# Patient Record
Sex: FEMALE | Race: WHITE | NOT HISPANIC OR LATINO | Employment: UNEMPLOYED | URBAN - NONMETROPOLITAN AREA
[De-identification: names, ages, dates, MRNs, and addresses within clinical notes are randomized per-mention and may not be internally consistent; named-entity substitution may affect disease eponyms.]

---

## 2017-03-11 ENCOUNTER — APPOINTMENT (OUTPATIENT)
Dept: GENERAL RADIOLOGY | Facility: HOSPITAL | Age: 82
End: 2017-03-11

## 2017-03-11 ENCOUNTER — HOSPITAL ENCOUNTER (EMERGENCY)
Facility: HOSPITAL | Age: 82
Discharge: HOME | End: 2017-03-11
Attending: EMERGENCY MEDICINE | Admitting: EMERGENCY MEDICINE

## 2017-03-11 VITALS
BODY MASS INDEX: 25.83 KG/M2 | TEMPERATURE: 97.6 F | OXYGEN SATURATION: 97 % | HEART RATE: 70 BPM | DIASTOLIC BLOOD PRESSURE: 80 MMHG | SYSTOLIC BLOOD PRESSURE: 142 MMHG | RESPIRATION RATE: 18 BRPM | HEIGHT: 65 IN | WEIGHT: 155 LBS
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
## 2017-03-11 DIAGNOSIS — S52.531A CLOSED COLLES' FRACTURE OF RIGHT RADIUS, INITIAL ENCOUNTER: Primary | ICD-10-CM

## 2017-03-11 DIAGNOSIS — S52.124A CLOSED NONDISPLACED FRACTURE OF HEAD OF RIGHT RADIUS, INITIAL ENCOUNTER: ICD-10-CM

## 2017-03-11 DIAGNOSIS — S52.611A CLOSED DISPLACED FRACTURE OF STYLOID PROCESS OF RIGHT ULNA, INITIAL ENCOUNTER: ICD-10-CM

## 2017-03-11 PROCEDURE — 73080 X-RAY EXAM OF ELBOW: CPT | Performed by: RADIOLOGY

## 2017-03-11 PROCEDURE — 73110 X-RAY EXAM OF WRIST: CPT | Performed by: RADIOLOGY

## 2017-03-11 PROCEDURE — 73130 X-RAY EXAM OF HAND: CPT

## 2017-03-11 PROCEDURE — 73110 X-RAY EXAM OF WRIST: CPT

## 2017-03-11 PROCEDURE — 99283 EMERGENCY DEPT VISIT LOW MDM: CPT

## 2017-03-11 PROCEDURE — 73080 X-RAY EXAM OF ELBOW: CPT

## 2017-03-11 PROCEDURE — 73130 X-RAY EXAM OF HAND: CPT | Performed by: RADIOLOGY

## 2017-03-11 RX ORDER — HYDROCODONE BITARTRATE AND ACETAMINOPHEN 5; 325 MG/1; MG/1
1 TABLET ORAL EVERY 6 HOURS PRN
Qty: 10 TABLET | Refills: 0 | Status: SHIPPED | OUTPATIENT
Start: 2017-03-11 | End: 2018-04-24 | Stop reason: ALTCHOICE

## 2017-03-11 NOTE — DISCHARGE INSTRUCTIONS

## 2017-03-11 NOTE — ED PROVIDER NOTES
Subjective   HPI Comments: 83-year-old female who presents to the ED today with a right wrist injury.  She states she fell last night.  She was stepping onto her porch and lost her balance and fell backwards into her yard.  She states she fell down one step.  She is complaining of pain in her right hand, right wrist and right elbow.  She denies any previous fracture or injury to this area.  Patient denies any other injury.  She denies any loss of consciousness.    Patient is a 83 y.o. female presenting with upper extremity pain.   History provided by:  Patient  Upper Extremity Issue   Location:  Wrist and elbow  Elbow location:  R elbow  Wrist location:  R wrist  Injury: yes    Time since incident:  1 day  Mechanism of injury: fall    Fall:     Fall occurred:  Down stairs    Impact surface:  Hamel    Point of impact:  Unable to specify    Entrapped after fall: no    Pain details:     Quality:  Aching and throbbing    Radiates to:  R elbow    Severity:  Moderate    Onset quality:  Sudden    Timing:  Constant    Progression:  Worsening  Foreign body present:  No foreign bodies  Prior injury to area:  No  Relieved by:  Rest  Worsened by:  Movement  Ineffective treatments:  None tried  Associated symptoms: decreased range of motion and swelling    Associated symptoms: no back pain, no fatigue, no fever, no muscle weakness, no neck pain, no numbness, no stiffness and no tingling        Review of Systems   Constitutional: Negative for fatigue and fever.   HENT: Negative.    Eyes: Negative.    Respiratory: Negative.    Cardiovascular: Negative.    Gastrointestinal: Negative.    Genitourinary: Negative.    Musculoskeletal: Positive for arthralgias and joint swelling. Negative for back pain, neck pain and stiffness.   Skin: Negative.    Neurological: Negative.    Psychiatric/Behavioral: Negative.    All other systems reviewed and are negative.      History reviewed. No pertinent past medical history.    Allergies    Allergen Reactions   • Motrin [Ibuprofen]    • Novocain [Procaine]        Past Surgical History   Procedure Laterality Date   • Breast biopsy Left      benign       History reviewed. No pertinent family history.    Social History     Social History   • Marital status:      Spouse name: N/A   • Number of children: N/A   • Years of education: N/A     Social History Main Topics   • Smoking status: Never Smoker   • Smokeless tobacco: None   • Alcohol use None   • Drug use: None   • Sexual activity: Not Asked     Other Topics Concern   • None     Social History Narrative   • None           Objective   Physical Exam   Constitutional: She is oriented to person, place, and time. She appears well-developed and well-nourished. No distress.   HENT:   Head: Normocephalic and atraumatic.   Right Ear: External ear normal.   Left Ear: External ear normal.   Nose: Nose normal.   Mouth/Throat: Oropharynx is clear and moist.   Eyes: Conjunctivae and EOM are normal. Pupils are equal, round, and reactive to light.   Neck: Normal range of motion. Neck supple.   Cardiovascular: Normal rate, regular rhythm, normal heart sounds and intact distal pulses.    Pulmonary/Chest: Effort normal and breath sounds normal. No respiratory distress. She has no wheezes. She exhibits no tenderness.   Abdominal: Soft. Bowel sounds are normal. There is no tenderness.   Musculoskeletal: She exhibits tenderness.   Pt has swelling and bruising from her right hand into her mid right forearm.  Decreased ROM of her right wrist, also has pain along the radial side of her right elbow.  She has a palpable right radial pulse.   Neurological: She is alert and oriented to person, place, and time.   Skin: Skin is warm and dry.   Psychiatric: She has a normal mood and affect. Her behavior is normal. Judgment and thought content normal.   Nursing note and vitals reviewed.      Splint Application  Date/Time: 3/11/2017 2:28 PM  Performed by: OSIRIS MINAYA  ROSINA  Authorized by: CHASTITY CLARK   Location details: right wrist  Splint type: sugar tong  Supplies used: Ortho-Glass  Post-procedure: The splinted body part was neurovascularly unchanged following the procedure.  Patient tolerance: Patient tolerated the procedure well with no immediate complications               ED Course  ED Course   Comment By Time   Pt declines any pain medication at this time. ANA Orozco 03/11 0926   Pt found to have a distal radius fracture and a possible right radial head fracture.  Will place in sugar tong splint and sling and have her follow up with ortho. ANA Orozco 03/11 1038                  MDM  Number of Diagnoses or Management Options  Closed Colles' fracture of right radius, initial encounter:   Closed displaced fracture of styloid process of right ulna, initial encounter:   Closed nondisplaced fracture of head of right radius, initial encounter:   Fall at home, initial encounter:      Amount and/or Complexity of Data Reviewed  Tests in the radiology section of CPT®: ordered and reviewed    Patient Progress  Patient progress: stable      Final diagnoses:   Closed Colles' fracture of right radius, initial encounter   Closed displaced fracture of styloid process of right ulna, initial encounter   Closed nondisplaced fracture of head of right radius, initial encounter   Fall at home, initial encounter            ANA Orozco  03/11/17 1427

## 2017-03-11 NOTE — ED NOTES
Discharge instructions reviewed with patient, patient instructed to return to ED if symptoms worsen or if any new problems arise. Patient verbalizes understanding of discharge instructions, patient ambulatory out of ED. No acute distress noted.       Renata Thornton RN  03/11/17 1127

## 2017-03-13 ENCOUNTER — OFFICE VISIT (OUTPATIENT)
Dept: ORTHOPEDIC SURGERY | Facility: CLINIC | Age: 82
End: 2017-03-13

## 2017-03-13 VITALS — BODY MASS INDEX: 25.83 KG/M2 | HEIGHT: 65 IN | WEIGHT: 155 LBS

## 2017-03-13 DIAGNOSIS — S52.601A RADIUS AND ULNA DISTAL FRACTURE, RIGHT, CLOSED, INITIAL ENCOUNTER: Primary | ICD-10-CM

## 2017-03-13 DIAGNOSIS — S52.501A RADIUS AND ULNA DISTAL FRACTURE, RIGHT, CLOSED, INITIAL ENCOUNTER: Primary | ICD-10-CM

## 2017-03-13 PROCEDURE — 25600 CLTX DST RDL FX/EPHYS SEP WO: CPT | Performed by: ORTHOPAEDIC SURGERY

## 2017-03-13 RX ORDER — METOPROLOL SUCCINATE 100 MG/1
100 TABLET, EXTENDED RELEASE ORAL DAILY
COMMUNITY
End: 2018-04-24 | Stop reason: ALTCHOICE

## 2017-03-13 RX ORDER — BENAZEPRIL HYDROCHLORIDE 20 MG/1
20 TABLET ORAL 2 TIMES DAILY
COMMUNITY
End: 2018-07-17 | Stop reason: DRUGHIGH

## 2017-03-13 RX ORDER — LEVOTHYROXINE SODIUM 112 UG/1
100 CAPSULE ORAL DAILY
Status: ON HOLD | COMMUNITY
End: 2018-05-08

## 2017-03-13 RX ORDER — HYDROCHLOROTHIAZIDE 12.5 MG/1
12.5 CAPSULE, GELATIN COATED ORAL DAILY PRN
Status: ON HOLD | COMMUNITY
End: 2022-12-20

## 2017-03-13 RX ORDER — AMLODIPINE BESYLATE 5 MG/1
5 TABLET ORAL DAILY
COMMUNITY
End: 2019-07-22 | Stop reason: DRUGHIGH

## 2017-03-13 RX ORDER — MELOXICAM 7.5 MG/1
7.5 TABLET ORAL 2 TIMES DAILY PRN
COMMUNITY
End: 2022-12-25 | Stop reason: HOSPADM

## 2017-03-13 RX ORDER — ASPIRIN 325 MG
325 TABLET ORAL DAILY
Status: ON HOLD | COMMUNITY

## 2017-03-13 NOTE — PROGRESS NOTES
New Patient Visit      Patient: Christine Garg  YOB: 1933  Date of Encounter: 03/13/2017          History of Present Illness:       83-year-old white female seen today secondary to an acute injury suffered 3/11/17. Patient states that she was attempting to step up on her porch when she fell back onto outstretched wrists. She had immediate pain as well as a popping sensation and swelling to the distal forearm and right wrist. She presented to the emergency department which radiographs of the hand, wrist and elbow were obtained. She was placed in a sugar tong splint and she was asked to follow-up with orthopedics. She is remain in the splint and sling with complaints of progressive swelling of her fingers. She reports intermittent numbness which has improved with the decrease swelling this a.m. She complains of a dull throbbing pain to the distal aspect of her wrist. No other prior injuries or complications with her wrist proper. She does report long-standing bilateral thumb osteoarthritis.      Past Medical History   Diagnosis Date   • High cholesterol    • Past heart attack    • Thyroid disease      Past Surgical History   Procedure Laterality Date   • Breast biopsy Left      benign   • Cataract extraction     • Coronary angioplasty       Social History     Occupational History   • Not on file.     Social History Main Topics   • Smoking status: Former Smoker   • Smokeless tobacco: Never Used   • Alcohol use No   • Drug use: Not on file   • Sexual activity: Not on file      Social History     Social History Narrative     Family History   Problem Relation Age of Onset   • Heart disease Father    • Heart disease Brother      Current Outpatient Prescriptions   Medication Sig Dispense Refill   • amLODIPine (NORVASC) 10 MG tablet Take 10 mg by mouth Daily.     • aspirin 325 MG tablet Take 325 mg by mouth Daily.     • benazepril (LOTENSIN) 10 MG tablet Take 10 mg by mouth Daily.     • hydrochlorothiazide  "(MICROZIDE) 12.5 MG capsule Take 12.5 mg by mouth Daily.     • levothyroxine (SYNTHROID, LEVOTHROID) 100 MCG tablet Take 100 mcg by mouth Daily.     • meloxicam (MOBIC) 15 MG tablet Take 15 mg by mouth Daily.     • metoprolol succinate XL (TOPROL-XL) 100 MG 24 hr tablet Take 100 mg by mouth Daily.     • HYDROcodone-acetaminophen (NORCO) 5-325 MG per tablet Take 1 tablet by mouth Every 6 (Six) Hours As Needed for severe pain (7-10). 10 tablet 0     No current facility-administered medications for this visit.      Allergies   Allergen Reactions   • Motrin [Ibuprofen]    • Novocain [Procaine]         Review of Systems   Constitution: Negative.   HENT: Negative.    Eyes: Negative.    Cardiovascular: Negative.    Respiratory: Negative.    Endocrine: Negative.    Hematologic/Lymphatic: Negative.    Skin: Negative.    Musculoskeletal:        Pertinent positives listed in HPI   Gastrointestinal: Positive for constipation.   Genitourinary: Negative.    Neurological: Negative.    Psychiatric/Behavioral: Negative.    Allergic/Immunologic: Negative.        Vitals:    03/13/17 1052   Weight: 155 lb (70.3 kg)   Height: 65\" (165.1 cm)       Physical Exam: 83 y.o. female .  General Appearance:    Well appearing, cooperative, in no acute distress.       Patient is alert and oriented x 3.            Musculoskeletal: On examination the patient has mild generalized swelling of the forearm with volar ecchymosis. She has tenderness upon any attempted range of motion of her wrist remainder of the range of motion was deferred secondary to notable fracture. She does have moderate generalized swelling starting at the wrist extending into the far hand and digits. She can weakly make a full fist. Palpable pulses. The patient has full flexion and extension of the elbow with no pain upon pronation or supination. Neurovascular status grossly intact.      Radiology:       AP, lateral and oblique x-rays of the right wrist reveals there is a " comminuted distal radius fracture with minimal impaction and extension into the radiocarpal joint. There is no associated obliquely oriented ulnar styloid fracture. Extensive first CMC joint osteoarthritis.    Procedures    Assesment:    ICD-10-CM ICD-9-CM   1. Radius and ulna distal fracture, right, closed, initial encounter S52.501A 813.44    S52.601A          Plan:    Discussion was had with the patient in regards to the intra-articular nondisplaced component of the distal radius fracture. We will treat this conservatively in short arm fiberglass cast applied today. She will remain in this for 4 weeks for xrays out of cast. Likely progression to brace at that time. She was given instructions on cast care as well as maximum elevation to help alleviate the swelling of the digits. Norco 5/325mg 1 po q 6 hours #20 no refills.     Discussion/Summary:    Written by Barrett Spring PA-C, acting as scribe for Dr. Taqueria CUELLAR, Ang Meng MD, personally performed the services described in this documentation as scribed by the above named individual in my presence, and it is both accurate and complete.  3/13/2017  1:44 PM      This document was signed by Barrett Spring PA-C March 13, 2017

## 2017-04-06 DIAGNOSIS — S52.501A CLOSED FRACTURE OF RIGHT DISTAL RADIUS AND ULNA, INITIAL ENCOUNTER: Primary | ICD-10-CM

## 2017-04-06 DIAGNOSIS — S52.601A CLOSED FRACTURE OF RIGHT DISTAL RADIUS AND ULNA, INITIAL ENCOUNTER: Primary | ICD-10-CM

## 2017-04-10 ENCOUNTER — HOSPITAL ENCOUNTER (OUTPATIENT)
Dept: GENERAL RADIOLOGY | Facility: HOSPITAL | Age: 82
Discharge: HOME | End: 2017-04-10
Attending: ORTHOPAEDIC SURGERY | Admitting: ORTHOPAEDIC SURGERY

## 2017-04-10 ENCOUNTER — OFFICE VISIT (OUTPATIENT)
Dept: ORTHOPEDIC SURGERY | Facility: CLINIC | Age: 82
End: 2017-04-10

## 2017-04-10 VITALS — WEIGHT: 155 LBS | BODY MASS INDEX: 25.83 KG/M2 | HEIGHT: 65 IN

## 2017-04-10 DIAGNOSIS — S52.601D CLOSED FRACTURE OF RIGHT DISTAL RADIUS AND ULNA, WITH ROUTINE HEALING, SUBSEQUENT ENCOUNTER: Primary | ICD-10-CM

## 2017-04-10 DIAGNOSIS — S52.601A CLOSED FRACTURE OF RIGHT DISTAL RADIUS AND ULNA, INITIAL ENCOUNTER: ICD-10-CM

## 2017-04-10 DIAGNOSIS — S52.501A CLOSED FRACTURE OF RIGHT DISTAL RADIUS AND ULNA, INITIAL ENCOUNTER: ICD-10-CM

## 2017-04-10 DIAGNOSIS — S52.501D CLOSED FRACTURE OF RIGHT DISTAL RADIUS AND ULNA, WITH ROUTINE HEALING, SUBSEQUENT ENCOUNTER: Primary | ICD-10-CM

## 2017-04-10 PROCEDURE — 99024 POSTOP FOLLOW-UP VISIT: CPT | Performed by: ORTHOPAEDIC SURGERY

## 2017-04-10 PROCEDURE — 73110 X-RAY EXAM OF WRIST: CPT

## 2017-04-10 PROCEDURE — 73110 X-RAY EXAM OF WRIST: CPT | Performed by: RADIOLOGY

## 2017-04-10 NOTE — PROGRESS NOTES
Christine Garg   :1933    Date of encounter:04/10/2017        HPI:  Christine Garg is a 83 y.o. female who returns here today for follow-up of a right distal radius and distal ulna fracture.  Date of injury was 3/11/2017.  She's been in a short arm cast for the last month.  She states she's tolerated it well.  She states she still has some mild pain does radiate down into her thumb.  She denies paresthesias.      Exam:  On examination she has mild swelling.  She has decreased range of motion.  There is no gross instability.  She still is mild tenderness along the distal radius.  Her neurovascular status is intact.    Radiology:  3 views of the right wrist show evidence of previous distal  radial and ulnar styloid fracture with evidence of healing    Assessment    ICD-10-CM ICD-9-CM   1. Closed fracture of right distal radius and ulna, with routine healing, subsequent encounter S52.501D V54.12    S52.601D        Plan:  An 83-year-old female 4 weeks post injury from a distal radius and ulnar styloid fracture of the right wrist.  X-rays today reveal no change in alignment with evidence of healing.  Today we provided her with a forearm brace to wear full time.  She can remove it for bathing purposes and gentle range of motion.  We will have her return back in one month for repeat x-rays and evaluation.    Written by, Ca PEREZ, acting as a scribe for Dr. Taqueria Tobar I, Ang Meng MD, personally performed the services described in this documentation as scribed by the above named individual in my presence, and it is both accurate and complete.  2017  10:00 PM

## 2017-05-05 DIAGNOSIS — S52.501D CLOSED FRACTURE OF RIGHT DISTAL RADIUS AND ULNA, WITH ROUTINE HEALING, SUBSEQUENT ENCOUNTER: Primary | ICD-10-CM

## 2017-05-05 DIAGNOSIS — S52.601D CLOSED FRACTURE OF RIGHT DISTAL RADIUS AND ULNA, WITH ROUTINE HEALING, SUBSEQUENT ENCOUNTER: Primary | ICD-10-CM

## 2017-05-08 ENCOUNTER — OFFICE VISIT (OUTPATIENT)
Dept: ORTHOPEDIC SURGERY | Facility: CLINIC | Age: 82
End: 2017-05-08

## 2017-05-08 ENCOUNTER — HOSPITAL ENCOUNTER (OUTPATIENT)
Dept: GENERAL RADIOLOGY | Facility: HOSPITAL | Age: 82
Discharge: HOME | End: 2017-05-08
Attending: ORTHOPAEDIC SURGERY | Admitting: ORTHOPAEDIC SURGERY

## 2017-05-08 DIAGNOSIS — M25.631 STIFFNESS OF WRIST JOINT, RIGHT: ICD-10-CM

## 2017-05-08 DIAGNOSIS — S52.501D CLOSED FRACTURE OF RIGHT DISTAL RADIUS AND ULNA, WITH ROUTINE HEALING, SUBSEQUENT ENCOUNTER: Primary | ICD-10-CM

## 2017-05-08 DIAGNOSIS — S52.601D CLOSED FRACTURE OF RIGHT DISTAL RADIUS AND ULNA, WITH ROUTINE HEALING, SUBSEQUENT ENCOUNTER: ICD-10-CM

## 2017-05-08 DIAGNOSIS — S52.501D CLOSED FRACTURE OF RIGHT DISTAL RADIUS AND ULNA, WITH ROUTINE HEALING, SUBSEQUENT ENCOUNTER: ICD-10-CM

## 2017-05-08 DIAGNOSIS — S52.601D CLOSED FRACTURE OF RIGHT DISTAL RADIUS AND ULNA, WITH ROUTINE HEALING, SUBSEQUENT ENCOUNTER: Primary | ICD-10-CM

## 2017-05-08 PROCEDURE — 73110 X-RAY EXAM OF WRIST: CPT | Performed by: RADIOLOGY

## 2017-05-08 PROCEDURE — 73110 X-RAY EXAM OF WRIST: CPT

## 2017-05-08 PROCEDURE — 99024 POSTOP FOLLOW-UP VISIT: CPT | Performed by: ORTHOPAEDIC SURGERY

## 2017-06-29 DIAGNOSIS — M25.531 RIGHT WRIST PAIN: Primary | ICD-10-CM

## 2017-07-05 ENCOUNTER — APPOINTMENT (OUTPATIENT)
Dept: GENERAL RADIOLOGY | Facility: HOSPITAL | Age: 82
End: 2017-07-05
Attending: ORTHOPAEDIC SURGERY

## 2017-08-15 ENCOUNTER — TRANSCRIBE ORDERS (OUTPATIENT)
Dept: ADMINISTRATIVE | Facility: HOSPITAL | Age: 82
End: 2017-08-15

## 2017-08-15 DIAGNOSIS — M81.0 OSTEOPOROSIS: Primary | ICD-10-CM

## 2017-08-21 ENCOUNTER — APPOINTMENT (OUTPATIENT)
Dept: BONE DENSITY | Facility: HOSPITAL | Age: 82
End: 2017-08-21
Attending: INTERNAL MEDICINE

## 2017-08-22 ENCOUNTER — HOSPITAL ENCOUNTER (OUTPATIENT)
Dept: BONE DENSITY | Facility: HOSPITAL | Age: 82
Discharge: HOME | End: 2017-08-22
Attending: INTERNAL MEDICINE | Admitting: INTERNAL MEDICINE

## 2017-08-22 DIAGNOSIS — M81.0 OSTEOPOROSIS: ICD-10-CM

## 2017-08-22 PROCEDURE — 77080 DXA BONE DENSITY AXIAL: CPT

## 2017-08-22 PROCEDURE — 77080 DXA BONE DENSITY AXIAL: CPT | Performed by: RADIOLOGY

## 2017-11-16 ENCOUNTER — TRANSCRIBE ORDERS (OUTPATIENT)
Dept: ADMINISTRATIVE | Facility: HOSPITAL | Age: 82
End: 2017-11-16

## 2017-11-16 DIAGNOSIS — R10.84 ABDOMINAL PAIN, DIFFUSE: Primary | ICD-10-CM

## 2017-11-17 ENCOUNTER — HOSPITAL ENCOUNTER (OUTPATIENT)
Dept: ULTRASOUND IMAGING | Facility: HOSPITAL | Age: 82
Discharge: HOME | End: 2017-11-17
Attending: INTERNAL MEDICINE | Admitting: INTERNAL MEDICINE

## 2017-11-17 DIAGNOSIS — R10.84 ABDOMINAL PAIN, DIFFUSE: ICD-10-CM

## 2017-11-17 PROCEDURE — 76700 US EXAM ABDOM COMPLETE: CPT

## 2017-11-17 PROCEDURE — 76700 US EXAM ABDOM COMPLETE: CPT | Performed by: RADIOLOGY

## 2018-04-24 ENCOUNTER — OFFICE VISIT (OUTPATIENT)
Dept: CARDIOLOGY | Facility: CLINIC | Age: 83
End: 2018-04-24

## 2018-04-24 VITALS
WEIGHT: 164.4 LBS | RESPIRATION RATE: 16 BRPM | BODY MASS INDEX: 27.39 KG/M2 | HEIGHT: 65 IN | DIASTOLIC BLOOD PRESSURE: 75 MMHG | SYSTOLIC BLOOD PRESSURE: 169 MMHG | HEART RATE: 68 BPM

## 2018-04-24 DIAGNOSIS — E78.5 DYSLIPIDEMIA: ICD-10-CM

## 2018-04-24 DIAGNOSIS — I10 ESSENTIAL HYPERTENSION: ICD-10-CM

## 2018-04-24 DIAGNOSIS — I25.10 ASCVD (ARTERIOSCLEROTIC CARDIOVASCULAR DISEASE): ICD-10-CM

## 2018-04-24 DIAGNOSIS — R60.0 LEG EDEMA: ICD-10-CM

## 2018-04-24 DIAGNOSIS — R07.2 PRECORDIAL PAIN: Primary | ICD-10-CM

## 2018-04-24 PROCEDURE — 99204 OFFICE O/P NEW MOD 45 MIN: CPT | Performed by: INTERNAL MEDICINE

## 2018-04-24 PROCEDURE — 93000 ELECTROCARDIOGRAM COMPLETE: CPT | Performed by: INTERNAL MEDICINE

## 2018-04-24 RX ORDER — CHLORAL HYDRATE 500 MG
CAPSULE ORAL
Status: ON HOLD | COMMUNITY
End: 2018-05-08

## 2018-04-24 RX ORDER — ACETAMINOPHEN 160 MG
2000 TABLET,DISINTEGRATING ORAL DAILY
COMMUNITY
End: 2023-03-22

## 2018-04-24 NOTE — PROGRESS NOTES
Dave Tobar MD  Christine [place]HEAD [place]  : 1933  DATE:2018    Patient Active Problem List   Diagnosis   • Precordial pain   • Essential hypertension   • Dyslipidemia   • Leg edema   • ASCVD (arteriosclerotic cardiovascular disease)       Dear Dave Tobar MD:    Subjective     Christine Garg is a 84 y.o. female with the above medical problems who is here today for follow-up. Patient states she was a previous patient of Dr. Mcduffie who has not been seen in several years.  She has a history of coronary artery disease underwent coronary artery bypass grafting ×3 in the past.  She also has a history of hypertension, dyslipidemia and thyroid disease.  She comes in today complaining of chest pain that she describes as midsternal, restless, so she'll shortness of breath, worsened on exertion and improved by nothing.  She states the intensity is 2/10 on the pain scale.  She also complains of dyspnea and lower extremity edema.  She denies any palpitations, orthopnea, PND, syncope or dizziness.  She is noted to be moderately hypertensive on current visit but she states she did not take her blood pressure medications this morning.  She has a previous history of smoking but quit 40 years ago.    Past Medical History:   Diagnosis Date   • High cholesterol    • Hypertension    • Past heart attack    • Thyroid disease        Past Surgical History:   Procedure Laterality Date   • BREAST BIOPSY Left     benign   • CATARACT EXTRACTION     • CORONARY ANGIOPLASTY         Family History   Problem Relation Age of Onset   • Heart disease Father    • Heart disease Brother        Social History     Social History   • Marital status:      Spouse name: N/A   • Number of children: N/A   • Years of education: N/A     Occupational History   • Not on file.     Social History Main Topics   • Smoking status: Former Smoker     Types: Cigarettes   • Smokeless tobacco: Never Used   • Alcohol use No   • Drug use: No   • Sexual  activity: Defer     Other Topics Concern   • Not on file     Social History Narrative   • No narrative on file         Current Outpatient Prescriptions:   •  amLODIPine (NORVASC) 5 MG tablet, Take 5 mg by mouth Daily., Disp: , Rfl:   •  aspirin 325 MG tablet, Take 325 mg by mouth Daily., Disp: , Rfl:   •  benazepril (LOTENSIN) 20 MG tablet, Take 10 mg by mouth Daily., Disp: , Rfl:   •  Calcium Carb-Cholecalciferol (OS-DARLYN PO), Take  by mouth., Disp: , Rfl:   •  Cholecalciferol (VITAMIN D3) 2000 units capsule, Take 1,000 Units by mouth Daily., Disp: , Rfl:   •  Flaxseed, Linseed, (FLAX SEEDS PO), Take  by mouth., Disp: , Rfl:   •  hydrochlorothiazide (MICROZIDE) 12.5 MG capsule, Take 12.5 mg by mouth Daily., Disp: , Rfl:   •  levothyroxine sodium (TIROSINT) 112 MCG capsule, Take 100 mcg by mouth Daily., Disp: , Rfl:   •  meloxicam (MOBIC) 7.5 MG tablet, Take 7.5 mg by mouth Daily., Disp: , Rfl:   •  metoprolol tartrate (LOPRESSOR) 25 MG tablet, Take 25 mg by mouth 2 (Two) Times a Day., Disp: , Rfl:   •  Omega-3 Fatty Acids (FISH OIL) 1000 MG capsule capsule, Take  by mouth Daily With Breakfast., Disp: , Rfl:   •  Red Yeast Rice Extract (RED YEAST RICE PO), Take  by mouth., Disp: , Rfl:     The following portions of the patient's history were reviewed and updated as appropriate: allergies, current medications, past family history, past medical history, past social history, past surgical history and problem list.    Review of Systems   Constitution: Positive for malaise/fatigue. Negative for chills, diaphoresis, fever, weakness, weight gain and weight loss.   HENT: Negative for congestion, ear discharge, ear pain, hearing loss, hoarse voice, nosebleeds, sore throat and tinnitus.    Eyes: Positive for blurred vision. Negative for discharge, double vision, pain and redness.   Cardiovascular: Positive for chest pain and leg swelling. Negative for dyspnea on exertion, irregular heartbeat, orthopnea, palpitations,  "paroxysmal nocturnal dyspnea and syncope.   Respiratory: Positive for shortness of breath. Negative for cough, hemoptysis and wheezing.    Endocrine: Negative for cold intolerance, heat intolerance, polydipsia, polyphagia and polyuria.   Hematologic/Lymphatic: Negative for bleeding problem. Does not bruise/bleed easily.   Skin: Negative for color change, dry skin, flushing, itching and rash.   Musculoskeletal: Positive for back pain, joint pain, joint swelling, muscle cramps and muscle weakness. Negative for arthritis, myalgias, neck pain and stiffness.   Gastrointestinal: Negative for abdominal pain, change in bowel habit, constipation, diarrhea, heartburn, nausea and vomiting.   Genitourinary: Positive for frequency. Negative for bladder incontinence, decreased libido, dysuria and hematuria.   Neurological: Positive for numbness. Negative for disturbances in coordination, dizziness, focal weakness, headaches, light-headedness, loss of balance, paresthesias and tremors.   Psychiatric/Behavioral: Negative for altered mental status, depression and memory loss. The patient does not have insomnia.    Allergic/Immunologic: Negative for hives.       Objective   Blood pressure 169/75, pulse 68, resp. rate 16, height 165.1 cm (65\"), weight 74.6 kg (164 lb 6.4 oz).    Physical Exam   Constitutional: She is oriented to person, place, and time. She appears well-developed and well-nourished.   Elderly WF sitting comfortably on chair.   HENT:   Mouth/Throat: Oropharynx is clear and moist.   Eyes: EOM are normal. Pupils are equal, round, and reactive to light.   Neck: Neck supple. No JVD present. No tracheal deviation present. No thyromegaly present.   Cardiovascular: Normal rate, regular rhythm, S1 normal and S2 normal.  Exam reveals no gallop and no friction rub.    No murmur heard.  Pulmonary/Chest: Effort normal and breath sounds normal. No respiratory distress. She has no wheezes. She has no rales.   Abdominal: Soft. Bowel " sounds are normal. She exhibits no mass. There is tenderness.   Musculoskeletal: Normal range of motion. She exhibits no edema.   Lymphadenopathy:     She has no cervical adenopathy.   Neurological: She is alert and oriented to person, place, and time.   Skin: Skin is warm and dry. No rash noted.   Psychiatric: She has a normal mood and affect.         ECG 12 Lead  Date/Time: 4/24/2018 8:36 AM  Performed by: MARTIN RHODES  Authorized by: MARTIN RHODES   Comparison: not compared with previous ECG   Previous ECG: no previous ECG available  Rhythm: sinus rhythm  Rate: normal  BPM: 60  Conduction: conduction normal  ST Segments: ST segments normal  T Waves: T waves normal  QRS axis: normal  Other findings: LVH and LAE  Clinical impression: non-specific ECG            Assessment/Plan      1.  Chest pain: Patient with chest pain concerning for coronary artery disease.  Especially in the setting of known history of coronary artery disease.  At this point we'll evaluate further with stress test and echocardiogram.  She is unable to complete a treadmill stress test due to bilateral knee pain and poor exercise tolerance.  We will therefore question nuclear stress test.    2.  ASCVD: Patient with history of ASCVD currently on aspirin, beta blocker and ACE inhibitor.  She is not on a statin due to history of intolerance in the past.  She states she is trying to get her cholesterol level with supplements.  This point we'll continue current regimen.    3.  Hypertension: Patient with history of hypertension which is currently uncontrolled.  However the patient states she did not take her blood pressure medications this morning as she was coming to the doctor's office.  I advised her of the importance of compliance with medication.  Otherwise the patient keep a blood pressure log and bring in next visit.  We will check CMP.    4.  Dyslipidemia: Patient with history of dyslipidemia with no recent  lipid panel record.  Will check.  She is currently not on a statin in spite of a history of coronary artery disease.  At this point the patient does not want to be started on a statin.  We will readdress with lipid panel.    5.  Lower extremity edema: Patient with bilateral lower extremity edema unclear etiology.  At this point we will assess further with a BNP and echocardiogram as above.       Diagnosis Plan   1. Precordial pain  ECG 12 Lead    Stress Test With Myocardial Perfusion (1 Day)    Adult Transthoracic Echo Complete W/ Cont if Necessary Per Protocol   2. ASCVD (arteriosclerotic cardiovascular disease)     3. Essential hypertension  Comprehensive Metabolic Panel   4. Dyslipidemia  Lipid Panel   5. Leg edema  BNP            Return in about 4 weeks (around 5/22/2018).    I appreciate the opportunity to participate in this patient's cardiovascular care.    Best Regards    Eyad Gonzalez

## 2018-05-07 ENCOUNTER — APPOINTMENT (OUTPATIENT)
Dept: CT IMAGING | Facility: HOSPITAL | Age: 83
End: 2018-05-07

## 2018-05-07 ENCOUNTER — APPOINTMENT (OUTPATIENT)
Dept: GENERAL RADIOLOGY | Facility: HOSPITAL | Age: 83
End: 2018-05-07

## 2018-05-07 ENCOUNTER — HOSPITAL ENCOUNTER (OUTPATIENT)
Facility: HOSPITAL | Age: 83
Setting detail: OBSERVATION
Discharge: HOME | End: 2018-05-08
Attending: FAMILY MEDICINE | Admitting: INTERNAL MEDICINE

## 2018-05-07 DIAGNOSIS — R07.9 CHEST PAIN WITH MODERATE RISK FOR CARDIAC ETIOLOGY: Primary | ICD-10-CM

## 2018-05-07 DIAGNOSIS — R06.00 DYSPNEA, UNSPECIFIED TYPE: ICD-10-CM

## 2018-05-07 LAB
ALBUMIN SERPL-MCNC: 4.2 G/DL (ref 3.4–4.8)
ALBUMIN/GLOB SERPL: 1.4 G/DL (ref 1.5–2.5)
ALP SERPL-CCNC: 104 U/L (ref 35–104)
ALT SERPL W P-5'-P-CCNC: 13 U/L (ref 10–36)
ANION GAP SERPL CALCULATED.3IONS-SCNC: 7.5 MMOL/L (ref 3.6–11.2)
APTT PPP: 27.5 SECONDS (ref 23.8–36.1)
AST SERPL-CCNC: 17 U/L (ref 10–30)
BASOPHILS # BLD AUTO: 0.04 10*3/MM3 (ref 0–0.3)
BASOPHILS NFR BLD AUTO: 0.6 % (ref 0–2)
BILIRUB SERPL-MCNC: 0.3 MG/DL (ref 0.2–1.8)
BNP SERPL-MCNC: 93 PG/ML (ref 0–100)
BUN BLD-MCNC: 30 MG/DL (ref 7–21)
BUN/CREAT SERPL: 20 (ref 7–25)
CALCIUM SPEC-SCNC: 9.7 MG/DL (ref 7.7–10)
CHLORIDE SERPL-SCNC: 106 MMOL/L (ref 99–112)
CK MB SERPL-CCNC: 2.14 NG/ML (ref 0–5)
CK MB SERPL-RTO: 2.3 % (ref 0–3)
CK SERPL-CCNC: 95 U/L (ref 24–173)
CO2 SERPL-SCNC: 24.5 MMOL/L (ref 24.3–31.9)
CREAT BLD-MCNC: 1.5 MG/DL (ref 0.43–1.29)
D DIMER PPP FEU-MCNC: 0.82 MCGFEU/ML (ref 0–0.5)
D-LACTATE SERPL-SCNC: 0.7 MMOL/L (ref 0.5–2)
DEPRECATED RDW RBC AUTO: 45.2 FL (ref 37–54)
EOSINOPHIL # BLD AUTO: 0.11 10*3/MM3 (ref 0–0.7)
EOSINOPHIL NFR BLD AUTO: 1.5 % (ref 0–7)
ERYTHROCYTE [DISTWIDTH] IN BLOOD BY AUTOMATED COUNT: 13.7 % (ref 11.5–14.5)
GFR SERPL CREATININE-BSD FRML MDRD: 33 ML/MIN/1.73
GLOBULIN UR ELPH-MCNC: 2.9 GM/DL
GLUCOSE BLD-MCNC: 101 MG/DL (ref 70–110)
HCT VFR BLD AUTO: 39.9 % (ref 37–47)
HGB BLD-MCNC: 13.5 G/DL (ref 12–16)
HOLD SPECIMEN: NORMAL
HOLD SPECIMEN: NORMAL
IMM GRANULOCYTES # BLD: 0.01 10*3/MM3 (ref 0–0.03)
IMM GRANULOCYTES NFR BLD: 0.1 % (ref 0–0.5)
INR PPP: 0.99 (ref 0.9–1.1)
LIPASE SERPL-CCNC: 48 U/L (ref 13–60)
LYMPHOCYTES # BLD AUTO: 1.67 10*3/MM3 (ref 1–3)
LYMPHOCYTES NFR BLD AUTO: 23.1 % (ref 16–46)
MCH RBC QN AUTO: 31.2 PG (ref 27–33)
MCHC RBC AUTO-ENTMCNC: 33.8 G/DL (ref 33–37)
MCV RBC AUTO: 92.1 FL (ref 80–94)
MONOCYTES # BLD AUTO: 0.66 10*3/MM3 (ref 0.1–0.9)
MONOCYTES NFR BLD AUTO: 9.1 % (ref 0–12)
NEUTROPHILS # BLD AUTO: 4.74 10*3/MM3 (ref 1.4–6.5)
NEUTROPHILS NFR BLD AUTO: 65.6 % (ref 40–75)
OSMOLALITY SERPL CALC.SUM OF ELEC: 282 MOSM/KG (ref 273–305)
PLATELET # BLD AUTO: 304 10*3/MM3 (ref 130–400)
PMV BLD AUTO: 10.3 FL (ref 6–10)
POTASSIUM BLD-SCNC: 4.2 MMOL/L (ref 3.5–5.3)
PROT SERPL-MCNC: 7.1 G/DL (ref 6–8)
PROTHROMBIN TIME: 13.3 SECONDS (ref 11–15.4)
RBC # BLD AUTO: 4.33 10*6/MM3 (ref 4.2–5.4)
SODIUM BLD-SCNC: 138 MMOL/L (ref 135–153)
TROPONIN I SERPL-MCNC: 0.01 NG/ML
TROPONIN I SERPL-MCNC: 0.01 NG/ML
WBC NRBC COR # BLD: 7.23 10*3/MM3 (ref 4.5–12.5)
WHOLE BLOOD HOLD SPECIMEN: NORMAL
WHOLE BLOOD HOLD SPECIMEN: NORMAL

## 2018-05-07 PROCEDURE — 96360 HYDRATION IV INFUSION INIT: CPT

## 2018-05-07 PROCEDURE — 93010 ELECTROCARDIOGRAM REPORT: CPT | Performed by: INTERNAL MEDICINE

## 2018-05-07 PROCEDURE — 85379 FIBRIN DEGRADATION QUANT: CPT | Performed by: FAMILY MEDICINE

## 2018-05-07 PROCEDURE — 84484 ASSAY OF TROPONIN QUANT: CPT | Performed by: FAMILY MEDICINE

## 2018-05-07 PROCEDURE — 36415 COLL VENOUS BLD VENIPUNCTURE: CPT

## 2018-05-07 PROCEDURE — 99285 EMERGENCY DEPT VISIT HI MDM: CPT

## 2018-05-07 PROCEDURE — 85610 PROTHROMBIN TIME: CPT | Performed by: FAMILY MEDICINE

## 2018-05-07 PROCEDURE — 80053 COMPREHEN METABOLIC PANEL: CPT | Performed by: FAMILY MEDICINE

## 2018-05-07 PROCEDURE — 83690 ASSAY OF LIPASE: CPT | Performed by: FAMILY MEDICINE

## 2018-05-07 PROCEDURE — 82550 ASSAY OF CK (CPK): CPT | Performed by: FAMILY MEDICINE

## 2018-05-07 PROCEDURE — 83880 ASSAY OF NATRIURETIC PEPTIDE: CPT | Performed by: FAMILY MEDICINE

## 2018-05-07 PROCEDURE — 85730 THROMBOPLASTIN TIME PARTIAL: CPT | Performed by: FAMILY MEDICINE

## 2018-05-07 PROCEDURE — 71250 CT THORAX DX C-: CPT | Performed by: RADIOLOGY

## 2018-05-07 PROCEDURE — 71046 X-RAY EXAM CHEST 2 VIEWS: CPT | Performed by: RADIOLOGY

## 2018-05-07 PROCEDURE — 82553 CREATINE MB FRACTION: CPT | Performed by: FAMILY MEDICINE

## 2018-05-07 PROCEDURE — 71046 X-RAY EXAM CHEST 2 VIEWS: CPT

## 2018-05-07 PROCEDURE — 93005 ELECTROCARDIOGRAM TRACING: CPT | Performed by: FAMILY MEDICINE

## 2018-05-07 PROCEDURE — 87040 BLOOD CULTURE FOR BACTERIA: CPT | Performed by: FAMILY MEDICINE

## 2018-05-07 PROCEDURE — 83605 ASSAY OF LACTIC ACID: CPT | Performed by: FAMILY MEDICINE

## 2018-05-07 PROCEDURE — 85025 COMPLETE CBC W/AUTO DIFF WBC: CPT | Performed by: INTERNAL MEDICINE

## 2018-05-07 PROCEDURE — 71250 CT THORAX DX C-: CPT

## 2018-05-07 RX ORDER — SODIUM CHLORIDE 0.9 % (FLUSH) 0.9 %
10 SYRINGE (ML) INJECTION AS NEEDED
Status: DISCONTINUED | OUTPATIENT
Start: 2018-05-07 | End: 2018-05-08 | Stop reason: HOSPADM

## 2018-05-07 RX ADMIN — SODIUM CHLORIDE 1000 ML: 9 INJECTION, SOLUTION INTRAVENOUS at 21:47

## 2018-05-07 RX ADMIN — SODIUM CHLORIDE 500 ML: 9 INJECTION, SOLUTION INTRAVENOUS at 19:55

## 2018-05-07 NOTE — ED PROVIDER NOTES
Subjective   84-year-old white female with a past medical history of coronary artery disease status post the triple bypass also has hypertension hyperlipidemia and hypothyroidism presents the emergency department complaining of shortness of breath increasing for 4  weeks duration        History provided by:  Patient  Shortness of Breath   Severity:  Mild  Onset quality:  Gradual  Timing:  Intermittent  Progression:  Worsening  Chronicity:  New  Context: activity    Relieved by:  Nothing  Worsened by:  Activity  Ineffective treatments:  None tried  Associated symptoms: chest pain    Associated symptoms: no abdominal pain, no claudication, no fever and no syncope    Chest pain:     Quality: aching      Severity:  Mild    Onset quality:  Gradual    Timing:  Intermittent    Progression:  Resolved    Chronicity:  New  Risk factors: no recent alcohol use, no family hx of DVT, no hx of cancer, no hx of PE/DVT, no obesity, no oral contraceptive use, no prolonged immobilization, no recent surgery and no tobacco use        Review of Systems   Constitutional: Negative.  Negative for fever.   HENT: Negative.    Respiratory: Positive for shortness of breath.    Cardiovascular: Positive for chest pain. Negative for claudication and syncope.   Gastrointestinal: Negative.  Negative for abdominal pain.   Endocrine: Negative.    Genitourinary: Negative.  Negative for dysuria.   Skin: Negative.    Neurological: Negative.    Psychiatric/Behavioral: Negative.    All other systems reviewed and are negative.      Past Medical History:   Diagnosis Date   • High cholesterol    • Hypertension    • Past heart attack    • Thyroid disease        Allergies   Allergen Reactions   • Motrin [Ibuprofen] Anaphylaxis and Hives   • Novocain [Procaine] Other (See Comments)     Pt state she passes out.       Past Surgical History:   Procedure Laterality Date   • BREAST BIOPSY Left     benign   • CATARACT EXTRACTION     • CORONARY ANGIOPLASTY     •  CORONARY ARTERY BYPASS GRAFT         Family History   Problem Relation Age of Onset   • Heart disease Father    • Heart disease Brother        Social History     Social History   • Marital status:      Social History Main Topics   • Smoking status: Former Smoker     Types: Cigarettes   • Smokeless tobacco: Never Used   • Alcohol use No   • Drug use: No   • Sexual activity: Defer     Other Topics Concern   • Not on file           Objective   Physical Exam   Constitutional: She is oriented to person, place, and time. She appears well-developed and well-nourished.   HENT:   Head: Normocephalic and atraumatic.   Right Ear: External ear normal.   Left Ear: External ear normal.   Eyes: EOM are normal. Pupils are equal, round, and reactive to light.   Neck: Neck supple.   Cardiovascular: Normal rate and regular rhythm.    Pulmonary/Chest: Effort normal and breath sounds normal.   Abdominal: Soft. Bowel sounds are normal.   Musculoskeletal: Normal range of motion.   Neurological: She is alert and oriented to person, place, and time.   Skin: Skin is warm. Capillary refill takes less than 2 seconds.   Psychiatric: She has a normal mood and affect. Her behavior is normal. Judgment and thought content normal.   Nursing note and vitals reviewed.      Procedures           ED Course  ED Course   Comment By Time   PT with history of CABG concern for this progressive SOA - has been seen for this at [facility] and is scheduled for Outpt stress; but is concerned that this is simlar to presentation last time she had triple bypass; obtain ddimer to ensure we were not missing finding such as ddimer- pt has elevated Cr so will need a VQ as well as stress- hospitlist will admit for further eval Ca Farah,  05/08 0005                  MDM  Number of Diagnoses or Management Options  Chest pain with moderate risk for cardiac etiology: new and requires workup  Dyspnea, unspecified type: new and requires workup     Amount  and/or Complexity of Data Reviewed  Clinical lab tests: ordered and reviewed  Tests in the radiology section of CPT®: ordered and reviewed  Tests in the medicine section of CPT®: ordered and reviewed  Decide to obtain previous medical records or to obtain history from someone other than the patient: yes  Discuss the patient with other providers: yes  Independent visualization of images, tracings, or specimens: yes    Risk of Complications, Morbidity, and/or Mortality  Presenting problems: high  Diagnostic procedures: high  Management options: high    Patient Progress  Patient progress: stable        Final diagnoses:   Chest pain with moderate risk for cardiac etiology   Dyspnea, unspecified type            Ca Farah DO  05/08/18 2686

## 2018-05-08 ENCOUNTER — APPOINTMENT (OUTPATIENT)
Dept: CARDIOLOGY | Facility: HOSPITAL | Age: 83
End: 2018-05-08
Attending: INTERNAL MEDICINE

## 2018-05-08 ENCOUNTER — APPOINTMENT (OUTPATIENT)
Dept: NUCLEAR MEDICINE | Facility: HOSPITAL | Age: 83
End: 2018-05-08

## 2018-05-08 ENCOUNTER — APPOINTMENT (OUTPATIENT)
Dept: CARDIOLOGY | Facility: HOSPITAL | Age: 83
End: 2018-05-08

## 2018-05-08 VITALS
BODY MASS INDEX: 27.69 KG/M2 | DIASTOLIC BLOOD PRESSURE: 57 MMHG | HEART RATE: 71 BPM | HEIGHT: 65 IN | SYSTOLIC BLOOD PRESSURE: 123 MMHG | RESPIRATION RATE: 20 BRPM | OXYGEN SATURATION: 95 % | WEIGHT: 166.2 LBS | TEMPERATURE: 98 F

## 2018-05-08 LAB
ALBUMIN SERPL-MCNC: 3.3 G/DL (ref 3.4–4.8)
ALBUMIN/GLOB SERPL: 1.4 G/DL (ref 1.5–2.5)
ALP SERPL-CCNC: 80 U/L (ref 35–104)
ALT SERPL W P-5'-P-CCNC: 14 U/L (ref 10–36)
ANION GAP SERPL CALCULATED.3IONS-SCNC: 7.7 MMOL/L (ref 3.6–11.2)
AST SERPL-CCNC: 15 U/L (ref 10–30)
BASOPHILS # BLD AUTO: 0.05 10*3/MM3 (ref 0–0.3)
BASOPHILS NFR BLD AUTO: 0.8 % (ref 0–2)
BH CV ECHO MEAS - % IVS THICK: 13.6 %
BH CV ECHO MEAS - % LVPW THICK: 53.6 %
BH CV ECHO MEAS - ACS: 1.6 CM
BH CV ECHO MEAS - AO MAX PG: 12.1 MMHG
BH CV ECHO MEAS - AO MEAN PG: 5.1 MMHG
BH CV ECHO MEAS - AO ROOT AREA (BSA CORRECTED): 1.5
BH CV ECHO MEAS - AO ROOT AREA: 5.6 CM^2
BH CV ECHO MEAS - AO ROOT DIAM: 2.7 CM
BH CV ECHO MEAS - AO V2 MAX: 173.7 CM/SEC
BH CV ECHO MEAS - AO V2 MEAN: 101.6 CM/SEC
BH CV ECHO MEAS - AO V2 VTI: 36.9 CM
BH CV ECHO MEAS - BSA(HAYCOCK): 1.9 M^2
BH CV ECHO MEAS - BSA: 1.8 M^2
BH CV ECHO MEAS - BZI_BMI: 27.6 KILOGRAMS/M^2
BH CV ECHO MEAS - BZI_METRIC_HEIGHT: 165.1 CM
BH CV ECHO MEAS - BZI_METRIC_WEIGHT: 75.3 KG
BH CV ECHO MEAS - CONTRAST EF 4CH: 65.9 ML/M^2
BH CV ECHO MEAS - EDV(CUBED): 63 ML
BH CV ECHO MEAS - EDV(MOD-SP4): 41 ML
BH CV ECHO MEAS - EDV(TEICH): 69.1 ML
BH CV ECHO MEAS - EF(CUBED): 65.2 %
BH CV ECHO MEAS - EF(MOD-SP4): 65.9 %
BH CV ECHO MEAS - EF(TEICH): 57.3 %
BH CV ECHO MEAS - ESV(CUBED): 21.9 ML
BH CV ECHO MEAS - ESV(MOD-SP4): 14 ML
BH CV ECHO MEAS - ESV(TEICH): 29.5 ML
BH CV ECHO MEAS - FS: 29.6 %
BH CV ECHO MEAS - IVS/LVPW: 1.2
BH CV ECHO MEAS - IVSD: 1.1 CM
BH CV ECHO MEAS - IVSS: 1.2 CM
BH CV ECHO MEAS - LA DIMENSION: 2.9 CM
BH CV ECHO MEAS - LA/AO: 1.1
BH CV ECHO MEAS - LV DIASTOLIC VOL/BSA (35-75): 22.4 ML/M^2
BH CV ECHO MEAS - LV MASS(C)D: 121.4 GRAMS
BH CV ECHO MEAS - LV MASS(C)DI: 66.4 GRAMS/M^2
BH CV ECHO MEAS - LV MASS(C)S: 110.9 GRAMS
BH CV ECHO MEAS - LV MASS(C)SI: 60.7 GRAMS/M^2
BH CV ECHO MEAS - LV SYSTOLIC VOL/BSA (12-30): 7.7 ML/M^2
BH CV ECHO MEAS - LVIDD: 4 CM
BH CV ECHO MEAS - LVIDS: 2.8 CM
BH CV ECHO MEAS - LVLD AP4: 6.6 CM
BH CV ECHO MEAS - LVLS AP4: 6.2 CM
BH CV ECHO MEAS - LVOT AREA (M): 3.1 CM^2
BH CV ECHO MEAS - LVOT AREA: 3 CM^2
BH CV ECHO MEAS - LVOT DIAM: 2 CM
BH CV ECHO MEAS - LVPWD: 0.88 CM
BH CV ECHO MEAS - LVPWS: 1.4 CM
BH CV ECHO MEAS - MV A MAX VEL: 101.2 CM/SEC
BH CV ECHO MEAS - MV E MAX VEL: 82.9 CM/SEC
BH CV ECHO MEAS - MV E/A: 0.82
BH CV ECHO MEAS - PA ACC SLOPE: 1225 CM/SEC^2
BH CV ECHO MEAS - PA ACC TIME: 0.12 SEC
BH CV ECHO MEAS - PA PR(ACCEL): 26.7 MMHG
BH CV ECHO MEAS - RAP SYSTOLE: 10 MMHG
BH CV ECHO MEAS - RVSP: 40 MMHG
BH CV ECHO MEAS - SI(AO): 112.8 ML/M^2
BH CV ECHO MEAS - SI(CUBED): 22.5 ML/M^2
BH CV ECHO MEAS - SI(MOD-SP4): 14.8 ML/M^2
BH CV ECHO MEAS - SI(TEICH): 21.7 ML/M^2
BH CV ECHO MEAS - SV(AO): 206.2 ML
BH CV ECHO MEAS - SV(CUBED): 41.1 ML
BH CV ECHO MEAS - SV(MOD-SP4): 27 ML
BH CV ECHO MEAS - SV(TEICH): 39.6 ML
BH CV ECHO MEAS - TR MAX VEL: 273.6 CM/SEC
BH CV NUCLEAR PRIOR STUDY: 3
BH CV STRESS BP STAGE 1: NORMAL
BH CV STRESS BP STAGE 2: NORMAL
BH CV STRESS COMMENTS STAGE 1: NORMAL
BH CV STRESS COMMENTS STAGE 2: NORMAL
BH CV STRESS DOSE REGADENOSON STAGE 1: 0.4
BH CV STRESS DURATION MIN STAGE 1: 0
BH CV STRESS DURATION MIN STAGE 2: 4
BH CV STRESS DURATION SEC STAGE 1: 10
BH CV STRESS DURATION SEC STAGE 2: 0
BH CV STRESS HR STAGE 1: 88
BH CV STRESS HR STAGE 2: 87
BH CV STRESS PROTOCOL 1: NORMAL
BH CV STRESS RECOVERY BP: NORMAL MMHG
BH CV STRESS RECOVERY HR: 70 BPM
BH CV STRESS STAGE 1: 1
BH CV STRESS STAGE 2: 2
BILIRUB SERPL-MCNC: 0.3 MG/DL (ref 0.2–1.8)
BUN BLD-MCNC: 26 MG/DL (ref 7–21)
BUN/CREAT SERPL: 23.6 (ref 7–25)
CALCIUM SPEC-SCNC: 8.6 MG/DL (ref 7.7–10)
CHLORIDE SERPL-SCNC: 109 MMOL/L (ref 99–112)
CHOLEST SERPL-MCNC: 184 MG/DL (ref 0–200)
CK MB SERPL-CCNC: 1.46 NG/ML (ref 0–5)
CK MB SERPL-CCNC: 1.76 NG/ML (ref 0–5)
CK MB SERPL-CCNC: 2.01 NG/ML (ref 0–5)
CK MB SERPL-RTO: 2.1 % (ref 0–3)
CK MB SERPL-RTO: 2.5 % (ref 0–3)
CK MB SERPL-RTO: 2.8 % (ref 0–3)
CK SERPL-CCNC: 68 U/L (ref 24–173)
CK SERPL-CCNC: 70 U/L (ref 24–173)
CK SERPL-CCNC: 72 U/L (ref 24–173)
CO2 SERPL-SCNC: 24.3 MMOL/L (ref 24.3–31.9)
CREAT BLD-MCNC: 1.1 MG/DL (ref 0.43–1.29)
DEPRECATED RDW RBC AUTO: 45.1 FL (ref 37–54)
EOSINOPHIL # BLD AUTO: 0.09 10*3/MM3 (ref 0–0.7)
EOSINOPHIL NFR BLD AUTO: 1.5 % (ref 0–7)
ERYTHROCYTE [DISTWIDTH] IN BLOOD BY AUTOMATED COUNT: 13.7 % (ref 11.5–14.5)
GFR SERPL CREATININE-BSD FRML MDRD: 47 ML/MIN/1.73
GLOBULIN UR ELPH-MCNC: 2.4 GM/DL
GLUCOSE BLD-MCNC: 145 MG/DL (ref 70–110)
HBA1C MFR BLD: 5.8 % (ref 4.5–5.7)
HCT VFR BLD AUTO: 35.7 % (ref 37–47)
HDLC SERPL-MCNC: 53 MG/DL (ref 60–100)
HGB BLD-MCNC: 11.7 G/DL (ref 12–16)
IMM GRANULOCYTES # BLD: 0.01 10*3/MM3 (ref 0–0.03)
IMM GRANULOCYTES NFR BLD: 0.2 % (ref 0–0.5)
LDLC SERPL CALC-MCNC: 111 MG/DL (ref 0–100)
LDLC/HDLC SERPL: 2.09 {RATIO}
LYMPHOCYTES # BLD AUTO: 2.1 10*3/MM3 (ref 1–3)
LYMPHOCYTES NFR BLD AUTO: 35.1 % (ref 16–46)
MAXIMAL PREDICTED HEART RATE: 136 BPM
MAXIMAL PREDICTED HEART RATE: 136 BPM
MCH RBC QN AUTO: 30.9 PG (ref 27–33)
MCHC RBC AUTO-ENTMCNC: 32.8 G/DL (ref 33–37)
MCV RBC AUTO: 94.2 FL (ref 80–94)
MONOCYTES # BLD AUTO: 0.58 10*3/MM3 (ref 0.1–0.9)
MONOCYTES NFR BLD AUTO: 9.7 % (ref 0–12)
MYOGLOBIN SERPL-MCNC: 51 NG/ML (ref 0–109)
MYOGLOBIN SERPL-MCNC: 61 NG/ML (ref 0–109)
MYOGLOBIN SERPL-MCNC: 65 NG/ML (ref 0–109)
NEUTROPHILS # BLD AUTO: 3.15 10*3/MM3 (ref 1.4–6.5)
NEUTROPHILS NFR BLD AUTO: 52.7 % (ref 40–75)
OSMOLALITY SERPL CALC.SUM OF ELEC: 288.6 MOSM/KG (ref 273–305)
PERCENT MAX PREDICTED HR: 64.71 %
PLATELET # BLD AUTO: 261 10*3/MM3 (ref 130–400)
PMV BLD AUTO: 10.4 FL (ref 6–10)
POTASSIUM BLD-SCNC: 3.6 MMOL/L (ref 3.5–5.3)
PROT SERPL-MCNC: 5.7 G/DL (ref 6–8)
RBC # BLD AUTO: 3.79 10*6/MM3 (ref 4.2–5.4)
SODIUM BLD-SCNC: 141 MMOL/L (ref 135–153)
STRESS BASELINE BP: NORMAL MMHG
STRESS BASELINE HR: 58 BPM
STRESS PERCENT HR: 76 %
STRESS POST PEAK BP: NORMAL MMHG
STRESS POST PEAK HR: 88 BPM
STRESS TARGET HR: 116 BPM
STRESS TARGET HR: 116 BPM
TRIGL SERPL-MCNC: 101 MG/DL (ref 0–150)
TROPONIN I SERPL-MCNC: 0.01 NG/ML
TROPONIN I SERPL-MCNC: 0.02 NG/ML
TROPONIN I SERPL-MCNC: 0.02 NG/ML
VLDLC SERPL-MCNC: 20.2 MG/DL
WBC NRBC COR # BLD: 5.98 10*3/MM3 (ref 4.5–12.5)

## 2018-05-08 PROCEDURE — 96372 THER/PROPH/DIAG INJ SC/IM: CPT

## 2018-05-08 PROCEDURE — 84484 ASSAY OF TROPONIN QUANT: CPT | Performed by: INTERNAL MEDICINE

## 2018-05-08 PROCEDURE — 83036 HEMOGLOBIN GLYCOSYLATED A1C: CPT | Performed by: INTERNAL MEDICINE

## 2018-05-08 PROCEDURE — 93306 TTE W/DOPPLER COMPLETE: CPT

## 2018-05-08 PROCEDURE — G0378 HOSPITAL OBSERVATION PER HR: HCPCS

## 2018-05-08 PROCEDURE — 25010000002 REGADENOSON 0.4 MG/5ML SOLUTION: Performed by: INTERNAL MEDICINE

## 2018-05-08 PROCEDURE — 0 TECHNETIUM SESTAMIBI: Performed by: INTERNAL MEDICINE

## 2018-05-08 PROCEDURE — 25010000002 ENOXAPARIN PER 10 MG: Performed by: INTERNAL MEDICINE

## 2018-05-08 PROCEDURE — 78452 HT MUSCLE IMAGE SPECT MULT: CPT | Performed by: INTERNAL MEDICINE

## 2018-05-08 PROCEDURE — 85025 COMPLETE CBC W/AUTO DIFF WBC: CPT | Performed by: INTERNAL MEDICINE

## 2018-05-08 PROCEDURE — A9500 TC99M SESTAMIBI: HCPCS | Performed by: INTERNAL MEDICINE

## 2018-05-08 PROCEDURE — 93017 CV STRESS TEST TRACING ONLY: CPT

## 2018-05-08 PROCEDURE — 78452 HT MUSCLE IMAGE SPECT MULT: CPT

## 2018-05-08 PROCEDURE — 80053 COMPREHEN METABOLIC PANEL: CPT | Performed by: INTERNAL MEDICINE

## 2018-05-08 PROCEDURE — 94799 UNLISTED PULMONARY SVC/PX: CPT

## 2018-05-08 PROCEDURE — 93018 CV STRESS TEST I&R ONLY: CPT | Performed by: INTERNAL MEDICINE

## 2018-05-08 PROCEDURE — 93306 TTE W/DOPPLER COMPLETE: CPT | Performed by: INTERNAL MEDICINE

## 2018-05-08 PROCEDURE — 80061 LIPID PANEL: CPT | Performed by: INTERNAL MEDICINE

## 2018-05-08 PROCEDURE — 99236 HOSP IP/OBS SAME DATE HI 85: CPT | Performed by: INTERNAL MEDICINE

## 2018-05-08 PROCEDURE — 82553 CREATINE MB FRACTION: CPT | Performed by: INTERNAL MEDICINE

## 2018-05-08 PROCEDURE — 83874 ASSAY OF MYOGLOBIN: CPT | Performed by: INTERNAL MEDICINE

## 2018-05-08 PROCEDURE — 82550 ASSAY OF CK (CPK): CPT | Performed by: INTERNAL MEDICINE

## 2018-05-08 RX ORDER — LISINOPRIL 10 MG/1
20 TABLET ORAL DAILY
Status: CANCELLED | OUTPATIENT
Start: 2018-05-08

## 2018-05-08 RX ORDER — MELOXICAM 7.5 MG/1
7.5 TABLET ORAL 2 TIMES DAILY PRN
Status: CANCELLED | OUTPATIENT
Start: 2018-05-08

## 2018-05-08 RX ORDER — AMLODIPINE BESYLATE 5 MG/1
5 TABLET ORAL DAILY
Status: DISCONTINUED | OUTPATIENT
Start: 2018-05-08 | End: 2018-05-08 | Stop reason: HOSPADM

## 2018-05-08 RX ORDER — ASPIRIN 325 MG
325 TABLET ORAL DAILY
Status: DISCONTINUED | OUTPATIENT
Start: 2018-05-08 | End: 2018-05-08 | Stop reason: HOSPADM

## 2018-05-08 RX ORDER — OMEGA-3S/DHA/EPA/FISH OIL/D3 300MG-1000
2000 CAPSULE ORAL DAILY
Status: DISCONTINUED | OUTPATIENT
Start: 2018-05-08 | End: 2018-05-08 | Stop reason: HOSPADM

## 2018-05-08 RX ORDER — SODIUM CHLORIDE 0.9 % (FLUSH) 0.9 %
1-10 SYRINGE (ML) INJECTION AS NEEDED
Status: DISCONTINUED | OUTPATIENT
Start: 2018-05-08 | End: 2018-05-08 | Stop reason: HOSPADM

## 2018-05-08 RX ORDER — LEVOTHYROXINE SODIUM 0.07 MG/1
112 TABLET ORAL DAILY
Status: DISCONTINUED | OUTPATIENT
Start: 2018-05-08 | End: 2018-05-08 | Stop reason: HOSPADM

## 2018-05-08 RX ORDER — ACETAMINOPHEN 160 MG
2000 TABLET,DISINTEGRATING ORAL DAILY
Status: CANCELLED | OUTPATIENT
Start: 2018-05-08

## 2018-05-08 RX ORDER — HEPARIN SODIUM 5000 [USP'U]/ML
5000 INJECTION, SOLUTION INTRAVENOUS; SUBCUTANEOUS EVERY 12 HOURS SCHEDULED
Status: DISCONTINUED | OUTPATIENT
Start: 2018-05-08 | End: 2018-05-08

## 2018-05-08 RX ORDER — HYDROCHLOROTHIAZIDE 12.5 MG/1
12.5 CAPSULE, GELATIN COATED ORAL DAILY
Status: CANCELLED | OUTPATIENT
Start: 2018-05-08

## 2018-05-08 RX ORDER — LEVOTHYROXINE SODIUM 112 UG/1
112 TABLET ORAL
Status: ON HOLD | COMMUNITY

## 2018-05-08 RX ADMIN — ASPIRIN 325 MG: 325 TABLET ORAL at 09:25

## 2018-05-08 RX ADMIN — REGADENOSON 0.4 MG: 0.08 INJECTION, SOLUTION INTRAVENOUS at 12:15

## 2018-05-08 RX ADMIN — TECHNETIUM TC 99M SESTAMIBI 1 DOSE: 1 INJECTION INTRAVENOUS at 10:25

## 2018-05-08 RX ADMIN — CHOLECALCIFEROL TAB 10 MCG (400 UNIT) 2000 UNITS: 10 TAB at 09:24

## 2018-05-08 RX ADMIN — METOPROLOL TARTRATE 25 MG: 25 TABLET, FILM COATED ORAL at 09:25

## 2018-05-08 RX ADMIN — TECHNETIUM TC 99M SESTAMIBI 1 DOSE: 1 INJECTION INTRAVENOUS at 12:15

## 2018-05-08 RX ADMIN — LEVOTHYROXINE SODIUM 112 MCG: 75 TABLET ORAL at 08:42

## 2018-05-08 RX ADMIN — AMLODIPINE BESYLATE 5 MG: 5 TABLET ORAL at 09:25

## 2018-05-08 RX ADMIN — ENOXAPARIN SODIUM 80 MG: 80 INJECTION SUBCUTANEOUS at 08:42

## 2018-05-08 NOTE — PLAN OF CARE
Problem: Pain, Acute (Adult)  Goal: Identify Related Risk Factors and Signs and Symptoms  Outcome: Ongoing (interventions implemented as appropriate)    Goal: Acceptable Pain Control/Comfort Level  Outcome: Ongoing (interventions implemented as appropriate)      Problem: Patient Care Overview  Goal: Plan of Care Review  Outcome: Ongoing (interventions implemented as appropriate)    Goal: Individualization and Mutuality  Outcome: Ongoing (interventions implemented as appropriate)    Goal: Discharge Needs Assessment  Outcome: Ongoing (interventions implemented as appropriate)    Goal: Interprofessional Rounds/Family Conf  Outcome: Ongoing (interventions implemented as appropriate)      Problem: Cardiac Output Decreased (Adult)  Goal: Identify Related Risk Factors and Signs and Symptoms  Outcome: Ongoing (interventions implemented as appropriate)    Goal: Effective Tissue Perfusion  Outcome: Ongoing (interventions implemented as appropriate)      Problem: Fall Risk (Adult)  Goal: Identify Related Risk Factors and Signs and Symptoms  Outcome: Ongoing (interventions implemented as appropriate)    Goal: Absence of Fall  Outcome: Ongoing (interventions implemented as appropriate)      Problem: Patient Care Overview  Goal: Plan of Care Review  Outcome: Ongoing (interventions implemented as appropriate)    Goal: Individualization and Mutuality  Outcome: Ongoing (interventions implemented as appropriate)    Goal: Discharge Needs Assessment  Outcome: Ongoing (interventions implemented as appropriate)    Goal: Interprofessional Rounds/Family Conf  Outcome: Ongoing (interventions implemented as appropriate)

## 2018-05-08 NOTE — ED NOTES
Patient presents to Emergency Department with complaints of shortness of breath x1 month with occasional chest pain. (Denies chest pain at this time.) Assisted patient to undress and placed in hospital gown.      Vale Scanlon RN  05/08/18 0022

## 2018-05-08 NOTE — PLAN OF CARE
Problem: Pain, Acute (Adult)  Goal: Identify Related Risk Factors and Signs and Symptoms  Outcome: Ongoing (interventions implemented as appropriate)      Problem: Patient Care Overview  Goal: Plan of Care Review  Outcome: Ongoing (interventions implemented as appropriate)      Problem: Cardiac Output Decreased (Adult)  Goal: Effective Tissue Perfusion  Outcome: Ongoing (interventions implemented as appropriate)      Problem: Fall Risk (Adult)  Goal: Identify Related Risk Factors and Signs and Symptoms  Outcome: Ongoing (interventions implemented as appropriate)

## 2018-05-08 NOTE — PROGRESS NOTES
Discharge Planning Assessment   [facility]     Patient Name: Christine Garg  MRN: [id number]  Today's Date: 5/8/2018    Admit Date: 5/7/2018          Discharge Needs Assessment     Row Name 05/08/18 1442       Living Environment    Lives With alone   Pt lives at home alone and is independent with ADLs.     Current Living Arrangements home/apartment/condo    Primary Care Provided by self    Quality of Family Relationships supportive;involved;helpful    Able to Return to Prior Arrangements yes       Transition Planning    Patient/Family Anticipates Transition to home    Transportation Anticipated family or friend will provide       Discharge Needs Assessment    Equipment Currently Used at Home none    Equipment Needed After Discharge none    Outpatient/Agency/Support Group Needs --   Pt does not utilize home health services and denies the need.             Discharge Plan     Row Name 05/08/18 1448       Plan    Plan Pt lives at home alone and states she is independent with ADLs. Pt does not utilize home health services or DME and denies the need at this time. Pt's PCP is Dr. Tobar. Pt states she does not have a POA or advance directive. Pt utilizes [facility] for prescriptions. Pt's family to provide transportation at discharge. SS will follow and assist as needed with discharge planning.     Patient/Family in Agreement with Plan yes     Demographic Summary     Row Name 05/08/18 1441       General Information    Admission Type observation    Referral Source nursing    Reason for Consult --   Patient is 84 years old and lives alone. SS spoke to pt.     Preferred Language English     Used During This Interaction no     Cassandra Amado

## 2018-05-08 NOTE — H&P
"Hospitalist History and Physical    Patient Identification:  Name: Christine Garg  Age/Sex: 84 y.o. female  :  1933  MRN: [id number]         Primary Care Physician: Dave Tobar MD    Chief Complaint   Patient presents with   • Shortness of Breath     pt reports soa that started approx 1 mth ago. pt also reports intermittent midsternal cp. pt denies cp at this time. pt reports took 325mg asa today       History of Present Illness  Patient is a 84 y.o. female presents with the following: shortness of breath and chest pain    The patient is a very pleasant 84-year-old female with past medical history significant for coronary artery disease status post 3 vessel coronary artery bypass graft approximately 7 years ago, hypertension and hyperlipidemia who presents the emergency department complaining of worsening shortness of air.    The patient states her symptoms have been present for at least one month.  She reports that her shortness of air has been gradual in onset.  She states that initially she was only symptomatic with exertion.  She reported that her symptoms would significantly improve at rest but she states that for the past 3-4 days she has had ongoing shortness of air at rest.  Patient states that she lives alone and is very independent and the symptoms have significantly impaired her activities of daily living.    The patient also reports occasional and intermittent chest pain.  She is unable to describe this pain in detail and states that it is \"just the pain and then it is gone.\"  She reports the associated symptoms of weakness and diaphoresis that usually occur at night. She denies PND and/or orthopnea.  Patient states that she became concerned because the symptoms are similar to symptoms she experienced prior to her bypass graft.    Upon further questioning, the patient reports occasional episodes of lower extremity edema.  She reports that she takes an as needed diuretic and reports " improvement in her symptoms.  The patient states that typically the swelling is worse by the end of the day and has resolved the following morning.  The patient denies any recent surgeries.  She denies any recent acute illnesses.  The patient states that her last trip out of state was in December of last year when she traveled to [place] to visit family.    In the emergency department, the patient was found to have 2 negative troponin levels.  Her BNP was 93.  Her initial BUN was 30 with a creatinine of 1.50.  The patient's baseline creatinine appears to be around 0.9-1.2.  A d-dimer was performed in the emergency department and was found to be slightly positive at 0.82.    The patient was seen by Dr. Gonzalez on April 24 with plans for an outpatient stress test on May 21. The patient has been admitted to the telemetry unit for further evaluation and management.     Past History:  Past Medical History:   Diagnosis Date   • High cholesterol    • Hypertension    • Past heart attack    • Thyroid disease      Past Surgical History:   Procedure Laterality Date   • BREAST BIOPSY Left     benign   • CATARACT EXTRACTION     • CORONARY ANGIOPLASTY     • CORONARY ARTERY BYPASS GRAFT       Family History   Problem Relation Age of Onset   • Heart disease Father    • Heart disease Brother      Social History   Substance Use Topics   • Smoking status: Former Smoker     Types: Cigarettes   • Smokeless tobacco: Never Used   • Alcohol use No     Prescriptions Prior to Admission   Medication Sig Dispense Refill Last Dose   • aspirin 325 MG tablet Take 325 mg by mouth Daily.   5/7/2018 at am   • levothyroxine (SYNTHROID, LEVOTHROID) 112 MCG tablet Take 112 mcg by mouth Daily.   5/7/2018 at am   • amLODIPine (NORVASC) 5 MG tablet Take 5 mg by mouth Daily.   Unknown at Unknown time   • benazepril (LOTENSIN) 20 MG tablet Take 20 mg by mouth 2 (Two) Times a Day.   Unknown at Unknown time   • Cholecalciferol (VITAMIN D3) 2000 units capsule  Take 2,000 Units by mouth Daily.   Unknown at Unknown time   • hydrochlorothiazide (MICROZIDE) 12.5 MG capsule Take 12.5 mg by mouth Daily.   Unknown at Unknown time   • meloxicam (MOBIC) 7.5 MG tablet Take 7.5 mg by mouth 2 (Two) Times a Day As Needed for Mild Pain .   Unknown at Unknown time   • metoprolol tartrate (LOPRESSOR) 25 MG tablet Take 25 mg by mouth 2 (Two) Times a Day.   Unknown at Unknown time     Allergies: Motrin [ibuprofen] and Novocain [procaine]    Review of Systems:  Review of Systems   Constitutional: Negative for chills, diaphoresis and fever.   HENT: Negative for hearing loss, tinnitus and trouble swallowing.    Eyes: Negative for discharge and visual disturbance.   Respiratory: Positive for shortness of breath. Negative for cough and wheezing.    Cardiovascular: Positive for chest pain. Negative for palpitations and leg swelling.   Gastrointestinal: Negative for abdominal pain, constipation, diarrhea, nausea and vomiting.   Endocrine: Negative for polydipsia, polyphagia and polyuria.   Genitourinary: Negative for dysuria, frequency and hematuria.   Musculoskeletal: Negative for gait problem, myalgias and neck pain.   Skin: Negative for rash and wound.   Neurological: Negative for dizziness, tremors, seizures, syncope, weakness and light-headedness.   Hematological: Does not bruise/bleed easily.   Psychiatric/Behavioral: Negative for confusion, hallucinations and suicidal ideas.      Vital Signs  Temp:  [97.6 °F (36.4 °C)-98.2 °F (36.8 °C)] 97.6 °F (36.4 °C)  Heart Rate:  [61-85] 61  Resp:  [18] 18  BP: (132-165)/(47-84) 141/47  Body mass index is 27.51 kg/m².    Physical Exam:  Physical Exam   Constitutional: She is oriented to person, place, and time. She appears well-developed and well-nourished. No distress.   HENT:   Head: Normocephalic and atraumatic.   Mouth/Throat: Oropharynx is clear and moist.   Eyes: Conjunctivae and EOM are normal. Pupils are equal, round, and reactive to light.    Neck: Neck supple. No tracheal deviation present. No thyromegaly present.   Cardiovascular: Normal rate and regular rhythm.  Exam reveals no gallop and no friction rub.    No murmur heard.  Pulmonary/Chest: Breath sounds normal. No respiratory distress. She has no wheezes. She has no rales.   Abdominal: Soft. Bowel sounds are normal. She exhibits no distension. There is no tenderness. There is no guarding.   Musculoskeletal: Normal range of motion. She exhibits no edema.   Neurological: She is alert and oriented to person, place, and time. No cranial nerve deficit.   Skin: Skin is warm and dry. No rash noted. No erythema.   Psychiatric: She has a normal mood and affect.      Results Review:      Results from last 7 days  Lab Units 05/08/18  0140 05/07/18  1837   WBC 10*3/mm3 5.98 7.23   HEMOGLOBIN g/dL 11.7* 13.5   HEMATOCRIT % 35.7* 39.9   PLATELETS 10*3/mm3 261 304       Results from last 7 days  Lab Units 05/08/18  0140 05/07/18  1837   SODIUM mmol/L 141 138   POTASSIUM mmol/L 3.6 4.2   CHLORIDE mmol/L 109 106   CO2 mmol/L 24.3 24.5   BUN mg/dL 26* 30*   CREATININE mg/dL 1.10 1.50*   CALCIUM mg/dL 8.6 9.7   GLUCOSE mg/dL 145* 101       Results from last 7 days  Lab Units 05/08/18  0140 05/07/18  1837   BILIRUBIN mg/dL 0.3 0.3   ALK PHOS U/L 80 104   AST (SGOT) U/L 15 17   ALT (SGPT) U/L 14 13       Results from last 7 days  Lab Units 05/08/18  0140 05/07/18  2219 05/07/18  1837   CK TOTAL U/L 68 95  --    TROPONIN I ng/mL 0.011 0.011 0.007   CK MB INDEX % 2.1 2.3  --    MYOGLOBIN ng/mL 61.0  --   --        Results from last 7 days  Lab Units 05/07/18  1837   INR  0.99       Imaging:    I have personally reviewed the EKG. Per my view: NSR with no nonspecific ST-T changes inferolaterally    Imaging Results (most recent)     Procedure Component Value Units Date/Time    CT Chest Without Contrast [[id number]] Updated:  05/07/18 2234    XR Chest 2 View [[id number]] Updated:  05/07/18 4226      FINDINGS: Today's study  shows a 3 mm nodule in the lateral aspect of the  left lung. This is nonspecific, no other parenchymal nodules are  present.     There are no pericardial or pleural effusions.     Small mediastinal lymph nodes are present. There is a 1 cm lymph node in  the precarinal location.     IMPRESSION:  1. Tiny parenchymal nodule.  2. Small mediastinal lymph nodes.      Assessment/Plan     -Progressive dyspnea of unclear etiology - anginal equivalent versus underlying pulmonary etiology versus other  -Coronary artery disease status post remote CABG  -Mildly (+) d-dimer; low risk for PE  -Hypertension  -Hypothyroidism    The patient has been admitted to the telemetry unit. She is NPO and will undergo stress testing pending the results of her cardiac isoenzymes. I have ordered an echocardiogram and consulted Cardiology. I have ordered an echocardiogram,  HgbA1c and FLP. She will receive gentle IVF hydration. We discussed her (+) d dimer test; what that means and how it is unclear if she has a pulmonary embolism, however, I feel that this is less likely. Unable to perform CTA of the chest at this time as Creatinine is 1.50 and patient may require left heart catheterization based on further cardiac workup. V/Q scan unable to be performed as she will have a stress test today. She did receive a one time dose of Lovenox. Will repeat her labs in the am and leave to the attending physician who will resume care of her regarding how to further pursue this once her cardiac workup has been complete.    DVT prophylaxis: Lovenox    Estimated Length of Stay: < 2 MNs    I discussed the patients findings and my recommendations with patient and nursing staff (Anibal)      Winnie Tillman DO  05/08/18  3:00 AM

## 2018-05-08 NOTE — DISCHARGE SUMMARY
[facility] HOSPITALIST MEDICINE DISCHARGE SUMMARY    Patient Identification:  Name:  Christine Garg  Age:  84 y.o.  Sex:  female  :  1933  MRN:  [id number]  Visit Number:  [id number]    Date of Admission: 2018  Date of Discharge:  2018     PCP: Dave Tobar MD    DISCHARGE DIAGNOSIS  Dyspnea on exertion  Coronary artery disease  Hypertension  Hyperlipidemia  Hypothyroidism    CONSULTS       PROCEDURES PERFORMED      HOSPITAL COURSE  Patient is a 84 y.o. female presented to [facility] complaining of shortness of breath.  Please see the admitting history and physical for further details.  Place in observation on telemetry and ruled out for myocardial infarction by serial cardiac enzymes and EKG.  She was continued on her aspirin and statin and usual home cardiac regimen.  She underwent a Lexiscan stress test which showed no evidence of ischemia and was considered a low risk test.  Her echocardiogram showed a normal ejection fraction with grade 1 diastolic dysfunction.  Her dyspnea is felt to be noncardiac.  Due to minimally elevated d-dimer she had a CT scan of her chest which was essentially unrevealing.  She and I had a long discussion about her dyspnea and I recommended that the next step in workup would be pulmonary function test and a pulmonologist.  We will set her up an appointment to see her PCP next week to initiate further workup for her dyspnea which has been ongoing for some time now.  She will also follow-up with cardiology in a few weeks with her previously scheduled appointment, as she was originally scheduled for an outpatient stress test a couple weeks from now.  She was discharged home in improved condition.    VITAL SIGNS:  1    18  1805 18  0033 18  0402   Weight: 72.6 kg (160 lb) 75 kg (165 lb 4.8 oz) 75.4 kg (166 lb 3.2 oz)     Body mass index is 27.66 kg/m².    PHYSICAL EXAM:  Constitutional:  Well-developed and well-nourished.  No  respiratory distress.      HENT:  Head: Normocephalic and atraumatic.  Mouth:  Moist mucous membranes.    Eyes:  Conjunctivae and EOM are normal.  Pupils are equal, round, and reactive to light.  No scleral icterus.  Neck:  Neck supple.  No JVD present.    Cardiovascular:  Normal rate, regular rhythm and normal heart sounds with no murmur.  Pulmonary/Chest:  No respiratory distress, no wheezes, no crackles, with normal breath sounds and good air movement.  Abdominal:  Soft.  Bowel sounds are normal.  No distension and no tenderness.   Musculoskeletal:  No edema, no tenderness, and no deformity.  No red or swollen joints anywhere.    Neurological:  Alert and oriented to person, place, and time.  No cranial nerve deficit.  No tongue deviation.  No facial droop.  No slurred speech.   Skin:  Skin is warm and dry.  No rash noted.  No pallor.   Psychiatric:  Normal mood and affect.  Behavior is normal.  Judgment and thought content normal.   Peripheral vascular:  No edema and strong pulses on all 4 extremities.  Genitourinary:    DISCHARGE DISPOSITION   Stable    DISCHARGE MEDICATIONS:   Christine Garg   Home Medication Instructions VALENTIN:[id number]    Printed on:05/08/18 6964   Medication Information                      amLODIPine (NORVASC) 5 MG tablet  Take 5 mg by mouth Daily.             aspirin 325 MG tablet  Take 325 mg by mouth Daily.             benazepril (LOTENSIN) 20 MG tablet  Take 20 mg by mouth 2 (Two) Times a Day.             Cholecalciferol (VITAMIN D3) 2000 units capsule  Take 2,000 Units by mouth Daily.             hydrochlorothiazide (MICROZIDE) 12.5 MG capsule  Take 12.5 mg by mouth Daily.             levothyroxine (SYNTHROID, LEVOTHROID) 112 MCG tablet  Take 112 mcg by mouth Daily.             meloxicam (MOBIC) 7.5 MG tablet  Take 7.5 mg by mouth 2 (Two) Times a Day As Needed for Mild Pain .             metoprolol tartrate (LOPRESSOR) 25 MG tablet  Take 25 mg by mouth 2 (Two) Times a Day.                    Your Scheduled Appointments    May 21, 2018  7:15 AM EDT   [facility] with COR NM ADMIN RM [facility] 1 [facility]  [place] [id number] [phone]   No food or drink for 2 hours prior to your test. No caffeine or chocolate 24 hours prior to you test. (this includes coffee, tea, soda, all decaffeinated beverages, cappuccino flavorings, noooz or vivarian, diet medications, excedrin, anacin or any energy drinks) wear comfortable clothing and walking shoes. Bring your insurance cards with you. Bring all medications in their original bottles. If you are diabetic, do not take your diabetic medications after consulting with your primary care physician. if you take insulin, only take half the dose after consulting with your primary care physician. please hold the following medications for 48 hours prior to your test after consulting with your primary care physician. (acebutolo, aggrenox, atenolol, bisoprolol, betaxolol, betapace, calan, cardizem, carvadilol, coreg, corgard, corzide, dilacor xr, diltiazem, inderal, inderal la, isoptin, karlone, labetolol, levatol, nadolol, normodyne, penbutolol, pindolol, propanolol, sectral, sotalol, tenoretic, tiazic, timolol, bystolic, toprol xl, lopressor, metoprolol, trandata, verapamil, verelan, visken, zebeta, zisc, theophylline, jim-dur, sio-phyline, qulbron-t, primatene, persantine, dipyrimadiole)   May 21, 2018  7:45 AM EDT  ECHOCARDIOGRAM 2D COMPLETE VISIT with COR ECHO/VAS/GELA CART  [facility] 1 [facility]  [place] [id number] [phone]   Bring your insurance cards with you. Wear comfortable clothing and shoes.   May 21, 2018  8:45 AM EDT   [facility] SCAN VT with COR NM 2 [facility] [address]  [facility] [id number] [phone]   No food or drink for 2 hours prior to your test. No caffeine or chocolate 24  hours prior to you test. (this includes coffee, tea, soda, all decaffeinated beverages, cappuccino flavorings, noooz or vivarian, diet medications, excedrin, anacin or any energy drinks) wear comfortable clothing and walking shoes. Bring your insurance cards with you. Bring all medications in their original bottles. If you are diabetic, do not take your diabetic medications after consulting with your primary care physician. if you take insulin, only take half the dose after consulting with your primary care physician. please hold the following medications for 48 hours prior to your test after consulting with your primary care physician. (acebutolo, aggrenox, atenolol, bisoprolol, betaxolol, betapace, calan, cardizem, carvadilol, coreg, corgard, corzide, dilacor xr, diltiazem, inderal, inderal la, isoptin, karlone, labetolol, levatol, nadolol, normodyne, penbutolol, pindolol, propanolol, sectral, sotalol, tenoretic, tiazic, timolol, bystolic, toprol xl, lopressor, metoprolol, trandata, verapamil, verelan, visken, zebeta, zisc, theophylline, jim-dur, sio-phyline, qulbron-t, primatene, persantine, dipyrimadiole)   May 21, 2018  9:15 AM EDT   [facility] SCAN [facility] with COR STRESS LAB 1 [facility] STRESS TESTS ([place]) 1 [facility] [id number] [phone]   No food or drink for 2 hours prior to your test. No caffeine or chocolate 24 hours prior to you test. (this includes coffee, tea, soda, all decaffeinated beverages, cappuccino flavorings, noooz or vivarian, diet medications, excedrin, anacin or any energy drinks) wear comfortable clothing and walking shoes. Bring your insurance cards with you. Bring all medications in their original bottles. If you are diabetic, do not take your diabetic medications after consulting with your primary care physician. if you take insulin, only take half the dose after consulting with your primary care physician. please hold the following medications  for 48 hours prior to your test after consulting with your primary care physician. (acebutolo, aggrenox, atenolol, bisoprolol, betaxolol, betapace, calan, cardizem, carvadilol, coreg, corgard, corzide, dilacor xr, diltiazem, inderal, inderal la, isoptin, karlone, labetolol, levatol, nadolol, normodyne, penbutolol, pindolol, propanolol, sectral, sotalol, tenoretic, tiazic, timolol, bystolic, toprol xl, lopressor, metoprolol, trandata, verapamil, verelan, visken, zebeta, zisc, theophylline, jim-dur, sio-phyline, qulbron-t, primatene, persantine, dipyrimadiole)   May 21, 2018 10:45 AM EDT   [facility] NM SCAN VT with COR NM 2 CARD  [facility] NUCLEAR MEDICINE ([place]) 1 [facility] [address] [phone]   No food or drink for 2 hours prior to your test. No caffeine or chocolate 24 hours prior to you test. (this includes coffee, tea, soda, all decaffeinated beverages, cappuccino flavorings, noooz or vivarian, diet medications, excedrin, anacin or any energy drinks) wear comfortable clothing and walking shoes. Bring your insurance cards with you. Bring all medications in their original bottles. If you are diabetic, do not take your diabetic medications after consulting with your primary care physician. if you take insulin, only take half the dose after consulting with your primary care physician. please hold the following medications for 48 hours prior to your test after consulting with your primary care physician. (acebutolo, aggrenox, atenolol, bisoprolol, betaxolol, betapace, calan, cardizem, carvadilol, coreg, corgard, corzide, dilacor xr, diltiazem, inderal, inderal la, isoptin, karlone, labetolol, levatol, nadolol, normodyne, penbutolol, pindolol, propanolol, sectral, sotalol, tenoretic, tiazic, timolol, bystolic, toprol xl, lopressor, metoprolol, trandata, verapamil, verelan, visken, zebeta, zisc, theophylline, jim-dur, sio-phyline, qulbron-t, primatene, persantine, dipyrimadiole)    Jun 05, 2018  3:00 PM EDT  Follow Up with Eyad Clarke MD  [facility] CARDIOLOGY (--) [address] [place] [address]  [phone]   Arrive 15 minutes prior to appointment.          Additional Instructions for the Follow-ups that You Need to Schedule     Discharge Follow-up with PCP    As directed      Follow Up Details:  Dr. Tobar in one week         Discharge Follow-up with Specified Provider: Dr. Gonzalez    As directed      To:  Dr. Gonzalez    Follow Up Details:  at previously scheduled appointment           Follow-up Information     Dave Tobar MD Follow up.    Specialty:  Internal Medicine  Why:  Dr. Tobar in one week: PATIENT WILL BE NOTIFIED ABOUT APPT  Contact information:  [address] [facility] [place] [address]  [phone]                   TEST  RESULTS PENDING AT DISCHARGE   Order Current Status    Blood Culture - Blood, Preliminary result    Blood Culture - Blood, Preliminary result           Anderson Wheatley MD  05/08/18  6:22 PM    Please note that this discharge summary required more than 30 minutes to complete.

## 2018-05-09 NOTE — PROGRESS NOTES
Discharge Planning Assessment   [place]     Patient Name: Christine Garg  MRN: [id number]  Today's Date: 5/9/2018    Admit Date: 5/7/2018          Discharge Needs Assessment    No documentation.             Discharge Plan     Row Name 05/09/18 0926       Plan    Final Discharge Disposition Code 01 - home or self-care    Final Note Pt discharged home.    Row Name 05/09/18 0640       Plan    Final Discharge Disposition Code 01 - home or self-care    Final Note Discharged home 5/8/18.        Destination     No service coordination in this encounter.      Durable Medical Equipment     No service coordination in this encounter.      Dialysis/Infusion     No service coordination in this encounter.      Home Medical Care     No service coordination in this encounter.      Social Care     No service coordination in this encounter.        Expected Discharge Date and Time     Expected Discharge Date Expected Discharge Time    May 8, 2018               Demographic Summary    No documentation.           Functional Status    No documentation.           Psychosocial    No documentation.           Abuse/Neglect    No documentation.           Legal    No documentation.           Substance Abuse    No documentation.           Patient Forms    No documentation.         Estella Christianson

## 2018-05-10 ENCOUNTER — TELEPHONE (OUTPATIENT)
Dept: CARDIOLOGY | Facility: CLINIC | Age: 83
End: 2018-05-10

## 2018-05-10 NOTE — TELEPHONE ENCOUNTER
Pt called and said that she had the same test that you've ordered for her done in the hospital when she was there. She said does she still need to have these done or can she cancel these?    Thank you

## 2018-05-12 LAB
BACTERIA SPEC AEROBE CULT: NORMAL
BACTERIA SPEC AEROBE CULT: NORMAL

## 2018-05-15 ENCOUNTER — TRANSCRIBE ORDERS (OUTPATIENT)
Dept: ADMINISTRATIVE | Facility: HOSPITAL | Age: 83
End: 2018-05-15

## 2018-05-15 DIAGNOSIS — R07.9 CHEST PAIN, UNSPECIFIED TYPE: Primary | ICD-10-CM

## 2018-05-15 DIAGNOSIS — R60.0 LEG EDEMA: ICD-10-CM

## 2018-05-16 ENCOUNTER — HOSPITAL ENCOUNTER (OUTPATIENT)
Dept: CARDIOLOGY | Facility: HOSPITAL | Age: 83
Discharge: HOME | End: 2018-05-16
Attending: INTERNAL MEDICINE

## 2018-05-16 ENCOUNTER — HOSPITAL ENCOUNTER (OUTPATIENT)
Dept: CT IMAGING | Facility: HOSPITAL | Age: 83
Discharge: HOME | End: 2018-05-16
Attending: INTERNAL MEDICINE | Admitting: INTERNAL MEDICINE

## 2018-05-16 DIAGNOSIS — R60.0 LEG EDEMA: ICD-10-CM

## 2018-05-16 DIAGNOSIS — R07.9 CHEST PAIN, UNSPECIFIED TYPE: ICD-10-CM

## 2018-05-16 PROCEDURE — 71275 CT ANGIOGRAPHY CHEST: CPT

## 2018-05-16 PROCEDURE — 25010000002 IOPAMIDOL 61 % SOLUTION: Performed by: INTERNAL MEDICINE

## 2018-05-16 PROCEDURE — 93970 EXTREMITY STUDY: CPT

## 2018-05-16 PROCEDURE — 93970 EXTREMITY STUDY: CPT | Performed by: RADIOLOGY

## 2018-05-16 PROCEDURE — 71275 CT ANGIOGRAPHY CHEST: CPT | Performed by: RADIOLOGY

## 2018-05-16 RX ADMIN — IOPAMIDOL 60 ML: 612 INJECTION, SOLUTION INTRAVENOUS at 11:15

## 2018-05-18 ENCOUNTER — TRANSCRIBE ORDERS (OUTPATIENT)
Dept: PULMONOLOGY | Facility: HOSPITAL | Age: 83
End: 2018-05-18

## 2018-05-18 ENCOUNTER — HOSPITAL ENCOUNTER (OUTPATIENT)
Dept: RESPIRATORY THERAPY | Facility: HOSPITAL | Age: 83
Discharge: HOME | End: 2018-05-18
Attending: INTERNAL MEDICINE | Admitting: INTERNAL MEDICINE

## 2018-05-18 DIAGNOSIS — R00.2 PALPITATIONS: ICD-10-CM

## 2018-05-18 DIAGNOSIS — R00.2 PALPITATIONS: Primary | ICD-10-CM

## 2018-05-18 PROCEDURE — 93226 XTRNL ECG REC<48 HR SCAN A/R: CPT

## 2018-05-18 PROCEDURE — 93225 XTRNL ECG REC<48 HRS REC: CPT

## 2018-05-21 ENCOUNTER — APPOINTMENT (OUTPATIENT)
Dept: CARDIOLOGY | Facility: HOSPITAL | Age: 83
End: 2018-05-21
Attending: INTERNAL MEDICINE

## 2018-05-21 ENCOUNTER — APPOINTMENT (OUTPATIENT)
Dept: NUCLEAR MEDICINE | Facility: HOSPITAL | Age: 83
End: 2018-05-21
Attending: INTERNAL MEDICINE

## 2018-05-22 PROCEDURE — 93227 XTRNL ECG REC<48 HR R&I: CPT | Performed by: INTERNAL MEDICINE

## 2018-06-05 ENCOUNTER — OFFICE VISIT (OUTPATIENT)
Dept: CARDIOLOGY | Facility: CLINIC | Age: 83
End: 2018-06-05

## 2018-06-05 VITALS
WEIGHT: 164 LBS | RESPIRATION RATE: 16 BRPM | HEART RATE: 67 BPM | SYSTOLIC BLOOD PRESSURE: 123 MMHG | OXYGEN SATURATION: 97 % | HEIGHT: 65 IN | BODY MASS INDEX: 27.32 KG/M2 | DIASTOLIC BLOOD PRESSURE: 65 MMHG

## 2018-06-05 DIAGNOSIS — I10 ESSENTIAL HYPERTENSION: ICD-10-CM

## 2018-06-05 DIAGNOSIS — I25.10 ASCVD (ARTERIOSCLEROTIC CARDIOVASCULAR DISEASE): ICD-10-CM

## 2018-06-05 DIAGNOSIS — R60.0 LEG EDEMA: ICD-10-CM

## 2018-06-05 DIAGNOSIS — R06.02 SOB (SHORTNESS OF BREATH): ICD-10-CM

## 2018-06-05 DIAGNOSIS — R07.2 PRECORDIAL PAIN: Primary | ICD-10-CM

## 2018-06-05 DIAGNOSIS — E78.5 DYSLIPIDEMIA: ICD-10-CM

## 2018-06-05 DIAGNOSIS — R00.2 PALPITATIONS: ICD-10-CM

## 2018-06-05 PROCEDURE — 99214 OFFICE O/P EST MOD 30 MIN: CPT | Performed by: INTERNAL MEDICINE

## 2018-06-05 RX ORDER — POTASSIUM CHLORIDE 750 MG/1
TABLET, FILM COATED, EXTENDED RELEASE ORAL
COMMUNITY
Start: 2018-05-14 | End: 2018-07-20 | Stop reason: SDUPTHER

## 2018-06-05 RX ORDER — METOPROLOL TARTRATE 50 MG/1
50 TABLET, FILM COATED ORAL EVERY 12 HOURS SCHEDULED
Qty: 60 TABLET | Refills: 6 | Status: SHIPPED | OUTPATIENT
Start: 2018-06-05 | End: 2018-07-17 | Stop reason: DRUGHIGH

## 2018-06-05 RX ORDER — FUROSEMIDE 20 MG/1
20 TABLET ORAL DAILY PRN
COMMUNITY
Start: 2018-05-14 | End: 2023-03-22

## 2018-06-05 NOTE — PROGRESS NOTES
Dave Tobar MD  Christine Garg  : 1933  DATE:2018    Patient Active Problem List   Diagnosis   • Precordial pain   • Essential hypertension   • Dyslipidemia   • Leg edema   • ASCVD (arteriosclerotic cardiovascular disease)   • Chest pain   • SOB (shortness of breath)   • Palpitations       Dear Dave Tobar MD:    Subjective     Christine Garg is a 84 y.o. female with the above medical problems who is here today for follow-up. According to the patient she was recently admitted for an episode of shortness of breath.  She underwent an extensive cardiac workup including a stress test which showed no evidence of ischemia.  An echocardiogram which showed a normal ejection fraction and no wall motion abnormalities.  It showed no evidence of systolic or diastolic dysfunction.  She underwent a BNP which was not be within normal limits.  She underwent a Holter which showed frequent PACs as well as short runs of supraventricular tachycardia.  She continues to complain of lower extremity edema that appears to have been related to an episode of acute renal failure.      Current Outpatient Prescriptions:   •  amLODIPine (NORVASC) 5 MG tablet, Take 5 mg by mouth Daily., Disp: , Rfl:   •  aspirin 325 MG tablet, Take 325 mg by mouth Daily., Disp: , Rfl:   •  benazepril (LOTENSIN) 20 MG tablet, Take 20 mg by mouth 2 (Two) Times a Day., Disp: , Rfl:   •  Cholecalciferol (VITAMIN D3) 2000 units capsule, Take 2,000 Units by mouth Daily., Disp: , Rfl:   •  hydrochlorothiazide (MICROZIDE) 12.5 MG capsule, Take 12.5 mg by mouth Daily., Disp: , Rfl:   •  levothyroxine (SYNTHROID, LEVOTHROID) 112 MCG tablet, Take 112 mcg by mouth Daily., Disp: , Rfl:   •  meloxicam (MOBIC) 7.5 MG tablet, Take 7.5 mg by mouth 2 (Two) Times a Day As Needed for Mild Pain ., Disp: , Rfl:   •  metoprolol tartrate (LOPRESSOR) 50 MG tablet, Take 1 tablet by mouth Every 12 (Twelve) Hours., Disp: 60 tablet, Rfl: 6  •  furosemide (LASIX) 20 MG  "tablet, , Disp: , Rfl:   •  potassium chloride (K-DUR) 10 MEQ CR tablet, , Disp: , Rfl:     The following portions of the patient's history were reviewed and updated as appropriate: allergies, current medications, past family history, past medical history, past social history, past surgical history and problem list.    Review of Systems   Constitution: Negative for chills, diaphoresis and fever.   HENT: Negative for congestion and ear pain.    Eyes: Negative for blurred vision, pain and redness.   Cardiovascular: Positive for dyspnea on exertion (has improved since ER) and leg swelling. Negative for chest pain, irregular heartbeat, near-syncope, orthopnea, palpitations, paroxysmal nocturnal dyspnea and syncope.   Respiratory: Negative for cough, hemoptysis and shortness of breath.    Endocrine: Negative for cold intolerance and heat intolerance.   Hematologic/Lymphatic: Does not bruise/bleed easily.   Skin: Negative for rash.   Musculoskeletal: Positive for arthritis, back pain, joint pain, joint swelling and stiffness. Negative for myalgias.   Gastrointestinal: Negative for abdominal pain, constipation, diarrhea, hematemesis, hematochezia, melena, nausea and vomiting.   Genitourinary: Negative for dysuria and hematuria.   Neurological: Negative for dizziness, focal weakness and numbness.   Psychiatric/Behavioral: Negative for depression. The patient is not nervous/anxious.        Objective   Blood pressure 123/65, pulse 67, resp. rate 16, height 165.1 cm (65\"), weight 74.4 kg (164 lb), SpO2 97 %.    Physical Exam   Constitutional: She is oriented to person, place, and time. She appears well-developed and well-nourished.   Elderly WF sitting comfortably on chair.   HENT:   Mouth/Throat: Oropharynx is clear and moist.   Eyes: EOM are normal. Pupils are equal, round, and reactive to light.   Neck: Neck supple. No JVD present. No tracheal deviation present. No thyromegaly present.   Cardiovascular: Normal rate, regular " rhythm, S1 normal and S2 normal.  Exam reveals no gallop and no friction rub.    No murmur heard.  Pulmonary/Chest: Effort normal and breath sounds normal. No respiratory distress. She has no wheezes. She has no rales.   Abdominal: Soft. Bowel sounds are normal. She exhibits no mass. There is tenderness.   Musculoskeletal: Normal range of motion. She exhibits no edema.   Lymphadenopathy:     She has no cervical adenopathy.   Neurological: She is alert and oriented to person, place, and time.   Skin: Skin is warm and dry. No rash noted.   Psychiatric: She has a normal mood and affect.       Procedures    Assessment/Plan      1.  Chest pain: Patient with chest pain that does not appear to be related to coronary artery disease.  She underwent a stress is return no evidence of ischemia and a cardiologist as a normal ejection fraction and no wall motion abnormalities.    2.  ASCVD: Patient with history of ASCVD currently on aspirin, beta blocker and ACE inhibitor.  She is not on a statin due to history of intolerance in the past.  According to the patient Dr. Tobar is currently working on starting Repatha.  We will continue to monitor.    3.  Hypertension: Patient with history of hypertension which is currently controlled on current regimen.  At this point would continue.    4.  Shortness of breath: Patient with shortness of breath of unclear etiology.  She has a previous history of smoking.  Will evaluate further with pulmonary function tests.    5.  Palpitations: Patient with occasional palpitations that appeared to be related to short runs of supraventricular tachycardia noted on Holter.  We will increase metoprolol dose to 50 mg by mouth twice a day and monitor for improvement.    6.  Dyslipidemia: Patient with history of dyslipidemia with abnormal lipid panel.  According to the patient she is unable to take statins due to intolerance.  She is undergoing approval for Repatha.    7.  Lower extremity edema: Patient  with a history of bilateral lower extremity edema that appears resolved at this time.  She continues to complain of lower extremity edema at home.  She underwent an echocardiography showed no evidence of systolic or diastolic heart failure.  She also underwent a BNP which was within normal limits.  His does not appear to be related to heart failure and could be related to an episode of renal dysfunction that was noted during recent hospitalization.       Diagnosis Plan   1. Precordial pain     2. ASCVD (arteriosclerotic cardiovascular disease)     3. Essential hypertension     4. SOB (shortness of breath)  Full Pulmonary Function Test With Bronchodilator   5. Dyslipidemia     6. Leg edema     7. Palpitations              Return in about 4 weeks (around 7/3/2018).    I appreciate the opportunity to participate in this patient's cardiovascular care.    Best Regards    Eyad Gonzalez

## 2018-07-06 ENCOUNTER — HOSPITAL ENCOUNTER (OUTPATIENT)
Dept: RESPIRATORY THERAPY | Facility: HOSPITAL | Age: 83
Discharge: HOME | End: 2018-07-06
Attending: INTERNAL MEDICINE | Admitting: INTERNAL MEDICINE

## 2018-07-06 VITALS — HEART RATE: 71 BPM | OXYGEN SATURATION: 99 % | RESPIRATION RATE: 16 BRPM

## 2018-07-06 DIAGNOSIS — R06.02 SOB (SHORTNESS OF BREATH): ICD-10-CM

## 2018-07-06 PROCEDURE — 94729 DIFFUSING CAPACITY: CPT | Performed by: INTERNAL MEDICINE

## 2018-07-06 PROCEDURE — 94640 AIRWAY INHALATION TREATMENT: CPT

## 2018-07-06 PROCEDURE — 94060 EVALUATION OF WHEEZING: CPT | Performed by: INTERNAL MEDICINE

## 2018-07-06 PROCEDURE — 94060 EVALUATION OF WHEEZING: CPT

## 2018-07-06 PROCEDURE — 94729 DIFFUSING CAPACITY: CPT

## 2018-07-06 PROCEDURE — 94727 GAS DIL/WSHOT DETER LNG VOL: CPT

## 2018-07-06 PROCEDURE — 94799 UNLISTED PULMONARY SVC/PX: CPT

## 2018-07-06 PROCEDURE — 94727 GAS DIL/WSHOT DETER LNG VOL: CPT | Performed by: INTERNAL MEDICINE

## 2018-07-06 RX ORDER — ALBUTEROL SULFATE 2.5 MG/3ML
2.5 SOLUTION RESPIRATORY (INHALATION) ONCE
Status: COMPLETED | OUTPATIENT
Start: 2018-07-06 | End: 2018-07-06

## 2018-07-06 RX ADMIN — ALBUTEROL SULFATE 2.5 MG: 2.5 SOLUTION RESPIRATORY (INHALATION) at 12:27

## 2018-07-17 ENCOUNTER — OFFICE VISIT (OUTPATIENT)
Dept: CARDIOLOGY | Facility: CLINIC | Age: 83
End: 2018-07-17

## 2018-07-17 VITALS
HEART RATE: 63 BPM | HEIGHT: 66 IN | BODY MASS INDEX: 26.68 KG/M2 | SYSTOLIC BLOOD PRESSURE: 156 MMHG | WEIGHT: 166 LBS | DIASTOLIC BLOOD PRESSURE: 75 MMHG | OXYGEN SATURATION: 97 %

## 2018-07-17 DIAGNOSIS — R94.2 ABNORMAL PFTS (PULMONARY FUNCTION TESTS): ICD-10-CM

## 2018-07-17 DIAGNOSIS — I25.10 CORONARY ARTERY DISEASE INVOLVING NATIVE CORONARY ARTERY OF NATIVE HEART WITHOUT ANGINA PECTORIS: Primary | ICD-10-CM

## 2018-07-17 DIAGNOSIS — R06.02 SHORTNESS OF BREATH: ICD-10-CM

## 2018-07-17 DIAGNOSIS — J44.9 CHRONIC OBSTRUCTIVE PULMONARY DISEASE, UNSPECIFIED COPD TYPE (HCC): ICD-10-CM

## 2018-07-17 DIAGNOSIS — I10 ESSENTIAL HYPERTENSION: ICD-10-CM

## 2018-07-17 DIAGNOSIS — E78.5 DYSLIPIDEMIA: ICD-10-CM

## 2018-07-17 DIAGNOSIS — R00.2 PALPITATIONS: ICD-10-CM

## 2018-07-17 DIAGNOSIS — E87.6 HYPOKALEMIA: ICD-10-CM

## 2018-07-17 DIAGNOSIS — R60.0 LEG EDEMA: ICD-10-CM

## 2018-07-17 PROCEDURE — 99213 OFFICE O/P EST LOW 20 MIN: CPT | Performed by: INTERNAL MEDICINE

## 2018-07-17 RX ORDER — BENAZEPRIL HYDROCHLORIDE 10 MG/1
10 TABLET ORAL 2 TIMES DAILY
COMMUNITY
End: 2022-11-30

## 2018-07-17 NOTE — PROGRESS NOTES
Christine Garg  6/8/1933 07/17/2018   Ref:No referring provider defined for this encounter.  Pcp: Dave Tobar MD  [address] [facility] [id number]    Follow up for:  Chief Complaint   Patient presents with   • Follow-up   • Med Management     Med list.    • Results     PFT.    • Shortness of Breath   • Edema     Feet        Patient Active Problem List   Diagnosis   • Precordial pain   • Essential hypertension   • Dyslipidemia   • Leg edema   • ASCVD (arteriosclerotic cardiovascular disease)   • Chest pain   • SOB (shortness of breath)   • Palpitations   • Coronary artery disease    • Abnormal PFTs (pulmonary function tests)   • Shortness of breath   • Chronic obstructive pulmonary disease (CMS/HCC)   • Hypokalemia       HPI     Christine Garg is a 84 yo female who presents to the office today for a follow up visit regarding shortness of breath. Patient notes she can only walk between 50 and 100 feet before she gets short of breath for the last 2 months. Patient notes 2 pillow orthopnea for 2 years. Patient notes bilateral leg edema which is improved. Patient denies chest pain, palpitations, dizziness or syncope. She is unable to tolerate statins and Dr. Tobar is trying to get Repatha for her. She has a history of CABG 2010 and short runs of SVT on a holter monitor.    She smoked 1/4 pack per day for 10 years quitting 50 years ago. 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      Review of Systems   Cardiovascular: Positive for leg swelling. Negative for chest pain, palpitations and syncope.   Respiratory: Positive for shortness of breath.    Neurological: Negative for dizziness.         Current Outpatient Prescriptions:   •  amLODIPine (NORVASC) 5 MG tablet, Take 5 mg by mouth Daily., Disp: , Rfl:   •  aspirin 325 MG tablet, Take 325 mg by mouth Daily., Disp: , Rfl:   •  benazepril (LOTENSIN) 10 MG tablet, Take 10 mg by mouth 2 (Two) Times a Day., Disp: , Rfl:   •   "Cholecalciferol (VITAMIN D3) 2000 units capsule, Take 2,000 Units by mouth Daily., Disp: , Rfl:   •  furosemide (LASIX) 20 MG tablet, , Disp: , Rfl:   •  hydrochlorothiazide (MICROZIDE) 12.5 MG capsule, Take 12.5 mg by mouth Daily., Disp: , Rfl:   •  levothyroxine (SYNTHROID, LEVOTHROID) 112 MCG tablet, Take 112 mcg by mouth Daily., Disp: , Rfl:   •  meloxicam (MOBIC) 7.5 MG tablet, Take 7.5 mg by mouth 2 (Two) Times a Day As Needed for Mild Pain ., Disp: , Rfl:   •  metoprolol tartrate (LOPRESSOR) 25 MG tablet, Take 25 mg by mouth 2 (Two) Times a Day., Disp: , Rfl:   •  potassium chloride (K-DUR) 10 MEQ CR tablet, , Disp: , Rfl:     Allergies   Allergen Reactions   • Motrin [Ibuprofen] Anaphylaxis and Hives   • Novocain [Procaine] Other (See Comments)     Pt state she passes out.       /75 (BP Location: Right arm, Patient Position: Sitting, Cuff Size: Adult)   Pulse 63   Ht 166.4 cm (65.5\")   Wt 75.3 kg (166 lb)   SpO2 97%   BMI 27.20 kg/m²        Physical Exam   Constitutional: She is oriented to person, place, and time.   Mild moderate obese   Cardiovascular: Normal rate, regular rhythm and intact distal pulses.  Exam reveals no gallop and no friction rub.    No murmur heard.  Pulses:       Carotid pulses are 2+ on the right side, and 2+ on the left side.       Posterior tibial pulses are 1+ on the right side, and 2+ on the left side.   Pulmonary/Chest: No respiratory distress. She has no wheezes. She has no rales. She exhibits no tenderness.   Bilateral basilar rhonchi R>L    Abdominal: Soft. Bowel sounds are normal. She exhibits no distension and no mass. There is no tenderness. There is no rebound and no guarding.   Mild moderate obese   Musculoskeletal: Normal range of motion. She exhibits no edema, tenderness or deformity.   Neurological: She is alert and oriented to person, place, and time. She displays normal reflexes. No cranial nerve deficit. She exhibits normal muscle tone. Coordination " normal.   Skin: No rash noted. No erythema. No pallor.   Psychiatric: She has a normal mood and affect. Her behavior is normal. Judgment and thought content normal.   :    Lab Review:    Procedures    Lab Results   Component Value Date     05/08/2018    K 3.6 05/08/2018     05/08/2018    CO2 24.3 05/08/2018    BUN 26 (H) 05/08/2018    CREATININE 1.10 05/08/2018    GLUCOSE 145 (H) 05/08/2018    CALCIUM 8.6 05/08/2018    AST 15 05/08/2018    ALT 14 05/08/2018    ALKPHOS 80 05/08/2018    LABIL2 1.4 (L) 10/12/2015     Lab Results   Component Value Date    CKTOTAL 72 05/08/2018     Lab Results   Component Value Date    WBC 5.98 05/08/2018    HGB 11.7 (L) 05/08/2018    HCT 35.7 (L) 05/08/2018     05/08/2018     Lab Results   Component Value Date    INR 0.99 05/07/2018    INR 0.94 10/12/2015     No results found for: MG  Lab Results   Component Value Date    TSH 0.245 (L) 10/13/2015    CHLPL 187 10/13/2015    TRIG 101 05/08/2018    HDL 53 (L) 05/08/2018     (H) 05/08/2018      Lab Results   Component Value Date    BNP 93.0 05/07/2018       Chemistry        Component Value Date/Time     05/08/2018 0140    K 3.6 05/08/2018 0140     05/08/2018 0140    CO2 24.3 05/08/2018 0140    BUN 26 (H) 05/08/2018 0140    CREATININE 1.10 05/08/2018 0140        Component Value Date/Time    CALCIUM 8.6 05/08/2018 0140    ALKPHOS 80 05/08/2018 0140    AST 15 05/08/2018 0140    ALT 14 05/08/2018 0140    BILITOT 0.3 05/08/2018 0140            Full Pulmonary Function Test With Bronchodilator 7/6/18:    Moderate obstructive lung disease with no significant broncho-dilator effect seen on this occasion.  Lung volumes are normal.  Diffusion capacity is moderately reduced.  Flow volume loop is obstructed and restricted.    Echocardiogram 5/8/18    · Normal left ventricular cavity size and wall thickness noted. All left ventricular wall segments contract normally.  · Estimated EF appears to be in the range of 61 -  65%.  · Left ventricular diastolic dysfunction (grade I) consistent with impaired relaxation.  · The aortic valve is structurally normal. No aortic valve regurgitation is present. No aortic valve stenosis is present.  · The mitral valve is normal in structure. Mild mitral valve regurgitation is present. No significant mitral valve stenosis is present  · The tricuspid valve is normal. No tricuspid valve stenosis is present. Mild tricuspid valve regurgitation is present. Estimated right ventricular systolic pressure from tricuspid regurgitation is mildly elevated (35-45 mmHg).  · There is no evidence of pericardial effusion.    Stress Test 5/8/18:    · Findings consistent with a normal ECG stress test.  · Myocardial perfusion imaging indicates a normal myocardial perfusion study with no evidence of ischemia.  · Normal LV cavity size. Normal LV wall motion noted. Normal RV cavity size.  · Left ventricular ejection fraction is hyperdynamic (Calculated EF > 70%).  · Impressions are consistent with a low risk study.        Impression:   Diagnosis Plan   1. Coronary artery disease s/p CABG and MI      2. Dyslipidemia  Potassium   3. Essential hypertension  Potassium   4. Palpitations non sustained SVT on holter     5. Leg edema     6. Chronic obstructive pulmonary disease, unspecified COPD type (CMS/HCC)  Ambulatory Referral to Pulmonology   7. Abnormal PFTs (pulmonary function tests)  Ambulatory Referral to Pulmonology   8. Shortness of breath  Ambulatory Referral to Pulmonology   9. Hypokalemia (3.6)         Plan:  1. Increase potassium to 20 meq daily.   2. Check potassium in 1 month  3. Refer to Pulmonology       Return in about 6 months (around 1/17/2019) for or sooner if needed..     This document signed by Bimal Bueno MD July 17, 2018 4:03 PM     Scribed for Bimal Bueno MD by Suzanna Ridley CMA. 7/17/2018  4:03 PM    IBimal MD, personally performed the services described in this  documentation as scribed by the above named individual in my presence, and it is both accurate and complete.  7/17/2018  4:03 PM

## 2018-07-20 RX ORDER — POTASSIUM CHLORIDE 1500 MG/1
20 TABLET, FILM COATED, EXTENDED RELEASE ORAL DAILY
Qty: 30 TABLET | Refills: 1 | Status: SHIPPED | OUTPATIENT
Start: 2018-07-20 | End: 2021-04-13

## 2018-09-04 ENCOUNTER — OFFICE VISIT (OUTPATIENT)
Dept: PULMONOLOGY | Facility: CLINIC | Age: 83
End: 2018-09-04

## 2018-09-04 VITALS
SYSTOLIC BLOOD PRESSURE: 125 MMHG | OXYGEN SATURATION: 94 % | TEMPERATURE: 98 F | HEART RATE: 81 BPM | BODY MASS INDEX: 27.32 KG/M2 | WEIGHT: 164 LBS | DIASTOLIC BLOOD PRESSURE: 80 MMHG | HEIGHT: 65 IN

## 2018-09-04 DIAGNOSIS — R53.83 FATIGUE, UNSPECIFIED TYPE: ICD-10-CM

## 2018-09-04 DIAGNOSIS — J45.30 MILD PERSISTENT ASTHMATIC BRONCHITIS WITHOUT COMPLICATION: Primary | ICD-10-CM

## 2018-09-04 PROCEDURE — 99204 OFFICE O/P NEW MOD 45 MIN: CPT | Performed by: INTERNAL MEDICINE

## 2018-09-04 RX ORDER — ALBUTEROL SULFATE 90 UG/1
2 AEROSOL, METERED RESPIRATORY (INHALATION) EVERY 4 HOURS PRN
Qty: 1 INHALER | Refills: 11 | Status: SHIPPED | OUTPATIENT
Start: 2018-09-04 | End: 2019-09-25 | Stop reason: SDUPTHER

## 2018-09-04 NOTE — PROGRESS NOTES
Subjective   Christine Garg is a 85 y.o. female who is being seen for Shortness of Breath    History of Present Illness   This 85-year-old female who has been referred to us by her primary care provider for evaluation of exertional dyspnea. Patient tells me that she usually does not have any problem at rest except vague feeling of air hunger type a time but she develops shortness of breath on mild exertion particularly when she is walking uphill.  At that time she also has dry cough and heard fine wheezing at times.  She denies any symptoms of postnasal drip.  Her sleep is good but she has excessive fatigue in the daytime which she noticed over the past few years.  She denies any weight loss or any change in appetite recently    She already had a chest CT as well as a pulmonary function test done per her primary care provider before coming to us.  Past Medical History:   Diagnosis Date   • High cholesterol    • Hypertension    • Past heart attack    • Thyroid disease      Past Surgical History:   Procedure Laterality Date   • BREAST BIOPSY Left     benign   • CATARACT EXTRACTION     • CORONARY ANGIOPLASTY     • CORONARY ARTERY BYPASS GRAFT       Family History   Problem Relation Age of Onset   • Heart disease Father    • Heart disease Brother       reports that she has quit smoking. Her smoking use included Cigarettes. She has never used smokeless tobacco. She reports that she does not drink alcohol or use drugs.  Allergies   Allergen Reactions   • Motrin [Ibuprofen] Anaphylaxis and Hives   • Novocain [Procaine] Other (See Comments)     Pt state she passes out.           Patient has not had flu and pneumonia vaccinations this year.     The following portions of the patient's history were reviewed and updated as appropriate: allergies, current medications, past family history, past medical history, past social history, past surgical history and problem list.    Review of Systems   Constitutional: Negative for  "appetite change, chills, diaphoresis and unexpected weight change.   HENT: Negative for sore throat, trouble swallowing and voice change.    Eyes: Negative for visual disturbance.   Respiratory: Positive for cough and shortness of breath. Negative for apnea, choking and wheezing.    Cardiovascular: Positive for leg swelling. Negative for chest pain and palpitations.   Gastrointestinal: Negative for abdominal pain, constipation, diarrhea, nausea and vomiting.   Endocrine: Negative for cold intolerance, heat intolerance, polydipsia, polyphagia and polyuria.   Genitourinary: Negative for difficulty urinating and dysuria.   Musculoskeletal: Negative for gait problem.   Skin: Negative for rash and wound.   Neurological: Negative for syncope and light-headedness.   Hematological: Negative for adenopathy.   Psychiatric/Behavioral: Negative for agitation, behavioral problems and confusion.   All other systems reviewed and are negative.      Objective   /80   Pulse 81   Temp 98 °F (36.7 °C)   Ht 165.1 cm (65\")   Wt 74.4 kg (164 lb)   SpO2 94%   BMI 27.29 kg/m²   Physical Exam   Constitutional: She is oriented to person, place, and time.   HENT:   Head: Normocephalic and atraumatic.   Nose: Mucosal edema present.   Eyes: Pupils are equal, round, and reactive to light. EOM are normal.   Neck: Neck supple.   Cardiovascular: Normal rate, regular rhythm and normal heart sounds.    Pulmonary/Chest: She has rhonchi.   Vesicular breath sound bilaterally with prolonged expiratory phase   Abdominal: Soft. Bowel sounds are normal.   Musculoskeletal: Normal range of motion. She exhibits no deformity.   Neurological: She is alert and oriented to person, place, and time.   Skin: Skin is warm and dry.   Psychiatric: She has a normal mood and affect. Her behavior is normal.   Nursing note and vitals reviewed.        Radiology:  Xr Chest 2 View    Result Date: 5/8/2018  No radiographic evidence of acute cardiac or pulmonary " disease.  This report was finalized on 5/8/2018 9:49 AM by Dr. Danny Nguyen MD.      Ct Chest Without Contrast    Result Date: 5/8/2018  Study Result     CT CHEST WITHOUT CONTRAST-      CLINICAL INDICATION: soa          DOSE: 329.92 mGy.cm  COMPARISON: Chest x-ray 05/07/2018.     Radiation dose reduction techniques were utilized per ALARA protocol.  Automated exposure control was initiated through either or [employer] or  DoseRight software packages by  protocol.           PROCEDURE: Axial images were acquired from the thoracic inlet through  the upper abdomen without any IV contrast. Reformatted images were  created.     FINDINGS: Today's study shows a 3 mm nodule in the lateral aspect of the  left lung. This is nonspecific, no other parenchymal nodules are  present.     There are no pericardial or pleural effusions.     Small mediastinal lymph nodes are present. There is a 1 cm lymph node in  the precarinal location.     IMPRESSION:  1. Tiny parenchymal nodule.  2. Small mediastinal lymph nodes.      This report was finalized on 5/8/2018 8:30 AM by Dr. Danny Nguyen MD.     .      Ct Chest Pulmonary Embolism With Contrast    Result Date: 5/16/2018  1. No evidence of a pulmonary embolus 2. Coronary artery calcifications.  This report was finalized on 5/16/2018 10:25 AM by Dr. Danyn Nguyen MD.      Us Venous Doppler Lower Extremity Bilateral (duplex)    Result Date: 5/16/2018  No DVT in the lower extremities on today's exam.  This report was finalized on 5/16/2018 9:38 AM by Dr. Danny Nguyen MD.        Lab Results:  Admission on 05/07/2018, Discharged on 05/08/2018   No results displayed because visit has over 200 results.          Assessment      ICD-10-CM ICD-9-CM   1. Mild persistent asthmatic bronchitis without complication J45.30 493.90   2. Fatigue, unspecified type R53.83 780.79                DISCUSSION:  Is 85-year-old pleasant lady has exertional dyspnea.  She already had a CT scan of the  chest done per her primary care provider to rule out pulmonary embolus, I have reviewed that.  No evidence of pulmonary embolus noted.  A prior CT was done without contrast, I have reviewed that were a tiny nodule, approximately 4 mm size was noted.  I believe this is insignificant considering the fact that she did not have any significant smoking history.  Nonetheless our recommendation is to follow her clinically and radiologically.    She had a pulmonary function test done, I have reviewed that.  Her FEV1 to FVC ratio was reduced to 67, FEV1 was 75% predicted and improved by 7% after bronchodilator.  FEF 25-75 was normal but improved by 26% after bronchodilator.  Lung volumes showed evidence of mild air trapping, diffusion capacity was slightly reduced.  I believe we are dealing with asthmatic bronchitis of mild degree I'm starting her on Breo 100 µg 1 puff daily for regular use and albuterol inhaler 2 puffs 3-4 times a day on a when necessary basis.  I believe that should be sufficient for her she is already following with her cardiologist closely and I would leave the other causes of exertional dyspnea, namely a cardiac component, to the able hand of our cardiology colleagues.    Patient complained of fatigue.  We have noticed that she is is on thyroid replacement.  If her symptoms persist she may benefit from a nocturnal polysomnography to rule out obstructive sleep apnea.  Would discuss about this during her next visit.    We recommend a follow-up visit with us in approximately 6 months for pulmonary reevaluation.    Plan    No orders of the defined types were placed in this encounter.    New Medications Ordered This Visit   Medications   • albuterol (PROAIR HFA) 108 (90 Base) MCG/ACT inhaler     Sig: Inhale 2 puffs Every 4 (Four) Hours As Needed for Shortness of Air.     Dispense:  1 inhaler     Refill:  11   • Fluticasone Furoate-Vilanterol (BREO ELLIPTA) 100-25 MCG/INH aerosol powder      Sig: Inhale 1  puff Daily.     Dispense:  1 each     Refill:  6                  Stephani Roberts MD, FCCP, FAASM  Pulmonary, Critical Care, and Sleep Medicine

## 2019-01-21 ENCOUNTER — OFFICE VISIT (OUTPATIENT)
Dept: CARDIOLOGY | Facility: CLINIC | Age: 84
End: 2019-01-21

## 2019-01-21 VITALS
DIASTOLIC BLOOD PRESSURE: 75 MMHG | SYSTOLIC BLOOD PRESSURE: 169 MMHG | WEIGHT: 167.2 LBS | BODY MASS INDEX: 27.86 KG/M2 | HEART RATE: 78 BPM | HEIGHT: 65 IN | RESPIRATION RATE: 16 BRPM

## 2019-01-21 DIAGNOSIS — I10 ESSENTIAL HYPERTENSION: Primary | ICD-10-CM

## 2019-01-21 DIAGNOSIS — E78.5 DYSLIPIDEMIA: ICD-10-CM

## 2019-01-21 DIAGNOSIS — I25.10 ASCVD (ARTERIOSCLEROTIC CARDIOVASCULAR DISEASE): ICD-10-CM

## 2019-01-21 PROCEDURE — 99213 OFFICE O/P EST LOW 20 MIN: CPT | Performed by: PHYSICIAN ASSISTANT

## 2019-01-21 PROCEDURE — 93000 ELECTROCARDIOGRAM COMPLETE: CPT | Performed by: PHYSICIAN ASSISTANT

## 2019-01-21 NOTE — PROGRESS NOTES
Dave Tobar MD  Christine Garg  6/8/1933 01/21/2019    Patient Active Problem List   Diagnosis   • Precordial pain   • Essential hypertension   • Dyslipidemia   • Leg edema   • ASCVD (arteriosclerotic cardiovascular disease)   • Chest pain   • SOB (shortness of breath)   • Palpitations   • Coronary artery disease    • Abnormal PFTs (pulmonary function tests)   • Shortness of breath   • Chronic obstructive pulmonary disease (CMS/HCC)   • Hypokalemia       Dear Dave Tobar MD:    Subjective       History of Present Illness:    Chief Complaint   Patient presents with   • Coronary Artery Disease       Christine Garg is a pleasant 85 y.o. female with a past medical history significant for ASCVD status post CABG in 2010, history of SVT, tobacco abuse for 10 years having quit 50 years ago.  She also has a history of essential hypertension and dyslipidemia.  She comes in today for routine cardiology follow-up.    Patient states she has been doing well.  She admits to chronic shortness of breath and orthopnea sleeping on 2 pillows a night which is been stable for the last year.  She also admits to pedal edema gets worse at the end of the day after she's been on her feet all day long.  Overall she does rate this edema is mild and does not cause her any problems.  She denies any chest pains, palpitations, syncope, or near syncope.  She is able to perform her activities of daily living.       Allergies   Allergen Reactions   • Motrin [Ibuprofen] Anaphylaxis and Hives   • Novocain [Procaine] Other (See Comments)     Pt state she passes out.   :      Current Outpatient Medications:   •  albuterol (PROAIR HFA) 108 (90 Base) MCG/ACT inhaler, Inhale 2 puffs Every 4 (Four) Hours As Needed for Shortness of Air., Disp: 1 inhaler, Rfl: 11  •  amLODIPine (NORVASC) 5 MG tablet, Take 5 mg by mouth Daily., Disp: , Rfl:   •  aspirin 325 MG tablet, Take 325 mg by mouth Daily., Disp: , Rfl:   •  benazepril (LOTENSIN) 20 MG tablet,  Take 20 mg by mouth 2 (Two) Times a Day., Disp: , Rfl:   •  Cholecalciferol (VITAMIN D3) 2000 units capsule, Take 2,000 Units by mouth Daily., Disp: , Rfl:   •  Fluticasone Furoate-Vilanterol (BREO ELLIPTA) 100-25 MCG/INH aerosol powder , Inhale 1 puff Daily., Disp: 1 each, Rfl: 6  •  furosemide (LASIX) 20 MG tablet, As Needed., Disp: , Rfl:   •  hydrochlorothiazide (MICROZIDE) 12.5 MG capsule, Take 12.5 mg by mouth Daily., Disp: , Rfl:   •  levothyroxine (SYNTHROID, LEVOTHROID) 112 MCG tablet, Take 112 mcg by mouth Daily., Disp: , Rfl:   •  meloxicam (MOBIC) 7.5 MG tablet, Take 7.5 mg by mouth 2 (Two) Times a Day As Needed for Mild Pain ., Disp: , Rfl:   •  metoprolol tartrate (LOPRESSOR) 25 MG tablet, Take 25 mg by mouth 2 (Two) Times a Day., Disp: , Rfl:   •  potassium chloride (K-TAB) 20 MEQ tablet controlled-release ER tablet, Take 1 tablet by mouth Daily. (Patient taking differently: Take 20 mEq by mouth As Needed (only if she takes lasix).), Disp: 30 tablet, Rfl: 1      The following portions of the patient's history were reviewed and updated as appropriate: allergies, current medications, past family history, past medical history, past social history, past surgical history and problem list.    Social History     Tobacco Use   • Smoking status: Former Smoker     Types: Cigarettes   • Smokeless tobacco: Never Used   Substance Use Topics   • Alcohol use: No   • Drug use: No       Review of Systems   Constitution: Negative for weakness and malaise/fatigue.   Cardiovascular: Negative for chest pain, dyspnea on exertion, irregular heartbeat, leg swelling, palpitations and syncope.   Respiratory: Positive for shortness of breath. Negative for cough.    Hematologic/Lymphatic: Negative for bleeding problem. Does not bruise/bleed easily.   Gastrointestinal: Negative for nausea and vomiting.   Neurological: Negative for dizziness.       Objective   Vitals:    01/21/19 1345   BP: 169/75   BP Location: Right arm   Pulse:  "78   Resp: 16   Weight: 75.8 kg (167 lb 3.2 oz)   Height: 165.1 cm (65\")     Body mass index is 27.82 kg/m².        Physical Exam   Constitutional: She is oriented to person, place, and time. She appears well-developed and well-nourished. No distress.   HENT:   Head: Normocephalic and atraumatic.   Cardiovascular: Normal rate, regular rhythm, normal heart sounds and intact distal pulses.   Pulmonary/Chest: Effort normal and breath sounds normal. No respiratory distress.   Musculoskeletal: She exhibits no edema.   Neurological: She is alert and oriented to person, place, and time.   Skin: She is not diaphoretic.       Lab Results   Component Value Date     05/08/2018    K 3.6 05/08/2018     05/08/2018    CO2 24.3 05/08/2018    BUN 26 (H) 05/08/2018    CREATININE 1.10 05/08/2018    GLUCOSE 145 (H) 05/08/2018    CALCIUM 8.6 05/08/2018    AST 15 05/08/2018    ALT 14 05/08/2018    ALKPHOS 80 05/08/2018    LABIL2 1.4 (L) 10/12/2015     Lab Results   Component Value Date    CKTOTAL 72 05/08/2018     Lab Results   Component Value Date    WBC 5.98 05/08/2018    HGB 11.7 (L) 05/08/2018    HCT 35.7 (L) 05/08/2018     05/08/2018     Lab Results   Component Value Date    INR 0.99 05/07/2018    INR 0.94 10/12/2015     No results found for: MG  Lab Results   Component Value Date    TSH 0.245 (L) 10/13/2015    CHLPL 187 10/13/2015    TRIG 101 05/08/2018    HDL 53 (L) 05/08/2018     (H) 05/08/2018      Lab Results   Component Value Date    BNP 93.0 05/07/2018       During this visit the following were done:  Labs Reviewed [x]    Labs Ordered []    Radiology Reports Reviewed [x]    Radiology Ordered []    PCP Records Reviewed []    Referring Provider Records Reviewed []    ER Records Reviewed []    Hospital Records Reviewed []    History Obtained From Family []    Radiology Images Reviewed []    Other Reviewed []    Records Requested []         ECG 12 Lead  Date/Time: 1/21/2019 1:46 PM  Performed by: " Enrique Trinidad PA-C  Authorized by: Enrique Trinidad PA-C   Rhythm: sinus rhythm  Conduction: conduction normal  ST Segments: ST segments normal  T depression: III and V1  T flattening: V3  Clinical impression: normal ECG and non-specific ECG  Comments: QTc 391              Assessment/Plan    Diagnosis Plan   1. Essential hypertension     2. ASCVD (arteriosclerotic cardiovascular disease)     3. Dyslipidemia                  Recommendations:  1. Continue amlodipine, aspirin, benazepril, and metoprolol tartrate.  2. Follow-up in 6 months.    No Follow-up on file.    As always, I appreciate very much the opportunity to participate in the cardiovascular care of your patients.      With Best Regards,    CHARI Burks disclaimer:  Much of this encounter note is an electronic transcription/translation of spoken language to printed text. The electronic translation of spoken language may permit erroneous, or at times, nonsensical words or phrases to be inadvertently transcribed; Although I have reviewed the note for such errors, some may still exist.

## 2019-07-22 ENCOUNTER — OFFICE VISIT (OUTPATIENT)
Dept: CARDIOLOGY | Facility: CLINIC | Age: 84
End: 2019-07-22

## 2019-07-22 VITALS
SYSTOLIC BLOOD PRESSURE: 174 MMHG | HEIGHT: 65 IN | WEIGHT: 172 LBS | DIASTOLIC BLOOD PRESSURE: 84 MMHG | BODY MASS INDEX: 28.66 KG/M2 | HEART RATE: 84 BPM | OXYGEN SATURATION: 98 %

## 2019-07-22 DIAGNOSIS — I25.10 ASCVD (ARTERIOSCLEROTIC CARDIOVASCULAR DISEASE): Primary | ICD-10-CM

## 2019-07-22 DIAGNOSIS — I25.10 CORONARY ARTERY DISEASE INVOLVING NATIVE CORONARY ARTERY OF NATIVE HEART WITHOUT ANGINA PECTORIS: ICD-10-CM

## 2019-07-22 DIAGNOSIS — I10 ESSENTIAL HYPERTENSION: ICD-10-CM

## 2019-07-22 PROCEDURE — 99213 OFFICE O/P EST LOW 20 MIN: CPT | Performed by: PHYSICIAN ASSISTANT

## 2019-07-22 PROCEDURE — 93000 ELECTROCARDIOGRAM COMPLETE: CPT | Performed by: PHYSICIAN ASSISTANT

## 2019-07-22 RX ORDER — AMLODIPINE BESYLATE 2.5 MG/1
2.5 TABLET ORAL DAILY
COMMUNITY
End: 2020-07-22 | Stop reason: SDUPTHER

## 2019-07-22 RX ORDER — ROSUVASTATIN CALCIUM 10 MG/1
10 TABLET, COATED ORAL WEEKLY
COMMUNITY
End: 2020-01-22 | Stop reason: SINTOL

## 2019-07-22 NOTE — PROGRESS NOTES
Dave Tobar MD  Christine Garg  6/8/1933 07/22/2019    Patient Active Problem List   Diagnosis   • Precordial pain   • Essential hypertension   • Dyslipidemia   • Leg edema   • ASCVD (arteriosclerotic cardiovascular disease)   • Chest pain   • SOB (shortness of breath)   • Palpitations   • Coronary artery disease    • Abnormal PFTs (pulmonary function tests)   • Shortness of breath   • Chronic obstructive pulmonary disease (CMS/HCC)   • Hypokalemia       Dear Dave Tobar MD:    Subjective     History of Present Illness:    Chief Complaint   Patient presents with   • Follow-up   • Med Management     med list.    • Shortness of Breath   • Edema       Christine Garg is a pleasant 86 y.o. female with a past medical history significant for ASCVD status post CABG in 2010, history of SVT, tobacco abuse for 10 years having quit 50 years ago.  She also has a history of essential hypertension and dyslipidemia.  She comes in today for routine cardiology follow-up.    Patient reports that she has been doing well. She does report some chronic shortness of breath and pedal edema but states that this is her baseline and has not changed any. She does take one lasix every now and then for increased pedal edema but reports she averages less than 1 lasix dose a week. She does deny any chest pains, palpitations, or syncope.     Echo doppler on 5/8/2018  Interpretation Summary   · Normal left ventricular cavity size and wall thickness noted. All left ventricular wall segments contract normally.  · Estimated EF appears to be in the range of 61 - 65%.  · Left ventricular diastolic dysfunction (grade I) consistent with impaired relaxation.  · The aortic valve is structurally normal. No aortic valve regurgitation is present. No aortic valve stenosis is present.  · The mitral valve is normal in structure. Mild mitral valve regurgitation is present. No significant mitral valve stenosis is present  · The tricuspid valve is normal.  No tricuspid valve stenosis is present. Mild tricuspid valve regurgitation is present. Estimated right ventricular systolic pressure from tricuspid regurgitation is mildly elevated (35-45 mmHg).  · There is no evidence of pericardial effusion.           Allergies   Allergen Reactions   • Motrin [Ibuprofen] Anaphylaxis and Hives   • Novocain [Procaine] Other (See Comments)     Pt state she passes out.   :      Current Outpatient Medications:   •  albuterol (PROAIR HFA) 108 (90 Base) MCG/ACT inhaler, Inhale 2 puffs Every 4 (Four) Hours As Needed for Shortness of Air., Disp: 1 inhaler, Rfl: 11  •  Alirocumab (PRALUENT) 150 MG/ML solution pen-injector, Inject  under the skin into the appropriate area as directed., Disp: , Rfl:   •  amLODIPine (NORVASC) 10 MG tablet, Take 10 mg by mouth Daily., Disp: , Rfl:   •  aspirin 325 MG tablet, Take 325 mg by mouth Daily., Disp: , Rfl:   •  benazepril (LOTENSIN) 20 MG tablet, Take 20 mg by mouth 2 (Two) Times a Day., Disp: , Rfl:   •  Cholecalciferol (VITAMIN D3) 2000 units capsule, Take 2,000 Units by mouth Daily., Disp: , Rfl:   •  Cyanocobalamin (VITAMIN B 12 PO), Take  by mouth., Disp: , Rfl:   •  Fluticasone Furoate-Vilanterol (BREO ELLIPTA) 100-25 MCG/INH aerosol powder , Inhale 1 puff Daily., Disp: 1 each, Rfl: 6  •  furosemide (LASIX) 20 MG tablet, As Needed., Disp: , Rfl:   •  hydrochlorothiazide (MICROZIDE) 12.5 MG capsule, Take 12.5 mg by mouth Daily., Disp: , Rfl:   •  levothyroxine (SYNTHROID, LEVOTHROID) 112 MCG tablet, Take 112 mcg by mouth Daily., Disp: , Rfl:   •  meloxicam (MOBIC) 7.5 MG tablet, Take 7.5 mg by mouth 2 (Two) Times a Day As Needed for Mild Pain ., Disp: , Rfl:   •  potassium chloride (K-TAB) 20 MEQ tablet controlled-release ER tablet, Take 1 tablet by mouth Daily. (Patient taking differently: Take 20 mEq by mouth As Needed (only if she takes lasix).), Disp: 30 tablet, Rfl: 1  •  rosuvastatin (CRESTOR) 10 MG tablet, Take 10 mg by mouth 1 (One) Time  "Per Week. Pt only takes her crestor 2 times a week., Disp: , Rfl:   •  metoprolol tartrate (LOPRESSOR) 25 MG tablet, Take 25 mg by mouth 2 (Two) Times a Day., Disp: , Rfl:     The following portions of the patient's history were reviewed and updated as appropriate: allergies, current medications, past family history, past medical history, past social history, past surgical history and problem list.    Social History     Tobacco Use   • Smoking status: Former Smoker     Types: Cigarettes   • Smokeless tobacco: Never Used   Substance Use Topics   • Alcohol use: No   • Drug use: No       Review of Systems   Constitution: Negative for weakness and malaise/fatigue.   Cardiovascular: Negative for chest pain, dyspnea on exertion and irregular heartbeat.   Respiratory: Negative for cough and shortness of breath.    Hematologic/Lymphatic: Negative for bleeding problem. Does not bruise/bleed easily.   Gastrointestinal: Negative for nausea and vomiting.       Objective   Vitals:    07/22/19 1201   BP: 174/84   BP Location: Left arm   Patient Position: Sitting   Cuff Size: Adult   Pulse: 84   SpO2: 98%   Weight: 78 kg (172 lb)   Height: 165.1 cm (65\")     Body mass index is 28.62 kg/m².    Physical Exam   Constitutional: She is oriented to person, place, and time. She appears well-developed and well-nourished. No distress.   HENT:   Head: Normocephalic and atraumatic.   Cardiovascular: Normal rate and normal heart sounds. An irregular rhythm present.   Pulmonary/Chest: Effort normal and breath sounds normal. No respiratory distress.   Musculoskeletal: She exhibits edema ( 1+ pedal edema bilaterally).   Neurological: She is alert and oriented to person, place, and time.   Skin: She is not diaphoretic.       Lab Results   Component Value Date     05/08/2018    K 3.6 05/08/2018     05/08/2018    CO2 24.3 05/08/2018    BUN 26 (H) 05/08/2018    CREATININE 1.10 05/08/2018    GLUCOSE 145 (H) 05/08/2018    CALCIUM 8.6 " 05/08/2018    AST 15 05/08/2018    ALT 14 05/08/2018    ALKPHOS 80 05/08/2018    LABIL2 1.4 (L) 10/12/2015     Lab Results   Component Value Date    CKTOTAL 72 05/08/2018     Lab Results   Component Value Date    WBC 5.98 05/08/2018    HGB 11.7 (L) 05/08/2018    HCT 35.7 (L) 05/08/2018     05/08/2018     Lab Results   Component Value Date    INR 0.99 05/07/2018    INR 0.94 10/12/2015     No results found for: MG  Lab Results   Component Value Date    TSH 0.245 (L) 10/13/2015    CHLPL 187 10/13/2015    TRIG 101 05/08/2018    HDL 53 (L) 05/08/2018     (H) 05/08/2018      Lab Results   Component Value Date    BNP 93.0 05/07/2018       During this visit the following were done:  Labs Reviewed [x]    Labs Ordered []    Radiology Reports Reviewed [x]    Radiology Ordered []    PCP Records Reviewed []    Referring Provider Records Reviewed []    ER Records Reviewed []    Hospital Records Reviewed []    History Obtained From Family []    Radiology Images Reviewed []    Other Reviewed []    Records Requested []         ECG 12 Lead  Date/Time: 7/22/2019 11:57 AM  Performed by: Enrique Trinidad PA-C  Authorized by: Enrique Trinidad PA-C   Comparison: compared with previous ECG   Similar to previous ECG  Rhythm: sinus rhythm  Ectopy: unifocal PVCs  Conduction: conduction normal  Other findings: left atrial abnormality    Clinical impression: non-specific ECG            Assessment/Plan    Diagnosis Plan   1. ASCVD (arteriosclerotic cardiovascular disease)     2. Essential hypertension     3. Coronary artery disease               Recommendations:  1. Patient appears to be doing well from cardiac standpoint.  She is asymptomatic her blood pressure is slightly elevated in the office today however she reports that when she checks it at home it runs about 130 mmHg systolic.  She does take 1 dose of Lasix roughly once every other week for increased pedal edema which I do feel is appropriate.  2. Continue Crestor,  Praluent, HCTZ, Lasix, benazepril, amlodipine, and aspirin.    Return in about 6 months (around 1/22/2020).    As always, I appreciate very much the opportunity to participate in the cardiovascular care of your patients.      With Best Regards,    Enrique Trinidad PA-C

## 2019-09-25 ENCOUNTER — OFFICE VISIT (OUTPATIENT)
Dept: PULMONOLOGY | Facility: CLINIC | Age: 84
End: 2019-09-25

## 2019-09-25 VITALS
DIASTOLIC BLOOD PRESSURE: 84 MMHG | HEART RATE: 84 BPM | TEMPERATURE: 98 F | SYSTOLIC BLOOD PRESSURE: 141 MMHG | BODY MASS INDEX: 27.49 KG/M2 | OXYGEN SATURATION: 97 % | WEIGHT: 165 LBS | HEIGHT: 65 IN

## 2019-09-25 DIAGNOSIS — J45.30 MILD PERSISTENT ASTHMA WITH ALLERGIC RHINITIS WITHOUT COMPLICATION: Primary | ICD-10-CM

## 2019-09-25 DIAGNOSIS — R91.1 PULMONARY NODULE: ICD-10-CM

## 2019-09-25 DIAGNOSIS — Z87.891 FORMER SMOKER: ICD-10-CM

## 2019-09-25 PROCEDURE — 99214 OFFICE O/P EST MOD 30 MIN: CPT | Performed by: PHYSICIAN ASSISTANT

## 2019-09-25 RX ORDER — MONTELUKAST SODIUM 10 MG/1
10 TABLET ORAL NIGHTLY
Qty: 30 TABLET | Refills: 11 | Status: ON HOLD | OUTPATIENT
Start: 2019-09-25

## 2019-09-25 RX ORDER — ALBUTEROL SULFATE 90 UG/1
2 AEROSOL, METERED RESPIRATORY (INHALATION) EVERY 4 HOURS PRN
Qty: 1 INHALER | Refills: 11 | Status: SHIPPED | OUTPATIENT
Start: 2019-09-25 | End: 2020-07-14 | Stop reason: SDUPTHER

## 2019-09-25 NOTE — PROGRESS NOTES
Interval history since last visit:  Shortness of breath at baseline with intermittent increases    Recent hospitalizations: none    Investigations (imaging, PFT's, labs, sleep study, record requests, etc.) none recent    Have you had the Influenza Vaccine? no   Would you like to receive this Vaccine today? no    Have you had the Pneumonia Vaccine?  no  Would you like to receive this Vaccine today? no    Subjective    Christine Garg presents for the following asthmatic bronchitis      History of Present Illness     Ms. Garg presents today for follow up of asthmatic bronchitis and former smoking history.   She reports that she is doing fairly well today. She reports that her baseline shortness of breath has progressively became more constant than episodic since the previous visit. However, she still notices days of fluctuations where symptoms are more so increased with ambulation versus fairly stable days.  Ambulation is also impaired knee pain. Travel is not significantly impaired by dyspnea. She is still able to ambulate through a store without having to rest multiple times. She reports that she is currently only able to use her albuterol and breo inhaler as needed, as she cannot afford the cost of this in addition to her other medications. Thus she attempts to avoid daily use.  Dyspnea is worsened by heat, dust exposure.       Review of Systems   Respiratory: Positive for shortness of breath. Negative for cough, chest tightness and wheezing.    Cardiovascular: Negative for chest pain.   Neurological: Negative for dizziness, light-headedness and headaches.       Active Problems:  Problem List Items Addressed This Visit     None          Past Medical History:  Past Medical History:   Diagnosis Date   • High cholesterol    • Hypertension    • Past heart attack    • Thyroid disease        Family History:  Family History   Problem Relation Age of Onset   • Heart disease Father    • Heart disease Brother        Social  History:  Social History     Tobacco Use   • Smoking status: Former Smoker     Types: Cigarettes   • Smokeless tobacco: Never Used   Substance Use Topics   • Alcohol use: No       Current Medications:  Current Outpatient Medications   Medication Sig Dispense Refill   • albuterol (PROAIR HFA) 108 (90 Base) MCG/ACT inhaler Inhale 2 puffs Every 4 (Four) Hours As Needed for Shortness of Air. 1 inhaler 11   • Alirocumab (PRALUENT) 150 MG/ML solution pen-injector Inject  under the skin into the appropriate area as directed.     • amLODIPine (NORVASC) 10 MG tablet Take 10 mg by mouth Daily.     • aspirin 325 MG tablet Take 325 mg by mouth Daily.     • benazepril (LOTENSIN) 20 MG tablet Take 20 mg by mouth 2 (Two) Times a Day.     • Cholecalciferol (VITAMIN D3) 2000 units capsule Take 2,000 Units by mouth Daily.     • Cyanocobalamin (VITAMIN B 12 PO) Take  by mouth.     • Fluticasone Furoate-Vilanterol (BREO ELLIPTA) 100-25 MCG/INH aerosol powder  Inhale 1 puff Daily. 1 each 6   • furosemide (LASIX) 20 MG tablet As Needed.     • hydrochlorothiazide (MICROZIDE) 12.5 MG capsule Take 12.5 mg by mouth Daily.     • levothyroxine (SYNTHROID, LEVOTHROID) 112 MCG tablet Take 112 mcg by mouth Daily.     • meloxicam (MOBIC) 7.5 MG tablet Take 7.5 mg by mouth 2 (Two) Times a Day As Needed for Mild Pain .     • metoprolol tartrate (LOPRESSOR) 25 MG tablet Take 25 mg by mouth 2 (Two) Times a Day.     • potassium chloride (K-TAB) 20 MEQ tablet controlled-release ER tablet Take 1 tablet by mouth Daily. (Patient taking differently: Take 20 mEq by mouth As Needed (only if she takes lasix).) 30 tablet 1   • rosuvastatin (CRESTOR) 10 MG tablet Take 10 mg by mouth 1 (One) Time Per Week. Pt only takes her crestor 2 times a week.       No current facility-administered medications for this visit.        Allergies:  Allergies   Allergen Reactions   • Motrin [Ibuprofen] Anaphylaxis and Hives   • Novocain [Procaine] Other (See Comments)     Pt state  "she passes out.       Vitals:  /84   Pulse 84   Temp 98 °F (36.7 °C)   Ht 165.1 cm (65\")   Wt 74.8 kg (165 lb)   SpO2 97%   BMI 27.46 kg/m²     Imaging:    Imaging Results (most recent)     None          Pulmonary Functions Testing Results:    No results found for: FEV1, FVC, YSC2OJU, TLC, DLCO    Results for orders placed or performed during the hospital encounter of 05/07/18   Blood Culture - Blood,   Result Value Ref Range    Blood Culture No growth at 5 days    Blood Culture - Blood,   Result Value Ref Range    Blood Culture No growth at 5 days    Comprehensive Metabolic Panel   Result Value Ref Range    Glucose 101 70 - 110 mg/dL    BUN 30 (H) 7 - 21 mg/dL    Creatinine 1.50 (H) 0.43 - 1.29 mg/dL    Sodium 138 135 - 153 mmol/L    Potassium 4.2 3.5 - 5.3 mmol/L    Chloride 106 99 - 112 mmol/L    CO2 24.5 24.3 - 31.9 mmol/L    Calcium 9.7 7.7 - 10.0 mg/dL    Total Protein 7.1 6.0 - 8.0 g/dL    Albumin 4.20 3.40 - 4.80 g/dL    ALT (SGPT) 13 10 - 36 U/L    AST (SGOT) 17 10 - 30 U/L    Alkaline Phosphatase 104 35 - 104 U/L    Total Bilirubin 0.3 0.2 - 1.8 mg/dL    eGFR Non African Amer 33 (L) >60 mL/min/1.73    Globulin 2.9 gm/dL    A/G Ratio 1.4 (L) 1.5 - 2.5 g/dL    BUN/Creatinine Ratio 20.0 7.0 - 25.0    Anion Gap 7.5 3.6 - 11.2 mmol/L   BNP   Result Value Ref Range    BNP 93.0 0.0 - 100.0 pg/mL   Troponin   Result Value Ref Range    Troponin I 0.007 <=0.040 ng/mL   CBC Auto Differential   Result Value Ref Range    WBC 7.23 4.50 - 12.50 10*3/mm3    RBC 4.33 4.20 - 5.40 10*6/mm3    Hemoglobin 13.5 12.0 - 16.0 g/dL    Hematocrit 39.9 37.0 - 47.0 %    MCV 92.1 80.0 - 94.0 fL    MCH 31.2 27.0 - 33.0 pg    MCHC 33.8 33.0 - 37.0 g/dL    RDW 13.7 11.5 - 14.5 %    RDW-SD 45.2 37.0 - 54.0 fl    MPV 10.3 (H) 6.0 - 10.0 fL    Platelets 304 130 - 400 10*3/mm3    Neutrophil % 65.6 40.0 - 75.0 %    Lymphocyte % 23.1 16.0 - 46.0 %    Monocyte % 9.1 0.0 - 12.0 %    Eosinophil % 1.5 0.0 - 7.0 %    Basophil % 0.6 0.0 - " 2.0 %    Immature Grans % 0.1 0.0 - 0.5 %    Neutrophils, Absolute 4.74 1.40 - 6.50 10*3/mm3    Lymphocytes, Absolute 1.67 1.00 - 3.00 10*3/mm3    Monocytes, Absolute 0.66 0.10 - 0.90 10*3/mm3    Eosinophils, Absolute 0.11 0.00 - 0.70 10*3/mm3    Basophils, Absolute 0.04 0.00 - 0.30 10*3/mm3    Immature Grans, Absolute 0.01 0.00 - 0.03 10*3/mm3   Osmolality, Calculated   Result Value Ref Range    Osmolality Calc 282.0 273.0 - 305.0 mOsm/kg   Protime-INR   Result Value Ref Range    Protime 13.3 11.0 - 15.4 Seconds    INR 0.99 0.90 - 1.10   aPTT   Result Value Ref Range    PTT 27.5 23.8 - 36.1 seconds   Lipase   Result Value Ref Range    Lipase 48 13 - 60 U/L   D-dimer, Quantitative   Result Value Ref Range    D-Dimer, Quantitative 0.82 (C) 0.00 - 0.50 MCGFEU/mL   Lactic Acid, Plasma   Result Value Ref Range    Lactate 0.7 0.5 - 2.0 mmol/L   Troponin   Result Value Ref Range    Troponin I 0.011 <=0.040 ng/mL   CK   Result Value Ref Range    Creatine Kinase 95 24 - 173 U/L   CK-MB   Result Value Ref Range    CKMB 2.14 0.00 - 5.00 ng/mL   CK-MB Index   Result Value Ref Range    CK-MB Index 2.3 0.0 - 3.0 %   Comprehensive Metabolic Panel   Result Value Ref Range    Glucose 145 (H) 70 - 110 mg/dL    BUN 26 (H) 7 - 21 mg/dL    Creatinine 1.10 0.43 - 1.29 mg/dL    Sodium 141 135 - 153 mmol/L    Potassium 3.6 3.5 - 5.3 mmol/L    Chloride 109 99 - 112 mmol/L    CO2 24.3 24.3 - 31.9 mmol/L    Calcium 8.6 7.7 - 10.0 mg/dL    Total Protein 5.7 (L) 6.0 - 8.0 g/dL    Albumin 3.30 (L) 3.40 - 4.80 g/dL    ALT (SGPT) 14 10 - 36 U/L    AST (SGOT) 15 10 - 30 U/L    Alkaline Phosphatase 80 35 - 104 U/L    Total Bilirubin 0.3 0.2 - 1.8 mg/dL    eGFR Non African Amer 47 (L) >60 mL/min/1.73    Globulin 2.4 gm/dL    A/G Ratio 1.4 (L) 1.5 - 2.5 g/dL    BUN/Creatinine Ratio 23.6 7.0 - 25.0    Anion Gap 7.7 3.6 - 11.2 mmol/L   CK   Result Value Ref Range    Creatine Kinase 68 24 - 173 U/L   CK   Result Value Ref Range    Creatine Kinase 70 24  - 173 U/L   CK-MB   Result Value Ref Range    CKMB 1.46 0.00 - 5.00 ng/mL   CK-MB   Result Value Ref Range    CKMB 1.76 0.00 - 5.00 ng/mL   Myoglobin, Serum   Result Value Ref Range    Myoglobin 61.0 0.0 - 109.0 ng/mL   Myoglobin, Serum   Result Value Ref Range    Myoglobin 51.0 0.0 - 109.0 ng/mL   Troponin   Result Value Ref Range    Troponin I 0.011 <=0.040 ng/mL   Troponin   Result Value Ref Range    Troponin I 0.019 <=0.040 ng/mL   Hemoglobin A1c   Result Value Ref Range    Hemoglobin A1C 5.80 (H) 4.50 - 5.70 %   Lipid Panel   Result Value Ref Range    Total Cholesterol 184 0 - 200 mg/dL    Triglycerides 101 0 - 150 mg/dL    HDL Cholesterol 53 (L) 60 - 100 mg/dL    LDL Cholesterol  111 (H) 0 - 100 mg/dL    VLDL Cholesterol 20.2 mg/dL    LDL/HDL Ratio 2.09    CBC Auto Differential   Result Value Ref Range    WBC 5.98 4.50 - 12.50 10*3/mm3    RBC 3.79 (L) 4.20 - 5.40 10*6/mm3    Hemoglobin 11.7 (L) 12.0 - 16.0 g/dL    Hematocrit 35.7 (L) 37.0 - 47.0 %    MCV 94.2 (H) 80.0 - 94.0 fL    MCH 30.9 27.0 - 33.0 pg    MCHC 32.8 (L) 33.0 - 37.0 g/dL    RDW 13.7 11.5 - 14.5 %    RDW-SD 45.1 37.0 - 54.0 fl    MPV 10.4 (H) 6.0 - 10.0 fL    Platelets 261 130 - 400 10*3/mm3    Neutrophil % 52.7 40.0 - 75.0 %    Lymphocyte % 35.1 16.0 - 46.0 %    Monocyte % 9.7 0.0 - 12.0 %    Eosinophil % 1.5 0.0 - 7.0 %    Basophil % 0.8 0.0 - 2.0 %    Immature Grans % 0.2 0.0 - 0.5 %    Neutrophils, Absolute 3.15 1.40 - 6.50 10*3/mm3    Lymphocytes, Absolute 2.10 1.00 - 3.00 10*3/mm3    Monocytes, Absolute 0.58 0.10 - 0.90 10*3/mm3    Eosinophils, Absolute 0.09 0.00 - 0.70 10*3/mm3    Basophils, Absolute 0.05 0.00 - 0.30 10*3/mm3    Immature Grans, Absolute 0.01 0.00 - 0.03 10*3/mm3   Osmolality, Calculated   Result Value Ref Range    Osmolality Calc 288.6 273.0 - 305.0 mOsm/kg   CK-MB Index   Result Value Ref Range    CK-MB Index 2.1 0.0 - 3.0 %   CK   Result Value Ref Range    Creatine Kinase 72 24 - 173 U/L   CK-MB   Result Value Ref  Range    CKMB 2.01 0.00 - 5.00 ng/mL   Myoglobin, Serum   Result Value Ref Range    Myoglobin 65.0 0.0 - 109.0 ng/mL   Troponin   Result Value Ref Range    Troponin I 0.018 <=0.040 ng/mL   CK-MB Index   Result Value Ref Range    CK-MB Index 2.5 0.0 - 3.0 %   CK-MB Index   Result Value Ref Range    CK-MB Index 2.8 0.0 - 3.0 %   Stress Test With Myocardial Perfusion One Day   Result Value Ref Range    Nuclear Prior Study 3     Target HR (85%) 116 bpm    Max. Pred. HR (100%) 136 bpm     CV STRESS PROTOCOL 1 Pharmacologic     Stage 1 1     HR Stage 1 88     BP Stage 1 142/73     Duration Min Stage 1 0     Duration Sec Stage 1 10     Stress Dose Regadenoson Stage 1 0.4     Stress Comments Stage 1 10 sec bolus injection     Stage 2 2     HR Stage 2 87     BP Stage 2 139/68     Duration Min Stage 2 4     Duration Sec Stage 2 0     Stress Comments Stage 2 recovery     Baseline HR 58 bpm    Baseline /64 mmHg    Peak HR 88 bpm    Percent Max Pred HR 64.71 %    Percent Target HR 76 %    Peak /64 mmHg    Recovery HR 70 bpm    Recovery /65 mmHg   Adult Transthoracic Echo Complete W/ Cont if Necessary Per Protocol   Result Value Ref Range    BSA 1.8 m^2    IVSd 1.1 cm    IVSs 1.2 cm    LVIDd 4.0 cm    LVIDs 2.8 cm    LVPWd 0.88 cm     CV ECHO RAVI - LVPWS 1.4 cm    IVS/LVPW 1.2     FS 29.6 %    EDV(Teich) 69.1 ml    ESV(Teich) 29.5 ml    EF(Teich) 57.3 %    EDV(cubed) 63.0 ml    ESV(cubed) 21.9 ml    EF(cubed) 65.2 %    % IVS thick 13.6 %    % LVPW thick 53.6 %    LV mass(C)d 121.4 grams    LV mass(C)dI 66.4 grams/m^2    LV mass(C)s 110.9 grams    LV mass(C)sI 60.7 grams/m^2    SV(Teich) 39.6 ml    SI(Teich) 21.7 ml/m^2    SV(cubed) 41.1 ml    SI(cubed) 22.5 ml/m^2    Ao root diam 2.7 cm    Ao root area 5.6 cm^2    ACS 1.6 cm    LA dimension 2.9 cm    LA/Ao 1.1     LVOT diam 2.0 cm    LVOT area 3.0 cm^2    LVOT area(traced) 3.1 cm^2    LVLd ap4 6.6 cm    EDV(MOD-sp4) 41.0 ml    LVLs ap4 6.2 cm     ESV(MOD-sp4) 14.0 ml    EF(MOD-sp4) 65.9 %    SV(MOD-sp4) 27.0 ml    SI(MOD-sp4) 14.8 ml/m^2    Ao root area (BSA corrected) 1.5     EF - Contrast (4Ch) 65.9 ml/m^2    LV Coronel Vol (BSA corrected) 22.4 ml/m^2    LV Sys Vol (BSA corrected) 7.7 ml/m^2    MV E max salome 82.9 cm/sec    MV A max salome 101.2 cm/sec    MV E/A 0.82     Ao pk salome 173.7 cm/sec    Ao max PG 12.1 mmHg    Ao V2 mean 101.6 cm/sec    Ao mean PG 5.1 mmHg    Ao V2 VTI 36.9 cm    SV(Ao) 206.2 ml    SI(Ao) 112.8 ml/m^2    PA acc slope 1,225 cm/sec^2    PA acc time 0.12 sec    TR max salome 273.6 cm/sec    RVSP(TR) 40.0 mmHg    RAP systole 10.0 mmHg    PA pr(Accel) 26.7 mmHg     CV ECHO RAVI - BZI_BMI 27.6 kilograms/m^2     CV ECHO RAVI - BSA(HAYCOCK) 1.9 m^2     CV ECHO RAVI - BZI_METRIC_WEIGHT 75.3 kg     CV ECHO RAVI - BZI_METRIC_HEIGHT 165.1 cm    Target HR (85%) 116 bpm    Max. Pred. HR (100%) 136 bpm   Light Blue Top   Result Value Ref Range    Extra Tube hold for add-on    Green Top (Gel)   Result Value Ref Range    Extra Tube Hold for add-ons.    Lavender Top   Result Value Ref Range    Extra Tube hold for add-on    Gold Top - SST   Result Value Ref Range    Extra Tube Hold for add-ons.        Objective   Physical Exam   Constitutional: She is oriented to person, place, and time. She appears well-developed and well-nourished. No distress.   HENT:   Head: Normocephalic and atraumatic.   Nose: Nose normal.   Mouth/Throat: No oropharyngeal exudate.   Eyes: EOM are normal. Pupils are equal, round, and reactive to light.   Neck: Normal range of motion. No thyromegaly present.   Cardiovascular: Normal rate, regular rhythm, S1 normal and S2 normal.   Pulmonary/Chest: Effort normal.   Bilateral air entry positive and appreciated throughout.  Diminished breath sounds at the bases.  No wheezing, rhonchi, or crackles.   Musculoskeletal: Normal range of motion. She exhibits edema (trace lower extremity edema bilaterally).   Neurological: She is alert and  oriented to person, place, and time.   Skin: Skin is warm and dry. She is not diaphoretic.   Psychiatric: She has a normal mood and affect. Her behavior is normal.   Vitals reviewed.      Assessment/Plan     I have reviewed the past medical history, family history, social history, surgical history, and allergies.     I have reviewed the imaging of the chest CT without contrast completed on 5/7/2018 this is significant for a tiny 3 mm pulmonary nodule of the lateral left lung and 1 cm lymph node of the precarinal location.    I have reviewed the imaging of the chest CT completed on 5/16/2018.  Interpretation was nonsignificant.  Per my interpretation, I have again noted the 3 mm nodule of the left lung on image 40.  1 cm precarinal lymph node again noted.     I reviewed the PFT completed on 7/6/2018 which showed moderate obstruction without significant bronchodilator effect.  Bronchodilator effect was noted at +7.  DLCO is moderately reduced.    I reviewed the echocardiogram completed on 5/8/2018.  EF noted at 61 to 65% with grade 1 diastolic dysfunction.  Mild mitral regurgitation present.  Mild tricuspid valve regurgitation present. No effusion.         ICD-10-CM ICD-9-CM   1. Mild persistent asthma with allergic rhinitis without complication J45.30 493.00   2. Pulmonary nodule R91.1 793.11   3. Former smoker Z87.891 V15.82         Mild asthmatic bronchitis:   · Concern for minimal emphysematous changes notable upon previous CT imaging.   · Started on Advair diskus for 2 puffs, once daily use.   Reminded to rinse following use to avoid oral irritation. She is familiar with this type of inhaler as she was previously on Breo ellipta, but currently only using this as needed as this was too expensive to afford with other medication costs and was unable to afford everyday use.   Advair diskus, HFA, and Dulera appear to be on the preferred list.   · Continue albuterol as needed. Ordered ProAir (ventolin more expensive,  not preferred by insurance)  · Ordered Singulair for nightly use      Pulmonary nodule, former smoker:   · Small 3 mm nodule and 1 cm lymph node mentioned on CT imaging completed on 5/8/2018 and 5/16/2018. Not mentioned on the following study completed on 5/16/2018, but appears to be present with calcified appearance. Previous smoker, but this was 50 years ago with ess than 1 pack per day.   Discussed the risks and benefits to repeated CT imaging. There is minimal concern with smoking history that this could be malignant.  However with calcified appearance on the secondary imaging, this could be related to previous infection.  She does admit to a history of pneumonia.    Patient is currently not experiencing any hemoptysis, unexpected weight loss, or night sweats, we have currently agreed after discussion to await repeat imaging at this time.   · Patient preferred to repeat as needed if she becomes more short of breath or develops symptoms concerning for malignancy      Vaccinations:  Not up to date. Preferred to await vaccinations today.     Patient's Body mass index is 27.46 kg/m². BMI is within normal parameters. No follow-up required..      Return in about 6 months (around 3/25/2020), or as needed.

## 2019-12-06 ENCOUNTER — HOSPITAL ENCOUNTER (OUTPATIENT)
Dept: MAMMOGRAPHY | Facility: HOSPITAL | Age: 84
Discharge: HOME | End: 2019-12-06

## 2019-12-06 ENCOUNTER — HOSPITAL ENCOUNTER (OUTPATIENT)
Dept: BONE DENSITY | Facility: HOSPITAL | Age: 84
Discharge: HOME | End: 2019-12-06
Admitting: INTERNAL MEDICINE

## 2019-12-06 DIAGNOSIS — Z12.31 SCREENING MAMMOGRAM FOR HIGH-RISK PATIENT: ICD-10-CM

## 2019-12-06 DIAGNOSIS — M81.0 AGE-RELATED OSTEOPOROSIS WITHOUT CURRENT PATHOLOGICAL FRACTURE: ICD-10-CM

## 2019-12-06 PROCEDURE — 77067 SCR MAMMO BI INCL CAD: CPT | Performed by: RADIOLOGY

## 2019-12-06 PROCEDURE — 77063 BREAST TOMOSYNTHESIS BI: CPT | Performed by: RADIOLOGY

## 2019-12-06 PROCEDURE — 77080 DXA BONE DENSITY AXIAL: CPT

## 2019-12-06 PROCEDURE — 77080 DXA BONE DENSITY AXIAL: CPT | Performed by: RADIOLOGY

## 2019-12-06 PROCEDURE — 77067 SCR MAMMO BI INCL CAD: CPT

## 2019-12-06 PROCEDURE — 77063 BREAST TOMOSYNTHESIS BI: CPT

## 2020-01-22 ENCOUNTER — OFFICE VISIT (OUTPATIENT)
Dept: CARDIOLOGY | Facility: CLINIC | Age: 85
End: 2020-01-22

## 2020-01-22 VITALS
DIASTOLIC BLOOD PRESSURE: 70 MMHG | HEIGHT: 65 IN | WEIGHT: 170.4 LBS | HEART RATE: 75 BPM | BODY MASS INDEX: 28.39 KG/M2 | OXYGEN SATURATION: 99 % | RESPIRATION RATE: 16 BRPM | SYSTOLIC BLOOD PRESSURE: 126 MMHG

## 2020-01-22 DIAGNOSIS — I25.10 ASCVD (ARTERIOSCLEROTIC CARDIOVASCULAR DISEASE): Primary | ICD-10-CM

## 2020-01-22 DIAGNOSIS — I10 ESSENTIAL HYPERTENSION: ICD-10-CM

## 2020-01-22 DIAGNOSIS — E78.5 DYSLIPIDEMIA: ICD-10-CM

## 2020-01-22 DIAGNOSIS — J44.9 CHRONIC OBSTRUCTIVE PULMONARY DISEASE, UNSPECIFIED COPD TYPE (HCC): ICD-10-CM

## 2020-01-22 PROCEDURE — 99213 OFFICE O/P EST LOW 20 MIN: CPT | Performed by: NURSE PRACTITIONER

## 2020-01-22 PROCEDURE — 93000 ELECTROCARDIOGRAM COMPLETE: CPT | Performed by: NURSE PRACTITIONER

## 2020-01-22 RX ORDER — UBIDECARENONE 100 MG
100 CAPSULE ORAL DAILY PRN
COMMUNITY
End: 2023-03-22

## 2020-01-22 NOTE — PROGRESS NOTES
Dave Tobar MD  Christine Garg  6/8/1933 01/22/2020    Patient Active Problem List   Diagnosis   • Precordial pain   • Essential hypertension   • Dyslipidemia   • Leg edema   • ASCVD (arteriosclerotic cardiovascular disease)   • Chest pain   • SOB (shortness of breath)   • Palpitations   • Coronary artery disease    • Abnormal PFTs (pulmonary function tests)   • Shortness of breath   • Chronic obstructive pulmonary disease (CMS/HCC)   • Hypokalemia   • Mild persistent asthma with allergic rhinitis   • Pulmonary nodule   • Former smoker       Dear Dave Tobar MD:    Subjective     Chief Complaint   Patient presents with   • Coronary Artery Disease     6 mos follow   • Shortness of Breath     some   • Edema     some   • Med Management     list provided           History of Present Illness:    Christine Garg is a 86 y.o. female with a history of ASCVD status post CABG in 2010, history of SVT, and tobacco abuse having quit 50 years ago.  She presents today for routine cardiology follow-up.  She reports she has been doing very well.  She denies any chest pain, palpitations, dizziness, or lightheadedness.  She has chronic shortness of breath which is recently been better since using the Breo inhaler prescribed by her pulmonologist.  She takes Lasix occasionally when needed for lower extremity edema but has not had to take this in several weeks.  She is intolerant to statins but has been taking Praluent prescribed by her PCP.          Allergies   Allergen Reactions   • Motrin [Ibuprofen] Anaphylaxis and Hives   • Novocain [Procaine] Other (See Comments)     Pt state she passes out.   :      Current Outpatient Medications:   •  albuterol sulfate HFA (PROAIR HFA) 108 (90 Base) MCG/ACT inhaler, Inhale 2 puffs Every 4 (Four) Hours As Needed for Shortness of Air., Disp: 1 inhaler, Rfl: 11  •  Alirocumab (PRALUENT) 150 MG/ML solution pen-injector, Inject  under the skin into the appropriate area as directed., Disp:  , Rfl:   •  amLODIPine (NORVASC) 10 MG tablet, Take 2.5 mg by mouth Daily., Disp: , Rfl:   •  aspirin 325 MG tablet, Take 325 mg by mouth Daily., Disp: , Rfl:   •  benazepril (LOTENSIN) 20 MG tablet, Take 10 mg by mouth 2 (Two) Times a Day., Disp: , Rfl:   •  Calcium-Vitamin D-Vitamin K (VIACTIV PO), Take  by mouth., Disp: , Rfl:   •  Cholecalciferol (VITAMIN D3) 2000 units capsule, Take 2,000 Units by mouth Daily., Disp: , Rfl:   •  coenzyme Q10 100 MG capsule, Take 100 mg by mouth Daily., Disp: , Rfl:   •  Cyanocobalamin (VITAMIN B 12 PO), Take  by mouth., Disp: , Rfl:   •  Fluticasone Furoate-Vilanterol (BREO ELLIPTA IN), Inhale., Disp: , Rfl:   •  furosemide (LASIX) 20 MG tablet, As Needed., Disp: , Rfl:   •  hydrochlorothiazide (MICROZIDE) 12.5 MG capsule, Take 12.5 mg by mouth Daily., Disp: , Rfl:   •  levothyroxine (SYNTHROID, LEVOTHROID) 112 MCG tablet, Take 112 mcg by mouth Daily., Disp: , Rfl:   •  meloxicam (MOBIC) 7.5 MG tablet, Take 7.5 mg by mouth 2 (Two) Times a Day As Needed for Mild Pain ., Disp: , Rfl:   •  metoprolol tartrate (LOPRESSOR) 25 MG tablet, Take 25 mg by mouth 2 (Two) Times a Day., Disp: , Rfl:   •  montelukast (SINGULAIR) 10 MG tablet, Take 1 tablet by mouth Every Night., Disp: 30 tablet, Rfl: 11  •  potassium chloride (K-TAB) 20 MEQ tablet controlled-release ER tablet, Take 1 tablet by mouth Daily. (Patient taking differently: Take 20 mEq by mouth As Needed (only if she takes lasix).), Disp: 30 tablet, Rfl: 1      The following portions of the patient's history were reviewed and updated as appropriate: allergies, current medications, past family history, past medical history, past social history, past surgical history and problem list.    Social History     Tobacco Use   • Smoking status: Former Smoker     Types: Cigarettes   • Smokeless tobacco: Never Used   Substance Use Topics   • Alcohol use: No   • Drug use: No       Review of Systems   Constitution: Negative for malaise/fatigue.  "  Cardiovascular: Positive for leg swelling. Negative for chest pain, dyspnea on exertion, irregular heartbeat, near-syncope, orthopnea, palpitations, paroxysmal nocturnal dyspnea and syncope.   Respiratory: Positive for shortness of breath. Negative for cough and wheezing.    Neurological: Negative for dizziness, light-headedness and weakness.       Objective   Vitals:    01/22/20 1252   BP: 126/70   Pulse: 75   Resp: 16   SpO2: 99%   Weight: 77.3 kg (170 lb 6.4 oz)   Height: 165.1 cm (65\")     Body mass index is 28.36 kg/m².        Physical Exam   Constitutional: She is oriented to person, place, and time. She appears well-developed and well-nourished.   HENT:   Head: Normocephalic and atraumatic.   Cardiovascular: Normal rate, regular rhythm and normal heart sounds. Exam reveals no S3 and no S4.   No murmur heard.  Pulmonary/Chest: Effort normal and breath sounds normal. She has no wheezes. She has no rales.   Abdominal: Soft. Bowel sounds are normal.   Musculoskeletal: She exhibits no edema.   Neurological: She is alert and oriented to person, place, and time.   Skin: Skin is warm and dry.   Psychiatric: She has a normal mood and affect. Her behavior is normal.       Lab Results   Component Value Date     05/08/2018    K 3.6 05/08/2018     05/08/2018    CO2 24.3 05/08/2018    BUN 26 (H) 05/08/2018    CREATININE 1.10 05/08/2018    GLUCOSE 145 (H) 05/08/2018    CALCIUM 8.6 05/08/2018    AST 15 05/08/2018    ALT 14 05/08/2018    ALKPHOS 80 05/08/2018    LABIL2 1.4 (L) 10/12/2015     Lab Results   Component Value Date    CKTOTAL 72 05/08/2018     Lab Results   Component Value Date    WBC 5.98 05/08/2018    HGB 11.7 (L) 05/08/2018    HCT 35.7 (L) 05/08/2018     05/08/2018     Lab Results   Component Value Date    INR 0.99 05/07/2018    INR 0.94 10/12/2015      Lab Results   Component Value Date    TSH 0.245 (L) 10/13/2015    CHLPL 187 10/13/2015    TRIG 101 05/08/2018    HDL 53 (L) 05/08/2018    "  (H) 05/08/2018      Lab Results   Component Value Date    BNP 93.0 05/07/2018             ECG 12 Lead  Date/Time: 1/22/2020 12:53 PM  Performed by: Cassandra Crowder APRN  Authorized by: Cassandra Crowder APRN   Comparison: compared with previous ECG   Similar to previous ECG  Rhythm: sinus rhythm  BPM: 68  Other findings: non-specific ST-T wave changes and left atrial abnormality              Assessment/Plan    Diagnosis Plan   1. ASCVD (arteriosclerotic cardiovascular disease)     2. Essential hypertension     3. Chronic obstructive pulmonary disease, unspecified COPD type (CMS/[facility])     4. Dyslipidemia                  Recommendations:    Continue with aspirin, Praluent, amlodipine, benazepril, metoprolol, and lasix when needed.    She will follow up in 6 months or sooner if needed.      Return in about 6 months (around 7/22/2020) for Recheck.    As always, I appreciate very much the opportunity to participate in the cardiovascular care of your patients.      With Best Regards,    LUCÍA Muñoz

## 2020-07-14 ENCOUNTER — OFFICE VISIT (OUTPATIENT)
Dept: PULMONOLOGY | Facility: CLINIC | Age: 85
End: 2020-07-14

## 2020-07-14 VITALS — BODY MASS INDEX: 27.49 KG/M2 | HEIGHT: 65 IN | WEIGHT: 165 LBS

## 2020-07-14 DIAGNOSIS — R91.1 PULMONARY NODULE: ICD-10-CM

## 2020-07-14 DIAGNOSIS — Z87.891 FORMER SMOKER: ICD-10-CM

## 2020-07-14 DIAGNOSIS — J45.30 MILD PERSISTENT ASTHMA WITH ALLERGIC RHINITIS WITHOUT COMPLICATION: Primary | ICD-10-CM

## 2020-07-14 PROCEDURE — 99442 PR PHYS/QHP TELEPHONE EVALUATION 11-20 MIN: CPT | Performed by: PHYSICIAN ASSISTANT

## 2020-07-14 RX ORDER — ALBUTEROL SULFATE 90 UG/1
2 AEROSOL, METERED RESPIRATORY (INHALATION) EVERY 4 HOURS PRN
Qty: 3 INHALER | Refills: 12 | Status: SHIPPED | OUTPATIENT
Start: 2020-07-14 | End: 2020-10-12

## 2020-07-14 NOTE — PROGRESS NOTES
You have chosen to receive care through a telephone visit. Do you consent to use a telephone visit for your medical care today? Yes      Interval history since last visit: Overall symptom stability    Recent hospitalizations: none    Investigations (imaging, PFT's, labs, sleep study, record requests, etc.)    Have you had the Influenza Vaccine? no   Would you like to receive this Vaccine today? no    Have you had the Pneumonia Vaccine?  yes   Would you like to receive this Vaccine today? no    Subjective    Christine Garg presents for the following Asthma      History of Present Illness       Follow-up visit completed today via telephone for mild persistent asthma, pulmonary nodule, and former smoking history.  She states that symptoms remain overall stable as she currently continues to use Breo Ellipta.  She is tried several different inhalers due to insurance coverage.  She reports occasional increase of shortness of breath, with no significant increase noted with the recent weather changes.   She admits to occasional cough.  She denies frequent requirements of the risk inhaler.  No recent fever, chills, other acute upper respiratory symptoms.  She has previously noted worsening symptoms with increased heat and dust exposure.      Review of Systems   Constitutional: Negative for activity change, chills and fever.   HENT: Negative for congestion and postnasal drip.    Respiratory: Negative for chest tightness, shortness of breath and wheezing.    Cardiovascular: Negative for chest pain and palpitations.   Gastrointestinal: Negative for nausea.   Neurological: Negative for dizziness and light-headedness.          Active Problems:  Problem List Items Addressed This Visit     None          Past Medical History:  Past Medical History:   Diagnosis Date   • High cholesterol    • Hypertension    • Past heart attack    • Thyroid disease        Family History:  Family History   Problem Relation Age of Onset   • Heart  disease Father    • Heart disease Brother    • Breast cancer Neg Hx        Social History:  Social History     Tobacco Use   • Smoking status: Former Smoker     Types: Cigarettes   • Smokeless tobacco: Never Used   Substance Use Topics   • Alcohol use: No       Current Medications:  Current Outpatient Medications   Medication Sig Dispense Refill   • albuterol sulfate HFA (ProAir HFA) 108 (90 Base) MCG/ACT inhaler Inhale 2 puffs Every 4 (Four) Hours As Needed for Wheezing or Shortness of Air for up to 90 days. 3 inhaler 12   • Alirocumab (PRALUENT) 150 MG/ML solution pen-injector Inject  under the skin into the appropriate area as directed.     • amLODIPine (NORVASC) 10 MG tablet Take 2.5 mg by mouth Daily.     • aspirin 325 MG tablet Take 325 mg by mouth Daily.     • benazepril (LOTENSIN) 20 MG tablet Take 10 mg by mouth 2 (Two) Times a Day.     • Calcium-Vitamin D-Vitamin K (VIACTIV PO) Take  by mouth.     • Cholecalciferol (VITAMIN D3) 2000 units capsule Take 2,000 Units by mouth Daily.     • coenzyme Q10 100 MG capsule Take 100 mg by mouth Daily.     • Cyanocobalamin (VITAMIN B 12 PO) Take  by mouth.     • Fluticasone Furoate-Vilanterol (BREO ELLIPTA IN) Inhale 100 mcg Daily.     • furosemide (LASIX) 20 MG tablet As Needed.     • hydrochlorothiazide (MICROZIDE) 12.5 MG capsule Take 12.5 mg by mouth Daily.     • levothyroxine (SYNTHROID, LEVOTHROID) 112 MCG tablet Take 112 mcg by mouth Daily.     • meloxicam (MOBIC) 7.5 MG tablet Take 7.5 mg by mouth 2 (Two) Times a Day As Needed for Mild Pain .     • metoprolol tartrate (LOPRESSOR) 25 MG tablet Take 25 mg by mouth 2 (Two) Times a Day.     • montelukast (SINGULAIR) 10 MG tablet Take 1 tablet by mouth Every Night. 30 tablet 11   • potassium chloride (K-TAB) 20 MEQ tablet controlled-release ER tablet Take 1 tablet by mouth Daily. (Patient taking differently: Take 20 mEq by mouth As Needed (only if she takes lasix).) 30 tablet 1     No current facility-administered  "medications for this visit.        Allergies:  Allergies   Allergen Reactions   • Motrin [Ibuprofen] Anaphylaxis and Hives   • Novocain [Procaine] Other (See Comments)     Pt state she passes out.       Vitals:  Ht 165.1 cm (65\")   Wt 74.8 kg (165 lb)   BMI 27.46 kg/m²   Vitals not obtained as visit was completed via telephone.    Imaging:    Imaging Results (Most Recent)     None          Pulmonary Functions Testing Results:    No results found for: FEV1, FVC, OCY6MEM, TLC, DLCO    Results for orders placed or performed during the hospital encounter of 05/07/18   Blood Culture - Blood,   Result Value Ref Range    Blood Culture No growth at 5 days    Blood Culture - Blood,   Result Value Ref Range    Blood Culture No growth at 5 days    Comprehensive Metabolic Panel   Result Value Ref Range    Glucose 101 70 - 110 mg/dL    BUN 30 (H) 7 - 21 mg/dL    Creatinine 1.50 (H) 0.43 - 1.29 mg/dL    Sodium 138 135 - 153 mmol/L    Potassium 4.2 3.5 - 5.3 mmol/L    Chloride 106 99 - 112 mmol/L    CO2 24.5 24.3 - 31.9 mmol/L    Calcium 9.7 7.7 - 10.0 mg/dL    Total Protein 7.1 6.0 - 8.0 g/dL    Albumin 4.20 3.40 - 4.80 g/dL    ALT (SGPT) 13 10 - 36 U/L    AST (SGOT) 17 10 - 30 U/L    Alkaline Phosphatase 104 35 - 104 U/L    Total Bilirubin 0.3 0.2 - 1.8 mg/dL    eGFR Non African Amer 33 (L) >60 mL/min/1.73    Globulin 2.9 gm/dL    A/G Ratio 1.4 (L) 1.5 - 2.5 g/dL    BUN/Creatinine Ratio 20.0 7.0 - 25.0    Anion Gap 7.5 3.6 - 11.2 mmol/L   BNP   Result Value Ref Range    BNP 93.0 0.0 - 100.0 pg/mL   Troponin   Result Value Ref Range    Troponin I 0.007 <=0.040 ng/mL   CBC Auto Differential   Result Value Ref Range    WBC 7.23 4.50 - 12.50 10*3/mm3    RBC 4.33 4.20 - 5.40 10*6/mm3    Hemoglobin 13.5 12.0 - 16.0 g/dL    Hematocrit 39.9 37.0 - 47.0 %    MCV 92.1 80.0 - 94.0 fL    MCH 31.2 27.0 - 33.0 pg    MCHC 33.8 33.0 - 37.0 g/dL    RDW 13.7 11.5 - 14.5 %    RDW-SD 45.2 37.0 - 54.0 fl    MPV 10.3 (H) 6.0 - 10.0 fL    Platelets " 304 130 - 400 10*3/mm3    Neutrophil % 65.6 40.0 - 75.0 %    Lymphocyte % 23.1 16.0 - 46.0 %    Monocyte % 9.1 0.0 - 12.0 %    Eosinophil % 1.5 0.0 - 7.0 %    Basophil % 0.6 0.0 - 2.0 %    Immature Grans % 0.1 0.0 - 0.5 %    Neutrophils, Absolute 4.74 1.40 - 6.50 10*3/mm3    Lymphocytes, Absolute 1.67 1.00 - 3.00 10*3/mm3    Monocytes, Absolute 0.66 0.10 - 0.90 10*3/mm3    Eosinophils, Absolute 0.11 0.00 - 0.70 10*3/mm3    Basophils, Absolute 0.04 0.00 - 0.30 10*3/mm3    Immature Grans, Absolute 0.01 0.00 - 0.03 10*3/mm3   Osmolality, Calculated   Result Value Ref Range    Osmolality Calc 282.0 273.0 - 305.0 mOsm/kg   Protime-INR   Result Value Ref Range    Protime 13.3 11.0 - 15.4 Seconds    INR 0.99 0.90 - 1.10   aPTT   Result Value Ref Range    PTT 27.5 23.8 - 36.1 seconds   Lipase   Result Value Ref Range    Lipase 48 13 - 60 U/L   D-dimer, Quantitative   Result Value Ref Range    D-Dimer, Quantitative 0.82 (C) 0.00 - 0.50 MCGFEU/mL   Lactic Acid, Plasma   Result Value Ref Range    Lactate 0.7 0.5 - 2.0 mmol/L   Troponin   Result Value Ref Range    Troponin I 0.011 <=0.040 ng/mL   CK   Result Value Ref Range    Creatine Kinase 95 24 - 173 U/L   CK-MB   Result Value Ref Range    CKMB 2.14 0.00 - 5.00 ng/mL   CK-MB Index   Result Value Ref Range    CK-MB Index 2.3 0.0 - 3.0 %   Comprehensive Metabolic Panel   Result Value Ref Range    Glucose 145 (H) 70 - 110 mg/dL    BUN 26 (H) 7 - 21 mg/dL    Creatinine 1.10 0.43 - 1.29 mg/dL    Sodium 141 135 - 153 mmol/L    Potassium 3.6 3.5 - 5.3 mmol/L    Chloride 109 99 - 112 mmol/L    CO2 24.3 24.3 - 31.9 mmol/L    Calcium 8.6 7.7 - 10.0 mg/dL    Total Protein 5.7 (L) 6.0 - 8.0 g/dL    Albumin 3.30 (L) 3.40 - 4.80 g/dL    ALT (SGPT) 14 10 - 36 U/L    AST (SGOT) 15 10 - 30 U/L    Alkaline Phosphatase 80 35 - 104 U/L    Total Bilirubin 0.3 0.2 - 1.8 mg/dL    eGFR Non African Amer 47 (L) >60 mL/min/1.73    Globulin 2.4 gm/dL    A/G Ratio 1.4 (L) 1.5 - 2.5 g/dL     BUN/Creatinine Ratio 23.6 7.0 - 25.0    Anion Gap 7.7 3.6 - 11.2 mmol/L   CK   Result Value Ref Range    Creatine Kinase 68 24 - 173 U/L   CK   Result Value Ref Range    Creatine Kinase 70 24 - 173 U/L   CK-MB   Result Value Ref Range    CKMB 1.46 0.00 - 5.00 ng/mL   CK-MB   Result Value Ref Range    CKMB 1.76 0.00 - 5.00 ng/mL   Myoglobin, Serum   Result Value Ref Range    Myoglobin 61.0 0.0 - 109.0 ng/mL   Myoglobin, Serum   Result Value Ref Range    Myoglobin 51.0 0.0 - 109.0 ng/mL   Troponin   Result Value Ref Range    Troponin I 0.011 <=0.040 ng/mL   Troponin   Result Value Ref Range    Troponin I 0.019 <=0.040 ng/mL   Hemoglobin A1c   Result Value Ref Range    Hemoglobin A1C 5.80 (H) 4.50 - 5.70 %   Lipid Panel   Result Value Ref Range    Total Cholesterol 184 0 - 200 mg/dL    Triglycerides 101 0 - 150 mg/dL    HDL Cholesterol 53 (L) 60 - 100 mg/dL    LDL Cholesterol  111 (H) 0 - 100 mg/dL    VLDL Cholesterol 20.2 mg/dL    LDL/HDL Ratio 2.09    CBC Auto Differential   Result Value Ref Range    WBC 5.98 4.50 - 12.50 10*3/mm3    RBC 3.79 (L) 4.20 - 5.40 10*6/mm3    Hemoglobin 11.7 (L) 12.0 - 16.0 g/dL    Hematocrit 35.7 (L) 37.0 - 47.0 %    MCV 94.2 (H) 80.0 - 94.0 fL    MCH 30.9 27.0 - 33.0 pg    MCHC 32.8 (L) 33.0 - 37.0 g/dL    RDW 13.7 11.5 - 14.5 %    RDW-SD 45.1 37.0 - 54.0 fl    MPV 10.4 (H) 6.0 - 10.0 fL    Platelets 261 130 - 400 10*3/mm3    Neutrophil % 52.7 40.0 - 75.0 %    Lymphocyte % 35.1 16.0 - 46.0 %    Monocyte % 9.7 0.0 - 12.0 %    Eosinophil % 1.5 0.0 - 7.0 %    Basophil % 0.8 0.0 - 2.0 %    Immature Grans % 0.2 0.0 - 0.5 %    Neutrophils, Absolute 3.15 1.40 - 6.50 10*3/mm3    Lymphocytes, Absolute 2.10 1.00 - 3.00 10*3/mm3    Monocytes, Absolute 0.58 0.10 - 0.90 10*3/mm3    Eosinophils, Absolute 0.09 0.00 - 0.70 10*3/mm3    Basophils, Absolute 0.05 0.00 - 0.30 10*3/mm3    Immature Grans, Absolute 0.01 0.00 - 0.03 10*3/mm3   Osmolality, Calculated   Result Value Ref Range    Osmolality Calc  288.6 273.0 - 305.0 mOsm/kg   CK-MB Index   Result Value Ref Range    CK-MB Index 2.1 0.0 - 3.0 %   CK   Result Value Ref Range    Creatine Kinase 72 24 - 173 U/L   CK-MB   Result Value Ref Range    CKMB 2.01 0.00 - 5.00 ng/mL   Myoglobin, Serum   Result Value Ref Range    Myoglobin 65.0 0.0 - 109.0 ng/mL   Troponin   Result Value Ref Range    Troponin I 0.018 <=0.040 ng/mL   CK-MB Index   Result Value Ref Range    CK-MB Index 2.5 0.0 - 3.0 %   CK-MB Index   Result Value Ref Range    CK-MB Index 2.8 0.0 - 3.0 %   Stress Test With Myocardial Perfusion One Day   Result Value Ref Range    Nuclear Prior Study 3     Target HR (85%) 116 bpm    Max. Pred. HR (100%) 136 bpm     CV STRESS PROTOCOL 1 Pharmacologic     Stage 1 1     HR Stage 1 88     BP Stage 1 142/73     Duration Min Stage 1 0     Duration Sec Stage 1 10     Stress Dose Regadenoson Stage 1 0.4     Stress Comments Stage 1 10 sec bolus injection     Stage 2 2     HR Stage 2 87     BP Stage 2 139/68     Duration Min Stage 2 4     Duration Sec Stage 2 0     Stress Comments Stage 2 recovery     Baseline HR 58 bpm    Baseline /64 mmHg    Peak HR 88 bpm    Percent Max Pred HR 64.71 %    Percent Target HR 76 %    Peak /64 mmHg    Recovery HR 70 bpm    Recovery /65 mmHg   Adult Transthoracic Echo Complete W/ Cont if Necessary Per Protocol   Result Value Ref Range    BSA 1.8 m^2    IVSd 1.1 cm    IVSs 1.2 cm    LVIDd 4.0 cm    LVIDs 2.8 cm    LVPWd 0.88 cm     CV ECHO RAVI - LVPWS 1.4 cm    IVS/LVPW 1.2     FS 29.6 %    EDV(Teich) 69.1 ml    ESV(Teich) 29.5 ml    EF(Teich) 57.3 %    EDV(cubed) 63.0 ml    ESV(cubed) 21.9 ml    EF(cubed) 65.2 %    % IVS thick 13.6 %    % LVPW thick 53.6 %    LV mass(C)d 121.4 grams    LV mass(C)dI 66.4 grams/m^2    LV mass(C)s 110.9 grams    LV mass(C)sI 60.7 grams/m^2    SV(Teich) 39.6 ml    SI(Teich) 21.7 ml/m^2    SV(cubed) 41.1 ml    SI(cubed) 22.5 ml/m^2    Ao root diam 2.7 cm    Ao root area 5.6 cm^2    ACS  1.6 cm    LA dimension 2.9 cm    LA/Ao 1.1     LVOT diam 2.0 cm    LVOT area 3.0 cm^2    LVOT area(traced) 3.1 cm^2    LVLd ap4 6.6 cm    EDV(MOD-sp4) 41.0 ml    LVLs ap4 6.2 cm    ESV(MOD-sp4) 14.0 ml    EF(MOD-sp4) 65.9 %    SV(MOD-sp4) 27.0 ml    SI(MOD-sp4) 14.8 ml/m^2    Ao root area (BSA corrected) 1.5     EF - Contrast (4Ch) 65.9 ml/m^2    LV Coronel Vol (BSA corrected) 22.4 ml/m^2    LV Sys Vol (BSA corrected) 7.7 ml/m^2    MV E max salome 82.9 cm/sec    MV A max salome 101.2 cm/sec    MV E/A 0.82     Ao pk salome 173.7 cm/sec    Ao max PG 12.1 mmHg    Ao V2 mean 101.6 cm/sec    Ao mean PG 5.1 mmHg    Ao V2 VTI 36.9 cm    SV(Ao) 206.2 ml    SI(Ao) 112.8 ml/m^2    PA acc slope 1,225 cm/sec^2    PA acc time 0.12 sec    TR max salome 273.6 cm/sec    RVSP(TR) 40.0 mmHg    RAP systole 10.0 mmHg    PA pr(Accel) 26.7 mmHg     CV ECHO RAVI - BZI_BMI 27.6 kilograms/m^2     CV ECHO RAVI - BSA(HAYCOCK) 1.9 m^2     CV ECHO RAVI - BZI_METRIC_WEIGHT 75.3 kg     CV ECHO RAVI - BZI_METRIC_HEIGHT 165.1 cm    Target HR (85%) 116 bpm    Max. Pred. HR (100%) 136 bpm   Light Blue Top   Result Value Ref Range    Extra Tube hold for add-on    Green Top (Gel)   Result Value Ref Range    Extra Tube Hold for add-ons.    Lavender Top   Result Value Ref Range    Extra Tube hold for add-on    Gold Top - SST   Result Value Ref Range    Extra Tube Hold for add-ons.        Objective   Physical Exam     Physical exam not completed as visit was completed via telephone.    Assessment/Plan     I have reviewed the past medical history, family history, social history, surgical history, and allergies.     Reviewed imaging and CT chest without contrast on completed on 5/7/2018.  Notable for 3 mm pulmonary nodule on the lateral left lung and 1 cm lymph node of the precarinal location.    Reviewed the PFT from 7/6/2018.  Moderate obstruction noted without significant bronchodilator effect, noted at +7.  DLCO moderately reduced.     Reviewed the echo from  May 2018.  Grade 1 diastolic dysfunction noted with mild mitral valve regurgitation and mild tricuspid regurgitation.        ICD-10-CM ICD-9-CM   1. Mild persistent asthma with allergic rhinitis without complication J45.30 493.00   2. Pulmonary nodule R91.1 793.11   3. Former smoker Z87.891 V15.82        Mild persistent asthma:  · Continue albuterol inhaler as needed  · Continue Breo Ellipta 100-25 mcg 1 puff daily.   May deescalate therapy if she remains symptomatically stable at the next visit.   Patient preferred to avoid inhaler changes at this time.   · Continue singular tablet nightly      Pulmonary nodule, former smoker  · Previous CT imaging was notable for a small 3 mm nodule and a 1 cm lymph node, with calcified nodes.    Again discussed that CT could be ordered to follow-up for any change of nodule. Discussed that nodule etiologies can be benign, infectious, or malignant.   She is notable for previous smoking history of several years with less than 1 pack per day, and quit over 50 years ago.   · She prefers to await CT imaging currently and repeat in the setting of worsening dyspnea, hemoptysis, or other symptoms.       Vaccinations: pneumonia vaccinations up to date.     BMI was not calculated today as this was completed via telephone encounter.   Most recently calculated BMI was 27.5.       Return in about 6 months (around 1/14/2021), or as needed.           This visit has been rescheduled as a phone visit to comply with patient safety concerns in accordance with CDC recommendations. Total time of discussion was 20 minutes.

## 2020-07-22 ENCOUNTER — OFFICE VISIT (OUTPATIENT)
Dept: CARDIOLOGY | Facility: CLINIC | Age: 85
End: 2020-07-22

## 2020-07-22 VITALS
OXYGEN SATURATION: 96 % | SYSTOLIC BLOOD PRESSURE: 177 MMHG | DIASTOLIC BLOOD PRESSURE: 67 MMHG | TEMPERATURE: 98.6 F | WEIGHT: 176.4 LBS | HEART RATE: 56 BPM | BODY MASS INDEX: 29.39 KG/M2 | HEIGHT: 65 IN

## 2020-07-22 DIAGNOSIS — I10 ESSENTIAL HYPERTENSION: ICD-10-CM

## 2020-07-22 DIAGNOSIS — E78.5 DYSLIPIDEMIA: ICD-10-CM

## 2020-07-22 DIAGNOSIS — I25.10 ASCVD (ARTERIOSCLEROTIC CARDIOVASCULAR DISEASE): Primary | ICD-10-CM

## 2020-07-22 DIAGNOSIS — J44.9 CHRONIC OBSTRUCTIVE PULMONARY DISEASE, UNSPECIFIED COPD TYPE (HCC): ICD-10-CM

## 2020-07-22 PROCEDURE — 99213 OFFICE O/P EST LOW 20 MIN: CPT | Performed by: NURSE PRACTITIONER

## 2020-07-22 PROCEDURE — 93000 ELECTROCARDIOGRAM COMPLETE: CPT | Performed by: NURSE PRACTITIONER

## 2020-07-22 RX ORDER — AMPICILLIN TRIHYDRATE 250 MG
600 CAPSULE ORAL DAILY PRN
COMMUNITY
End: 2023-03-22

## 2020-07-22 RX ORDER — AMLODIPINE BESYLATE 10 MG/1
10 TABLET ORAL DAILY
Qty: 30 TABLET | Refills: 11 | Status: ON HOLD | OUTPATIENT
Start: 2020-07-22 | End: 2022-12-20

## 2020-07-22 NOTE — PROGRESS NOTES
Dave Tobar MD  Christine Garg  6/8/1933 07/22/2020    Patient Active Problem List   Diagnosis   • Precordial pain   • Essential hypertension   • Dyslipidemia   • Leg edema   • ASCVD (arteriosclerotic cardiovascular disease)   • Chest pain   • SOB (shortness of breath)   • Palpitations   • Coronary artery disease    • Abnormal PFTs (pulmonary function tests)   • Shortness of breath   • Chronic obstructive pulmonary disease (CMS/HCC)   • Hypokalemia   • Mild persistent asthma with allergic rhinitis   • Pulmonary nodule   • Former smoker       Dear Dave Tobar MD:    Subjective     Chief Complaint   Patient presents with   • Follow-up   • Med Management     med list.    • Shortness of Breath   • Edema   • Dizziness     started last Thursday.            History of Present Illness:    Christine Garg is a 87 y.o. female  with a history of ASCVD status post CABG in 2010, history of SVT, and tobacco abuse having quit 50 years ago.  She presents today for routine cardiology follow-up. She denies any chest pains, palpitations, near syncope, or syncope. She has chronic shortness of breath with mild to moderate exertion which has not changed from baseline. She reports her BP has been running high the last 5 days which has caused dizziness. She has been taking amlodipine 10 mg daily instead of 2.5 mg daily and that has brought her pressure down. She has not been dizzy the past few days.         Allergies   Allergen Reactions   • Motrin [Ibuprofen] Anaphylaxis and Hives   • Novocain [Procaine] Other (See Comments)     Pt state she passes out.   :      Current Outpatient Medications:   •  albuterol sulfate HFA (ProAir HFA) 108 (90 Base) MCG/ACT inhaler, Inhale 2 puffs Every 4 (Four) Hours As Needed for Wheezing or Shortness of Air for up to 90 days., Disp: 3 inhaler, Rfl: 12  •  Alirocumab (PRALUENT) 150 MG/ML solution pen-injector, Inject  under the skin into the appropriate area as directed., Disp: , Rfl:   •   amLODIPine (NORVASC) 10 MG tablet, Take 1 tablet by mouth Daily., Disp: 30 tablet, Rfl: 11  •  aspirin 325 MG tablet, Take 325 mg by mouth Daily., Disp: , Rfl:   •  benazepril (LOTENSIN) 10 MG tablet, Take 10 mg by mouth 2 (Two) Times a Day., Disp: , Rfl:   •  Calcium-Vitamin D-Vitamin K (VIACTIV PO), Take  by mouth., Disp: , Rfl:   •  Cholecalciferol (VITAMIN D3) 2000 units capsule, Take 2,000 Units by mouth Daily., Disp: , Rfl:   •  coenzyme Q10 100 MG capsule, Take 100 mg by mouth Daily., Disp: , Rfl:   •  Cyanocobalamin (VITAMIN B 12 PO), Take  by mouth., Disp: , Rfl:   •  Fluticasone Furoate-Vilanterol (BREO ELLIPTA IN), Inhale 100 mcg Daily., Disp: , Rfl:   •  furosemide (LASIX) 20 MG tablet, As Needed., Disp: , Rfl:   •  hydrochlorothiazide (MICROZIDE) 12.5 MG capsule, Take 12.5 mg by mouth Daily., Disp: , Rfl:   •  levothyroxine (SYNTHROID, LEVOTHROID) 112 MCG tablet, Take 112 mcg by mouth Daily., Disp: , Rfl:   •  meloxicam (MOBIC) 7.5 MG tablet, Take 7.5 mg by mouth 2 (Two) Times a Day As Needed for Mild Pain ., Disp: , Rfl:   •  metoprolol tartrate (LOPRESSOR) 25 MG tablet, Take 25 mg by mouth 2 (Two) Times a Day., Disp: , Rfl:   •  montelukast (SINGULAIR) 10 MG tablet, Take 1 tablet by mouth Every Night., Disp: 30 tablet, Rfl: 11  •  potassium chloride (K-TAB) 20 MEQ tablet controlled-release ER tablet, Take 1 tablet by mouth Daily. (Patient taking differently: Take 20 mEq by mouth As Needed (only if she takes lasix).), Disp: 30 tablet, Rfl: 1  •  Red Yeast Rice 600 MG capsule, Take  by mouth., Disp: , Rfl:       The following portions of the patient's history were reviewed and updated as appropriate: allergies, current medications, past family history, past medical history, past social history, past surgical history and problem list.    Social History     Tobacco Use   • Smoking status: Former Smoker     Types: Cigarettes   • Smokeless tobacco: Never Used   Substance Use Topics   • Alcohol use: No   •  "Drug use: No       Review of Systems   Constitution: Negative for decreased appetite and malaise/fatigue.   Cardiovascular: Negative for chest pain, dyspnea on exertion, irregular heartbeat, leg swelling, near-syncope, orthopnea, palpitations, paroxysmal nocturnal dyspnea and syncope.   Respiratory: Negative for cough, shortness of breath and wheezing.    Neurological: Positive for dizziness. Negative for light-headedness and weakness.       Objective   Vitals:    07/22/20 1311 07/22/20 1323   BP: 166/70 177/67   BP Location: Left arm Left arm   Patient Position: Sitting Sitting   Cuff Size: Adult Adult   Pulse: 65 56   Temp: 98.6 °F (37 °C)    TempSrc: Infrared    SpO2: 96%    Weight: 80 kg (176 lb 6.4 oz)    Height: 165.1 cm (65\")      Body mass index is 29.35 kg/m².        Physical Exam   Constitutional: She is oriented to person, place, and time. She appears well-developed and well-nourished.   HENT:   Head: Normocephalic and atraumatic.   Cardiovascular: Normal rate, regular rhythm and normal heart sounds. Exam reveals no S3 and no S4.   No murmur heard.  Pulmonary/Chest: Effort normal and breath sounds normal. She has no wheezes. She has no rales.   Abdominal: Soft. Bowel sounds are normal.   Musculoskeletal: She exhibits no edema.   Neurological: She is alert and oriented to person, place, and time.   Skin: Skin is warm and dry.   Psychiatric: She has a normal mood and affect. Her behavior is normal.       Lab Results   Component Value Date     05/08/2018    K 3.6 05/08/2018     05/08/2018    CO2 24.3 05/08/2018    BUN 26 (H) 05/08/2018    CREATININE 1.10 05/08/2018    GLUCOSE 145 (H) 05/08/2018    CALCIUM 8.6 05/08/2018    AST 15 05/08/2018    ALT 14 05/08/2018    ALKPHOS 80 05/08/2018    LABIL2 1.4 (L) 10/12/2015     Lab Results   Component Value Date    CKTOTAL 72 05/08/2018     Lab Results   Component Value Date    WBC 5.98 05/08/2018    HGB 11.7 (L) 05/08/2018    HCT 35.7 (L) 05/08/2018    "  05/08/2018     Lab Results   Component Value Date    INR 0.99 05/07/2018    INR 0.94 10/12/2015        Lab Results   Component Value Date    TSH 0.245 (L) 10/13/2015    CHLPL 187 10/13/2015    TRIG 101 05/08/2018    HDL 53 (L) 05/08/2018     (H) 05/08/2018      Lab Results   Component Value Date    BNP 93.0 05/07/2018             ECG 12 Lead  Date/Time: 7/22/2020 1:09 PM  Performed by: Cassandra Crowder APRN  Authorized by: Cassandra Crowder APRN   Comparison: compared with previous ECG   Similar to previous ECG  Rhythm: sinus bradycardia  BPM: 55                Assessment/Plan    Diagnosis Plan   1. ASCVD (arteriosclerotic cardiovascular disease)     2. Essential hypertension  amLODIPine (NORVASC) 10 MG tablet   3. Chronic obstructive pulmonary disease, unspecified COPD type (CMS/[facility])     4. Dyslipidemia                  Recommendations:    1. Will increase amlodipine to 10 mg daily. I asked her to monitor her BP at home and let us know if her BP is too low or is running >150/90.     2. If her dizziness is recurrent, would recommend 14 day holter and echocardiogram. She will let us know if this occurs.     3. I have recommended labs. She reports her PCP will be ordering these in a few weeks.     4. Follow up in 6 months or sooner if needed.      No follow-ups on file.    As always, I appreciate very much the opportunity to participate in the cardiovascular care of your patients.      With Best Regards,    LUCÍA Muñoz

## 2021-02-10 ENCOUNTER — IMMUNIZATION (OUTPATIENT)
Dept: VACCINE CLINIC | Facility: HOSPITAL | Age: 86
End: 2021-02-10

## 2021-02-10 PROCEDURE — 0001A: CPT | Performed by: INTERNAL MEDICINE

## 2021-02-10 PROCEDURE — 91300 HC SARSCOV02 VAC 30MCG/0.3ML IM: CPT | Performed by: INTERNAL MEDICINE

## 2021-03-03 ENCOUNTER — IMMUNIZATION (OUTPATIENT)
Dept: VACCINE CLINIC | Facility: HOSPITAL | Age: 86
End: 2021-03-03

## 2021-03-03 PROCEDURE — 0002A: CPT | Performed by: INTERNAL MEDICINE

## 2021-03-03 PROCEDURE — 91300 HC SARSCOV02 VAC 30MCG/0.3ML IM: CPT | Performed by: INTERNAL MEDICINE

## 2021-03-08 ENCOUNTER — TELEPHONE (OUTPATIENT)
Dept: CARDIOLOGY | Facility: CLINIC | Age: 86
End: 2021-03-08

## 2021-03-08 ENCOUNTER — OFFICE VISIT (OUTPATIENT)
Dept: CARDIOLOGY | Facility: CLINIC | Age: 86
End: 2021-03-08

## 2021-03-08 VITALS
OXYGEN SATURATION: 99 % | RESPIRATION RATE: 16 BRPM | WEIGHT: 175.4 LBS | BODY MASS INDEX: 29.22 KG/M2 | TEMPERATURE: 97.4 F | DIASTOLIC BLOOD PRESSURE: 78 MMHG | HEIGHT: 65 IN | HEART RATE: 63 BPM | SYSTOLIC BLOOD PRESSURE: 146 MMHG

## 2021-03-08 DIAGNOSIS — R06.09 DYSPNEA ON EXERTION: ICD-10-CM

## 2021-03-08 DIAGNOSIS — I10 ESSENTIAL HYPERTENSION: ICD-10-CM

## 2021-03-08 DIAGNOSIS — E78.5 DYSLIPIDEMIA: ICD-10-CM

## 2021-03-08 DIAGNOSIS — J44.9 CHRONIC OBSTRUCTIVE PULMONARY DISEASE, UNSPECIFIED COPD TYPE (HCC): ICD-10-CM

## 2021-03-08 DIAGNOSIS — I25.10 ASCVD (ARTERIOSCLEROTIC CARDIOVASCULAR DISEASE): Primary | ICD-10-CM

## 2021-03-08 PROCEDURE — 93000 ELECTROCARDIOGRAM COMPLETE: CPT | Performed by: NURSE PRACTITIONER

## 2021-03-08 PROCEDURE — 99214 OFFICE O/P EST MOD 30 MIN: CPT | Performed by: NURSE PRACTITIONER

## 2021-03-08 NOTE — PROGRESS NOTES
Dave oTbar MD  Christine Garg  6/8/1933 03/08/2021    Patient Active Problem List   Diagnosis   • Precordial pain   • Essential hypertension   • Dyslipidemia   • Leg edema   • ASCVD (arteriosclerotic cardiovascular disease)   • Chest pain   • SOB (shortness of breath)   • Palpitations   • Coronary artery disease    • Abnormal PFTs (pulmonary function tests)   • Shortness of breath   • Chronic obstructive pulmonary disease (CMS/HCC)   • Hypokalemia   • Mild persistent asthma with allergic rhinitis   • Pulmonary nodule   • Former smoker       Dear Dave Tobar MD:    Subjective     Chief Complaint   Patient presents with   • Coronary Artery Disease     7 mos follow   • Shortness of Breath     routine activity   • Med Management     list provided           History of Present Illness:    Christine Garg is a 87 y.o. female with a past medical history of ASCVD status post CABG in 2010, history of SVT, COPD and history of tobacco abuse.  She presents today for routine cardiology follow-up.  She reports she has been doing well.  She denies any chest pains, palpitations, or dizziness.  Her PCP adjusted her BP medications and her dizziness she was previously experiencing resolved.  She does have chronic shortness of breath with mild to moderate exertion which she does not believe has recently changed.  She has occasional leg edema for which she takes Lasix.  She denies any orthopnea or PND.  She reports her PCP has recently obtained labs.        Allergies   Allergen Reactions   • Motrin [Ibuprofen] Anaphylaxis and Hives   • Novocain [Procaine] Other (See Comments)     Pt state she passes out.   :      Current Outpatient Medications:   •  Alirocumab (PRALUENT) 150 MG/ML solution pen-injector, Inject  under the skin into the appropriate area as directed., Disp: , Rfl:   •  amLODIPine (NORVASC) 10 MG tablet, Take 1 tablet by mouth Daily. (Patient taking differently: Take 2.5 mg by mouth Daily.), Disp: 30 tablet,  Rfl: 11  •  aspirin 325 MG tablet, Take 325 mg by mouth Daily., Disp: , Rfl:   •  benazepril (LOTENSIN) 10 MG tablet, Take 10 mg by mouth 2 (Two) Times a Day., Disp: , Rfl:   •  BREO ELLIPTA 100-25 MCG/INH inhaler, INHALE ONE DOSE BY MOUTH DAILY, Disp: 60 each, Rfl: 5  •  Cholecalciferol (VITAMIN D3) 2000 units capsule, Take 2,000 Units by mouth Daily., Disp: , Rfl:   •  coenzyme Q10 100 MG capsule, Take 100 mg by mouth Daily., Disp: , Rfl:   •  Cyanocobalamin (VITAMIN B 12 PO), Take  by mouth., Disp: , Rfl:   •  Fluticasone Furoate-Vilanterol (BREO ELLIPTA IN), Inhale 100 mcg Daily., Disp: , Rfl:   •  furosemide (LASIX) 20 MG tablet, As Needed., Disp: , Rfl:   •  hydrochlorothiazide (MICROZIDE) 12.5 MG capsule, Take 12.5 mg by mouth Daily., Disp: , Rfl:   •  levothyroxine (SYNTHROID, LEVOTHROID) 112 MCG tablet, Take 112 mcg by mouth Daily., Disp: , Rfl:   •  meloxicam (MOBIC) 7.5 MG tablet, Take 7.5 mg by mouth 2 (Two) Times a Day As Needed for Mild Pain ., Disp: , Rfl:   •  metoprolol tartrate (LOPRESSOR) 25 MG tablet, Take 25 mg by mouth 2 (Two) Times a Day., Disp: , Rfl:   •  montelukast (SINGULAIR) 10 MG tablet, Take 1 tablet by mouth Every Night., Disp: 30 tablet, Rfl: 11  •  potassium chloride (K-TAB) 20 MEQ tablet controlled-release ER tablet, Take 1 tablet by mouth Daily. (Patient taking differently: Take 20 mEq by mouth As Needed (only if she takes lasix).), Disp: 30 tablet, Rfl: 1  •  Red Yeast Rice 600 MG capsule, Take  by mouth., Disp: , Rfl:   •  Calcium-Vitamin D-Vitamin K (VIACTIV PO), Take  by mouth., Disp: , Rfl:       The following portions of the patient's history were reviewed and updated as appropriate: allergies, current medications, past family history, past medical history, past social history, past surgical history and problem list.    Social History     Tobacco Use   • Smoking status: Former Smoker     Types: Cigarettes   • Smokeless tobacco: Never Used   Substance Use Topics   • Alcohol  "use: No   • Drug use: No       Review of Systems   Constitutional: Negative for decreased appetite and malaise/fatigue.   Cardiovascular: Positive for dyspnea on exertion. Negative for chest pain, irregular heartbeat, leg swelling, near-syncope, orthopnea, palpitations, paroxysmal nocturnal dyspnea and syncope.   Respiratory: Positive for shortness of breath. Negative for cough and wheezing.    Neurological: Negative for dizziness, light-headedness and weakness.       Objective   Vitals:    03/08/21 1126   BP: 146/78   Pulse: 63   Resp: 16   Temp: 97.4 °F (36.3 °C)   SpO2: 99%   Weight: 79.6 kg (175 lb 6.4 oz)   Height: 165.1 cm (65\")     Body mass index is 29.19 kg/m².        Vitals reviewed.   Constitutional:       Appearance: Healthy appearance. Well-developed and not in distress.   HENT:      Head: Normocephalic and atraumatic.   Neck:      Vascular: No JVD.   Pulmonary:      Effort: Pulmonary effort is normal.      Breath sounds: Normal breath sounds. No wheezing. No rales.   Cardiovascular:      Normal rate. Regular rhythm.      Murmurs: There is no murmur.      . No S3 and S4 gallop.   Edema:     Peripheral edema absent.   Abdominal:      General: Bowel sounds are normal.      Palpations: Abdomen is soft.   Skin:     General: Skin is warm and dry.   Neurological:      Mental Status: Alert, oriented to person, place, and time and oriented to person, place and time.   Psychiatric:         Mood and Affect: Mood normal.         Behavior: Behavior normal.         Lab Results   Component Value Date     05/08/2018    K 3.6 05/08/2018     05/08/2018    CO2 24.3 05/08/2018    BUN 26 (H) 05/08/2018    CREATININE 1.10 05/08/2018    GLUCOSE 145 (H) 05/08/2018    CALCIUM 8.6 05/08/2018    AST 15 05/08/2018    ALT 14 05/08/2018    ALKPHOS 80 05/08/2018    LABIL2 1.4 (L) 10/12/2015     Lab Results   Component Value Date    CKTOTAL 72 05/08/2018     Lab Results   Component Value Date    WBC 5.98 05/08/2018    HGB " 11.7 (L) 05/08/2018    HCT 35.7 (L) 05/08/2018     05/08/2018     Lab Results   Component Value Date    INR 0.99 05/07/2018    INR 0.94 10/12/2015     No results found for: MG  Lab Results   Component Value Date    TSH 0.245 (L) 10/13/2015    CHLPL 187 10/13/2015    TRIG 101 05/08/2018    HDL 53 (L) 05/08/2018     (H) 05/08/2018      Lab Results   Component Value Date    BNP 93.0 05/07/2018             ECG 12 Lead    Date/Time: 3/8/2021 11:27 AM  Performed by: Cassandra Crowder APRN  Authorized by: Cassandra Crowder APRN   Comparison: compared with previous ECG   Similar to previous ECG  Rhythm: sinus bradycardia  BPM: 57  Q waves: II and III                  Assessment/Plan    Diagnosis Plan   1. ASCVD (arteriosclerotic cardiovascular disease)  ECG 12 Lead    Adult Transthoracic Echo Complete W/ Cont if Necessary Per Protocol   2. Essential hypertension  ECG 12 Lead   3. Chronic obstructive pulmonary disease, unspecified COPD type (CMS/HCC)  ECG 12 Lead   4. Dyslipidemia  ECG 12 Lead   5. Dyspnea on exertion  ECG 12 Lead    Adult Transthoracic Echo Complete W/ Cont if Necessary Per Protocol                Recommendations:    1. Her chronic dyspnea is likely secondary to COPD.  However, since her last echocardiogram was in 2018 we will repeat this to evaluate LV function and tailor further therapy accordingly.  2. Continue low-dose aspirin, benazepril, and metoprolol.  3. We will request recent labs from PCP.  4. Follow-up in 6 months or sooner if needed.        Return in about 6 months (around 9/8/2021) for Recheck.    As always, I appreciate very much the opportunity to participate in the cardiovascular care of your patients.      With Best Regards,    LUCÍA Muñoz

## 2021-03-08 NOTE — TELEPHONE ENCOUNTER
----- Message from LUCÍA Muñoz sent at 3/8/2021  1:06 PM EST -----  Please request recent labs from PCP.      Most recent labs requested -they are from 02/2020-there is an order from 08/2020 but they were not completed

## 2021-03-17 ENCOUNTER — HOSPITAL ENCOUNTER (OUTPATIENT)
Dept: CARDIOLOGY | Facility: HOSPITAL | Age: 86
Discharge: HOME | End: 2021-03-17
Admitting: NURSE PRACTITIONER

## 2021-03-17 DIAGNOSIS — R06.09 DYSPNEA ON EXERTION: ICD-10-CM

## 2021-03-17 DIAGNOSIS — I25.10 ASCVD (ARTERIOSCLEROTIC CARDIOVASCULAR DISEASE): ICD-10-CM

## 2021-03-17 LAB
BH CV ECHO MEAS - % IVS THICK: 36.1 %
BH CV ECHO MEAS - % LVPW THICK: 48.4 %
BH CV ECHO MEAS - ACS: 1.6 CM
BH CV ECHO MEAS - AO MAX PG: 7.1 MMHG
BH CV ECHO MEAS - AO MEAN PG: 3 MMHG
BH CV ECHO MEAS - AO ROOT AREA (BSA CORRECTED): 1.3
BH CV ECHO MEAS - AO ROOT AREA: 4.7 CM^2
BH CV ECHO MEAS - AO ROOT DIAM: 2.5 CM
BH CV ECHO MEAS - AO V2 MAX: 133 CM/SEC
BH CV ECHO MEAS - AO V2 MEAN: 86.3 CM/SEC
BH CV ECHO MEAS - AO V2 VTI: 29.1 CM
BH CV ECHO MEAS - BSA(HAYCOCK): 1.9 M^2
BH CV ECHO MEAS - BSA: 1.9 M^2
BH CV ECHO MEAS - BZI_BMI: 29.1 KILOGRAMS/M^2
BH CV ECHO MEAS - BZI_METRIC_HEIGHT: 165.1 CM
BH CV ECHO MEAS - BZI_METRIC_WEIGHT: 79.4 KG
BH CV ECHO MEAS - EDV(CUBED): 69.4 ML
BH CV ECHO MEAS - EDV(MOD-SP4): 43.1 ML
BH CV ECHO MEAS - EDV(TEICH): 74.7 ML
BH CV ECHO MEAS - EF(CUBED): 77.5 %
BH CV ECHO MEAS - EF(MOD-SP4): 50.6 %
BH CV ECHO MEAS - EF(TEICH): 70.1 %
BH CV ECHO MEAS - ESV(CUBED): 15.6 ML
BH CV ECHO MEAS - ESV(MOD-SP4): 21.3 ML
BH CV ECHO MEAS - ESV(TEICH): 22.3 ML
BH CV ECHO MEAS - FS: 39.2 %
BH CV ECHO MEAS - IVS/LVPW: 0.96
BH CV ECHO MEAS - IVSD: 0.85 CM
BH CV ECHO MEAS - IVSS: 1.2 CM
BH CV ECHO MEAS - LA DIMENSION: 3.6 CM
BH CV ECHO MEAS - LA/AO: 1.5
BH CV ECHO MEAS - LV DIASTOLIC VOL/BSA (35-75): 23.1 ML/M^2
BH CV ECHO MEAS - LV MASS(C)D: 108.4 GRAMS
BH CV ECHO MEAS - LV MASS(C)DI: 58 GRAMS/M^2
BH CV ECHO MEAS - LV MASS(C)S: 89.1 GRAMS
BH CV ECHO MEAS - LV MASS(C)SI: 47.7 GRAMS/M^2
BH CV ECHO MEAS - LV SYSTOLIC VOL/BSA (12-30): 11.4 ML/M^2
BH CV ECHO MEAS - LVIDD: 4.1 CM
BH CV ECHO MEAS - LVIDS: 2.5 CM
BH CV ECHO MEAS - LVLD AP4: 7 CM
BH CV ECHO MEAS - LVLS AP4: 6.2 CM
BH CV ECHO MEAS - LVOT AREA (M): 1.8 CM^2
BH CV ECHO MEAS - LVOT AREA: 1.8 CM^2
BH CV ECHO MEAS - LVOT DIAM: 1.5 CM
BH CV ECHO MEAS - LVPWD: 0.88 CM
BH CV ECHO MEAS - LVPWS: 1.3 CM
BH CV ECHO MEAS - MV A MAX VEL: 98.5 CM/SEC
BH CV ECHO MEAS - MV E MAX VEL: 83.1 CM/SEC
BH CV ECHO MEAS - MV E/A: 0.84
BH CV ECHO MEAS - PA ACC TIME: 0.1 SEC
BH CV ECHO MEAS - PA PR(ACCEL): 34.5 MMHG
BH CV ECHO MEAS - RAP SYSTOLE: 10 MMHG
BH CV ECHO MEAS - RVSP: 37.7 MMHG
BH CV ECHO MEAS - SI(AO): 73.4 ML/M^2
BH CV ECHO MEAS - SI(CUBED): 28.8 ML/M^2
BH CV ECHO MEAS - SI(MOD-SP4): 11.7 ML/M^2
BH CV ECHO MEAS - SI(TEICH): 28 ML/M^2
BH CV ECHO MEAS - SV(AO): 137.2 ML
BH CV ECHO MEAS - SV(CUBED): 53.8 ML
BH CV ECHO MEAS - SV(MOD-SP4): 21.8 ML
BH CV ECHO MEAS - SV(TEICH): 52.3 ML
BH CV ECHO MEAS - TR MAX VEL: 263 CM/SEC
MAXIMAL PREDICTED HEART RATE: 133 BPM
STRESS TARGET HR: 113 BPM

## 2021-03-17 PROCEDURE — 93306 TTE W/DOPPLER COMPLETE: CPT | Performed by: INTERNAL MEDICINE

## 2021-03-17 PROCEDURE — 93306 TTE W/DOPPLER COMPLETE: CPT

## 2021-03-18 ENCOUNTER — TELEPHONE (OUTPATIENT)
Dept: CARDIOLOGY | Facility: CLINIC | Age: 86
End: 2021-03-18

## 2021-03-29 ENCOUNTER — TRANSCRIBE ORDERS (OUTPATIENT)
Dept: ADMINISTRATIVE | Facility: HOSPITAL | Age: 86
End: 2021-03-29

## 2021-03-29 DIAGNOSIS — I73.9 PAD (PERIPHERAL ARTERY DISEASE) (HCC): Primary | ICD-10-CM

## 2021-04-05 ENCOUNTER — HOSPITAL ENCOUNTER (OUTPATIENT)
Dept: ULTRASOUND IMAGING | Facility: HOSPITAL | Age: 86
Discharge: HOME | End: 2021-04-05
Admitting: INTERNAL MEDICINE

## 2021-04-05 DIAGNOSIS — I73.9 PAD (PERIPHERAL ARTERY DISEASE) (HCC): ICD-10-CM

## 2021-04-05 PROCEDURE — 93922 UPR/L XTREMITY ART 2 LEVELS: CPT

## 2021-04-05 PROCEDURE — 93922 UPR/L XTREMITY ART 2 LEVELS: CPT | Performed by: RADIOLOGY

## 2021-04-13 ENCOUNTER — OFFICE VISIT (OUTPATIENT)
Dept: PULMONOLOGY | Facility: CLINIC | Age: 86
End: 2021-04-13

## 2021-04-13 VITALS
TEMPERATURE: 98 F | BODY MASS INDEX: 28.09 KG/M2 | HEIGHT: 66 IN | WEIGHT: 174.8 LBS | SYSTOLIC BLOOD PRESSURE: 148 MMHG | OXYGEN SATURATION: 96 % | DIASTOLIC BLOOD PRESSURE: 96 MMHG | HEART RATE: 76 BPM

## 2021-04-13 DIAGNOSIS — R91.1 PULMONARY NODULE: ICD-10-CM

## 2021-04-13 DIAGNOSIS — J44.9 CHRONIC OBSTRUCTIVE PULMONARY DISEASE, UNSPECIFIED COPD TYPE (HCC): Primary | ICD-10-CM

## 2021-04-13 PROCEDURE — 99213 OFFICE O/P EST LOW 20 MIN: CPT | Performed by: INTERNAL MEDICINE

## 2021-04-13 RX ORDER — OXYBUTYNIN CHLORIDE 5 MG/1
5 TABLET ORAL DAILY
COMMUNITY
End: 2022-10-11

## 2021-04-13 NOTE — PROGRESS NOTES
Chief complaint:   Chief Complaint   Patient presents with   •  COPD     follow up       History of present illness:   Ms. Garg is a very pleasant 87-year-old female with a past medical history of COPD and pulmonary nodule.  I am seeing the patient for first time in the clinic.  Patient followed with Dr. Roberts and ANA Pierre previously.  She presents to pulmonary outpatient clinic as a regular follow-up.  She reported gradual worsening of her shortness of breath.  She is currently on Breo Ellipta inhaler and albuterol inhaler.  Her PFT in year 2018 showed moderate obstruction.  CT scan of the chest in 2018 showed tiny nodules.  She denies any wheezing, chest pain or racing of her heart    Review of Systems:   Negative for cough or wheezing.  Positive for worsening of shortness of breath on exertion.  Past medical history, past surgical history, social history and family history:  •  has a past medical history of High cholesterol, Hypertension, Past heart attack, and Thyroid disease.  •  has a past surgical history that includes Breast biopsy (Left); Cataract extraction; Coronary angioplasty; and Coronary artery bypass graft.  •  reports that she has quit smoking. Her smoking use included cigarettes. She has never used smokeless tobacco. She reports that she does not drink alcohol and does not use drugs.  • family history includes Heart disease in her brother and father.     Medication and allergies:  • Active medication:  Current Outpatient Medications on File Prior to Visit   Medication Sig   • Alirocumab (PRALUENT) 150 MG/ML solution pen-injector Inject  under the skin into the appropriate area as directed.   • aspirin 325 MG tablet Take 325 mg by mouth Daily.   • benazepril (LOTENSIN) 10 MG tablet Take 10 mg by mouth 2 (Two) Times a Day.   • BREO ELLIPTA 100-25 MCG/INH inhaler INHALE ONE DOSE BY MOUTH DAILY   • Calcium-Vitamin D-Vitamin K (VIACTIV PO) Take  by mouth.   • Cholecalciferol (VITAMIN D3) 2000 units  "capsule Take 2,000 Units by mouth Daily.   • coenzyme Q10 100 MG capsule Take 100 mg by mouth Daily.   • Cyanocobalamin (VITAMIN B 12 PO) Take  by mouth.   • Fluticasone Furoate-Vilanterol (BREO ELLIPTA IN) Inhale 100 mcg Daily.   • furosemide (LASIX) 20 MG tablet As Needed.   • hydrochlorothiazide (MICROZIDE) 12.5 MG capsule Take 12.5 mg by mouth Daily.   • levothyroxine (SYNTHROID, LEVOTHROID) 112 MCG tablet Take 112 mcg by mouth Daily.   • meloxicam (MOBIC) 7.5 MG tablet Take 7.5 mg by mouth 2 (Two) Times a Day As Needed for Mild Pain .   • metoprolol tartrate (LOPRESSOR) 25 MG tablet Take 25 mg by mouth 2 (Two) Times a Day.   • montelukast (SINGULAIR) 10 MG tablet Take 1 tablet by mouth Every Night.   • oxybutynin (DITROPAN) 5 MG tablet Take 5 mg by mouth Daily.   • Red Yeast Rice 600 MG capsule Take  by mouth.   • amLODIPine (NORVASC) 10 MG tablet Take 1 tablet by mouth Daily. (Patient taking differently: Take 2.5 mg by mouth Daily.)   • [DISCONTINUED] potassium chloride (K-TAB) 20 MEQ tablet controlled-release ER tablet Take 1 tablet by mouth Daily. (Patient taking differently: Take 20 mEq by mouth As Needed (only if she takes lasix).)     No current facility-administered medications on file prior to visit.        Allergies:    Motrin [ibuprofen] and Novocain [procaine]      Vital Signs    height is 166.4 cm (65.5\") and weight is 79.3 kg (174 lb 12.8 oz). Her temperature is 98 °F (36.7 °C). Her blood pressure is 148/96 and her pulse is 76. Her oxygen saturation is 96%.      Physical Exam:  Patient is oriented to time place and person.  No distress.   No cranial nerve deficit.  Bilateral air entry equal.  No wheezing, rhonchi, crackles.  S1-S2 normal.  No murmur heard.       Result review:   • PFT and CT scan of the chest that was 102,018 reviewed      Assessment and plan:    The primary encounter diagnosis was Chronic obstructive pulmonary disease, unspecified COPD type (CMS/HCC). A diagnosis of Pulmonary " nodule was also pertinent to this visit.    Patient is currently on albuterol inhaler Breo Ellipta inhaler.  I started patient on Spiriva Respimat inhaler.  Patient wishes not to pursue any further imaging for pulmonary nodule and I agree with her decision.      Follow up in 6 months and as needed.       Thank you for involving us in the care of the patient.  Valeriano Morales MD  Pulmonary and Critical Care Medicine

## 2022-06-21 DIAGNOSIS — M25.561 PAIN IN BOTH KNEES, UNSPECIFIED CHRONICITY: Primary | ICD-10-CM

## 2022-06-21 DIAGNOSIS — M25.562 PAIN IN BOTH KNEES, UNSPECIFIED CHRONICITY: Primary | ICD-10-CM

## 2022-06-27 ENCOUNTER — OFFICE VISIT (OUTPATIENT)
Dept: ORTHOPEDIC SURGERY | Facility: CLINIC | Age: 87
End: 2022-06-27

## 2022-06-27 ENCOUNTER — HOSPITAL ENCOUNTER (OUTPATIENT)
Dept: GENERAL RADIOLOGY | Facility: HOSPITAL | Age: 87
Discharge: HOME | End: 2022-06-27
Admitting: ORTHOPAEDIC SURGERY

## 2022-06-27 VITALS
DIASTOLIC BLOOD PRESSURE: 71 MMHG | SYSTOLIC BLOOD PRESSURE: 140 MMHG | BODY MASS INDEX: 27.97 KG/M2 | HEIGHT: 66 IN | HEART RATE: 60 BPM | WEIGHT: 174 LBS

## 2022-06-27 DIAGNOSIS — M25.551 HIP PAIN, BILATERAL: ICD-10-CM

## 2022-06-27 DIAGNOSIS — M25.562 PAIN IN BOTH KNEES, UNSPECIFIED CHRONICITY: ICD-10-CM

## 2022-06-27 DIAGNOSIS — M25.561 PAIN IN BOTH KNEES, UNSPECIFIED CHRONICITY: ICD-10-CM

## 2022-06-27 DIAGNOSIS — M16.12 PRIMARY OSTEOARTHRITIS OF LEFT HIP: Primary | ICD-10-CM

## 2022-06-27 DIAGNOSIS — M25.552 HIP PAIN, BILATERAL: ICD-10-CM

## 2022-06-27 PROCEDURE — 73562 X-RAY EXAM OF KNEE 3: CPT | Performed by: RADIOLOGY

## 2022-06-27 PROCEDURE — 99203 OFFICE O/P NEW LOW 30 MIN: CPT | Performed by: ORTHOPAEDIC SURGERY

## 2022-06-27 PROCEDURE — 73562 X-RAY EXAM OF KNEE 3: CPT

## 2022-06-27 PROCEDURE — 73523 X-RAY EXAM HIPS BI 5/> VIEWS: CPT | Performed by: RADIOLOGY

## 2022-06-27 PROCEDURE — 73523 X-RAY EXAM HIPS BI 5/> VIEWS: CPT

## 2022-06-27 RX ORDER — CHLORAL HYDRATE 500 MG
CAPSULE ORAL DAILY PRN
Status: ON HOLD | COMMUNITY
End: 2022-12-20

## 2022-06-27 NOTE — PROGRESS NOTES
New Patient Visit      Patient: Christine Garg  YOB: 1933  Date of Encounter: 06/27/2022        Chief Complaint:   Chief Complaint   Patient presents with   • Left Knee - Pain, Initial Evaluation   • Right Knee - Pain, Initial Evaluation   • Left Hip - Pain   • Right Hip - Pain           HPI:   Christine Garg, 89 y.o. female, referred by Dave Tobar MD presents for evaluation of bilateral knee pain and also left hip pain.  She reports bilateral knee pain was quite severe later this year but over the past 6 weeks her pain seems to be improving.  She does also localize pain to the anterior aspect of her left hip.  She reports increased pain with prolonged standing or walking.  Denies weakness or numbness to either leg.  She is convinced that her pain is somehow related to her cholesterol medication.  Past medical history includes coronary artery disease previous MI and COPD.        Active Problem List:  Patient Active Problem List   Diagnosis   • Precordial pain   • Essential hypertension   • Dyslipidemia   • Leg edema   • ASCVD (arteriosclerotic cardiovascular disease)   • Chest pain   • SOB (shortness of breath)   • Palpitations   • Coronary artery disease    • Abnormal PFTs (pulmonary function tests)   • Shortness of breath   • Chronic obstructive pulmonary disease (HCC)   • Hypokalemia   • Mild persistent asthma with allergic rhinitis   • Pulmonary nodule   • Former smoker           Past Medical History:  Past Medical History:   Diagnosis Date   • High cholesterol    • Hypertension    • Past heart attack    • Thyroid disease            Past Surgical History:  Past Surgical History:   Procedure Laterality Date   • BREAST BIOPSY Left     benign   • CATARACT EXTRACTION     • CORONARY ANGIOPLASTY     • CORONARY ARTERY BYPASS GRAFT             Family History:  Family History   Problem Relation Age of Onset   • Heart disease Mother    • Heart disease Father    • Heart disease Brother    • Breast  cancer Neg Hx          Social History:  Social History     Socioeconomic History   • Marital status:    Tobacco Use   • Smoking status: Former Smoker     Types: Cigarettes   • Smokeless tobacco: Never Used   Substance and Sexual Activity   • Alcohol use: No   • Drug use: No   • Sexual activity: Defer     Body mass index is 28.5 kg/m².      Medications:  Current Outpatient Medications   Medication Sig Dispense Refill   • Alirocumab 150 MG/ML solution pen-injector Inject  under the skin into the appropriate area as directed.     • amLODIPine (NORVASC) 10 MG tablet Take 1 tablet by mouth Daily. (Patient taking differently: Take 2.5 mg by mouth Daily.) 30 tablet 11   • aspirin 325 MG tablet Take 325 mg by mouth Daily.     • benazepril (LOTENSIN) 10 MG tablet Take 10 mg by mouth 2 (Two) Times a Day.     • Calcium-Vitamin D-Vitamin K (VIACTIV PO) Take  by mouth.     • Cholecalciferol (VITAMIN D3) 2000 units capsule Take 2,000 Units by mouth Daily.     • coenzyme Q10 100 MG capsule Take 100 mg by mouth Daily.     • Cyanocobalamin (VITAMIN B 12 PO) Take  by mouth.     • hydrochlorothiazide (MICROZIDE) 12.5 MG capsule Take 12.5 mg by mouth Daily.     • levothyroxine (SYNTHROID, LEVOTHROID) 112 MCG tablet Take 112 mcg by mouth Daily.     • meloxicam (MOBIC) 7.5 MG tablet Take 7.5 mg by mouth 2 (Two) Times a Day As Needed for Mild Pain .     • metoprolol tartrate (LOPRESSOR) 25 MG tablet Take 25 mg by mouth 2 (Two) Times a Day.     • montelukast (SINGULAIR) 10 MG tablet Take 1 tablet by mouth Every Night. 30 tablet 11   • Omega-3 Fatty Acids (fish oil) 1000 MG capsule capsule Take  by mouth Daily With Breakfast.     • Red Yeast Rice 600 MG capsule Take  by mouth.     • BREO ELLIPTA 100-25 MCG/INH inhaler INHALE ONE DOSE BY MOUTH DAILY 60 each 5   • Fluticasone Furoate-Vilanterol (BREO ELLIPTA IN) Inhale 100 mcg Daily.     • furosemide (LASIX) 20 MG tablet As Needed.     • oxybutynin (DITROPAN) 5 MG tablet Take 5 mg by  "mouth Daily.     • tiotropium bromide monohydrate (SPIRIVA RESPIMAT) 2.5 MCG/ACT aerosol solution inhaler Inhale 2 puffs Daily. 1 each 11     No current facility-administered medications for this visit.         Allergies:  Allergies   Allergen Reactions   • Motrin [Ibuprofen] Anaphylaxis and Hives   • Novocain [Procaine] Other (See Comments)     Pt state she passes out.         Review of Systems:   Review of Systems   Constitutional: Negative.    HENT: Negative.    Eyes: Negative.    Respiratory: Positive for shortness of breath.    Cardiovascular: Positive for leg swelling.   Gastrointestinal: Negative.    Endocrine: Negative.    Genitourinary: Positive for frequency.   Musculoskeletal: Positive for arthralgias, back pain and joint swelling.   Skin: Negative.    Allergic/Immunologic: Negative.    Neurological: Positive for numbness.   Hematological: Negative.    Psychiatric/Behavioral: Negative.          Physical Exam:   Physical Exam  GENERAL: 89 y.o. female, alert and oriented X 3 in no acute distress.   Visit Vitals  /71   Pulse 60   Ht 166.4 cm (65.51\")   Wt 78.9 kg (174 lb)   BMI 28.50 kg/m²         Musculoskeletal:   Examination left hip reveals flexion to 90 degrees with minimal rotation and associated pain right hip with 90 degrees of flexion with 30 degree arc of pain this at the extremes.    Bilateral knee evaluation shows minimal effusion mild to moderate medial joint line tenderness no gross instability neurovascular exam grossly intact.      Radiology/Labs:     XR Knee 3 View Bilateral    Result Date: 6/27/2022  Very mild changes of osteoarthritis in both knees.  This report was finalized on 6/27/2022 12:48 PM by Dr. Benito Hewitt II, MD.      XR Hips Bilateral With or Without Pelvis 5 View    Result Date: 6/27/2022  Osteoarthritis in the hips radiographically much more advanced on the left.  This report was finalized on 6/27/2022 12:48 PM by Dr. Benito Hewitt II, MD.        Radiographs " bilateral hips demonstrates moderate osteoarthritis left hip.    Radiographs bilateral knees shows minimal osteoarthritis by report and by my review.      Assessment & Plan:   89 y.o. female presents with bilateral lower extremity pain which seems to be improving she relates this to her cholesterol medication.  She reports she stopped her medication about 6 weeks ago and she improved.  She is having minimal symptoms with right and left knee we will hold off on any further treatment.  Regarding her left hip complaints discussed her options and she has agreed to receive intra-articular steroid injection she is referred to [facility] for steroid injection left hip intra-articular.  She will follow-up in his clinic as needed.        ICD-10-CM ICD-9-CM   1. Primary osteoarthritis of left hip  M16.12 715.15   2. Hip pain, bilateral  M25.551 719.45    M25.552    3. Pain in both knees, unspecified chronicity  M25.561 719.46    M25.562            Cc:   Dave Tobar MD                This document has been electronically signed by Ang Meng MD   June 28, 2022 18:43 EDT

## 2022-06-28 ENCOUNTER — TELEPHONE (OUTPATIENT)
Dept: ORTHOPEDIC SURGERY | Facility: CLINIC | Age: 87
End: 2022-06-28

## 2022-06-28 NOTE — TELEPHONE ENCOUNTER
Notified patient of her appoinntment for a FL guiced left hip injection on 07/21/2022 at 1:30 pm instructed to hold aspirin 325 mg 3 days prior to the procedure.

## 2022-07-21 ENCOUNTER — APPOINTMENT (OUTPATIENT)
Dept: GENERAL RADIOLOGY | Facility: HOSPITAL | Age: 87
End: 2022-07-21

## 2022-07-21 ENCOUNTER — HOSPITAL ENCOUNTER (OUTPATIENT)
Dept: GENERAL RADIOLOGY | Facility: HOSPITAL | Age: 87
Discharge: HOME | End: 2022-07-21
Admitting: ORTHOPAEDIC SURGERY

## 2022-07-21 VITALS
DIASTOLIC BLOOD PRESSURE: 69 MMHG | BODY MASS INDEX: 27.49 KG/M2 | HEIGHT: 65 IN | RESPIRATION RATE: 18 BRPM | HEART RATE: 64 BPM | OXYGEN SATURATION: 99 % | SYSTOLIC BLOOD PRESSURE: 158 MMHG | WEIGHT: 165 LBS

## 2022-07-21 DIAGNOSIS — M16.12 PRIMARY OSTEOARTHRITIS OF LEFT HIP: ICD-10-CM

## 2022-07-21 DIAGNOSIS — M16.12 PRIMARY OSTEOARTHRITIS OF LEFT HIP: Primary | ICD-10-CM

## 2022-07-21 PROCEDURE — 87070 CULTURE OTHR SPECIMN AEROBIC: CPT | Performed by: ORTHOPAEDIC SURGERY

## 2022-07-21 PROCEDURE — 20610 DRAIN/INJ JOINT/BURSA W/O US: CPT | Performed by: RADIOLOGY

## 2022-07-21 PROCEDURE — 87205 SMEAR GRAM STAIN: CPT | Performed by: ORTHOPAEDIC SURGERY

## 2022-07-21 PROCEDURE — 77002 NEEDLE LOCALIZATION BY XRAY: CPT | Performed by: RADIOLOGY

## 2022-07-21 PROCEDURE — 25010000002 IOPAMIDOL 61 % SOLUTION: Performed by: RADIOLOGY

## 2022-07-21 PROCEDURE — 0 LIDOCAINE 1 % SOLUTION: Performed by: RADIOLOGY

## 2022-07-21 PROCEDURE — 77002 NEEDLE LOCALIZATION BY XRAY: CPT

## 2022-07-21 PROCEDURE — 25010000002 TRIAMCINOLONE PER 10 MG: Performed by: RADIOLOGY

## 2022-07-21 RX ORDER — LIDOCAINE HYDROCHLORIDE 10 MG/ML
10 INJECTION, SOLUTION INFILTRATION; PERINEURAL ONCE
Status: COMPLETED | OUTPATIENT
Start: 2022-07-21 | End: 2022-07-21

## 2022-07-21 RX ORDER — BUPIVACAINE HYDROCHLORIDE 5 MG/ML
5 INJECTION, SOLUTION PERINEURAL ONCE
Status: COMPLETED | OUTPATIENT
Start: 2022-07-21 | End: 2022-07-21

## 2022-07-21 RX ORDER — TRIAMCINOLONE ACETONIDE 40 MG/ML
80 INJECTION, SUSPENSION INTRA-ARTICULAR; INTRAMUSCULAR ONCE
Status: COMPLETED | OUTPATIENT
Start: 2022-07-21 | End: 2022-07-21

## 2022-07-21 RX ADMIN — TRIAMCINOLONE ACETONIDE 80 MG: 40 INJECTION, SUSPENSION INTRA-ARTICULAR; INTRAMUSCULAR at 13:46

## 2022-07-21 RX ADMIN — IOPAMIDOL 3 ML: 612 INJECTION, SOLUTION INTRAVENOUS at 13:46

## 2022-07-21 RX ADMIN — BUPIVACAINE HYDROCHLORIDE 5 ML: 5 INJECTION, SOLUTION PERINEURAL at 13:46

## 2022-07-21 RX ADMIN — LIDOCAINE HYDROCHLORIDE 10 ML: 10 INJECTION, SOLUTION INFILTRATION; PERINEURAL at 13:43

## 2022-07-21 NOTE — NURSING NOTE
Spoke with Mayelin at Dr. Meng's office who reports she will contact provider to ask about testing fluid. Awaiting call back.

## 2022-07-21 NOTE — NURSING NOTE
Procedure complete and patient tolerated well. Dressing applied. Approximately 1.5ml yellow fluid aspirated from joint. Dr. Nguyen requests contact ordering provider to ask about sending off for analysis.

## 2022-07-24 LAB
BACTERIA SPEC AEROBE CULT: NORMAL
GRAM STN SPEC: NORMAL
GRAM STN SPEC: NORMAL

## 2022-10-11 ENCOUNTER — SPECIALTY PHARMACY (OUTPATIENT)
Dept: PHARMACY | Facility: HOSPITAL | Age: 87
End: 2022-10-11

## 2022-10-11 ENCOUNTER — DISEASE STATE MANAGEMENT VISIT (OUTPATIENT)
Dept: PHARMACY | Facility: HOSPITAL | Age: 87
End: 2022-10-11

## 2022-10-11 DIAGNOSIS — I25.10 ASCVD (ARTERIOSCLEROTIC CARDIOVASCULAR DISEASE): Primary | ICD-10-CM

## 2022-10-11 RX ORDER — EVOLOCUMAB 140 MG/ML
140 INJECTION, SOLUTION SUBCUTANEOUS
Qty: 2 ML | Refills: 0 | Status: SHIPPED | OUTPATIENT
Start: 2022-10-11 | End: 2022-10-11

## 2022-10-11 RX ORDER — EVOLOCUMAB 140 MG/ML
140 INJECTION, SOLUTION SUBCUTANEOUS
Qty: 2 ML | Refills: 5 | Status: SHIPPED | OUTPATIENT
Start: 2022-10-11 | End: 2022-11-30 | Stop reason: SDUPTHER

## 2022-10-11 NOTE — PROGRESS NOTES
[facility]  Lipid Management Program - PCSK9i       Christine Garg is a 89 y.o. female referred to the [facility] by Dr. Tobar for clinical pharmacy and specialty pharmacy management of PCSK9i.  Christine Garg is  treated for clinical ASCVD and hyperlipidemia and currently takes Praluent.  In the past, patient has tried zetia and statin therapy and was statin intolerant with myalagia. Denies any trouble giving herself the injection and denies side effects with Praluent. Praluent was changed to Repatha due to lower cost of medication. The patient denies any allergies to latex.      Relevant Past Medical History and Comorbidities  Past Medical History:   Diagnosis Date   • High cholesterol    • Hypertension    • Past heart attack    • Thyroid disease      Social History     Socioeconomic History   • Marital status:    Tobacco Use   • Smoking status: Former     Types: Cigarettes   • Smokeless tobacco: Never   Substance and Sexual Activity   • Alcohol use: No   • Drug use: No   • Sexual activity: Defer       Allergies  Motrin [ibuprofen] and Novocain [procaine]    Current Medication List    Current Outpatient Medications:   •  aspirin 325 MG tablet, Take 325 mg by mouth Daily., Disp: , Rfl:   •  benazepril (LOTENSIN) 10 MG tablet, Take 10 mg by mouth 2 (Two) Times a Day., Disp: , Rfl:   •  Calcium-Vitamin D-Vitamin K (VIACTIV PO), Take  by mouth Daily As Needed., Disp: , Rfl:   •  coenzyme Q10 100 MG capsule, Take 100 mg by mouth Daily., Disp: , Rfl:   •  Cyanocobalamin (VITAMIN B 12 PO), Take  by mouth Daily As Needed. 2-3 times a week, Disp: , Rfl:   •  Evolocumab (Repatha SureClick) solution auto-injector SureClick injection, Inject 1 mL under the skin into the appropriate area as directed Every 14 (Fourteen) Days., Disp: 2 mL, Rfl: 0  •  furosemide (LASIX) 20 MG tablet, As Needed., Disp: , Rfl:   •  hydrochlorothiazide (MICROZIDE) 12.5 MG capsule, Take 12.5 mg by mouth  Daily., Disp: , Rfl:   •  levothyroxine (SYNTHROID, LEVOTHROID) 112 MCG tablet, Take 112 mcg by mouth Daily., Disp: , Rfl:   •  meloxicam (MOBIC) 7.5 MG tablet, Take 1 tablet by mouth Daily., Disp: , Rfl:   •  metoprolol tartrate (LOPRESSOR) 25 MG tablet, Take 25 mg by mouth 2 (Two) Times a Day., Disp: , Rfl:   •  montelukast (SINGULAIR) 10 MG tablet, Take 1 tablet by mouth Every Night., Disp: 30 tablet, Rfl: 11  •  Omega-3 Fatty Acids (fish oil) 1000 MG capsule capsule, Take  by mouth Daily As Needed. 2-3 times a week, Disp: , Rfl:   •  Red Yeast Rice 600 MG capsule, Take  by mouth Daily As Needed., Disp: , Rfl:   •  Alirocumab 150 MG/ML solution pen-injector, Inject  under the skin into the appropriate area as directed., Disp: , Rfl:   •  amLODIPine (NORVASC) 10 MG tablet, Take 1 tablet by mouth Daily. (Patient taking differently: Take 2.5 mg by mouth Daily.), Disp: 30 tablet, Rfl: 11  •  BREO ELLIPTA 100-25 MCG/INH inhaler, INHALE ONE DOSE BY MOUTH DAILY, Disp: 60 each, Rfl: 5  •  Cholecalciferol (VITAMIN D3) 2000 units capsule, Take 1 capsule by mouth Daily As Needed. 3-4 times a week, Disp: , Rfl:   •  tiotropium bromide monohydrate (SPIRIVA RESPIMAT) 2.5 MCG/ACT aerosol solution inhaler, Inhale 2 puffs Daily., Disp: 1 each, Rfl: 11    Drug Interactions  None    Relevant Laboratory Values  Lab Results   Component Value Date    CHOL 184 05/08/2018    CHLPL 187 10/13/2015    TRIG 101 05/08/2018    HDL 53 (L) 05/08/2018     (H) 05/08/2018       Medication Assessment & Plan    Will begin the prior authorization, if applicable.  Will see patient in clinic once prior authorization is obtained to order medication, provide injection training and medication counseling and follow-up with patient as necessary.         Initial Education Provided for Repatha    Patient seen in the [facility] for initial education and injection training for PCSK9 inhibitors. The patient was introduced to services  offered by [facility], including: regular assessments, refill coordination, curbside pick-up or mail order delivery options, prior authorization maintenance, and financial assistance programs as applicable. The patient was also provided with contact information for the pharmacy team. Welcome information and patient satisfaction survey to be sent by retail team with patient's initial fill.    Patient Instructions    Repatha is used to lower LDL, or bad cholesterol, to help reduce your chance of a heart attack or stroke.  You should give your injection once every 14 days  Your doctor will likely keep you on this medication indefinitely as long as it is working for you and not causing any adverse effects.      This medication should be kept in the refrigerator until you are ready to use it.  Once removed from the refrigerator, it is good at room temperature for 30 days.  Do not leave in your car or expose to extreme heat.  Do not shake or freeze.  Dispose the used syringe/device in a sharps container.     This injection is a subcutaneous injection, which means just under the skin.  It is important to choose an area of your skin that is not tender, bruised, cut or has scars or stretch marks.  You can inject into your abdomen (except for 2 inches around your navel), your thigh or your upper arm.  Do not administer with other drugs.   Rotate injection sites each time you give the injection. Wash your hands prior to giving yourself an injection and use an alcohol wipe to clean the area of the injection and allow to dry prior to injecting.      If you miss a dose, take it as soon as you remember and resume the original schedule if it is within 7 days from the missed dose.  If an every 2 week dose is not administered within 7 days, wait until the next dose on the original schedule.  If a once-monthly dose is not administered within 7 days, administer the dose and start a new schedule based on this  date.     Be sure to let your doctor or pharmacist know of any medication changes, including OTC and herbal supplements.     Adverse Effects  Reviewed with patient and education on management provided.     • Sore Throat  • Injection site pain  • Itching or irritation   • Flu-like symptoms  • Signs of an allergic reaction     Immunizations  While there are no immunizations that you need to get specifically because you are on this drug, it is important to keep up with your recommended routine vaccinations.     Adherence and Self-Administration    • Barriers to Patient Adherence and/or Self-Administration: None  • Methods for Supporting Patient Adherence and/or Self-Administration: None Required     Goals of Therapy  • Patient Goals of Therapy: To not miss any doses  • Clinical Goals or Therapeutic Targets, If Applicable: LDL Reduction      Attestation  I attest that the initiated specialty medication is appropriate for the patient based on my assessment.  If the prescribed therapy is at any point deemed not appropriate based on the current or future assessments, a consultation will be initiated with the patient's specialty care provider to determine the best course of action. The revised plan of therapy will be documented along with any additional patient education provided.     The patient has been provided with the following education and any applicable administration techniques (i.e. self-injection) have been demonstrated for the therapies indicated. All questions and concerns have been addressed prior to the patient receiving the medication, and the patient has verbalized understanding of the education and any materials provided.  Additional patient education shall be provided and documented upon request by the patient, provider or payer.      The patient would like to receive specialty mail-out services for the next injection. Care Coordinator to set up future refill outreaches, coordinate prescription delivery,  and escalate clinical questions to pharmacist.     Patient should receive a lipid panel in 4-12 weeks.  Order has been placed.     Thank you,     Jane Anderson RPH  10/11/22  16:03 EDT

## 2022-10-11 NOTE — PROGRESS NOTES
[facility]  Lipid Management Program - PCSK9i       Christine Garg is a 89 y.o. female referred to the [facility] by Repatha for clinical pharmacy and specialty pharmacy management of PCSK9i.  Christine Garg is  treated for clinical ASCVD and hyperlipidemia and currently takes Praluent.  In the past, patient has tried zetia and statin therapy and was statin intolerant with myalagia. Denies any trouble giving herself the injection and denies side effects with Praluent. Praluent was changed to Repatha due to lower cost of medication. The patient denies any allergies to latex.      Relevant Past Medical History and Comorbidities  Past Medical History:   Diagnosis Date   • High cholesterol    • Hypertension    • Past heart attack    • Thyroid disease      Social History     Socioeconomic History   • Marital status:    Tobacco Use   • Smoking status: Former     Types: Cigarettes   • Smokeless tobacco: Never   Substance and Sexual Activity   • Alcohol use: No   • Drug use: No   • Sexual activity: Defer       Allergies  Motrin [ibuprofen] and Novocain [procaine]    Current Medication List    Current Outpatient Medications:   •  Alirocumab 150 MG/ML solution pen-injector, Inject  under the skin into the appropriate area as directed., Disp: , Rfl:   •  amLODIPine (NORVASC) 10 MG tablet, Take 1 tablet by mouth Daily. (Patient taking differently: Take 2.5 mg by mouth Daily.), Disp: 30 tablet, Rfl: 11  •  aspirin 325 MG tablet, Take 325 mg by mouth Daily., Disp: , Rfl:   •  benazepril (LOTENSIN) 10 MG tablet, Take 10 mg by mouth 2 (Two) Times a Day., Disp: , Rfl:   •  BREO ELLIPTA 100-25 MCG/INH inhaler, INHALE ONE DOSE BY MOUTH DAILY, Disp: 60 each, Rfl: 5  •  Calcium-Vitamin D-Vitamin K (VIACTIV PO), Take  by mouth Daily As Needed., Disp: , Rfl:   •  Cholecalciferol (VITAMIN D3) 2000 units capsule, Take 1 capsule by mouth Daily As Needed. 3-4 times a week, Disp: , Rfl:   •   coenzyme Q10 100 MG capsule, Take 100 mg by mouth Daily., Disp: , Rfl:   •  Cyanocobalamin (VITAMIN B 12 PO), Take  by mouth Daily As Needed. 2-3 times a week, Disp: , Rfl:   •  Evolocumab (Repatha SureClick) solution auto-injector SureClick injection, Inject 1 mL under the skin into the appropriate area as directed Every 14 (Fourteen) Days., Disp: 2 mL, Rfl: 5  •  furosemide (LASIX) 20 MG tablet, As Needed., Disp: , Rfl:   •  hydrochlorothiazide (MICROZIDE) 12.5 MG capsule, Take 12.5 mg by mouth Daily., Disp: , Rfl:   •  levothyroxine (SYNTHROID, LEVOTHROID) 112 MCG tablet, Take 112 mcg by mouth Daily., Disp: , Rfl:   •  meloxicam (MOBIC) 7.5 MG tablet, Take 1 tablet by mouth Daily., Disp: , Rfl:   •  metoprolol tartrate (LOPRESSOR) 25 MG tablet, Take 25 mg by mouth 2 (Two) Times a Day., Disp: , Rfl:   •  montelukast (SINGULAIR) 10 MG tablet, Take 1 tablet by mouth Every Night., Disp: 30 tablet, Rfl: 11  •  Omega-3 Fatty Acids (fish oil) 1000 MG capsule capsule, Take  by mouth Daily As Needed. 2-3 times a week, Disp: , Rfl:   •  Red Yeast Rice 600 MG capsule, Take  by mouth Daily As Needed., Disp: , Rfl:   •  tiotropium bromide monohydrate (SPIRIVA RESPIMAT) 2.5 MCG/ACT aerosol solution inhaler, Inhale 2 puffs Daily., Disp: 1 each, Rfl: 11    Drug Interactions  None    Relevant Laboratory Values  Lab Results   Component Value Date    CHOL 184 05/08/2018    CHLPL 187 10/13/2015    TRIG 101 05/08/2018    HDL 53 (L) 05/08/2018     (H) 05/08/2018       Medication Assessment & Plan    Will begin the prior authorization, if applicable.  Will see patient in clinic once prior authorization is obtained to order medication, provide injection training and medication counseling and follow-up with patient as necessary.         Initial Education Provided for Repatha    Patient seen in the [facility] Management [facility] for initial education and injection training for PCSK9 inhibitors. The patient was introduced to services  offered by [facility], including: regular assessments, refill coordination, curbside pick-up or mail order delivery options, prior authorization maintenance, and financial assistance programs as applicable. The patient was also provided with contact information for the pharmacy team. Welcome information and patient satisfaction survey to be sent by retail team with patient's initial fill.    Patient Instructions    Repatha is used to lower LDL, or bad cholesterol, to help reduce your chance of a heart attack or stroke.  You should give your injection once every 14 days  Your doctor will likely keep you on this medication indefinitely as long as it is working for you and not causing any adverse effects.      This medication should be kept in the refrigerator until you are ready to use it.  Once removed from the refrigerator, it is good at room temperature for 30 days.  Do not leave in your car or expose to extreme heat.  Do not shake or freeze.  Dispose the used syringe/device in a sharps container.     This injection is a subcutaneous injection, which means just under the skin.  It is important to choose an area of your skin that is not tender, bruised, cut or has scars or stretch marks.  You can inject into your abdomen (except for 2 inches around your navel), your thigh or your upper arm.  Do not administer with other drugs.   Rotate injection sites each time you give the injection. Wash your hands prior to giving yourself an injection and use an alcohol wipe to clean the area of the injection and allow to dry prior to injecting.      If you miss a dose, take it as soon as you remember and resume the original schedule if it is within 7 days from the missed dose.  If an every 2 week dose is not administered within 7 days, wait until the next dose on the original schedule.  If a once-monthly dose is not administered within 7 days, administer the dose and start a new schedule based on this  date.     Be sure to let your doctor or pharmacist know of any medication changes, including OTC and herbal supplements.     Adverse Effects  Reviewed with patient and education on management provided.     • Sore Throat  • Injection site pain  • Itching or irritation   • Flu-like symptoms  • Signs of an allergic reaction     Immunizations  While there are no immunizations that you need to get specifically because you are on this drug, it is important to keep up with your recommended routine vaccinations.     Adherence and Self-Administration    • Barriers to Patient Adherence and/or Self-Administration: None  • Methods for Supporting Patient Adherence and/or Self-Administration: None Required     Goals of Therapy  • Patient Goals of Therapy: To not miss any doses  • Clinical Goals or Therapeutic Targets, If Applicable: LDL Reduction      Attestation  I attest that the initiated specialty medication is appropriate for the patient based on my assessment.  If the prescribed therapy is at any point deemed not appropriate based on the current or future assessments, a consultation will be initiated with the patient's specialty care provider to determine the best course of action. The revised plan of therapy will be documented along with any additional patient education provided.     The patient has been provided with the following education and any applicable administration techniques (i.e. self-injection) have been demonstrated for the therapies indicated. All questions and concerns have been addressed prior to the patient receiving the medication, and the patient has verbalized understanding of the education and any materials provided.  Additional patient education shall be provided and documented upon request by the patient, provider or payer.      The patient would like to receive specialty mail-out services for the next injection. Care Coordinator to set up future refill outreaches, coordinate prescription delivery,  and escalate clinical questions to pharmacist.     Patient should receive a lipid panel in 4-12 weeks.  Order has been placed.     Thank you,     Jane Anderson RPH  10/11/22  16:14 EDT

## 2022-11-03 ENCOUNTER — SPECIALTY PHARMACY (OUTPATIENT)
Dept: PHARMACY | Facility: HOSPITAL | Age: 87
End: 2022-11-03

## 2022-11-03 NOTE — PROGRESS NOTES
Specialty Pharmacy Refill Coordination Note      Name:  Christine Garg  :  1933  Date:  11/3/2022         Past Medical History:   Diagnosis Date   • High cholesterol    • Hypertension    • Past heart attack    • Thyroid disease        Past Surgical History:   Procedure Laterality Date   • BREAST BIOPSY Left     benign   • CATARACT EXTRACTION     • CORONARY ANGIOPLASTY     • CORONARY ARTERY BYPASS GRAFT         Social History     Socioeconomic History   • Marital status:    Tobacco Use   • Smoking status: Former     Types: Cigarettes   • Smokeless tobacco: Never   Substance and Sexual Activity   • Alcohol use: No   • Drug use: No   • Sexual activity: Defer       Family History   Problem Relation Age of Onset   • Heart disease Mother    • Heart disease Father    • Heart disease Brother    • Breast cancer Neg Hx        Allergies   Allergen Reactions   • Motrin [Ibuprofen] Anaphylaxis and Hives   • Novocain [Procaine] Other (See Comments)     Pt state she passes out.       Current Outpatient Medications   Medication Sig Dispense Refill   • Alirocumab 150 MG/ML solution pen-injector Inject  under the skin into the appropriate area as directed.     • amLODIPine (NORVASC) 10 MG tablet Take 1 tablet by mouth Daily. (Patient taking differently: Take 2.5 mg by mouth Daily.) 30 tablet 11   • aspirin 325 MG tablet Take 325 mg by mouth Daily.     • benazepril (LOTENSIN) 10 MG tablet Take 10 mg by mouth 2 (Two) Times a Day.     • BREO ELLIPTA 100-25 MCG/INH inhaler INHALE ONE DOSE BY MOUTH DAILY 60 each 5   • Calcium-Vitamin D-Vitamin K (VIACTIV PO) Take  by mouth Daily As Needed.     • Cholecalciferol (VITAMIN D3) 2000 units capsule Take 1 capsule by mouth Daily As Needed. 3-4 times a week     • coenzyme Q10 100 MG capsule Take 100 mg by mouth Daily.     • Cyanocobalamin (VITAMIN B 12 PO) Take  by mouth Daily As Needed. 2-3 times a week     • Evolocumab (Repatha SureClick) solution auto-injector SureClick  injection Inject 1 mL under the skin into the appropriate area as directed Every 14 (Fourteen) Days. 2 mL 5   • furosemide (LASIX) 20 MG tablet As Needed.     • hydrochlorothiazide (MICROZIDE) 12.5 MG capsule Take 12.5 mg by mouth Daily.     • levothyroxine (SYNTHROID, LEVOTHROID) 112 MCG tablet Take 112 mcg by mouth Daily.     • meloxicam (MOBIC) 7.5 MG tablet Take 1 tablet by mouth Daily.     • metoprolol tartrate (LOPRESSOR) 25 MG tablet Take 25 mg by mouth 2 (Two) Times a Day.     • montelukast (SINGULAIR) 10 MG tablet Take 1 tablet by mouth Every Night. 30 tablet 11   • Omega-3 Fatty Acids (fish oil) 1000 MG capsule capsule Take  by mouth Daily As Needed. 2-3 times a week     • Red Yeast Rice 600 MG capsule Take  by mouth Daily As Needed.     • tiotropium bromide monohydrate (SPIRIVA RESPIMAT) 2.5 MCG/ACT aerosol solution inhaler Inhale 2 puffs Daily. 1 each 11     No current facility-administered medications for this visit.         ASSESSMENT/PLAN:      Christine is a 89 y.o. female contacted today regarding refills of  Repatha Sureclick 140mg/ml specialty medication(s).    Reviewed and verified with patient:       Specialty medication(s) and dose(s) confirmed: yes    Refill Questions    Flowsheet Row Most Recent Value   Changes to allergies? No   Changes to medications? No   New conditions since last clinic visit No   Unplanned office visit, urgent care, ED, or hospital admission in the last 4 weeks  No   How does patient/caregiver feel medication is working? Very good   Financial problems or insurance changes  No   Since the previous refill, were any specialty medication doses or scheduled injections missed or delayed?  No   Does this patient require a clinical escalation to a pharmacist? No                     Follow-up: 28 day(s)     Parvin Miramontes, Pharmacy Technician  Specialty Pharmacy Technician

## 2022-11-30 ENCOUNTER — SPECIALTY PHARMACY (OUTPATIENT)
Dept: PHARMACY | Facility: HOSPITAL | Age: 87
End: 2022-11-30

## 2022-11-30 RX ORDER — VALSARTAN 320 MG/1
320 TABLET ORAL DAILY
Status: ON HOLD | COMMUNITY

## 2022-11-30 RX ORDER — EVOLOCUMAB 140 MG/ML
140 INJECTION, SOLUTION SUBCUTANEOUS
Qty: 6 ML | Refills: 1 | Status: ON HOLD | OUTPATIENT
Start: 2022-11-30

## 2022-11-30 RX ORDER — TRAMADOL HYDROCHLORIDE 50 MG/1
50 TABLET ORAL EVERY 12 HOURS PRN
COMMUNITY
Start: 2022-09-08 | End: 2023-03-22

## 2022-11-30 RX ORDER — CLONIDINE HYDROCHLORIDE 0.1 MG/1
0.1 TABLET ORAL 3 TIMES DAILY PRN
Status: ON HOLD | COMMUNITY
Start: 2022-10-07

## 2022-11-30 NOTE — PROGRESS NOTES
Patient reported she has completed 2 rounds of antibiotics and during prescribing of last round, the macrobid, that Dr. Tobar had advised patient to hold her next Repatha dose because he thought she may not be responding to the antibiotics as well with the Repatha. Patient reports that she has completed macrobid therapy and denies any signs/symptoms of infections and reports feeling better. Patient reports that she does not have any additional antibiotic courses to take and will follow-up with Dr. Tobar in 2 weeks. Patient instructed that she could resume Repatha since antibiotic course was completed and she denies any continued signs/symptoms of infection. Continued follow-up not warranted at this time and missed dose was not due to an adherence issue rather from instruction of a provider.       Thank you,    Jane Hidalgo. Gallito, PharmD  11/30/22  10:48 EST

## 2022-11-30 NOTE — PROGRESS NOTES
Specialty Pharmacy Refill Coordination Note      Name:  Christine Garg  :  1933  Date:  2022         Past Medical History:   Diagnosis Date   • High cholesterol    • Hypertension    • Past heart attack    • Thyroid disease        Past Surgical History:   Procedure Laterality Date   • BREAST BIOPSY Left     benign   • CATARACT EXTRACTION     • CORONARY ANGIOPLASTY     • CORONARY ARTERY BYPASS GRAFT         Social History     Socioeconomic History   • Marital status:    Tobacco Use   • Smoking status: Former     Types: Cigarettes   • Smokeless tobacco: Never   Substance and Sexual Activity   • Alcohol use: No   • Drug use: No   • Sexual activity: Defer       Family History   Problem Relation Age of Onset   • Heart disease Mother    • Heart disease Father    • Heart disease Brother    • Breast cancer Neg Hx        Allergies   Allergen Reactions   • Motrin [Ibuprofen] Anaphylaxis and Hives   • Novocain [Procaine] Other (See Comments)     Pt state she passes out.       Current Outpatient Medications   Medication Sig Dispense Refill   • Alirocumab 150 MG/ML solution pen-injector Inject  under the skin into the appropriate area as directed.     • amLODIPine (NORVASC) 10 MG tablet Take 1 tablet by mouth Daily. (Patient taking differently: Take 2.5 mg by mouth Daily.) 30 tablet 11   • aspirin 325 MG tablet Take 325 mg by mouth Daily.     • benazepril (LOTENSIN) 10 MG tablet Take 10 mg by mouth 2 (Two) Times a Day.     • BREO ELLIPTA 100-25 MCG/INH inhaler INHALE ONE DOSE BY MOUTH DAILY 60 each 5   • Calcium-Vitamin D-Vitamin K (VIACTIV PO) Take  by mouth Daily As Needed.     • Cholecalciferol (VITAMIN D3) 2000 units capsule Take 1 capsule by mouth Daily As Needed. 3-4 times a week     • coenzyme Q10 100 MG capsule Take 100 mg by mouth Daily.     • Cyanocobalamin (VITAMIN B 12 PO) Take  by mouth Daily As Needed. 2-3 times a week     • Evolocumab (Repatha SureClick) solution auto-injector SureClick  injection Inject 1 mL under the skin into the appropriate area as directed Every 14 (Fourteen) Days. 2 mL 5   • furosemide (LASIX) 20 MG tablet As Needed.     • hydrochlorothiazide (MICROZIDE) 12.5 MG capsule Take 12.5 mg by mouth Daily.     • levothyroxine (SYNTHROID, LEVOTHROID) 112 MCG tablet Take 112 mcg by mouth Daily.     • meloxicam (MOBIC) 7.5 MG tablet Take 1 tablet by mouth Daily.     • metoprolol tartrate (LOPRESSOR) 25 MG tablet Take 25 mg by mouth 2 (Two) Times a Day.     • montelukast (SINGULAIR) 10 MG tablet Take 1 tablet by mouth Every Night. 30 tablet 11   • Omega-3 Fatty Acids (fish oil) 1000 MG capsule capsule Take  by mouth Daily As Needed. 2-3 times a week     • Red Yeast Rice 600 MG capsule Take  by mouth Daily As Needed.     • tiotropium bromide monohydrate (SPIRIVA RESPIMAT) 2.5 MCG/ACT aerosol solution inhaler Inhale 2 puffs Daily. 1 each 11     No current facility-administered medications for this visit.         ASSESSMENT/PLAN:      Christine is a 89 y.o. female contacted today regarding refills of  Repatha Sureclick 140mg.ml  specialty medication(s).    Reviewed and verified with patient:       Specialty medication(s) and dose(s) confirmed: yes    Refill Questions    Flowsheet Row Most Recent Value   Changes to allergies? No   Changes to medications? Yes  [Dr Tobar prescribed ciprofloxacin for 10 days for a kidney injection. Thhe cipro did not clear infection therefore Amadou prescribed macrobid. she finished the last dose of macrobid on 11/29.]   New conditions since last clinic visit No   Unplanned office visit, urgent care, ED, or hospital admission in the last 4 weeks  No   How does patient/caregiver feel medication is working? Very good   Financial problems or insurance changes  No   Since the previous refill, were any specialty medication doses or scheduled injections missed or delayed?  Yes   If yes, please provide the amount 1 injection on 11/22   Why were doses missed? Patient  missed one dose of repatha on 11/22. last injection was 11/8. Dr Tobar prescribed ciprofloxacin for 10 days for a kidney injection. Thhe cipro did not clear infection therefore Amadou prescribed macrobid. she finished the last dose of macrobid on 11/29. Amadou advised pt to not take repatha while on these meds. Patient would like a call back to instruct her when to start injection back.   Does this patient require a clinical escalation to a pharmacist? Yes  [Patient missed one dose of repatha on 11/22. last injection was 11/8.]                     Follow-up: 28 day(s)     Angy Davidson, Pharmacy Technician  Specialty Pharmacy Technician

## 2022-11-30 NOTE — PROGRESS NOTES
[facility]  Lipid Management Program - PCSK[facility]       Christine Garg is a 89 y.o. female referred to the [facility] by Dr. Tobar for clinical pharmacy and specialty pharmacy management of PCSK[facility].  Christine Garg is  treated for clinical ASCVD and hyperlipidemia and currently takes Praluent.  In the past, patient has tried zetia and statin therapy and was statin intolerant with myalagia. Denies any trouble giving herself the injection and denies side effects with Praluent. Praluent was changed to Repatha due to lower cost of medication. The patient denies any allergies to latex.      Patient has only had two doses so far and denies having any adverse effects with the Repatha.  She reports that she had received 2 courses of antibiotics. Per Dr. Tobar advised patient to hold her Repatha dose because he thought it may be contributing to her not responding to the antibiotics. Therefore she missed 1 dose of Repatha. Dr. Tobar wanted patient to hold another Repatha dose as well however since patient has completed her antibiotic course and denies any symptoms associated with an infection, patient was instructed to resume Repatha. Patient to follow up with Dr. Tobar in 2 weeks.     Relevant Past Medical History and Comorbidities  Past Medical History:   Diagnosis Date   • High cholesterol    • Hypertension    • Past heart attack    • Thyroid disease      Social History     Socioeconomic History   • Marital status:    Tobacco Use   • Smoking status: Former     Types: Cigarettes   • Smokeless tobacco: Never   Substance and Sexual Activity   • Alcohol use: No   • Drug use: No   • Sexual activity: Defer       Allergies  Motrin [ibuprofen] and Novocain [procaine]    Current Medication List    Current Outpatient Medications:   •  Alirocumab 150 MG/ML solution pen-injector, Inject  under the skin into the appropriate area as directed., Disp: , Rfl:   •  amLODIPine (NORVASC) 10 MG  AS reviewed, sent to scan tablet, Take 1 tablet by mouth Daily. (Patient taking differently: Take 2.5 mg by mouth Daily.), Disp: 30 tablet, Rfl: 11  •  aspirin 325 MG tablet, Take 325 mg by mouth Daily., Disp: , Rfl:   •  benazepril (LOTENSIN) 10 MG tablet, Take 10 mg by mouth 2 (Two) Times a Day., Disp: , Rfl:   •  BREO ELLIPTA 100-25 MCG/INH inhaler, INHALE ONE DOSE BY MOUTH DAILY, Disp: 60 each, Rfl: 5  •  Calcium-Vitamin D-Vitamin K (VIACTIV PO), Take  by mouth Daily As Needed., Disp: , Rfl:   •  Cholecalciferol (VITAMIN D3) 2000 units capsule, Take 1 capsule by mouth Daily As Needed. 3-4 times a week, Disp: , Rfl:   •  coenzyme Q10 100 MG capsule, Take 100 mg by mouth Daily., Disp: , Rfl:   •  Cyanocobalamin (VITAMIN B 12 PO), Take  by mouth Daily As Needed. 2-3 times a week, Disp: , Rfl:   •  Evolocumab (Repatha SureClick) solution auto-injector SureClick injection, Inject 1 mL under the skin into the appropriate area as directed Every 14 (Fourteen) Days., Disp: 2 mL, Rfl: 5  •  furosemide (LASIX) 20 MG tablet, As Needed., Disp: , Rfl:   •  hydrochlorothiazide (MICROZIDE) 12.5 MG capsule, Take 12.5 mg by mouth Daily., Disp: , Rfl:   •  levothyroxine (SYNTHROID, LEVOTHROID) 112 MCG tablet, Take 112 mcg by mouth Daily., Disp: , Rfl:   •  meloxicam (MOBIC) 7.5 MG tablet, Take 1 tablet by mouth Daily., Disp: , Rfl:   •  metoprolol tartrate (LOPRESSOR) 25 MG tablet, Take 25 mg by mouth 2 (Two) Times a Day., Disp: , Rfl:   •  montelukast (SINGULAIR) 10 MG tablet, Take 1 tablet by mouth Every Night., Disp: 30 tablet, Rfl: 11  •  Omega-3 Fatty Acids (fish oil) 1000 MG capsule capsule, Take  by mouth Daily As Needed. 2-3 times a week, Disp: , Rfl:   •  Red Yeast Rice 600 MG capsule, Take  by mouth Daily As Needed., Disp: , Rfl:   •  tiotropium bromide monohydrate (SPIRIVA RESPIMAT) 2.5 MCG/ACT aerosol solution inhaler, Inhale 2 puffs Daily., Disp: 1 each, Rfl: 11    Drug Interactions  None    Relevant Laboratory Values  Lab Results   Component  Value Date    CHOL 184 05/08/2018    CHLPL 187 10/13/2015    TRIG 101 05/08/2018    HDL 53 (L) 05/08/2018     (H) 05/08/2018       Medication Assessment & Plan    Since patient denies any adverse effects and trouble giving the injection. Benefits outweigh risks and patient to continue on Repatha 140 mg Q14 days. Will order per-protocol under Dr. Tobar.      The patient would like to receive specialty mail-out services for the next injection. Care Coordinator to set up future refill outreaches, coordinate prescription delivery, and escalate clinical questions to pharmacist.     Thank you,     Jane Anderson [facility]  11/30/22  11:17 EST

## 2022-12-19 ENCOUNTER — APPOINTMENT (OUTPATIENT)
Dept: GENERAL RADIOLOGY | Facility: HOSPITAL | Age: 87
DRG: 872 | End: 2022-12-19
Payer: MEDICARE

## 2022-12-19 ENCOUNTER — APPOINTMENT (OUTPATIENT)
Dept: CT IMAGING | Facility: HOSPITAL | Age: 87
DRG: 872 | End: 2022-12-19
Payer: MEDICARE

## 2022-12-19 ENCOUNTER — HOSPITAL ENCOUNTER (INPATIENT)
Facility: HOSPITAL | Age: 87
LOS: 6 days | Discharge: HOME | DRG: 872 | End: 2022-12-25
Attending: STUDENT IN AN ORGANIZED HEALTH CARE EDUCATION/TRAINING PROGRAM | Admitting: HOSPITALIST
Payer: MEDICARE

## 2022-12-19 DIAGNOSIS — K52.9 ACUTE COLITIS: Primary | ICD-10-CM

## 2022-12-19 LAB
ALBUMIN SERPL-MCNC: 3.88 G/DL (ref 3.5–5.2)
ALBUMIN/GLOB SERPL: 1.2 G/DL
ALP SERPL-CCNC: 115 U/L (ref 39–117)
ALT SERPL W P-5'-P-CCNC: 14 U/L (ref 1–33)
ANION GAP SERPL CALCULATED.3IONS-SCNC: 11.5 MMOL/L (ref 5–15)
ANION GAP SERPL CALCULATED.3IONS-SCNC: 15.2 MMOL/L (ref 5–15)
AST SERPL-CCNC: 13 U/L (ref 1–32)
BASOPHILS # BLD AUTO: 0.07 10*3/MM3 (ref 0–0.2)
BASOPHILS NFR BLD AUTO: 0.4 % (ref 0–1.5)
BILIRUB SERPL-MCNC: 0.6 MG/DL (ref 0–1.2)
BUN SERPL-MCNC: 21 MG/DL (ref 8–23)
BUN SERPL-MCNC: 21 MG/DL (ref 8–23)
BUN/CREAT SERPL: 21.6 (ref 7–25)
BUN/CREAT SERPL: 22.3 (ref 7–25)
CALCIUM SPEC-SCNC: 9.1 MG/DL (ref 8.6–10.5)
CALCIUM SPEC-SCNC: 9.8 MG/DL (ref 8.6–10.5)
CHLORIDE SERPL-SCNC: 90 MMOL/L (ref 98–107)
CHLORIDE SERPL-SCNC: 94 MMOL/L (ref 98–107)
CO2 SERPL-SCNC: 20.8 MMOL/L (ref 22–29)
CO2 SERPL-SCNC: 23.5 MMOL/L (ref 22–29)
CREAT SERPL-MCNC: 0.94 MG/DL (ref 0.57–1)
CREAT SERPL-MCNC: 0.97 MG/DL (ref 0.57–1)
CRP SERPL-MCNC: 11.01 MG/DL (ref 0–0.5)
D-LACTATE SERPL-SCNC: 1.5 MMOL/L (ref 0.5–2)
DEPRECATED RDW RBC AUTO: 43.1 FL (ref 37–54)
EGFRCR SERPLBLD CKD-EPI 2021: 56 ML/MIN/1.73
EGFRCR SERPLBLD CKD-EPI 2021: 58.1 ML/MIN/1.73
EOSINOPHIL # BLD AUTO: 0.01 10*3/MM3 (ref 0–0.4)
EOSINOPHIL NFR BLD AUTO: 0.1 % (ref 0.3–6.2)
ERYTHROCYTE [DISTWIDTH] IN BLOOD BY AUTOMATED COUNT: 13.2 % (ref 12.3–15.4)
FLUAV RNA RESP QL NAA+PROBE: NOT DETECTED
FLUBV RNA RESP QL NAA+PROBE: NOT DETECTED
GLOBULIN UR ELPH-MCNC: 3.1 GM/DL
GLUCOSE SERPL-MCNC: 134 MG/DL (ref 65–99)
GLUCOSE SERPL-MCNC: 152 MG/DL (ref 65–99)
HBA1C MFR BLD: 6 % (ref 4.8–5.6)
HCT VFR BLD AUTO: 43.5 % (ref 34–46.6)
HGB BLD-MCNC: 15.3 G/DL (ref 12–15.9)
HOLD SPECIMEN: NORMAL
HOLD SPECIMEN: NORMAL
IMM GRANULOCYTES # BLD AUTO: 0.09 10*3/MM3 (ref 0–0.05)
IMM GRANULOCYTES NFR BLD AUTO: 0.5 % (ref 0–0.5)
LYMPHOCYTES # BLD AUTO: 1.3 10*3/MM3 (ref 0.7–3.1)
LYMPHOCYTES NFR BLD AUTO: 6.7 % (ref 19.6–45.3)
MCH RBC QN AUTO: 31.5 PG (ref 26.6–33)
MCHC RBC AUTO-ENTMCNC: 35.2 G/DL (ref 31.5–35.7)
MCV RBC AUTO: 89.5 FL (ref 79–97)
MONOCYTES # BLD AUTO: 1.25 10*3/MM3 (ref 0.1–0.9)
MONOCYTES NFR BLD AUTO: 6.5 % (ref 5–12)
NEUTROPHILS NFR BLD AUTO: 16.61 10*3/MM3 (ref 1.7–7)
NEUTROPHILS NFR BLD AUTO: 85.8 % (ref 42.7–76)
NRBC BLD AUTO-RTO: 0 /100 WBC (ref 0–0.2)
PLATELET # BLD AUTO: 377 10*3/MM3 (ref 140–450)
PMV BLD AUTO: 8.8 FL (ref 6–12)
POTASSIUM SERPL-SCNC: 4 MMOL/L (ref 3.5–5.2)
POTASSIUM SERPL-SCNC: 4.2 MMOL/L (ref 3.5–5.2)
PROT SERPL-MCNC: 7 G/DL (ref 6–8.5)
RBC # BLD AUTO: 4.86 10*6/MM3 (ref 3.77–5.28)
SARS-COV-2 RNA RESP QL NAA+PROBE: NOT DETECTED
SODIUM SERPL-SCNC: 126 MMOL/L (ref 136–145)
SODIUM SERPL-SCNC: 129 MMOL/L (ref 136–145)
T4 FREE SERPL-MCNC: 2.07 NG/DL (ref 0.93–1.7)
TROPONIN T SERPL-MCNC: <0.01 NG/ML (ref 0–0.03)
TSH SERPL DL<=0.05 MIU/L-ACNC: 1.12 UIU/ML (ref 0.27–4.2)
WBC NRBC COR # BLD: 19.33 10*3/MM3 (ref 3.4–10.8)
WHOLE BLOOD HOLD COAG: NORMAL
WHOLE BLOOD HOLD SPECIMEN: NORMAL

## 2022-12-19 PROCEDURE — 84443 ASSAY THYROID STIM HORMONE: CPT | Performed by: PHYSICIAN ASSISTANT

## 2022-12-19 PROCEDURE — 25010000002 MORPHINE PER 10 MG: Performed by: STUDENT IN AN ORGANIZED HEALTH CARE EDUCATION/TRAINING PROGRAM

## 2022-12-19 PROCEDURE — 93010 ELECTROCARDIOGRAM REPORT: CPT | Performed by: INTERNAL MEDICINE

## 2022-12-19 PROCEDURE — 84484 ASSAY OF TROPONIN QUANT: CPT | Performed by: PHYSICIAN ASSISTANT

## 2022-12-19 PROCEDURE — 87040 BLOOD CULTURE FOR BACTERIA: CPT | Performed by: STUDENT IN AN ORGANIZED HEALTH CARE EDUCATION/TRAINING PROGRAM

## 2022-12-19 PROCEDURE — 99284 EMERGENCY DEPT VISIT MOD MDM: CPT

## 2022-12-19 PROCEDURE — 25010000002 PIPERACILLIN SOD-TAZOBACTAM PER 1 G: Performed by: STUDENT IN AN ORGANIZED HEALTH CARE EDUCATION/TRAINING PROGRAM

## 2022-12-19 PROCEDURE — 87636 SARSCOV2 & INF A&B AMP PRB: CPT | Performed by: STUDENT IN AN ORGANIZED HEALTH CARE EDUCATION/TRAINING PROGRAM

## 2022-12-19 PROCEDURE — 84439 ASSAY OF FREE THYROXINE: CPT | Performed by: PHYSICIAN ASSISTANT

## 2022-12-19 PROCEDURE — 25010000002 HEPARIN (PORCINE) PER 1000 UNITS: Performed by: INTERNAL MEDICINE

## 2022-12-19 PROCEDURE — 36415 COLL VENOUS BLD VENIPUNCTURE: CPT

## 2022-12-19 PROCEDURE — 74018 RADEX ABDOMEN 1 VIEW: CPT

## 2022-12-19 PROCEDURE — 25010000002 PIPERACILLIN SOD-TAZOBACTAM PER 1 G: Performed by: INTERNAL MEDICINE

## 2022-12-19 PROCEDURE — 86140 C-REACTIVE PROTEIN: CPT | Performed by: NURSE PRACTITIONER

## 2022-12-19 PROCEDURE — 93005 ELECTROCARDIOGRAM TRACING: CPT | Performed by: HOSPITALIST

## 2022-12-19 PROCEDURE — 83605 ASSAY OF LACTIC ACID: CPT | Performed by: STUDENT IN AN ORGANIZED HEALTH CARE EDUCATION/TRAINING PROGRAM

## 2022-12-19 PROCEDURE — 85025 COMPLETE CBC W/AUTO DIFF WBC: CPT | Performed by: NURSE PRACTITIONER

## 2022-12-19 PROCEDURE — 74176 CT ABD & PELVIS W/O CONTRAST: CPT

## 2022-12-19 PROCEDURE — 99223 1ST HOSP IP/OBS HIGH 75: CPT | Performed by: PHYSICIAN ASSISTANT

## 2022-12-19 PROCEDURE — 25010000002 ONDANSETRON PER 1 MG: Performed by: STUDENT IN AN ORGANIZED HEALTH CARE EDUCATION/TRAINING PROGRAM

## 2022-12-19 PROCEDURE — 80053 COMPREHEN METABOLIC PANEL: CPT | Performed by: NURSE PRACTITIONER

## 2022-12-19 PROCEDURE — 83930 ASSAY OF BLOOD OSMOLALITY: CPT | Performed by: PHYSICIAN ASSISTANT

## 2022-12-19 PROCEDURE — 72072 X-RAY EXAM THORAC SPINE 3VWS: CPT

## 2022-12-19 PROCEDURE — 72110 X-RAY EXAM L-2 SPINE 4/>VWS: CPT

## 2022-12-19 PROCEDURE — 83036 HEMOGLOBIN GLYCOSYLATED A1C: CPT | Performed by: PHYSICIAN ASSISTANT

## 2022-12-19 RX ORDER — IPRATROPIUM BROMIDE AND ALBUTEROL SULFATE 2.5; .5 MG/3ML; MG/3ML
3 SOLUTION RESPIRATORY (INHALATION) EVERY 4 HOURS PRN
Status: DISCONTINUED | OUTPATIENT
Start: 2022-12-19 | End: 2022-12-22

## 2022-12-19 RX ORDER — ACETAMINOPHEN 325 MG/1
650 TABLET ORAL EVERY 6 HOURS PRN
Status: DISCONTINUED | OUTPATIENT
Start: 2022-12-19 | End: 2022-12-25 | Stop reason: HOSPADM

## 2022-12-19 RX ORDER — HEPARIN SODIUM 5000 [USP'U]/ML
5000 INJECTION, SOLUTION INTRAVENOUS; SUBCUTANEOUS EVERY 8 HOURS SCHEDULED
Status: DISCONTINUED | OUTPATIENT
Start: 2022-12-19 | End: 2022-12-25 | Stop reason: HOSPADM

## 2022-12-19 RX ORDER — HYDROXYZINE HYDROCHLORIDE 25 MG/1
25 TABLET, FILM COATED ORAL 3 TIMES DAILY PRN
Status: DISCONTINUED | OUTPATIENT
Start: 2022-12-19 | End: 2022-12-25 | Stop reason: HOSPADM

## 2022-12-19 RX ORDER — SODIUM CHLORIDE 0.9 % (FLUSH) 0.9 %
10 SYRINGE (ML) INJECTION EVERY 12 HOURS SCHEDULED
Status: DISCONTINUED | OUTPATIENT
Start: 2022-12-19 | End: 2022-12-25 | Stop reason: HOSPADM

## 2022-12-19 RX ORDER — PROCHLORPERAZINE EDISYLATE 5 MG/ML
5 INJECTION INTRAMUSCULAR; INTRAVENOUS EVERY 6 HOURS PRN
Status: DISCONTINUED | OUTPATIENT
Start: 2022-12-19 | End: 2022-12-25 | Stop reason: HOSPADM

## 2022-12-19 RX ORDER — SODIUM CHLORIDE 9 MG/ML
40 INJECTION, SOLUTION INTRAVENOUS AS NEEDED
Status: DISCONTINUED | OUTPATIENT
Start: 2022-12-19 | End: 2022-12-25 | Stop reason: HOSPADM

## 2022-12-19 RX ORDER — CALCIUM CARBONATE 200(500)MG
2 TABLET,CHEWABLE ORAL 3 TIMES DAILY PRN
Status: DISCONTINUED | OUTPATIENT
Start: 2022-12-19 | End: 2022-12-25 | Stop reason: HOSPADM

## 2022-12-19 RX ORDER — MORPHINE SULFATE 2 MG/ML
2 INJECTION, SOLUTION INTRAMUSCULAR; INTRAVENOUS ONCE
Status: COMPLETED | OUTPATIENT
Start: 2022-12-19 | End: 2022-12-19

## 2022-12-19 RX ORDER — SODIUM CHLORIDE 0.9 % (FLUSH) 0.9 %
10 SYRINGE (ML) INJECTION AS NEEDED
Status: DISCONTINUED | OUTPATIENT
Start: 2022-12-19 | End: 2022-12-25 | Stop reason: HOSPADM

## 2022-12-19 RX ORDER — CHOLECALCIFEROL (VITAMIN D3) 125 MCG
10 CAPSULE ORAL NIGHTLY PRN
Status: DISCONTINUED | OUTPATIENT
Start: 2022-12-19 | End: 2022-12-25 | Stop reason: HOSPADM

## 2022-12-19 RX ORDER — LIDOCAINE 50 MG/G
2 PATCH TOPICAL
Status: DISCONTINUED | OUTPATIENT
Start: 2022-12-20 | End: 2022-12-25 | Stop reason: HOSPADM

## 2022-12-19 RX ORDER — SODIUM CHLORIDE 9 MG/ML
50 INJECTION, SOLUTION INTRAVENOUS CONTINUOUS
Status: DISCONTINUED | OUTPATIENT
Start: 2022-12-19 | End: 2022-12-21

## 2022-12-19 RX ORDER — PANTOPRAZOLE SODIUM 40 MG/10ML
40 INJECTION, POWDER, LYOPHILIZED, FOR SOLUTION INTRAVENOUS
Status: DISCONTINUED | OUTPATIENT
Start: 2022-12-20 | End: 2022-12-20

## 2022-12-19 RX ORDER — ONDANSETRON 2 MG/ML
4 INJECTION INTRAMUSCULAR; INTRAVENOUS ONCE
Status: COMPLETED | OUTPATIENT
Start: 2022-12-19 | End: 2022-12-19

## 2022-12-19 RX ADMIN — SODIUM CHLORIDE 75 ML/HR: 9 INJECTION, SOLUTION INTRAVENOUS at 22:08

## 2022-12-19 RX ADMIN — SODIUM CHLORIDE 1000 ML: 9 INJECTION, SOLUTION INTRAVENOUS at 18:14

## 2022-12-19 RX ADMIN — HEPARIN SODIUM 5000 UNITS: 5000 INJECTION INTRAVENOUS; SUBCUTANEOUS at 22:08

## 2022-12-19 RX ADMIN — ONDANSETRON 4 MG: 2 INJECTION INTRAMUSCULAR; INTRAVENOUS at 18:37

## 2022-12-19 RX ADMIN — PIPERACILLIN SODIUM AND TAZOBACTAM SODIUM 3.38 G: 3; .375 INJECTION, POWDER, LYOPHILIZED, FOR SOLUTION INTRAVENOUS at 22:09

## 2022-12-19 RX ADMIN — Medication 10 ML: at 22:09

## 2022-12-19 RX ADMIN — PIPERACILLIN SODIUM AND TAZOBACTAM SODIUM 3.38 G: 3; .375 INJECTION, POWDER, LYOPHILIZED, FOR SOLUTION INTRAVENOUS at 20:04

## 2022-12-19 RX ADMIN — MORPHINE SULFATE 2 MG: 2 INJECTION, SOLUTION INTRAMUSCULAR; INTRAVENOUS at 18:40

## 2022-12-19 NOTE — ED NOTES
MEDICAL SCREENING:    Reason for Visit: Mid and lower back pain. Difficulty ambulating. She reports she has also had issues with constipation.    Patient initially seen in triage.  The patient was advised further evaluation and diagnostic testing will be needed, some of the treatment and testing will be initiated in the lobby in order to begin the process.  The patient will be returned to the waiting area for the time being and possibly be re-assessed by a subsequent ED provider.  The patient will be brought back to the treatment area in as timely manner as possible.       Tessy De Anda, APRN  12/19/22 [id number]

## 2022-12-19 NOTE — ED NOTES
Patient presents to the ER with patient's family due to back pain that has been ongoing for the past 1-2 months but has worsened over the past week. Patient states that she has had constipation for 4 days, states yesterday she took a laxative and reports diarrhea yesterday.

## 2022-12-19 NOTE — ED PROVIDER NOTES
Subjective   History of Present Illness  89-year-old female with past medical history of hypertension and hypothyroidism presents to the ER due to concerns for increasing lower back pain.  Patient noted increasing constipation over the last several days.  Denied fever or chills.  Noted intermittent nausea without vomiting.  Denied chest pain or shortness of breath.  Denied diaphoresis.  Denied obvious aggravating or alleviating factors.  Vital stable.  Afebrile        Review of Systems   Gastrointestinal: Positive for abdominal pain.   Musculoskeletal: Positive for back pain.   All other systems reviewed and are negative.      Past Medical History:   Diagnosis Date   • High cholesterol    • Hypertension    • Past heart attack    • Thyroid disease        Allergies   Allergen Reactions   • Motrin [Ibuprofen] Anaphylaxis and Hives   • Novocain [Procaine] Other (See Comments)     Pt state she passes out.       Past Surgical History:   Procedure Laterality Date   • BREAST BIOPSY Left     benign   • CATARACT EXTRACTION     • CORONARY ANGIOPLASTY     • CORONARY ARTERY BYPASS GRAFT         Family History   Problem Relation Age of Onset   • Heart disease Mother    • Heart disease Father    • Heart disease Brother    • Breast cancer Neg Hx        Social History     Socioeconomic History   • Marital status:    Tobacco Use   • Smoking status: Former     Types: Cigarettes   • Smokeless tobacco: Never   Substance and Sexual Activity   • Alcohol use: No   • Drug use: No   • Sexual activity: Defer           Objective   Physical Exam  Constitutional:       General: She is not in acute distress.     Appearance: Normal appearance. She is not ill-appearing.   HENT:      Head: Normocephalic and atraumatic.      Right Ear: External ear normal.      Left Ear: External ear normal.      Nose: Nose normal.      Mouth/Throat:      Mouth: Mucous membranes are moist.   Eyes:      Extraocular Movements: Extraocular movements intact.       Pupils: Pupils are equal, round, and reactive to light.   Cardiovascular:      Rate and Rhythm: Normal rate and regular rhythm.      Heart sounds: No murmur heard.  Pulmonary:      Effort: Pulmonary effort is normal. No respiratory distress.      Breath sounds: Normal breath sounds. No wheezing.   Abdominal:      General: Bowel sounds are normal.      Palpations: Abdomen is soft.      Tenderness: There is abdominal tenderness in the left lower quadrant. There is rebound.      Comments: Significant tenderness palpation of left lower quadrant with radiating abdominal pain.   Musculoskeletal:         General: No deformity or signs of injury. Normal range of motion.      Cervical back: Normal range of motion and neck supple.   Skin:     General: Skin is warm and dry.      Findings: No erythema.   Neurological:      General: No focal deficit present.      Mental Status: She is alert and oriented to person, place, and time. Mental status is at baseline.      Cranial Nerves: No cranial nerve deficit.   Psychiatric:         Mood and Affect: Mood normal.         Behavior: Behavior normal.         Thought Content: Thought content normal.         Procedures           ED Course  ED Course as of 12/19/22 1852   Mon Dec 19, 2022   1827 CBC notes leukocytosis.  CMP notes hyponatremia.  CRP elevated.  Plain film imaging of the lumbar thoracic spine notes spondylosis but notes no acute changes.  CT of the abdomen pelvis noted concerns for diffuse colitis.  Patient received IV antibiotics and IV fluid.  Recommend admission for further work up and treatment.  Hospitalist team consulted and made aware of the patient.  Consults and orders placed per hospitalist request.  Patient was agreeable to admission plan.  Vitals stable on admission. [SF]      ED Course User Index  [SF] Leon Hanna DO                                           [facility]    Final diagnoses:   Acute colitis       ED Disposition  ED Disposition     ED Disposition    Decision to Admit    Condition   --    Comment   Level of Care: Telemetry [5]   Diagnosis: Colitis [[id number]]   Admitting Physician: ANA MARÍA FORD [[id number]]   Attending Physician: ANA MARÍA FORD [[id number]]   Certification: I Certify That Inpatient Hospital Services Are Medically Necessary For Greater Than 2 Midnights               No follow-up provider specified.       Medication List      Changed    amLODIPine 10 MG tablet  Commonly known as: NORVASC  Take 1 tablet by mouth Daily.  What changed: how much to take             Leon Hanna, DO  12/19/22 [id number]

## 2022-12-20 LAB
ANION GAP SERPL CALCULATED.3IONS-SCNC: 12.6 MMOL/L (ref 5–15)
BACTERIA UR QL AUTO: ABNORMAL /HPF
BILIRUB UR QL STRIP: NEGATIVE
BUN SERPL-MCNC: 16 MG/DL (ref 8–23)
BUN/CREAT SERPL: 20.3 (ref 7–25)
CALCIUM SPEC-SCNC: 9 MG/DL (ref 8.6–10.5)
CHLORIDE SERPL-SCNC: 100 MMOL/L (ref 98–107)
CLARITY UR: ABNORMAL
CO2 SERPL-SCNC: 20.4 MMOL/L (ref 22–29)
COLOR UR: YELLOW
CREAT SERPL-MCNC: 0.79 MG/DL (ref 0.57–1)
CRP SERPL-MCNC: 10.97 MG/DL (ref 0–0.5)
DEPRECATED RDW RBC AUTO: 45.6 FL (ref 37–54)
EGFRCR SERPLBLD CKD-EPI 2021: 71.6 ML/MIN/1.73
ERYTHROCYTE [DISTWIDTH] IN BLOOD BY AUTOMATED COUNT: 13.4 % (ref 12.3–15.4)
GLUCOSE SERPL-MCNC: 120 MG/DL (ref 65–99)
GLUCOSE UR STRIP-MCNC: NEGATIVE MG/DL
HCT VFR BLD AUTO: 38.5 % (ref 34–46.6)
HGB BLD-MCNC: 12.9 G/DL (ref 12–15.9)
HGB UR QL STRIP.AUTO: NEGATIVE
HYALINE CASTS UR QL AUTO: ABNORMAL /LPF
KETONES UR QL STRIP: NEGATIVE
LEUKOCYTE ESTERASE UR QL STRIP.AUTO: ABNORMAL
MAGNESIUM SERPL-MCNC: 1.8 MG/DL (ref 1.6–2.4)
MCH RBC QN AUTO: 31.4 PG (ref 26.6–33)
MCHC RBC AUTO-ENTMCNC: 33.5 G/DL (ref 31.5–35.7)
MCV RBC AUTO: 93.7 FL (ref 79–97)
NITRITE UR QL STRIP: NEGATIVE
NT-PROBNP SERPL-MCNC: 1006 PG/ML (ref 0–1800)
OSMOLALITY SERPL: 283 MOSM/KG (ref 280–301)
OSMOLALITY UR: 304 MOSM/KG
PH UR STRIP.AUTO: 5.5 [PH] (ref 5–8)
PLATELET # BLD AUTO: 313 10*3/MM3 (ref 140–450)
PMV BLD AUTO: 9 FL (ref 6–12)
POTASSIUM SERPL-SCNC: 3.8 MMOL/L (ref 3.5–5.2)
PROT UR QL STRIP: NEGATIVE
RBC # BLD AUTO: 4.11 10*6/MM3 (ref 3.77–5.28)
RBC # UR STRIP: ABNORMAL /HPF
REF LAB TEST METHOD: ABNORMAL
SODIUM SERPL-SCNC: 133 MMOL/L (ref 136–145)
SODIUM UR-SCNC: 38 MMOL/L
SP GR UR STRIP: 1.01 (ref 1–1.03)
SQUAMOUS #/AREA URNS HPF: ABNORMAL /HPF
TROPONIN T SERPL-MCNC: <0.01 NG/ML (ref 0–0.03)
TROPONIN T SERPL-MCNC: <0.01 NG/ML (ref 0–0.03)
UROBILINOGEN UR QL STRIP: ABNORMAL
WBC # UR STRIP: ABNORMAL /HPF
WBC NRBC COR # BLD: 15.98 10*3/MM3 (ref 3.4–10.8)

## 2022-12-20 PROCEDURE — 84484 ASSAY OF TROPONIN QUANT: CPT | Performed by: PHYSICIAN ASSISTANT

## 2022-12-20 PROCEDURE — 83935 ASSAY OF URINE OSMOLALITY: CPT | Performed by: PHYSICIAN ASSISTANT

## 2022-12-20 PROCEDURE — 83880 ASSAY OF NATRIURETIC PEPTIDE: CPT | Performed by: HOSPITALIST

## 2022-12-20 PROCEDURE — 3E03329 INTRODUCTION OF OTHER ANTI-INFECTIVE INTO PERIPHERAL VEIN, PERCUTANEOUS APPROACH: ICD-10-PCS | Performed by: HOSPITALIST

## 2022-12-20 PROCEDURE — 81001 URINALYSIS AUTO W/SCOPE: CPT | Performed by: INTERNAL MEDICINE

## 2022-12-20 PROCEDURE — 25010000002 MAGNESIUM SULFATE 2 GM/50ML SOLUTION: Performed by: HOSPITALIST

## 2022-12-20 PROCEDURE — 25010000002 HEPARIN (PORCINE) PER 1000 UNITS: Performed by: INTERNAL MEDICINE

## 2022-12-20 PROCEDURE — 80048 BASIC METABOLIC PNL TOTAL CA: CPT | Performed by: INTERNAL MEDICINE

## 2022-12-20 PROCEDURE — 25010000002 MORPHINE PER 10 MG: Performed by: HOSPITALIST

## 2022-12-20 PROCEDURE — 25010000002 ONDANSETRON PER 1 MG: Performed by: HOSPITALIST

## 2022-12-20 PROCEDURE — 86140 C-REACTIVE PROTEIN: CPT | Performed by: PHYSICIAN ASSISTANT

## 2022-12-20 PROCEDURE — 85027 COMPLETE CBC AUTOMATED: CPT | Performed by: INTERNAL MEDICINE

## 2022-12-20 PROCEDURE — 25010000002 PIPERACILLIN SOD-TAZOBACTAM PER 1 G: Performed by: INTERNAL MEDICINE

## 2022-12-20 PROCEDURE — 97162 PT EVAL MOD COMPLEX 30 MIN: CPT

## 2022-12-20 PROCEDURE — 97166 OT EVAL MOD COMPLEX 45 MIN: CPT

## 2022-12-20 PROCEDURE — 84300 ASSAY OF URINE SODIUM: CPT | Performed by: PHYSICIAN ASSISTANT

## 2022-12-20 PROCEDURE — 94799 UNLISTED PULMONARY SVC/PX: CPT

## 2022-12-20 PROCEDURE — 87086 URINE CULTURE/COLONY COUNT: CPT | Performed by: INTERNAL MEDICINE

## 2022-12-20 PROCEDURE — 99232 SBSQ HOSP IP/OBS MODERATE 35: CPT | Performed by: HOSPITALIST

## 2022-12-20 PROCEDURE — 83735 ASSAY OF MAGNESIUM: CPT | Performed by: PHYSICIAN ASSISTANT

## 2022-12-20 RX ORDER — FAMOTIDINE 10 MG/ML
20 INJECTION, SOLUTION INTRAVENOUS DAILY
Status: DISCONTINUED | OUTPATIENT
Start: 2022-12-20 | End: 2022-12-22

## 2022-12-20 RX ORDER — OMEGA-3S/DHA/EPA/FISH OIL/D3 300MG-1000
2000 CAPSULE ORAL DAILY
Status: DISCONTINUED | OUTPATIENT
Start: 2022-12-20 | End: 2022-12-25 | Stop reason: HOSPADM

## 2022-12-20 RX ORDER — HYDROCHLOROTHIAZIDE 12.5 MG/1
12.5 TABLET ORAL DAILY PRN
Status: CANCELLED | OUTPATIENT
Start: 2022-12-20

## 2022-12-20 RX ORDER — LANOLIN ALCOHOL/MO/W.PET/CERES
1000 CREAM (GRAM) TOPICAL DAILY
Status: CANCELLED | OUTPATIENT
Start: 2022-12-20

## 2022-12-20 RX ORDER — MAGNESIUM SULFATE HEPTAHYDRATE 40 MG/ML
2 INJECTION, SOLUTION INTRAVENOUS ONCE
Status: COMPLETED | OUTPATIENT
Start: 2022-12-20 | End: 2022-12-20

## 2022-12-20 RX ORDER — MORPHINE SULFATE 2 MG/ML
1 INJECTION, SOLUTION INTRAMUSCULAR; INTRAVENOUS EVERY 4 HOURS PRN
Status: DISCONTINUED | OUTPATIENT
Start: 2022-12-20 | End: 2022-12-24

## 2022-12-20 RX ORDER — ASPIRIN 325 MG
325 TABLET ORAL DAILY
Status: CANCELLED | OUTPATIENT
Start: 2022-12-20

## 2022-12-20 RX ORDER — AMLODIPINE BESYLATE 5 MG/1
2.5 TABLET ORAL DAILY
Status: DISCONTINUED | OUTPATIENT
Start: 2022-12-20 | End: 2022-12-22

## 2022-12-20 RX ORDER — ACETAMINOPHEN 160 MG
2000 TABLET,DISINTEGRATING ORAL DAILY
Status: CANCELLED | OUTPATIENT
Start: 2022-12-20

## 2022-12-20 RX ORDER — HYDROCODONE BITARTRATE AND ACETAMINOPHEN 5; 325 MG/1; MG/1
1 TABLET ORAL EVERY 6 HOURS PRN
Status: DISCONTINUED | OUTPATIENT
Start: 2022-12-20 | End: 2022-12-20

## 2022-12-20 RX ORDER — ONDANSETRON 2 MG/ML
4 INJECTION INTRAMUSCULAR; INTRAVENOUS EVERY 6 HOURS PRN
Status: DISCONTINUED | OUTPATIENT
Start: 2022-12-20 | End: 2022-12-25 | Stop reason: HOSPADM

## 2022-12-20 RX ORDER — MONTELUKAST SODIUM 10 MG/1
10 TABLET ORAL NIGHTLY
Status: CANCELLED | OUTPATIENT
Start: 2022-12-20

## 2022-12-20 RX ORDER — MELOXICAM 7.5 MG/1
7.5 TABLET ORAL 2 TIMES DAILY PRN
Status: CANCELLED | OUTPATIENT
Start: 2022-12-20

## 2022-12-20 RX ORDER — LANOLIN ALCOHOL/MO/W.PET/CERES
1000 CREAM (GRAM) TOPICAL DAILY
COMMUNITY
End: 2023-03-22

## 2022-12-20 RX ORDER — FUROSEMIDE 20 MG/1
20 TABLET ORAL DAILY PRN
Status: CANCELLED | OUTPATIENT
Start: 2022-12-20

## 2022-12-20 RX ORDER — MORPHINE SULFATE 2 MG/ML
1 INJECTION, SOLUTION INTRAMUSCULAR; INTRAVENOUS ONCE
Status: COMPLETED | OUTPATIENT
Start: 2022-12-20 | End: 2022-12-20

## 2022-12-20 RX ORDER — LEVOTHYROXINE SODIUM 0.07 MG/1
112 TABLET ORAL DAILY
Status: CANCELLED | OUTPATIENT
Start: 2022-12-20

## 2022-12-20 RX ORDER — VALSARTAN 160 MG/1
160 TABLET ORAL DAILY
Status: DISCONTINUED | OUTPATIENT
Start: 2022-12-21 | End: 2022-12-21

## 2022-12-20 RX ORDER — VALSARTAN 160 MG/1
320 TABLET ORAL DAILY
Status: CANCELLED | OUTPATIENT
Start: 2022-12-20

## 2022-12-20 RX ORDER — LANOLIN ALCOHOL/MO/W.PET/CERES
1000 CREAM (GRAM) TOPICAL DAILY
Status: DISCONTINUED | OUTPATIENT
Start: 2022-12-20 | End: 2022-12-25 | Stop reason: HOSPADM

## 2022-12-20 RX ORDER — TRAMADOL HYDROCHLORIDE 50 MG/1
50 TABLET ORAL EVERY 12 HOURS PRN
Status: DISCONTINUED | OUTPATIENT
Start: 2022-12-20 | End: 2022-12-25 | Stop reason: HOSPADM

## 2022-12-20 RX ORDER — AMLODIPINE BESYLATE 5 MG/1
2.5 TABLET ORAL DAILY
Status: CANCELLED | OUTPATIENT
Start: 2022-12-20

## 2022-12-20 RX ORDER — MONTELUKAST SODIUM 10 MG/1
10 TABLET ORAL NIGHTLY
Status: DISCONTINUED | OUTPATIENT
Start: 2022-12-20 | End: 2022-12-25 | Stop reason: HOSPADM

## 2022-12-20 RX ORDER — AMLODIPINE BESYLATE 2.5 MG/1
2.5 TABLET ORAL DAILY
COMMUNITY
End: 2022-12-25 | Stop reason: HOSPADM

## 2022-12-20 RX ORDER — CLONIDINE HYDROCHLORIDE 0.1 MG/1
0.1 TABLET ORAL 3 TIMES DAILY PRN
Status: CANCELLED | OUTPATIENT
Start: 2022-12-20

## 2022-12-20 RX ORDER — TRAMADOL HYDROCHLORIDE 50 MG/1
50 TABLET ORAL EVERY 12 HOURS PRN
Status: CANCELLED | OUTPATIENT
Start: 2022-12-20

## 2022-12-20 RX ORDER — HYDROCHLOROTHIAZIDE 12.5 MG/1
12.5 TABLET ORAL DAILY PRN
COMMUNITY
End: 2022-12-25 | Stop reason: HOSPADM

## 2022-12-20 RX ORDER — LEVOTHYROXINE SODIUM 0.07 MG/1
112 TABLET ORAL
Status: DISCONTINUED | OUTPATIENT
Start: 2022-12-21 | End: 2022-12-25 | Stop reason: HOSPADM

## 2022-12-20 RX ORDER — GARLIC EXTRACT 500 MG
1 CAPSULE ORAL DAILY
Status: DISCONTINUED | OUTPATIENT
Start: 2022-12-21 | End: 2022-12-25 | Stop reason: HOSPADM

## 2022-12-20 RX ADMIN — HEPARIN SODIUM 5000 UNITS: 5000 INJECTION INTRAVENOUS; SUBCUTANEOUS at 05:46

## 2022-12-20 RX ADMIN — ACETAMINOPHEN 650 MG: 325 TABLET, FILM COATED ORAL at 05:42

## 2022-12-20 RX ADMIN — HEPARIN SODIUM 5000 UNITS: 5000 INJECTION INTRAVENOUS; SUBCUTANEOUS at 14:03

## 2022-12-20 RX ADMIN — HYDROXYZINE HYDROCHLORIDE 25 MG: 25 TABLET ORAL at 21:45

## 2022-12-20 RX ADMIN — MORPHINE SULFATE 1 MG: 2 INJECTION, SOLUTION INTRAMUSCULAR; INTRAVENOUS at 21:45

## 2022-12-20 RX ADMIN — ONDANSETRON 4 MG: 2 INJECTION INTRAMUSCULAR; INTRAVENOUS at 13:22

## 2022-12-20 RX ADMIN — AMLODIPINE BESYLATE 2.5 MG: 5 TABLET ORAL at 21:46

## 2022-12-20 RX ADMIN — HEPARIN SODIUM 5000 UNITS: 5000 INJECTION INTRAVENOUS; SUBCUTANEOUS at 21:46

## 2022-12-20 RX ADMIN — MORPHINE SULFATE 1 MG: 2 INJECTION, SOLUTION INTRAMUSCULAR; INTRAVENOUS at 07:49

## 2022-12-20 RX ADMIN — MAGNESIUM SULFATE HEPTAHYDRATE 2 G: 40 INJECTION, SOLUTION INTRAVENOUS at 13:22

## 2022-12-20 RX ADMIN — PIPERACILLIN SODIUM AND TAZOBACTAM SODIUM 3.38 G: 3; .375 INJECTION, POWDER, LYOPHILIZED, FOR SOLUTION INTRAVENOUS at 05:43

## 2022-12-20 RX ADMIN — METOPROLOL TARTRATE 25 MG: 25 TABLET, FILM COATED ORAL at 21:46

## 2022-12-20 RX ADMIN — PIPERACILLIN SODIUM AND TAZOBACTAM SODIUM 3.38 G: 3; .375 INJECTION, POWDER, LYOPHILIZED, FOR SOLUTION INTRAVENOUS at 21:45

## 2022-12-20 RX ADMIN — Medication 10 ML: at 08:43

## 2022-12-20 RX ADMIN — PIPERACILLIN SODIUM AND TAZOBACTAM SODIUM 3.38 G: 3; .375 INJECTION, POWDER, LYOPHILIZED, FOR SOLUTION INTRAVENOUS at 11:55

## 2022-12-20 RX ADMIN — Medication 1000 MCG: at 21:46

## 2022-12-20 RX ADMIN — LIDOCAINE 2 PATCH: 50 PATCH CUTANEOUS at 07:49

## 2022-12-20 RX ADMIN — MONTELUKAST SODIUM 10 MG: 10 TABLET, COATED ORAL at 21:46

## 2022-12-20 RX ADMIN — PANTOPRAZOLE SODIUM 40 MG: 40 INJECTION, POWDER, FOR SOLUTION INTRAVENOUS at 06:33

## 2022-12-20 RX ADMIN — LIDOCAINE 2 PATCH: 50 PATCH CUTANEOUS at 08:42

## 2022-12-20 RX ADMIN — Medication 10 ML: at 21:45

## 2022-12-20 RX ADMIN — MORPHINE SULFATE 1 MG: 2 INJECTION, SOLUTION INTRAMUSCULAR; INTRAVENOUS at 14:50

## 2022-12-20 RX ADMIN — FAMOTIDINE 20 MG: 10 INJECTION, SOLUTION INTRAVENOUS at 21:45

## 2022-12-20 NOTE — THERAPY EVALUATION
Patient Name: Christine Garg  : 1933    MRN: [id number]                              Today's Date: 2022       Admit Date: 2022    Visit Dx:     ICD-10-CM ICD-9-CM   1. Acute colitis  K52.9 558.9     Patient Active Problem List   Diagnosis   • Precordial pain   • Essential hypertension   • Dyslipidemia   • Leg edema   • ASCVD (arteriosclerotic cardiovascular disease)   • Chest pain   • SOB (shortness of breath)   • Palpitations   • Coronary artery disease    • Abnormal PFTs (pulmonary function tests)   • Shortness of breath   • Chronic obstructive pulmonary disease (HCC)   • Hypokalemia   • Mild persistent asthma with allergic rhinitis   • Pulmonary nodule   • Former smoker   • Colitis     Past Medical History:   Diagnosis Date   • Advanced age    • CAD (coronary artery disease)     s/p 3v CABG   • Chronic kidney disease (CKD), stage III (moderate) (HCC)    • COPD (chronic obstructive pulmonary disease) ([facility])    • History of tobacco use    • Hyperlipidemia    • Hypertension    • Hypothyroidism    • Pulmonary nodule      Past Surgical History:   Procedure Laterality Date   • BREAST BIOPSY Left     benign   • CATARACT EXTRACTION     • CORONARY ANGIOPLASTY     • CORONARY ARTERY BYPASS GRAFT        General Information     Row Name 22 1016          OT Time and Intention    Document Type evaluation  -     Mode of Treatment individual therapy;occupational therapy  -     Row Name 22 1016          General Information    Patient Profile Reviewed yes  -     Prior Level of Function independent:;all household mobility;community mobility;ADL's;cooking;cleaning;driving;shopping  -     Existing Precautions/Restrictions no known precautions/restrictions  -     Barriers to Rehab none identified  -     Row Name 22 1016          Occupational Profile    Reason for Services/Referral (Occupational Profile) Patient was admitted to [facility] on 2022. She was referred for  OT evaluation due to change in functional performance with ADLs, functional mobility, and/or transfers.  -[facility] Name 12/20/22 1016          Living Environment    People in Home alone  -[facility] Name 12/20/22 1016          Safety Issues, Functional Mobility    Impairments Affecting Function (Mobility) balance;endurance/activity tolerance;pain  -           User Key  (r) = Recorded By, (t) = Taken By, (c) = Cosigned By    Initials Name Provider Type     Reta Kapoor OT Occupational Therapist                 Mobility/ADL's     Row Name 12/20/22 1017          Bed Mobility    Bed Mobility bed mobility (all) activities  -     All Activities, [place] (Bed Mobility) standby assist  -     Assistive Device (Bed Mobility) bed rails  -KP     Row Name 12/20/22 1017          Transfers    Transfers sit-stand transfer;stand-sit transfer  -KP     Row Name 12/20/22 1017          Sit-Stand Transfer    Sit-Stand [place] (Transfers) contact guard;minimum assist (75% patient effort)  -KP     Row Name 12/20/22 1017          Stand-Sit Transfer    Stand-Sit [place] (Transfers) contact guard;minimum assist (75% patient effort)  -KP     Row Name 12/20/22 1017          Functional Mobility    Functional Mobility- Ind. Level minimum assist (75% patient effort);contact guard assist  -     Functional Mobility- Comment 20 feet in room to door and back to bed with slight loss of balance but able to recover; utilized furniture in room for intermittent support  -[facility] Name 12/20/22 1017          Activities of Daily Living    BADL Assessment/Intervention bathing;upper body dressing;lower body dressing;grooming;toileting;feeding  -KP     Row Name 12/20/22 1017          Bathing Assessment/Intervention    [place] Level (Bathing) bathing skills;minimum assist (75% patient effort)  -KP     Row Name 12/20/22 1017          Upper Body Dressing Assessment/Training    [place] Level (Upper Body Dressing) upper body  dressing skills;standby assist  -KP     Row Name 12/20/22 1017          Lower Body Dressing Assessment/Training    [place] Level (Lower Body Dressing) lower body dressing skills;standby assist  -KP     Row Name 12/20/22 1017          Grooming Assessment/Training    [place] Level (Grooming) grooming skills;standby assist  -KP     [facility] Name 12/20/22 1017          Self-Feeding Assessment/Training    [place] Level (Feeding) feeding skills;set up  -     Row Name 12/20/22 1017          Toileting Assessment/Training    [place] Level (Toileting) toileting skills;standby assist  -           User Key  (r) = Recorded By, (t) = Taken By, (c) = Cosigned By    Initials Name Provider Type    Reta David OT Occupational Therapist               Obj/Interventions     Row Name 12/20/22 1020          Sensory Assessment (Somatosensory)    Sensory Assessment (Somatosensory) UE sensation intact  -[facility] Name 12/20/22 1020          Vision Assessment/Intervention    Visual Impairment/Limitations WFL;corrective lenses for reading  -     Row Name 12/20/22 1020          Range of Motion Comprehensive    General Range of Motion bilateral upper extremity ROM WFL  -[facility] Name 12/20/22 1020          Strength Comprehensive (MMT)    Comment, General Manual Muscle Testing (MMT) Assessment 4+/5 MMT in BUEs  -[facility] Name 12/20/22 1020          Motor Skills    Motor Skills coordination;functional endurance  -     Coordination WFL;bilateral;upper extremity  -KP     Functional Endurance fair  -[facility] Name 12/20/22 1020          Balance    Balance Assessment sitting static balance;standing static balance  -KP     Static Sitting Balance independent  -     Static Standing Balance contact guard;minimal assist  -           User Key  (r) = Recorded By, (t) = Taken By, (c) = Cosigned By    Initials Name Provider Type    Reta David OT Occupational Therapist               Goals/Plan     Row Name  12/20/22 1023          Transfer Goal 1 (OT)    Activity/Assistive Device (Transfer Goal 1, OT) transfers, all  -     [place] Level/Cues Needed (Transfer Goal 1, OT) independent  -     Time Frame (Transfer Goal 1, OT) by discharge  -     Row Name 12/20/22 1023          Bathing Goal 1 (OT)    Activity/Device (Bathing Goal 1, OT) bathing skills, all  -     [place] Level/Cues Needed (Bathing Goal 1, OT) independent  -     Time Frame (Bathing Goal 1, OT) by discharge  -     Row Name 12/20/22 1023          Problem Specific Goal 1 (OT)    Problem Specific Goal 1 (OT) Patient will perform sustained activity X15 minutes to promote functional endurance/activity tolerance needed for daily routine.  -     Time Frame (Problem Specific Goal 1, OT) by discharge  -     Row Name 12/20/22 1023          Therapy Assessment/Plan (OT)    Planned Therapy Interventions (OT) activity tolerance training;BADL retraining;functional balance retraining;strengthening exercise;transfer/mobility retraining;patient/caregiver education/training;ROM/therapeutic exercise  -           User Key  (r) = Recorded By, (t) = Taken By, (c) = Cosigned By    Initials Name Provider Type     Reta Kapoor, OT Occupational Therapist               Clinical Impression     Row Name 12/20/22 1021          Pain Assessment    Pretreatment Pain Rating 1/10  -     Posttreatment Pain Rating 1/10  -     Pain Location - back  -     Row Name 12/20/22 1021          Plan of Care Review    Plan of Care Reviewed With patient  -     Progress no change  -     Outcome Evaluation Patient seen for OT evaluation. She currently requires minimal to standby assist for simple ADL routine and contact guard/minimal assist for functional moblity. Patient was independent at prior level and would benefit from skilled OT services during acute hospitalization to address functional deficits of limited endurance/activity tolerance, impaired balance, and pain  for promotion of highest independence and safety with daily routine. Recommend return home with assist as needed at discharge.  -     Row Name 12/20/22 1021          Therapy Assessment/Plan (OT)    Patient/Family Therapy Goal Statement (OT) return home  -     Rehab Potential (OT) good, to achieve stated therapy goals  -     Criteria for Skilled Therapeutic Interventions Met (OT) yes;meets criteria;skilled treatment is necessary  -     Therapy Frequency (OT) 3 times/wk  3-5x/week as able and available to promote functional progress  -     Predicted Duration of Therapy Intervention (OT) discharge  -     Row Name 12/20/22 1021          Therapy Plan Review/Discharge Plan (OT)    Anticipated Discharge Disposition (OT) home with assist  -     Row Name 12/20/22 1021          Positioning and Restraints    Pre-Treatment Position in bed  -     Post Treatment Position bed  -     In Bed call light within reach  -           User Key  (r) = Recorded By, (t) = Taken By, (c) = Cosigned By    Initials Name Provider Type     Reta Kapoor OT Occupational Therapist               Outcome Measures    No documentation.                 Occupational Therapy Education     Title: PT OT SLP Therapies (In Progress)     Topic: Occupational Therapy (In Progress)     Point: ADL training (Done)     Description:   Instruct learner(s) on proper safety adaptation and remediation techniques during self care or transfers.   Instruct in proper use of assistive devices.              Learning Progress Summary           Patient Acceptance, E, VU by  at 12/20/2022 1025    Comment: OT role, plan of care, goals                   Point: Home exercise program (Not Started)     Description:   Instruct learner(s) on appropriate technique for monitoring, assisting and/or progressing therapeutic exercises/activities.              Learner Progress:  Not documented in this visit.          Point: Precautions (Not Started)     Description:    Instruct learner(s) on prescribed precautions during self-care and functional transfers.              Learner Progress:  Not documented in this visit.          Point: Body mechanics (Not Started)     Description:   Instruct learner(s) on proper positioning and spine alignment during self-care, functional mobility activities and/or exercises.              Learner Progress:  Not documented in this visit.                      User Key     Initials Effective Dates Name Provider Type [facility] 06/16/21 -  Reta Kapoor OT Occupational Therapist OT              OT Recommendation and Plan  Planned Therapy Interventions (OT): activity tolerance training, BADL retraining, functional balance retraining, strengthening exercise, transfer/mobility retraining, patient/caregiver education/training, ROM/therapeutic exercise  Therapy Frequency (OT): 3 times/wk (3-5x/week as able and available to promote functional progress)  Plan of Care Review  Plan of Care Reviewed With: patient  Progress: no change  Outcome Evaluation: Patient seen for OT evaluation. She currently requires minimal to standby assist for simple ADL routine and contact guard/minimal assist for functional moblity. Patient was independent at prior level and would benefit from skilled OT services during acute hospitalization to address functional deficits of limited endurance/activity tolerance, impaired balance, and pain for promotion of highest independence and safety with daily routine. Recommend return home with assist as needed at discharge.     Time Calculation:    Time Calculation- OT     Row Name 12/20/22 1025             Time Calculation- OT    OT Start Time 0930  [facility]      OT Received On 12/20/22  -            User Key  (r) = Recorded By, (t) = Taken By, (c) = Cosigned By    Initials Name Provider Type     Reta Kapoor OT Occupational Therapist              Therapy Charges for Today     Code Description Service Date Service Provider Modifiers  Qty    [id number]  OT EVAL MOD COMPLEXITY 4 12/20/2022 Reta Kapoor, OT GO 1               Reta Kapoor OT  12/20/2022

## 2022-12-20 NOTE — PLAN OF CARE
Goal Outcome Evaluation:  Plan of Care Reviewed With: patient        Progress: no change  Outcome Evaluation: Patient seen for OT evaluation. She currently requires minimal to standby assist for simple ADL routine and contact guard/minimal assist for functional moblity. Patient was independent at prior level and would benefit from skilled OT services during acute hospitalization to address functional deficits of limited endurance/activity tolerance, impaired balance, and pain for promotion of highest independence and safety with daily routine. Recommend return home with assist as needed at discharge.

## 2022-12-20 NOTE — PLAN OF CARE
Goal Outcome Evaluation:   Pt resting in bed. No signs or symptoms of distress noted. No complaints at this time. Will continue with plan of care.

## 2022-12-20 NOTE — PLAN OF CARE
Goal Outcome Evaluation:   Care plan started.  Patient resting comfortably at this time. Tolerating RA well. HOB elevated. IVF started per order and IV ATB continue per order.  Bed alarm on and call bell in reach.  Will continue to follow the POC.

## 2022-12-20 NOTE — H&P
[facility]  HISTORY & PHYSICAL    Patient Identification:  Name:  Christine Garg  Age:  89 y.o.  Sex:  female  :  1933  MRN:  [id number]   Visit Number:  [id number]  Admit Date: 2022   Primary Care Physician:  Dave Tobar MD     Subjective     Chief complaint:   Chief Complaint   Patient presents with   • Back Pain     History of presenting illness:   Patient is a 89 y.o. female with past medical history significant for essential hypertension, hyperlipidemia, hypothyroidism, CKD stage IIIa, CAD s/p 3V CABG,  COPD, known pulmonary nodule, hx of smoking tobacco use, that presented to the  emergency department for evaluation of back and abdominal pain.    The patient states she has been experiencing lumbar back pain over the past 2 months that has progressively worsened over the past week.  She states now the pain is radiating to her shoulder blades bilaterally.  She denies any injury or recent falls.  Her sons present at bedside state due to the back pain the patient has been unable to drive which is unusual for her as she is typically very independent.  She states over the past week she has been constipated so she drank some prune juice and took two over-the-counter pills for constipation.  She states since yesterday she has had around 25 episodes of watery diarrhea.  She states after taking the 2 over-the-counter pills she's had significant abdominal cramping.  She denies any fever, chills, diaphoresis, emesis, or dysuria.  She does admit to bright red blood in her stools and significant nausea.  She reports over the past week she has been unable to eat or drink very much and recently noticed malodorous urine. She believes she had a colonoscopy around 10 years ago that was unremarkable other than showing diverticulosis.  She has not been on any antibiotics recently but per one of her sons at bedside, she was recently diagnosed with  pleurisy by her PCP and received 2 \"injections\" in their office. She admits to a history of coronary artery disease and states she has had a CABG in the past at [facility], possibly over 10 years ago.  She denies any chest pain or palpitations.    Upon arrival to the ED, vitals were temperature 96.9 °F, pulse 120, respirations 18, /56, SPO2 97% on room air     In the emergency department the patient received IV morphine 2 mg, IV Zofran 4 mg, NS 1 L bolus    Patient has been admitted to the telemetry floor for further evaluation and treatment.  Patient seen and evaluated in the emergency department in [place] [address]    ---------------------------------------------------------------------------------------------------------------------   Review of Systems   Constitutional: Positive for appetite change (Decreased) and fatigue. Negative for chills, diaphoresis and fever.   HENT: Negative for congestion and sore throat.    Respiratory: Negative for cough, shortness of breath and wheezing.    Cardiovascular: Negative for chest pain, palpitations and leg swelling.   Gastrointestinal: Positive for abdominal pain, blood in stool (Bright red blood), diarrhea (25 episodes since yesterday) and nausea. Negative for constipation and vomiting.   Genitourinary: Negative for dysuria and frequency.        Admits to malodorous urine     Musculoskeletal: Positive for back pain (Lumbar back pain that radiates to her shoulder blades bilaterally) and gait problem (Difficulty with ambulation secondary to pain).   Skin: Negative for wound.   Neurological: Negative for dizziness, syncope and light-headedness.   Psychiatric/Behavioral: Negative for confusion.      ---------------------------------------------------------------------------------------------------------------------   Past Medical History:   Diagnosis Date   • High cholesterol    • Hypertension    • Past heart attack    • Thyroid disease      Past Surgical  History:   Procedure Laterality Date   • BREAST BIOPSY Left     benign   • CATARACT EXTRACTION     • CORONARY ANGIOPLASTY     • CORONARY ARTERY BYPASS GRAFT       Family History   Problem Relation Age of Onset   • Heart disease Mother    • Heart disease Father    • Heart disease Brother    • Breast cancer Neg Hx      Social History     Socioeconomic History   • Marital status:    Tobacco Use   • Smoking status: Former     Types: Cigarettes   • Smokeless tobacco: Never   Substance and Sexual Activity   • Alcohol use: No   • Drug use: No   • Sexual activity: Defer     ---------------------------------------------------------------------------------------------------------------------   Allergies:  Motrin [ibuprofen] and Novocain [procaine]  ---------------------------------------------------------------------------------------------------------------------   Medications below are reported home medications pulling from within the system; at this time, these medications have not been reconciled unless otherwise specified and are in the verification process for further verifcation as current home medications.    Prior to Admission Medications     Prescriptions Last Dose Informant Patient Reported? Taking?    amLODIPine (NORVASC) 10 MG tablet   No No    Take 1 tablet by mouth Daily.    Patient taking differently:  Take 2.5 mg by mouth Daily.    aspirin 325 MG tablet  Self Yes No    Take 325 mg by mouth Daily.    BREO ELLIPTA 100-25 MCG/INH inhaler   No No    INHALE ONE DOSE BY MOUTH DAILY    Cholecalciferol (VITAMIN D3) 2000 units capsule  Self Yes No    Take 2,000 Units by mouth Daily As Needed. 2-3 times a week    cloNIDine (CATAPRES) 0.1 MG tablet   Yes No    Take 0.1 mg by mouth Every 8 (Eight) Hours. Only to take if SBP > 150    coenzyme Q10 100 MG capsule   Yes No    Take 100 mg by mouth Daily As Needed. 2-3 times a week    Cyanocobalamin (VITAMIN B 12 PO)   Yes No    Take  by mouth Daily As Needed. 2-3  times a week    Evolocumab (Repatha SureClick) solution auto-injector SureClick injection   No No    Inject 1 mL under the skin into the appropriate area as directed Every 14 (Fourteen) Days.    furosemide (LASIX) 20 MG tablet   Yes No    Take 20 mg by mouth Daily As Needed.    hydrochlorothiazide (MICROZIDE) 12.5 MG capsule  Self Yes No    Take 12.5 mg by mouth Daily.    levothyroxine (SYNTHROID, LEVOTHROID) 112 MCG tablet  Self Yes No    Take 112 mcg by mouth Daily.    meloxicam (MOBIC) 7.5 MG tablet  Self Yes No    Take 1 tablet by mouth Daily.    metoprolol tartrate (LOPRESSOR) 25 MG tablet   Yes No    Take 25 mg by mouth 2 (Two) Times a Day.    montelukast (SINGULAIR) 10 MG tablet   No No    Take 1 tablet by mouth Every Night.    Omega-3 Fatty Acids (fish oil) 1000 MG capsule capsule   Yes No    Take  by mouth Daily As Needed. 2-3 times a week    Red Yeast Rice 600 MG capsule   Yes No    Take  by mouth Daily As Needed. 2 to 3 times a week    tiotropium bromide monohydrate (SPIRIVA RESPIMAT) 2.5 MCG/ACT aerosol solution inhaler   No No    Inhale 2 puffs Daily.    traMADol (ULTRAM) 50 MG tablet   Yes No    Take 50 mg by mouth Every 12 (Twelve) Hours.    valsartan (DIOVAN) 320 MG tablet   Yes No    Take 320 mg by mouth Daily.        ---------------------------------------------------------------------------------------------------------------------    Objective     Hospital Scheduled Meds:  piperacillin-tazobactam, 3.375 g, Intravenous, Once           Current listed hospital scheduled medications may not yet reflect those currently placed in orders that are signed and held, awaiting patient's arrival to floor/unit.    ---------------------------------------------------------------------------------------------------------------------   Vital Signs:  Temp:  [96.9 °F (36.1 °C)] 96.9 °F (36.1 °C)  Heart Rate:  [] 98  Resp:  [18] 18  BP: (121-131)/(56-72) 131/72  No data found.  SpO2 Percentage    12/19/22 1540  12/19/22 1834   SpO2: 97% 98%     SpO2:  [97 %-98 %] 98 %  on   ;   Device (Oxygen Therapy): room air    Body mass index is 24.96 kg/m².  Wt Readings from Last 3 Encounters:   12/19/22 68 kg (150 lb)   07/21/22 74.8 kg (165 lb)   06/27/22 78.9 kg (174 lb)     ---------------------------------------------------------------------------------------------------------------------   Physical Exam:  Physical Exam  Constitutional:       General: She is awake.      Appearance: Normal appearance. She is well-developed and normal weight.      Comments: 2 sons present at bedside during exam   HENT:      Head: Normocephalic and atraumatic.   Eyes:      General: Lids are normal.   Cardiovascular:      Rate and Rhythm: Normal rate and regular rhythm.      Pulses: Normal pulses.           Dorsalis pedis pulses are 2+ on the right side and 2+ on the left side.        Posterior tibial pulses are 2+ on the right side and 2+ on the left side.      Heart sounds: Normal heart sounds. No murmur heard.    No friction rub. No gallop.   Pulmonary:      Effort: Pulmonary effort is normal.      Breath sounds: Normal breath sounds.   Abdominal:      General: Bowel sounds are normal. There is no distension.      Palpations: Abdomen is soft.      Tenderness: There is generalized abdominal tenderness. There is no guarding.   Musculoskeletal:      Cervical back: Normal.      Thoracic back: Normal.      Lumbar back: Normal.      Right lower leg: No edema.      Left lower leg: No edema.   Skin:     Findings: No ecchymosis or erythema.   Neurological:      General: No focal deficit present.      Mental Status: She is alert and oriented to person, place, and time. Mental status is at baseline.   Psychiatric:         Speech: Speech normal.         Behavior: Behavior is cooperative.         Cognition and Memory: Cognition normal.        ---------------------------------------------------------------------------------------------------------------------  EKG:   Baseline EKG ordered and pending    Telemetry:    Patient is not currently on telemetry  ---------------------------------------------------------------------------------------------------------------------             Results from last 7 days   Lab Units 12/19/22  1638   CRP mg/dL 11.01*   WBC 10*3/mm3 19.33*   HEMOGLOBIN g/dL 15.3   HEMATOCRIT % 43.5   MCV fL 89.5   MCHC g/dL 35.2   PLATELETS 10*3/mm3 377     Results from last 7 days   Lab Units 12/19/22  1638   SODIUM mmol/L 126*   POTASSIUM mmol/L 4.0   CHLORIDE mmol/L 90*   CO2 mmol/L 20.8*   BUN mg/dL 21   CREATININE mg/dL 0.97   CALCIUM mg/dL 9.8   GLUCOSE mg/dL 134*   ALBUMIN g/dL 3.88   BILIRUBIN mg/dL 0.6   ALK PHOS U/L 115   AST (SGOT) U/L 13   ALT (SGPT) U/L 14   Estimated Creatinine Clearance: 42.2 mL/min (by C-G formula based on SCr of 0.97 mg/dL).  No results found for: AMMONIA    No results found for: HGBA1C, POCGLU  Lab Results   Component Value Date    HGBA1C 5.80 (H) 05/08/2018     Lab Results   Component Value Date    TSH 0.245 (L) 10/13/2015     Pain Management Panel    There is no flowsheet data to display.       I have personally reviewed the above laboratory results.   ---------------------------------------------------------------------------------------------------------------------  Imaging Results (Last 7 Days)     Procedure Component Value Units Date/Time    CT Abdomen Pelvis Without Contrast [[id number]] Collected: 12/19/22 1814     Updated: 12/19/22 1816    Narrative:      CT Abdomen Pelvis WO    INDICATION:   Abdominal pain, acute, nonlocalized    TECHNIQUE:   CT of the abdomen and pelvis without IV contrast. Coronal and sagittal reconstructions were obtained.  Radiation dose reduction techniques included automated exposure control or exposure modulation based on body size. Count of known CT and cardiac  nuc  med studies performed in previous 12 months: 0.     COMPARISON:   None available.    FINDINGS:  Evaluation of abdominal viscera is limited due to lack of intravenous contrast.    Lower chest: Peripheral reticulations in the right bilateral lung bases.  Liver: Unremarkable.  Gallbladder and bile ducts: Unremarkable.  Spleen: Unremarkable.  Pancreas: Unremarkable.  Adrenals: Unremarkable.  Kidneys and ureters: Unremarkable.  Bowel: Incidental duodenal diverticulum. Diffuse colonic wall thickening extending from the sigmoid colon to the transverse colon. Sigmoid diverticulosis.  Bladder: Underdistended.  Reproductive organs: Unremarkable.  Lymph nodes: Unremarkable.  Peritoneum: Unremarkable.  Vessels: Extensive atherosclerosis.  Abdominal wall: Unremarkable.  Bones: Multilevel degenerative changes of the lumbar spine with dextroscoliosis.      Impression:        1.  Diffuse colonic wall thickening extending from the sigmoid to the transverse colon compatible with colitis.  2.  Sigmoid diverticulosis without evidence of acute diverticulitis.  3.  Peripheral reticulations in the bilateral lung bases may reflect underlying interstitial lung disease.      Signer Name: Julian Sales MD   Signed: 12/19/2022 6:14 PM   Workstation Name: [id number]    [facility]    XR Spine Lumbar Complete 4+VW [[id number]] Collected: 12/19/22 1758     Updated: 12/19/22 1801    Narrative:      CR Spine Lumbar Min 4 Vws    INDICATION:   Low back pain    COMPARISON:   None available.    FINDINGS:  AP, lateral, L5-S1 spot, and 2 oblique views of the lumbar spine.     Actual convex curvature of the thoracic spine with apex of the L2-3 level. Vertebral body height are preserved. Multilevel disc degeneration. Disc space height loss and marginal osteophytosis most pronounced at the L2-3 level. Multilevel facet  arthropathy.      Impression:      Multilevel lumbar spondylosis.     Signer Name: Julian Sales MD   Signed: 12/19/2022  5:58 PM   Workstation Name: [id number]    [facility]    XR Spine Thoracic 3 View [[id number]] Collected: 12/19/22 1754     Updated: 12/19/22 1756    Narrative:      CR Spine Thoracic 3 Vws    INDICATION:   Acute onset of mid back pain    COMPARISON:   None.    FINDINGS:  AP, lateral, views of the thoracic spine. The alignment is normal. There is no evidence of fracture.  Bony structures are diffusely demineralized. Vascular calcifications noted. Spondylotic changes of the thoracic spine. Pedicles appear intact.      Impression:      No clearly acute radiographic findings.    Clinical aspects of the case will determine if MR of the thoracic spine could provide additional information.    Signer Name: Shashi Corona MD   Signed: 12/19/2022 5:54 PM   Workstation Name: [id number]    [facility]    XR Abdomen KUB [[id number]] Collected: 12/19/22 1753     Updated: 12/19/22 1755    Narrative:      CR Abdomen 1 Vw    INDICATION:   Back pain and constipation    COMPARISON:   None available    FINDINGS:  AP radiograph(s) of the abdomen. The renal shadows are symmetric. No renal calculi. No bladder calculi.    The bowel gas pattern is nonobstructive. Vascular calcifications noted. No acute osseous abnormalities. Degenerative changes of the lumbar spine. No radiopaque foreign body.      Impression:      No clearly acute radiographic findings.    Signer Name: Shashi Corona MD   Signed: 12/19/2022 5:53 PM   Workstation Name: [id number]    [facility]        I have personally reviewed the above radiology results.     Last Echocardiogram:  Results for orders placed during the hospital encounter of 03/17/21    Adult Transthoracic Echo Complete W/ Cont if Necessary Per Protocol    Interpretation Summary  · Normal left ventricular cavity size and wall thickness noted. All left ventricular wall segments contract normally  · Left ventricular ejection fraction  appears to be 61 - 65%.  · The aortic valve is not well visualized. No aortic valve regurgitation or stenosis is present.  · The mitral valve is structurally normal with no significant stenosis present. Mild mitral valve regurgitation is present.  · There is no evidence of pericardial effusion.    ---------------------------------------------------------------------------------------------------------------------    Assessment & Plan      Active Hospital Problems    Diagnosis  POA   • **Colitis [K52.9]  Yes     #Sepsis criteria POA secondary to acute colitis (HR> 90, CRP> 2, WBC> 12)  #BRBPR   -CT of abdomen/pelvis without contrast reviewed, shows diffuse colonic wall thickening extending from the sigmoid to the transverse colon compatible with colitis; sigmoid diverticulosis without evidence of diverticulitis  -Received IV Zosyn in the emergency department, continue with pharmacy dose IV Zosyn on the floor  -Blood cultures pending x 2   -Obtain GI panel; will hold on obtaining C. difficile for now since patient took laxatives recently; may consider should persistent diarrhea continue  -Patient reports bright red blood per rectum, suspect secondary to irritation versus hemorrhoids; H&H stable  -IV protonix 40 mg daily   -Repeat a.m. CBC and CRP; obtain lactate  -Continue with maintenance fluids at 75 mL/h  -Typically would recommend outpatient colonoscopy in ~6-8 weeks once colitis has resolved,  however, given advanced age this is not recommend per current guidelines   -PO tylenol PRN for pain  -Clear liquid diet, advance as tolerated   -Monitor closely     #Mild hyponatremia  -Corrected sodium for mild hyperglycemia is 127; suspect hyponatremia secondary to hypovolemia in setting of decreased p.o. intake  -Received NS 1 L bolus in the emergency department; continue with maintenance fluids at 75 mL/h  -Urine sodium, urine osmolality, and serum osmolality ordered  -Repeat a.m. CMP and monitor sodium level  closely  -Review home medications once reconciled per pharmacy to determine if there are any offending agents    #CKD stage IIIb  -Baseline creatinine appears to be around 0.9-1.1; creatinine admission 0.97  -Repeat a.m. CMP and monitor renal function closely    #Lumbar back pain with radiation to scapula bilaterally  #Multilevel lumbar spondylosis noted on x-ray lumbar spine  #Coronary artery disease status post three-vessel CABG (2011)  #Essential hypertension  #Hyperlipidemia  -Patient denies any chest pain but does admit to the back pain that radiates to her scapula  -Given cardiac history we will go ahead and obtain EKG and serial troponins  -Once arriving to the telemetry floor will monitor on telemetry as she is currently on a ED stretcher in hallway 102 that does not have telemetry monitoring capabilities  -Review home medications once reconciled per pharmacy; based off of current list plan to continue aspirin, Lopressor, Norvasc and valsartan; may consider holding HCTZ in setting of mild hyponatremia  -Lidoderm patches and PO tylenol available PRN for pain     #Malodorous urine  -Obtain UA    #Chronic diastolic CHF  -Appears euvolemic on exam  -Echo from 3/2021 reviewed, EF 61 to 65%  -Monitor for signs of volume overload with daily weights, strict I's and O's    #COPD without acute exacerbation  #Known pulmonary nodule   -Nebs available as needed  -Reviewed home regimen once reconciled per pharmacy  -Recommend continue outpatient follow-up with pulmonology    #Hypothyroidism  -Obtain TSH and free T4  -Continue Synthroid    #Advanced age  -PT/OT  -Up with assistance, fall precautions    F/E/N: NS 75 mL/h.  Replace electrolytes as necessary.  Clear liquid diet  ---------------------------------------------------  DVT Prophylaxis: Subcutaneous heparin  GI Prophylaxis: IV Protonix  Activity: Up with assistance, fall precautions    The patient is considered to be a high risk patient due to: Sepsis POA secondary  to acute colitis, mild hyponatremia    INPATIENT status due to the need for care which can only be reasonably provided in an hospital setting such as aggressive/expedited ancillary services and/or consultation services, the necessity for IV medications, close physician monitoring and/or the possible need for procedures.  In such, I feel patient’s risk for adverse outcomes and need for care warrant INPATIENT evaluation and predict the patient’s care encounter to likely last beyond 2 midnights.    Code Status: FULL CODE     Disposition/Discharge planning: Plan for home at discharge.  Pending clinical course    I have discussed the patient's assessment and plan with the patient and attending physician       Anum Reyna PA-C  Hospitalist Service -- [facility]       12/19/22  19:34 EST    Attending Physician: Jef Beckwith MD

## 2022-12-20 NOTE — PLAN OF CARE
Goal Outcome Evaluation:   Patient resting comfortable. No acute distress noted. IVF continue per order. HOB elevated. Call bell in reach. Bed alarm on. Will continue to follow the POC.

## 2022-12-20 NOTE — PAYOR COMM NOTE
[facility]  NPI:[id number]    Utilization Review  Contact: Cari Gant RN  Phone: [phone]  Fax:[phone]    INITIATE INPATIENT AUTHORIZATION   REF#[id number]  MRN [id number]  Amanda Gomez (89 y.o. Female)     Date of Birth   06/08/1933    Social Security Number       Address   [address] [address]    Home Phone   [phone]    MRN   [id number]       [employer]   [facility]    Marital Status                               Admission Date   12/19/22    Admission Type   Emergency    Admitting Provider   Jef Beckwith MD    Attending Provider   Jeana Arango MD    Department, Room/Bed   [address], [address]       Discharge Date       Discharge Disposition       Discharge Destination                               Attending Provider: Jeana Arango MD    Allergies: Motrin [Ibuprofen], Novocain [Procaine]    Isolation: None   Infection: COVID (rule out) (12/19/22), C.difficile (rule out) (12/20/22)   Code Status: CPR    Ht: 165.1 cm (65\")   Wt: 70.1 kg (154 lb 8 oz)    Admission Cmt: None   Principal Problem: Colitis [K52.9]                 Active Insurance as of 12/19/2022     Primary Coverage     Payor Plan Insurance Group Employer/Plan Group    ANTHEM MEDICARE REPLACEMENT ANTHEM MEDICARE ADVANTAGE [id number]     Payor Plan Address Payor Plan Phone Number Payor Plan Fax Number Effective Dates    [address] [phone]  1/1/2019 - None Entered    [facility] [id number]       Subscriber Name Subscriber Birth Date Member ID       AMANDA GOMEZ 6/8/1933 [id number]                 Emergency Contacts      (Rel.) Home Phone Work Phone Mobile Phone    Kaleb Gomez (Son) [phone] -- --

## 2022-12-20 NOTE — ED NOTES
Patient denies request to urinate for UA at this time. Patient states \"Im too cold to walk to bathroom and Im not using that pan in my bed.\" MD made aware.

## 2022-12-20 NOTE — CASE MANAGEMENT/SOCIAL WORK
Discharge Planning Assessment  [facility]     Patient Name: Christine Garg  MRN: [id number]  Today's Date: 12/20/2022    Admit Date: 12/19/2022       Discharge Needs Assessment     Row Name 12/20/22 1145       Living Environment    People in Home alone    Current Living Arrangements home    Primary Care Provided by self    Provides Primary Care For no one    Family Caregiver if Needed child(shimon), adult    Quality of Family Relationships helpful;involved;supportive       Resource/Environmental Concerns    Resource/Environmental Concerns none       Transition Planning    Patient/Family Anticipates Transition to home with family    Patient/Family Anticipated Services at Transition none    Transportation Anticipated family or friend will provide       Discharge Needs Assessment    Equipment Currently Used at Home none               Discharge Plan     Row Name 12/20/22 1146       Plan    Plan Pt admitted on 12/19/22.  SS received consult per Case Management for discharge planning.  SS spoke with pt at bedside on this date.  Pt resides at home alone at [address] [place] [address] and plans to return home at discharge.  Pt currently does not utilize home health or DME.  Pt stated no POA or Living Will.  Pt's PCP is Dave Tobar.  Pt utilizes [facility]. Pt drives self.  Pt stated no needs.  SS can be reconsulted if needs arise.               Continued Care and Services - Admitted Since 12/19/2022    Coordination has not been started for this encounter.     Selected Continued Care - Episodes Includes continued care and service providers with selected services from the active episodes listed below    Hyperlipidemia Episode start date: 10/11/2022   There are no active outsourced providers for this episode.               Expected Discharge Date and Time     Expected Discharge Date Expected Discharge Time    Dec 21, 2022          Demographic Summary     Row Name 12/20/22 1145       General Information    Admission Type  inpatient    Referral Source nursing    Reason for Consult discharge planning    Preferred Language English              Estella Christianson, BSW

## 2022-12-20 NOTE — PROGRESS NOTES
[facility] HOSPITALIST PROGRESS NOTE     Patient Identification:  Name:  Christine Garg  Age:  89 y.o.  Sex:  female  :  1933  MRN:  [id number]  Visit Number:  [id number]  ROOM: [address] [place] [address]     Primary Care Provider:  Dave Tobar MD    Length of stay:  1    Subjective        Chief Compliant follow-up for sepsis and colitis    Patient seen and examined this morning with no family at bedside.  Patient continued to have nausea diffuse abdominal pain and severe back pain and vomiting but no episodes of diarrhea today.  Patient did take clear liquid diet and had vomiting after that.  Denies any chest pain shortness of breath cough.  Afebrile no events overnight.  On RA.       Objective     Current Hospital Meds:[START ON 2022] Acidophilus/Pectin, 1 capsule, Oral, Daily  amLODIPine, 2.5 mg, Oral, Daily  heparin (porcine), 5,000 Units, Subcutaneous, Q8H  [START ON 2022] levothyroxine, 112 mcg, Oral, Q AM  lidocaine, 2 patch, Transdermal, Q24H  metoprolol tartrate, 25 mg, Oral, Q12H  montelukast, 10 mg, Oral, Nightly  pantoprazole, 40 mg, Intravenous, Q AM  piperacillin-tazobactam, 3.375 g, Intravenous, Q8H  sodium chloride, 10 mL, Intravenous, Q12H  [START ON 2022] valsartan, 160 mg, Oral, Daily  vitamin B-12, 1,000 mcg, Oral, Daily  Vitamin D3, 2,000 Units, Oral, Daily    Pharmacy to Dose Zosyn,   sodium chloride, 50 mL/hr, Last Rate: 50 mL/hr (22 1133)      ----------------------------------------------------------------------------------------------------------------------  Vital Signs:  Temp:  [98 °F (36.7 °C)-98.7 °F (37.1 °C)] 98.4 °F (36.9 °C)  Heart Rate:  [] 112  Resp:  [16-20] 20  BP: (140-173)/(57-87) 173/87  SpO2:  [96 %-99 %] 97 %  on   ;   Device (Oxygen Therapy): room air  Body mass index is 25.71 kg/m².    Wt Readings from Last 3 Encounters:   22 70.1 kg (154 lb 8 oz)   22 74.8 kg (165 lb)   22 78.9 kg (174 lb)       Intake/Output  Summary (Last 24 hours) at 12/20/2022 1948  Last data filed at 12/20/2022 1200  Gross per 24 hour   Intake 960 ml   Output --   Net 960 ml     NPO Diet NPO Type: Sips with Meds, Ice Chips  ----------------------------------------------------------------------------------------------------------------------  Physical exam:  General: Comfortable, Not in distress.  Well-developed and well-nourished.   HENT:  Head:  Normocephalic and atraumatic.  Mouth:  Moist mucous membranes.    Eyes:  Conjunctivae and EOM are normal.  Pupils are equal, round, and reactive to light.  No scleral icterus.    Neck:  Neck supple.  No JVD present.    Cardiovascular:  Normal rate, regular rhythm with no murmur.  Pulmonary/Chest:  No respiratory distress, no wheezes, no crackles, with normal breath sounds and good air movement.  Abdomen:  Soft.  Bowel sounds are normal.  No distension and + diffuse tenderness.   Musculoskeletal:  no tenderness, and no deformity.  No red or swollen joints anywhere.    Neurological:  Alert and oriented to person, place, and time.  No cranial nerve deficit. No focal deficits. No facial droop.  No slurred speech.   Skin:  Skin is warm and dry. No rash noted. No pallor.   Peripheral vascular: pulses in all 4 extremities with no clubbing, no cyanosis, no edema.  Genitourinary: no jenkins  ----------------------------------------------------------------------------------------------------------------------  ----------------------------------------------------------------------------------------------------------------------  Results from last 7 days   Lab Units 12/20/22 0327 12/19/22 1924 12/19/22  1638   CRP mg/dL 10.97*  --  11.01*   LACTATE mmol/L  --  1.5  --    WBC 10*3/mm3 15.98*  --  19.33*   HEMOGLOBIN g/dL 12.9  --  15.3   HEMATOCRIT % 38.5  --  43.5   MCV fL 93.7  --  89.5   MCHC g/dL 33.5  --  35.2   PLATELETS 10*3/mm3 313  --  377     Results from last 7 days   Lab Units 12/20/22 0327 12/19/22 1924  12/19/22  1638   SODIUM mmol/L 133* 129* 126*   POTASSIUM mmol/L 3.8 4.2 4.0   MAGNESIUM mg/dL 1.8  --   --    CHLORIDE mmol/L 100 94* 90*   CO2 mmol/L 20.4* 23.5 20.8*   BUN mg/dL 16 21 21   CREATININE mg/dL 0.79 0.94 0.97   CALCIUM mg/dL 9.0 9.1 9.8   GLUCOSE mg/dL 120* 152* 134*   ALBUMIN g/dL  --   --  3.88   BILIRUBIN mg/dL  --   --  0.6   ALK PHOS U/L  --   --  115   AST (SGOT) U/L  --   --  13   ALT (SGPT) U/L  --   --  14   Estimated Creatinine Clearance: 47.4 mL/min (by C-G formula based on SCr of 0.79 mg/dL).  Results from last 7 days   Lab Units 12/20/22  0851 12/20/22  0327 12/19/22  2119   TROPONIN T ng/mL <0.010 <0.010 <0.010     Hemoglobin A1C   Date/Time Value Ref Range Status   12/19/2022 1638 6.00 (H) 4.80 - 5.60 % Final     No results found for: AMMONIA    Results from last 7 days   Lab Units 12/20/22  0308   NITRITE UA  Negative   WBC UA /HPF 6-12*   BACTERIA UA /HPF None Seen   SQUAM EPITHEL UA /HPF 0-2     No results found for: BLOODCXNo results found for: RESPCXNo results found for: URINECXNo results found for: WOUNDCXNo results found for: BODYFLDCXNo results found for: STOOLCX  I have personally looked at the labs and they are summarized above.  ----------------------------------------------------------------------------------------------------------------------  Imaging Results (Last 24 Hours)     ** No results found for the last 24 hours. **        I have personally reviewed the radiology images and read the final radiology report.    Assessment & Plan      Assessment:  Sepsis secondary to diffuse acute sigmoid and Transverse colon Colitis  Mild hyponatremia  CKD stage II/III  COPD  Prediabetes with A1C 6  Essential hypertension  Hyperlipidemia   CAD s/p 3V CABG    Known pulmonary nodule  Back pain with Multilevel lumbar spondylosis noted on x-ray lumbar spine  H/O of smoking tobacco use      Plan:  Sepsis secondary to diffuse acute sigmoid and Transverse colon Colitis, on IV Zosyn.  Will  start on probiotic.  Currently patient afebrile and VSS.  Leukocytosis improving.  CRP elevated.  Lactate normal.  Blood culture prelim no growth.  Urine culture pending.  Cdiff PCR ordered and gastro PCR ordered but no stool sample yet. CT of abdomen/pelvis without contrast reviewed, shows diffuse colonic wall thickening extending from the sigmoid to the transverse colon compatible with colitis; sigmoid diverticulosis without evidence of diverticulitis.   Will make patient n.p.o. with sips of meds and ice chips.  Continue with pain management and nausea medication.     Mild hyponatremia, , improving. On NS, decrease the rate to 50 cc/hr.     CKD stage II/III, Cr .79. Monitor.     COPD, stable.  On as needed.     Prediabetes with A1C 6, CBG controlled.  We will do Accu-Cheks every 6 hrs.     Essential hypertension, blood pressure accelerated. Resume home amlodipine,   Lopressor and valsartan. On clonidine prn.     Heparin subcu for DVT prophylaxis  Start on Pepcid IV for GI prophylaxis    Management and plans discussed in detail with patient and nursing.       The patient is high risk due to the following diagnoses/reasons:  Sepsis secondary to diffuse acute sigmoid and Transverse colon Colitis    Disposition Home    Jeana Arango MD  12/20/22  19:48 EST

## 2022-12-20 NOTE — THERAPY EVALUATION
Acute Care - Physical Therapy Initial Evaluation   [facility]     Patient Name: Christine Garg  : 1933  MRN: [id number]  Today's Date: 2022      Visit Dx:     ICD-10-CM ICD-9-CM   1. Acute colitis  K52.9 558.9     Patient Active Problem List   Diagnosis   • Precordial pain   • Essential hypertension   • Dyslipidemia   • Leg edema   • ASCVD (arteriosclerotic cardiovascular disease)   • Chest pain   • SOB (shortness of breath)   • Palpitations   • Coronary artery disease    • Abnormal PFTs (pulmonary function tests)   • Shortness of breath   • Chronic obstructive pulmonary disease (HCC)   • Hypokalemia   • Mild persistent asthma with allergic rhinitis   • Pulmonary nodule   • Former smoker   • Colitis     Past Medical History:   Diagnosis Date   • Advanced age    • CAD (coronary artery disease)     s/p 3v CABG   • Chronic kidney disease (CKD), stage III (moderate) (HCC)    • COPD (chronic obstructive pulmonary disease) ([facility])    • History of tobacco use    • Hyperlipidemia    • Hypertension    • Hypothyroidism    • Pulmonary nodule      Past Surgical History:   Procedure Laterality Date   • BREAST BIOPSY Left     benign   • CATARACT EXTRACTION     • CORONARY ANGIOPLASTY     • CORONARY ARTERY BYPASS GRAFT       PT Assessment (last 12 hours)     PT Evaluation and Treatment     Row Name 22 0913          Physical Therapy Time and Intention    Document Type evaluation  -KH     Mode of Treatment physical therapy  -KH     Patient Effort adequate  -     Comment Pt seen for evaluation this date. She lives alone and was not using AD with PLOF. She does not have a history of falls  -     Row Name 22 0955          General Information    Patient Profile Reviewed yes  -     Existing Precautions/Restrictions fall  -     Row Name 2260          Previous Level of Function/Home Environm    Household Ambulation, Premorbid Functional Level other (see comments);independent  no AD  -     Row Name  12/20/22 0935          Living Environment    Current Living Arrangements home  -     People in Home alone  -     Row Name 12/20/22 0935          Range of Motion Comprehensive    Comment, General Range of Motion A/PROM grossly WFL -  hip abd limited, pain with L hip she reports it's gone/wore out  -[facility] Name 12/20/22 0935          Strength Comprehensive (MMT)    Comment, General Manual Muscle Testing (MMT) Assessment Grossly 4/5: Gross MMT hip flex 3/5, knee flex 4+, knee ext 4+ to 5/5, DF/PF 5/5  -[facility] Name 12/20/22 0935          Bed Mobility    Bed Mobility supine-sit;sit-supine  -     Supine-Sit [place] (Bed Mobility) modified independence;independent  -     Sit-Supine [place] (Bed Mobility) modified independence;independent  -[facility] Name 12/20/22 0935          Transfers    Transfers sit-stand transfer;stand-sit transfer  -[facility] Name 12/20/22 0935          Sit-Stand Transfer    Sit-Stand [place] (Transfers) standby assist;contact guard  -[facility] Name 12/20/22 09          Stand-Sit Transfer    Stand-Sit [place] (Transfers) standby assist;contact guard  -[facility] Name 12/20/22 0935          Gait/Stairs (Locomotion)    Comment, (Gait/Stairs) Pt walked grossly 20'+ with some LOB noted, CGA/[place] grossly without RW  -[facility] Name 12/20/22 0935          Balance    Balance Assessment standing dynamic balance  -     Dynamic Standing Balance standby assist;contact guard  -[facility] Name 12/20/22 0935          Plan of Care Review    Outcome Evaluation Pt seen for evaluation this date, presents as fall risk, she appears unsafe to return home alone d/t fall risk. D/C rec for home with family supervision, assist if family is available and willing. Otherwise, pt may benefit from short term rehab SNF/IPR.  -     Row Name 12/20/22 0935          Positioning and Restraints    Pre-Treatment Position in bed  -     Post Treatment Position bed  -     In Bed supine;call  light within reach;exit alarm on  -     Row Name 12/20/22 0935          Therapy Assessment/Plan (PT)    Functional Level at Time of Evaluation (PT) CGA/[place] with ambulation  -     PT Diagnosis (PT) Fall risk, impaired/decreased balance  -     Rehab Potential (PT) good, to achieve stated therapy goals  -     Criteria for Skilled Interventions Met (PT) yes  -     Therapy Frequency (PT) 2 times/wk  2-5x/wk  -     Predicted Duration of Therapy Intervention (PT) length of stay  -     Row Name 12/20/22 0935          Physical Therapy Goals    Gait Training Goal Selection (PT) gait training, PT goal 1  -     Row Name 12/20/22 0935          Gait Training Goal 1 (PT)    Activity/Assistive Device (Gait Training Goal 1, PT) gait (walking locomotion);assistive device use;improve balance and speed;walker, rolling  -           User Key  (r) = Recorded By, (t) = Taken By, (c) = Cosigned By    Initials Name Provider Type    Precious Zaragoza PT Physical Therapist                  PT Recommendation and Plan  Anticipated Discharge Disposition (PT): home with supervision, home with assist, home with home health, skilled nursing facility, sub acute care setting, inpatient rehabilitation facility  Planned Therapy Interventions (PT): gait training, balance training  Therapy Frequency (PT): 2 times/wk (2-5x/wk)  Outcome Evaluation: Pt seen for evaluation this date, presents as fall risk, she appears unsafe to return home alone d/t fall risk. D/C rec for home with family supervision, assist if family is available and willing. Otherwise, pt may benefit from short term rehab SNF/IPR.       Time Calculation:    PT Charges     Row Name 12/20/22 1353             Time Calculation    Start Time 0935  -      PT Received On 12/20/22  -      PT Goal Re-Cert Due Date 01/03/23  -            User Key  (r) = Recorded By, (t) = Taken By, (c) = Cosigned By    Initials Name Provider Type    Precious Zaragoza PT Physical  Therapist              Therapy Charges for Today     Code Description Service Date Service Provider Modifiers Qty    91731587618 HC PT EVAL MOD COMPLEXITY 4 12/20/2022 Precious Huerta, PT GP 1               Precious Huerta, PT  12/20/2022

## 2022-12-20 NOTE — PLAN OF CARE
Goal Outcome Evaluation:              Outcome Evaluation: Pt seen for evaluation this date, presents as fall risk, she appears unsafe to return home alone d/t fall risk. D/C rec for home with family supervision, assist if family is available and willing. Otherwise, pt may benefit from short term rehab SNF/IPR.

## 2022-12-21 LAB
ANION GAP SERPL CALCULATED.3IONS-SCNC: 10.2 MMOL/L (ref 5–15)
BACTERIA SPEC AEROBE CULT: NO GROWTH
BASOPHILS # BLD AUTO: 0.07 10*3/MM3 (ref 0–0.2)
BASOPHILS NFR BLD AUTO: 0.5 % (ref 0–1.5)
BUN SERPL-MCNC: 7 MG/DL (ref 8–23)
BUN/CREAT SERPL: 11.3 (ref 7–25)
CALCIUM SPEC-SCNC: 8.4 MG/DL (ref 8.6–10.5)
CHLORIDE SERPL-SCNC: 100 MMOL/L (ref 98–107)
CO2 SERPL-SCNC: 22.8 MMOL/L (ref 22–29)
CREAT SERPL-MCNC: 0.62 MG/DL (ref 0.57–1)
DEPRECATED RDW RBC AUTO: 45.1 FL (ref 37–54)
EGFRCR SERPLBLD CKD-EPI 2021: 85.2 ML/MIN/1.73
EOSINOPHIL # BLD AUTO: 0.16 10*3/MM3 (ref 0–0.4)
EOSINOPHIL NFR BLD AUTO: 1.3 % (ref 0.3–6.2)
ERYTHROCYTE [DISTWIDTH] IN BLOOD BY AUTOMATED COUNT: 13.3 % (ref 12.3–15.4)
GLUCOSE BLDC GLUCOMTR-MCNC: 102 MG/DL (ref 70–130)
GLUCOSE BLDC GLUCOMTR-MCNC: 104 MG/DL (ref 70–130)
GLUCOSE BLDC GLUCOMTR-MCNC: 107 MG/DL (ref 70–130)
GLUCOSE BLDC GLUCOMTR-MCNC: 121 MG/DL (ref 70–130)
GLUCOSE BLDC GLUCOMTR-MCNC: 99 MG/DL (ref 70–130)
GLUCOSE SERPL-MCNC: 114 MG/DL (ref 65–99)
HCT VFR BLD AUTO: 38 % (ref 34–46.6)
HGB BLD-MCNC: 12.8 G/DL (ref 12–15.9)
IMM GRANULOCYTES # BLD AUTO: 0.06 10*3/MM3 (ref 0–0.05)
IMM GRANULOCYTES NFR BLD AUTO: 0.5 % (ref 0–0.5)
LYMPHOCYTES # BLD AUTO: 1.72 10*3/MM3 (ref 0.7–3.1)
LYMPHOCYTES NFR BLD AUTO: 13.5 % (ref 19.6–45.3)
MAGNESIUM SERPL-MCNC: 1.8 MG/DL (ref 1.6–2.4)
MCH RBC QN AUTO: 30.8 PG (ref 26.6–33)
MCHC RBC AUTO-ENTMCNC: 33.7 G/DL (ref 31.5–35.7)
MCV RBC AUTO: 91.6 FL (ref 79–97)
MONOCYTES # BLD AUTO: 1.13 10*3/MM3 (ref 0.1–0.9)
MONOCYTES NFR BLD AUTO: 8.9 % (ref 5–12)
NEUTROPHILS NFR BLD AUTO: 75.3 % (ref 42.7–76)
NEUTROPHILS NFR BLD AUTO: 9.6 10*3/MM3 (ref 1.7–7)
NRBC BLD AUTO-RTO: 0 /100 WBC (ref 0–0.2)
PLATELET # BLD AUTO: 316 10*3/MM3 (ref 140–450)
PMV BLD AUTO: 9.1 FL (ref 6–12)
POTASSIUM SERPL-SCNC: 3.6 MMOL/L (ref 3.5–5.2)
RBC # BLD AUTO: 4.15 10*6/MM3 (ref 3.77–5.28)
SODIUM SERPL-SCNC: 133 MMOL/L (ref 136–145)
WBC NRBC COR # BLD: 12.74 10*3/MM3 (ref 3.4–10.8)

## 2022-12-21 PROCEDURE — 94762 N-INVAS EAR/PLS OXIMTRY CONT: CPT

## 2022-12-21 PROCEDURE — 80048 BASIC METABOLIC PNL TOTAL CA: CPT | Performed by: INTERNAL MEDICINE

## 2022-12-21 PROCEDURE — 85025 COMPLETE CBC W/AUTO DIFF WBC: CPT | Performed by: HOSPITALIST

## 2022-12-21 PROCEDURE — 25010000002 MAGNESIUM SULFATE 2 GM/50ML SOLUTION: Performed by: HOSPITALIST

## 2022-12-21 PROCEDURE — 25010000002 PIPERACILLIN SOD-TAZOBACTAM PER 1 G: Performed by: INTERNAL MEDICINE

## 2022-12-21 PROCEDURE — 99232 SBSQ HOSP IP/OBS MODERATE 35: CPT | Performed by: HOSPITALIST

## 2022-12-21 PROCEDURE — 97110 THERAPEUTIC EXERCISES: CPT

## 2022-12-21 PROCEDURE — 83735 ASSAY OF MAGNESIUM: CPT | Performed by: HOSPITALIST

## 2022-12-21 PROCEDURE — 25010000002 HEPARIN (PORCINE) PER 1000 UNITS: Performed by: INTERNAL MEDICINE

## 2022-12-21 PROCEDURE — 97530 THERAPEUTIC ACTIVITIES: CPT

## 2022-12-21 PROCEDURE — 25010000002 MORPHINE PER 10 MG: Performed by: HOSPITALIST

## 2022-12-21 PROCEDURE — 97116 GAIT TRAINING THERAPY: CPT

## 2022-12-21 PROCEDURE — 94761 N-INVAS EAR/PLS OXIMETRY MLT: CPT

## 2022-12-21 PROCEDURE — 82962 GLUCOSE BLOOD TEST: CPT

## 2022-12-21 PROCEDURE — 25010000002 HYDRALAZINE PER 20 MG: Performed by: HOSPITALIST

## 2022-12-21 PROCEDURE — 94799 UNLISTED PULMONARY SVC/PX: CPT

## 2022-12-21 RX ORDER — VALSARTAN 160 MG/1
320 TABLET ORAL DAILY
Status: DISCONTINUED | OUTPATIENT
Start: 2022-12-21 | End: 2022-12-25 | Stop reason: HOSPADM

## 2022-12-21 RX ORDER — MAGNESIUM SULFATE HEPTAHYDRATE 40 MG/ML
2 INJECTION, SOLUTION INTRAVENOUS ONCE
Status: COMPLETED | OUTPATIENT
Start: 2022-12-21 | End: 2022-12-21

## 2022-12-21 RX ORDER — VALSARTAN 160 MG/1
320 TABLET ORAL DAILY
Status: DISCONTINUED | OUTPATIENT
Start: 2022-12-22 | End: 2022-12-21

## 2022-12-21 RX ORDER — HYDROCODONE BITARTRATE AND ACETAMINOPHEN 5; 325 MG/1; MG/1
1 TABLET ORAL EVERY 6 HOURS PRN
Status: DISCONTINUED | OUTPATIENT
Start: 2022-12-21 | End: 2022-12-25

## 2022-12-21 RX ORDER — CARVEDILOL 6.25 MG/1
6.25 TABLET ORAL 2 TIMES DAILY WITH MEALS
Status: DISCONTINUED | OUTPATIENT
Start: 2022-12-21 | End: 2022-12-22

## 2022-12-21 RX ORDER — VALSARTAN 160 MG/1
160 TABLET ORAL ONCE
Status: DISCONTINUED | OUTPATIENT
Start: 2022-12-21 | End: 2022-12-21

## 2022-12-21 RX ORDER — HYDRALAZINE HYDROCHLORIDE 20 MG/ML
10 INJECTION INTRAMUSCULAR; INTRAVENOUS ONCE
Status: COMPLETED | OUTPATIENT
Start: 2022-12-21 | End: 2022-12-21

## 2022-12-21 RX ADMIN — HYDROCODONE BITARTRATE AND ACETAMINOPHEN 1 TABLET: 5; 325 TABLET ORAL at 22:23

## 2022-12-21 RX ADMIN — PIPERACILLIN SODIUM AND TAZOBACTAM SODIUM 3.38 G: 3; .375 INJECTION, POWDER, LYOPHILIZED, FOR SOLUTION INTRAVENOUS at 20:29

## 2022-12-21 RX ADMIN — HEPARIN SODIUM 5000 UNITS: 5000 INJECTION INTRAVENOUS; SUBCUTANEOUS at 05:25

## 2022-12-21 RX ADMIN — Medication 10 ML: at 22:23

## 2022-12-21 RX ADMIN — MAGNESIUM SULFATE HEPTAHYDRATE 2 G: 40 INJECTION, SOLUTION INTRAVENOUS at 06:47

## 2022-12-21 RX ADMIN — Medication 1000 MCG: at 09:14

## 2022-12-21 RX ADMIN — CARVEDILOL 6.25 MG: 6.25 TABLET, FILM COATED ORAL at 19:06

## 2022-12-21 RX ADMIN — VALSARTAN 320 MG: 160 TABLET, FILM COATED ORAL at 09:14

## 2022-12-21 RX ADMIN — PIPERACILLIN SODIUM AND TAZOBACTAM SODIUM 3.38 G: 3; .375 INJECTION, POWDER, LYOPHILIZED, FOR SOLUTION INTRAVENOUS at 11:47

## 2022-12-21 RX ADMIN — HYDROCODONE BITARTRATE AND ACETAMINOPHEN 1 TABLET: 5; 325 TABLET ORAL at 15:21

## 2022-12-21 RX ADMIN — HYDRALAZINE HYDROCHLORIDE 10 MG: 20 INJECTION INTRAMUSCULAR; INTRAVENOUS at 11:54

## 2022-12-21 RX ADMIN — AMLODIPINE BESYLATE 2.5 MG: 5 TABLET ORAL at 09:12

## 2022-12-21 RX ADMIN — LEVOTHYROXINE SODIUM 112 MCG: 0.07 TABLET ORAL at 05:25

## 2022-12-21 RX ADMIN — CARVEDILOL 6.25 MG: 6.25 TABLET, FILM COATED ORAL at 10:10

## 2022-12-21 RX ADMIN — LIDOCAINE 2 PATCH: 50 PATCH CUTANEOUS at 09:14

## 2022-12-21 RX ADMIN — PIPERACILLIN SODIUM AND TAZOBACTAM SODIUM 3.38 G: 3; .375 INJECTION, POWDER, LYOPHILIZED, FOR SOLUTION INTRAVENOUS at 03:14

## 2022-12-21 RX ADMIN — Medication 1 CAPSULE: at 09:14

## 2022-12-21 RX ADMIN — FAMOTIDINE 20 MG: 10 INJECTION, SOLUTION INTRAVENOUS at 09:14

## 2022-12-21 RX ADMIN — Medication 10 ML: at 09:14

## 2022-12-21 RX ADMIN — HEPARIN SODIUM 5000 UNITS: 5000 INJECTION INTRAVENOUS; SUBCUTANEOUS at 15:21

## 2022-12-21 RX ADMIN — CHOLECALCIFEROL TAB 10 MCG (400 UNIT) 2000 UNITS: 10 TAB at 09:12

## 2022-12-21 RX ADMIN — MONTELUKAST SODIUM 10 MG: 10 TABLET, COATED ORAL at 20:29

## 2022-12-21 RX ADMIN — HEPARIN SODIUM 5000 UNITS: 5000 INJECTION INTRAVENOUS; SUBCUTANEOUS at 20:29

## 2022-12-21 RX ADMIN — MORPHINE SULFATE 1 MG: 2 INJECTION, SOLUTION INTRAMUSCULAR; INTRAVENOUS at 02:14

## 2022-12-21 NOTE — PROGRESS NOTES
[facility] HOSPITALIST PROGRESS NOTE     Patient Identification:  Name:  Christine Garg  Age:  89 y.o.  Sex:  female  :  1933  MRN:  [id number]  Visit Number:  [id number]  ROOM: [address] [place] [address]     Primary Care Provider:  Dave Tobar MD    Length of stay:  2    Subjective        Chief Compliant follow-up for sepsis and colitis    Patient seen and examined this morning with no family at bedside.  Patient feels better. denies nausea, vomiting and would like to eat. abdominal pain and back pain is improving. No flatus and no episodes of diarrhea today.  Denies any chest pain shortness of breath cough.  Afebrile no events overnight.  On RA. Noted to have SBP in 180-190.       Objective     Current Hospital Meds:Acidophilus/Pectin, 1 capsule, Oral, Daily  amLODIPine, 2.5 mg, Oral, Daily  carvedilol, 6.25 mg, Oral, BID With Meals  cholecalciferol, 2,000 Units, Oral, Daily  famotidine, 20 mg, Intravenous, Daily  heparin (porcine), 5,000 Units, Subcutaneous, Q8H  levothyroxine, 112 mcg, Oral, Q AM  lidocaine, 2 patch, Transdermal, Q24H  montelukast, 10 mg, Oral, Nightly  piperacillin-tazobactam, 3.375 g, Intravenous, Q8H  sodium chloride, 10 mL, Intravenous, Q12H  valsartan, 320 mg, Oral, Daily  vitamin B-12, 1,000 mcg, Oral, Daily    Pharmacy to Dose Zosyn,       ----------------------------------------------------------------------------------------------------------------------  Vital Signs:  Temp:  [98.2 °F (36.8 °C)-98.4 °F (36.9 °C)] 98.3 °F (36.8 °C)  Heart Rate:  [] 89  Resp:  [18-20] 18  BP: (162-196)/(61-90) 184/80  SpO2:  [94 %-98 %] 98 %  on   ;   Device (Oxygen Therapy): room air  Body mass index is 26.03 kg/m².    Wt Readings from Last 3 Encounters:   22 70.9 kg (156 lb 6.4 oz)   22 74.8 kg (165 lb)   22 78.9 kg (174 lb)     No intake or output data in the 24 hours ending 22 1303  Diet: Liquid Diets, Cardiac Diets; Clear Liquid; Healthy Heart (2-3 Na+);  Texture: Regular Texture (IDDSI 7); Fluid Consistency: Thin (IDDSI 0)  ----------------------------------------------------------------------------------------------------------------------  Physical exam:  General: Comfortable, Not in distress.  Well-developed and well-nourished.   HENT:  Head:  Normocephalic and atraumatic.  Mouth:  Moist mucous membranes.    Eyes:  Conjunctivae and EOM are normal.  Pupils are equal, round, and reactive to light.  No scleral icterus.    Neck:  Neck supple.  No JVD present.    Cardiovascular:  Normal rate, regular rhythm with no murmur.  Pulmonary/Chest:  No respiratory distress, no wheezes, no crackles, with normal breath sounds and good air movement.  Abdomen:  Soft.  Bowel sounds are normal.  No distension and + diffuse tenderness.   Musculoskeletal:  no tenderness, and no deformity.  No red or swollen joints anywhere.    Neurological:  Alert and oriented to person, place, and time.  No cranial nerve deficit. No focal deficits. No facial droop.  No slurred speech.   Skin:  Skin is warm and dry. No rash noted. No pallor.   Peripheral vascular: pulses in all 4 extremities with no clubbing, no cyanosis, no edema.  Genitourinary: no jenkins  ----------------------------------------------------------------------------------------------------------------------  ----------------------------------------------------------------------------------------------------------------------  Results from last 7 days   Lab Units 12/21/22 0209 12/20/22 0327 12/19/22 1924 12/19/22  1638   CRP mg/dL  --  10.97*  --  11.01*   LACTATE mmol/L  --   --  1.5  --    WBC 10*3/mm3 12.74* 15.98*  --  19.33*   HEMOGLOBIN g/dL 12.8 12.9  --  15.3   HEMATOCRIT % 38.0 38.5  --  43.5   MCV fL 91.6 93.7  --  89.5   MCHC g/dL 33.7 33.5  --  35.2   PLATELETS 10*3/mm3 316 313  --  377     Results from last 7 days   Lab Units 12/21/22 0209 12/20/22 0327 12/19/22 1924 12/19/22  1638   SODIUM mmol/L 133* 133* 129*  126*   POTASSIUM mmol/L 3.6 3.8 4.2 4.0   MAGNESIUM mg/dL 1.8 1.8  --   --    CHLORIDE mmol/L 100 100 94* 90*   CO2 mmol/L 22.8 20.4* 23.5 20.8*   BUN mg/dL 7* 16 21 21   CREATININE mg/dL 0.62 0.79 0.94 0.97   CALCIUM mg/dL 8.4* 9.0 9.1 9.8   GLUCOSE mg/dL 114* 120* 152* 134*   ALBUMIN g/dL  --   --   --  3.88   BILIRUBIN mg/dL  --   --   --  0.6   ALK PHOS U/L  --   --   --  115   AST (SGOT) U/L  --   --   --  13   ALT (SGPT) U/L  --   --   --  14   Estimated Creatinine Clearance: 60.8 mL/min (by C-G formula based on SCr of 0.62 mg/dL).  Results from last 7 days   Lab Units 12/20/22  0851 12/20/22  0327 12/19/22  2119   TROPONIN T ng/mL <0.010 <0.010 <0.010     Hemoglobin A1C   Date/Time Value Ref Range Status   12/19/2022 1638 6.00 (H) 4.80 - 5.60 % Final     Glucose   Date/Time Value Ref Range Status   12/21/2022 1100 99 70 - 130 mg/dL Final     Comment:     Meter: [id number] : [id number] JASON ALLEN   12/21/2022 0715 102 70 - 130 mg/dL Final     Comment:     Meter: [id number] : [id number] [facility]   12/21/2022 0002 107 70 - 130 mg/dL Final     Comment:     Meter: [id number] : [id number] Yajaira Bates     No results found for: AMMONIA    Results from last 7 days   Lab Units 12/20/22  0308   NITRITE UA  Negative   WBC UA /HPF 6-12*   BACTERIA UA /HPF None Seen   SQUAM EPITHEL UA /HPF 0-2     No results found for: BLOODCXNo results found for: RESPCXNo results found for: URINECXNo results found for: WOUNDCXNo results found for: BODYFLDCXNo results found for: STOOLCX  I have personally looked at the labs and they are summarized above.  ----------------------------------------------------------------------------------------------------------------------  Imaging Results (Last 24 Hours)     ** No results found for the last 24 hours. **        I have personally reviewed the radiology images and read the final radiology report.    Assessment & Plan      Assessment:  Sepsis secondary to diffuse acute  sigmoid and Transverse colon Colitis  Mild hyponatremia  CKD stage II/III  COPD  Prediabetes with A1C 6  Essential hypertension  Hyperlipidemia   CAD s/p 3V CABG    Known pulmonary nodule  Back pain with Multilevel lumbar spondylosis noted on x-ray lumbar spine  H/O of smoking tobacco use      Plan:  Sepsis secondary to diffuse acute sigmoid and Transverse colon Colitis, on IV Zosyn and probiotic.  Currently patient afebrile and VSS.  Leukocytosis improving.  CRP elevated.  Lactate normal.  Blood culture prelim no growth.  Urine culture pending.  Cdiff PCR ordered and gastro PCR ordered but no stool sample yet. CT of abdomen/pelvis without contrast reviewed, shows diffuse colonic wall thickening extending from the sigmoid to the transverse colon compatible with colitis; sigmoid diverticulosis without evidence of diverticulitis.   Will start on clear liquid diet.  Continue with pain management and nausea medication. DC IVF.     Mild hyponatremia, , stable. DC NS. Supplement magnesium.     CKD stage II/III, Cr .6. Monitor.     COPD, stable.  On as needed.     Prediabetes with A1C 6, CBG controlled.  We will do Accu-Cheks every 6 hrs.     Accelerated hypertension, SBP in 180-190. Start on coreg and dc lopressor. Continue with the Diovan and amlodipine. Monitor and adjust. Will do one time dose iv prn.  On clonidine prn.     Heparin subcu for DVT prophylaxis  Pepcid IV for GI prophylaxis.  Advise to ambulate.     Management and plans discussed in detail with patient and nursing.       The patient is high risk due to the following diagnoses/reasons:  Sepsis secondary to diffuse acute sigmoid and Transverse colon Colitis    Disposition Home    Jeana Arango MD  12/21/22  13:03 EST

## 2022-12-21 NOTE — PLAN OF CARE
Goal Outcome Evaluation:   Pt awake and alert this shift. She was able to ambulate within her room and tolerated well on room air. No s/s of acute distress noted. No complaints or requests at this time. VSS, will continue to monitor and follow plan of care

## 2022-12-21 NOTE — THERAPY TREATMENT NOTE
Acute Care - Physical Therapy Treatment Note  DAYLIN Sky     Patient Name: Christine Garg  : 1933  MRN: [id number]  Today's Date: 2022      Visit Dx:     ICD-10-CM ICD-9-CM   1. Acute colitis  K52.9 558.9     Patient Active Problem List   Diagnosis   • Precordial pain   • Essential hypertension   • Dyslipidemia   • Leg edema   • ASCVD (arteriosclerotic cardiovascular disease)   • Chest pain   • SOB (shortness of breath)   • Palpitations   • Coronary artery disease    • Abnormal PFTs (pulmonary function tests)   • Shortness of breath   • Chronic obstructive pulmonary disease (HCC)   • Hypokalemia   • Mild persistent asthma with allergic rhinitis   • Pulmonary nodule   • Former smoker   • Colitis     Past Medical History:   Diagnosis Date   • Advanced age    • CAD (coronary artery disease)     s/p 3v CABG   • Chronic kidney disease (CKD), stage III (moderate) (HCC)    • COPD (chronic obstructive pulmonary disease) (HCC)    • History of tobacco use    • Hyperlipidemia    • Hypertension    • Hypothyroidism    • Pulmonary nodule      Past Surgical History:   Procedure Laterality Date   • BREAST BIOPSY Left     benign   • CATARACT EXTRACTION     • CORONARY ANGIOPLASTY     • CORONARY ARTERY BYPASS GRAFT       PT Assessment (last 12 hours)     PT Evaluation and Treatment     Row Name 22 1508          Physical Therapy Time and Intention    Subjective Information complains of;pain  -HR     Document Type therapy note (daily note)  -HR     Mode of Treatment physical therapy  -HR     Patient Effort adequate  -HR     Comment Pt and RN in agreement for PT. Pt walked 40' with no AD, but did have some LOB and required min A for correction. Sitting exercises up in chair until tolerance. Left Pt sitting EOB with lunch tray, notified nursing aid.  -HR     Row Name 22 1501          General Information    Patient Profile Reviewed yes  -HR     Existing Precautions/Restrictions fall  -HR     Row Name 22  1506          Cognition    Personal Safety Interventions fall prevention program maintained;gait belt;supervised activity;nonskid shoes/slippers when out of bed  -HR     Row Name 12/21/22 1506          Bed Mobility    Bed Mobility supine-sit;sit-supine  -HR     Supine-Sit [place] (Bed Mobility) modified independence;independent  -HR     Row Name 12/21/22 1506          Transfers    Transfers sit-stand transfer;stand-sit transfer  -HR     Row Name 12/21/22 1506          Sit-Stand Transfer    Sit-Stand [place] (Transfers) standby assist;contact guard  -HR     Row Name 12/21/22 1506          Stand-Sit Transfer    Stand-Sit [place] (Transfers) standby assist;contact guard  -HR     Row Name 12/21/22 1506          Gait/Stairs (Locomotion)    [place] Level (Gait) contact guard;minimum assist (75% patient effort)  -HR     Assistive Device (Gait) other (see comments)  no AD  -HR     Distance in Feet (Gait) 40'  -HR     Pattern (Gait) step-to;swing-through  -HR     Deviations/Abnormal Patterns (Gait) festinating/shuffling;base of support, narrow  -HR     Row Name 12/21/22 1506          Motor Skills    Therapeutic Exercise other (see comments)  Sitting: AP, LAQ, March, knees in/out  -HR     Row Name 12/21/22 1506          Positioning and Restraints    Pre-Treatment Position in bed  -HR     Post Treatment Position bed  -HR     In Bed notified nsg;sitting EOB;call light within reach;encouraged to call for assist  -HR     Row Name 12/21/22 1506          Therapy Assessment/Plan (PT)    Rehab Potential (PT) good, to achieve stated therapy goals  -HR     Criteria for Skilled Interventions Met (PT) yes  -HR     Therapy Frequency (PT) 2 times/wk  2-5x/wk  -HR     Row Name 12/21/22 1506          Physical Therapy Goals    Gait Training Goal Selection (PT) gait training, PT goal 1  -HR     Row Name 12/21/22 1506          Gait Training Goal 1 (PT)    Activity/Assistive Device (Gait Training Goal 1, PT) gait (walking  locomotion);assistive device use;improve balance and speed;walker, rolling  -HR           User Key  (r) = Recorded By, (t) = Taken By, (c) = Cosigned By    Initials Name Provider Type    HR Ethel Quiles PTA Physical Therapist Assistant                  PT Recommendation and Plan  Anticipated Discharge Disposition (PT): home with supervision, home with assist, home with home health, skilled nursing facility, sub acute care setting, inpatient rehabilitation facility  Therapy Frequency (PT): 2 times/wk (2-5x/wk)          Time Calculation:    PT Charges     Row Name 12/21/22 1510             Time Calculation    Start Time 1359  -HR      PT Received On 12/21/22  -HR         Time Calculation- PT    Total Timed Code Minutes- PT 23 minute(s)  -HR            User Key  (r) = Recorded By, (t) = Taken By, (c) = Cosigned By    Initials Name Provider Type    HR Ethel Quiles PTA Physical Therapist Assistant              Therapy Charges for Today     Code Description Service Date Service Provider Modifiers Qty    19445880222 HC GAIT TRAINING EA 15 MIN 12/21/2022 Ethel Quiles PTA GP, CQ 1    [id number]  PT THER PROC EA 15 MIN 12/21/2022 Ethel Quiles PTA GP, CQ 1               Ethel Quiles PTA  12/21/2022

## 2022-12-21 NOTE — PLAN OF CARE
Goal Outcome Evaluation:   Patient resting comfortably at this time.  No reports of any acute distress. IVF continue per order. Patient continues to be NPO and tolerating well. HOB elevated. Bed alarm on and call bell in reach.  Will continue to follow POC.

## 2022-12-21 NOTE — CASE MANAGEMENT/SOCIAL WORK
Discharge Planning Assessment   Jose Daniel     Patient Name: Christine Garg  MRN: [id number]  Today's Date: 12/21/2022    Admit Date: 12/19/2022    Plan: SS received new consult per Case Management due to therapy recommendations.  SS attempted to discuss discharge disposition with pt.  Pt asleep.  SS will follow up in am.     Discharge Plan     Row Name 12/21/22 1631       Plan    Plan SS received new consult per Case Management due to therapy recommendations.  SS attempted to discuss discharge disposition with pt.  Pt asleep.  SS will follow up in am.              ZA Martins

## 2022-12-21 NOTE — THERAPY TREATMENT NOTE
Patient Name: Christine Garg  : 1933    MRN: [id number]                              Today's Date: 2022       Admit Date: 2022    Visit Dx:     ICD-10-CM ICD-9-CM   1. Acute colitis  K52.9 558.9     Patient Active Problem List   Diagnosis   • Precordial pain   • Essential hypertension   • Dyslipidemia   • Leg edema   • ASCVD (arteriosclerotic cardiovascular disease)   • Chest pain   • SOB (shortness of breath)   • Palpitations   • Coronary artery disease    • Abnormal PFTs (pulmonary function tests)   • Shortness of breath   • Chronic obstructive pulmonary disease (HCC)   • Hypokalemia   • Mild persistent asthma with allergic rhinitis   • Pulmonary nodule   • Former smoker   • Colitis     Past Medical History:   Diagnosis Date   • Advanced age    • CAD (coronary artery disease)     s/p 3v CABG   • Chronic kidney disease (CKD), stage III (moderate) (HCC)    • COPD (chronic obstructive pulmonary disease) (HCC)    • History of tobacco use    • Hyperlipidemia    • Hypertension    • Hypothyroidism    • Pulmonary nodule      Past Surgical History:   Procedure Laterality Date   • BREAST BIOPSY Left     benign   • CATARACT EXTRACTION     • CORONARY ANGIOPLASTY     • CORONARY ARTERY BYPASS GRAFT        General Information     Row Name 22 1429          OT Time and Intention    Document Type therapy note (daily note)  -     Mode of Treatment individual therapy;occupational therapy  -     Row Name 22 1429          General Information    Patient Profile Reviewed yes  -     Existing Precautions/Restrictions no known precautions/restrictions  -     Row Name 22 1429          Cognition    Orientation Status (Cognition) oriented x 4  -     Row Name 22 1429          Safety Issues, Functional Mobility    Impairments Affecting Function (Mobility) balance;endurance/activity tolerance;pain  -KP           User Key  (r) = Recorded By, (t) = Taken By, (c) = Cosigned By    Initials Name  Provider Type    Reta David OT Occupational Therapist                 Mobility/ADL's     Row Name 12/21/22 1430          Bed Mobility    Bed Mobility supine-sit  -     Supine-Sit [place] (Bed Mobility) standby assist  -     Assistive Device (Bed Mobility) bed rails  -     Row Name 12/21/22 1430          Transfers    Transfers bed-chair transfer  -     Row Name 12/21/22 1430          Bed-Chair Transfer    Bed-Chair [place] (Transfers) contact guard  -     Comment, (Bed-Chair Transfer) stand step pivot using bedrail to standard chair  -           User Key  (r) = Recorded By, (t) = Taken By, (c) = Cosigned By    Initials Name Provider Type    Reta David OT Occupational Therapist               Obj/Interventions    No documentation.                Goals/Plan    No documentation.                Clinical Impression     Row Name 12/21/22 1430          Pain Assessment    Pretreatment Pain Rating 1/10  -     Posttreatment Pain Rating 1/10  -     Pain Location generalized  -     Pain Location - back  -     Row Name 12/21/22 1430          Plan of Care Review    Plan of Care Reviewed With patient  -     Progress improving  -     Outcome Evaluation Patient seen for OT treatment session. She was assist to chair and encouraged out of bed activity to promote functional performance. Standby assist from sidelying to edge of bed and contact guard assist for transfer to chair at bedside. Continue plan of care.  -     Row Name 12/21/22 1430          Positioning and Restraints    Pre-Treatment Position in bed  -     Post Treatment Position chair  -KP     In Chair notified nsg;call light within reach;encouraged to call for assist  -KP           User Key  (r) = Recorded By, (t) = Taken By, (c) = Cosigned By    Initials Name Provider Type    Reta David OT Occupational Therapist               Outcome Measures    No documentation.                 Occupational Therapy Education      Title: PT OT SLP Therapies (Done)     Topic: Occupational Therapy (Done)     Point: ADL training (Done)     Description:   Instruct learner(s) on proper safety adaptation and remediation techniques during self care or transfers.   Instruct in proper use of assistive devices.              Learning Progress Summary           Patient Acceptance, E, VU by BRADLEY at 12/21/2022 0945    Acceptance, E, VU by  at 12/20/2022 1025    Comment: OT role, plan of care, goals                   Point: Home exercise program (Done)     Description:   Instruct learner(s) on appropriate technique for monitoring, assisting and/or progressing therapeutic exercises/activities.              Learning Progress Summary           Patient Acceptance, E, VU by BRADLEY at 12/21/2022 0945                   Point: Precautions (Done)     Description:   Instruct learner(s) on prescribed precautions during self-care and functional transfers.              Learning Progress Summary           Patient Acceptance, E, VU by BRADLEY at 12/21/2022 0945                   Point: Body mechanics (Done)     Description:   Instruct learner(s) on proper positioning and spine alignment during self-care, functional mobility activities and/or exercises.              Learning Progress Summary           Patient Acceptance, E, VU by BRADLEY at 12/21/2022 0945                               User Key     Initials Effective Dates Name Provider Type Discipline    BRADLEY 09/22/22 -  Susi Tapia, RN Registered Nurse Nurse     06/16/21 -  Reta Kapoor OT Occupational Therapist OT              OT Recommendation and Plan  Planned Therapy Interventions (OT): activity tolerance training, BADL retraining, functional balance retraining, strengthening exercise, transfer/mobility retraining, patient/caregiver education/training, ROM/therapeutic exercise  Therapy Frequency (OT): 3 times/wk (3-5x/week as able and available to promote functional progress)  Plan of Care Review  Plan of Care Reviewed  With: patient  Progress: improving  Outcome Evaluation: Patient seen for OT treatment session. She was assist to chair and encouraged out of bed activity to promote functional performance. Standby assist from sidelying to edge of bed and contact guard assist for transfer to chair at bedside. Continue plan of care.     Time Calculation:    Time Calculation- OT     Row Name 12/21/22 1432             Time Calculation- OT    OT Start Time 1040  -KP      OT Received On 12/21/22  -            User Key  (r) = Recorded By, (t) = Taken By, (c) = Cosigned By    Initials Name Provider Type    Reta David OT Occupational Therapist              Therapy Charges for Today     Code Description Service Date Service Provider Modifiers Qty    64886563169  OT EVAL MOD COMPLEXITY 4 12/20/2022 Reta Kapoor OT GO 1    [id number]  OT THERAPEUTIC ACT EA 15 MIN 12/21/2022 Reta Kapoor OT GO 1               Reta Kapoor OT  12/21/2022

## 2022-12-21 NOTE — PLAN OF CARE
Goal Outcome Evaluation:  Plan of Care Reviewed With: patient        Progress: improving  Outcome Evaluation: Patient seen for OT treatment session. She was assist to chair and encouraged out of bed activity to promote functional performance. Standby assist from sidelying to edge of bed and contact guard assist for transfer to chair at bedside. Continue plan of care.

## 2022-12-22 LAB
ANION GAP SERPL CALCULATED.3IONS-SCNC: 11.3 MMOL/L (ref 5–15)
ANION GAP SERPL CALCULATED.3IONS-SCNC: 13.5 MMOL/L (ref 5–15)
BASOPHILS # BLD AUTO: 0.07 10*3/MM3 (ref 0–0.2)
BASOPHILS NFR BLD AUTO: 0.7 % (ref 0–1.5)
BUN SERPL-MCNC: 7 MG/DL (ref 8–23)
BUN SERPL-MCNC: 9 MG/DL (ref 8–23)
BUN/CREAT SERPL: 10.8 (ref 7–25)
BUN/CREAT SERPL: 12.9 (ref 7–25)
CALCIUM SPEC-SCNC: 8.8 MG/DL (ref 8.6–10.5)
CALCIUM SPEC-SCNC: 9.5 MG/DL (ref 8.6–10.5)
CHLORIDE SERPL-SCNC: 94 MMOL/L (ref 98–107)
CHLORIDE SERPL-SCNC: 97 MMOL/L (ref 98–107)
CO2 SERPL-SCNC: 21.5 MMOL/L (ref 22–29)
CO2 SERPL-SCNC: 22.7 MMOL/L (ref 22–29)
CREAT SERPL-MCNC: 0.65 MG/DL (ref 0.57–1)
CREAT SERPL-MCNC: 0.7 MG/DL (ref 0.57–1)
DEPRECATED RDW RBC AUTO: 43.4 FL (ref 37–54)
EGFRCR SERPLBLD CKD-EPI 2021: 82.8 ML/MIN/1.73
EGFRCR SERPLBLD CKD-EPI 2021: 84.3 ML/MIN/1.73
EOSINOPHIL # BLD AUTO: 0.22 10*3/MM3 (ref 0–0.4)
EOSINOPHIL NFR BLD AUTO: 2.3 % (ref 0.3–6.2)
ERYTHROCYTE [DISTWIDTH] IN BLOOD BY AUTOMATED COUNT: 13.2 % (ref 12.3–15.4)
GLUCOSE BLDC GLUCOMTR-MCNC: 108 MG/DL (ref 70–130)
GLUCOSE BLDC GLUCOMTR-MCNC: 136 MG/DL (ref 70–130)
GLUCOSE BLDC GLUCOMTR-MCNC: 143 MG/DL (ref 70–130)
GLUCOSE SERPL-MCNC: 104 MG/DL (ref 65–99)
GLUCOSE SERPL-MCNC: 144 MG/DL (ref 65–99)
HCT VFR BLD AUTO: 37.6 % (ref 34–46.6)
HGB BLD-MCNC: 12.8 G/DL (ref 12–15.9)
IMM GRANULOCYTES # BLD AUTO: 0.04 10*3/MM3 (ref 0–0.05)
IMM GRANULOCYTES NFR BLD AUTO: 0.4 % (ref 0–0.5)
LYMPHOCYTES # BLD AUTO: 1.82 10*3/MM3 (ref 0.7–3.1)
LYMPHOCYTES NFR BLD AUTO: 19.2 % (ref 19.6–45.3)
MAGNESIUM SERPL-MCNC: 1.9 MG/DL (ref 1.6–2.4)
MCH RBC QN AUTO: 30.5 PG (ref 26.6–33)
MCHC RBC AUTO-ENTMCNC: 34 G/DL (ref 31.5–35.7)
MCV RBC AUTO: 89.7 FL (ref 79–97)
MONOCYTES # BLD AUTO: 0.95 10*3/MM3 (ref 0.1–0.9)
MONOCYTES NFR BLD AUTO: 10 % (ref 5–12)
NEUTROPHILS NFR BLD AUTO: 6.36 10*3/MM3 (ref 1.7–7)
NEUTROPHILS NFR BLD AUTO: 67.4 % (ref 42.7–76)
NRBC BLD AUTO-RTO: 0 /100 WBC (ref 0–0.2)
PLATELET # BLD AUTO: 308 10*3/MM3 (ref 140–450)
PMV BLD AUTO: 8.9 FL (ref 6–12)
POTASSIUM SERPL-SCNC: 3.2 MMOL/L (ref 3.5–5.2)
POTASSIUM SERPL-SCNC: 4.7 MMOL/L (ref 3.5–5.2)
QT INTERVAL: 346 MS
QTC INTERVAL: 441 MS
RBC # BLD AUTO: 4.19 10*6/MM3 (ref 3.77–5.28)
SODIUM SERPL-SCNC: 128 MMOL/L (ref 136–145)
SODIUM SERPL-SCNC: 132 MMOL/L (ref 136–145)
WBC NRBC COR # BLD: 9.46 10*3/MM3 (ref 3.4–10.8)

## 2022-12-22 PROCEDURE — 94799 UNLISTED PULMONARY SVC/PX: CPT

## 2022-12-22 PROCEDURE — 82962 GLUCOSE BLOOD TEST: CPT

## 2022-12-22 PROCEDURE — 83735 ASSAY OF MAGNESIUM: CPT | Performed by: HOSPITALIST

## 2022-12-22 PROCEDURE — 94761 N-INVAS EAR/PLS OXIMETRY MLT: CPT

## 2022-12-22 PROCEDURE — 25010000002 PIPERACILLIN SOD-TAZOBACTAM PER 1 G: Performed by: HOSPITALIST

## 2022-12-22 PROCEDURE — 25010000002 PIPERACILLIN SOD-TAZOBACTAM PER 1 G: Performed by: INTERNAL MEDICINE

## 2022-12-22 PROCEDURE — 80048 BASIC METABOLIC PNL TOTAL CA: CPT | Performed by: INTERNAL MEDICINE

## 2022-12-22 PROCEDURE — 97116 GAIT TRAINING THERAPY: CPT

## 2022-12-22 PROCEDURE — 25010000002 MAGNESIUM SULFATE 2 GM/50ML SOLUTION: Performed by: HOSPITALIST

## 2022-12-22 PROCEDURE — 85025 COMPLETE CBC W/AUTO DIFF WBC: CPT | Performed by: HOSPITALIST

## 2022-12-22 PROCEDURE — 80048 BASIC METABOLIC PNL TOTAL CA: CPT | Performed by: HOSPITALIST

## 2022-12-22 PROCEDURE — 99232 SBSQ HOSP IP/OBS MODERATE 35: CPT | Performed by: HOSPITALIST

## 2022-12-22 PROCEDURE — 25010000002 HEPARIN (PORCINE) PER 1000 UNITS: Performed by: INTERNAL MEDICINE

## 2022-12-22 RX ORDER — POTASSIUM CHLORIDE 1.5 G/1.77G
40 POWDER, FOR SOLUTION ORAL AS NEEDED
Status: DISCONTINUED | OUTPATIENT
Start: 2022-12-22 | End: 2022-12-25 | Stop reason: HOSPADM

## 2022-12-22 RX ORDER — CARVEDILOL 3.12 MG/1
3.12 TABLET ORAL ONCE
Status: COMPLETED | OUTPATIENT
Start: 2022-12-22 | End: 2022-12-22

## 2022-12-22 RX ORDER — POTASSIUM CHLORIDE 20 MEQ/1
40 TABLET, EXTENDED RELEASE ORAL AS NEEDED
Status: DISCONTINUED | OUTPATIENT
Start: 2022-12-22 | End: 2022-12-25 | Stop reason: HOSPADM

## 2022-12-22 RX ORDER — MAGNESIUM SULFATE HEPTAHYDRATE 40 MG/ML
2 INJECTION, SOLUTION INTRAVENOUS ONCE
Status: COMPLETED | OUTPATIENT
Start: 2022-12-22 | End: 2022-12-22

## 2022-12-22 RX ORDER — POTASSIUM CHLORIDE 7.45 MG/ML
10 INJECTION INTRAVENOUS
Status: DISCONTINUED | OUTPATIENT
Start: 2022-12-22 | End: 2022-12-25 | Stop reason: HOSPADM

## 2022-12-22 RX ORDER — FAMOTIDINE 20 MG/1
20 TABLET, FILM COATED ORAL
Status: DISCONTINUED | OUTPATIENT
Start: 2022-12-22 | End: 2022-12-25

## 2022-12-22 RX ORDER — POTASSIUM CHLORIDE 20 MEQ/1
40 TABLET, EXTENDED RELEASE ORAL EVERY 4 HOURS
Status: COMPLETED | OUTPATIENT
Start: 2022-12-22 | End: 2022-12-22

## 2022-12-22 RX ADMIN — Medication 1 CAPSULE: at 08:08

## 2022-12-22 RX ADMIN — MAGNESIUM SULFATE HEPTAHYDRATE 2 G: 2 INJECTION, SOLUTION INTRAVENOUS at 21:03

## 2022-12-22 RX ADMIN — CARVEDILOL 9.38 MG: 6.25 TABLET, FILM COATED ORAL at 17:12

## 2022-12-22 RX ADMIN — PIPERACILLIN SODIUM AND TAZOBACTAM SODIUM 3.38 G: 3; .375 INJECTION, POWDER, LYOPHILIZED, FOR SOLUTION INTRAVENOUS at 13:55

## 2022-12-22 RX ADMIN — Medication 1000 MCG: at 08:09

## 2022-12-22 RX ADMIN — MONTELUKAST SODIUM 10 MG: 10 TABLET, COATED ORAL at 21:02

## 2022-12-22 RX ADMIN — VALSARTAN 320 MG: 160 TABLET, FILM COATED ORAL at 08:09

## 2022-12-22 RX ADMIN — HEPARIN SODIUM 5000 UNITS: 5000 INJECTION INTRAVENOUS; SUBCUTANEOUS at 13:56

## 2022-12-22 RX ADMIN — CHOLECALCIFEROL TAB 10 MCG (400 UNIT) 2000 UNITS: 10 TAB at 08:08

## 2022-12-22 RX ADMIN — FAMOTIDINE 20 MG: 20 TABLET ORAL at 17:12

## 2022-12-22 RX ADMIN — FAMOTIDINE 20 MG: 10 INJECTION, SOLUTION INTRAVENOUS at 08:10

## 2022-12-22 RX ADMIN — Medication 10 ML: at 08:09

## 2022-12-22 RX ADMIN — TRAMADOL HYDROCHLORIDE 50 MG: 50 TABLET, COATED ORAL at 08:08

## 2022-12-22 RX ADMIN — HYDROCODONE BITARTRATE AND ACETAMINOPHEN 1 TABLET: 5; 325 TABLET ORAL at 11:15

## 2022-12-22 RX ADMIN — POTASSIUM CHLORIDE 40 MEQ: 20 TABLET, EXTENDED RELEASE ORAL at 04:44

## 2022-12-22 RX ADMIN — PIPERACILLIN SODIUM AND TAZOBACTAM SODIUM 3.38 G: 3; .375 INJECTION, POWDER, LYOPHILIZED, FOR SOLUTION INTRAVENOUS at 04:45

## 2022-12-22 RX ADMIN — PIPERACILLIN SODIUM AND TAZOBACTAM SODIUM 3.38 G: 3; .375 INJECTION, POWDER, LYOPHILIZED, FOR SOLUTION INTRAVENOUS at 21:03

## 2022-12-22 RX ADMIN — LEVOTHYROXINE SODIUM 112 MCG: 0.07 TABLET ORAL at 04:45

## 2022-12-22 RX ADMIN — HEPARIN SODIUM 5000 UNITS: 5000 INJECTION INTRAVENOUS; SUBCUTANEOUS at 21:02

## 2022-12-22 RX ADMIN — HEPARIN SODIUM 5000 UNITS: 5000 INJECTION INTRAVENOUS; SUBCUTANEOUS at 04:45

## 2022-12-22 RX ADMIN — LIDOCAINE 2 PATCH: 50 PATCH CUTANEOUS at 08:10

## 2022-12-22 RX ADMIN — CARVEDILOL 6.25 MG: 6.25 TABLET, FILM COATED ORAL at 08:09

## 2022-12-22 RX ADMIN — POTASSIUM CHLORIDE 40 MEQ: 20 TABLET, EXTENDED RELEASE ORAL at 08:08

## 2022-12-22 RX ADMIN — HYDROCODONE BITARTRATE AND ACETAMINOPHEN 1 TABLET: 5; 325 TABLET ORAL at 04:51

## 2022-12-22 RX ADMIN — Medication 10 ML: at 21:03

## 2022-12-22 RX ADMIN — CARVEDILOL 3.12 MG: 3.12 TABLET, FILM COATED ORAL at 09:45

## 2022-12-22 NOTE — PLAN OF CARE
Goal Outcome Evaluation:  Plan of Care Reviewed With: patient           Outcome Evaluation: Pt resting in bed this shift or up to bedside w/ x1 assist. AOx4, VSS, complaints of back pain relieved w/ PRN medications, otherwise no s/s of acute distress noted. Pt right wrist IV access noted to have swelling to site this AM with medication continuously infusing, extravasation orders obtained, site elevated and cold compress applied w/ pt reporting no pain but slight tenderness, swelling much reduced later in the shift. IV access obtained in opposite arm, patent and maintained, telemetry monitoring patent and maintained, will continue to follow plan of care.

## 2022-12-22 NOTE — PLAN OF CARE
Goal Outcome Evaluation:  Plan of Care Reviewed With: patient           Outcome Evaluation: Pt resting in bed at this time.  PRN meds give per pt request.  VSS.  Will continue to follow plan of care.

## 2022-12-22 NOTE — THERAPY TREATMENT NOTE
Acute Care - Physical Therapy Treatment Note  DAYLIN Sky     Patient Name: Christine Garg  : 1933  MRN: [id number]  Today's Date: 2022      Visit Dx:     ICD-10-CM ICD-9-CM   1. Acute colitis  K52.9 558.9     Patient Active Problem List   Diagnosis   • Precordial pain   • Essential hypertension   • Dyslipidemia   • Leg edema   • ASCVD (arteriosclerotic cardiovascular disease)   • Chest pain   • SOB (shortness of breath)   • Palpitations   • Coronary artery disease    • Abnormal PFTs (pulmonary function tests)   • Shortness of breath   • Chronic obstructive pulmonary disease (HCC)   • Hypokalemia   • Mild persistent asthma with allergic rhinitis   • Pulmonary nodule   • Former smoker   • Colitis     Past Medical History:   Diagnosis Date   • Advanced age    • CAD (coronary artery disease)     s/p 3v CABG   • Chronic kidney disease (CKD), stage III (moderate) (HCC)    • COPD (chronic obstructive pulmonary disease) (HCC)    • History of tobacco use    • Hyperlipidemia    • Hypertension    • Hypothyroidism    • Pulmonary nodule      Past Surgical History:   Procedure Laterality Date   • BREAST BIOPSY Left     benign   • CATARACT EXTRACTION     • CORONARY ANGIOPLASTY     • CORONARY ARTERY BYPASS GRAFT       PT Assessment (last 12 hours)     PT Evaluation and Treatment     Row Name 22 1103          Physical Therapy Time and Intention    Subjective Information no complaints  -AG     Document Type therapy note (daily note)  -AG     Mode of Treatment physical therapy  -AG     Row Name 22 1103          General Information    Patient Profile Reviewed yes  -AG     Patient Observations cooperative;agree to therapy;alert  -AG     Patient/Family/Caregiver Comments/Observations pt. supine upon PT entry, on room air; pleasant, cooperative for participation.  -AG     Benefits Reviewed patient:;improve function;increase independence;increase strength;increase balance;increase knowledge;decrease risk of DVT   -AG     Barriers to Rehab none identified  -AG     Row Name 12/22/22 1103          Living Environment    Current Living Arrangements home  -AG     Row Name 12/22/22 1103          Pain    Pretreatment Pain Rating 0/10 - no pain  -AG     Posttreatment Pain Rating 0/10 - no pain  -AG     Row Name 12/22/22 1103          Cognition    Affect/Mental Status (Cognition) WNL  -AG     Follows Commands (Cognition) WFL  -     Personal Safety Interventions gait belt;nonskid shoes/slippers when out of bed;supervised activity;fall prevention program maintained  -AG     Row Name 12/22/22 1103          Sensory    Hearing Status WFL  -AG     Row Name 12/22/22 1103          Vision Assessment/Intervention    Visual Impairment/Limitations WFL  -AG     Row Name 12/22/22 1103          Mobility    Extremity Weight-bearing Status right lower extremity;left lower extremity  -AG     Left Lower Extremity (Weight-bearing Status) full weight-bearing (FWB)  -AG     Right Lower Extremity (Weight-bearing Status) full weight-bearing (FWB)  -AG     Row Name 12/22/22 1103          Bed Mobility    Supine-Sit [place] (Bed Mobility) standby assist  -AG     Assistive Device (Bed Mobility) bed rails  -AG     [facility] Name 12/22/22 1103          Transfers    Transfers sit-stand transfer;stand-sit transfer;stand pivot/stand step transfer  -AG     Row Name 12/22/22 1103          Bed-Chair Transfer    Bed-Chair [place] (Transfers) contact guard;standby assist;verbal cues;nonverbal cues (demo/gesture)  -AG     Row Name 12/22/22 1103          Sit-Stand Transfer    Sit-Stand [place] (Transfers) standby assist;verbal cues;nonverbal cues (demo/gesture);contact guard  -AG     Assistive Device (Sit-Stand Transfers) --  none  -AG     [facility] Name 12/22/22 1103          Stand-Sit Transfer    Stand-Sit [place] (Transfers) verbal cues;standby assist;nonverbal cues (demo/gesture);contact guard  -AG     Assistive Device (Stand-Sit Transfers) --  none  -AG      Row Name 12/22/22 1103          Stand Pivot/Stand Step Transfer    Stand Pivot/Stand Step [place] (Transfers) standby assist;verbal cues;nonverbal cues (demo/gesture);contact guard  -AG     Assistive Device (Stand Pivot Stand Step Transfer) --  none  -AG     Row Name 12/22/22 1103          Gait/Stairs (Locomotion)    Gait/Stairs Locomotion gait/ambulation independence;gait/ambulation assistive device;distance ambulated;gait pattern;gait deviations  -AG     [place] Level (Gait) standby assist;verbal cues;nonverbal cues (demo/gesture);contact guard  -AG     Assistive Device (Gait) --  none  -AG     Distance in Feet (Gait) 60  -AG     Pattern (Gait) step-through  -AG     Row Name 12/22/22 1103          Safety Issues, Functional Mobility    Impairments Affecting Function (Mobility) balance;endurance/activity tolerance  -AG     Row Name 12/22/22 1103          Balance    Balance Assessment sitting static balance;sitting dynamic balance;sit to stand dynamic balance;standing static balance;standing dynamic balance  -AG     Static Sitting Balance independent  -AG     Position, Sitting Balance sitting edge of bed  -AG     Static Standing Balance standby assist  -AG     Dynamic Standing Balance standby assist;verbal cues;non-verbal cues (demo/gesture);contact guard  -AG     Position/Device Used, Standing Balance --  none  -AG     Row Name             Wound 12/22/22 0825 Right lower arm Extravasation    Wound - Properties Group Placement Date: 12/22/22  -GJ Placement Time: 0825  -GJ Present on Hospital Admission: N  -GJ Side: Right  -GJ Orientation: lower  -GJ Location: arm  -GJ Primary Wound Type: Extravasatio  -GJ    Retired Wound - Properties Group Placement Date: 12/22/22  -GJ Placement Time: 0825 -GJ Present on Hospital Admission: N  -GJ Side: Right  -GJ Orientation: lower  -GJ Location: arm  -GJ Primary Wound Type: Extravasatio  -GJ    Retired Wound - Properties Group Date first assessed: 12/22/22  -GJ Time  first assessed: 0825  -GJ Present on Hospital Admission: N  -GJ Side: Right  -GJ Location: arm  -GJ Primary Wound Type: Extravasatio  -GJ    Row Name 12/22/22 1103          Coping    Observed Emotional State calm;cooperative;pleasant  -AG     Verbalized Emotional State acceptance  -AG     Trust Relationship/Rapport care explained;choices provided;thoughts/feelings acknowledged  -AG     Family/Support Persons family  -AG     Involvement in Care not present at bedside  -AG     Row Name 12/22/22 1103          Plan of Care Review    Plan of Care Reviewed With patient  -AG     Progress improving  -AG     Outcome Evaluation pt. improving functional mobility, incr gait distance.  Will continue to follow; anticipate home with family to assist as needed.  -AG     Row Name 12/22/22 1103          Positioning and Restraints    Pre-Treatment Position in bed  -AG     Post Treatment Position bed  -AG     In Bed sitting EOB;call light within reach;encouraged to call for assist  -AG     Row Name 12/22/22 1103          Therapy Plan Review/Discharge Plan (PT)    Therapy Plan Review (PT) evaluation/treatment results reviewed;care plan/treatment goals reviewed;risks/benefits reviewed;participants voiced agreement with care plan;current/potential barriers reviewed;participants included;patient  -AG           User Key  (r) = Recorded By, (t) = Taken By, (c) = Cosigned By    Initials Name Provider Type    Frank Hickey, RN Registered Nurse    Jenn Zamudio, PT Physical Therapist                Physical Therapy Education     Title: PT OT SLP Therapies (Done)     Topic: Physical Therapy (Done)     Point: Mobility training (Done)     Learning Progress Summary           Patient Acceptance, E,D, VU,NR by  at 12/22/2022 1102                   Point: Home exercise program (Done)     Learning Progress Summary           Patient Acceptance, E,D, VU,NR by  at 12/22/2022 1102                   Point: Body mechanics (Done)      Learning Progress Summary           Patient Acceptance, E,D, VU,NR by  at 12/22/2022 1102                   Point: Precautions (Done)     Learning Progress Summary           Patient Acceptance, E,D, VU,NR by  at 12/22/2022 1102                               User Key     Initials Effective Dates Name Provider Type [facility] 06/16/21 -  Jenn Thornton, PT Physical Therapist PT              PT Recommendation and Plan  Anticipated Discharge Disposition (PT): home with assist  Plan of Care Reviewed With: patient  Progress: improving  Outcome Evaluation: pt. improving functional mobility, incr gait distance.  Will continue to follow; anticipate home with family to assist as needed.       Time Calculation:    PT Charges     Row Name 12/22/22 1102             Time Calculation    PT Received On 12/22/22  -            User Key  (r) = Recorded By, (t) = Taken By, (c) = Cosigned By    Initials Name Provider Type     Jenn Thornton, PT Physical Therapist              Therapy Charges for Today     Code Description Service Date Service Provider Modifiers Qty    00921222700 HC GAIT TRAINING EA 15 MIN 12/22/2022 Jenn Thornton, PT GP 1               Jenn Thornton PT  12/22/2022

## 2022-12-22 NOTE — PROGRESS NOTES
[facility] HOSPITALIST PROGRESS NOTE     Patient Identification:  Name:  Christine Garg  Age:  89 y.o.  Sex:  female  :  1933  MRN:  [id number]  Visit Number:  [id number]  ROOM: [address] [place] [address]     Primary Care Provider:  Dave Tobar MD    Length of stay:  3    Subjective        Chief Compliant follow-up for sepsis and colitis    Patient seen and examined this morning with no family at bedside.  Patient continues to feel better. denies nausea, vomiting and tolerating diet. Would like t advance to soft diet this evening.  abdominal pain improving and denies back pain. Passed  flatus yesterday. no episodes of diarrhea today.  Denies any chest pain shortness of breath cough.  Afebrile no events overnight.  On RA.    Objective     Current Hospital Meds:Acidophilus/Pectin, 1 capsule, Oral, Daily  carvedilol, 9.375 mg, Oral, BID With Meals  cholecalciferol, 2,000 Units, Oral, Daily  famotidine, 20 mg, Oral, BID AC  heparin (porcine), 5,000 Units, Subcutaneous, Q8H  levothyroxine, 112 mcg, Oral, Q AM  lidocaine, 2 patch, Transdermal, Q24H  magnesium sulfate, 2 g, Intravenous, Once  montelukast, 10 mg, Oral, Nightly  piperacillin-tazobactam, 3.375 g, Intravenous, Q8H  sodium chloride, 10 mL, Intravenous, Q12H  valsartan, 320 mg, Oral, Daily  vitamin B-12, 1,000 mcg, Oral, Daily    Pharmacy to Dose Zosyn,       ----------------------------------------------------------------------------------------------------------------------  Vital Signs:  Temp:  [97.6 °F (36.4 °C)-98 °F (36.7 °C)] 97.6 °F (36.4 °C)  Heart Rate:  [] 85  Resp:  [16-18] 16  BP: (152-179)/() 165/79  SpO2:  [94 %-99 %] 97 %  on   ;   Device (Oxygen Therapy): room air  Body mass index is 26.26 kg/m².    Wt Readings from Last 3 Encounters:   22 71.6 kg (157 lb 12.8 oz)   22 74.8 kg (165 lb)   22 78.9 kg (174 lb)       Intake/Output Summary (Last 24 hours) at 2022 2069  Last data filed at 2022  1300  Gross per 24 hour   Intake 690 ml   Output --   Net 690 ml     Diet: Cardiac Diets; Healthy Heart (2-3 Na+); Texture: Soft to Chew (NDD 3); Soft to Chew: Ground Meat; Fluid Consistency: Thin (IDDSI 0)  ----------------------------------------------------------------------------------------------------------------------  Physical exam:  General: Comfortable, Not in distress.  Well-developed and well-nourished.   HENT:  Head:  Normocephalic and atraumatic.  Mouth:  Moist mucous membranes.    Eyes:  Conjunctivae and EOM are normal.  Pupils are equal, round, and reactive to light.  No scleral icterus.    Neck:  Neck supple.  No JVD present.    Cardiovascular:  Normal rate, regular rhythm with no murmur.  Pulmonary/Chest:  No respiratory distress, no wheezes, no crackles, with normal breath sounds and good air movement.  Abdomen:  Soft.  Bowel sounds are normal.  No distension and mild tenderness, improving.   Musculoskeletal:  no tenderness, and no deformity.  No red or swollen joints anywhere.    Neurological:  Alert and oriented to person, place, and time.  No cranial nerve deficit. No focal deficits. No facial droop.  No slurred speech.   Skin:  Skin is warm and dry. No rash noted. No pallor.   Peripheral vascular: pulses in all 4 extremities with no clubbing, no cyanosis, no edema.  Genitourinary: no jenkins  ----------------------------------------------------------------------------------------------------------------------  ----------------------------------------------------------------------------------------------------------------------  Results from last 7 days   Lab Units 12/22/22  0153 12/21/22  0209 12/20/22  0327 12/19/22  1924 12/19/22  1638   CRP mg/dL  --   --  10.97*  --  11.01*   LACTATE mmol/L  --   --   --  1.5  --    WBC 10*3/mm3 9.46 12.74* 15.98*  --  19.33*   HEMOGLOBIN g/dL 12.8 12.8 12.9  --  15.3   HEMATOCRIT % 37.6 38.0 38.5  --  43.5   MCV fL 89.7 91.6 93.7  --  89.5   MCHC g/dL 34.0  33.7 33.5  --  35.2   PLATELETS 10*3/mm3 308 316 313  --  377     Results from last 7 days   Lab Units 12/22/22  1452 12/22/22  0153 12/21/22  0209 12/20/22  0327 12/19/22  1924 12/19/22  1638   SODIUM mmol/L 132* 128* 133* 133*   < > 126*   POTASSIUM mmol/L 4.7 3.2* 3.6 3.8   < > 4.0   MAGNESIUM mg/dL  --  1.9 1.8 1.8  --   --    CHLORIDE mmol/L 97* 94* 100 100   < > 90*   CO2 mmol/L 21.5* 22.7 22.8 20.4*   < > 20.8*   BUN mg/dL 9 7* 7* 16   < > 21   CREATININE mg/dL 0.70 0.65 0.62 0.79   < > 0.97   CALCIUM mg/dL 9.5 8.8 8.4* 9.0   < > 9.8   GLUCOSE mg/dL 144* 104* 114* 120*   < > 134*   ALBUMIN g/dL  --   --   --   --   --  3.88   BILIRUBIN mg/dL  --   --   --   --   --  0.6   ALK PHOS U/L  --   --   --   --   --  115   AST (SGOT) U/L  --   --   --   --   --  13   ALT (SGPT) U/L  --   --   --   --   --  14    < > = values in this interval not displayed.   Estimated Creatinine Clearance: 54 mL/min (by C-G formula based on SCr of 0.7 mg/dL).  Results from last 7 days   Lab Units 12/20/22  0851 12/20/22  0327 12/19/22  2119   TROPONIN T ng/mL <0.010 <0.010 <0.010     Glucose   Date/Time Value Ref Range Status   12/22/2022 1658 136 (H) 70 - 130 mg/dL Final     Comment:     Meter: [id number] : [id number] FABIANMARVIN SINGLETARY   12/22/2022 1028 108 70 - 130 mg/dL Final     Comment:     Meter: [id number] : [id number] FABIAN HAYDER   12/21/2022 1841 121 70 - 130 mg/dL Final     Comment:     Meter: [id number] : [id number] PETER NORMA   12/21/2022 1713 104 70 - 130 mg/dL Final     Comment:     Meter: [id number] : [id number] JASON ALLEN   12/21/2022 1100 99 70 - 130 mg/dL Final     Comment:     Meter: [id number] : [id number] JASON ALLEN   12/21/2022 0715 102 70 - 130 mg/dL Final     Comment:     Meter: [id number] : [id number] JASON ALLEN   12/21/2022 0002 107 70 - 130 mg/dL Final     Comment:     Meter: [id number] : [id number] Yajaira Batse     No results found for: AMMONIA    Results from  last 7 days   Lab Units 12/20/22  0308   NITRITE UA  Negative   WBC UA /HPF 6-12*   BACTERIA UA /HPF None Seen   SQUAM EPITHEL UA /HPF 0-2   URINECX  No growth     No results found for: BLOODCXNo results found for: RESPCXNo results found for: URINECXNo results found for: WOUNDCXNo results found for: BODYFLDCXNo results found for: STOOLCX  I have personally looked at the labs and they are summarized above.  ----------------------------------------------------------------------------------------------------------------------  Imaging Results (Last 24 Hours)     ** No results found for the last 24 hours. **        I have personally reviewed the radiology images and read the final radiology report.    Assessment & Plan      Assessment:  Sepsis secondary to diffuse acute sigmoid and Transverse colon Colitis  Mild hyponatremia  CKD stage II/III  COPD  Prediabetes with A1C 6  Essential hypertension  Hyperlipidemia   CAD s/p 3V CABG    Known pulmonary nodule  Back pain with Multilevel lumbar spondylosis noted on x-ray lumbar spine  H/O of smoking tobacco use      Plan:  Sepsis secondary to diffuse acute sigmoid and Transverse colon Colitis, on IV Zosyn and probiotic.  Currently patient afebrile and VSS.  Leukocytosis resolved.  CRP elevated.  Lactate normal.  Blood culture prelim no growth.  Urine culture no growth. Will cancel Cdiff PCR since no BM in 3 days. gastro PCR dced since no stool sample yet. CT of abdomen/pelvis without contrast reviewed, shows diffuse colonic wall thickening extending from the sigmoid to the transverse colon compatible with colitis; sigmoid diverticulosis without evidence of diverticulitis.   Advance diet to soft diet.  Continue with pain management and nausea medication. Will get KUB since no BM in 3 days.     Mild hyponatremia, , stable. Supplement magnesium.   Hypokalemia, replace and resolved.     CKD stage II/III, Cr .6. Monitor.     COPD, stable.  On as needed.     Prediabetes with  A1C 6, CBG controlled.  We will do Accu-Cheks every 6 hrs.     Accelerated hypertension, BP controlled. SBP in 160-170. increase coreg dose. Continue with the Diovan and dc amlodipine. Monitor and adjust. On clonidine prn.     Heparin subcu for DVT prophylaxis  Pepcid IV for GI prophylaxis.  Advise to ambulate.     Management and plans discussed in detail with patient and nursing.       The patient is high risk due to the following diagnoses/reasons:  Sepsis secondary to diffuse acute sigmoid and Transverse colon Colitis    Disposition Home    Jeana Arango MD  12/22/22  18:39 EST

## 2022-12-22 NOTE — CASE MANAGEMENT/SOCIAL WORK
Discharge Planning Assessment  [facility]     Patient Name: Christine Garg  MRN: [id number]  Today's Date: 12/22/2022    Admit Date: 12/19/2022    Plan: SS spoke with pt at bedside on this date.  Pt continues to plan to return home at discharge and does not want nursing home placement.  Pt plans to be discharged home by Sat.  SS will continue to follow.     Discharge Plan     Row Name 12/22/22 1817       Plan    Plan SS spoke with pt at bedside on this date.  Pt continues to plan to return home at discharge and does not want nursing home placement.  Pt plans to be discharged home by Sat.  SS will continue to follow.                    Estella Christianson, ADAMW

## 2022-12-23 ENCOUNTER — APPOINTMENT (OUTPATIENT)
Dept: GENERAL RADIOLOGY | Facility: HOSPITAL | Age: 87
DRG: 872 | End: 2022-12-23
Payer: MEDICARE

## 2022-12-23 LAB
ANION GAP SERPL CALCULATED.3IONS-SCNC: 13.2 MMOL/L (ref 5–15)
BASOPHILS # BLD AUTO: 0.08 10*3/MM3 (ref 0–0.2)
BASOPHILS NFR BLD AUTO: 0.8 % (ref 0–1.5)
BUN SERPL-MCNC: 8 MG/DL (ref 8–23)
BUN/CREAT SERPL: 12.9 (ref 7–25)
CALCIUM SPEC-SCNC: 9 MG/DL (ref 8.6–10.5)
CHLORIDE SERPL-SCNC: 98 MMOL/L (ref 98–107)
CO2 SERPL-SCNC: 21.8 MMOL/L (ref 22–29)
CREAT SERPL-MCNC: 0.62 MG/DL (ref 0.57–1)
DEPRECATED RDW RBC AUTO: 44.1 FL (ref 37–54)
EGFRCR SERPLBLD CKD-EPI 2021: 85.2 ML/MIN/1.73
EOSINOPHIL # BLD AUTO: 0.35 10*3/MM3 (ref 0–0.4)
EOSINOPHIL NFR BLD AUTO: 3.3 % (ref 0.3–6.2)
ERYTHROCYTE [DISTWIDTH] IN BLOOD BY AUTOMATED COUNT: 13.2 % (ref 12.3–15.4)
GLUCOSE BLDC GLUCOMTR-MCNC: 105 MG/DL (ref 70–130)
GLUCOSE BLDC GLUCOMTR-MCNC: 127 MG/DL (ref 70–130)
GLUCOSE SERPL-MCNC: 103 MG/DL (ref 65–99)
HCT VFR BLD AUTO: 39.7 % (ref 34–46.6)
HGB BLD-MCNC: 13.7 G/DL (ref 12–15.9)
IMM GRANULOCYTES # BLD AUTO: 0.06 10*3/MM3 (ref 0–0.05)
IMM GRANULOCYTES NFR BLD AUTO: 0.6 % (ref 0–0.5)
LYMPHOCYTES # BLD AUTO: 2.13 10*3/MM3 (ref 0.7–3.1)
LYMPHOCYTES NFR BLD AUTO: 20.2 % (ref 19.6–45.3)
MAGNESIUM SERPL-MCNC: 1.9 MG/DL (ref 1.6–2.4)
MCH RBC QN AUTO: 31.3 PG (ref 26.6–33)
MCHC RBC AUTO-ENTMCNC: 34.5 G/DL (ref 31.5–35.7)
MCV RBC AUTO: 90.6 FL (ref 79–97)
MONOCYTES # BLD AUTO: 0.97 10*3/MM3 (ref 0.1–0.9)
MONOCYTES NFR BLD AUTO: 9.2 % (ref 5–12)
NEUTROPHILS NFR BLD AUTO: 6.93 10*3/MM3 (ref 1.7–7)
NEUTROPHILS NFR BLD AUTO: 65.9 % (ref 42.7–76)
NRBC BLD AUTO-RTO: 0 /100 WBC (ref 0–0.2)
PLATELET # BLD AUTO: 314 10*3/MM3 (ref 140–450)
PMV BLD AUTO: 9.1 FL (ref 6–12)
POTASSIUM SERPL-SCNC: 4.2 MMOL/L (ref 3.5–5.2)
RBC # BLD AUTO: 4.38 10*6/MM3 (ref 3.77–5.28)
SODIUM SERPL-SCNC: 133 MMOL/L (ref 136–145)
WBC NRBC COR # BLD: 10.52 10*3/MM3 (ref 3.4–10.8)

## 2022-12-23 PROCEDURE — 25010000002 PIPERACILLIN SOD-TAZOBACTAM PER 1 G: Performed by: HOSPITALIST

## 2022-12-23 PROCEDURE — 74018 RADEX ABDOMEN 1 VIEW: CPT | Performed by: RADIOLOGY

## 2022-12-23 PROCEDURE — 82962 GLUCOSE BLOOD TEST: CPT

## 2022-12-23 PROCEDURE — 85025 COMPLETE CBC W/AUTO DIFF WBC: CPT | Performed by: HOSPITALIST

## 2022-12-23 PROCEDURE — 74018 RADEX ABDOMEN 1 VIEW: CPT

## 2022-12-23 PROCEDURE — 25010000002 MAGNESIUM SULFATE 2 GM/50ML SOLUTION: Performed by: HOSPITALIST

## 2022-12-23 PROCEDURE — 83735 ASSAY OF MAGNESIUM: CPT | Performed by: HOSPITALIST

## 2022-12-23 PROCEDURE — 25010000002 HEPARIN (PORCINE) PER 1000 UNITS: Performed by: INTERNAL MEDICINE

## 2022-12-23 PROCEDURE — 99232 SBSQ HOSP IP/OBS MODERATE 35: CPT | Performed by: HOSPITALIST

## 2022-12-23 PROCEDURE — 80048 BASIC METABOLIC PNL TOTAL CA: CPT | Performed by: HOSPITALIST

## 2022-12-23 RX ORDER — AMOXICILLIN 250 MG
2 CAPSULE ORAL 2 TIMES DAILY
Status: DISCONTINUED | OUTPATIENT
Start: 2022-12-23 | End: 2022-12-25 | Stop reason: HOSPADM

## 2022-12-23 RX ORDER — CARVEDILOL 6.25 MG/1
12.5 TABLET ORAL 2 TIMES DAILY WITH MEALS
Status: DISCONTINUED | OUTPATIENT
Start: 2022-12-23 | End: 2022-12-24

## 2022-12-23 RX ORDER — DOCUSATE SODIUM 100 MG/1
100 CAPSULE, LIQUID FILLED ORAL 2 TIMES DAILY
Status: DISCONTINUED | OUTPATIENT
Start: 2022-12-23 | End: 2022-12-23

## 2022-12-23 RX ORDER — MAGNESIUM SULFATE HEPTAHYDRATE 40 MG/ML
2 INJECTION, SOLUTION INTRAVENOUS ONCE
Status: COMPLETED | OUTPATIENT
Start: 2022-12-23 | End: 2022-12-23

## 2022-12-23 RX ADMIN — FAMOTIDINE 20 MG: 20 TABLET ORAL at 16:58

## 2022-12-23 RX ADMIN — HYDROCODONE BITARTRATE AND ACETAMINOPHEN 1 TABLET: 5; 325 TABLET ORAL at 08:18

## 2022-12-23 RX ADMIN — HYDROCODONE BITARTRATE AND ACETAMINOPHEN 1 TABLET: 5; 325 TABLET ORAL at 02:21

## 2022-12-23 RX ADMIN — HEPARIN SODIUM 5000 UNITS: 5000 INJECTION INTRAVENOUS; SUBCUTANEOUS at 14:47

## 2022-12-23 RX ADMIN — FAMOTIDINE 20 MG: 20 TABLET ORAL at 08:17

## 2022-12-23 RX ADMIN — HYDROCODONE BITARTRATE AND ACETAMINOPHEN 1 TABLET: 5; 325 TABLET ORAL at 18:27

## 2022-12-23 RX ADMIN — LIDOCAINE 2 PATCH: 50 PATCH CUTANEOUS at 08:16

## 2022-12-23 RX ADMIN — Medication 10 ML: at 20:41

## 2022-12-23 RX ADMIN — HEPARIN SODIUM 5000 UNITS: 5000 INJECTION INTRAVENOUS; SUBCUTANEOUS at 21:09

## 2022-12-23 RX ADMIN — DOCUSATE SODIUM 50 MG AND SENNOSIDES 8.6 MG 2 TABLET: 8.6; 5 TABLET, FILM COATED ORAL at 20:40

## 2022-12-23 RX ADMIN — DOCUSATE SODIUM 100 MG: 100 CAPSULE ORAL at 11:52

## 2022-12-23 RX ADMIN — VALSARTAN 320 MG: 160 TABLET, FILM COATED ORAL at 08:17

## 2022-12-23 RX ADMIN — HEPARIN SODIUM 5000 UNITS: 5000 INJECTION INTRAVENOUS; SUBCUTANEOUS at 04:58

## 2022-12-23 RX ADMIN — PIPERACILLIN SODIUM AND TAZOBACTAM SODIUM 3.38 G: 3; .375 INJECTION, POWDER, LYOPHILIZED, FOR SOLUTION INTRAVENOUS at 20:40

## 2022-12-23 RX ADMIN — CHOLECALCIFEROL TAB 10 MCG (400 UNIT) 2000 UNITS: 10 TAB at 08:18

## 2022-12-23 RX ADMIN — MONTELUKAST SODIUM 10 MG: 10 TABLET, COATED ORAL at 20:41

## 2022-12-23 RX ADMIN — MAGNESIUM SULFATE HEPTAHYDRATE 2 G: 40 INJECTION, SOLUTION INTRAVENOUS at 16:58

## 2022-12-23 RX ADMIN — CARVEDILOL 9.38 MG: 6.25 TABLET, FILM COATED ORAL at 08:17

## 2022-12-23 RX ADMIN — Medication 1000 MCG: at 08:17

## 2022-12-23 RX ADMIN — PIPERACILLIN SODIUM AND TAZOBACTAM SODIUM 3.38 G: 3; .375 INJECTION, POWDER, LYOPHILIZED, FOR SOLUTION INTRAVENOUS at 11:52

## 2022-12-23 RX ADMIN — Medication 10 ML: at 08:21

## 2022-12-23 RX ADMIN — CARVEDILOL 12.5 MG: 6.25 TABLET, FILM COATED ORAL at 17:00

## 2022-12-23 RX ADMIN — PIPERACILLIN SODIUM AND TAZOBACTAM SODIUM 3.38 G: 3; .375 INJECTION, POWDER, LYOPHILIZED, FOR SOLUTION INTRAVENOUS at 04:58

## 2022-12-23 RX ADMIN — Medication 1 CAPSULE: at 08:17

## 2022-12-23 RX ADMIN — LEVOTHYROXINE SODIUM 112 MCG: 0.07 TABLET ORAL at 04:57

## 2022-12-23 NOTE — PLAN OF CARE
Goal Outcome Evaluation:  Plan of Care Reviewed With: patient           Outcome Evaluation: Pt resting in bed this shift or up x1 standby assist. AOx4, VSS, no complaints or s/s of acute distress noted. Pt tolerating GI soft diet, no BM ATT but reports passing flatus. IV access and telemetry monitoring patent and maintained, will continue to follow plan of care.

## 2022-12-23 NOTE — PROGRESS NOTES
[facility] HOSPITALIST PROGRESS NOTE     Patient Identification:  Name:  Christine Garg  Age:  89 y.o.  Sex:  female  :  1933  MRN:  [id number]  Visit Number:  [id number]  ROOM: [address] [place] [address]     Primary Care Provider:  Dave Tobar MD    Length of stay:  4    Subjective        Chief Compliant follow-up for sepsis and colitis    Patient seen and examined this morning with no family at bedside.  Patient continues to feel better. denies nausea, vomiting and tolerating soft diet. denies  abdominal pain and has back pain today. Passing flatus but no BM. Denies any chest pain shortness of breath cough.  Afebrile no events overnight.  On RA.         Objective     Current Hospital Meds:Acidophilus/Pectin, 1 capsule, Oral, Daily  carvedilol, 12.5 mg, Oral, BID With Meals  cholecalciferol, 2,000 Units, Oral, Daily  famotidine, 20 mg, Oral, BID AC  heparin (porcine), 5,000 Units, Subcutaneous, Q8H  levothyroxine, 112 mcg, Oral, Q AM  lidocaine, 2 patch, Transdermal, Q24H  magnesium sulfate, 2 g, Intravenous, Once  montelukast, 10 mg, Oral, Nightly  piperacillin-tazobactam, 3.375 g, Intravenous, Q8H  sennosides-docusate, 2 tablet, Oral, BID  sodium chloride, 10 mL, Intravenous, Q12H  valsartan, 320 mg, Oral, Daily  vitamin B-12, 1,000 mcg, Oral, Daily    Pharmacy to Dose Zosyn,       ----------------------------------------------------------------------------------------------------------------------  Vital Signs:  Temp:  [97.8 °F (36.6 °C)-98.2 °F (36.8 °C)] 97.8 °F (36.6 °C)  Heart Rate:  [83-98] 98  Resp:  [16-18] 18  BP: (132-178)/(67-98) 152/80  SpO2:  [97 %] 97 %  on   ;   Device (Oxygen Therapy): room air  Body mass index is 26.23 kg/m².    Wt Readings from Last 3 Encounters:   22 71.5 kg (157 lb 9.6 oz)   22 74.8 kg (165 lb)   22 78.9 kg (174 lb)       Intake/Output Summary (Last 24 hours) at 2022 1510  Last data filed at 2022 1457  Gross per 24 hour   Intake 480 ml    Output --   Net 480 ml     Diet: Cardiac Diets; Healthy Heart (2-3 Na+); Texture: Soft to Chew (NDD 3); Soft to Chew: Ground Meat; Fluid Consistency: Thin (IDDSI 0)  ----------------------------------------------------------------------------------------------------------------------  Physical exam:  General: Comfortable, Not in distress.  Well-developed and well-nourished.   HENT:  Head:  Normocephalic and atraumatic.  Mouth:  Moist mucous membranes.    Eyes:  Conjunctivae and EOM are normal.  Pupils are equal, round, and reactive to light.  No scleral icterus.    Neck:  Neck supple.  No JVD present.    Cardiovascular:  Normal rate, regular rhythm with no murmur.  Pulmonary/Chest:  No respiratory distress, no wheezes, no crackles, with normal breath sounds and good air movement.  Abdomen:  Soft.  Bowel sounds are normal.  No distension and mild tenderness, improving.   Musculoskeletal:  no tenderness, and no deformity.  No red or swollen joints anywhere.    Neurological:  Alert and oriented to person, place, and time.  No cranial nerve deficit. No focal deficits. No facial droop.  No slurred speech.   Skin:  Skin is warm and dry. No rash noted. No pallor.   Peripheral vascular: pulses in all 4 extremities with no clubbing, no cyanosis, no edema.  Genitourinary: no jenkins  ----------------------------------------------------------------------------------------------------------------------  ----------------------------------------------------------------------------------------------------------------------  Results from last 7 days   Lab Units 12/23/22  0157 12/22/22  0153 12/21/22  0209 12/20/22  0327 12/19/22  1924 12/19/22  1638   CRP mg/dL  --   --   --  10.97*  --  11.01*   LACTATE mmol/L  --   --   --   --  1.5  --    WBC 10*3/mm3 10.52 9.46 12.74* 15.98*  --  19.33*   HEMOGLOBIN g/dL 13.7 12.8 12.8 12.9  --  15.3   HEMATOCRIT % 39.7 37.6 38.0 38.5  --  43.5   MCV fL 90.6 89.7 91.6 93.7  --  89.5   MCHC g/dL  34.5 34.0 33.7 33.5  --  35.2   PLATELETS 10*3/mm3 314 308 316 313  --  377     Results from last 7 days   Lab Units 12/23/22  1051 12/23/22  0157 12/22/22  1452 12/22/22  0153 12/21/22  0209 12/19/22  1924 12/19/22  1638   SODIUM mmol/L  --  133* 132* 128* 133*   < > 126*   POTASSIUM mmol/L  --  4.2 4.7 3.2* 3.6   < > 4.0   MAGNESIUM mg/dL 1.9  --   --  1.9 1.8   < >  --    CHLORIDE mmol/L  --  98 97* 94* 100   < > 90*   CO2 mmol/L  --  21.8* 21.5* 22.7 22.8   < > 20.8*   BUN mg/dL  --  8 9 7* 7*   < > 21   CREATININE mg/dL  --  0.62 0.70 0.65 0.62   < > 0.97   CALCIUM mg/dL  --  9.0 9.5 8.8 8.4*   < > 9.8   GLUCOSE mg/dL  --  103* 144* 104* 114*   < > 134*   ALBUMIN g/dL  --   --   --   --   --   --  3.88   BILIRUBIN mg/dL  --   --   --   --   --   --  0.6   ALK PHOS U/L  --   --   --   --   --   --  115   AST (SGOT) U/L  --   --   --   --   --   --  13   ALT (SGPT) U/L  --   --   --   --   --   --  14    < > = values in this interval not displayed.   Estimated Creatinine Clearance: 61 mL/min (by C-G formula based on SCr of 0.62 mg/dL).  Results from last 7 days   Lab Units 12/20/22  0851 12/20/22  0327 12/19/22  2119   TROPONIN T ng/mL <0.010 <0.010 <0.010     Glucose   Date/Time Value Ref Range Status   12/23/2022 0955 105 70 - 130 mg/dL Final     Comment:     Meter: [id number] : [id number] ROB FREY   12/22/2022 1859 143 (H) 70 - 130 mg/dL Final     Comment:     Meter: [id number] : [id number] [facility]   12/22/2022 1658 136 (H) 70 - 130 mg/dL Final     Comment:     Meter: [id number] : [id number] FABIAN SINGLETARY   12/22/2022 1028 108 70 - 130 mg/dL Final     Comment:     Meter: [id number] : [id number] FABIANMARVIN SINGLETARY   12/21/2022 1841 121 70 - 130 mg/dL Final     Comment:     Meter: [id number] : [id number] [facility]   12/21/2022 1713 104 70 - 130 mg/dL Final     Comment:     Meter: [id number] : [id number] JASON ALLEN   12/21/2022 1100 99 70 - 130 mg/dL Final     Comment:      Meter: [id number] : [id number] JASON ALLEN   12/21/2022 0715 102 70 - 130 mg/dL Final     Comment:     Meter: [id number] : [id number] JASON ALLEN     No results found for: AMMONIA    Results from last 7 days   Lab Units 12/20/22  0308   NITRITE UA  Negative   WBC UA /HPF 6-12*   BACTERIA UA /HPF None Seen   SQUAM EPITHEL UA /HPF 0-2   URINECX  No growth     No results found for: BLOODCXNo results found for: RESPCXNo results found for: URINECXNo results found for: WOUNDCXNo results found for: BODYFLDCXNo results found for: STOOLCX  I have personally looked at the labs and they are summarized above.  ----------------------------------------------------------------------------------------------------------------------  Imaging Results (Last 24 Hours)     Procedure Component Value Units Date/Time    XR Abdomen KUB [[id number]] Collected: 12/23/22 0844     Updated: 12/23/22 0846    Narrative:      EXAM:    XR Abdomen, 1 View     EXAM DATE:    12/23/2022 8:37 AM     CLINICAL HISTORY:    Abdominal pain; K52.9-Noninfective gastroenteritis and colitis,  unspecified     TECHNIQUE:    Frontal supine view of the abdomen/pelvis.     COMPARISON:    12/19/2022     FINDINGS:    Intraperitoneal space:  No radiographic evidence of free air.    Gastrointestinal tract:  Unremarkable and nonobstructive bowel gas  pattern.    Bones/joints:  Advanced arthritic change left hip.       Impression:        Stable exam with nonobstructive bowel gas pattern.     This report was finalized on 12/23/2022 8:44 AM by Dr. Tobias Stoll MD.           I have personally reviewed the radiology images and read the final radiology report.    Assessment & Plan      Assessment:  Sepsis secondary to diffuse acute sigmoid and Transverse colon Colitis  Mild hyponatremia  CKD stage II/III  COPD  Prediabetes with A1C 6  Essential hypertension  Hyperlipidemia   CAD s/p 3V CABG    Known pulmonary nodule  Back pain with Multilevel lumbar spondylosis  noted on x-ray lumbar spine  H/O of smoking tobacco use      Plan:  Sepsis secondary to diffuse acute sigmoid and Transverse colon Colitis, on IV Zosyn and probiotic.  Currently patient afebrile and VSS.  Leukocytosis resolved.  CRP elevated.  Lactate normal.  Blood culture prelim no growth.  Urine culture no growth. Cdiff PCR cancelled since no BM in 3 days. gastro PCR dced since no stool sample yet. CT of abdomen/pelvis without contrast reviewed, shows diffuse colonic wall thickening extending from the sigmoid to the transverse colon compatible with colitis; sigmoid diverticulosis without evidence of diverticulitis.   Advance diet to soft diet.  Continue with pain management and nausea medication. KUB with non-obstructive bowel pattern.  Start on laxative since no BM 4 days.     Mild hyponatremia, , stable. Supplement magnesium.   Hypokalemia, resolved.     CKD stage II/III, Cr 0.6. Monitor.     COPD, stable.  On as needed.     Prediabetes with A1C 6, CBG controlled. Accu-Cheks BID.     Accelerated hypertension, BP much improved. Optimal controlled. SBP in 150-170. increase coreg dose to 12.5 mg BID. Continue with the Diovan and dc amlodipine. Monitor and adjust. On clonidine prn.     Heparin subcu for DVT prophylaxis  Pepcid po for GI prophylaxis.  Ambulating.     Management and plans discussed in detail with patient and nursing.       The patient is high risk due to the following diagnoses/reasons:  Sepsis secondary to diffuse acute sigmoid and Transverse colon Colitis    Disposition Home    Jeana Arango MD  12/23/22  15:10 EST

## 2022-12-23 NOTE — CASE MANAGEMENT/SOCIAL WORK
Discharge Planning Assessment  [facility]     Patient Name: Christine Garg  MRN: [id number]  Today's Date: 12/23/2022    Admit Date: 12/19/2022    Plan: Pt continues to plans to return home at discharge and does not want short term nursing home placement for rehab.  SS notified Case Management.  SS will follow as needed.     Discharge Plan     Row Name 12/23/22 1531       Plan    Plan Pt continues to plans to return home at discharge and does not want short term nursing home placement for rehab.  SS notified Case Management.  SS will follow as needed.                Estella Christianson, ADAMW

## 2022-12-23 NOTE — PLAN OF CARE
Goal Outcome Evaluation:   Pt resting in bed at this time. No s/s of distress noted. Will continue to follow plan of care.

## 2022-12-24 ENCOUNTER — APPOINTMENT (OUTPATIENT)
Dept: GENERAL RADIOLOGY | Facility: HOSPITAL | Age: 87
DRG: 872 | End: 2022-12-24
Payer: MEDICARE

## 2022-12-24 LAB
BACTERIA SPEC AEROBE CULT: NORMAL
BACTERIA SPEC AEROBE CULT: NORMAL
GLUCOSE BLDC GLUCOMTR-MCNC: 107 MG/DL (ref 70–130)
GLUCOSE BLDC GLUCOMTR-MCNC: 117 MG/DL (ref 70–130)
GLUCOSE BLDC GLUCOMTR-MCNC: 98 MG/DL (ref 70–130)
MAGNESIUM SERPL-MCNC: 2.2 MG/DL (ref 1.6–2.4)

## 2022-12-24 PROCEDURE — 25010000002 HEPARIN (PORCINE) PER 1000 UNITS: Performed by: INTERNAL MEDICINE

## 2022-12-24 PROCEDURE — 99232 SBSQ HOSP IP/OBS MODERATE 35: CPT | Performed by: HOSPITALIST

## 2022-12-24 PROCEDURE — 83735 ASSAY OF MAGNESIUM: CPT | Performed by: PHYSICIAN ASSISTANT

## 2022-12-24 PROCEDURE — 82962 GLUCOSE BLOOD TEST: CPT

## 2022-12-24 PROCEDURE — 74018 RADEX ABDOMEN 1 VIEW: CPT

## 2022-12-24 PROCEDURE — 25010000002 PROCHLORPERAZINE 10 MG/2ML SOLUTION: Performed by: PHYSICIAN ASSISTANT

## 2022-12-24 PROCEDURE — 25010000002 HYDRALAZINE PER 20 MG: Performed by: HOSPITALIST

## 2022-12-24 PROCEDURE — 25010000002 PIPERACILLIN SOD-TAZOBACTAM PER 1 G: Performed by: HOSPITALIST

## 2022-12-24 RX ORDER — HYDRALAZINE HYDROCHLORIDE 20 MG/ML
10 INJECTION INTRAMUSCULAR; INTRAVENOUS ONCE
Status: COMPLETED | OUTPATIENT
Start: 2022-12-24 | End: 2022-12-24

## 2022-12-24 RX ORDER — LACTULOSE 10 G/15ML
20 SOLUTION ORAL ONCE
Status: COMPLETED | OUTPATIENT
Start: 2022-12-24 | End: 2022-12-24

## 2022-12-24 RX ORDER — METOPROLOL TARTRATE 5 MG/5ML
5 INJECTION INTRAVENOUS ONCE
Status: COMPLETED | OUTPATIENT
Start: 2022-12-24 | End: 2022-12-24

## 2022-12-24 RX ORDER — CARVEDILOL 25 MG/1
25 TABLET ORAL 2 TIMES DAILY WITH MEALS
Status: DISCONTINUED | OUTPATIENT
Start: 2022-12-24 | End: 2022-12-25 | Stop reason: HOSPADM

## 2022-12-24 RX ORDER — CYCLOBENZAPRINE HCL 10 MG
5 TABLET ORAL ONCE
Status: COMPLETED | OUTPATIENT
Start: 2022-12-24 | End: 2022-12-24

## 2022-12-24 RX ORDER — BISACODYL 10 MG
10 SUPPOSITORY, RECTAL RECTAL DAILY
Status: DISCONTINUED | OUTPATIENT
Start: 2022-12-24 | End: 2022-12-25 | Stop reason: HOSPADM

## 2022-12-24 RX ADMIN — DOCUSATE SODIUM 50 MG AND SENNOSIDES 8.6 MG 2 TABLET: 8.6; 5 TABLET, FILM COATED ORAL at 20:30

## 2022-12-24 RX ADMIN — LIDOCAINE 2 PATCH: 50 PATCH CUTANEOUS at 08:56

## 2022-12-24 RX ADMIN — CARVEDILOL 12.5 MG: 6.25 TABLET, FILM COATED ORAL at 07:01

## 2022-12-24 RX ADMIN — FAMOTIDINE 20 MG: 20 TABLET ORAL at 17:21

## 2022-12-24 RX ADMIN — PROCHLORPERAZINE EDISYLATE 5 MG: 5 INJECTION INTRAMUSCULAR; INTRAVENOUS at 20:31

## 2022-12-24 RX ADMIN — HEPARIN SODIUM 5000 UNITS: 5000 INJECTION INTRAVENOUS; SUBCUTANEOUS at 20:30

## 2022-12-24 RX ADMIN — DOCUSATE SODIUM 50 MG AND SENNOSIDES 8.6 MG 2 TABLET: 8.6; 5 TABLET, FILM COATED ORAL at 08:56

## 2022-12-24 RX ADMIN — PIPERACILLIN SODIUM AND TAZOBACTAM SODIUM 3.38 G: 3; .375 INJECTION, POWDER, LYOPHILIZED, FOR SOLUTION INTRAVENOUS at 13:41

## 2022-12-24 RX ADMIN — CHOLECALCIFEROL TAB 10 MCG (400 UNIT) 2000 UNITS: 10 TAB at 08:57

## 2022-12-24 RX ADMIN — Medication 10 ML: at 08:57

## 2022-12-24 RX ADMIN — Medication 10 MG: at 11:54

## 2022-12-24 RX ADMIN — HYDROCODONE BITARTRATE AND ACETAMINOPHEN 1 TABLET: 5; 325 TABLET ORAL at 04:16

## 2022-12-24 RX ADMIN — PIPERACILLIN SODIUM AND TAZOBACTAM SODIUM 3.38 G: 3; .375 INJECTION, POWDER, LYOPHILIZED, FOR SOLUTION INTRAVENOUS at 04:05

## 2022-12-24 RX ADMIN — VALSARTAN 320 MG: 160 TABLET, FILM COATED ORAL at 08:56

## 2022-12-24 RX ADMIN — HYDRALAZINE HYDROCHLORIDE 10 MG: 20 INJECTION INTRAMUSCULAR; INTRAVENOUS at 14:48

## 2022-12-24 RX ADMIN — PIPERACILLIN SODIUM AND TAZOBACTAM SODIUM 3.38 G: 3; .375 INJECTION, POWDER, LYOPHILIZED, FOR SOLUTION INTRAVENOUS at 20:31

## 2022-12-24 RX ADMIN — Medication 1 CAPSULE: at 08:57

## 2022-12-24 RX ADMIN — LEVOTHYROXINE SODIUM 112 MCG: 0.07 TABLET ORAL at 05:54

## 2022-12-24 RX ADMIN — LACTULOSE 20 G: 20 SOLUTION ORAL at 20:30

## 2022-12-24 RX ADMIN — MONTELUKAST SODIUM 10 MG: 10 TABLET, COATED ORAL at 20:30

## 2022-12-24 RX ADMIN — Medication 1000 MCG: at 08:56

## 2022-12-24 RX ADMIN — CARVEDILOL 25 MG: 25 TABLET, FILM COATED ORAL at 17:21

## 2022-12-24 RX ADMIN — Medication 10 ML: at 20:33

## 2022-12-24 RX ADMIN — HYDROCODONE BITARTRATE AND ACETAMINOPHEN 1 TABLET: 5; 325 TABLET ORAL at 20:24

## 2022-12-24 RX ADMIN — FAMOTIDINE 20 MG: 20 TABLET ORAL at 08:57

## 2022-12-24 RX ADMIN — HEPARIN SODIUM 5000 UNITS: 5000 INJECTION INTRAVENOUS; SUBCUTANEOUS at 05:54

## 2022-12-24 RX ADMIN — METOPROLOL TARTRATE 5 MG: 1 INJECTION, SOLUTION INTRAVENOUS at 07:57

## 2022-12-24 RX ADMIN — CYCLOBENZAPRINE 5 MG: 10 TABLET, FILM COATED ORAL at 11:54

## 2022-12-24 RX ADMIN — HEPARIN SODIUM 5000 UNITS: 5000 INJECTION INTRAVENOUS; SUBCUTANEOUS at 13:41

## 2022-12-24 NOTE — PROGRESS NOTES
[facility] HOSPITALIST PROGRESS NOTE     Patient Identification:  Name:  Christine Garg  Age:  89 y.o.  Sex:  female  :  1933  MRN:  [id number]  Visit Number:  [id number]  ROOM: [address] [place] [address]     Primary Care Provider:  Dave Tobar MD    Length of stay:  5    Subjective        Chief Compliant follow-up for sepsis and colitis    Patient seen and examined this morning with no family at bedside.  Patient complaints of back pain right lower, improving with pain medication and worsening with movements. denies nausea, vomiting and tolerating soft diet. denies abdominal pain. Passing flatus and had small BM today. Denies any chest pain shortness of breath cough.  Afebrile no events overnight.  On RA.   SBP in 200 this am.       Objective     Current Hospital Meds:Acidophilus/Pectin, 1 capsule, Oral, Daily  bisacodyl, 10 mg, Rectal, Daily  carvedilol, 25 mg, Oral, BID With Meals  cholecalciferol, 2,000 Units, Oral, Daily  famotidine, 20 mg, Oral, BID AC  heparin (porcine), 5,000 Units, Subcutaneous, Q8H  levothyroxine, 112 mcg, Oral, Q AM  lidocaine, 2 patch, Transdermal, Q24H  montelukast, 10 mg, Oral, Nightly  piperacillin-tazobactam, 3.375 g, Intravenous, Q8H  sennosides-docusate, 2 tablet, Oral, BID  sodium chloride, 10 mL, Intravenous, Q12H  valsartan, 320 mg, Oral, Daily  vitamin B-12, 1,000 mcg, Oral, Daily    Pharmacy to Dose Zosyn,       ----------------------------------------------------------------------------------------------------------------------  Vital Signs:  Temp:  [98 °F (36.7 °C)-98.4 °F (36.9 °C)] 98.1 °F (36.7 °C)  Heart Rate:  [69-87] 79  Resp:  [17-18] 18  BP: (135-200)/(59-95) 169/68  SpO2:  [96 %-98 %] 98 %  on   ;   Device (Oxygen Therapy): room air  Body mass index is 27.19 kg/m².    Wt Readings from Last 3 Encounters:   22 74.1 kg (163 lb 6.4 oz)   22 74.8 kg (165 lb)   22 78.9 kg (174 lb)       Intake/Output Summary (Last 24 hours) at 2022  1502  Last data filed at 12/24/2022 1409  Gross per 24 hour   Intake 1140 ml   Output 450 ml   Net 690 ml     Diet: Cardiac Diets; Healthy Heart (2-3 Na+); Texture: Soft to Chew (NDD 3); Soft to Chew: Ground Meat; Fluid Consistency: Thin (IDDSI 0)  ----------------------------------------------------------------------------------------------------------------------  Physical exam:  General: Comfortable, Not in distress.  Well-developed and well-nourished.   HENT:  Head:  Normocephalic and atraumatic.  Mouth:  Moist mucous membranes.    Eyes:  Conjunctivae and EOM are normal.  Pupils are equal, round, and reactive to light.  No scleral icterus.    Neck:  Neck supple.  No JVD present.    Cardiovascular:  Normal rate, regular rhythm with no murmur.  Pulmonary/Chest:  No respiratory distress, no wheezes, no crackles, with normal breath sounds and good air movement.  Abdomen:  Soft.  Bowel sounds are normal.  No distension and mild tenderness, improving.   Musculoskeletal:  no tenderness, and no deformity.  No red or swollen joints anywhere.    Neurological:  Alert and oriented to person, place, and time.  No cranial nerve deficit. No focal deficits. No facial droop.  No slurred speech.   Skin:  Skin is warm and dry. No rash noted. No pallor.   Peripheral vascular: pulses in all 4 extremities with no clubbing, no cyanosis, no edema.  Genitourinary: no jenkins  Back: No midline tenderness. No tenderness on the right lower back.   ----------------------------------------------------------------------------------------------------------------------  ----------------------------------------------------------------------------------------------------------------------  Results from last 7 days   Lab Units 12/23/22  0157 12/22/22  0153 12/21/22  0209 12/20/22  0327 12/19/22  1924 12/19/22  1638   CRP mg/dL  --   --   --  10.97*  --  11.01*   LACTATE mmol/L  --   --   --   --  1.5  --    WBC 10*3/mm3 10.52 9.46 12.74* 15.98*  --   19.33*   HEMOGLOBIN g/dL 13.7 12.8 12.8 12.9  --  15.3   HEMATOCRIT % 39.7 37.6 38.0 38.5  --  43.5   MCV fL 90.6 89.7 91.6 93.7  --  89.5   MCHC g/dL 34.5 34.0 33.7 33.5  --  35.2   PLATELETS 10*3/mm3 314 308 316 313  --  377     Results from last 7 days   Lab Units 12/24/22  0633 12/23/22  1051 12/23/22  0157 12/22/22  1452 12/22/22  0153 12/19/22  1924 12/19/22  1638   SODIUM mmol/L  --   --  133* 132* 128*   < > 126*   POTASSIUM mmol/L  --   --  4.2 4.7 3.2*   < > 4.0   MAGNESIUM mg/dL 2.2 1.9  --   --  1.9   < >  --    CHLORIDE mmol/L  --   --  98 97* 94*   < > 90*   CO2 mmol/L  --   --  21.8* 21.5* 22.7   < > 20.8*   BUN mg/dL  --   --  8 9 7*   < > 21   CREATININE mg/dL  --   --  0.62 0.70 0.65   < > 0.97   CALCIUM mg/dL  --   --  9.0 9.5 8.8   < > 9.8   GLUCOSE mg/dL  --   --  103* 144* 104*   < > 134*   ALBUMIN g/dL  --   --   --   --   --   --  3.88   BILIRUBIN mg/dL  --   --   --   --   --   --  0.6   ALK PHOS U/L  --   --   --   --   --   --  115   AST (SGOT) U/L  --   --   --   --   --   --  13   ALT (SGPT) U/L  --   --   --   --   --   --  14    < > = values in this interval not displayed.   Estimated Creatinine Clearance: 62 mL/min (by C-G formula based on SCr of 0.62 mg/dL).  Results from last 7 days   Lab Units 12/20/22  0851 12/20/22  0327 12/19/22  2119   TROPONIN T ng/mL <0.010 <0.010 <0.010     Glucose   Date/Time Value Ref Range Status   12/24/2022 0628 98 70 - 130 mg/dL Final     Comment:     Meter: [id number] : [id number] naina spears   12/23/2022 1615 127 70 - 130 mg/dL Final     Comment:     Meter: [id number] : [id number] ROB FREY   12/23/2022 0955 105 70 - 130 mg/dL Final     Comment:     Meter: [id number] : [id number] ROB FREY   12/22/2022 1859 143 (H) 70 - 130 mg/dL Final     Comment:     Meter: [id number] : [id number] PETER GREEN   12/22/2022 1658 136 (H) 70 - 130 mg/dL Final     Comment:     Meter: [id number] : [id number] FABIAN SINGLETARY   12/22/2022 1028  108 70 - 130 mg/dL Final     Comment:     Meter: [id number] : [id number] FABIAN SINGLETARY   12/21/2022 1841 121 70 - 130 mg/dL Final     Comment:     Meter: [id number] : [id number] PETER GREEN   12/21/2022 1713 104 70 - 130 mg/dL Final     Comment:     Meter: [id number] : [id number] JASON ALLEN     No results found for: AMMONIA    Results from last 7 days   Lab Units 12/20/22  0308   NITRITE UA  Negative   WBC UA /HPF 6-12*   BACTERIA UA /HPF None Seen   SQUAM EPITHEL UA /HPF 0-2   URINECX  No growth     No results found for: BLOODCXNo results found for: RESPCXNo results found for: URINECXNo results found for: WOUNDCXNo results found for: BODYFLDCXNo results found for: STOOLCX  I have personally looked at the labs and they are summarized above.  ----------------------------------------------------------------------------------------------------------------------  Imaging Results (Last 24 Hours)     ** No results found for the last 24 hours. **        I have personally reviewed the radiology images and read the final radiology report.    Assessment & Plan      Assessment:  Sepsis secondary to diffuse acute sigmoid and Transverse colon Colitis  Mild hyponatremia  CKD stage II/III  COPD  Prediabetes with A1C 6  Essential hypertension  Hyperlipidemia   CAD s/p 3V CABG    Known pulmonary nodule  Back pain with Multilevel lumbar spondylosis noted on x-ray lumbar spine  H/O of smoking tobacco use      Plan:  Sepsis secondary to diffuse acute sigmoid and Transverse colon Colitis, on IV Zosyn for 5 days and probiotic.  Currently patient afebrile and VSS.  Leukocytosis resolved.  CRP elevated.  Lactate normal.  Blood culture prelim no growth.  Urine culture no growth. Cdiff PCR cancelled since no BM in 3 days. gastro PCR dced since no stool sample yet. CT of abdomen/pelvis without contrast reviewed, shows diffuse colonic wall thickening extending from the sigmoid to the transverse colon compatible with colitis;  sigmoid diverticulosis without evidence of diverticulitis. on soft diet.  Continue with pain management and nausea medication. KUB with non-obstructive bowel pattern. on laxative and had small BM today.     Mild hyponatremia, , stable.   Hypokalemia, resolved.     CKD stage II/III, Cr 0.6. Monitor.     COPD, stable.  On as needed.     Prediabetes with A1C 6, CBG controlled. Accu-Cheks BID.     Accelerated hypertension, BP in 150-200. Will do a dose of iv hydralazine and increase coreg dose to 25 mg BID. Continue with the Diovan. Monitor and adjust. On clonidine prn.     Back pain with Multilevel lumbar spondylosis noted on x-ray lumbar spine, on the prn tylenol and norco. Will do a dose of flexeril. On the lidocaine patch. Xray thoracic spine with No clearly acute radiographic findings.    Heparin subcu for DVT prophylaxis  Pepcid po for GI prophylaxis.  Ambulating.     Management and plans discussed in detail with patient and nursing.       The patient is high risk due to the following diagnoses/reasons:  Sepsis secondary to diffuse acute sigmoid and Transverse colon Colitis    Disposition Home    Jeana Arango MD  12/24/22  15:02 EST

## 2022-12-24 NOTE — PLAN OF CARE
Goal Outcome Evaluation: Pt's BP has been elevated intermittently this shift requiring IV meds to assist in lowering BP. Pt has also received bowel regimen medications with very little results. MD aware. Possible discharge tomorrow if BP managed. Will continue with plan of care.

## 2022-12-24 NOTE — PLAN OF CARE
Goal Outcome Evaluation:     Patient moved from 314B to 304B this shift, patient stated she would inform her family. Patient resting in bed. No complaints of chest pain or shortness of breath. Patient complained of pain, PRN medication provided, see MAR. No indicators of acute distress noted. Will continue to follow plan of care.

## 2022-12-25 VITALS
WEIGHT: 163 LBS | SYSTOLIC BLOOD PRESSURE: 126 MMHG | TEMPERATURE: 97.8 F | BODY MASS INDEX: 27.16 KG/M2 | RESPIRATION RATE: 20 BRPM | HEIGHT: 65 IN | HEART RATE: 82 BPM | DIASTOLIC BLOOD PRESSURE: 61 MMHG | OXYGEN SATURATION: 98 %

## 2022-12-25 LAB
ALBUMIN SERPL-MCNC: 3.37 G/DL (ref 3.5–5.2)
ALP SERPL-CCNC: 79 U/L (ref 39–117)
ALT SERPL W P-5'-P-CCNC: 30 U/L (ref 1–33)
ANION GAP SERPL CALCULATED.3IONS-SCNC: 10 MMOL/L (ref 5–15)
AST SERPL-CCNC: 22 U/L (ref 1–32)
BILIRUB CONJ SERPL-MCNC: <0.2 MG/DL (ref 0–0.3)
BILIRUB INDIRECT SERPL-MCNC: ABNORMAL MG/DL
BILIRUB SERPL-MCNC: 0.3 MG/DL (ref 0–1.2)
BUN SERPL-MCNC: 14 MG/DL (ref 8–23)
BUN/CREAT SERPL: 17.3 (ref 7–25)
CALCIUM SPEC-SCNC: 9.4 MG/DL (ref 8.6–10.5)
CHLORIDE SERPL-SCNC: 98 MMOL/L (ref 98–107)
CO2 SERPL-SCNC: 25 MMOL/L (ref 22–29)
CREAT SERPL-MCNC: 0.81 MG/DL (ref 0.57–1)
EGFRCR SERPLBLD CKD-EPI 2021: 69.5 ML/MIN/1.73
GLUCOSE BLDC GLUCOMTR-MCNC: 114 MG/DL (ref 70–130)
GLUCOSE BLDC GLUCOMTR-MCNC: 89 MG/DL (ref 70–130)
GLUCOSE SERPL-MCNC: 97 MG/DL (ref 65–99)
POTASSIUM SERPL-SCNC: 4.3 MMOL/L (ref 3.5–5.2)
PROT SERPL-MCNC: 6.1 G/DL (ref 6–8.5)
SODIUM SERPL-SCNC: 133 MMOL/L (ref 136–145)

## 2022-12-25 PROCEDURE — 80076 HEPATIC FUNCTION PANEL: CPT | Performed by: HOSPITALIST

## 2022-12-25 PROCEDURE — 99239 HOSP IP/OBS DSCHRG MGMT >30: CPT | Performed by: HOSPITALIST

## 2022-12-25 PROCEDURE — 82962 GLUCOSE BLOOD TEST: CPT

## 2022-12-25 PROCEDURE — 80048 BASIC METABOLIC PNL TOTAL CA: CPT | Performed by: HOSPITALIST

## 2022-12-25 PROCEDURE — 25010000002 HEPARIN (PORCINE) PER 1000 UNITS: Performed by: INTERNAL MEDICINE

## 2022-12-25 RX ORDER — AMOXICILLIN 250 MG
2 CAPSULE ORAL 2 TIMES DAILY
Qty: 20 TABLET | Refills: 0 | Status: SHIPPED | OUTPATIENT
Start: 2022-12-25 | End: 2022-12-30

## 2022-12-25 RX ORDER — CYCLOBENZAPRINE HCL 10 MG
5 TABLET ORAL 3 TIMES DAILY PRN
Status: DISCONTINUED | OUTPATIENT
Start: 2022-12-25 | End: 2022-12-25 | Stop reason: HOSPADM

## 2022-12-25 RX ORDER — LIDOCAINE 50 MG/G
2 PATCH TOPICAL
Qty: 10 PATCH | Refills: 0 | Status: SHIPPED | OUTPATIENT
Start: 2022-12-26 | End: 2023-01-06

## 2022-12-25 RX ORDER — FAMOTIDINE 20 MG/1
20 TABLET, FILM COATED ORAL DAILY
Status: DISCONTINUED | OUTPATIENT
Start: 2022-12-26 | End: 2022-12-25 | Stop reason: HOSPADM

## 2022-12-25 RX ORDER — CARVEDILOL 25 MG/1
25 TABLET ORAL 2 TIMES DAILY WITH MEALS
Qty: 60 TABLET | Refills: 0 | Status: ON HOLD | OUTPATIENT
Start: 2022-12-25

## 2022-12-25 RX ORDER — LACTULOSE 10 G/15ML
20 SOLUTION ORAL 2 TIMES DAILY
Status: DISCONTINUED | OUTPATIENT
Start: 2022-12-25 | End: 2022-12-25 | Stop reason: HOSPADM

## 2022-12-25 RX ORDER — CYCLOBENZAPRINE HCL 5 MG
5 TABLET ORAL 3 TIMES DAILY PRN
Qty: 5 TABLET | Refills: 0 | Status: SHIPPED | OUTPATIENT
Start: 2022-12-25 | End: 2022-12-25

## 2022-12-25 RX ORDER — LIDOCAINE 50 MG/G
2 PATCH TOPICAL
Qty: 10 PATCH | Refills: 0 | Status: SHIPPED | OUTPATIENT
Start: 2022-12-26 | End: 2022-12-25 | Stop reason: SDUPTHER

## 2022-12-25 RX ADMIN — LACTULOSE 20 G: 20 SOLUTION ORAL at 09:41

## 2022-12-25 RX ADMIN — HEPARIN SODIUM 5000 UNITS: 5000 INJECTION INTRAVENOUS; SUBCUTANEOUS at 14:06

## 2022-12-25 RX ADMIN — CHOLECALCIFEROL TAB 10 MCG (400 UNIT) 2000 UNITS: 10 TAB at 09:40

## 2022-12-25 RX ADMIN — ACETAMINOPHEN 650 MG: 325 TABLET, FILM COATED ORAL at 14:05

## 2022-12-25 RX ADMIN — DOCUSATE SODIUM 50 MG AND SENNOSIDES 8.6 MG 2 TABLET: 8.6; 5 TABLET, FILM COATED ORAL at 09:39

## 2022-12-25 RX ADMIN — Medication 1 CAPSULE: at 09:40

## 2022-12-25 RX ADMIN — CYCLOBENZAPRINE 5 MG: 10 TABLET, FILM COATED ORAL at 14:05

## 2022-12-25 RX ADMIN — CARVEDILOL 25 MG: 25 TABLET, FILM COATED ORAL at 09:40

## 2022-12-25 RX ADMIN — Medication 10 ML: at 09:47

## 2022-12-25 RX ADMIN — VALSARTAN 320 MG: 160 TABLET, FILM COATED ORAL at 09:40

## 2022-12-25 RX ADMIN — Medication 10 MG: at 09:41

## 2022-12-25 RX ADMIN — Medication 1000 MCG: at 09:40

## 2022-12-25 RX ADMIN — FAMOTIDINE 20 MG: 20 TABLET ORAL at 09:40

## 2022-12-25 RX ADMIN — HEPARIN SODIUM 5000 UNITS: 5000 INJECTION INTRAVENOUS; SUBCUTANEOUS at 05:40

## 2022-12-25 RX ADMIN — LEVOTHYROXINE SODIUM 112 MCG: 0.07 TABLET ORAL at 05:40

## 2022-12-25 RX ADMIN — LIDOCAINE 2 PATCH: 50 PATCH CUTANEOUS at 09:41

## 2022-12-25 RX ADMIN — HYDROCODONE BITARTRATE AND ACETAMINOPHEN 1 TABLET: 5; 325 TABLET ORAL at 06:28

## 2022-12-25 NOTE — NURSING NOTE
Patient ambulated in amaro. Oxygen saturation remained above 96% on room air. Heart rate stayed below 105 bpm. Patient denied dizziness. Lying in bed with no S&S of distress.

## 2022-12-25 NOTE — PLAN OF CARE
Goal Outcome Evaluation:      Pt is lying in bed with no S&S of distress. No complaints at this time. Will continue to monitor and follow plan of care.

## 2022-12-25 NOTE — PLAN OF CARE
Goal Outcome Evaluation:      Pt currently resting comfortably. No s/s of acute distress noted at this time. Pt had some c/o pain and nausea, prn meds given- see MAR. Will continue with plan of care.             coffee

## 2022-12-25 NOTE — DISCHARGE SUMMARY
[facility] HOSPITALISTS DISCHARGE SUMMARY    Patient Identification:  Name:  Christine Garg  Age:  89 y.o.  Sex:  female  :  1933  MRN:  [id number]  Visit Number:  [id number]    Date of Admission: 2022  Date of Discharge:  2022     PCP: Dave Tobar MD      DISCHARGE DIAGNOSIS  Sepsis secondary to diffuse acute sigmoid and Transverse colon Colitis, improved  Mild hyponatremia  CKD stage II/III  COPD  Prediabetes with A1C 6  Essential hypertension  Hyperlipidemia   CAD s/p 3V CABG    Known pulmonary nodule  Back pain with Multilevel lumbar spondylosis noted on x-ray lumbar spine  H/O of smoking tobacco use      CONSULTS   None      PROCEDURES PERFORMED  None      HOSPITAL COURSE  Ms. Garg is a 89 y.o. female with past medical history significant for essential hypertension, hyperlipidemia, hypothyroidism, CKD stage IIIa, CAD s/p 3V CABG,  COPD, known pulmonary nodule, hx of smoking tobacco use, that presented to the [facility] emergency department for evaluation of back and abdominal pain.  Please see the admitting history and physical for further details. Admitted with Sepsis secondary to diffuse acute sigmoid and Transverse colon Colitis.  Patient was started on IV fluids and IV antibiotics.  C. difficile PCR and gastrointestinal PCR ordered but patient did not have bowel movement for 4 days so those orders were discontinued.  Patient continued to improve symptomatically with resolution of leukocytosis and tolerating diet.  No nausea vomiting and abdominal pain resolved completely.  Since no BM on day 4, started on laxatives and patient had good BM today.  Noted to have accelerated hypertension so antihypertensive adjusted.  Started on Coreg and dose max of 25 twice daily and home Lopressor, HCTZ and amlodipine discontinued.  HCTZ discontinued because of hyponatremia. blood pressure stabilized on valsartan and Coreg.  Patient had back pain which improved  significantly with Flexeril.  Patient been afebrile VSS improved symptomatically significantly and ambulating.  Since patient is vitally stable, improved symptomatically and would like to go home, it was decided to discharge patient to home.  Patient finished antibiotic course for 5 days.  Discharge disposition stable.  Patient did not want home health.         VITAL SIGNS:  Temp:  [97.8 °F (36.6 °C)-98.2 °F (36.8 °C)] 97.8 °F (36.6 °C)  Heart Rate:  [71-97] 82  Resp:  [18-20] 20  BP: (126-182)/(61-87) 126/61  SpO2:  [97 %-98 %] 98 %  on   ;   Device (Oxygen Therapy): room air    Body mass index is 27.12 kg/m².  Wt Readings from Last 3 Encounters:   12/25/22 73.9 kg (163 lb)   07/21/22 74.8 kg (165 lb)   06/27/22 78.9 kg (174 lb)       PHYSICAL EXAM:  General: Comfortable, Not in distress.  Well-developed and well-nourished.   HENT:  Head:  Normocephalic and atraumatic.  Mouth:  Moist mucous membranes.    Eyes:  Conjunctivae and EOM are normal.  Pupils are equal, round, and reactive to light.  No scleral icterus.    Neck:  Neck supple.  No JVD present.    Cardiovascular:  Normal rate, regular rhythm with no murmur.  Pulmonary/Chest:  No respiratory distress, no wheezes, no crackles, with normal breath sounds and good air movement.  Abdomen:  Soft.  Bowel sounds are normal.  No distension and no tenderness.  Musculoskeletal:  no tenderness, and no deformity.  No red or swollen joints anywhere.    Neurological:  Alert and oriented to person, place, and time.  No cranial nerve deficit. No focal deficits. No facial droop.  No slurred speech.   Skin:  Skin is warm and dry. No rash noted. No pallor.   Peripheral vascular: pulses in all 4 extremities with no clubbing, no cyanosis, no edema.  Genitourinary: no jenkins  Back: No midline tenderness. No tenderness on the right lower back.     DISCHARGE DISPOSITION   Home    DISCHARGE MEDICATIONS:     Discharge Medications      New Medications      Instructions Start Date    carvedilol 25 MG tablet  Commonly known as: COREG   25 mg, Oral, 2 Times Daily With Meals      lidocaine 5 %  Commonly known as: LIDODERM   2 patches, Transdermal, Every 24 Hours Scheduled, Remove & Discard patch within 12 hours or as directed by MD   Start Date: December 26, 2022     sennosides-docusate 8.6-50 MG per tablet  Commonly known as: PERICOLACE   2 tablets, Oral, 2 Times Daily         Continue These Medications      Instructions Start Date   aspirin 325 MG tablet   325 mg, Oral, Daily      cloNIDine 0.1 MG tablet  Commonly known as: CATAPRES   0.1 mg, Oral, 3 Times Daily PRN      coenzyme Q10 100 MG capsule   100 mg, Oral, Daily PRN, 2-3 times a week      furosemide 20 MG tablet  Commonly known as: LASIX   20 mg, Oral, Daily PRN      levothyroxine 112 MCG tablet  Commonly known as: SYNTHROID, LEVOTHROID   112 mcg, Oral, Daily      montelukast 10 MG tablet  Commonly known as: SINGULAIR   10 mg, Oral, Nightly      Red Yeast Rice 600 MG capsule   600 mg, Oral, Daily PRN, 2 to 3 times a week      Repatha SureClick solution auto-injector SureClick injection  Generic drug: Evolocumab   140 mg, Subcutaneous, Every 14 Days      traMADol 50 MG tablet  Commonly known as: ULTRAM   50 mg, Oral, Every 12 Hours PRN      valsartan 320 MG tablet  Commonly known as: DIOVAN   320 mg, Oral, Daily      vitamin B-12 1000 MCG tablet  Commonly known as: CYANOCOBALAMIN   1,000 mcg, Oral, Daily      Vitamin D3 50 MCG (2000 UT) capsule   2,000 Units, Oral, Daily         Stop These Medications    amLODIPine 2.5 MG tablet  Commonly known as: NORVASC     hydroCHLOROthiazide 12.5 MG tablet  Commonly known as: HYDRODIURIL     meloxicam 7.5 MG tablet  Commonly known as: MOBIC     metoprolol tartrate 25 MG tablet  Commonly known as: LOPRESSOR            Diet Instructions     Diet: Cardiac      Discharge Diet: Cardiac        Activity Instructions     Activity as Tolerated          No future appointments.    Additional Instructions for  the Follow-ups that You Need to Schedule     Discharge Follow-up with PCP   As directed       Currently Documented PCP:    Dave Tobar MD    PCP Phone Number:    [phone]     Follow Up Details: Within 1 week            Follow-up Information     Dave Tobar MD .    Specialty: Internal Medicine  Why: Someone will call either Monday or Tuesday with appointments.  Contact information:  [address] [place] [address]  [phone]                          TEST  RESULTS PENDING AT DISCHARGE       CODE STATUS  Code Status and Medical Interventions:   Ordered at: 12/19/22 1842     Code Status (Patient has no pulse and is not breathing):    CPR (Attempt to Resuscitate)     Medical Interventions (Patient has pulse or is breathing):    Full Support       The ASCVD Risk score (Kal DK, et al., 2019) failed to calculate for the following reasons:    The 2019 ASCVD risk score is only valid for ages 40 to 79     Jeana Arango MD  12/25/22  18:50 EST     Time: I spent  35 minutes on this discharge activity which included: face-to-face encounter with the patient, reviewing the data in the system, coordination of the care with the nursing staff as well as consultants, documentation, and entering orders.        Please note that this discharge summary required more than 30 minutes to complete.    Please send a copy of this dictation to the following providers:  Dave Tobar MD

## 2022-12-26 ENCOUNTER — TELEPHONE (OUTPATIENT)
Dept: TELEMETRY | Facility: HOSPITAL | Age: 87
End: 2022-12-26

## 2022-12-26 ENCOUNTER — READMISSION MANAGEMENT (OUTPATIENT)
Dept: CALL CENTER | Facility: HOSPITAL | Age: 87
End: 2022-12-26

## 2022-12-26 NOTE — OUTREACH NOTE
Prep Survey    Flowsheet Row Responses   [facility]? [facility]   Is LACE score < 7 ? No   Eligibility Readm Mgmt   Discharge diagnosis Colitis   Does the patient have one of the following disease processes/diagnoses(primary or secondary)? Other   Does the patient have Home health ordered? No   Is there a DME ordered? No   Medication alerts for this patient see AVS   Prep survey completed? Yes          CURT PARKER - Registered Nurse

## 2022-12-27 ENCOUNTER — READMISSION MANAGEMENT (OUTPATIENT)
Dept: CALL CENTER | Facility: HOSPITAL | Age: 87
End: 2022-12-27

## 2022-12-27 ENCOUNTER — TELEPHONE (OUTPATIENT)
Dept: TELEMETRY | Facility: HOSPITAL | Age: 87
End: 2022-12-27

## 2022-12-27 NOTE — OUTREACH NOTE
Medical Week 1 Survey    Flowsheet Row Responses   [facility] [place] patient discharged from? [facility]   Does the patient have one of the following disease processes/diagnoses(primary or secondary)? Other   Week 1 attempt successful? Yes   Call start time 1613   Call end time 1624   Discharge diagnosis Colitis   Meds reviewed with patient/caregiver? Yes   Is the patient having any side effects they believe may be caused by any medication additions or changes? No   Does the patient have all medications ordered at discharge? No   What is keeping the patient from filling the prescriptions? Pre-authorization in progress  [insurancenot covering Lidocaine patches, son going to lick up stool softeners OTC]   Nursing Interventions No intervention needed   Is the patient taking all medications as directed (includes completed medication regime)? Yes   Does the patient have a primary care provider?  Yes   Does the patient have an appointment with their PCP within 7 days of discharge? Yes   Has the patient kept scheduled appointments due by today? N/A   Has home health visited the patient within 72 hours of discharge? N/A   Psychosocial issues? No   Comments pt states son will stay with pt until pt situated   Did the patient receive a copy of their discharge instructions? Yes   Nursing interventions Reviewed instructions with patient   What is the patient's perception of their health status since discharge? Improving   Is the patient/caregiver able to teach back signs and symptoms related to disease process for when to call PCP? Yes   Is the patient/caregiver able to teach back signs and symptoms related to disease process for when to call 911? Yes   Is the patient/caregiver able to teach back the hierarchy of who to call/visit for symptoms/problems? PCP, Specialist, Home health nurse, Urgent Care, ED, 911 Yes   If the patient is a current smoker, are they able to teach back resources for cessation? Not a smoker   Additional teach  back comments has not had BM since d/c, plans to take stool softeners   Week 1 call completed? Yes          KALI GUZMAN - Registered Nurse

## 2023-01-01 ENCOUNTER — APPOINTMENT (OUTPATIENT)
Dept: CT IMAGING | Facility: HOSPITAL | Age: 88
End: 2023-01-01
Payer: MEDICARE

## 2023-01-01 ENCOUNTER — APPOINTMENT (OUTPATIENT)
Dept: GENERAL RADIOLOGY | Facility: HOSPITAL | Age: 88
End: 2023-01-01
Payer: MEDICARE

## 2023-01-01 ENCOUNTER — HOSPITAL ENCOUNTER (INPATIENT)
Facility: HOSPITAL | Age: 88
LOS: 1 days | End: 2023-05-02
Attending: INTERNAL MEDICINE | Admitting: INTERNAL MEDICINE
Payer: COMMERCIAL

## 2023-01-01 ENCOUNTER — HOSPITAL ENCOUNTER (INPATIENT)
Facility: HOSPITAL | Age: 88
LOS: 21 days | End: 2023-05-02
Attending: FAMILY MEDICINE | Admitting: INTERNAL MEDICINE
Payer: MEDICARE

## 2023-01-01 VITALS
RESPIRATION RATE: 16 BRPM | HEART RATE: 81 BPM | SYSTOLIC BLOOD PRESSURE: 82 MMHG | HEIGHT: 65 IN | TEMPERATURE: 96.7 F | BODY MASS INDEX: 24.32 KG/M2 | DIASTOLIC BLOOD PRESSURE: 60 MMHG | WEIGHT: 146 LBS | OXYGEN SATURATION: 97 %

## 2023-01-01 DIAGNOSIS — K22.2 ESOPHAGEAL STRICTURE: Primary | ICD-10-CM

## 2023-01-01 LAB
ABO GROUP BLD: NORMAL
ALBUMIN FLD-MCNC: 1.8 G/DL
ALBUMIN SERPL-MCNC: 2.1 G/DL (ref 3.5–5.2)
ALBUMIN SERPL-MCNC: 2.2 G/DL (ref 3.5–5.2)
ALBUMIN SERPL-MCNC: 2.6 G/DL (ref 3.5–5.2)
ALBUMIN/GLOB SERPL: 1.2 G/DL
ALP SERPL-CCNC: 107 U/L (ref 39–117)
ALT SERPL W P-5'-P-CCNC: 23 U/L (ref 1–33)
ANION GAP SERPL CALCULATED.3IONS-SCNC: 10 MMOL/L (ref 5–15)
ANION GAP SERPL CALCULATED.3IONS-SCNC: 10 MMOL/L (ref 5–15)
ANION GAP SERPL CALCULATED.3IONS-SCNC: 6 MMOL/L (ref 5–15)
ANION GAP SERPL CALCULATED.3IONS-SCNC: 7 MMOL/L (ref 5–15)
ANION GAP SERPL CALCULATED.3IONS-SCNC: 8 MMOL/L (ref 5–15)
ANION GAP SERPL CALCULATED.3IONS-SCNC: 9 MMOL/L (ref 5–15)
AST SERPL-CCNC: 20 U/L (ref 1–32)
BACTERIA FLD CULT: ABNORMAL
BACTERIA SPEC AEROBE CULT: NO GROWTH
BACTERIA SPEC ANAEROBE CULT: NORMAL
BACTERIA UR QL AUTO: ABNORMAL /HPF
BASOPHILS # BLD AUTO: 0.05 10*3/MM3 (ref 0–0.2)
BASOPHILS # BLD AUTO: 0.05 10*3/MM3 (ref 0–0.2)
BASOPHILS # BLD AUTO: 0.06 10*3/MM3 (ref 0–0.2)
BASOPHILS # BLD AUTO: 0.07 10*3/MM3 (ref 0–0.2)
BASOPHILS # BLD AUTO: 0.07 10*3/MM3 (ref 0–0.2)
BASOPHILS # BLD AUTO: 0.08 10*3/MM3 (ref 0–0.2)
BASOPHILS # BLD AUTO: 0.08 10*3/MM3 (ref 0–0.2)
BASOPHILS NFR BLD AUTO: 0.4 % (ref 0–1.5)
BASOPHILS NFR BLD AUTO: 0.5 % (ref 0–1.5)
BASOPHILS NFR BLD AUTO: 0.5 % (ref 0–1.5)
BASOPHILS NFR BLD AUTO: 0.6 % (ref 0–1.5)
BASOPHILS NFR BLD AUTO: 0.6 % (ref 0–1.5)
BASOPHILS NFR BLD AUTO: 0.7 % (ref 0–1.5)
BILIRUB SERPL-MCNC: <0.2 MG/DL (ref 0–1.2)
BILIRUB UR QL STRIP: NEGATIVE
BLD GP AB SCN SERPL QL: NEGATIVE
BUN SERPL-MCNC: 10 MG/DL (ref 8–23)
BUN SERPL-MCNC: 12 MG/DL (ref 8–23)
BUN SERPL-MCNC: 12 MG/DL (ref 8–23)
BUN SERPL-MCNC: 13 MG/DL (ref 8–23)
BUN SERPL-MCNC: 14 MG/DL (ref 8–23)
BUN SERPL-MCNC: 14 MG/DL (ref 8–23)
BUN SERPL-MCNC: 16 MG/DL (ref 8–23)
BUN SERPL-MCNC: 17 MG/DL (ref 8–23)
BUN SERPL-MCNC: 7 MG/DL (ref 8–23)
BUN SERPL-MCNC: 8 MG/DL (ref 8–23)
BUN SERPL-MCNC: 8 MG/DL (ref 8–23)
BUN SERPL-MCNC: 9 MG/DL (ref 8–23)
BUN/CREAT SERPL: 18.9 (ref 7–25)
BUN/CREAT SERPL: 22.5 (ref 7–25)
BUN/CREAT SERPL: 22.9 (ref 7–25)
BUN/CREAT SERPL: 23.5 (ref 7–25)
BUN/CREAT SERPL: 23.5 (ref 7–25)
BUN/CREAT SERPL: 24.4 (ref 7–25)
BUN/CREAT SERPL: 31.7 (ref 7–25)
BUN/CREAT SERPL: 33.3 (ref 7–25)
BUN/CREAT SERPL: 35 (ref 7–25)
BUN/CREAT SERPL: 36.4 (ref 7–25)
BUN/CREAT SERPL: 37.1 (ref 7–25)
BUN/CREAT SERPL: 37.8 (ref 7–25)
BUN/CREAT SERPL: 38.6 (ref 7–25)
BUN/CREAT SERPL: 42.9 (ref 7–25)
CALCIUM SPEC-SCNC: 7.8 MG/DL (ref 8.6–10.5)
CALCIUM SPEC-SCNC: 7.9 MG/DL (ref 8.6–10.5)
CALCIUM SPEC-SCNC: 8 MG/DL (ref 8.6–10.5)
CALCIUM SPEC-SCNC: 8.2 MG/DL (ref 8.6–10.5)
CALCIUM SPEC-SCNC: 8.4 MG/DL (ref 8.6–10.5)
CALCIUM SPEC-SCNC: 8.5 MG/DL (ref 8.6–10.5)
CALCIUM SPEC-SCNC: 8.6 MG/DL (ref 8.6–10.5)
CALCIUM SPEC-SCNC: 8.8 MG/DL (ref 8.6–10.5)
CALCIUM SPEC-SCNC: 8.9 MG/DL (ref 8.6–10.5)
CHLORIDE SERPL-SCNC: 87 MMOL/L (ref 98–107)
CHLORIDE SERPL-SCNC: 87 MMOL/L (ref 98–107)
CHLORIDE SERPL-SCNC: 88 MMOL/L (ref 98–107)
CHLORIDE SERPL-SCNC: 89 MMOL/L (ref 98–107)
CHLORIDE SERPL-SCNC: 91 MMOL/L (ref 98–107)
CHLORIDE SERPL-SCNC: 93 MMOL/L (ref 98–107)
CHLORIDE SERPL-SCNC: 94 MMOL/L (ref 98–107)
CHLORIDE SERPL-SCNC: 96 MMOL/L (ref 98–107)
CHLORIDE SERPL-SCNC: 98 MMOL/L (ref 98–107)
CHLORIDE SERPL-SCNC: 99 MMOL/L (ref 98–107)
CLARITY UR: CLEAR
CO2 SERPL-SCNC: 25 MMOL/L (ref 22–29)
CO2 SERPL-SCNC: 28 MMOL/L (ref 22–29)
CO2 SERPL-SCNC: 28 MMOL/L (ref 22–29)
CO2 SERPL-SCNC: 29 MMOL/L (ref 22–29)
CO2 SERPL-SCNC: 30 MMOL/L (ref 22–29)
CO2 SERPL-SCNC: 31 MMOL/L (ref 22–29)
CO2 SERPL-SCNC: 32 MMOL/L (ref 22–29)
COLOR UR: YELLOW
CREAT SERPL-MCNC: 0.28 MG/DL (ref 0.57–1)
CREAT SERPL-MCNC: 0.34 MG/DL (ref 0.57–1)
CREAT SERPL-MCNC: 0.35 MG/DL (ref 0.57–1)
CREAT SERPL-MCNC: 0.35 MG/DL (ref 0.57–1)
CREAT SERPL-MCNC: 0.37 MG/DL (ref 0.57–1)
CREAT SERPL-MCNC: 0.37 MG/DL (ref 0.57–1)
CREAT SERPL-MCNC: 0.39 MG/DL (ref 0.57–1)
CREAT SERPL-MCNC: 0.4 MG/DL (ref 0.57–1)
CREAT SERPL-MCNC: 0.4 MG/DL (ref 0.57–1)
CREAT SERPL-MCNC: 0.41 MG/DL (ref 0.57–1)
CREAT SERPL-MCNC: 0.41 MG/DL (ref 0.57–1)
CREAT SERPL-MCNC: 0.44 MG/DL (ref 0.57–1)
CREAT SERPL-MCNC: 0.44 MG/DL (ref 0.57–1)
CREAT SERPL-MCNC: 0.51 MG/DL (ref 0.57–1)
CRP SERPL-MCNC: 18.47 MG/DL (ref 0–0.5)
DEPRECATED RDW RBC AUTO: 45.7 FL (ref 37–54)
DEPRECATED RDW RBC AUTO: 46.2 FL (ref 37–54)
DEPRECATED RDW RBC AUTO: 46.4 FL (ref 37–54)
DEPRECATED RDW RBC AUTO: 46.6 FL (ref 37–54)
DEPRECATED RDW RBC AUTO: 47 FL (ref 37–54)
DEPRECATED RDW RBC AUTO: 47.6 FL (ref 37–54)
DEPRECATED RDW RBC AUTO: 47.8 FL (ref 37–54)
DEPRECATED RDW RBC AUTO: 49.1 FL (ref 37–54)
DEPRECATED RDW RBC AUTO: 49.6 FL (ref 37–54)
DEPRECATED RDW RBC AUTO: 50.1 FL (ref 37–54)
DEPRECATED RDW RBC AUTO: 50.2 FL (ref 37–54)
DEPRECATED RDW RBC AUTO: 51.7 FL (ref 37–54)
EGFRCR SERPLBLD CKD-EPI 2021: 103.2 ML/MIN/1.73
EGFRCR SERPLBLD CKD-EPI 2021: 89.4 ML/MIN/1.73
EGFRCR SERPLBLD CKD-EPI 2021: 92.6 ML/MIN/1.73
EGFRCR SERPLBLD CKD-EPI 2021: 92.6 ML/MIN/1.73
EGFRCR SERPLBLD CKD-EPI 2021: 94.2 ML/MIN/1.73
EGFRCR SERPLBLD CKD-EPI 2021: 94.2 ML/MIN/1.73
EGFRCR SERPLBLD CKD-EPI 2021: 94.7 ML/MIN/1.73
EGFRCR SERPLBLD CKD-EPI 2021: 94.7 ML/MIN/1.73
EGFRCR SERPLBLD CKD-EPI 2021: 95.3 ML/MIN/1.73
EGFRCR SERPLBLD CKD-EPI 2021: 96.5 ML/MIN/1.73
EGFRCR SERPLBLD CKD-EPI 2021: 96.5 ML/MIN/1.73
EGFRCR SERPLBLD CKD-EPI 2021: 97.8 ML/MIN/1.73
EGFRCR SERPLBLD CKD-EPI 2021: 97.8 ML/MIN/1.73
EGFRCR SERPLBLD CKD-EPI 2021: 98.5 ML/MIN/1.73
EOSINOPHIL # BLD AUTO: 0.26 10*3/MM3 (ref 0–0.4)
EOSINOPHIL # BLD AUTO: 0.31 10*3/MM3 (ref 0–0.4)
EOSINOPHIL # BLD AUTO: 0.32 10*3/MM3 (ref 0–0.4)
EOSINOPHIL # BLD AUTO: 0.33 10*3/MM3 (ref 0–0.4)
EOSINOPHIL # BLD AUTO: 0.38 10*3/MM3 (ref 0–0.4)
EOSINOPHIL # BLD AUTO: 0.42 10*3/MM3 (ref 0–0.4)
EOSINOPHIL # BLD AUTO: 0.44 10*3/MM3 (ref 0–0.4)
EOSINOPHIL # BLD AUTO: 0.53 10*3/MM3 (ref 0–0.4)
EOSINOPHIL # BLD AUTO: 0.8 10*3/MM3 (ref 0–0.4)
EOSINOPHIL NFR BLD AUTO: 1.7 % (ref 0.3–6.2)
EOSINOPHIL NFR BLD AUTO: 2.2 % (ref 0.3–6.2)
EOSINOPHIL NFR BLD AUTO: 2.3 % (ref 0.3–6.2)
EOSINOPHIL NFR BLD AUTO: 2.5 % (ref 0.3–6.2)
EOSINOPHIL NFR BLD AUTO: 2.6 % (ref 0.3–6.2)
EOSINOPHIL NFR BLD AUTO: 2.9 % (ref 0.3–6.2)
EOSINOPHIL NFR BLD AUTO: 2.9 % (ref 0.3–6.2)
EOSINOPHIL NFR BLD AUTO: 4 % (ref 0.3–6.2)
EOSINOPHIL NFR BLD AUTO: 8.8 % (ref 0.3–6.2)
ERYTHROCYTE [DISTWIDTH] IN BLOOD BY AUTOMATED COUNT: 13.4 % (ref 12.3–15.4)
ERYTHROCYTE [DISTWIDTH] IN BLOOD BY AUTOMATED COUNT: 13.5 % (ref 12.3–15.4)
ERYTHROCYTE [DISTWIDTH] IN BLOOD BY AUTOMATED COUNT: 13.5 % (ref 12.3–15.4)
ERYTHROCYTE [DISTWIDTH] IN BLOOD BY AUTOMATED COUNT: 13.6 % (ref 12.3–15.4)
ERYTHROCYTE [DISTWIDTH] IN BLOOD BY AUTOMATED COUNT: 13.7 % (ref 12.3–15.4)
ERYTHROCYTE [DISTWIDTH] IN BLOOD BY AUTOMATED COUNT: 13.8 % (ref 12.3–15.4)
ERYTHROCYTE [DISTWIDTH] IN BLOOD BY AUTOMATED COUNT: 13.9 % (ref 12.3–15.4)
ERYTHROCYTE [DISTWIDTH] IN BLOOD BY AUTOMATED COUNT: 13.9 % (ref 12.3–15.4)
ERYTHROCYTE [DISTWIDTH] IN BLOOD BY AUTOMATED COUNT: 14 % (ref 12.3–15.4)
ERYTHROCYTE [DISTWIDTH] IN BLOOD BY AUTOMATED COUNT: 14.2 % (ref 12.3–15.4)
GLOBULIN UR ELPH-MCNC: 2.1 GM/DL
GLUCOSE BLDC GLUCOMTR-MCNC: 100 MG/DL (ref 70–130)
GLUCOSE BLDC GLUCOMTR-MCNC: 100 MG/DL (ref 70–130)
GLUCOSE BLDC GLUCOMTR-MCNC: 101 MG/DL (ref 70–130)
GLUCOSE BLDC GLUCOMTR-MCNC: 103 MG/DL (ref 70–130)
GLUCOSE BLDC GLUCOMTR-MCNC: 106 MG/DL (ref 70–130)
GLUCOSE BLDC GLUCOMTR-MCNC: 107 MG/DL (ref 70–130)
GLUCOSE BLDC GLUCOMTR-MCNC: 108 MG/DL (ref 70–130)
GLUCOSE BLDC GLUCOMTR-MCNC: 108 MG/DL (ref 70–130)
GLUCOSE BLDC GLUCOMTR-MCNC: 109 MG/DL (ref 70–130)
GLUCOSE BLDC GLUCOMTR-MCNC: 111 MG/DL (ref 70–130)
GLUCOSE BLDC GLUCOMTR-MCNC: 112 MG/DL (ref 70–130)
GLUCOSE BLDC GLUCOMTR-MCNC: 112 MG/DL (ref 70–130)
GLUCOSE BLDC GLUCOMTR-MCNC: 113 MG/DL (ref 70–130)
GLUCOSE BLDC GLUCOMTR-MCNC: 114 MG/DL (ref 70–130)
GLUCOSE BLDC GLUCOMTR-MCNC: 115 MG/DL (ref 70–130)
GLUCOSE BLDC GLUCOMTR-MCNC: 116 MG/DL (ref 70–130)
GLUCOSE BLDC GLUCOMTR-MCNC: 116 MG/DL (ref 70–130)
GLUCOSE BLDC GLUCOMTR-MCNC: 117 MG/DL (ref 70–130)
GLUCOSE BLDC GLUCOMTR-MCNC: 119 MG/DL (ref 70–130)
GLUCOSE BLDC GLUCOMTR-MCNC: 120 MG/DL (ref 70–130)
GLUCOSE BLDC GLUCOMTR-MCNC: 121 MG/DL (ref 70–130)
GLUCOSE BLDC GLUCOMTR-MCNC: 122 MG/DL (ref 70–130)
GLUCOSE BLDC GLUCOMTR-MCNC: 125 MG/DL (ref 70–130)
GLUCOSE BLDC GLUCOMTR-MCNC: 125 MG/DL (ref 70–130)
GLUCOSE BLDC GLUCOMTR-MCNC: 126 MG/DL (ref 70–130)
GLUCOSE BLDC GLUCOMTR-MCNC: 128 MG/DL (ref 70–130)
GLUCOSE BLDC GLUCOMTR-MCNC: 130 MG/DL (ref 70–130)
GLUCOSE BLDC GLUCOMTR-MCNC: 133 MG/DL (ref 70–130)
GLUCOSE BLDC GLUCOMTR-MCNC: 134 MG/DL (ref 70–130)
GLUCOSE BLDC GLUCOMTR-MCNC: 135 MG/DL (ref 70–130)
GLUCOSE BLDC GLUCOMTR-MCNC: 136 MG/DL (ref 70–130)
GLUCOSE BLDC GLUCOMTR-MCNC: 136 MG/DL (ref 70–130)
GLUCOSE BLDC GLUCOMTR-MCNC: 139 MG/DL (ref 70–130)
GLUCOSE BLDC GLUCOMTR-MCNC: 139 MG/DL (ref 70–130)
GLUCOSE BLDC GLUCOMTR-MCNC: 140 MG/DL (ref 70–130)
GLUCOSE BLDC GLUCOMTR-MCNC: 143 MG/DL (ref 70–130)
GLUCOSE BLDC GLUCOMTR-MCNC: 146 MG/DL (ref 70–130)
GLUCOSE BLDC GLUCOMTR-MCNC: 146 MG/DL (ref 70–130)
GLUCOSE BLDC GLUCOMTR-MCNC: 148 MG/DL (ref 70–130)
GLUCOSE BLDC GLUCOMTR-MCNC: 149 MG/DL (ref 70–130)
GLUCOSE BLDC GLUCOMTR-MCNC: 163 MG/DL (ref 70–130)
GLUCOSE BLDC GLUCOMTR-MCNC: 76 MG/DL (ref 70–130)
GLUCOSE BLDC GLUCOMTR-MCNC: 78 MG/DL (ref 70–130)
GLUCOSE BLDC GLUCOMTR-MCNC: 79 MG/DL (ref 70–130)
GLUCOSE BLDC GLUCOMTR-MCNC: 81 MG/DL (ref 70–130)
GLUCOSE BLDC GLUCOMTR-MCNC: 82 MG/DL (ref 70–130)
GLUCOSE BLDC GLUCOMTR-MCNC: 83 MG/DL (ref 70–130)
GLUCOSE BLDC GLUCOMTR-MCNC: 85 MG/DL (ref 70–130)
GLUCOSE BLDC GLUCOMTR-MCNC: 85 MG/DL (ref 70–130)
GLUCOSE BLDC GLUCOMTR-MCNC: 87 MG/DL (ref 70–130)
GLUCOSE BLDC GLUCOMTR-MCNC: 88 MG/DL (ref 70–130)
GLUCOSE BLDC GLUCOMTR-MCNC: 97 MG/DL (ref 70–130)
GLUCOSE BLDC GLUCOMTR-MCNC: 98 MG/DL (ref 70–130)
GLUCOSE BLDC GLUCOMTR-MCNC: 98 MG/DL (ref 70–130)
GLUCOSE BLDC GLUCOMTR-MCNC: 99 MG/DL (ref 70–130)
GLUCOSE BLDC GLUCOMTR-MCNC: 99 MG/DL (ref 70–130)
GLUCOSE FLD-MCNC: <2 MG/DL
GLUCOSE SERPL-MCNC: 105 MG/DL (ref 65–99)
GLUCOSE SERPL-MCNC: 105 MG/DL (ref 65–99)
GLUCOSE SERPL-MCNC: 106 MG/DL (ref 65–99)
GLUCOSE SERPL-MCNC: 108 MG/DL (ref 65–99)
GLUCOSE SERPL-MCNC: 111 MG/DL (ref 65–99)
GLUCOSE SERPL-MCNC: 113 MG/DL (ref 65–99)
GLUCOSE SERPL-MCNC: 116 MG/DL (ref 65–99)
GLUCOSE SERPL-MCNC: 118 MG/DL (ref 65–99)
GLUCOSE SERPL-MCNC: 123 MG/DL (ref 65–99)
GLUCOSE SERPL-MCNC: 128 MG/DL (ref 65–99)
GLUCOSE SERPL-MCNC: 68 MG/DL (ref 65–99)
GLUCOSE SERPL-MCNC: 85 MG/DL (ref 65–99)
GLUCOSE SERPL-MCNC: 96 MG/DL (ref 65–99)
GLUCOSE SERPL-MCNC: 97 MG/DL (ref 65–99)
GLUCOSE UR STRIP-MCNC: NEGATIVE MG/DL
GRAM STN SPEC: ABNORMAL
GRAM STN SPEC: ABNORMAL
HCT VFR BLD AUTO: 26 % (ref 34–46.6)
HCT VFR BLD AUTO: 27.5 % (ref 34–46.6)
HCT VFR BLD AUTO: 28.6 % (ref 34–46.6)
HCT VFR BLD AUTO: 28.8 % (ref 34–46.6)
HCT VFR BLD AUTO: 29 % (ref 34–46.6)
HCT VFR BLD AUTO: 29.9 % (ref 34–46.6)
HCT VFR BLD AUTO: 30.5 % (ref 34–46.6)
HCT VFR BLD AUTO: 30.7 % (ref 34–46.6)
HCT VFR BLD AUTO: 31.1 % (ref 34–46.6)
HCT VFR BLD AUTO: 32.2 % (ref 34–46.6)
HCT VFR BLD AUTO: 32.3 % (ref 34–46.6)
HCT VFR BLD AUTO: 32.9 % (ref 34–46.6)
HGB BLD-MCNC: 10.3 G/DL (ref 12–15.9)
HGB BLD-MCNC: 10.3 G/DL (ref 12–15.9)
HGB BLD-MCNC: 10.9 G/DL (ref 12–15.9)
HGB BLD-MCNC: 8.3 G/DL (ref 12–15.9)
HGB BLD-MCNC: 8.8 G/DL (ref 12–15.9)
HGB BLD-MCNC: 9 G/DL (ref 12–15.9)
HGB BLD-MCNC: 9.4 G/DL (ref 12–15.9)
HGB BLD-MCNC: 9.5 G/DL (ref 12–15.9)
HGB BLD-MCNC: 9.7 G/DL (ref 12–15.9)
HGB BLD-MCNC: 9.9 G/DL (ref 12–15.9)
HGB UR QL STRIP.AUTO: NEGATIVE
HYALINE CASTS UR QL AUTO: ABNORMAL /LPF
IMM GRANULOCYTES # BLD AUTO: 0.06 10*3/MM3 (ref 0–0.05)
IMM GRANULOCYTES # BLD AUTO: 0.08 10*3/MM3 (ref 0–0.05)
IMM GRANULOCYTES # BLD AUTO: 0.1 10*3/MM3 (ref 0–0.05)
IMM GRANULOCYTES # BLD AUTO: 0.11 10*3/MM3 (ref 0–0.05)
IMM GRANULOCYTES # BLD AUTO: 0.14 10*3/MM3 (ref 0–0.05)
IMM GRANULOCYTES # BLD AUTO: 0.16 10*3/MM3 (ref 0–0.05)
IMM GRANULOCYTES # BLD AUTO: 0.16 10*3/MM3 (ref 0–0.05)
IMM GRANULOCYTES # BLD AUTO: 0.17 10*3/MM3 (ref 0–0.05)
IMM GRANULOCYTES # BLD AUTO: 0.25 10*3/MM3 (ref 0–0.05)
IMM GRANULOCYTES NFR BLD AUTO: 0.7 % (ref 0–0.5)
IMM GRANULOCYTES NFR BLD AUTO: 0.8 % (ref 0–0.5)
IMM GRANULOCYTES NFR BLD AUTO: 0.9 % (ref 0–0.5)
IMM GRANULOCYTES NFR BLD AUTO: 1 % (ref 0–0.5)
IMM GRANULOCYTES NFR BLD AUTO: 1.1 % (ref 0–0.5)
IMM GRANULOCYTES NFR BLD AUTO: 1.2 % (ref 0–0.5)
IMM GRANULOCYTES NFR BLD AUTO: 1.9 % (ref 0–0.5)
KETONES UR QL STRIP: NEGATIVE
LDH FLD-CCNC: 2328 U/L
LEUKOCYTE ESTERASE UR QL STRIP.AUTO: ABNORMAL
LYMPHOCYTES # BLD AUTO: 1.06 10*3/MM3 (ref 0.7–3.1)
LYMPHOCYTES # BLD AUTO: 1.1 10*3/MM3 (ref 0.7–3.1)
LYMPHOCYTES # BLD AUTO: 1.12 10*3/MM3 (ref 0.7–3.1)
LYMPHOCYTES # BLD AUTO: 1.13 10*3/MM3 (ref 0.7–3.1)
LYMPHOCYTES # BLD AUTO: 1.21 10*3/MM3 (ref 0.7–3.1)
LYMPHOCYTES # BLD AUTO: 1.26 10*3/MM3 (ref 0.7–3.1)
LYMPHOCYTES # BLD AUTO: 1.33 10*3/MM3 (ref 0.7–3.1)
LYMPHOCYTES # BLD AUTO: 1.39 10*3/MM3 (ref 0.7–3.1)
LYMPHOCYTES # BLD AUTO: 1.51 10*3/MM3 (ref 0.7–3.1)
LYMPHOCYTES NFR BLD AUTO: 12.3 % (ref 19.6–45.3)
LYMPHOCYTES NFR BLD AUTO: 7.4 % (ref 19.6–45.3)
LYMPHOCYTES NFR BLD AUTO: 8 % (ref 19.6–45.3)
LYMPHOCYTES NFR BLD AUTO: 8.5 % (ref 19.6–45.3)
LYMPHOCYTES NFR BLD AUTO: 8.5 % (ref 19.6–45.3)
LYMPHOCYTES NFR BLD AUTO: 9.2 % (ref 19.6–45.3)
LYMPHOCYTES NFR BLD AUTO: 9.4 % (ref 19.6–45.3)
LYMPHOCYTES NFR BLD AUTO: 9.6 % (ref 19.6–45.3)
LYMPHOCYTES NFR BLD AUTO: 9.6 % (ref 19.6–45.3)
MAGNESIUM SERPL-MCNC: 1.4 MG/DL (ref 1.6–2.4)
MAGNESIUM SERPL-MCNC: 1.6 MG/DL (ref 1.6–2.4)
MAGNESIUM SERPL-MCNC: 1.6 MG/DL (ref 1.6–2.4)
MAGNESIUM SERPL-MCNC: 1.7 MG/DL (ref 1.6–2.4)
MAGNESIUM SERPL-MCNC: 1.8 MG/DL (ref 1.6–2.4)
MAGNESIUM SERPL-MCNC: 1.8 MG/DL (ref 1.6–2.4)
MCH RBC QN AUTO: 30.3 PG (ref 26.6–33)
MCH RBC QN AUTO: 30.3 PG (ref 26.6–33)
MCH RBC QN AUTO: 30.4 PG (ref 26.6–33)
MCH RBC QN AUTO: 30.5 PG (ref 26.6–33)
MCH RBC QN AUTO: 30.7 PG (ref 26.6–33)
MCH RBC QN AUTO: 30.7 PG (ref 26.6–33)
MCH RBC QN AUTO: 30.8 PG (ref 26.6–33)
MCH RBC QN AUTO: 30.8 PG (ref 26.6–33)
MCH RBC QN AUTO: 30.9 PG (ref 26.6–33)
MCH RBC QN AUTO: 31.2 PG (ref 26.6–33)
MCH RBC QN AUTO: 31.5 PG (ref 26.6–33)
MCH RBC QN AUTO: 32.1 PG (ref 26.6–33)
MCHC RBC AUTO-ENTMCNC: 31 G/DL (ref 31.5–35.7)
MCHC RBC AUTO-ENTMCNC: 31.3 G/DL (ref 31.5–35.7)
MCHC RBC AUTO-ENTMCNC: 31.8 G/DL (ref 31.5–35.7)
MCHC RBC AUTO-ENTMCNC: 31.9 G/DL (ref 31.5–35.7)
MCHC RBC AUTO-ENTMCNC: 31.9 G/DL (ref 31.5–35.7)
MCHC RBC AUTO-ENTMCNC: 32 G/DL (ref 31.5–35.7)
MCHC RBC AUTO-ENTMCNC: 32.2 G/DL (ref 31.5–35.7)
MCHC RBC AUTO-ENTMCNC: 32.4 G/DL (ref 31.5–35.7)
MCHC RBC AUTO-ENTMCNC: 32.5 G/DL (ref 31.5–35.7)
MCHC RBC AUTO-ENTMCNC: 32.9 G/DL (ref 31.5–35.7)
MCHC RBC AUTO-ENTMCNC: 33 G/DL (ref 31.5–35.7)
MCHC RBC AUTO-ENTMCNC: 33.9 G/DL (ref 31.5–35.7)
MCV RBC AUTO: 100.6 FL (ref 79–97)
MCV RBC AUTO: 93.4 FL (ref 79–97)
MCV RBC AUTO: 93.5 FL (ref 79–97)
MCV RBC AUTO: 94.1 FL (ref 79–97)
MCV RBC AUTO: 94.4 FL (ref 79–97)
MCV RBC AUTO: 94.5 FL (ref 79–97)
MCV RBC AUTO: 94.7 FL (ref 79–97)
MCV RBC AUTO: 94.8 FL (ref 79–97)
MCV RBC AUTO: 96.3 FL (ref 79–97)
MCV RBC AUTO: 96.9 FL (ref 79–97)
MCV RBC AUTO: 97.9 FL (ref 79–97)
MCV RBC AUTO: 98 FL (ref 79–97)
MONOCYTES # BLD AUTO: 0.63 10*3/MM3 (ref 0.1–0.9)
MONOCYTES # BLD AUTO: 0.64 10*3/MM3 (ref 0.1–0.9)
MONOCYTES # BLD AUTO: 0.69 10*3/MM3 (ref 0.1–0.9)
MONOCYTES # BLD AUTO: 0.7 10*3/MM3 (ref 0.1–0.9)
MONOCYTES # BLD AUTO: 0.72 10*3/MM3 (ref 0.1–0.9)
MONOCYTES # BLD AUTO: 0.72 10*3/MM3 (ref 0.1–0.9)
MONOCYTES # BLD AUTO: 0.73 10*3/MM3 (ref 0.1–0.9)
MONOCYTES # BLD AUTO: 0.73 10*3/MM3 (ref 0.1–0.9)
MONOCYTES # BLD AUTO: 0.96 10*3/MM3 (ref 0.1–0.9)
MONOCYTES NFR BLD AUTO: 4.8 % (ref 5–12)
MONOCYTES NFR BLD AUTO: 5.3 % (ref 5–12)
MONOCYTES NFR BLD AUTO: 5.3 % (ref 5–12)
MONOCYTES NFR BLD AUTO: 5.4 % (ref 5–12)
MONOCYTES NFR BLD AUTO: 5.5 % (ref 5–12)
MONOCYTES NFR BLD AUTO: 7.6 % (ref 5–12)
NEUTROPHILS NFR BLD AUTO: 10.67 10*3/MM3 (ref 1.7–7)
NEUTROPHILS NFR BLD AUTO: 10.78 10*3/MM3 (ref 1.7–7)
NEUTROPHILS NFR BLD AUTO: 11.24 10*3/MM3 (ref 1.7–7)
NEUTROPHILS NFR BLD AUTO: 11.76 10*3/MM3 (ref 1.7–7)
NEUTROPHILS NFR BLD AUTO: 12.49 10*3/MM3 (ref 1.7–7)
NEUTROPHILS NFR BLD AUTO: 12.5 10*3/MM3 (ref 1.7–7)
NEUTROPHILS NFR BLD AUTO: 14.66 10*3/MM3 (ref 1.7–7)
NEUTROPHILS NFR BLD AUTO: 6.38 10*3/MM3 (ref 1.7–7)
NEUTROPHILS NFR BLD AUTO: 69.9 % (ref 42.7–76)
NEUTROPHILS NFR BLD AUTO: 80.2 % (ref 42.7–76)
NEUTROPHILS NFR BLD AUTO: 81 % (ref 42.7–76)
NEUTROPHILS NFR BLD AUTO: 81 % (ref 42.7–76)
NEUTROPHILS NFR BLD AUTO: 81.5 % (ref 42.7–76)
NEUTROPHILS NFR BLD AUTO: 81.9 % (ref 42.7–76)
NEUTROPHILS NFR BLD AUTO: 82.2 % (ref 42.7–76)
NEUTROPHILS NFR BLD AUTO: 83.9 % (ref 42.7–76)
NEUTROPHILS NFR BLD AUTO: 84.2 % (ref 42.7–76)
NEUTROPHILS NFR BLD AUTO: 9.78 10*3/MM3 (ref 1.7–7)
NITRITE UR QL STRIP: NEGATIVE
NRBC BLD AUTO-RTO: 0 /100 WBC (ref 0–0.2)
OSMOLALITY UR: 364 MOSM/KG (ref 300–1100)
OSMOLALITY UR: 435 MOSM/KG (ref 300–1100)
PH UR STRIP.AUTO: 6.5 [PH] (ref 5–8)
PHOSPHATE SERPL-MCNC: 3.2 MG/DL (ref 2.5–4.5)
PHOSPHATE SERPL-MCNC: 3.2 MG/DL (ref 2.5–4.5)
PHOSPHATE SERPL-MCNC: 3.6 MG/DL (ref 2.5–4.5)
PLATELET # BLD AUTO: 251 10*3/MM3 (ref 140–450)
PLATELET # BLD AUTO: 280 10*3/MM3 (ref 140–450)
PLATELET # BLD AUTO: 326 10*3/MM3 (ref 140–450)
PLATELET # BLD AUTO: 337 10*3/MM3 (ref 140–450)
PLATELET # BLD AUTO: 340 10*3/MM3 (ref 140–450)
PLATELET # BLD AUTO: 345 10*3/MM3 (ref 140–450)
PLATELET # BLD AUTO: 352 10*3/MM3 (ref 140–450)
PLATELET # BLD AUTO: 357 10*3/MM3 (ref 140–450)
PLATELET # BLD AUTO: 359 10*3/MM3 (ref 140–450)
PLATELET # BLD AUTO: 387 10*3/MM3 (ref 140–450)
PLATELET # BLD AUTO: 395 10*3/MM3 (ref 140–450)
PLATELET # BLD AUTO: 410 10*3/MM3 (ref 140–450)
PMV BLD AUTO: 10 FL (ref 6–12)
PMV BLD AUTO: 10.4 FL (ref 6–12)
PMV BLD AUTO: 9.1 FL (ref 6–12)
PMV BLD AUTO: 9.2 FL (ref 6–12)
PMV BLD AUTO: 9.3 FL (ref 6–12)
PMV BLD AUTO: 9.4 FL (ref 6–12)
PMV BLD AUTO: 9.5 FL (ref 6–12)
PMV BLD AUTO: 9.5 FL (ref 6–12)
PMV BLD AUTO: 9.6 FL (ref 6–12)
PMV BLD AUTO: 9.6 FL (ref 6–12)
POTASSIUM SERPL-SCNC: 3.7 MMOL/L (ref 3.5–5.2)
POTASSIUM SERPL-SCNC: 3.8 MMOL/L (ref 3.5–5.2)
POTASSIUM SERPL-SCNC: 3.8 MMOL/L (ref 3.5–5.2)
POTASSIUM SERPL-SCNC: 3.9 MMOL/L (ref 3.5–5.2)
POTASSIUM SERPL-SCNC: 4.1 MMOL/L (ref 3.5–5.2)
POTASSIUM SERPL-SCNC: 4.2 MMOL/L (ref 3.5–5.2)
POTASSIUM SERPL-SCNC: 4.2 MMOL/L (ref 3.5–5.2)
POTASSIUM SERPL-SCNC: 4.4 MMOL/L (ref 3.5–5.2)
POTASSIUM SERPL-SCNC: 4.6 MMOL/L (ref 3.5–5.2)
POTASSIUM SERPL-SCNC: 4.7 MMOL/L (ref 3.5–5.2)
PREALB SERPL-MCNC: 9.3 MG/DL (ref 20–40)
PROT FLD-MCNC: 3.3 G/DL
PROT SERPL-MCNC: 4.7 G/DL (ref 6–8.5)
PROT UR QL STRIP: NEGATIVE
QT INTERVAL: 326 MS
QT INTERVAL: 370 MS
QTC INTERVAL: 439 MS
QTC INTERVAL: 442 MS
RBC # BLD AUTO: 2.7 10*6/MM3 (ref 3.77–5.28)
RBC # BLD AUTO: 2.9 10*6/MM3 (ref 3.77–5.28)
RBC # BLD AUTO: 2.96 10*6/MM3 (ref 3.77–5.28)
RBC # BLD AUTO: 3.04 10*6/MM3 (ref 3.77–5.28)
RBC # BLD AUTO: 3.08 10*6/MM3 (ref 3.77–5.28)
RBC # BLD AUTO: 3.2 10*6/MM3 (ref 3.77–5.28)
RBC # BLD AUTO: 3.21 10*6/MM3 (ref 3.77–5.28)
RBC # BLD AUTO: 3.23 10*6/MM3 (ref 3.77–5.28)
RBC # BLD AUTO: 3.25 10*6/MM3 (ref 3.77–5.28)
RBC # BLD AUTO: 3.27 10*6/MM3 (ref 3.77–5.28)
RBC # BLD AUTO: 3.3 10*6/MM3 (ref 3.77–5.28)
RBC # BLD AUTO: 3.4 10*6/MM3 (ref 3.77–5.28)
RBC # UR STRIP: ABNORMAL /HPF
REF LAB TEST METHOD: ABNORMAL
REF LAB TEST METHOD: NORMAL
RH BLD: POSITIVE
SODIUM SERPL-SCNC: 126 MMOL/L (ref 136–145)
SODIUM SERPL-SCNC: 127 MMOL/L (ref 136–145)
SODIUM SERPL-SCNC: 128 MMOL/L (ref 136–145)
SODIUM SERPL-SCNC: 128 MMOL/L (ref 136–145)
SODIUM SERPL-SCNC: 129 MMOL/L (ref 136–145)
SODIUM SERPL-SCNC: 129 MMOL/L (ref 136–145)
SODIUM SERPL-SCNC: 130 MMOL/L (ref 136–145)
SODIUM SERPL-SCNC: 130 MMOL/L (ref 136–145)
SODIUM SERPL-SCNC: 132 MMOL/L (ref 136–145)
SODIUM SERPL-SCNC: 132 MMOL/L (ref 136–145)
SODIUM SERPL-SCNC: 134 MMOL/L (ref 136–145)
SODIUM SERPL-SCNC: 135 MMOL/L (ref 136–145)
SODIUM UR-SCNC: 53 MMOL/L
SODIUM UR-SCNC: <20 MMOL/L
SP GR UR STRIP: 1.02 (ref 1–1.03)
SQUAMOUS #/AREA URNS HPF: ABNORMAL /HPF
T&S EXPIRATION DATE: NORMAL
UROBILINOGEN UR QL STRIP: ABNORMAL
WBC # UR STRIP: ABNORMAL /HPF
WBC NRBC COR # BLD: 12 10*3/MM3 (ref 3.4–10.8)
WBC NRBC COR # BLD: 12.98 10*3/MM3 (ref 3.4–10.8)
WBC NRBC COR # BLD: 13.15 10*3/MM3 (ref 3.4–10.8)
WBC NRBC COR # BLD: 13.29 10*3/MM3 (ref 3.4–10.8)
WBC NRBC COR # BLD: 13.57 10*3/MM3 (ref 3.4–10.8)
WBC NRBC COR # BLD: 13.86 10*3/MM3 (ref 3.4–10.8)
WBC NRBC COR # BLD: 14.51 10*3/MM3 (ref 3.4–10.8)
WBC NRBC COR # BLD: 14.86 10*3/MM3 (ref 3.4–10.8)
WBC NRBC COR # BLD: 14.9 10*3/MM3 (ref 3.4–10.8)
WBC NRBC COR # BLD: 17.81 10*3/MM3 (ref 3.4–10.8)
WBC NRBC COR # BLD: 9.11 10*3/MM3 (ref 3.4–10.8)
WBC NRBC COR # BLD: 9.87 10*3/MM3 (ref 3.4–10.8)

## 2023-01-01 PROCEDURE — 84157 ASSAY OF PROTEIN OTHER: CPT | Performed by: INTERNAL MEDICINE

## 2023-01-01 PROCEDURE — 25010000002 HYDROMORPHONE PER 4 MG: Performed by: INTERNAL MEDICINE

## 2023-01-01 PROCEDURE — 80048 BASIC METABOLIC PNL TOTAL CA: CPT | Performed by: STUDENT IN AN ORGANIZED HEALTH CARE EDUCATION/TRAINING PROGRAM

## 2023-01-01 PROCEDURE — 94799 UNLISTED PULMONARY SVC/PX: CPT

## 2023-01-01 PROCEDURE — 25010000002 CEFEPIME PER 500 MG: Performed by: INTERNAL MEDICINE

## 2023-01-01 PROCEDURE — 97110 THERAPEUTIC EXERCISES: CPT

## 2023-01-01 PROCEDURE — 97530 THERAPEUTIC ACTIVITIES: CPT

## 2023-01-01 PROCEDURE — 97166 OT EVAL MOD COMPLEX 45 MIN: CPT

## 2023-01-01 PROCEDURE — 94761 N-INVAS EAR/PLS OXIMETRY MLT: CPT

## 2023-01-01 PROCEDURE — 82962 GLUCOSE BLOOD TEST: CPT

## 2023-01-01 PROCEDURE — 84300 ASSAY OF URINE SODIUM: CPT | Performed by: INTERNAL MEDICINE

## 2023-01-01 PROCEDURE — 25010000002 METOCLOPRAMIDE PER 10 MG: Performed by: INTERNAL MEDICINE

## 2023-01-01 PROCEDURE — 0 CEFEPIME PER 500 MG: Performed by: INTERNAL MEDICINE

## 2023-01-01 PROCEDURE — 25010000002 ENOXAPARIN PER 10 MG: Performed by: STUDENT IN AN ORGANIZED HEALTH CARE EDUCATION/TRAINING PROGRAM

## 2023-01-01 PROCEDURE — C1874 STENT, COATED/COV W/DEL SYS: HCPCS | Performed by: INTERNAL MEDICINE

## 2023-01-01 PROCEDURE — 25010000002 FLUCONAZOLE PER 200 MG: Performed by: INTERNAL MEDICINE

## 2023-01-01 PROCEDURE — 99232 SBSQ HOSP IP/OBS MODERATE 35: CPT | Performed by: INTERNAL MEDICINE

## 2023-01-01 PROCEDURE — 25010000002 MIDAZOLAM PER 1 MG: Performed by: RADIOLOGY

## 2023-01-01 PROCEDURE — 49406 IMAGE CATH FLUID PERI/RETRO: CPT

## 2023-01-01 PROCEDURE — 86900 BLOOD TYPING SEROLOGIC ABO: CPT

## 2023-01-01 PROCEDURE — 25010000002 CEFTRIAXONE PER 250 MG: Performed by: INTERNAL MEDICINE

## 2023-01-01 PROCEDURE — 0 LIDOCAINE 1 % SOLUTION: Performed by: INTERNAL MEDICINE

## 2023-01-01 PROCEDURE — 85025 COMPLETE CBC W/AUTO DIFF WBC: CPT | Performed by: INTERNAL MEDICINE

## 2023-01-01 PROCEDURE — 99232 SBSQ HOSP IP/OBS MODERATE 35: CPT | Performed by: STUDENT IN AN ORGANIZED HEALTH CARE EDUCATION/TRAINING PROGRAM

## 2023-01-01 PROCEDURE — 83735 ASSAY OF MAGNESIUM: CPT | Performed by: STUDENT IN AN ORGANIZED HEALTH CARE EDUCATION/TRAINING PROGRAM

## 2023-01-01 PROCEDURE — 99233 SBSQ HOSP IP/OBS HIGH 50: CPT | Performed by: INTERNAL MEDICINE

## 2023-01-01 PROCEDURE — 87205 SMEAR GRAM STAIN: CPT | Performed by: INTERNAL MEDICINE

## 2023-01-01 PROCEDURE — 82945 GLUCOSE OTHER FLUID: CPT | Performed by: INTERNAL MEDICINE

## 2023-01-01 PROCEDURE — 97162 PT EVAL MOD COMPLEX 30 MIN: CPT

## 2023-01-01 PROCEDURE — C1769 GUIDE WIRE: HCPCS | Performed by: INTERNAL MEDICINE

## 2023-01-01 PROCEDURE — 94640 AIRWAY INHALATION TREATMENT: CPT

## 2023-01-01 PROCEDURE — 25010000002 ONDANSETRON PER 1 MG: Performed by: INTERNAL MEDICINE

## 2023-01-01 PROCEDURE — 80069 RENAL FUNCTION PANEL: CPT | Performed by: INTERNAL MEDICINE

## 2023-01-01 PROCEDURE — 99152 MOD SED SAME PHYS/QHP 5/>YRS: CPT

## 2023-01-01 PROCEDURE — 80048 BASIC METABOLIC PNL TOTAL CA: CPT | Performed by: INTERNAL MEDICINE

## 2023-01-01 PROCEDURE — 83735 ASSAY OF MAGNESIUM: CPT | Performed by: INTERNAL MEDICINE

## 2023-01-01 PROCEDURE — 87075 CULTR BACTERIA EXCEPT BLOOD: CPT | Performed by: INTERNAL MEDICINE

## 2023-01-01 PROCEDURE — 80053 COMPREHEN METABOLIC PANEL: CPT | Performed by: INTERNAL MEDICINE

## 2023-01-01 PROCEDURE — 25010000002 MORPHINE PER 10 MG

## 2023-01-01 PROCEDURE — 87077 CULTURE AEROBIC IDENTIFY: CPT | Performed by: INTERNAL MEDICINE

## 2023-01-01 PROCEDURE — 84134 ASSAY OF PREALBUMIN: CPT

## 2023-01-01 PROCEDURE — 71045 X-RAY EXAM CHEST 1 VIEW: CPT

## 2023-01-01 PROCEDURE — 99232 SBSQ HOSP IP/OBS MODERATE 35: CPT | Performed by: NURSE PRACTITIONER

## 2023-01-01 PROCEDURE — 86850 RBC ANTIBODY SCREEN: CPT

## 2023-01-01 PROCEDURE — 87086 URINE CULTURE/COLONY COUNT: CPT | Performed by: INTERNAL MEDICINE

## 2023-01-01 PROCEDURE — 97535 SELF CARE MNGMENT TRAINING: CPT

## 2023-01-01 PROCEDURE — 86140 C-REACTIVE PROTEIN: CPT

## 2023-01-01 PROCEDURE — 92610 EVALUATE SWALLOWING FUNCTION: CPT

## 2023-01-01 PROCEDURE — 25010000002 HALOPERIDOL LACTATE PER 5 MG: Performed by: NURSE PRACTITIONER

## 2023-01-01 PROCEDURE — 93005 ELECTROCARDIOGRAM TRACING: CPT | Performed by: INTERNAL MEDICINE

## 2023-01-01 PROCEDURE — 25010000002 HYDROMORPHONE PER 4 MG: Performed by: FAMILY MEDICINE

## 2023-01-01 PROCEDURE — 82948 REAGENT STRIP/BLOOD GLUCOSE: CPT

## 2023-01-01 PROCEDURE — 83935 ASSAY OF URINE OSMOLALITY: CPT | Performed by: INTERNAL MEDICINE

## 2023-01-01 PROCEDURE — 0D758DZ DILATION OF ESOPHAGUS WITH INTRALUMINAL DEVICE, VIA NATURAL OR ARTIFICIAL OPENING ENDOSCOPIC: ICD-10-PCS | Performed by: INTERNAL MEDICINE

## 2023-01-01 PROCEDURE — 99232 SBSQ HOSP IP/OBS MODERATE 35: CPT | Performed by: PHYSICIAN ASSISTANT

## 2023-01-01 PROCEDURE — 81001 URINALYSIS AUTO W/SCOPE: CPT | Performed by: INTERNAL MEDICINE

## 2023-01-01 PROCEDURE — 94664 DEMO&/EVAL PT USE INHALER: CPT

## 2023-01-01 PROCEDURE — 85027 COMPLETE CBC AUTOMATED: CPT | Performed by: STUDENT IN AN ORGANIZED HEALTH CARE EDUCATION/TRAINING PROGRAM

## 2023-01-01 PROCEDURE — 63710000001 ONDANSETRON PER 8 MG: Performed by: INTERNAL MEDICINE

## 2023-01-01 PROCEDURE — 87015 SPECIMEN INFECT AGNT CONCNTJ: CPT | Performed by: INTERNAL MEDICINE

## 2023-01-01 PROCEDURE — 25010000002 MORPHINE PER 10 MG: Performed by: NURSE PRACTITIONER

## 2023-01-01 PROCEDURE — 25010000002 HYDROMORPHONE PER 4 MG: Performed by: STUDENT IN AN ORGANIZED HEALTH CARE EDUCATION/TRAINING PROGRAM

## 2023-01-01 PROCEDURE — 82042 OTHER SOURCE ALBUMIN QUAN EA: CPT | Performed by: INTERNAL MEDICINE

## 2023-01-01 PROCEDURE — 0D9630Z DRAINAGE OF STOMACH WITH DRAINAGE DEVICE, PERCUTANEOUS APPROACH: ICD-10-PCS | Performed by: PEDIATRICS

## 2023-01-01 PROCEDURE — 25010000002 FENTANYL CITRATE (PF) 50 MCG/ML SOLUTION: Performed by: RADIOLOGY

## 2023-01-01 PROCEDURE — 74176 CT ABD & PELVIS W/O CONTRAST: CPT

## 2023-01-01 PROCEDURE — 25010000002 LORAZEPAM PER 2 MG: Performed by: NURSE PRACTITIONER

## 2023-01-01 PROCEDURE — 85025 COMPLETE CBC W/AUTO DIFF WBC: CPT

## 2023-01-01 PROCEDURE — 93010 ELECTROCARDIOGRAM REPORT: CPT | Performed by: INTERNAL MEDICINE

## 2023-01-01 PROCEDURE — 74018 RADEX ABDOMEN 1 VIEW: CPT

## 2023-01-01 PROCEDURE — 87186 SC STD MICRODIL/AGAR DIL: CPT | Performed by: INTERNAL MEDICINE

## 2023-01-01 PROCEDURE — 97116 GAIT TRAINING THERAPY: CPT

## 2023-01-01 PROCEDURE — 83615 LACTATE (LD) (LDH) ENZYME: CPT | Performed by: INTERNAL MEDICINE

## 2023-01-01 PROCEDURE — 43266 EGD ENDOSCOPIC STENT PLACE: CPT | Performed by: INTERNAL MEDICINE

## 2023-01-01 PROCEDURE — 3E03329 INTRODUCTION OF OTHER ANTI-INFECTIVE INTO PERIPHERAL VEIN, PERCUTANEOUS APPROACH: ICD-10-PCS | Performed by: INTERNAL MEDICINE

## 2023-01-01 PROCEDURE — 85025 COMPLETE CBC W/AUTO DIFF WBC: CPT | Performed by: STUDENT IN AN ORGANIZED HEALTH CARE EDUCATION/TRAINING PROGRAM

## 2023-01-01 PROCEDURE — 87070 CULTURE OTHR SPECIMN AEROBIC: CPT | Performed by: INTERNAL MEDICINE

## 2023-01-01 PROCEDURE — 75989 ABSCESS DRAINAGE UNDER X-RAY: CPT

## 2023-01-01 PROCEDURE — G0378 HOSPITAL OBSERVATION PER HR: HCPCS

## 2023-01-01 PROCEDURE — 3E013GC INTRODUCTION OF OTHER THERAPEUTIC SUBSTANCE INTO SUBCUTANEOUS TISSUE, PERCUTANEOUS APPROACH: ICD-10-PCS | Performed by: STUDENT IN AN ORGANIZED HEALTH CARE EDUCATION/TRAINING PROGRAM

## 2023-01-01 PROCEDURE — 99222 1ST HOSP IP/OBS MODERATE 55: CPT | Performed by: PHYSICIAN ASSISTANT

## 2023-01-01 PROCEDURE — 80048 BASIC METABOLIC PNL TOTAL CA: CPT

## 2023-01-01 PROCEDURE — 86901 BLOOD TYPING SEROLOGIC RH(D): CPT

## 2023-01-01 PROCEDURE — 85027 COMPLETE CBC AUTOMATED: CPT | Performed by: INTERNAL MEDICINE

## 2023-01-01 PROCEDURE — 99231 SBSQ HOSP IP/OBS SF/LOW 25: CPT | Performed by: INTERNAL MEDICINE

## 2023-01-01 PROCEDURE — 99223 1ST HOSP IP/OBS HIGH 75: CPT | Performed by: INTERNAL MEDICINE

## 2023-01-01 PROCEDURE — 3E0G76Z INTRODUCTION OF NUTRITIONAL SUBSTANCE INTO UPPER GI, VIA NATURAL OR ARTIFICIAL OPENING: ICD-10-PCS | Performed by: INTERNAL MEDICINE

## 2023-01-01 PROCEDURE — 84100 ASSAY OF PHOSPHORUS: CPT | Performed by: INTERNAL MEDICINE

## 2023-01-01 PROCEDURE — C1729 CATH, DRAINAGE: HCPCS

## 2023-01-01 PROCEDURE — 74177 CT ABD & PELVIS W/CONTRAST: CPT

## 2023-01-01 PROCEDURE — 85027 COMPLETE CBC AUTOMATED: CPT

## 2023-01-01 PROCEDURE — 25510000001 IOPAMIDOL 61 % SOLUTION: Performed by: INTERNAL MEDICINE

## 2023-01-01 PROCEDURE — 76000 FLUOROSCOPY <1 HR PHYS/QHP: CPT

## 2023-01-01 PROCEDURE — 83735 ASSAY OF MAGNESIUM: CPT

## 2023-01-01 DEVICE — STENT SYSTEM
Type: IMPLANTABLE DEVICE | Site: ESOPHAGUS | Status: FUNCTIONAL
Brand: WALLFLEX™ ESOPHAGEAL

## 2023-01-01 DEVICE — ST SYS STNTFIX CLIP 8.5TO10MM 165CM: Type: IMPLANTABLE DEVICE | Site: ESOPHAGUS | Status: FUNCTIONAL

## 2023-01-01 RX ORDER — METOCLOPRAMIDE HYDROCHLORIDE 5 MG/5ML
5 SOLUTION ORAL
Status: CANCELLED | OUTPATIENT
Start: 2023-01-01

## 2023-01-01 RX ORDER — ACETAMINOPHEN 160 MG/5ML
650 SOLUTION ORAL EVERY 4 HOURS PRN
Status: DISCONTINUED | OUTPATIENT
Start: 2023-01-01 | End: 2023-01-01

## 2023-01-01 RX ORDER — ONDANSETRON 2 MG/ML
4 INJECTION INTRAMUSCULAR; INTRAVENOUS ONCE AS NEEDED
Status: DISCONTINUED | OUTPATIENT
Start: 2023-01-01 | End: 2023-01-01 | Stop reason: HOSPADM

## 2023-01-01 RX ORDER — LEVOTHYROXINE SODIUM 112 UG/1
112 TABLET ORAL
Status: DISCONTINUED | OUTPATIENT
Start: 2023-01-01 | End: 2023-01-01 | Stop reason: HOSPADM

## 2023-01-01 RX ORDER — ACETAMINOPHEN 650 MG/1
650 SUPPOSITORY RECTAL EVERY 4 HOURS PRN
Status: CANCELLED | OUTPATIENT
Start: 2023-01-01

## 2023-01-01 RX ORDER — CARVEDILOL 12.5 MG/1
25 TABLET ORAL 2 TIMES DAILY WITH MEALS
Status: DISCONTINUED | OUTPATIENT
Start: 2023-01-01 | End: 2023-01-01 | Stop reason: HOSPADM

## 2023-01-01 RX ORDER — AMOXICILLIN 250 MG
2 CAPSULE ORAL 2 TIMES DAILY
Status: DISCONTINUED | OUTPATIENT
Start: 2023-01-01 | End: 2023-01-01

## 2023-01-01 RX ORDER — HYDROCODONE BITARTRATE AND ACETAMINOPHEN 7.5; 325 MG/1; MG/1
1 TABLET ORAL EVERY 4 HOURS PRN
Status: DISCONTINUED | OUTPATIENT
Start: 2023-01-01 | End: 2023-01-01 | Stop reason: HOSPADM

## 2023-01-01 RX ORDER — ACETAMINOPHEN 650 MG/1
650 SUPPOSITORY RECTAL EVERY 4 HOURS PRN
Status: DISCONTINUED | OUTPATIENT
Start: 2023-01-01 | End: 2023-01-01

## 2023-01-01 RX ORDER — DOCUSATE SODIUM 50 MG/5 ML
100 LIQUID (ML) ORAL 2 TIMES DAILY
Status: CANCELLED | OUTPATIENT
Start: 2023-01-01

## 2023-01-01 RX ORDER — POLYETHYLENE GLYCOL 3350 17 G/17G
17 POWDER, FOR SOLUTION ORAL DAILY PRN
Status: CANCELLED | OUTPATIENT
Start: 2023-01-01

## 2023-01-01 RX ORDER — SODIUM CHLORIDE 0.9 % (FLUSH) 0.9 %
10 SYRINGE (ML) INJECTION AS NEEDED
Status: DISCONTINUED | OUTPATIENT
Start: 2023-01-01 | End: 2023-01-01 | Stop reason: HOSPADM

## 2023-01-01 RX ORDER — LEVOTHYROXINE SODIUM 112 UG/1
112 TABLET ORAL
Status: CANCELLED | OUTPATIENT
Start: 2023-05-03

## 2023-01-01 RX ORDER — BISACODYL 10 MG
10 SUPPOSITORY, RECTAL RECTAL DAILY PRN
Status: DISCONTINUED | OUTPATIENT
Start: 2023-01-01 | End: 2023-01-01

## 2023-01-01 RX ORDER — HYDROMORPHONE HYDROCHLORIDE 1 MG/ML
0.25 INJECTION, SOLUTION INTRAMUSCULAR; INTRAVENOUS; SUBCUTANEOUS EVERY 4 HOURS PRN
Status: DISCONTINUED | OUTPATIENT
Start: 2023-01-01 | End: 2023-01-01

## 2023-01-01 RX ORDER — FLUCONAZOLE 100 MG/1
200 TABLET ORAL EVERY 24 HOURS
Status: COMPLETED | OUTPATIENT
Start: 2023-01-01 | End: 2023-01-01

## 2023-01-01 RX ORDER — FLUCONAZOLE 2 MG/ML
400 INJECTION, SOLUTION INTRAVENOUS EVERY 24 HOURS
Status: DISCONTINUED | OUTPATIENT
Start: 2023-01-01 | End: 2023-01-01

## 2023-01-01 RX ORDER — HYDROCODONE BITARTRATE AND ACETAMINOPHEN 7.5; 325 MG/1; MG/1
1 TABLET ORAL EVERY 6 HOURS PRN
Status: DISCONTINUED | OUTPATIENT
Start: 2023-01-01 | End: 2023-01-01

## 2023-01-01 RX ORDER — BISACODYL 10 MG
10 SUPPOSITORY, RECTAL RECTAL DAILY
Status: DISCONTINUED | OUTPATIENT
Start: 2023-01-01 | End: 2023-05-03 | Stop reason: HOSPADM

## 2023-01-01 RX ORDER — LORAZEPAM 2 MG/ML
0.5 INJECTION INTRAMUSCULAR EVERY 4 HOURS PRN
Status: DISCONTINUED | OUTPATIENT
Start: 2023-01-01 | End: 2023-05-03 | Stop reason: HOSPADM

## 2023-01-01 RX ORDER — BISACODYL 10 MG
10 SUPPOSITORY, RECTAL RECTAL DAILY PRN
Status: CANCELLED | OUTPATIENT
Start: 2023-01-01

## 2023-01-01 RX ORDER — SODIUM CHLORIDE 0.9 % (FLUSH) 0.9 %
10 SYRINGE (ML) INJECTION EVERY 12 HOURS SCHEDULED
Status: DISCONTINUED | OUTPATIENT
Start: 2023-01-01 | End: 2023-05-03 | Stop reason: HOSPADM

## 2023-01-01 RX ORDER — MIDAZOLAM HYDROCHLORIDE 1 MG/ML
INJECTION INTRAMUSCULAR; INTRAVENOUS AS NEEDED
Status: COMPLETED | OUTPATIENT
Start: 2023-01-01 | End: 2023-01-01

## 2023-01-01 RX ORDER — ACETAMINOPHEN 325 MG/1
650 TABLET ORAL EVERY 4 HOURS PRN
Status: CANCELLED | OUTPATIENT
Start: 2023-01-01

## 2023-01-01 RX ORDER — TORSEMIDE 20 MG/1
40 TABLET ORAL ONCE
Status: COMPLETED | OUTPATIENT
Start: 2023-01-01 | End: 2023-01-01

## 2023-01-01 RX ORDER — NALOXEGOL OXALATE 25 MG/1
25 TABLET, FILM COATED ORAL EVERY MORNING
COMMUNITY

## 2023-01-01 RX ORDER — ASPIRIN 81 MG/1
81 TABLET, CHEWABLE ORAL DAILY
Status: CANCELLED | OUTPATIENT
Start: 2023-05-03

## 2023-01-01 RX ORDER — GLYCOPYRROLATE 0.2 MG/ML
0.2 INJECTION INTRAMUSCULAR; INTRAVENOUS EVERY 4 HOURS PRN
Status: DISCONTINUED | OUTPATIENT
Start: 2023-01-01 | End: 2023-05-03 | Stop reason: HOSPADM

## 2023-01-01 RX ORDER — MORPHINE SULFATE 2 MG/ML
2 INJECTION, SOLUTION INTRAMUSCULAR; INTRAVENOUS
Status: DISCONTINUED | OUTPATIENT
Start: 2023-01-01 | End: 2023-01-01 | Stop reason: HOSPADM

## 2023-01-01 RX ORDER — VALSARTAN 160 MG/1
160 TABLET ORAL
Status: DISCONTINUED | OUTPATIENT
Start: 2023-01-01 | End: 2023-01-01

## 2023-01-01 RX ORDER — SODIUM CHLORIDE 9 MG/ML
75 INJECTION, SOLUTION INTRAVENOUS CONTINUOUS
Status: ACTIVE | OUTPATIENT
Start: 2023-01-01 | End: 2023-01-01

## 2023-01-01 RX ORDER — TIZANIDINE 4 MG/1
2 TABLET ORAL 2 TIMES DAILY
COMMUNITY

## 2023-01-01 RX ORDER — EPHEDRINE SULFATE 50 MG/ML
5 INJECTION, SOLUTION INTRAVENOUS ONCE AS NEEDED
Status: DISCONTINUED | OUTPATIENT
Start: 2023-01-01 | End: 2023-01-01 | Stop reason: HOSPADM

## 2023-01-01 RX ORDER — FUROSEMIDE 10 MG/ML
20 INJECTION INTRAMUSCULAR; INTRAVENOUS EVERY 6 HOURS PRN
Status: DISCONTINUED | OUTPATIENT
Start: 2023-01-01 | End: 2023-05-03 | Stop reason: HOSPADM

## 2023-01-01 RX ORDER — ALUMINA, MAGNESIA, AND SIMETHICONE 2400; 2400; 240 MG/30ML; MG/30ML; MG/30ML
15 SUSPENSION ORAL EVERY 6 HOURS PRN
Status: CANCELLED | OUTPATIENT
Start: 2023-01-01

## 2023-01-01 RX ORDER — ASPIRIN 325 MG
325 TABLET ORAL DAILY
Status: DISCONTINUED | OUTPATIENT
Start: 2023-01-01 | End: 2023-01-01

## 2023-01-01 RX ORDER — SODIUM CHLORIDE 0.9 % (FLUSH) 0.9 %
10 SYRINGE (ML) INJECTION EVERY 12 HOURS SCHEDULED
Status: CANCELLED | OUTPATIENT
Start: 2023-01-01

## 2023-01-01 RX ORDER — FENTANYL CITRATE 50 UG/ML
INJECTION, SOLUTION INTRAMUSCULAR; INTRAVENOUS
Status: DISPENSED
Start: 2023-01-01 | End: 2023-01-01

## 2023-01-01 RX ORDER — MORPHINE SULFATE 4 MG/ML
4 INJECTION, SOLUTION INTRAMUSCULAR; INTRAVENOUS
Status: DISCONTINUED | OUTPATIENT
Start: 2023-01-01 | End: 2023-05-03 | Stop reason: HOSPADM

## 2023-01-01 RX ORDER — ONDANSETRON 4 MG/1
4 TABLET, FILM COATED ORAL EVERY 6 HOURS PRN
Status: DISCONTINUED | OUTPATIENT
Start: 2023-01-01 | End: 2023-01-01

## 2023-01-01 RX ORDER — MIDODRINE HYDROCHLORIDE 10 MG/1
10 TABLET ORAL ONCE
Status: COMPLETED | OUTPATIENT
Start: 2023-01-01 | End: 2023-01-01

## 2023-01-01 RX ORDER — METOCLOPRAMIDE HYDROCHLORIDE 5 MG/5ML
10 SOLUTION ORAL
Status: DISCONTINUED | OUTPATIENT
Start: 2023-01-01 | End: 2023-01-01

## 2023-01-01 RX ORDER — FUROSEMIDE 10 MG/ML
40 INJECTION INTRAMUSCULAR; INTRAVENOUS DAILY
Status: DISCONTINUED | OUTPATIENT
Start: 2023-01-01 | End: 2023-01-01

## 2023-01-01 RX ORDER — ASPIRIN 81 MG/1
81 TABLET, CHEWABLE ORAL DAILY
Status: DISCONTINUED | OUTPATIENT
Start: 2023-01-01 | End: 2023-01-01 | Stop reason: HOSPADM

## 2023-01-01 RX ORDER — PANTOPRAZOLE SODIUM 40 MG/10ML
40 INJECTION, POWDER, LYOPHILIZED, FOR SOLUTION INTRAVENOUS
Status: DISCONTINUED | OUTPATIENT
Start: 2023-01-01 | End: 2023-01-01

## 2023-01-01 RX ORDER — BISACODYL 5 MG/1
5 TABLET, DELAYED RELEASE ORAL DAILY PRN
Status: DISCONTINUED | OUTPATIENT
Start: 2023-01-01 | End: 2023-01-01

## 2023-01-01 RX ORDER — ALUMINA, MAGNESIA, AND SIMETHICONE 2400; 2400; 240 MG/30ML; MG/30ML; MG/30ML
15 SUSPENSION ORAL EVERY 6 HOURS PRN
Status: DISCONTINUED | OUTPATIENT
Start: 2023-01-01 | End: 2023-01-01

## 2023-01-01 RX ORDER — HYDRALAZINE HYDROCHLORIDE 50 MG/1
50 TABLET, FILM COATED ORAL EVERY 8 HOURS SCHEDULED
Status: DISCONTINUED | OUTPATIENT
Start: 2023-01-01 | End: 2023-01-01

## 2023-01-01 RX ORDER — LIDOCAINE 40 MG/G
1 CREAM TOPICAL EVERY 6 HOURS
Status: DISCONTINUED | OUTPATIENT
Start: 2023-01-01 | End: 2023-05-03 | Stop reason: HOSPADM

## 2023-01-01 RX ORDER — HYDROXYZINE HYDROCHLORIDE 25 MG/1
25 TABLET, FILM COATED ORAL 3 TIMES DAILY PRN
Status: DISCONTINUED | OUTPATIENT
Start: 2023-01-01 | End: 2023-01-01

## 2023-01-01 RX ORDER — ACETAMINOPHEN 160 MG/5ML
650 SOLUTION ORAL EVERY 4 HOURS PRN
Status: DISCONTINUED | OUTPATIENT
Start: 2023-01-01 | End: 2023-01-01 | Stop reason: HOSPADM

## 2023-01-01 RX ORDER — ACETAMINOPHEN 325 MG/1
650 TABLET ORAL EVERY 4 HOURS PRN
Status: DISCONTINUED | OUTPATIENT
Start: 2023-01-01 | End: 2023-05-03 | Stop reason: HOSPADM

## 2023-01-01 RX ORDER — MIDAZOLAM HYDROCHLORIDE 1 MG/ML
INJECTION INTRAMUSCULAR; INTRAVENOUS
Status: DISPENSED
Start: 2023-01-01 | End: 2023-01-01

## 2023-01-01 RX ORDER — ACETAMINOPHEN 325 MG/1
650 TABLET ORAL EVERY 4 HOURS PRN
Status: DISCONTINUED | OUTPATIENT
Start: 2023-01-01 | End: 2023-01-01

## 2023-01-01 RX ORDER — LIDOCAINE 50 MG/G
1 PATCH TOPICAL
Status: CANCELLED | OUTPATIENT
Start: 2023-05-03

## 2023-01-01 RX ORDER — ACETAMINOPHEN 650 MG/1
650 SUPPOSITORY RECTAL EVERY 4 HOURS PRN
Status: DISCONTINUED | OUTPATIENT
Start: 2023-01-01 | End: 2023-05-03 | Stop reason: HOSPADM

## 2023-01-01 RX ORDER — SCOLOPAMINE TRANSDERMAL SYSTEM 1 MG/1
1 PATCH, EXTENDED RELEASE TRANSDERMAL
Status: DISCONTINUED | OUTPATIENT
Start: 2023-01-01 | End: 2023-05-03 | Stop reason: HOSPADM

## 2023-01-01 RX ORDER — LEVOTHYROXINE SODIUM 112 UG/1
112 TABLET ORAL
Status: DISCONTINUED | OUTPATIENT
Start: 2023-01-01 | End: 2023-01-01

## 2023-01-01 RX ORDER — BISACODYL 10 MG
10 SUPPOSITORY, RECTAL RECTAL DAILY PRN
Status: DISCONTINUED | OUTPATIENT
Start: 2023-01-01 | End: 2023-05-03 | Stop reason: HOSPADM

## 2023-01-01 RX ORDER — ONDANSETRON 2 MG/ML
4 INJECTION INTRAMUSCULAR; INTRAVENOUS EVERY 6 HOURS PRN
Status: DISCONTINUED | OUTPATIENT
Start: 2023-01-01 | End: 2023-01-01 | Stop reason: HOSPADM

## 2023-01-01 RX ORDER — CARVEDILOL 12.5 MG/1
25 TABLET ORAL 2 TIMES DAILY WITH MEALS
Status: CANCELLED | OUTPATIENT
Start: 2023-01-01

## 2023-01-01 RX ORDER — MORPHINE SULFATE 2 MG/ML
2 INJECTION, SOLUTION INTRAMUSCULAR; INTRAVENOUS
Status: DISCONTINUED | OUTPATIENT
Start: 2023-01-01 | End: 2023-01-01

## 2023-01-01 RX ORDER — SODIUM CHLORIDE 9 MG/ML
40 INJECTION, SOLUTION INTRAVENOUS AS NEEDED
Status: DISCONTINUED | OUTPATIENT
Start: 2023-01-01 | End: 2023-01-01 | Stop reason: HOSPADM

## 2023-01-01 RX ORDER — LORAZEPAM 2 MG/ML
0.25 INJECTION INTRAMUSCULAR EVERY 4 HOURS PRN
Status: DISCONTINUED | OUTPATIENT
Start: 2023-01-01 | End: 2023-01-01

## 2023-01-01 RX ORDER — CARVEDILOL 12.5 MG/1
25 TABLET ORAL 2 TIMES DAILY WITH MEALS
Status: DISCONTINUED | OUTPATIENT
Start: 2023-01-01 | End: 2023-01-01

## 2023-01-01 RX ORDER — LORAZEPAM 0.5 MG/1
0.5 TABLET ORAL EVERY 6 HOURS PRN
Status: DISCONTINUED | OUTPATIENT
Start: 2023-01-01 | End: 2023-01-01

## 2023-01-01 RX ORDER — ENOXAPARIN SODIUM 100 MG/ML
40 INJECTION SUBCUTANEOUS NIGHTLY
Status: DISCONTINUED | OUTPATIENT
Start: 2023-01-01 | End: 2023-01-01 | Stop reason: HOSPADM

## 2023-01-01 RX ORDER — BISACODYL 10 MG
10 SUPPOSITORY, RECTAL RECTAL DAILY PRN
Status: DISCONTINUED | OUTPATIENT
Start: 2023-01-01 | End: 2023-01-01 | Stop reason: HOSPADM

## 2023-01-01 RX ORDER — GABAPENTIN 100 MG/1
100 CAPSULE ORAL EVERY 12 HOURS SCHEDULED
Status: DISCONTINUED | OUTPATIENT
Start: 2023-01-01 | End: 2023-01-01 | Stop reason: HOSPADM

## 2023-01-01 RX ORDER — IPRATROPIUM BROMIDE AND ALBUTEROL SULFATE 2.5; .5 MG/3ML; MG/3ML
3 SOLUTION RESPIRATORY (INHALATION) EVERY 4 HOURS PRN
Status: CANCELLED | OUTPATIENT
Start: 2023-01-01

## 2023-01-01 RX ORDER — LIDOCAINE 50 MG/G
1 PATCH TOPICAL
Status: DISCONTINUED | OUTPATIENT
Start: 2023-01-01 | End: 2023-01-01 | Stop reason: HOSPADM

## 2023-01-01 RX ORDER — FUROSEMIDE 40 MG/1
40 TABLET ORAL DAILY
Status: COMPLETED | OUTPATIENT
Start: 2023-01-01 | End: 2023-01-01

## 2023-01-01 RX ORDER — ONDANSETRON 4 MG/1
8 TABLET, FILM COATED ORAL EVERY 6 HOURS SCHEDULED
Status: DISCONTINUED | OUTPATIENT
Start: 2023-01-01 | End: 2023-01-01

## 2023-01-01 RX ORDER — ASPIRIN 81 MG/1
81 TABLET, CHEWABLE ORAL DAILY
Status: DISCONTINUED | OUTPATIENT
Start: 2023-01-01 | End: 2023-01-01

## 2023-01-01 RX ORDER — PANTOPRAZOLE SODIUM 40 MG/1
40 TABLET, DELAYED RELEASE ORAL
Status: DISCONTINUED | OUTPATIENT
Start: 2023-01-01 | End: 2023-01-01

## 2023-01-01 RX ORDER — PROMETHAZINE HYDROCHLORIDE 12.5 MG/1
12.5 SUPPOSITORY RECTAL EVERY 4 HOURS PRN
Status: CANCELLED | OUTPATIENT
Start: 2023-01-01

## 2023-01-01 RX ORDER — HYDROCODONE BITARTRATE AND ACETAMINOPHEN 7.5; 325 MG/1; MG/1
1 TABLET ORAL EVERY 6 HOURS
Status: DISCONTINUED | OUTPATIENT
Start: 2023-01-01 | End: 2023-01-01 | Stop reason: HOSPADM

## 2023-01-01 RX ORDER — SODIUM CHLORIDE 9 MG/ML
40 INJECTION, SOLUTION INTRAVENOUS AS NEEDED
Status: CANCELLED | OUTPATIENT
Start: 2023-01-01

## 2023-01-01 RX ORDER — LORAZEPAM 2 MG/ML
0.25 INJECTION INTRAMUSCULAR EVERY 4 HOURS PRN
Status: CANCELLED | OUTPATIENT
Start: 2023-01-01 | End: 2023-05-09

## 2023-01-01 RX ORDER — IPRATROPIUM BROMIDE AND ALBUTEROL SULFATE 2.5; .5 MG/3ML; MG/3ML
3 SOLUTION RESPIRATORY (INHALATION) EVERY 4 HOURS PRN
Status: DISCONTINUED | OUTPATIENT
Start: 2023-01-01 | End: 2023-05-03 | Stop reason: HOSPADM

## 2023-01-01 RX ORDER — LORAZEPAM 2 MG/ML
0.25 INJECTION INTRAMUSCULAR EVERY 4 HOURS PRN
Status: DISCONTINUED | OUTPATIENT
Start: 2023-01-01 | End: 2023-01-01 | Stop reason: HOSPADM

## 2023-01-01 RX ORDER — DOCUSATE SODIUM 50 MG/5 ML
100 LIQUID (ML) ORAL 2 TIMES DAILY
Status: DISCONTINUED | OUTPATIENT
Start: 2023-01-01 | End: 2023-01-01 | Stop reason: HOSPADM

## 2023-01-01 RX ORDER — POLYETHYLENE GLYCOL 3350 17 G/17G
17 POWDER, FOR SOLUTION ORAL DAILY PRN
Status: DISCONTINUED | OUTPATIENT
Start: 2023-01-01 | End: 2023-01-01 | Stop reason: HOSPADM

## 2023-01-01 RX ORDER — ONDANSETRON 2 MG/ML
4 INJECTION INTRAMUSCULAR; INTRAVENOUS EVERY 6 HOURS PRN
Status: DISCONTINUED | OUTPATIENT
Start: 2023-01-01 | End: 2023-05-03 | Stop reason: HOSPADM

## 2023-01-01 RX ORDER — METOCLOPRAMIDE HYDROCHLORIDE 5 MG/ML
10 INJECTION INTRAMUSCULAR; INTRAVENOUS EVERY 6 HOURS
Status: DISCONTINUED | OUTPATIENT
Start: 2023-01-01 | End: 2023-01-01

## 2023-01-01 RX ORDER — METOCLOPRAMIDE HYDROCHLORIDE 5 MG/5ML
5 SOLUTION ORAL
Status: DISCONTINUED | OUTPATIENT
Start: 2023-01-01 | End: 2023-01-01 | Stop reason: HOSPADM

## 2023-01-01 RX ORDER — NITROFURANTOIN 25; 75 MG/1; MG/1
100 CAPSULE ORAL 2 TIMES DAILY
COMMUNITY

## 2023-01-01 RX ORDER — LIDOCAINE HYDROCHLORIDE 10 MG/ML
10 INJECTION, SOLUTION INFILTRATION; PERINEURAL ONCE
Status: COMPLETED | OUTPATIENT
Start: 2023-01-01 | End: 2023-01-01

## 2023-01-01 RX ORDER — METRONIDAZOLE 500 MG/100ML
500 INJECTION, SOLUTION INTRAVENOUS EVERY 8 HOURS
Status: DISCONTINUED | OUTPATIENT
Start: 2023-01-01 | End: 2023-01-01

## 2023-01-01 RX ORDER — SODIUM CHLORIDE 0.9 % (FLUSH) 0.9 %
10 SYRINGE (ML) INJECTION EVERY 12 HOURS SCHEDULED
Status: DISCONTINUED | OUTPATIENT
Start: 2023-01-01 | End: 2023-01-01 | Stop reason: HOSPADM

## 2023-01-01 RX ORDER — IPRATROPIUM BROMIDE AND ALBUTEROL SULFATE 2.5; .5 MG/3ML; MG/3ML
3 SOLUTION RESPIRATORY (INHALATION)
Status: CANCELLED | OUTPATIENT
Start: 2023-01-01

## 2023-01-01 RX ORDER — VALSARTAN 160 MG/1
320 TABLET ORAL
Status: DISCONTINUED | OUTPATIENT
Start: 2023-01-01 | End: 2023-01-01

## 2023-01-01 RX ORDER — LANSOPRAZOLE
30 KIT
Status: DISCONTINUED | OUTPATIENT
Start: 2023-01-01 | End: 2023-01-01 | Stop reason: RX

## 2023-01-01 RX ORDER — ENOXAPARIN SODIUM 100 MG/ML
40 INJECTION SUBCUTANEOUS NIGHTLY
Status: CANCELLED | OUTPATIENT
Start: 2023-01-01

## 2023-01-01 RX ORDER — SODIUM CHLORIDE 0.9 % (FLUSH) 0.9 %
10 SYRINGE (ML) INJECTION AS NEEDED
Status: CANCELLED | OUTPATIENT
Start: 2023-01-01

## 2023-01-01 RX ORDER — CLONIDINE HYDROCHLORIDE 0.1 MG/1
0.1 TABLET ORAL 3 TIMES DAILY PRN
Status: CANCELLED | OUTPATIENT
Start: 2023-01-01

## 2023-01-01 RX ORDER — ACETAMINOPHEN 160 MG/5ML
650 SOLUTION ORAL EVERY 4 HOURS PRN
Status: CANCELLED | OUTPATIENT
Start: 2023-01-01

## 2023-01-01 RX ORDER — LORAZEPAM 2 MG/ML
1 INJECTION INTRAMUSCULAR ONCE
Status: DISCONTINUED | OUTPATIENT
Start: 2023-01-01 | End: 2023-05-03 | Stop reason: HOSPADM

## 2023-01-01 RX ORDER — LIDOCAINE 40 MG/G
1 CREAM TOPICAL EVERY 6 HOURS
Status: DISCONTINUED | OUTPATIENT
Start: 2023-01-01 | End: 2023-01-01 | Stop reason: HOSPADM

## 2023-01-01 RX ORDER — ACETAMINOPHEN 325 MG/1
650 TABLET ORAL EVERY 4 HOURS PRN
Status: DISCONTINUED | OUTPATIENT
Start: 2023-01-01 | End: 2023-01-01 | Stop reason: HOSPADM

## 2023-01-01 RX ORDER — IPRATROPIUM BROMIDE AND ALBUTEROL SULFATE 2.5; .5 MG/3ML; MG/3ML
3 SOLUTION RESPIRATORY (INHALATION) EVERY 4 HOURS PRN
Status: DISCONTINUED | OUTPATIENT
Start: 2023-01-01 | End: 2023-01-01 | Stop reason: HOSPADM

## 2023-01-01 RX ORDER — HYDROMORPHONE HYDROCHLORIDE 1 MG/ML
0.25 INJECTION, SOLUTION INTRAMUSCULAR; INTRAVENOUS; SUBCUTANEOUS EVERY 8 HOURS PRN
Status: DISCONTINUED | OUTPATIENT
Start: 2023-01-01 | End: 2023-01-01

## 2023-01-01 RX ORDER — HYDROXYZINE HYDROCHLORIDE 25 MG/1
25 TABLET, FILM COATED ORAL 3 TIMES DAILY PRN
Status: DISCONTINUED | OUTPATIENT
Start: 2023-01-01 | End: 2023-01-01 | Stop reason: HOSPADM

## 2023-01-01 RX ORDER — LIDOCAINE 50 MG/G
1 PATCH TOPICAL
Status: DISCONTINUED | OUTPATIENT
Start: 2023-05-03 | End: 2023-05-03 | Stop reason: HOSPADM

## 2023-01-01 RX ORDER — GABAPENTIN 100 MG/1
100 CAPSULE ORAL DAILY
Status: DISCONTINUED | OUTPATIENT
Start: 2023-01-01 | End: 2023-01-01

## 2023-01-01 RX ORDER — PROMETHAZINE HYDROCHLORIDE 12.5 MG/1
12.5 SUPPOSITORY RECTAL EVERY 4 HOURS PRN
Status: DISCONTINUED | OUTPATIENT
Start: 2023-01-01 | End: 2023-01-01 | Stop reason: HOSPADM

## 2023-01-01 RX ORDER — ONDANSETRON 4 MG/1
8 TABLET, FILM COATED ORAL EVERY 6 HOURS SCHEDULED
Status: COMPLETED | OUTPATIENT
Start: 2023-01-01 | End: 2023-01-01

## 2023-01-01 RX ORDER — MORPHINE SULFATE 4 MG/ML
4 INJECTION, SOLUTION INTRAMUSCULAR; INTRAVENOUS ONCE
Status: DISCONTINUED | OUTPATIENT
Start: 2023-01-01 | End: 2023-05-03 | Stop reason: HOSPADM

## 2023-01-01 RX ORDER — HYDROCODONE BITARTRATE AND ACETAMINOPHEN 7.5; 325 MG/1; MG/1
1 TABLET ORAL EVERY 4 HOURS PRN
Status: CANCELLED | OUTPATIENT
Start: 2023-01-01

## 2023-01-01 RX ORDER — ACETAMINOPHEN 650 MG/1
650 SUPPOSITORY RECTAL EVERY 4 HOURS PRN
Status: DISCONTINUED | OUTPATIENT
Start: 2023-01-01 | End: 2023-01-01 | Stop reason: HOSPADM

## 2023-01-01 RX ORDER — GABAPENTIN 100 MG/1
100 CAPSULE ORAL EVERY 12 HOURS SCHEDULED
Status: CANCELLED | OUTPATIENT
Start: 2023-01-01

## 2023-01-01 RX ORDER — FENTANYL CITRATE 50 UG/ML
INJECTION, SOLUTION INTRAMUSCULAR; INTRAVENOUS AS NEEDED
Status: COMPLETED | OUTPATIENT
Start: 2023-01-01 | End: 2023-01-01

## 2023-01-01 RX ORDER — ONDANSETRON 2 MG/ML
4 INJECTION INTRAMUSCULAR; INTRAVENOUS EVERY 6 HOURS PRN
Status: CANCELLED | OUTPATIENT
Start: 2023-01-01

## 2023-01-01 RX ORDER — HYDROXYZINE HYDROCHLORIDE 25 MG/1
25 TABLET, FILM COATED ORAL 3 TIMES DAILY PRN
Status: CANCELLED | OUTPATIENT
Start: 2023-01-01

## 2023-01-01 RX ORDER — IPRATROPIUM BROMIDE AND ALBUTEROL SULFATE 2.5; .5 MG/3ML; MG/3ML
3 SOLUTION RESPIRATORY (INHALATION)
Status: DISCONTINUED | OUTPATIENT
Start: 2023-01-01 | End: 2023-01-01 | Stop reason: HOSPADM

## 2023-01-01 RX ORDER — METOCLOPRAMIDE 5 MG/1
5 TABLET ORAL
Status: DISCONTINUED | OUTPATIENT
Start: 2023-01-01 | End: 2023-01-01

## 2023-01-01 RX ORDER — HYDROCODONE BITARTRATE AND ACETAMINOPHEN 7.5; 325 MG/1; MG/1
1 TABLET ORAL EVERY 6 HOURS
Status: CANCELLED | OUTPATIENT
Start: 2023-01-01 | End: 2023-05-09

## 2023-01-01 RX ORDER — PREDNISONE 20 MG/1
20 TABLET ORAL DAILY
COMMUNITY

## 2023-01-01 RX ORDER — ALUMINA, MAGNESIA, AND SIMETHICONE 2400; 2400; 240 MG/30ML; MG/30ML; MG/30ML
15 SUSPENSION ORAL EVERY 6 HOURS PRN
Status: DISCONTINUED | OUTPATIENT
Start: 2023-01-01 | End: 2023-01-01 | Stop reason: HOSPADM

## 2023-01-01 RX ORDER — HALOPERIDOL 5 MG/ML
1 INJECTION INTRAMUSCULAR EVERY 4 HOURS PRN
Status: DISCONTINUED | OUTPATIENT
Start: 2023-01-01 | End: 2023-05-03 | Stop reason: HOSPADM

## 2023-01-01 RX ORDER — ACETAMINOPHEN 160 MG/5ML
650 SOLUTION ORAL EVERY 4 HOURS PRN
Status: DISCONTINUED | OUTPATIENT
Start: 2023-01-01 | End: 2023-05-03 | Stop reason: HOSPADM

## 2023-01-01 RX ORDER — FUROSEMIDE 40 MG/1
40 TABLET ORAL DAILY
Status: DISCONTINUED | OUTPATIENT
Start: 2023-01-01 | End: 2023-01-01

## 2023-01-01 RX ORDER — LIDOCAINE 40 MG/G
1 CREAM TOPICAL EVERY 6 HOURS
Status: CANCELLED | OUTPATIENT
Start: 2023-01-01

## 2023-01-01 RX ORDER — BISACODYL 10 MG
10 SUPPOSITORY, RECTAL RECTAL ONCE
Status: COMPLETED | OUTPATIENT
Start: 2023-01-01 | End: 2023-01-01

## 2023-01-01 RX ORDER — MORPHINE SULFATE 2 MG/ML
2 INJECTION, SOLUTION INTRAMUSCULAR; INTRAVENOUS
Status: CANCELLED | OUTPATIENT
Start: 2023-01-01 | End: 2023-05-05

## 2023-01-01 RX ORDER — CLONIDINE HYDROCHLORIDE 0.1 MG/1
0.1 TABLET ORAL 3 TIMES DAILY PRN
Status: DISCONTINUED | OUTPATIENT
Start: 2023-01-01 | End: 2023-01-01 | Stop reason: HOSPADM

## 2023-01-01 RX ORDER — POLYVINYL ALCOHOL 14 MG/ML
1 SOLUTION/ DROPS OPHTHALMIC
Status: DISCONTINUED | OUTPATIENT
Start: 2023-01-01 | End: 2023-05-03 | Stop reason: HOSPADM

## 2023-01-01 RX ORDER — CLONIDINE HYDROCHLORIDE 0.1 MG/1
0.1 TABLET ORAL 3 TIMES DAILY PRN
Status: DISCONTINUED | OUTPATIENT
Start: 2023-01-01 | End: 2023-01-01

## 2023-01-01 RX ORDER — POLYETHYLENE GLYCOL 3350 17 G/17G
17 POWDER, FOR SOLUTION ORAL DAILY PRN
Status: DISCONTINUED | OUTPATIENT
Start: 2023-01-01 | End: 2023-01-01

## 2023-01-01 RX ADMIN — Medication 10 ML: at 21:28

## 2023-01-01 RX ADMIN — CARVEDILOL 25 MG: 12.5 TABLET, FILM COATED ORAL at 08:35

## 2023-01-01 RX ADMIN — LEVOTHYROXINE SODIUM 112 MCG: 112 TABLET ORAL at 05:23

## 2023-01-01 RX ADMIN — ONDANSETRON 4 MG: 2 INJECTION INTRAMUSCULAR; INTRAVENOUS at 09:59

## 2023-01-01 RX ADMIN — GABAPENTIN 100 MG: 100 CAPSULE ORAL at 08:18

## 2023-01-01 RX ADMIN — LEVOTHYROXINE SODIUM 112 MCG: 112 TABLET ORAL at 05:20

## 2023-01-01 RX ADMIN — HYDROCODONE BITARTRATE AND ACETAMINOPHEN 1 TABLET: 7.5; 325 TABLET ORAL at 18:14

## 2023-01-01 RX ADMIN — VALSARTAN 320 MG: 160 TABLET, FILM COATED ORAL at 10:41

## 2023-01-01 RX ADMIN — ASPIRIN 81 MG CHEWABLE TABLET 81 MG: 81 TABLET CHEWABLE at 08:19

## 2023-01-01 RX ADMIN — Medication 10 ML: at 21:18

## 2023-01-01 RX ADMIN — IPRATROPIUM BROMIDE AND ALBUTEROL SULFATE 3 ML: 2.5; .5 SOLUTION RESPIRATORY (INHALATION) at 10:39

## 2023-01-01 RX ADMIN — SENNOSIDES 10 ML: 8.8 LIQUID ORAL at 08:19

## 2023-01-01 RX ADMIN — DOCUSATE SODIUM 100 MG: 50 LIQUID ORAL at 08:35

## 2023-01-01 RX ADMIN — ALUMINUM HYDROXIDE, MAGNESIUM HYDROXIDE, AND DIMETHICONE 15 ML: 400; 400; 40 SUSPENSION ORAL at 14:27

## 2023-01-01 RX ADMIN — DOCUSATE SODIUM 100 MG: 50 LIQUID ORAL at 20:10

## 2023-01-01 RX ADMIN — GABAPENTIN 100 MG: 100 CAPSULE ORAL at 09:01

## 2023-01-01 RX ADMIN — DOCUSATE SODIUM 100 MG: 50 LIQUID ORAL at 08:48

## 2023-01-01 RX ADMIN — CARVEDILOL 25 MG: 12.5 TABLET, FILM COATED ORAL at 17:41

## 2023-01-01 RX ADMIN — LANSOPRAZOLE 30 MG: KIT at 17:18

## 2023-01-01 RX ADMIN — METOCLOPRAMIDE HYDROCHLORIDE 10 MG: 5 SOLUTION ORAL at 06:30

## 2023-01-01 RX ADMIN — SENNOSIDES 10 ML: 8.8 LIQUID ORAL at 08:17

## 2023-01-01 RX ADMIN — DOCUSATE SODIUM 100 MG: 50 LIQUID ORAL at 08:33

## 2023-01-01 RX ADMIN — FLUCONAZOLE 400 MG: 400 INJECTION, SOLUTION INTRAVENOUS at 18:36

## 2023-01-01 RX ADMIN — BISACODYL 10 MG: 10 SUPPOSITORY RECTAL at 14:07

## 2023-01-01 RX ADMIN — HYDROCODONE BITARTRATE AND ACETAMINOPHEN 1 TABLET: 7.5; 325 TABLET ORAL at 05:22

## 2023-01-01 RX ADMIN — Medication 10 ML: at 21:10

## 2023-01-01 RX ADMIN — ONDANSETRON 4 MG: 2 INJECTION INTRAMUSCULAR; INTRAVENOUS at 05:19

## 2023-01-01 RX ADMIN — HYDROCODONE BITARTRATE AND ACETAMINOPHEN 1 TABLET: 7.5; 325 TABLET ORAL at 18:10

## 2023-01-01 RX ADMIN — ENOXAPARIN SODIUM 40 MG: 40 INJECTION SUBCUTANEOUS at 20:50

## 2023-01-01 RX ADMIN — SODIUM CHLORIDE 75 ML/HR: 9 INJECTION, SOLUTION INTRAVENOUS at 17:08

## 2023-01-01 RX ADMIN — Medication 10 ML: at 08:05

## 2023-01-01 RX ADMIN — PANTOPRAZOLE SODIUM 40 MG: 40 INJECTION, POWDER, LYOPHILIZED, FOR SOLUTION INTRAVENOUS at 06:44

## 2023-01-01 RX ADMIN — LEVOTHYROXINE SODIUM 112 MCG: 112 TABLET ORAL at 12:32

## 2023-01-01 RX ADMIN — CARVEDILOL 25 MG: 12.5 TABLET, FILM COATED ORAL at 08:04

## 2023-01-01 RX ADMIN — Medication 10 ML: at 08:26

## 2023-01-01 RX ADMIN — LANSOPRAZOLE 30 MG: KIT at 12:39

## 2023-01-01 RX ADMIN — VALSARTAN 320 MG: 160 TABLET, FILM COATED ORAL at 09:59

## 2023-01-01 RX ADMIN — CLONIDINE HYDROCHLORIDE 0.1 MG: 0.1 TABLET ORAL at 10:48

## 2023-01-01 RX ADMIN — ENOXAPARIN SODIUM 40 MG: 40 INJECTION SUBCUTANEOUS at 20:34

## 2023-01-01 RX ADMIN — ASPIRIN 81 MG CHEWABLE TABLET 81 MG: 81 TABLET CHEWABLE at 09:01

## 2023-01-01 RX ADMIN — METOCLOPRAMIDE HYDROCHLORIDE 10 MG: 5 SOLUTION ORAL at 08:20

## 2023-01-01 RX ADMIN — METRONIDAZOLE 500 MG: 500 INJECTION, SOLUTION INTRAVENOUS at 11:52

## 2023-01-01 RX ADMIN — ENOXAPARIN SODIUM 40 MG: 40 INJECTION SUBCUTANEOUS at 21:03

## 2023-01-01 RX ADMIN — HYDRALAZINE HYDROCHLORIDE 50 MG: 50 TABLET, FILM COATED ORAL at 21:29

## 2023-01-01 RX ADMIN — CARVEDILOL 25 MG: 12.5 TABLET, FILM COATED ORAL at 18:10

## 2023-01-01 RX ADMIN — HYDROCODONE BITARTRATE AND ACETAMINOPHEN 1 TABLET: 7.5; 325 TABLET ORAL at 10:48

## 2023-01-01 RX ADMIN — HYDROCODONE BITARTRATE AND ACETAMINOPHEN 1 TABLET: 7.5; 325 TABLET ORAL at 08:04

## 2023-01-01 RX ADMIN — IPRATROPIUM BROMIDE AND ALBUTEROL SULFATE 3 ML: 2.5; .5 SOLUTION RESPIRATORY (INHALATION) at 16:55

## 2023-01-01 RX ADMIN — IPRATROPIUM BROMIDE AND ALBUTEROL SULFATE 3 ML: 2.5; .5 SOLUTION RESPIRATORY (INHALATION) at 15:44

## 2023-01-01 RX ADMIN — LEVOTHYROXINE SODIUM 112 MCG: 112 TABLET ORAL at 06:20

## 2023-01-01 RX ADMIN — HYDROCODONE BITARTRATE AND ACETAMINOPHEN 1 TABLET: 7.5; 325 TABLET ORAL at 19:55

## 2023-01-01 RX ADMIN — ONDANSETRON 4 MG: 2 INJECTION INTRAMUSCULAR; INTRAVENOUS at 21:48

## 2023-01-01 RX ADMIN — METRONIDAZOLE 500 MG: 500 INJECTION, SOLUTION INTRAVENOUS at 03:23

## 2023-01-01 RX ADMIN — ASPIRIN 81 MG CHEWABLE TABLET 81 MG: 81 TABLET CHEWABLE at 08:41

## 2023-01-01 RX ADMIN — GABAPENTIN 100 MG: 100 CAPSULE ORAL at 08:11

## 2023-01-01 RX ADMIN — HYDROXYZINE HYDROCHLORIDE 25 MG: 25 TABLET ORAL at 12:03

## 2023-01-01 RX ADMIN — HYDRALAZINE HYDROCHLORIDE 50 MG: 50 TABLET, FILM COATED ORAL at 04:16

## 2023-01-01 RX ADMIN — CEFTRIAXONE 2 G: 2 INJECTION, POWDER, FOR SOLUTION INTRAMUSCULAR; INTRAVENOUS at 08:12

## 2023-01-01 RX ADMIN — HYDROMORPHONE HYDROCHLORIDE 0.25 MG: 1 INJECTION, SOLUTION INTRAMUSCULAR; INTRAVENOUS; SUBCUTANEOUS at 16:44

## 2023-01-01 RX ADMIN — HYDRALAZINE HYDROCHLORIDE 75 MG: 50 TABLET, FILM COATED ORAL at 14:56

## 2023-01-01 RX ADMIN — DOCUSATE SODIUM 100 MG: 50 LIQUID ORAL at 21:15

## 2023-01-01 RX ADMIN — CARVEDILOL 25 MG: 12.5 TABLET, FILM COATED ORAL at 18:11

## 2023-01-01 RX ADMIN — SENNOSIDES 10 ML: 8.8 LIQUID ORAL at 08:38

## 2023-01-01 RX ADMIN — SENNOSIDES 10 ML: 8.8 LIQUID ORAL at 08:33

## 2023-01-01 RX ADMIN — ONDANSETRON 4 MG: 2 INJECTION INTRAMUSCULAR; INTRAVENOUS at 23:44

## 2023-01-01 RX ADMIN — HYDROMORPHONE HYDROCHLORIDE 0.25 MG: 1 INJECTION, SOLUTION INTRAMUSCULAR; INTRAVENOUS; SUBCUTANEOUS at 04:20

## 2023-01-01 RX ADMIN — GABAPENTIN 100 MG: 100 CAPSULE ORAL at 20:10

## 2023-01-01 RX ADMIN — HYDROCODONE BITARTRATE AND ACETAMINOPHEN 1 TABLET: 7.5; 325 TABLET ORAL at 10:08

## 2023-01-01 RX ADMIN — GABAPENTIN 100 MG: 100 CAPSULE ORAL at 21:03

## 2023-01-01 RX ADMIN — LANSOPRAZOLE 30 MG: KIT at 09:33

## 2023-01-01 RX ADMIN — HYDRALAZINE HYDROCHLORIDE 50 MG: 50 TABLET, FILM COATED ORAL at 15:41

## 2023-01-01 RX ADMIN — HYDRALAZINE HYDROCHLORIDE 75 MG: 50 TABLET, FILM COATED ORAL at 01:39

## 2023-01-01 RX ADMIN — METOCLOPRAMIDE HYDROCHLORIDE 5 MG: 5 SOLUTION ORAL at 05:23

## 2023-01-01 RX ADMIN — SENNOSIDES 10 ML: 8.8 LIQUID ORAL at 21:18

## 2023-01-01 RX ADMIN — DOCUSATE SODIUM 100 MG: 50 LIQUID ORAL at 08:19

## 2023-01-01 RX ADMIN — SENNOSIDES 10 ML: 8.8 LIQUID ORAL at 08:53

## 2023-01-01 RX ADMIN — HYDROCODONE BITARTRATE AND ACETAMINOPHEN 1 TABLET: 7.5; 325 TABLET ORAL at 01:04

## 2023-01-01 RX ADMIN — GABAPENTIN 100 MG: 100 CAPSULE ORAL at 21:49

## 2023-01-01 RX ADMIN — HYDROCODONE BITARTRATE AND ACETAMINOPHEN 1 TABLET: 7.5; 325 TABLET ORAL at 11:10

## 2023-01-01 RX ADMIN — LIDOCAINE 1 PATCH: 700 PATCH TOPICAL at 09:58

## 2023-01-01 RX ADMIN — LEVOTHYROXINE SODIUM 112 MCG: 112 TABLET ORAL at 06:39

## 2023-01-01 RX ADMIN — METOCLOPRAMIDE HYDROCHLORIDE 5 MG: 5 SOLUTION ORAL at 11:27

## 2023-01-01 RX ADMIN — BISACODYL 10 MG: 10 SUPPOSITORY RECTAL at 17:07

## 2023-01-01 RX ADMIN — Medication 10 ML: at 20:07

## 2023-01-01 RX ADMIN — LIDOCAINE 1 PATCH: 700 PATCH TOPICAL at 08:24

## 2023-01-01 RX ADMIN — GABAPENTIN 100 MG: 100 CAPSULE ORAL at 21:17

## 2023-01-01 RX ADMIN — HYDRALAZINE HYDROCHLORIDE 75 MG: 50 TABLET, FILM COATED ORAL at 00:42

## 2023-01-01 RX ADMIN — LIDOCAINE 1 PATCH: 700 PATCH TOPICAL at 11:23

## 2023-01-01 RX ADMIN — SODIUM CHLORIDE 500 ML: 9 INJECTION, SOLUTION INTRAVENOUS at 20:07

## 2023-01-01 RX ADMIN — DOCUSATE SODIUM 100 MG: 50 LIQUID ORAL at 21:08

## 2023-01-01 RX ADMIN — GABAPENTIN 100 MG: 100 CAPSULE ORAL at 09:12

## 2023-01-01 RX ADMIN — GABAPENTIN 100 MG: 100 CAPSULE ORAL at 08:38

## 2023-01-01 RX ADMIN — LIDOCAINE 4% 1 APPLICATION: 4 CREAM TOPICAL at 21:48

## 2023-01-01 RX ADMIN — DOCUSATE SODIUM 100 MG: 50 LIQUID ORAL at 20:51

## 2023-01-01 RX ADMIN — METRONIDAZOLE 500 MG: 500 INJECTION, SOLUTION INTRAVENOUS at 01:44

## 2023-01-01 RX ADMIN — PANTOPRAZOLE SODIUM 40 MG: 40 INJECTION, POWDER, LYOPHILIZED, FOR SOLUTION INTRAVENOUS at 17:49

## 2023-01-01 RX ADMIN — LIDOCAINE 4% 1 APPLICATION: 4 CREAM TOPICAL at 21:49

## 2023-01-01 RX ADMIN — IPRATROPIUM BROMIDE AND ALBUTEROL SULFATE 3 ML: 2.5; .5 SOLUTION RESPIRATORY (INHALATION) at 10:11

## 2023-01-01 RX ADMIN — HYDROXYZINE HYDROCHLORIDE 25 MG: 25 TABLET ORAL at 01:44

## 2023-01-01 RX ADMIN — CARVEDILOL 25 MG: 12.5 TABLET, FILM COATED ORAL at 08:19

## 2023-01-01 RX ADMIN — CEFEPIME 2 G: 2 INJECTION, POWDER, FOR SOLUTION INTRAVENOUS at 01:03

## 2023-01-01 RX ADMIN — CARVEDILOL 25 MG: 12.5 TABLET, FILM COATED ORAL at 09:01

## 2023-01-01 RX ADMIN — METOCLOPRAMIDE HYDROCHLORIDE 10 MG: 5 SOLUTION ORAL at 09:59

## 2023-01-01 RX ADMIN — METOCLOPRAMIDE 5 MG: 5 TABLET ORAL at 12:00

## 2023-01-01 RX ADMIN — LANSOPRAZOLE 30 MG: KIT at 09:06

## 2023-01-01 RX ADMIN — HYDROMORPHONE HYDROCHLORIDE 0.25 MG: 1 INJECTION, SOLUTION INTRAMUSCULAR; INTRAVENOUS; SUBCUTANEOUS at 08:52

## 2023-01-01 RX ADMIN — HYDROCODONE BITARTRATE AND ACETAMINOPHEN 1 TABLET: 7.5; 325 TABLET ORAL at 03:17

## 2023-01-01 RX ADMIN — HYDROCODONE BITARTRATE AND ACETAMINOPHEN 1 TABLET: 7.5; 325 TABLET ORAL at 06:29

## 2023-01-01 RX ADMIN — GABAPENTIN 100 MG: 100 CAPSULE ORAL at 08:58

## 2023-01-01 RX ADMIN — CARVEDILOL 25 MG: 12.5 TABLET, FILM COATED ORAL at 09:31

## 2023-01-01 RX ADMIN — HYDROMORPHONE HYDROCHLORIDE 0.25 MG: 1 INJECTION, SOLUTION INTRAMUSCULAR; INTRAVENOUS; SUBCUTANEOUS at 17:50

## 2023-01-01 RX ADMIN — FUROSEMIDE 40 MG: 40 TABLET ORAL at 09:31

## 2023-01-01 RX ADMIN — DOCUSATE SODIUM 100 MG: 50 LIQUID ORAL at 21:48

## 2023-01-01 RX ADMIN — CLONIDINE HYDROCHLORIDE 0.1 MG: 0.1 TABLET ORAL at 00:02

## 2023-01-01 RX ADMIN — HYDRALAZINE HYDROCHLORIDE 50 MG: 50 TABLET, FILM COATED ORAL at 13:49

## 2023-01-01 RX ADMIN — IPRATROPIUM BROMIDE AND ALBUTEROL SULFATE 3 ML: 2.5; .5 SOLUTION RESPIRATORY (INHALATION) at 10:36

## 2023-01-01 RX ADMIN — SENNOSIDES 10 ML: 8.8 LIQUID ORAL at 08:20

## 2023-01-01 RX ADMIN — IPRATROPIUM BROMIDE AND ALBUTEROL SULFATE 3 ML: 2.5; .5 SOLUTION RESPIRATORY (INHALATION) at 22:29

## 2023-01-01 RX ADMIN — LEVOTHYROXINE SODIUM 112 MCG: 112 TABLET ORAL at 06:02

## 2023-01-01 RX ADMIN — METOCLOPRAMIDE HYDROCHLORIDE 10 MG: 5 SOLUTION ORAL at 21:50

## 2023-01-01 RX ADMIN — ENOXAPARIN SODIUM 40 MG: 40 INJECTION SUBCUTANEOUS at 20:19

## 2023-01-01 RX ADMIN — HYDROCODONE BITARTRATE AND ACETAMINOPHEN 1 TABLET: 7.5; 325 TABLET ORAL at 02:05

## 2023-01-01 RX ADMIN — LIDOCAINE 4% 1 APPLICATION: 4 CREAM TOPICAL at 08:48

## 2023-01-01 RX ADMIN — HYDROCODONE BITARTRATE AND ACETAMINOPHEN 1 TABLET: 7.5; 325 TABLET ORAL at 01:21

## 2023-01-01 RX ADMIN — METOCLOPRAMIDE 10 MG: 5 INJECTION, SOLUTION INTRAMUSCULAR; INTRAVENOUS at 00:13

## 2023-01-01 RX ADMIN — ASPIRIN 81 MG CHEWABLE TABLET 81 MG: 81 TABLET CHEWABLE at 08:34

## 2023-01-01 RX ADMIN — Medication 10 ML: at 08:20

## 2023-01-01 RX ADMIN — LEVOTHYROXINE SODIUM 112 MCG: 112 TABLET ORAL at 06:48

## 2023-01-01 RX ADMIN — HYDROMORPHONE HYDROCHLORIDE 0.25 MG: 1 INJECTION, SOLUTION INTRAMUSCULAR; INTRAVENOUS; SUBCUTANEOUS at 21:22

## 2023-01-01 RX ADMIN — SENNOSIDES 10 ML: 8.8 LIQUID ORAL at 21:29

## 2023-01-01 RX ADMIN — HYDROCODONE BITARTRATE AND ACETAMINOPHEN 1 TABLET: 7.5; 325 TABLET ORAL at 05:09

## 2023-01-01 RX ADMIN — LANSOPRAZOLE 30 MG: KIT at 05:34

## 2023-01-01 RX ADMIN — VALSARTAN 320 MG: 160 TABLET, FILM COATED ORAL at 08:13

## 2023-01-01 RX ADMIN — LIDOCAINE 4% 1 APPLICATION: 4 CREAM TOPICAL at 09:20

## 2023-01-01 RX ADMIN — HYDROCODONE BITARTRATE AND ACETAMINOPHEN 1 TABLET: 7.5; 325 TABLET ORAL at 04:31

## 2023-01-01 RX ADMIN — DOCUSATE SODIUM 100 MG: 50 LIQUID ORAL at 21:29

## 2023-01-01 RX ADMIN — METOCLOPRAMIDE HYDROCHLORIDE 10 MG: 5 SOLUTION ORAL at 20:33

## 2023-01-01 RX ADMIN — MORPHINE SULFATE 2 MG: 2 INJECTION, SOLUTION INTRAMUSCULAR; INTRAVENOUS at 10:54

## 2023-01-01 RX ADMIN — GABAPENTIN 100 MG: 100 CAPSULE ORAL at 20:34

## 2023-01-01 RX ADMIN — METOCLOPRAMIDE HYDROCHLORIDE 10 MG: 5 SOLUTION ORAL at 20:19

## 2023-01-01 RX ADMIN — CARVEDILOL 25 MG: 12.5 TABLET, FILM COATED ORAL at 18:16

## 2023-01-01 RX ADMIN — GABAPENTIN 100 MG: 100 CAPSULE ORAL at 08:45

## 2023-01-01 RX ADMIN — HYDRALAZINE HYDROCHLORIDE 75 MG: 50 TABLET, FILM COATED ORAL at 14:58

## 2023-01-01 RX ADMIN — VALSARTAN 320 MG: 160 TABLET, FILM COATED ORAL at 08:04

## 2023-01-01 RX ADMIN — HYDROCODONE BITARTRATE AND ACETAMINOPHEN 1 TABLET: 7.5; 325 TABLET ORAL at 03:23

## 2023-01-01 RX ADMIN — HYDROXYZINE HYDROCHLORIDE 25 MG: 25 TABLET ORAL at 11:34

## 2023-01-01 RX ADMIN — FUROSEMIDE 40 MG: 40 TABLET ORAL at 09:59

## 2023-01-01 RX ADMIN — METRONIDAZOLE 500 MG: 500 INJECTION, SOLUTION INTRAVENOUS at 02:59

## 2023-01-01 RX ADMIN — ASPIRIN 81 MG CHEWABLE TABLET 81 MG: 81 TABLET CHEWABLE at 09:32

## 2023-01-01 RX ADMIN — HYDROMORPHONE HYDROCHLORIDE 0.25 MG: 1 INJECTION, SOLUTION INTRAMUSCULAR; INTRAVENOUS; SUBCUTANEOUS at 18:05

## 2023-01-01 RX ADMIN — HYDROCODONE BITARTRATE AND ACETAMINOPHEN 1 TABLET: 7.5; 325 TABLET ORAL at 21:33

## 2023-01-01 RX ADMIN — METOCLOPRAMIDE 5 MG: 5 TABLET ORAL at 06:23

## 2023-01-01 RX ADMIN — Medication 10 ML: at 21:22

## 2023-01-01 RX ADMIN — LIDOCAINE 4% 1 APPLICATION: 4 CREAM TOPICAL at 16:03

## 2023-01-01 RX ADMIN — HYDRALAZINE HYDROCHLORIDE 50 MG: 50 TABLET, FILM COATED ORAL at 21:24

## 2023-01-01 RX ADMIN — ONDANSETRON 4 MG: 2 INJECTION INTRAMUSCULAR; INTRAVENOUS at 04:04

## 2023-01-01 RX ADMIN — DOCUSATE SODIUM 100 MG: 50 LIQUID ORAL at 09:33

## 2023-01-01 RX ADMIN — MIDAZOLAM HYDROCHLORIDE 0.5 MG: 1 INJECTION, SOLUTION INTRAMUSCULAR; INTRAVENOUS at 11:13

## 2023-01-01 RX ADMIN — CARVEDILOL 25 MG: 12.5 TABLET, FILM COATED ORAL at 08:10

## 2023-01-01 RX ADMIN — HYDROMORPHONE HYDROCHLORIDE 0.25 MG: 1 INJECTION, SOLUTION INTRAMUSCULAR; INTRAVENOUS; SUBCUTANEOUS at 08:19

## 2023-01-01 RX ADMIN — METOCLOPRAMIDE HYDROCHLORIDE 10 MG: 5 SOLUTION ORAL at 18:41

## 2023-01-01 RX ADMIN — CARVEDILOL 25 MG: 12.5 TABLET, FILM COATED ORAL at 17:04

## 2023-01-01 RX ADMIN — CEFEPIME 2 G: 2 INJECTION, POWDER, FOR SOLUTION INTRAVENOUS at 08:57

## 2023-01-01 RX ADMIN — CARVEDILOL 25 MG: 12.5 TABLET, FILM COATED ORAL at 17:48

## 2023-01-01 RX ADMIN — CARVEDILOL 25 MG: 12.5 TABLET, FILM COATED ORAL at 17:08

## 2023-01-01 RX ADMIN — ENOXAPARIN SODIUM 40 MG: 40 INJECTION SUBCUTANEOUS at 21:07

## 2023-01-01 RX ADMIN — METOCLOPRAMIDE HYDROCHLORIDE 5 MG: 5 SOLUTION ORAL at 17:07

## 2023-01-01 RX ADMIN — PANTOPRAZOLE SODIUM 40 MG: 40 INJECTION, POWDER, LYOPHILIZED, FOR SOLUTION INTRAVENOUS at 17:07

## 2023-01-01 RX ADMIN — LIDOCAINE 1 PATCH: 700 PATCH TOPICAL at 08:46

## 2023-01-01 RX ADMIN — METRONIDAZOLE 500 MG: 500 INJECTION, SOLUTION INTRAVENOUS at 17:16

## 2023-01-01 RX ADMIN — METOCLOPRAMIDE HYDROCHLORIDE 5 MG: 5 SOLUTION ORAL at 16:54

## 2023-01-01 RX ADMIN — GABAPENTIN 100 MG: 100 CAPSULE ORAL at 20:05

## 2023-01-01 RX ADMIN — SODIUM CHLORIDE 75 ML/HR: 9 INJECTION, SOLUTION INTRAVENOUS at 21:40

## 2023-01-01 RX ADMIN — Medication 10 ML: at 21:07

## 2023-01-01 RX ADMIN — SENNOSIDES 10 ML: 8.8 LIQUID ORAL at 21:48

## 2023-01-01 RX ADMIN — HYDROMORPHONE HYDROCHLORIDE 0.25 MG: 1 INJECTION, SOLUTION INTRAMUSCULAR; INTRAVENOUS; SUBCUTANEOUS at 23:44

## 2023-01-01 RX ADMIN — CARVEDILOL 25 MG: 12.5 TABLET, FILM COATED ORAL at 08:45

## 2023-01-01 RX ADMIN — Medication 10 ML: at 08:11

## 2023-01-01 RX ADMIN — SENNOSIDES 10 ML: 8.8 LIQUID ORAL at 21:54

## 2023-01-01 RX ADMIN — LEVOTHYROXINE SODIUM 112 MCG: 0.11 TABLET ORAL at 05:14

## 2023-01-01 RX ADMIN — HYDROCODONE BITARTRATE AND ACETAMINOPHEN 1 TABLET: 7.5; 325 TABLET ORAL at 08:10

## 2023-01-01 RX ADMIN — METOCLOPRAMIDE HYDROCHLORIDE 10 MG: 5 SOLUTION ORAL at 16:31

## 2023-01-01 RX ADMIN — SENNOSIDES 10 ML: 8.8 LIQUID ORAL at 21:05

## 2023-01-01 RX ADMIN — GABAPENTIN 100 MG: 100 CAPSULE ORAL at 09:31

## 2023-01-01 RX ADMIN — DOCUSATE SODIUM 100 MG: 50 LIQUID ORAL at 21:05

## 2023-01-01 RX ADMIN — HYDROCODONE BITARTRATE AND ACETAMINOPHEN 1 TABLET: 7.5; 325 TABLET ORAL at 05:59

## 2023-01-01 RX ADMIN — CEFTRIAXONE 2 G: 2 INJECTION, POWDER, FOR SOLUTION INTRAMUSCULAR; INTRAVENOUS at 08:19

## 2023-01-01 RX ADMIN — METOCLOPRAMIDE HYDROCHLORIDE 10 MG: 5 SOLUTION ORAL at 20:10

## 2023-01-01 RX ADMIN — DOCUSATE SODIUM 100 MG: 50 LIQUID ORAL at 20:19

## 2023-01-01 RX ADMIN — PANTOPRAZOLE SODIUM 40 MG: 40 INJECTION, POWDER, LYOPHILIZED, FOR SOLUTION INTRAVENOUS at 17:04

## 2023-01-01 RX ADMIN — HYDRALAZINE HYDROCHLORIDE 50 MG: 50 TABLET, FILM COATED ORAL at 21:53

## 2023-01-01 RX ADMIN — GABAPENTIN 100 MG: 100 CAPSULE ORAL at 21:50

## 2023-01-01 RX ADMIN — BISACODYL 10 MG: 10 SUPPOSITORY RECTAL at 10:23

## 2023-01-01 RX ADMIN — PANTOPRAZOLE SODIUM 40 MG: 40 INJECTION, POWDER, LYOPHILIZED, FOR SOLUTION INTRAVENOUS at 16:31

## 2023-01-01 RX ADMIN — SENNOSIDES 10 ML: 8.8 LIQUID ORAL at 20:10

## 2023-01-01 RX ADMIN — DOCUSATE SODIUM 100 MG: 50 LIQUID ORAL at 08:13

## 2023-01-01 RX ADMIN — MORPHINE SULFATE 2 MG: 2 INJECTION, SOLUTION INTRAMUSCULAR; INTRAVENOUS at 00:38

## 2023-01-01 RX ADMIN — HYDROMORPHONE HYDROCHLORIDE 0.25 MG: 1 INJECTION, SOLUTION INTRAMUSCULAR; INTRAVENOUS; SUBCUTANEOUS at 05:20

## 2023-01-01 RX ADMIN — HYDRALAZINE HYDROCHLORIDE 75 MG: 50 TABLET, FILM COATED ORAL at 14:46

## 2023-01-01 RX ADMIN — CEFEPIME 2 G: 2 INJECTION, POWDER, FOR SOLUTION INTRAVENOUS at 16:03

## 2023-01-01 RX ADMIN — METOCLOPRAMIDE HYDROCHLORIDE 10 MG: 5 SOLUTION ORAL at 12:12

## 2023-01-01 RX ADMIN — LANSOPRAZOLE 30 MG: 15 TABLET, ORALLY DISINTEGRATING ORAL at 08:34

## 2023-01-01 RX ADMIN — CARVEDILOL 25 MG: 12.5 TABLET, FILM COATED ORAL at 18:49

## 2023-01-01 RX ADMIN — FLUCONAZOLE 400 MG: 400 INJECTION, SOLUTION INTRAVENOUS at 17:16

## 2023-01-01 RX ADMIN — HYDROCODONE BITARTRATE AND ACETAMINOPHEN 1 TABLET: 7.5; 325 TABLET ORAL at 01:07

## 2023-01-01 RX ADMIN — DOCUSATE SODIUM 100 MG: 50 LIQUID ORAL at 10:42

## 2023-01-01 RX ADMIN — DOCUSATE SODIUM 100 MG: 50 LIQUID ORAL at 08:09

## 2023-01-01 RX ADMIN — LANSOPRAZOLE 30 MG: KIT at 18:41

## 2023-01-01 RX ADMIN — LIDOCAINE 4% 1 APPLICATION: 4 CREAM TOPICAL at 02:05

## 2023-01-01 RX ADMIN — SENNOSIDES AND DOCUSATE SODIUM 2 TABLET: 50; 8.6 TABLET ORAL at 09:02

## 2023-01-01 RX ADMIN — METOCLOPRAMIDE 10 MG: 5 INJECTION, SOLUTION INTRAMUSCULAR; INTRAVENOUS at 17:04

## 2023-01-01 RX ADMIN — HYDROXYZINE HYDROCHLORIDE 25 MG: 25 TABLET ORAL at 20:05

## 2023-01-01 RX ADMIN — HYDROXYZINE HYDROCHLORIDE 25 MG: 25 TABLET ORAL at 08:41

## 2023-01-01 RX ADMIN — GABAPENTIN 100 MG: 100 CAPSULE ORAL at 12:11

## 2023-01-01 RX ADMIN — LANSOPRAZOLE 30 MG: 15 TABLET, ORALLY DISINTEGRATING ORAL at 11:49

## 2023-01-01 RX ADMIN — Medication 10 ML: at 21:25

## 2023-01-01 RX ADMIN — CARVEDILOL 25 MG: 12.5 TABLET, FILM COATED ORAL at 07:53

## 2023-01-01 RX ADMIN — METOCLOPRAMIDE HYDROCHLORIDE 10 MG: 5 SOLUTION ORAL at 17:16

## 2023-01-01 RX ADMIN — Medication 10 ML: at 09:38

## 2023-01-01 RX ADMIN — GABAPENTIN 100 MG: 100 CAPSULE ORAL at 08:19

## 2023-01-01 RX ADMIN — MIDODRINE HYDROCHLORIDE 10 MG: 10 TABLET ORAL at 14:30

## 2023-01-01 RX ADMIN — HYDROCODONE BITARTRATE AND ACETAMINOPHEN 1 TABLET: 7.5; 325 TABLET ORAL at 21:10

## 2023-01-01 RX ADMIN — HYDROCODONE BITARTRATE AND ACETAMINOPHEN 1 TABLET: 7.5; 325 TABLET ORAL at 10:23

## 2023-01-01 RX ADMIN — CEFEPIME 2 G: 2 INJECTION, POWDER, FOR SOLUTION INTRAVENOUS at 15:47

## 2023-01-01 RX ADMIN — PANTOPRAZOLE SODIUM 40 MG: 40 INJECTION, POWDER, LYOPHILIZED, FOR SOLUTION INTRAVENOUS at 06:23

## 2023-01-01 RX ADMIN — IPRATROPIUM BROMIDE AND ALBUTEROL SULFATE 3 ML: 2.5; .5 SOLUTION RESPIRATORY (INHALATION) at 12:26

## 2023-01-01 RX ADMIN — LEVOTHYROXINE SODIUM 112 MCG: 112 TABLET ORAL at 05:59

## 2023-01-01 RX ADMIN — IPRATROPIUM BROMIDE AND ALBUTEROL SULFATE 3 ML: 2.5; .5 SOLUTION RESPIRATORY (INHALATION) at 16:08

## 2023-01-01 RX ADMIN — PANTOPRAZOLE SODIUM 40 MG: 40 INJECTION, POWDER, LYOPHILIZED, FOR SOLUTION INTRAVENOUS at 10:41

## 2023-01-01 RX ADMIN — LIDOCAINE 1 PATCH: 700 PATCH TOPICAL at 08:18

## 2023-01-01 RX ADMIN — LORAZEPAM 0.25 MG: 2 INJECTION INTRAMUSCULAR; INTRAVENOUS at 18:46

## 2023-01-01 RX ADMIN — LIDOCAINE 1 PATCH: 700 PATCH TOPICAL at 09:31

## 2023-01-01 RX ADMIN — IPRATROPIUM BROMIDE AND ALBUTEROL SULFATE 3 ML: 2.5; .5 SOLUTION RESPIRATORY (INHALATION) at 16:52

## 2023-01-01 RX ADMIN — Medication 10 ML: at 21:49

## 2023-01-01 RX ADMIN — METOCLOPRAMIDE HYDROCHLORIDE 5 MG: 5 SOLUTION ORAL at 12:09

## 2023-01-01 RX ADMIN — LANSOPRAZOLE 30 MG: KIT at 17:42

## 2023-01-01 RX ADMIN — ASPIRIN 81 MG CHEWABLE TABLET 81 MG: 81 TABLET CHEWABLE at 08:35

## 2023-01-01 RX ADMIN — HYDROXYZINE HYDROCHLORIDE 25 MG: 25 TABLET ORAL at 03:22

## 2023-01-01 RX ADMIN — HYDROCODONE BITARTRATE AND ACETAMINOPHEN 1 TABLET: 7.5; 325 TABLET ORAL at 10:36

## 2023-01-01 RX ADMIN — HYDROCODONE BITARTRATE AND ACETAMINOPHEN 1 TABLET: 7.5; 325 TABLET ORAL at 08:23

## 2023-01-01 RX ADMIN — HYDROCODONE BITARTRATE AND ACETAMINOPHEN 1 TABLET: 7.5; 325 TABLET ORAL at 03:20

## 2023-01-01 RX ADMIN — GABAPENTIN 100 MG: 100 CAPSULE ORAL at 08:04

## 2023-01-01 RX ADMIN — LIDOCAINE 1 PATCH: 700 PATCH TOPICAL at 12:26

## 2023-01-01 RX ADMIN — ONDANSETRON 4 MG: 2 INJECTION INTRAMUSCULAR; INTRAVENOUS at 15:13

## 2023-01-01 RX ADMIN — METOCLOPRAMIDE HYDROCHLORIDE 5 MG: 5 SOLUTION ORAL at 05:39

## 2023-01-01 RX ADMIN — CLONIDINE HYDROCHLORIDE 0.1 MG: 0.1 TABLET ORAL at 06:09

## 2023-01-01 RX ADMIN — METRONIDAZOLE 500 MG: 500 INJECTION, SOLUTION INTRAVENOUS at 17:34

## 2023-01-01 RX ADMIN — CEFEPIME 2 G: 2 INJECTION, POWDER, FOR SOLUTION INTRAVENOUS at 00:35

## 2023-01-01 RX ADMIN — ONDANSETRON 8 MG: 4 TABLET ORAL at 14:58

## 2023-01-01 RX ADMIN — HYDROCODONE BITARTRATE AND ACETAMINOPHEN 1 TABLET: 7.5; 325 TABLET ORAL at 21:28

## 2023-01-01 RX ADMIN — HYDROXYZINE HYDROCHLORIDE 25 MG: 25 TABLET ORAL at 14:50

## 2023-01-01 RX ADMIN — IPRATROPIUM BROMIDE AND ALBUTEROL SULFATE 3 ML: 2.5; .5 SOLUTION RESPIRATORY (INHALATION) at 07:25

## 2023-01-01 RX ADMIN — ASPIRIN 81 MG CHEWABLE TABLET 81 MG: 81 TABLET CHEWABLE at 08:10

## 2023-01-01 RX ADMIN — HYDROCODONE BITARTRATE AND ACETAMINOPHEN 1 TABLET: 7.5; 325 TABLET ORAL at 10:46

## 2023-01-01 RX ADMIN — METRONIDAZOLE 500 MG: 500 INJECTION, SOLUTION INTRAVENOUS at 02:39

## 2023-01-01 RX ADMIN — LIDOCAINE 1 PATCH: 700 PATCH TOPICAL at 10:40

## 2023-01-01 RX ADMIN — GABAPENTIN 100 MG: 100 CAPSULE ORAL at 21:47

## 2023-01-01 RX ADMIN — METOCLOPRAMIDE HYDROCHLORIDE 10 MG: 5 SOLUTION ORAL at 06:39

## 2023-01-01 RX ADMIN — METOCLOPRAMIDE HYDROCHLORIDE 10 MG: 5 SOLUTION ORAL at 21:10

## 2023-01-01 RX ADMIN — HYDROCODONE BITARTRATE AND ACETAMINOPHEN 1 TABLET: 7.5; 325 TABLET ORAL at 00:53

## 2023-01-01 RX ADMIN — IPRATROPIUM BROMIDE AND ALBUTEROL SULFATE 3 ML: 2.5; .5 SOLUTION RESPIRATORY (INHALATION) at 03:23

## 2023-01-01 RX ADMIN — VALSARTAN 320 MG: 160 TABLET, FILM COATED ORAL at 09:12

## 2023-01-01 RX ADMIN — GABAPENTIN 100 MG: 100 CAPSULE ORAL at 21:05

## 2023-01-01 RX ADMIN — SENNOSIDES 10 ML: 8.8 LIQUID ORAL at 20:35

## 2023-01-01 RX ADMIN — ONDANSETRON 8 MG: 4 TABLET ORAL at 12:39

## 2023-01-01 RX ADMIN — PANTOPRAZOLE SODIUM 40 MG: 40 INJECTION, POWDER, LYOPHILIZED, FOR SOLUTION INTRAVENOUS at 18:44

## 2023-01-01 RX ADMIN — SENNOSIDES 10 ML: 8.8 LIQUID ORAL at 08:25

## 2023-01-01 RX ADMIN — LANSOPRAZOLE 30 MG: KIT at 10:40

## 2023-01-01 RX ADMIN — HYDROCODONE BITARTRATE AND ACETAMINOPHEN 1 TABLET: 7.5; 325 TABLET ORAL at 06:20

## 2023-01-01 RX ADMIN — PANTOPRAZOLE SODIUM 40 MG: 40 INJECTION, POWDER, LYOPHILIZED, FOR SOLUTION INTRAVENOUS at 18:11

## 2023-01-01 RX ADMIN — HYDROCODONE BITARTRATE AND ACETAMINOPHEN 1 TABLET: 7.5; 325 TABLET ORAL at 03:04

## 2023-01-01 RX ADMIN — ASPIRIN 81 MG CHEWABLE TABLET 81 MG: 81 TABLET CHEWABLE at 08:58

## 2023-01-01 RX ADMIN — METOCLOPRAMIDE HYDROCHLORIDE 10 MG: 5 SOLUTION ORAL at 13:05

## 2023-01-01 RX ADMIN — PANTOPRAZOLE SODIUM 40 MG: 40 INJECTION, POWDER, LYOPHILIZED, FOR SOLUTION INTRAVENOUS at 06:06

## 2023-01-01 RX ADMIN — SODIUM CHLORIDE 75 ML/HR: 9 INJECTION, SOLUTION INTRAVENOUS at 09:24

## 2023-01-01 RX ADMIN — LIDOCAINE 1 PATCH: 700 PATCH TOPICAL at 08:04

## 2023-01-01 RX ADMIN — HYDROCODONE BITARTRATE AND ACETAMINOPHEN 1 TABLET: 7.5; 325 TABLET ORAL at 12:03

## 2023-01-01 RX ADMIN — CLONIDINE HYDROCHLORIDE 0.1 MG: 0.1 TABLET ORAL at 12:32

## 2023-01-01 RX ADMIN — HYDROCODONE BITARTRATE AND ACETAMINOPHEN 1 TABLET: 7.5; 325 TABLET ORAL at 14:47

## 2023-01-01 RX ADMIN — HYDRALAZINE HYDROCHLORIDE 50 MG: 50 TABLET, FILM COATED ORAL at 04:31

## 2023-01-01 RX ADMIN — Medication 10 ML: at 10:02

## 2023-01-01 RX ADMIN — ENOXAPARIN SODIUM 40 MG: 40 INJECTION SUBCUTANEOUS at 21:50

## 2023-01-01 RX ADMIN — HYDROCODONE BITARTRATE AND ACETAMINOPHEN 1 TABLET: 7.5; 325 TABLET ORAL at 18:59

## 2023-01-01 RX ADMIN — LEVOTHYROXINE SODIUM 112 MCG: 112 TABLET ORAL at 05:08

## 2023-01-01 RX ADMIN — LEVOTHYROXINE SODIUM 112 MCG: 112 TABLET ORAL at 06:03

## 2023-01-01 RX ADMIN — HYDROMORPHONE HYDROCHLORIDE 0.25 MG: 1 INJECTION, SOLUTION INTRAMUSCULAR; INTRAVENOUS; SUBCUTANEOUS at 12:34

## 2023-01-01 RX ADMIN — HYDRALAZINE HYDROCHLORIDE 75 MG: 50 TABLET, FILM COATED ORAL at 06:44

## 2023-01-01 RX ADMIN — MORPHINE SULFATE 2 MG: 2 INJECTION, SOLUTION INTRAMUSCULAR; INTRAVENOUS at 15:04

## 2023-01-01 RX ADMIN — LANSOPRAZOLE 30 MG: KIT at 17:16

## 2023-01-01 RX ADMIN — GABAPENTIN 100 MG: 100 CAPSULE ORAL at 10:02

## 2023-01-01 RX ADMIN — METOCLOPRAMIDE 5 MG: 5 TABLET ORAL at 21:05

## 2023-01-01 RX ADMIN — GABAPENTIN 100 MG: 100 CAPSULE ORAL at 09:03

## 2023-01-01 RX ADMIN — CARVEDILOL 25 MG: 12.5 TABLET, FILM COATED ORAL at 17:07

## 2023-01-01 RX ADMIN — LANSOPRAZOLE 30 MG: 15 TABLET, ORALLY DISINTEGRATING ORAL at 17:40

## 2023-01-01 RX ADMIN — HYDROCODONE BITARTRATE AND ACETAMINOPHEN 1 TABLET: 7.5; 325 TABLET ORAL at 20:21

## 2023-01-01 RX ADMIN — HYDROMORPHONE HYDROCHLORIDE 0.25 MG: 1 INJECTION, SOLUTION INTRAMUSCULAR; INTRAVENOUS; SUBCUTANEOUS at 03:28

## 2023-01-01 RX ADMIN — HYDROCODONE BITARTRATE AND ACETAMINOPHEN 1 TABLET: 7.5; 325 TABLET ORAL at 11:26

## 2023-01-01 RX ADMIN — IPRATROPIUM BROMIDE AND ALBUTEROL SULFATE 3 ML: 2.5; .5 SOLUTION RESPIRATORY (INHALATION) at 06:52

## 2023-01-01 RX ADMIN — HYDRALAZINE HYDROCHLORIDE 50 MG: 50 TABLET, FILM COATED ORAL at 06:06

## 2023-01-01 RX ADMIN — ASPIRIN 81 MG CHEWABLE TABLET 81 MG: 81 TABLET CHEWABLE at 08:00

## 2023-01-01 RX ADMIN — HYDROCODONE BITARTRATE AND ACETAMINOPHEN 1 TABLET: 7.5; 325 TABLET ORAL at 06:03

## 2023-01-01 RX ADMIN — METRONIDAZOLE 500 MG: 500 INJECTION, SOLUTION INTRAVENOUS at 09:13

## 2023-01-01 RX ADMIN — METOCLOPRAMIDE HYDROCHLORIDE 5 MG: 5 SOLUTION ORAL at 11:55

## 2023-01-01 RX ADMIN — LANSOPRAZOLE 30 MG: 15 TABLET, ORALLY DISINTEGRATING ORAL at 08:40

## 2023-01-01 RX ADMIN — Medication 10 ML: at 17:04

## 2023-01-01 RX ADMIN — HYDROCODONE BITARTRATE AND ACETAMINOPHEN 1 TABLET: 7.5; 325 TABLET ORAL at 00:39

## 2023-01-01 RX ADMIN — METOCLOPRAMIDE HYDROCHLORIDE 10 MG: 5 SOLUTION ORAL at 18:16

## 2023-01-01 RX ADMIN — CARVEDILOL 25 MG: 12.5 TABLET, FILM COATED ORAL at 16:44

## 2023-01-01 RX ADMIN — SENNOSIDES 10 ML: 8.8 LIQUID ORAL at 09:59

## 2023-01-01 RX ADMIN — HYDROMORPHONE HYDROCHLORIDE 0.25 MG: 1 INJECTION, SOLUTION INTRAMUSCULAR; INTRAVENOUS; SUBCUTANEOUS at 09:29

## 2023-01-01 RX ADMIN — LIDOCAINE 1 PATCH: 700 PATCH TOPICAL at 08:57

## 2023-01-01 RX ADMIN — CEFEPIME 2 G: 2 INJECTION, POWDER, FOR SOLUTION INTRAVENOUS at 17:06

## 2023-01-01 RX ADMIN — CARVEDILOL 25 MG: 12.5 TABLET, FILM COATED ORAL at 17:16

## 2023-01-01 RX ADMIN — CEFEPIME 2 G: 2 INJECTION, POWDER, FOR SOLUTION INTRAVENOUS at 00:01

## 2023-01-01 RX ADMIN — SENNOSIDES 10 ML: 8.8 LIQUID ORAL at 10:32

## 2023-01-01 RX ADMIN — DOCUSATE SODIUM 100 MG: 50 LIQUID ORAL at 08:17

## 2023-01-01 RX ADMIN — HYDROCODONE BITARTRATE AND ACETAMINOPHEN 1 TABLET: 7.5; 325 TABLET ORAL at 18:11

## 2023-01-01 RX ADMIN — HYDROMORPHONE HYDROCHLORIDE 0.25 MG: 1 INJECTION, SOLUTION INTRAMUSCULAR; INTRAVENOUS; SUBCUTANEOUS at 01:57

## 2023-01-01 RX ADMIN — ASPIRIN 81 MG CHEWABLE TABLET 81 MG: 81 TABLET CHEWABLE at 09:31

## 2023-01-01 RX ADMIN — ENOXAPARIN SODIUM 40 MG: 40 INJECTION SUBCUTANEOUS at 20:22

## 2023-01-01 RX ADMIN — HYDROCODONE BITARTRATE AND ACETAMINOPHEN 1 TABLET: 7.5; 325 TABLET ORAL at 20:10

## 2023-01-01 RX ADMIN — CEFEPIME 2 G: 2 INJECTION, POWDER, FOR SOLUTION INTRAVENOUS at 18:08

## 2023-01-01 RX ADMIN — DOCUSATE SODIUM 100 MG: 50 LIQUID ORAL at 21:54

## 2023-01-01 RX ADMIN — HYDROMORPHONE HYDROCHLORIDE 0.25 MG: 1 INJECTION, SOLUTION INTRAMUSCULAR; INTRAVENOUS; SUBCUTANEOUS at 21:53

## 2023-01-01 RX ADMIN — HYDROXYZINE HYDROCHLORIDE 25 MG: 25 TABLET ORAL at 21:48

## 2023-01-01 RX ADMIN — ENOXAPARIN SODIUM 40 MG: 40 INJECTION SUBCUTANEOUS at 21:48

## 2023-01-01 RX ADMIN — Medication 10 ML: at 09:13

## 2023-01-01 RX ADMIN — LEVOTHYROXINE SODIUM 112 MCG: 112 TABLET ORAL at 05:40

## 2023-01-01 RX ADMIN — HYDROCODONE BITARTRATE AND ACETAMINOPHEN 1 TABLET: 7.5; 325 TABLET ORAL at 05:14

## 2023-01-01 RX ADMIN — ASPIRIN 81 MG CHEWABLE TABLET 81 MG: 81 TABLET CHEWABLE at 08:46

## 2023-01-01 RX ADMIN — IPRATROPIUM BROMIDE AND ALBUTEROL SULFATE 3 ML: 2.5; .5 SOLUTION RESPIRATORY (INHALATION) at 11:53

## 2023-01-01 RX ADMIN — LIDOCAINE HYDROCHLORIDE 10 ML: 10 INJECTION, SOLUTION INFILTRATION; PERINEURAL at 11:41

## 2023-01-01 RX ADMIN — HYDROCODONE BITARTRATE AND ACETAMINOPHEN 1 TABLET: 7.5; 325 TABLET ORAL at 21:09

## 2023-01-01 RX ADMIN — Medication 10 ML: at 19:56

## 2023-01-01 RX ADMIN — LORAZEPAM 0.25 MG: 2 INJECTION INTRAMUSCULAR; INTRAVENOUS at 14:23

## 2023-01-01 RX ADMIN — HYDRALAZINE HYDROCHLORIDE 75 MG: 50 TABLET, FILM COATED ORAL at 05:59

## 2023-01-01 RX ADMIN — DOCUSATE SODIUM 100 MG: 50 LIQUID ORAL at 20:33

## 2023-01-01 RX ADMIN — HYDROCODONE BITARTRATE AND ACETAMINOPHEN 1 TABLET: 7.5; 325 TABLET ORAL at 12:09

## 2023-01-01 RX ADMIN — HYDROCODONE BITARTRATE AND ACETAMINOPHEN 1 TABLET: 7.5; 325 TABLET ORAL at 18:53

## 2023-01-01 RX ADMIN — ENOXAPARIN SODIUM 40 MG: 40 INJECTION SUBCUTANEOUS at 20:33

## 2023-01-01 RX ADMIN — HYDROCODONE BITARTRATE AND ACETAMINOPHEN 1 TABLET: 7.5; 325 TABLET ORAL at 17:07

## 2023-01-01 RX ADMIN — METOCLOPRAMIDE HYDROCHLORIDE 10 MG: 5 SOLUTION ORAL at 08:19

## 2023-01-01 RX ADMIN — Medication 10 ML: at 09:02

## 2023-01-01 RX ADMIN — GABAPENTIN 100 MG: 100 CAPSULE ORAL at 08:00

## 2023-01-01 RX ADMIN — LEVOTHYROXINE SODIUM 112 MCG: 112 TABLET ORAL at 06:13

## 2023-01-01 RX ADMIN — HYDROCODONE BITARTRATE AND ACETAMINOPHEN 1 TABLET: 7.5; 325 TABLET ORAL at 11:55

## 2023-01-01 RX ADMIN — LIDOCAINE 1 PATCH: 700 PATCH TOPICAL at 18:35

## 2023-01-01 RX ADMIN — CARVEDILOL 25 MG: 12.5 TABLET, FILM COATED ORAL at 10:02

## 2023-01-01 RX ADMIN — CEFEPIME 2 G: 2 INJECTION, POWDER, FOR SOLUTION INTRAVENOUS at 08:53

## 2023-01-01 RX ADMIN — HYDROCODONE BITARTRATE AND ACETAMINOPHEN 1 TABLET: 7.5; 325 TABLET ORAL at 17:14

## 2023-01-01 RX ADMIN — HYDRALAZINE HYDROCHLORIDE 50 MG: 50 TABLET, FILM COATED ORAL at 21:10

## 2023-01-01 RX ADMIN — CEFEPIME 2 G: 2 INJECTION, POWDER, FOR SOLUTION INTRAVENOUS at 08:26

## 2023-01-01 RX ADMIN — LANSOPRAZOLE 30 MG: KIT at 06:39

## 2023-01-01 RX ADMIN — CLONIDINE HYDROCHLORIDE 0.1 MG: 0.1 TABLET ORAL at 16:43

## 2023-01-01 RX ADMIN — ACETAMINOPHEN 650 MG: 650 SOLUTION ORAL at 08:48

## 2023-01-01 RX ADMIN — METOCLOPRAMIDE HYDROCHLORIDE 5 MG: 5 SOLUTION ORAL at 06:04

## 2023-01-01 RX ADMIN — DOCUSATE SODIUM 100 MG: 50 LIQUID ORAL at 09:59

## 2023-01-01 RX ADMIN — HYDROCODONE BITARTRATE AND ACETAMINOPHEN 1 TABLET: 7.5; 325 TABLET ORAL at 06:10

## 2023-01-01 RX ADMIN — MORPHINE SULFATE 2 MG: 2 INJECTION, SOLUTION INTRAMUSCULAR; INTRAVENOUS at 18:39

## 2023-01-01 RX ADMIN — CARVEDILOL 25 MG: 12.5 TABLET, FILM COATED ORAL at 09:58

## 2023-01-01 RX ADMIN — CEFEPIME 2 G: 2 INJECTION, POWDER, FOR SOLUTION INTRAVENOUS at 09:20

## 2023-01-01 RX ADMIN — ASPIRIN 81 MG CHEWABLE TABLET 81 MG: 81 TABLET CHEWABLE at 08:17

## 2023-01-01 RX ADMIN — CARVEDILOL 25 MG: 12.5 TABLET, FILM COATED ORAL at 10:42

## 2023-01-01 RX ADMIN — HYDROCODONE BITARTRATE AND ACETAMINOPHEN 1 TABLET: 7.5; 325 TABLET ORAL at 21:05

## 2023-01-01 RX ADMIN — HYDROCODONE BITARTRATE AND ACETAMINOPHEN 1 TABLET: 7.5; 325 TABLET ORAL at 11:49

## 2023-01-01 RX ADMIN — HYDRALAZINE HYDROCHLORIDE 75 MG: 50 TABLET, FILM COATED ORAL at 06:23

## 2023-01-01 RX ADMIN — HYDROCODONE BITARTRATE AND ACETAMINOPHEN 1 TABLET: 7.5; 325 TABLET ORAL at 14:58

## 2023-01-01 RX ADMIN — LORAZEPAM 0.5 MG: 0.5 TABLET ORAL at 11:26

## 2023-01-01 RX ADMIN — SENNOSIDES 10 ML: 8.8 LIQUID ORAL at 08:35

## 2023-01-01 RX ADMIN — ONDANSETRON 4 MG: 2 INJECTION INTRAMUSCULAR; INTRAVENOUS at 20:59

## 2023-01-01 RX ADMIN — CEFTRIAXONE 2 G: 2 INJECTION, POWDER, FOR SOLUTION INTRAMUSCULAR; INTRAVENOUS at 09:32

## 2023-01-01 RX ADMIN — METOCLOPRAMIDE HYDROCHLORIDE 10 MG: 5 SOLUTION ORAL at 05:37

## 2023-01-01 RX ADMIN — PANTOPRAZOLE SODIUM 40 MG: 40 INJECTION, POWDER, LYOPHILIZED, FOR SOLUTION INTRAVENOUS at 08:19

## 2023-01-01 RX ADMIN — LEVOTHYROXINE SODIUM 112 MCG: 112 TABLET ORAL at 06:06

## 2023-01-01 RX ADMIN — HYDROCODONE BITARTRATE AND ACETAMINOPHEN 1 TABLET: 7.5; 325 TABLET ORAL at 22:51

## 2023-01-01 RX ADMIN — METOCLOPRAMIDE HYDROCHLORIDE 10 MG: 5 SOLUTION ORAL at 06:47

## 2023-01-01 RX ADMIN — FLUCONAZOLE 200 MG: 100 TABLET ORAL at 12:09

## 2023-01-01 RX ADMIN — Medication 10 ML: at 20:15

## 2023-01-01 RX ADMIN — IPRATROPIUM BROMIDE AND ALBUTEROL SULFATE 3 ML: 2.5; .5 SOLUTION RESPIRATORY (INHALATION) at 00:51

## 2023-01-01 RX ADMIN — LANSOPRAZOLE 30 MG: KIT at 16:31

## 2023-01-01 RX ADMIN — METOCLOPRAMIDE HYDROCHLORIDE 10 MG: 5 SOLUTION ORAL at 11:34

## 2023-01-01 RX ADMIN — METOCLOPRAMIDE HYDROCHLORIDE 5 MG: 5 SOLUTION ORAL at 12:03

## 2023-01-01 RX ADMIN — HYDROCODONE BITARTRATE AND ACETAMINOPHEN 1 TABLET: 7.5; 325 TABLET ORAL at 08:34

## 2023-01-01 RX ADMIN — GABAPENTIN 100 MG: 100 CAPSULE ORAL at 09:59

## 2023-01-01 RX ADMIN — HYDROCODONE BITARTRATE AND ACETAMINOPHEN 1 TABLET: 7.5; 325 TABLET ORAL at 17:24

## 2023-01-01 RX ADMIN — TORSEMIDE 40 MG: 20 TABLET ORAL at 16:42

## 2023-01-01 RX ADMIN — GABAPENTIN 100 MG: 100 CAPSULE ORAL at 20:19

## 2023-01-01 RX ADMIN — GABAPENTIN 100 MG: 100 CAPSULE ORAL at 21:28

## 2023-01-01 RX ADMIN — METOCLOPRAMIDE HYDROCHLORIDE 5 MG: 5 SOLUTION ORAL at 11:49

## 2023-01-01 RX ADMIN — Medication 10 ML: at 21:54

## 2023-01-01 RX ADMIN — LEVOTHYROXINE SODIUM 112 MCG: 112 TABLET ORAL at 06:44

## 2023-01-01 RX ADMIN — VALSARTAN 320 MG: 160 TABLET, FILM COATED ORAL at 08:19

## 2023-01-01 RX ADMIN — POLYETHYLENE GLYCOL 3350 17 G: 17 POWDER, FOR SOLUTION ORAL at 09:58

## 2023-01-01 RX ADMIN — ENOXAPARIN SODIUM 40 MG: 40 INJECTION SUBCUTANEOUS at 21:05

## 2023-01-01 RX ADMIN — PANTOPRAZOLE SODIUM 40 MG: 40 INJECTION, POWDER, LYOPHILIZED, FOR SOLUTION INTRAVENOUS at 09:31

## 2023-01-01 RX ADMIN — METOCLOPRAMIDE HYDROCHLORIDE 10 MG: 5 SOLUTION ORAL at 12:39

## 2023-01-01 RX ADMIN — CARVEDILOL 25 MG: 12.5 TABLET, FILM COATED ORAL at 08:13

## 2023-01-01 RX ADMIN — LANSOPRAZOLE 30 MG: KIT at 05:40

## 2023-01-01 RX ADMIN — HYDRALAZINE HYDROCHLORIDE 75 MG: 50 TABLET, FILM COATED ORAL at 21:09

## 2023-01-01 RX ADMIN — METOCLOPRAMIDE HYDROCHLORIDE 5 MG: 5 SOLUTION ORAL at 17:40

## 2023-01-01 RX ADMIN — GABAPENTIN 100 MG: 100 CAPSULE ORAL at 20:33

## 2023-01-01 RX ADMIN — HYDRALAZINE HYDROCHLORIDE 50 MG: 50 TABLET, FILM COATED ORAL at 05:14

## 2023-01-01 RX ADMIN — POLYETHYLENE GLYCOL 3350 17 G: 17 POWDER, FOR SOLUTION ORAL at 10:23

## 2023-01-01 RX ADMIN — PANTOPRAZOLE SODIUM 40 MG: 40 INJECTION, POWDER, LYOPHILIZED, FOR SOLUTION INTRAVENOUS at 16:43

## 2023-01-01 RX ADMIN — CARVEDILOL 25 MG: 12.5 TABLET, FILM COATED ORAL at 18:41

## 2023-01-01 RX ADMIN — HYDROCODONE BITARTRATE AND ACETAMINOPHEN 1 TABLET: 7.5; 325 TABLET ORAL at 17:16

## 2023-01-01 RX ADMIN — METOCLOPRAMIDE HYDROCHLORIDE 5 MG: 5 SOLUTION ORAL at 08:33

## 2023-01-01 RX ADMIN — HYDROCODONE BITARTRATE AND ACETAMINOPHEN 1 TABLET: 7.5; 325 TABLET ORAL at 15:08

## 2023-01-01 RX ADMIN — HYDRALAZINE HYDROCHLORIDE 50 MG: 50 TABLET, FILM COATED ORAL at 15:08

## 2023-01-01 RX ADMIN — HYDRALAZINE HYDROCHLORIDE 75 MG: 50 TABLET, FILM COATED ORAL at 05:19

## 2023-01-01 RX ADMIN — FLUCONAZOLE 400 MG: 400 INJECTION, SOLUTION INTRAVENOUS at 17:44

## 2023-01-01 RX ADMIN — HYDROMORPHONE HYDROCHLORIDE 0.25 MG: 1 INJECTION, SOLUTION INTRAMUSCULAR; INTRAVENOUS; SUBCUTANEOUS at 08:18

## 2023-01-01 RX ADMIN — HYDRALAZINE HYDROCHLORIDE 75 MG: 50 TABLET, FILM COATED ORAL at 05:55

## 2023-01-01 RX ADMIN — ONDANSETRON 4 MG: 2 INJECTION INTRAMUSCULAR; INTRAVENOUS at 21:28

## 2023-01-01 RX ADMIN — ASPIRIN 81 MG CHEWABLE TABLET 81 MG: 81 TABLET CHEWABLE at 08:04

## 2023-01-01 RX ADMIN — HYDRALAZINE HYDROCHLORIDE 75 MG: 50 TABLET, FILM COATED ORAL at 06:39

## 2023-01-01 RX ADMIN — Medication 10 ML: at 08:25

## 2023-01-01 RX ADMIN — LIDOCAINE 1 PATCH: 700 PATCH TOPICAL at 10:02

## 2023-01-01 RX ADMIN — METRONIDAZOLE 500 MG: 500 INJECTION, SOLUTION INTRAVENOUS at 17:10

## 2023-01-01 RX ADMIN — Medication 10 ML: at 09:01

## 2023-01-01 RX ADMIN — GABAPENTIN 100 MG: 100 CAPSULE ORAL at 08:35

## 2023-01-01 RX ADMIN — HYDRALAZINE HYDROCHLORIDE 75 MG: 50 TABLET, FILM COATED ORAL at 13:33

## 2023-01-01 RX ADMIN — VALSARTAN 320 MG: 160 TABLET, FILM COATED ORAL at 08:45

## 2023-01-01 RX ADMIN — GABAPENTIN 100 MG: 100 CAPSULE ORAL at 19:55

## 2023-01-01 RX ADMIN — HYDROCODONE BITARTRATE AND ACETAMINOPHEN 1 TABLET: 7.5; 325 TABLET ORAL at 17:48

## 2023-01-01 RX ADMIN — MORPHINE SULFATE 2 MG: 2 INJECTION, SOLUTION INTRAMUSCULAR; INTRAVENOUS at 10:47

## 2023-01-01 RX ADMIN — PANTOPRAZOLE SODIUM 40 MG: 40 INJECTION, POWDER, LYOPHILIZED, FOR SOLUTION INTRAVENOUS at 08:05

## 2023-01-01 RX ADMIN — LEVOTHYROXINE SODIUM 112 MCG: 112 TABLET ORAL at 06:23

## 2023-01-01 RX ADMIN — CEFEPIME 2 G: 2 INJECTION, POWDER, FOR SOLUTION INTRAVENOUS at 00:38

## 2023-01-01 RX ADMIN — VALSARTAN 320 MG: 160 TABLET, FILM COATED ORAL at 09:31

## 2023-01-01 RX ADMIN — METOCLOPRAMIDE HYDROCHLORIDE 5 MG: 5 SOLUTION ORAL at 10:49

## 2023-01-01 RX ADMIN — HYDRALAZINE HYDROCHLORIDE 75 MG: 50 TABLET, FILM COATED ORAL at 06:03

## 2023-01-01 RX ADMIN — Medication 10 ML: at 09:34

## 2023-01-01 RX ADMIN — DOCUSATE SODIUM 100 MG: 50 LIQUID ORAL at 21:21

## 2023-01-01 RX ADMIN — HYDRALAZINE HYDROCHLORIDE 50 MG: 50 TABLET, FILM COATED ORAL at 14:41

## 2023-01-01 RX ADMIN — HYDROCODONE BITARTRATE AND ACETAMINOPHEN 1 TABLET: 7.5; 325 TABLET ORAL at 02:36

## 2023-01-01 RX ADMIN — LEVOTHYROXINE SODIUM 112 MCG: 112 TABLET ORAL at 05:19

## 2023-01-01 RX ADMIN — HYDRALAZINE HYDROCHLORIDE 50 MG: 50 TABLET, FILM COATED ORAL at 21:28

## 2023-01-01 RX ADMIN — Medication 10 ML: at 21:29

## 2023-01-01 RX ADMIN — HYDROCODONE BITARTRATE AND ACETAMINOPHEN 1 TABLET: 7.5; 325 TABLET ORAL at 17:40

## 2023-01-01 RX ADMIN — DOCUSATE SODIUM 100 MG: 50 LIQUID ORAL at 08:56

## 2023-01-01 RX ADMIN — IPRATROPIUM BROMIDE AND ALBUTEROL SULFATE 3 ML: 2.5; .5 SOLUTION RESPIRATORY (INHALATION) at 09:41

## 2023-01-01 RX ADMIN — LEVOTHYROXINE SODIUM 112 MCG: 112 TABLET ORAL at 05:55

## 2023-01-01 RX ADMIN — CLONIDINE HYDROCHLORIDE 0.1 MG: 0.1 TABLET ORAL at 23:48

## 2023-01-01 RX ADMIN — HYDROXYZINE HYDROCHLORIDE 25 MG: 25 TABLET ORAL at 01:42

## 2023-01-01 RX ADMIN — SENNOSIDES 10 ML: 8.8 LIQUID ORAL at 08:05

## 2023-01-01 RX ADMIN — HYDROCODONE BITARTRATE AND ACETAMINOPHEN 1 TABLET: 7.5; 325 TABLET ORAL at 07:53

## 2023-01-01 RX ADMIN — HYDROXYZINE HYDROCHLORIDE 25 MG: 25 TABLET ORAL at 08:57

## 2023-01-01 RX ADMIN — HYDROCODONE BITARTRATE AND ACETAMINOPHEN 1 TABLET: 7.5; 325 TABLET ORAL at 14:56

## 2023-01-01 RX ADMIN — METOCLOPRAMIDE HYDROCHLORIDE 5 MG: 5 SOLUTION ORAL at 17:44

## 2023-01-01 RX ADMIN — HYDROCODONE BITARTRATE AND ACETAMINOPHEN 1 TABLET: 7.5; 325 TABLET ORAL at 14:46

## 2023-01-01 RX ADMIN — Medication 10 ML: at 09:21

## 2023-01-01 RX ADMIN — METRONIDAZOLE 500 MG: 500 INJECTION, SOLUTION INTRAVENOUS at 03:03

## 2023-01-01 RX ADMIN — CLONIDINE HYDROCHLORIDE 0.1 MG: 0.1 TABLET ORAL at 04:13

## 2023-01-01 RX ADMIN — ACETAMINOPHEN 650 MG: 650 SOLUTION ORAL at 09:58

## 2023-01-01 RX ADMIN — METOCLOPRAMIDE HYDROCHLORIDE 10 MG: 5 SOLUTION ORAL at 12:45

## 2023-01-01 RX ADMIN — METOCLOPRAMIDE HYDROCHLORIDE 10 MG: 5 SOLUTION ORAL at 22:17

## 2023-01-01 RX ADMIN — ENOXAPARIN SODIUM 40 MG: 40 INJECTION SUBCUTANEOUS at 19:55

## 2023-01-01 RX ADMIN — HYDROCODONE BITARTRATE AND ACETAMINOPHEN 1 TABLET: 7.5; 325 TABLET ORAL at 21:03

## 2023-01-01 RX ADMIN — Medication 10 ML: at 10:43

## 2023-01-01 RX ADMIN — GABAPENTIN 100 MG: 100 CAPSULE ORAL at 21:24

## 2023-01-01 RX ADMIN — ONDANSETRON 8 MG: 4 TABLET ORAL at 06:48

## 2023-01-01 RX ADMIN — ONDANSETRON 8 MG: 4 TABLET ORAL at 03:26

## 2023-01-01 RX ADMIN — DOCUSATE SODIUM 100 MG: 50 LIQUID ORAL at 08:05

## 2023-01-01 RX ADMIN — GLYCOPYRROLATE 0.2 MG: 0.2 INJECTION INTRAMUSCULAR; INTRAVENOUS at 18:15

## 2023-01-01 RX ADMIN — METRONIDAZOLE 500 MG: 500 INJECTION, SOLUTION INTRAVENOUS at 17:24

## 2023-01-01 RX ADMIN — LANSOPRAZOLE 30 MG: KIT at 18:16

## 2023-01-01 RX ADMIN — IPRATROPIUM BROMIDE AND ALBUTEROL SULFATE 3 ML: 2.5; .5 SOLUTION RESPIRATORY (INHALATION) at 20:38

## 2023-01-01 RX ADMIN — ASPIRIN 81 MG CHEWABLE TABLET 81 MG: 81 TABLET CHEWABLE at 10:42

## 2023-01-01 RX ADMIN — SENNOSIDES 10 ML: 8.8 LIQUID ORAL at 21:08

## 2023-01-01 RX ADMIN — IPRATROPIUM BROMIDE AND ALBUTEROL SULFATE 3 ML: 2.5; .5 SOLUTION RESPIRATORY (INHALATION) at 18:31

## 2023-01-01 RX ADMIN — LIDOCAINE 4% 1 APPLICATION: 4 CREAM TOPICAL at 03:52

## 2023-01-01 RX ADMIN — DOCUSATE SODIUM 100 MG: 50 LIQUID ORAL at 21:24

## 2023-01-01 RX ADMIN — Medication 10 ML: at 09:03

## 2023-01-01 RX ADMIN — HYDRALAZINE HYDROCHLORIDE 75 MG: 50 TABLET, FILM COATED ORAL at 06:48

## 2023-01-01 RX ADMIN — SODIUM CHLORIDE 75 ML/HR: 9 INJECTION, SOLUTION INTRAVENOUS at 18:06

## 2023-01-01 RX ADMIN — METOCLOPRAMIDE HYDROCHLORIDE 10 MG: 5 SOLUTION ORAL at 17:42

## 2023-01-01 RX ADMIN — LEVOTHYROXINE SODIUM 112 MCG: 112 TABLET ORAL at 05:34

## 2023-01-01 RX ADMIN — LIDOCAINE 1 PATCH: 700 PATCH TOPICAL at 08:13

## 2023-01-01 RX ADMIN — ASPIRIN 81 MG CHEWABLE TABLET 81 MG: 81 TABLET CHEWABLE at 08:13

## 2023-01-01 RX ADMIN — GABAPENTIN 100 MG: 100 CAPSULE ORAL at 09:07

## 2023-01-01 RX ADMIN — PANTOPRAZOLE SODIUM 40 MG: 40 INJECTION, POWDER, LYOPHILIZED, FOR SOLUTION INTRAVENOUS at 06:10

## 2023-01-01 RX ADMIN — IPRATROPIUM BROMIDE AND ALBUTEROL SULFATE 3 ML: 2.5; .5 SOLUTION RESPIRATORY (INHALATION) at 18:56

## 2023-01-01 RX ADMIN — IPRATROPIUM BROMIDE AND ALBUTEROL SULFATE 3 ML: 2.5; .5 SOLUTION RESPIRATORY (INHALATION) at 00:10

## 2023-01-01 RX ADMIN — IPRATROPIUM BROMIDE AND ALBUTEROL SULFATE 3 ML: 2.5; .5 SOLUTION RESPIRATORY (INHALATION) at 19:55

## 2023-01-01 RX ADMIN — Medication 10 ML: at 21:34

## 2023-01-01 RX ADMIN — ACETAMINOPHEN 650 MG: 650 SOLUTION ORAL at 18:15

## 2023-01-01 RX ADMIN — GABAPENTIN 100 MG: 100 CAPSULE ORAL at 21:09

## 2023-01-01 RX ADMIN — METOCLOPRAMIDE HYDROCHLORIDE 5 MG: 5 SOLUTION ORAL at 09:00

## 2023-01-01 RX ADMIN — FUROSEMIDE 40 MG: 40 TABLET ORAL at 08:19

## 2023-01-01 RX ADMIN — HYDROCODONE BITARTRATE AND ACETAMINOPHEN 1 TABLET: 7.5; 325 TABLET ORAL at 21:24

## 2023-01-01 RX ADMIN — HYDROCODONE BITARTRATE AND ACETAMINOPHEN 1 TABLET: 7.5; 325 TABLET ORAL at 06:39

## 2023-01-01 RX ADMIN — CARVEDILOL 25 MG: 12.5 TABLET, FILM COATED ORAL at 18:12

## 2023-01-01 RX ADMIN — METOCLOPRAMIDE HYDROCHLORIDE 10 MG: 5 SOLUTION ORAL at 20:35

## 2023-01-01 RX ADMIN — FLUCONAZOLE 400 MG: 400 INJECTION, SOLUTION INTRAVENOUS at 17:24

## 2023-01-01 RX ADMIN — LIDOCAINE 4% 1 APPLICATION: 4 CREAM TOPICAL at 15:04

## 2023-01-01 RX ADMIN — LIDOCAINE 1 PATCH: 700 PATCH TOPICAL at 09:12

## 2023-01-01 RX ADMIN — CARVEDILOL 25 MG: 12.5 TABLET, FILM COATED ORAL at 09:11

## 2023-01-01 RX ADMIN — HYDROMORPHONE HYDROCHLORIDE 0.25 MG: 1 INJECTION, SOLUTION INTRAMUSCULAR; INTRAVENOUS; SUBCUTANEOUS at 19:25

## 2023-01-01 RX ADMIN — HYDROCODONE BITARTRATE AND ACETAMINOPHEN 1 TABLET: 7.5; 325 TABLET ORAL at 06:44

## 2023-01-01 RX ADMIN — CARVEDILOL 25 MG: 12.5 TABLET, FILM COATED ORAL at 18:44

## 2023-01-01 RX ADMIN — LEVOTHYROXINE SODIUM 112 MCG: 112 TABLET ORAL at 05:45

## 2023-01-01 RX ADMIN — PANTOPRAZOLE SODIUM 40 MG: 40 INJECTION, POWDER, LYOPHILIZED, FOR SOLUTION INTRAVENOUS at 18:05

## 2023-01-01 RX ADMIN — HYDROCODONE BITARTRATE AND ACETAMINOPHEN 1 TABLET: 7.5; 325 TABLET ORAL at 22:44

## 2023-01-01 RX ADMIN — FLUCONAZOLE 400 MG: 400 INJECTION, SOLUTION INTRAVENOUS at 16:53

## 2023-01-01 RX ADMIN — LANSOPRAZOLE 30 MG: KIT at 09:02

## 2023-01-01 RX ADMIN — METOCLOPRAMIDE HYDROCHLORIDE 10 MG: 5 SOLUTION ORAL at 09:32

## 2023-01-01 RX ADMIN — HYDROMORPHONE HYDROCHLORIDE 0.25 MG: 1 INJECTION, SOLUTION INTRAMUSCULAR; INTRAVENOUS; SUBCUTANEOUS at 16:43

## 2023-01-01 RX ADMIN — VALSARTAN 320 MG: 160 TABLET, FILM COATED ORAL at 09:05

## 2023-01-01 RX ADMIN — LIDOCAINE 1 PATCH: 700 PATCH TOPICAL at 08:39

## 2023-01-01 RX ADMIN — HYDROMORPHONE HYDROCHLORIDE 0.25 MG: 1 INJECTION, SOLUTION INTRAMUSCULAR; INTRAVENOUS; SUBCUTANEOUS at 05:19

## 2023-01-01 RX ADMIN — HALOPERIDOL LACTATE 1 MG: 5 INJECTION, SOLUTION INTRAMUSCULAR at 18:15

## 2023-01-01 RX ADMIN — IPRATROPIUM BROMIDE AND ALBUTEROL SULFATE 3 ML: 2.5; .5 SOLUTION RESPIRATORY (INHALATION) at 00:55

## 2023-01-01 RX ADMIN — HYDRALAZINE HYDROCHLORIDE 75 MG: 50 TABLET, FILM COATED ORAL at 14:27

## 2023-01-01 RX ADMIN — DOCUSATE SODIUM 100 MG: 50 LIQUID ORAL at 08:18

## 2023-01-01 RX ADMIN — Medication 10 ML: at 21:06

## 2023-01-01 RX ADMIN — Medication 10 ML: at 21:50

## 2023-01-01 RX ADMIN — HYDRALAZINE HYDROCHLORIDE 75 MG: 50 TABLET, FILM COATED ORAL at 14:55

## 2023-01-01 RX ADMIN — LANSOPRAZOLE 30 MG: KIT at 16:55

## 2023-01-01 RX ADMIN — GABAPENTIN 100 MG: 100 CAPSULE ORAL at 08:12

## 2023-01-01 RX ADMIN — LIDOCAINE 1 PATCH: 700 PATCH TOPICAL at 08:12

## 2023-01-01 RX ADMIN — ENOXAPARIN SODIUM 40 MG: 40 INJECTION SUBCUTANEOUS at 20:05

## 2023-01-01 RX ADMIN — VALSARTAN 320 MG: 160 TABLET, FILM COATED ORAL at 08:34

## 2023-01-01 RX ADMIN — ASPIRIN 81 MG CHEWABLE TABLET 81 MG: 81 TABLET CHEWABLE at 09:03

## 2023-01-01 RX ADMIN — HYDROCODONE BITARTRATE AND ACETAMINOPHEN 1 TABLET: 7.5; 325 TABLET ORAL at 00:02

## 2023-01-01 RX ADMIN — LEVOTHYROXINE SODIUM 112 MCG: 112 TABLET ORAL at 05:28

## 2023-01-01 RX ADMIN — ASPIRIN 81 MG CHEWABLE TABLET 81 MG: 81 TABLET CHEWABLE at 10:02

## 2023-01-01 RX ADMIN — SENNOSIDES 10 ML: 8.8 LIQUID ORAL at 23:04

## 2023-01-01 RX ADMIN — ENOXAPARIN SODIUM 40 MG: 40 INJECTION SUBCUTANEOUS at 21:19

## 2023-01-01 RX ADMIN — IOPAMIDOL 85 ML: 612 INJECTION, SOLUTION INTRAVENOUS at 18:31

## 2023-01-01 RX ADMIN — METOCLOPRAMIDE 10 MG: 5 INJECTION, SOLUTION INTRAMUSCULAR; INTRAVENOUS at 06:44

## 2023-01-01 RX ADMIN — IPRATROPIUM BROMIDE AND ALBUTEROL SULFATE 3 ML: 2.5; .5 SOLUTION RESPIRATORY (INHALATION) at 13:09

## 2023-01-01 RX ADMIN — ENOXAPARIN SODIUM 40 MG: 40 INJECTION SUBCUTANEOUS at 21:15

## 2023-01-01 RX ADMIN — HYDRALAZINE HYDROCHLORIDE 50 MG: 50 TABLET, FILM COATED ORAL at 21:32

## 2023-01-01 RX ADMIN — SENNOSIDES 10 ML: 8.8 LIQUID ORAL at 09:01

## 2023-01-01 RX ADMIN — DOCUSATE SODIUM 100 MG: 50 LIQUID ORAL at 21:50

## 2023-01-01 RX ADMIN — METOCLOPRAMIDE HYDROCHLORIDE 5 MG: 5 SOLUTION ORAL at 08:38

## 2023-01-01 RX ADMIN — LIDOCAINE 1 PATCH: 700 PATCH TOPICAL at 08:34

## 2023-01-01 RX ADMIN — LANSOPRAZOLE 30 MG: 15 TABLET, ORALLY DISINTEGRATING ORAL at 17:24

## 2023-01-01 RX ADMIN — SENNOSIDES 10 ML: 8.8 LIQUID ORAL at 20:51

## 2023-01-01 RX ADMIN — CARVEDILOL 25 MG: 12.5 TABLET, FILM COATED ORAL at 21:33

## 2023-01-01 RX ADMIN — IPRATROPIUM BROMIDE AND ALBUTEROL SULFATE 3 ML: 2.5; .5 SOLUTION RESPIRATORY (INHALATION) at 18:28

## 2023-01-01 RX ADMIN — LIDOCAINE 4% 1 APPLICATION: 4 CREAM TOPICAL at 08:27

## 2023-01-01 RX ADMIN — SENNOSIDES 10 ML: 8.8 LIQUID ORAL at 21:24

## 2023-01-01 RX ADMIN — VALSARTAN 320 MG: 160 TABLET, FILM COATED ORAL at 08:00

## 2023-01-01 RX ADMIN — Medication 10 ML: at 20:51

## 2023-01-01 RX ADMIN — HYDROCODONE BITARTRATE AND ACETAMINOPHEN 1 TABLET: 7.5; 325 TABLET ORAL at 04:04

## 2023-01-01 RX ADMIN — FLUCONAZOLE 200 MG: 100 TABLET ORAL at 17:42

## 2023-01-01 RX ADMIN — PANTOPRAZOLE SODIUM 40 MG: 40 INJECTION, POWDER, LYOPHILIZED, FOR SOLUTION INTRAVENOUS at 08:09

## 2023-01-01 RX ADMIN — HYDROCODONE BITARTRATE AND ACETAMINOPHEN 1 TABLET: 7.5; 325 TABLET ORAL at 23:21

## 2023-01-01 RX ADMIN — LIDOCAINE 4% 1 APPLICATION: 4 CREAM TOPICAL at 20:54

## 2023-01-01 RX ADMIN — METOCLOPRAMIDE HYDROCHLORIDE 10 MG: 5 SOLUTION ORAL at 16:42

## 2023-01-01 RX ADMIN — HYDROMORPHONE HYDROCHLORIDE 0.25 MG: 1 INJECTION, SOLUTION INTRAMUSCULAR; INTRAVENOUS; SUBCUTANEOUS at 01:18

## 2023-01-01 RX ADMIN — SODIUM CHLORIDE 40 ML: 9 INJECTION, SOLUTION INTRAVENOUS at 09:02

## 2023-01-01 RX ADMIN — LANSOPRAZOLE 30 MG: KIT at 17:43

## 2023-01-01 RX ADMIN — HYDROCODONE BITARTRATE AND ACETAMINOPHEN 1 TABLET: 7.5; 325 TABLET ORAL at 17:15

## 2023-01-01 RX ADMIN — HYDRALAZINE HYDROCHLORIDE 75 MG: 50 TABLET, FILM COATED ORAL at 21:03

## 2023-01-01 RX ADMIN — LIDOCAINE 1 PATCH: 700 PATCH TOPICAL at 09:33

## 2023-01-01 RX ADMIN — HYDROMORPHONE HYDROCHLORIDE 0.25 MG: 1 INJECTION, SOLUTION INTRAMUSCULAR; INTRAVENOUS; SUBCUTANEOUS at 15:41

## 2023-01-01 RX ADMIN — HYDROCODONE BITARTRATE AND ACETAMINOPHEN 1 TABLET: 7.5; 325 TABLET ORAL at 03:26

## 2023-01-01 RX ADMIN — DOCUSATE SODIUM 100 MG: 50 LIQUID ORAL at 09:01

## 2023-01-01 RX ADMIN — GABAPENTIN 100 MG: 100 CAPSULE ORAL at 08:33

## 2023-01-01 RX ADMIN — CARVEDILOL 25 MG: 12.5 TABLET, FILM COATED ORAL at 09:02

## 2023-01-01 RX ADMIN — CEFEPIME 2 G: 2 INJECTION, POWDER, FOR SOLUTION INTRAVENOUS at 00:53

## 2023-01-01 RX ADMIN — SENNOSIDES 10 ML: 8.8 LIQUID ORAL at 09:33

## 2023-01-01 RX ADMIN — GABAPENTIN 100 MG: 100 CAPSULE ORAL at 08:48

## 2023-01-01 RX ADMIN — HYDROCODONE BITARTRATE AND ACETAMINOPHEN 1 TABLET: 7.5; 325 TABLET ORAL at 00:42

## 2023-01-01 RX ADMIN — IPRATROPIUM BROMIDE AND ALBUTEROL SULFATE 3 ML: 2.5; .5 SOLUTION RESPIRATORY (INHALATION) at 01:27

## 2023-01-01 RX ADMIN — HYDROCODONE BITARTRATE AND ACETAMINOPHEN 1 TABLET: 7.5; 325 TABLET ORAL at 01:44

## 2023-01-01 RX ADMIN — VALSARTAN 320 MG: 160 TABLET, FILM COATED ORAL at 09:01

## 2023-01-01 RX ADMIN — LANSOPRAZOLE 30 MG: KIT at 09:58

## 2023-01-01 RX ADMIN — HYDRALAZINE HYDROCHLORIDE 75 MG: 50 TABLET, FILM COATED ORAL at 21:22

## 2023-01-01 RX ADMIN — Medication 10 ML: at 08:13

## 2023-01-01 RX ADMIN — CARVEDILOL 25 MG: 12.5 TABLET, FILM COATED ORAL at 17:47

## 2023-01-01 RX ADMIN — LEVOTHYROXINE SODIUM 112 MCG: 112 TABLET ORAL at 05:09

## 2023-01-01 RX ADMIN — METRONIDAZOLE 500 MG: 500 INJECTION, SOLUTION INTRAVENOUS at 09:20

## 2023-01-01 RX ADMIN — IPRATROPIUM BROMIDE AND ALBUTEROL SULFATE 3 ML: 2.5; .5 SOLUTION RESPIRATORY (INHALATION) at 11:56

## 2023-01-01 RX ADMIN — HYDRALAZINE HYDROCHLORIDE 75 MG: 50 TABLET, FILM COATED ORAL at 21:18

## 2023-01-01 RX ADMIN — IPRATROPIUM BROMIDE AND ALBUTEROL SULFATE 3 ML: 2.5; .5 SOLUTION RESPIRATORY (INHALATION) at 08:55

## 2023-01-01 RX ADMIN — PANTOPRAZOLE SODIUM 40 MG: 40 INJECTION, POWDER, LYOPHILIZED, FOR SOLUTION INTRAVENOUS at 17:45

## 2023-01-01 RX ADMIN — METOCLOPRAMIDE HYDROCHLORIDE 5 MG: 5 SOLUTION ORAL at 17:24

## 2023-01-01 RX ADMIN — HYDROCODONE BITARTRATE AND ACETAMINOPHEN 1 TABLET: 7.5; 325 TABLET ORAL at 06:13

## 2023-01-01 RX ADMIN — HYDRALAZINE HYDROCHLORIDE 75 MG: 50 TABLET, FILM COATED ORAL at 14:21

## 2023-01-01 RX ADMIN — Medication 10 ML: at 08:48

## 2023-01-01 RX ADMIN — SODIUM CHLORIDE 75 ML/HR: 9 INJECTION, SOLUTION INTRAVENOUS at 21:34

## 2023-01-01 RX ADMIN — CARVEDILOL 25 MG: 12.5 TABLET, FILM COATED ORAL at 18:05

## 2023-01-01 RX ADMIN — LANSOPRAZOLE 30 MG: KIT at 18:08

## 2023-01-01 RX ADMIN — VALSARTAN 320 MG: 160 TABLET, FILM COATED ORAL at 08:10

## 2023-01-01 RX ADMIN — GABAPENTIN 100 MG: 100 CAPSULE ORAL at 21:18

## 2023-01-01 RX ADMIN — HYDROCODONE BITARTRATE AND ACETAMINOPHEN 1 TABLET: 7.5; 325 TABLET ORAL at 00:35

## 2023-01-01 RX ADMIN — MORPHINE SULFATE 2 MG: 2 INJECTION, SOLUTION INTRAMUSCULAR; INTRAVENOUS at 08:13

## 2023-01-01 RX ADMIN — CARVEDILOL 25 MG: 12.5 TABLET, FILM COATED ORAL at 09:32

## 2023-01-01 RX ADMIN — METOCLOPRAMIDE HYDROCHLORIDE 10 MG: 5 SOLUTION ORAL at 13:33

## 2023-01-01 RX ADMIN — METOCLOPRAMIDE HYDROCHLORIDE 10 MG: 5 SOLUTION ORAL at 18:08

## 2023-01-01 RX ADMIN — FUROSEMIDE 40 MG: 40 TABLET ORAL at 09:49

## 2023-01-01 RX ADMIN — VALSARTAN 320 MG: 160 TABLET, FILM COATED ORAL at 10:01

## 2023-01-01 RX ADMIN — HYDROMORPHONE HYDROCHLORIDE 0.25 MG: 1 INJECTION, SOLUTION INTRAMUSCULAR; INTRAVENOUS; SUBCUTANEOUS at 09:55

## 2023-01-01 RX ADMIN — BISACODYL 10 MG: 10 SUPPOSITORY RECTAL at 08:35

## 2023-01-01 RX ADMIN — FENTANYL CITRATE 25 MCG: 50 INJECTION, SOLUTION INTRAMUSCULAR; INTRAVENOUS at 11:13

## 2023-01-01 RX ADMIN — HYDRALAZINE HYDROCHLORIDE 50 MG: 50 TABLET, FILM COATED ORAL at 05:08

## 2023-01-01 RX ADMIN — FUROSEMIDE 40 MG: 40 TABLET ORAL at 08:13

## 2023-01-01 RX ADMIN — ASPIRIN 81 MG CHEWABLE TABLET 81 MG: 81 TABLET CHEWABLE at 09:12

## 2023-01-01 RX ADMIN — HYDRALAZINE HYDROCHLORIDE 50 MG: 50 TABLET, FILM COATED ORAL at 13:31

## 2023-01-01 RX ADMIN — METRONIDAZOLE 500 MG: 500 INJECTION, SOLUTION INTRAVENOUS at 17:43

## 2023-01-01 RX ADMIN — DOCUSATE SODIUM 100 MG: 50 LIQUID ORAL at 08:40

## 2023-01-01 RX ADMIN — ASPIRIN 81 MG CHEWABLE TABLET 81 MG: 81 TABLET CHEWABLE at 09:58

## 2023-01-01 RX ADMIN — GABAPENTIN 100 MG: 100 CAPSULE ORAL at 20:50

## 2023-01-01 RX ADMIN — LIDOCAINE 4% 1 APPLICATION: 4 CREAM TOPICAL at 19:58

## 2023-01-01 RX ADMIN — LIDOCAINE 1 PATCH: 700 PATCH TOPICAL at 08:35

## 2023-01-01 RX ADMIN — HYDROCODONE BITARTRATE AND ACETAMINOPHEN 1 TABLET: 7.5; 325 TABLET ORAL at 18:41

## 2023-01-01 RX ADMIN — METOCLOPRAMIDE 5 MG: 5 TABLET ORAL at 17:48

## 2023-01-01 RX ADMIN — VALSARTAN 320 MG: 160 TABLET, FILM COATED ORAL at 09:32

## 2023-01-01 RX ADMIN — HYDROCODONE BITARTRATE AND ACETAMINOPHEN 1 TABLET: 7.5; 325 TABLET ORAL at 10:11

## 2023-01-01 RX ADMIN — ASPIRIN 81 MG 81 MG: 81 TABLET ORAL at 09:01

## 2023-01-01 RX ADMIN — GABAPENTIN 100 MG: 100 CAPSULE ORAL at 21:22

## 2023-01-01 RX ADMIN — METRONIDAZOLE 500 MG: 500 INJECTION, SOLUTION INTRAVENOUS at 10:06

## 2023-01-04 ENCOUNTER — TRANSCRIBE ORDERS (OUTPATIENT)
Dept: ADMINISTRATIVE | Facility: HOSPITAL | Age: 88
End: 2023-01-04
Payer: MEDICARE

## 2023-01-04 DIAGNOSIS — K52.9 COLITIS: Primary | ICD-10-CM

## 2023-01-05 ENCOUNTER — READMISSION MANAGEMENT (OUTPATIENT)
Dept: CALL CENTER | Facility: HOSPITAL | Age: 88
End: 2023-01-05
Payer: MEDICARE

## 2023-01-05 ENCOUNTER — HOSPITAL ENCOUNTER (EMERGENCY)
Facility: HOSPITAL | Age: 88
Discharge: HOME | End: 2023-01-06
Attending: STUDENT IN AN ORGANIZED HEALTH CARE EDUCATION/TRAINING PROGRAM | Admitting: STUDENT IN AN ORGANIZED HEALTH CARE EDUCATION/TRAINING PROGRAM
Payer: MEDICARE

## 2023-01-05 ENCOUNTER — APPOINTMENT (OUTPATIENT)
Dept: GENERAL RADIOLOGY | Facility: HOSPITAL | Age: 88
End: 2023-01-05
Payer: MEDICARE

## 2023-01-05 DIAGNOSIS — R11.2 NAUSEA AND VOMITING, UNSPECIFIED VOMITING TYPE: ICD-10-CM

## 2023-01-05 DIAGNOSIS — U07.1 SARS-COV-2 POSITIVE: ICD-10-CM

## 2023-01-05 DIAGNOSIS — E87.1 ACUTE HYPONATREMIA: Primary | ICD-10-CM

## 2023-01-05 LAB
ALBUMIN SERPL-MCNC: 3.8 G/DL (ref 3.5–5.2)
ALBUMIN/GLOB SERPL: 1.3 G/DL
ALP SERPL-CCNC: 79 U/L (ref 39–117)
ALT SERPL W P-5'-P-CCNC: 18 U/L (ref 1–33)
ANION GAP SERPL CALCULATED.3IONS-SCNC: 15.8 MMOL/L (ref 5–15)
AST SERPL-CCNC: 19 U/L (ref 1–32)
BASOPHILS # BLD AUTO: 0.02 10*3/MM3 (ref 0–0.2)
BASOPHILS NFR BLD AUTO: 0.3 % (ref 0–1.5)
BILIRUB SERPL-MCNC: 0.2 MG/DL (ref 0–1.2)
BUN SERPL-MCNC: 16 MG/DL (ref 8–23)
BUN/CREAT SERPL: 18.8 (ref 7–25)
CALCIUM SPEC-SCNC: 9.3 MG/DL (ref 8.6–10.5)
CHLORIDE SERPL-SCNC: 88 MMOL/L (ref 98–107)
CO2 SERPL-SCNC: 21.2 MMOL/L (ref 22–29)
CREAT SERPL-MCNC: 0.85 MG/DL (ref 0.57–1)
CRP SERPL-MCNC: 4.11 MG/DL (ref 0–0.5)
D-LACTATE SERPL-SCNC: 1.5 MMOL/L (ref 0.5–2)
DEPRECATED RDW RBC AUTO: 44.2 FL (ref 37–54)
EGFRCR SERPLBLD CKD-EPI 2021: 65.6 ML/MIN/1.73
EOSINOPHIL # BLD AUTO: 0.14 10*3/MM3 (ref 0–0.4)
EOSINOPHIL NFR BLD AUTO: 1.9 % (ref 0.3–6.2)
ERYTHROCYTE [DISTWIDTH] IN BLOOD BY AUTOMATED COUNT: 13.5 % (ref 12.3–15.4)
FLUAV RNA RESP QL NAA+PROBE: NOT DETECTED
FLUBV RNA RESP QL NAA+PROBE: NOT DETECTED
GLOBULIN UR ELPH-MCNC: 2.9 GM/DL
GLUCOSE SERPL-MCNC: 106 MG/DL (ref 65–99)
HCT VFR BLD AUTO: 37.7 % (ref 34–46.6)
HGB BLD-MCNC: 13 G/DL (ref 12–15.9)
HOLD SPECIMEN: NORMAL
HOLD SPECIMEN: NORMAL
IMM GRANULOCYTES # BLD AUTO: 0.03 10*3/MM3 (ref 0–0.05)
IMM GRANULOCYTES NFR BLD AUTO: 0.4 % (ref 0–0.5)
LIPASE SERPL-CCNC: 40 U/L (ref 13–60)
LYMPHOCYTES # BLD AUTO: 1.26 10*3/MM3 (ref 0.7–3.1)
LYMPHOCYTES NFR BLD AUTO: 17.5 % (ref 19.6–45.3)
MCH RBC QN AUTO: 31.1 PG (ref 26.6–33)
MCHC RBC AUTO-ENTMCNC: 34.5 G/DL (ref 31.5–35.7)
MCV RBC AUTO: 90.2 FL (ref 79–97)
MONOCYTES # BLD AUTO: 0.67 10*3/MM3 (ref 0.1–0.9)
MONOCYTES NFR BLD AUTO: 9.3 % (ref 5–12)
NEUTROPHILS NFR BLD AUTO: 5.07 10*3/MM3 (ref 1.7–7)
NEUTROPHILS NFR BLD AUTO: 70.6 % (ref 42.7–76)
NRBC BLD AUTO-RTO: 0 /100 WBC (ref 0–0.2)
PLATELET # BLD AUTO: 359 10*3/MM3 (ref 140–450)
PMV BLD AUTO: 9.1 FL (ref 6–12)
POTASSIUM SERPL-SCNC: 4.5 MMOL/L (ref 3.5–5.2)
PROT SERPL-MCNC: 6.7 G/DL (ref 6–8.5)
RBC # BLD AUTO: 4.18 10*6/MM3 (ref 3.77–5.28)
SARS-COV-2 RNA RESP QL NAA+PROBE: DETECTED
SODIUM SERPL-SCNC: 125 MMOL/L (ref 136–145)
WBC NRBC COR # BLD: 7.19 10*3/MM3 (ref 3.4–10.8)
WHOLE BLOOD HOLD COAG: NORMAL
WHOLE BLOOD HOLD SPECIMEN: NORMAL

## 2023-01-05 PROCEDURE — 85025 COMPLETE CBC W/AUTO DIFF WBC: CPT | Performed by: PHYSICIAN ASSISTANT

## 2023-01-05 PROCEDURE — 83605 ASSAY OF LACTIC ACID: CPT | Performed by: PHYSICIAN ASSISTANT

## 2023-01-05 PROCEDURE — 83690 ASSAY OF LIPASE: CPT | Performed by: PHYSICIAN ASSISTANT

## 2023-01-05 PROCEDURE — 87636 SARSCOV2 & INF A&B AMP PRB: CPT | Performed by: PHYSICIAN ASSISTANT

## 2023-01-05 PROCEDURE — 80053 COMPREHEN METABOLIC PANEL: CPT | Performed by: PHYSICIAN ASSISTANT

## 2023-01-05 PROCEDURE — 96374 THER/PROPH/DIAG INJ IV PUSH: CPT

## 2023-01-05 PROCEDURE — 36415 COLL VENOUS BLD VENIPUNCTURE: CPT

## 2023-01-05 PROCEDURE — 96375 TX/PRO/DX INJ NEW DRUG ADDON: CPT

## 2023-01-05 PROCEDURE — 71045 X-RAY EXAM CHEST 1 VIEW: CPT

## 2023-01-05 PROCEDURE — 87040 BLOOD CULTURE FOR BACTERIA: CPT | Performed by: PHYSICIAN ASSISTANT

## 2023-01-05 PROCEDURE — 96361 HYDRATE IV INFUSION ADD-ON: CPT

## 2023-01-05 PROCEDURE — 99284 EMERGENCY DEPT VISIT MOD MDM: CPT

## 2023-01-05 PROCEDURE — 86140 C-REACTIVE PROTEIN: CPT | Performed by: PHYSICIAN ASSISTANT

## 2023-01-05 RX ORDER — ONDANSETRON 2 MG/ML
4 INJECTION INTRAMUSCULAR; INTRAVENOUS ONCE
Status: COMPLETED | OUTPATIENT
Start: 2023-01-05 | End: 2023-01-06

## 2023-01-05 RX ORDER — METRONIDAZOLE 500 MG/1
500 TABLET ORAL 2 TIMES DAILY
COMMUNITY
End: 2023-03-22

## 2023-01-05 RX ORDER — CIPROFLOXACIN 500 MG/1
500 TABLET, FILM COATED ORAL 2 TIMES DAILY
COMMUNITY
End: 2023-03-22

## 2023-01-05 RX ORDER — SODIUM CHLORIDE 0.9 % (FLUSH) 0.9 %
10 SYRINGE (ML) INJECTION AS NEEDED
Status: DISCONTINUED | OUTPATIENT
Start: 2023-01-05 | End: 2023-01-06 | Stop reason: HOSPADM

## 2023-01-05 NOTE — OUTREACH NOTE
Medical Week 2 Survey    Flowsheet Row Responses   [facility] patient discharged from? [facility]   Does the patient have one of the following disease processes/diagnoses(primary or secondary)? Other   Week 2 attempt successful? Yes   Call start time 1522   Discharge diagnosis Colitis   Call end time 1531   Meds reviewed with patient/caregiver? Yes   Is the patient having any side effects they believe may be caused by any medication additions or changes? No   Does the patient have all medications ordered at discharge? No   Prescription comments Pt has p/u OTC stool softners   Is the patient taking all medications as directed (includes completed medication regime)? Yes   Medication comments new ABX started r/t UTI after PCP f/u   Does the patient have a primary care provider?  Yes   Does the patient have an appointment with their PCP within 7 days of discharge? Yes   Has the patient kept scheduled appointments due by today? Yes   Has home health visited the patient within 72 hours of discharge? N/A   Psychosocial issues? No   What is the patient's perception of their health status since discharge? Same  [pt c/o n/v, constipation. Pt reports she has only has 2 small BM's since d/c and is feeling some discomfort. Pt is taking a stool softner 3x/day. Pt is on a clear liquid diet for 2 weeks. Enc pt to call PCP for advice.]   Week 2 Call Completed? Yes          GE GUZMAN - Registered Nurse

## 2023-01-06 ENCOUNTER — APPOINTMENT (OUTPATIENT)
Dept: CT IMAGING | Facility: HOSPITAL | Age: 88
End: 2023-01-06
Payer: MEDICARE

## 2023-01-06 VITALS
OXYGEN SATURATION: 97 % | TEMPERATURE: 98.9 F | BODY MASS INDEX: 24.99 KG/M2 | SYSTOLIC BLOOD PRESSURE: 168 MMHG | DIASTOLIC BLOOD PRESSURE: 79 MMHG | HEIGHT: 65 IN | RESPIRATION RATE: 18 BRPM | HEART RATE: 68 BPM | WEIGHT: 150 LBS

## 2023-01-06 LAB
ALBUMIN SERPL-MCNC: 3 G/DL (ref 3.5–5.2)
ALBUMIN/GLOB SERPL: 1.2 G/DL
ALP SERPL-CCNC: 65 U/L (ref 39–117)
ALT SERPL W P-5'-P-CCNC: 13 U/L (ref 1–33)
ANION GAP SERPL CALCULATED.3IONS-SCNC: 11.4 MMOL/L (ref 5–15)
ANION GAP SERPL CALCULATED.3IONS-SCNC: 11.7 MMOL/L (ref 5–15)
AST SERPL-CCNC: 18 U/L (ref 1–32)
BILIRUB SERPL-MCNC: 0.2 MG/DL (ref 0–1.2)
BILIRUB UR QL STRIP: NEGATIVE
BUN SERPL-MCNC: 6 MG/DL (ref 8–23)
BUN SERPL-MCNC: 7 MG/DL (ref 8–23)
BUN/CREAT SERPL: 10.2 (ref 7–25)
BUN/CREAT SERPL: 11.3 (ref 7–25)
CALCIUM SPEC-SCNC: 8.1 MG/DL (ref 8.6–10.5)
CALCIUM SPEC-SCNC: 8.2 MG/DL (ref 8.6–10.5)
CHLORIDE SERPL-SCNC: 97 MMOL/L (ref 98–107)
CHLORIDE SERPL-SCNC: 99 MMOL/L (ref 98–107)
CLARITY UR: CLEAR
CO2 SERPL-SCNC: 21.3 MMOL/L (ref 22–29)
CO2 SERPL-SCNC: 23.6 MMOL/L (ref 22–29)
COLOR UR: YELLOW
CREAT SERPL-MCNC: 0.59 MG/DL (ref 0.57–1)
CREAT SERPL-MCNC: 0.62 MG/DL (ref 0.57–1)
EGFRCR SERPLBLD CKD-EPI 2021: 85.2 ML/MIN/1.73
EGFRCR SERPLBLD CKD-EPI 2021: 86.3 ML/MIN/1.73
GLOBULIN UR ELPH-MCNC: 2.5 GM/DL
GLUCOSE SERPL-MCNC: 100 MG/DL (ref 65–99)
GLUCOSE SERPL-MCNC: 118 MG/DL (ref 65–99)
GLUCOSE UR STRIP-MCNC: NEGATIVE MG/DL
HGB UR QL STRIP.AUTO: NEGATIVE
KETONES UR QL STRIP: ABNORMAL
LEUKOCYTE ESTERASE UR QL STRIP.AUTO: NEGATIVE
NITRITE UR QL STRIP: NEGATIVE
PH UR STRIP.AUTO: 6 [PH] (ref 5–8)
POTASSIUM SERPL-SCNC: 3.9 MMOL/L (ref 3.5–5.2)
POTASSIUM SERPL-SCNC: 4 MMOL/L (ref 3.5–5.2)
PROT SERPL-MCNC: 5.5 G/DL (ref 6–8.5)
PROT UR QL STRIP: NEGATIVE
SODIUM SERPL-SCNC: 132 MMOL/L (ref 136–145)
SODIUM SERPL-SCNC: 132 MMOL/L (ref 136–145)
SP GR UR STRIP: 1.01 (ref 1–1.03)
UROBILINOGEN UR QL STRIP: ABNORMAL

## 2023-01-06 PROCEDURE — 96374 THER/PROPH/DIAG INJ IV PUSH: CPT

## 2023-01-06 PROCEDURE — 25010000002 IOPAMIDOL 61 % SOLUTION: Performed by: STUDENT IN AN ORGANIZED HEALTH CARE EDUCATION/TRAINING PROGRAM

## 2023-01-06 PROCEDURE — 81003 URINALYSIS AUTO W/O SCOPE: CPT | Performed by: PHYSICIAN ASSISTANT

## 2023-01-06 PROCEDURE — 80053 COMPREHEN METABOLIC PANEL: CPT | Performed by: STUDENT IN AN ORGANIZED HEALTH CARE EDUCATION/TRAINING PROGRAM

## 2023-01-06 PROCEDURE — 74177 CT ABD & PELVIS W/CONTRAST: CPT

## 2023-01-06 PROCEDURE — 25010000002 ONDANSETRON PER 1 MG: Performed by: STUDENT IN AN ORGANIZED HEALTH CARE EDUCATION/TRAINING PROGRAM

## 2023-01-06 PROCEDURE — 96361 HYDRATE IV INFUSION ADD-ON: CPT

## 2023-01-06 PROCEDURE — 96375 TX/PRO/DX INJ NEW DRUG ADDON: CPT

## 2023-01-06 RX ORDER — LABETALOL HYDROCHLORIDE 5 MG/ML
10 INJECTION, SOLUTION INTRAVENOUS ONCE
Status: COMPLETED | OUTPATIENT
Start: 2023-01-06 | End: 2023-01-06

## 2023-01-06 RX ORDER — MONTELUKAST SODIUM 10 MG/1
10 TABLET ORAL NIGHTLY
Status: DISCONTINUED | OUTPATIENT
Start: 2023-01-06 | End: 2023-01-06 | Stop reason: HOSPADM

## 2023-01-06 RX ORDER — TRAMADOL HYDROCHLORIDE 50 MG/1
50 TABLET ORAL EVERY 12 HOURS PRN
Status: DISCONTINUED | OUTPATIENT
Start: 2023-01-06 | End: 2023-01-06 | Stop reason: HOSPADM

## 2023-01-06 RX ORDER — DOCUSATE SODIUM 100 MG/1
100 CAPSULE, LIQUID FILLED ORAL 3 TIMES DAILY
COMMUNITY
End: 2023-03-22

## 2023-01-06 RX ORDER — ASPIRIN 325 MG
325 TABLET ORAL DAILY
Status: CANCELLED | OUTPATIENT
Start: 2023-01-06

## 2023-01-06 RX ORDER — LANOLIN ALCOHOL/MO/W.PET/CERES
1000 CREAM (GRAM) TOPICAL DAILY
Status: DISCONTINUED | OUTPATIENT
Start: 2023-01-06 | End: 2023-01-06 | Stop reason: HOSPADM

## 2023-01-06 RX ORDER — METRONIDAZOLE 250 MG/1
500 TABLET ORAL 2 TIMES DAILY
Status: CANCELLED | OUTPATIENT
Start: 2023-01-06 | End: 2023-01-14

## 2023-01-06 RX ORDER — CLONIDINE HYDROCHLORIDE 0.1 MG/1
0.1 TABLET ORAL ONCE
Status: COMPLETED | OUTPATIENT
Start: 2023-01-06 | End: 2023-01-06

## 2023-01-06 RX ORDER — LIDOCAINE 50 MG/G
1 PATCH TOPICAL
Status: DISCONTINUED | OUTPATIENT
Start: 2023-01-06 | End: 2023-01-06 | Stop reason: HOSPADM

## 2023-01-06 RX ORDER — ASPIRIN 325 MG
325 TABLET ORAL DAILY
Status: DISCONTINUED | OUTPATIENT
Start: 2023-01-06 | End: 2023-01-06 | Stop reason: HOSPADM

## 2023-01-06 RX ORDER — LIDOCAINE 50 MG/G
1 PATCH TOPICAL DAILY
Status: CANCELLED | OUTPATIENT
Start: 2023-01-06

## 2023-01-06 RX ORDER — OMEGA-3S/DHA/EPA/FISH OIL/D3 300MG-1000
2000 CAPSULE ORAL DAILY
Status: DISCONTINUED | OUTPATIENT
Start: 2023-01-06 | End: 2023-01-06 | Stop reason: HOSPADM

## 2023-01-06 RX ORDER — LEVOTHYROXINE SODIUM 0.07 MG/1
112 TABLET ORAL DAILY
Status: CANCELLED | OUTPATIENT
Start: 2023-01-06

## 2023-01-06 RX ORDER — CLONIDINE HYDROCHLORIDE 0.1 MG/1
0.1 TABLET ORAL 3 TIMES DAILY PRN
Status: CANCELLED | OUTPATIENT
Start: 2023-01-06

## 2023-01-06 RX ORDER — VALSARTAN 160 MG/1
320 TABLET ORAL DAILY
Status: CANCELLED | OUTPATIENT
Start: 2023-01-06

## 2023-01-06 RX ORDER — ACETAMINOPHEN 500 MG
500 TABLET ORAL DAILY PRN
COMMUNITY
End: 2023-03-22

## 2023-01-06 RX ORDER — CLONIDINE HYDROCHLORIDE 0.1 MG/1
0.1 TABLET ORAL 3 TIMES DAILY PRN
Status: DISCONTINUED | OUTPATIENT
Start: 2023-01-06 | End: 2023-01-06 | Stop reason: HOSPADM

## 2023-01-06 RX ORDER — ACETAMINOPHEN 160 MG
2000 TABLET,DISINTEGRATING ORAL DAILY
Status: CANCELLED | OUTPATIENT
Start: 2023-01-06

## 2023-01-06 RX ORDER — MONTELUKAST SODIUM 10 MG/1
10 TABLET ORAL NIGHTLY
Status: CANCELLED | OUTPATIENT
Start: 2023-01-06

## 2023-01-06 RX ORDER — LEVOFLOXACIN 750 MG/1
750 TABLET ORAL
Status: CANCELLED | OUTPATIENT
Start: 2023-01-06 | End: 2023-01-14

## 2023-01-06 RX ORDER — HYDROCODONE BITARTRATE AND ACETAMINOPHEN 5; 325 MG/1; MG/1
1 TABLET ORAL DAILY PRN
Status: CANCELLED | OUTPATIENT
Start: 2023-01-06

## 2023-01-06 RX ORDER — LANOLIN ALCOHOL/MO/W.PET/CERES
1000 CREAM (GRAM) TOPICAL DAILY
Status: CANCELLED | OUTPATIENT
Start: 2023-01-06

## 2023-01-06 RX ORDER — ACETAMINOPHEN 500 MG
500 TABLET ORAL DAILY PRN
Status: CANCELLED | OUTPATIENT
Start: 2023-01-06

## 2023-01-06 RX ORDER — IBUPROFEN 200 MG
200 TABLET ORAL DAILY PRN
Status: CANCELLED | OUTPATIENT
Start: 2023-01-06

## 2023-01-06 RX ORDER — DOCUSATE SODIUM 100 MG/1
100 CAPSULE, LIQUID FILLED ORAL 3 TIMES DAILY
Status: DISCONTINUED | OUTPATIENT
Start: 2023-01-06 | End: 2023-01-06 | Stop reason: HOSPADM

## 2023-01-06 RX ORDER — TRAMADOL HYDROCHLORIDE 50 MG/1
50 TABLET ORAL EVERY 12 HOURS PRN
Status: CANCELLED | OUTPATIENT
Start: 2023-01-06

## 2023-01-06 RX ORDER — VALSARTAN 160 MG/1
320 TABLET ORAL DAILY
Status: DISCONTINUED | OUTPATIENT
Start: 2023-01-06 | End: 2023-01-06 | Stop reason: HOSPADM

## 2023-01-06 RX ORDER — LEVOTHYROXINE SODIUM 0.07 MG/1
112 TABLET ORAL
Status: DISCONTINUED | OUTPATIENT
Start: 2023-01-06 | End: 2023-01-06 | Stop reason: HOSPADM

## 2023-01-06 RX ORDER — LIDOCAINE 4 G/G
1 PATCH TOPICAL DAILY
COMMUNITY
End: 2023-03-22

## 2023-01-06 RX ORDER — CARVEDILOL 25 MG/1
25 TABLET ORAL 2 TIMES DAILY WITH MEALS
Status: DISCONTINUED | OUTPATIENT
Start: 2023-01-06 | End: 2023-01-06 | Stop reason: HOSPADM

## 2023-01-06 RX ORDER — CARVEDILOL 25 MG/1
25 TABLET ORAL 2 TIMES DAILY WITH MEALS
Status: CANCELLED | OUTPATIENT
Start: 2023-01-06

## 2023-01-06 RX ORDER — DOCUSATE SODIUM 100 MG/1
100 CAPSULE, LIQUID FILLED ORAL 3 TIMES DAILY
Status: CANCELLED | OUTPATIENT
Start: 2023-01-06

## 2023-01-06 RX ORDER — HYDROCODONE BITARTRATE AND ACETAMINOPHEN 5; 325 MG/1; MG/1
1 TABLET ORAL DAILY PRN
Status: ON HOLD | COMMUNITY
Start: 2023-01-03

## 2023-01-06 RX ORDER — IBUPROFEN 200 MG
200 TABLET ORAL DAILY PRN
COMMUNITY
End: 2023-03-22

## 2023-01-06 RX ADMIN — CLONIDINE HYDROCHLORIDE 0.1 MG: 0.1 TABLET ORAL at 14:41

## 2023-01-06 RX ADMIN — LIDOCAINE 1 PATCH: 50 PATCH TOPICAL at 13:18

## 2023-01-06 RX ADMIN — ASPIRIN 325 MG: 325 TABLET ORAL at 13:23

## 2023-01-06 RX ADMIN — VALSARTAN 320 MG: 160 TABLET, FILM COATED ORAL at 13:24

## 2023-01-06 RX ADMIN — TRAMADOL HYDROCHLORIDE 50 MG: 50 TABLET, COATED ORAL at 13:47

## 2023-01-06 RX ADMIN — CLONIDINE HYDROCHLORIDE 0.1 MG: 0.1 TABLET ORAL at 07:45

## 2023-01-06 RX ADMIN — DOCUSATE SODIUM 100 MG: 100 CAPSULE ORAL at 13:23

## 2023-01-06 RX ADMIN — SODIUM CHLORIDE 1000 ML: 9 INJECTION, SOLUTION INTRAVENOUS at 00:04

## 2023-01-06 RX ADMIN — ONDANSETRON 4 MG: 2 INJECTION INTRAMUSCULAR; INTRAVENOUS at 00:04

## 2023-01-06 RX ADMIN — Medication 1000 MCG: at 13:23

## 2023-01-06 RX ADMIN — LEVOTHYROXINE SODIUM 112 MCG: 0.07 TABLET ORAL at 13:25

## 2023-01-06 RX ADMIN — IOPAMIDOL 80 ML: 612 INJECTION, SOLUTION INTRAVENOUS at 01:05

## 2023-01-06 RX ADMIN — SODIUM CHLORIDE 125 ML/HR: 9 INJECTION, SOLUTION INTRAVENOUS at 02:45

## 2023-01-06 RX ADMIN — CHOLECALCIFEROL TAB 10 MCG (400 UNIT) 2000 UNITS: 10 TAB at 13:21

## 2023-01-06 RX ADMIN — Medication 5 MG: at 11:50

## 2023-01-06 NOTE — ED NOTES
Patient offered victor m merritt per provider. Given water to drink. Nothing else needed at this time.

## 2023-01-06 NOTE — ED NOTES
MEDICAL SCREENING:    Reason for Visit: recently hospitalized for UTI and colitis; not feeling better     Patient initially seen in triage.  The patient was advised further evaluation and diagnostic testing will be needed, some of the treatment and testing will be initiated in the lobby in order to begin the process.  The patient will be returned to the waiting area for the time being and possibly be re-assessed by a subsequent ED provider.  The patient will be brought back to the treatment area in as timely manner as possible.       Alejandrina Gonzalez PA  01/05/23 9485

## 2023-01-06 NOTE — ED NOTES
Dr. Hanna notified of patient blood pressure, advised we are awaiting pharmacy to verify home medications.

## 2023-01-06 NOTE — ED PROVIDER NOTES
Subjective   History of Present Illness  89-year-old female presents with complaints of progressive weakness, fatigue, and vomiting with generalized body aches.  Patient was previously hospitalized for 6 days for colitis and discharged on oral antibiotics for total treatment duration of 10 days.    On Tuesday, she reports that her PCP, Dr Magdaleno, started patient on Cipro and Flagyl (2 days ago) she was still having some abdominal pain and since darting the second round of antibiotics she has had recurrent episodes of vomiting denies diarrhea.    Patient states that she has only been on a liquid diet consisting of broth or chicken noodle soup but had decreased oral intake the past 2 days  because of abdominal cramping when attempting to eat after being placed on Cipro and Flagyl.  She is also stating that she has had recurrent episodes of vomiting with nausea after starting oral antibiotics.    Pt now states that she has generalized weakness and acute difficulty in focusing.  She ports since the vomiting started she has noticed that she is having and decreased concentration ability and usually she is fully independent with no difficulty in thought processing.    Patient also states that she has a sensation to defecate but cannot actually have a good bowel movement.        Review of Systems   Constitutional: Positive for activity change, appetite change and fatigue. Negative for fever.   HENT: Negative.    Respiratory: Negative.    Cardiovascular: Negative.  Negative for chest pain.   Gastrointestinal: Negative.  Negative for abdominal pain.   Endocrine: Negative.    Genitourinary: Negative.  Negative for dysuria.   Musculoskeletal: Positive for back pain.   Skin: Negative.    Neurological: Positive for weakness and light-headedness.   Psychiatric/Behavioral: Positive for decreased concentration.   All other systems reviewed and are negative.      Past Medical History:   Diagnosis Date   • Advanced age    • CAD  (coronary artery disease) 2011    s/p 3v CABG   • Chronic kidney disease (CKD), stage III (moderate) ([facility])    • COPD (chronic obstructive pulmonary disease) ([facility])    • History of tobacco use    • Hyperlipidemia    • Hypertension    • Hypothyroidism    • Pulmonary nodule        Allergies   Allergen Reactions   • Motrin [Ibuprofen] Anaphylaxis and Hives   • Novocain [Procaine] Other (See Comments)     Pt state she passes out.       Past Surgical History:   Procedure Laterality Date   • BREAST BIOPSY Left     benign   • CATARACT EXTRACTION     • CORONARY ANGIOPLASTY     • CORONARY ARTERY BYPASS GRAFT         Family History   Problem Relation Age of Onset   • Heart disease Mother    • Heart disease Father    • Heart disease Brother    • Breast cancer Neg Hx        Social History     Socioeconomic History   • Marital status:    Tobacco Use   • Smoking status: Former     Types: Cigarettes   • Smokeless tobacco: Never   Substance and Sexual Activity   • Alcohol use: No   • Drug use: No   • Sexual activity: Defer           Objective   Physical Exam  Vitals and nursing note reviewed.   Constitutional:       General: She is not in acute distress.     Appearance: She is well-developed. She is not diaphoretic.      Comments: Appears acutely unwell but she is awake and alert and answering questions appropriately.   HENT:      Head: Normocephalic and atraumatic.      Right Ear: External ear normal.      Left Ear: External ear normal.      Nose: Nose normal.      Mouth/Throat:      Mouth: Mucous membranes are dry.   Eyes:      Extraocular Movements: Extraocular movements intact.      Conjunctiva/sclera: Conjunctivae normal.      Pupils: Pupils are equal, round, and reactive to light.   Neck:      Vascular: No JVD.      Trachea: No tracheal deviation.   Cardiovascular:      Rate and Rhythm: Normal rate and regular rhythm.      Heart sounds: Normal heart sounds. No murmur heard.  Pulmonary:      Effort: Pulmonary effort is  normal. No respiratory distress.      Breath sounds: Normal breath sounds. No wheezing.   Abdominal:      General: Abdomen is flat. Bowel sounds are normal.      Palpations: Abdomen is soft.      Tenderness: There is no abdominal tenderness.   Musculoskeletal:         General: No deformity. Normal range of motion.      Cervical back: Normal range of motion and neck supple.   Skin:     General: Skin is warm and dry.      Coloration: Skin is not pale.      Findings: No erythema or rash.   Neurological:      General: No focal deficit present.      Mental Status: She is alert and oriented to person, place, and time.      Cranial Nerves: No cranial nerve deficit.   Psychiatric:         Behavior: Behavior normal.         Thought Content: Thought content normal.         Procedures       Results for orders placed or performed during the hospital encounter of 01/05/23   COVID-19 and FLU A/B PCR - Swab, Nasopharynx    Specimen: Nasopharynx; Swab   Result Value Ref Range    COVID19 Detected (C) Not Detected - Ref. Range    Influenza A PCR Not Detected Not Detected    Influenza B PCR Not Detected Not Detected   Comprehensive Metabolic Panel    Specimen: Blood   Result Value Ref Range    Glucose 106 (H) 65 - 99 mg/dL    BUN 16 8 - 23 mg/dL    Creatinine 0.85 0.57 - 1.00 mg/dL    Sodium 125 (L) 136 - 145 mmol/L    Potassium 4.5 3.5 - 5.2 mmol/L    Chloride 88 (L) 98 - 107 mmol/L    CO2 21.2 (L) 22.0 - 29.0 mmol/L    Calcium 9.3 8.6 - 10.5 mg/dL    Total Protein 6.7 6.0 - 8.5 g/dL    Albumin 3.8 3.5 - 5.2 g/dL    ALT (SGPT) 18 1 - 33 U/L    AST (SGOT) 19 1 - 32 U/L    Alkaline Phosphatase 79 39 - 117 U/L    Total Bilirubin 0.2 0.0 - 1.2 mg/dL    Globulin 2.9 gm/dL    A/G Ratio 1.3 g/dL    BUN/Creatinine Ratio 18.8 7.0 - 25.0    Anion Gap 15.8 (H) 5.0 - 15.0 mmol/L    eGFR 65.6 >60.0 mL/min/1.73   Urinalysis With Microscopic If Indicated (No Culture) - Urine, Clean Catch    Specimen: Urine, Clean Catch   Result Value Ref Range     Color, UA Yellow Yellow, Straw    Appearance, UA Clear Clear    pH, UA 6.0 5.0 - 8.0    Specific Gravity, UA 1.014 1.005 - 1.030    Glucose, UA Negative Negative    Ketones, UA 15 mg/dL (1+) (A) Negative    Bilirubin, UA Negative Negative    Blood, UA Negative Negative    Protein, UA Negative Negative    Leuk Esterase, UA Negative Negative    Nitrite, UA Negative Negative    Urobilinogen, UA 0.2 E.U./dL 0.2 - 1.0 E.U./dL   Lipase    Specimen: Blood   Result Value Ref Range    Lipase 40 13 - 60 U/L   C-reactive Protein    Specimen: Blood   Result Value Ref Range    C-Reactive Protein 4.11 (H) 0.00 - 0.50 mg/dL   Lactic Acid, Plasma    Specimen: Blood   Result Value Ref Range    Lactate 1.5 0.5 - 2.0 mmol/L   CBC Auto Differential    Specimen: Blood   Result Value Ref Range    WBC 7.19 3.40 - 10.80 10*3/mm3    RBC 4.18 3.77 - 5.28 10*6/mm3    Hemoglobin 13.0 12.0 - 15.9 g/dL    Hematocrit 37.7 34.0 - 46.6 %    MCV 90.2 79.0 - 97.0 fL    MCH 31.1 26.6 - 33.0 pg    MCHC 34.5 31.5 - 35.7 g/dL    RDW 13.5 12.3 - 15.4 %    RDW-SD 44.2 37.0 - 54.0 fl    MPV 9.1 6.0 - 12.0 fL    Platelets 359 140 - 450 10*3/mm3    Neutrophil % 70.6 42.7 - 76.0 %    Lymphocyte % 17.5 (L) 19.6 - 45.3 %    Monocyte % 9.3 5.0 - 12.0 %    Eosinophil % 1.9 0.3 - 6.2 %    Basophil % 0.3 0.0 - 1.5 %    Immature Grans % 0.4 0.0 - 0.5 %    Neutrophils, Absolute 5.07 1.70 - 7.00 10*3/mm3    Lymphocytes, Absolute 1.26 0.70 - 3.10 10*3/mm3    Monocytes, Absolute 0.67 0.10 - 0.90 10*3/mm3    Eosinophils, Absolute 0.14 0.00 - 0.40 10*3/mm3    Basophils, Absolute 0.02 0.00 - 0.20 10*3/mm3    Immature Grans, Absolute 0.03 0.00 - 0.05 10*3/mm3    nRBC 0.0 0.0 - 0.2 /100 WBC   Comprehensive Metabolic Panel    Specimen: Hand, Left; Blood   Result Value Ref Range    Glucose 100 (H) 65 - 99 mg/dL    BUN 7 (L) 8 - 23 mg/dL    Creatinine 0.62 0.57 - 1.00 mg/dL    Sodium 132 (L) 136 - 145 mmol/L    Potassium 4.0 3.5 - 5.2 mmol/L    Chloride 99 98 - 107 mmol/L    CO2  21.3 (L) 22.0 - 29.0 mmol/L    Calcium 8.1 (L) 8.6 - 10.5 mg/dL    Total Protein 5.5 (L) 6.0 - 8.5 g/dL    Albumin 3.0 (L) 3.5 - 5.2 g/dL    ALT (SGPT) 13 1 - 33 U/L    AST (SGOT) 18 1 - 32 U/L    Alkaline Phosphatase 65 39 - 117 U/L    Total Bilirubin 0.2 0.0 - 1.2 mg/dL    Globulin 2.5 gm/dL    A/G Ratio 1.2 g/dL    BUN/Creatinine Ratio 11.3 7.0 - 25.0    Anion Gap 11.7 5.0 - 15.0 mmol/L    eGFR 85.2 >60.0 mL/min/1.73   Basic Metabolic Panel    Specimen: Hand, Left; Blood   Result Value Ref Range    Glucose 118 (H) 65 - 99 mg/dL    BUN 6 (L) 8 - 23 mg/dL    Creatinine 0.59 0.57 - 1.00 mg/dL    Sodium 132 (L) 136 - 145 mmol/L    Potassium 3.9 3.5 - 5.2 mmol/L    Chloride 97 (L) 98 - 107 mmol/L    CO2 23.6 22.0 - 29.0 mmol/L    Calcium 8.2 (L) 8.6 - 10.5 mg/dL    BUN/Creatinine Ratio 10.2 7.0 - 25.0    Anion Gap 11.4 5.0 - 15.0 mmol/L    eGFR 86.3 >60.0 mL/min/1.73   Green Top (Gel)   Result Value Ref Range    Extra Tube Hold for add-ons.    Lavender Top   Result Value Ref Range    Extra Tube hold for add-on    Gold Top - SST   Result Value Ref Range    Extra Tube Hold for add-ons.    Light Blue Top   Result Value Ref Range    Extra Tube Hold for add-ons.        ED Course  ED Course as of 01/06/23 1442   Thu Jan 05, 2023   7231 Findings today show the patient is COVID-positive    Hyponatremia with hypochloremia secondary to GI loss.    Patient has a prominent lymph of his lites are elevated on CBC however no prominent neutrophils making an acute bacterial infectious process unlikely.    X-ray chest unremarkable [LK]   2358 Review of labs show the patient's serum sodium on 12/23 was 133mmol/L   Sodium currently 125 today  Consistent with acute hyponatremia [LK]   Fri Jan 06, 2023   0010 Pt has acute symptomatic hyponatremia with an acute sodium change as previously documented.      Patient also is COVID-positive today    Was given 4 mg of patient will be bolused 1 L of normal saline    Zofran 4 mg IVP x1 for  nausea     And repeat CT abd/pelvis is currently pending   [LK]   0011 Colitis seen on previous CT has predominantly resolved patient has mild inflammatory edges possibly with mild sigmoid diverticulitis however patient has no white count and is afebrile    Patient side effects of profuse vomiting and symptomatic hyponatremia after starting further antibiotics of Cipro and Flagyl I do not feel that patient clinically meets criteria to continue oral antibiotics at this time she is been adequately previously treated during hospitalization require intravenous antibiotics and was discharged home on oral antibiotics to complete appropriate duration of course as CT imaging does reflect that she has been adequately treated. [LK]   0026 Patient is stable for medical level of care [LK]   0144 Patient is now sitting more upright in bed and appears to feel much better.  Patient also expressed that she is feeling better and does not feel as weak thus far she is received 500 mL of normal saline.  After the 1ST 1L NS is finished, will continue normal saline @ maintenance at 125 an hour for an additional L     Labs scheduled for repeat CMP  0800 [LK]   1007 I assumed care of this patient at shift change.  Repeat CMP notes improvement in hyponatremia.  Patient's colitis is resolved on CT in comparison to previous CT imaging of the abdomen pelvis.  Inflammatory markers are elevated.  However, patient does have COVID-19.  CBC is unremarkable. [SF]   1439 Sodium has improved and remained stable.  Blood pressure was elevated through ER course due to patient's home regimen being delayed.  Blood pressure has improved.  Patient remains asymptomatic.  Work up and results were discussed throughly with the patient.  The patient will be discharged for further monitoring and management with their PCP.  Red flags, warning signs, worsening symptoms, and when to return to the ER discussed with and understood by the patient.  Patient will follow  up with their PCP in a timely manner.  Vitals stable at discharge. [SF]      ED Course User Index  [LK] Amy Hartley DO  [SF] Leon Hanna DO      CT Abdomen Pelvis With Contrast   Final Result   1.  Diffuse colitis has resolved since 12/19/2022.   2.  Moderate lower colonic diverticulosis. Suspected subtle focal acute diverticulitis involving the upper sigmoid colon as noted. No complicating features.   3.  No visible sacral decubitus wound.      Signer Name: Pedro Bello MD    Signed: 1/6/2023 1:26 AM    Workstation Name: [facility]     Radiology [facility]      XR Chest 1 View   Final Result   No active disease.      Signer Name: Pedro Bello MD    Signed: 1/5/2023 10:30 PM    Workstation Name: [facility]     Radiology [facility]                                             Medical Decision Making  89-year-old female presented with increasing fatigue, malaise, nausea, and dizziness.  Patient was recently treated for colitis.  Symptoms have somewhat improved.  CT imaging notes improvement of colitis as well.  Hyponatremia was identified and treated.  No beds were available for immediate admission for hyponatremia.  However, patient's symptoms improved significantly after IV fluid resuscitation.  Sodium has improved and remained stable.  Blood pressure was elevated through ER course due to patient's home regimen being delayed.  Blood pressure has improved.  Patient remains asymptomatic.  Work up and results were discussed throughly with the patient.  The patient will be discharged for further monitoring and management with their PCP.  Red flags, warning signs, worsening symptoms, and when to return to the ER discussed with and understood by the patient.  Patient will follow up with their PCP in a timely manner.  Vitals stable at discharge.    Acute hyponatremia: self-limited or minor problem  Nausea and vomiting, unspecified vomiting type: acute illness or  injury  SARS-CoV-2 positive: acute illness or injury  Amount and/or Complexity of Data Reviewed  Labs: ordered.  Radiology: ordered.      Risk  OTC drugs.  Prescription drug management.  Decision regarding hospitalization.          Final diagnoses:   Acute hyponatremia   SARS-CoV-2 positive   Nausea and vomiting, unspecified vomiting type       ED Disposition  ED Disposition     ED Disposition   Discharge    Condition   Stable    Comment   --             Dave Tobar MD  [address] [facility] [address]  [phone]    In 1 week      [facility] Emergency Department  [address] [place] [id number] [phone]    If symptoms worsen         Medication List      No changes were made to your prescriptions during this visit.          Leon Hanna,   01/06/23 1442

## 2023-01-10 LAB
BACTERIA SPEC AEROBE CULT: NORMAL
BACTERIA SPEC AEROBE CULT: NORMAL

## 2023-01-17 ENCOUNTER — READMISSION MANAGEMENT (OUTPATIENT)
Dept: CALL CENTER | Facility: HOSPITAL | Age: 88
End: 2023-01-17
Payer: MEDICARE

## 2023-01-17 NOTE — OUTREACH NOTE
Medical Week 4 Survey    Flowsheet Row Responses   [facility] patient discharged from? [facility]   Does the patient have one of the following disease processes/diagnoses(primary or secondary)? Other   Week 4 attempt successful? Yes   Call start time 1203   Call end time 1212   Discharge diagnosis Colitis   Medication alerts for this patient pt reports hydrocodone and Tylenol/Advil alternating, says it helps back pain some but not much.   Meds reviewed with patient/caregiver? Yes   Is the patient having any side effects they believe may be caused by any medication additions or changes? No   Is the patient taking all medications as directed (includes completed medication regime)? Yes   Has the patient kept scheduled appointments due by today? Yes   Comments Pt c/o back pain but not abd pain. Pt has had covid. Pt unable to stand w/o pain. Having to take stool softeners for constipation. Monitoring BP at home, 228/102 HR 72,  235/105 HR 72 today 243/122 73 HR Monday at 5am at 4pm 167/65 HR 73. Advised that pt should call PCP or go to ER if BP is this high. Advised to bring these readings to the f/u appt she has today with PCP. Pt denies dizziness or HA   What is the patient's perception of their health status since discharge? Same   Is the patient/caregiver able to teach back signs and symptoms related to disease process for when to call PCP? Yes   Is the patient/caregiver able to teach back the hierarchy of who to call/visit for symptoms/problems? PCP, Specialist, Home health nurse, Urgent Care, ED, 911 Yes   Week 4 Call Completed? Yes   Would the patient like one additional call? No   Graduated Yes   Is the patient interested in additional calls from an ambulatory ?  NOTE:  applies to high risk patients requiring additional follow-up. No   Did the patient feel the follow up calls were helpful during their recovery period? Yes          KAE PARKER - Registered Nurse

## 2023-01-20 ENCOUNTER — SPECIALTY PHARMACY (OUTPATIENT)
Dept: PHARMACY | Facility: HOSPITAL | Age: 88
End: 2023-01-20
Payer: MEDICARE

## 2023-01-20 NOTE — PROGRESS NOTES
Specialty Pharmacy Refill Coordination Note      Name:  Christine Garg  :  1933  Date:  2023         Past Medical History:   Diagnosis Date   • Advanced age    • CAD (coronary artery disease)     s/p 3v CABG   • Chronic kidney disease (CKD), stage III (moderate) ([facility])    • COPD (chronic obstructive pulmonary disease) ([facility])    • History of tobacco use    • Hyperlipidemia    • Hypertension    • Hypothyroidism    • Pulmonary nodule        Past Surgical History:   Procedure Laterality Date   • BREAST BIOPSY Left     benign   • CATARACT EXTRACTION     • CORONARY ANGIOPLASTY     • CORONARY ARTERY BYPASS GRAFT         Social History     Socioeconomic History   • Marital status:    Tobacco Use   • Smoking status: Former     Types: Cigarettes   • Smokeless tobacco: Never   Substance and Sexual Activity   • Alcohol use: No   • Drug use: No   • Sexual activity: Defer       Family History   Problem Relation Age of Onset   • Heart disease Mother    • Heart disease Father    • Heart disease Brother    • Breast cancer Neg Hx        Allergies   Allergen Reactions   • Motrin [Ibuprofen] Anaphylaxis and Hives   • Novocain [Procaine] Other (See Comments)     Pt state she passes out.       Current Outpatient Medications   Medication Sig Dispense Refill   • acetaminophen (TYLENOL) 500 MG tablet Take 500 mg by mouth Daily As Needed for Mild Pain.     • aspirin 325 MG tablet Take 325 mg by mouth Daily.     • carvedilol (COREG) 25 MG tablet Take 1 tablet by mouth 2 (Two) Times a Day With Meals. 60 tablet 0   • Cholecalciferol (VITAMIN D3) 2000 units capsule Take 2,000 Units by mouth Daily.     • ciprofloxacin (CIPRO) 500 MG tablet Take 500 mg by mouth 2 (Two) Times a Day.     • cloNIDine (CATAPRES) 0.1 MG tablet Take 0.1 mg by mouth 3 (Three) Times a Day As Needed for High Blood Pressure (SBP > 150).     • coenzyme Q10 100 MG capsule Take 100 mg by mouth Daily As Needed. 2-3 times a week     • docusate sodium  (COLACE) 100 MG capsule Take 100 mg by mouth 3 (Three) Times a Day.     • Evolocumab (Repatha SureClick) solution auto-injector SureClick injection Inject 1 mL under the skin into the appropriate area as directed Every 14 (Fourteen) Days. 6 mL 1   • furosemide (LASIX) 20 MG tablet Take 20 mg by mouth Daily As Needed (swelling).     • HYDROcodone-acetaminophen (NORCO) 5-325 MG per tablet Take 1 tablet by mouth Daily As Needed.     • ibuprofen (ADVIL,MOTRIN) 200 MG tablet Take 200 mg by mouth Daily As Needed for Mild Pain.     • levothyroxine (SYNTHROID, LEVOTHROID) 112 MCG tablet Take 112 mcg by mouth Daily.     • Lidocaine 4 % patch Apply 1 patch topically Daily.     • metroNIDAZOLE (FLAGYL) 500 MG tablet Take 500 mg by mouth 2 (Two) Times a Day.     • montelukast (SINGULAIR) 10 MG tablet Take 1 tablet by mouth Every Night. 30 tablet 11   • Red Yeast Rice 600 MG capsule Take 600 mg by mouth Daily As Needed. 2 to 3 times a week     • traMADol (ULTRAM) 50 MG tablet Take 50 mg by mouth Every 12 (Twelve) Hours As Needed for Moderate Pain or Severe Pain.     • valsartan (DIOVAN) 320 MG tablet Take 320 mg by mouth Daily.     • vitamin B-12 (CYANOCOBALAMIN) 1000 MCG tablet Take 1,000 mcg by mouth Daily.       No current facility-administered medications for this visit.         ASSESSMENT/PLAN:      Christine is a 89 y.o. female contacted today regarding refills of  Repatha sureclick injection  specialty medication(s).    Reviewed and verified with patient:       Specialty medication(s) and dose(s) confirmed: yes    Refill Questions    Flowsheet Row Most Recent Value   Changes to allergies? No   Changes to medications? Yes  [has started antibiotics and pain meds around 1/5 after ed visit]   New conditions since last clinic visit No   Unplanned office visit, urgent care, ED, or hospital admission in the last 4 weeks  Yes  [tested positive for covid. has been having a lot of respiratory issues. er visit 1/5]   How does  patient/caregiver feel medication is working? Very good   Financial problems or insurance changes  No   Since the previous refill, were any specialty medication doses or scheduled injections missed or delayed?  No   If yes, please provide the amount 0   Why were doses missed? no   Does this patient require a clinical escalation to a pharmacist? Yes                     Follow-up: 84 day(s)     Angy Davidson, Pharmacy Technician  Specialty Pharmacy Technician

## 2023-01-20 NOTE — PROGRESS NOTES
Pt recently presented to the ED on 1/5/23 for generalized weakness, fatigue and vomiting after being prescribed Cipro and Flagyl for abdominal pain a few days prior by PCP. Pt was diagnosed with Covid in ED. Upon discharge, the antibiotics were discontinued. There are no issues with continuing her Repatha therapy at this time.     Tessy Pedraza RPH  01/20/23  11:00 EST

## 2023-02-07 ENCOUNTER — HOSPITAL ENCOUNTER (OUTPATIENT)
Dept: GENERAL RADIOLOGY | Facility: HOSPITAL | Age: 88
Discharge: HOME | End: 2023-02-07
Payer: MEDICARE

## 2023-02-07 ENCOUNTER — LAB (OUTPATIENT)
Dept: LAB | Facility: HOSPITAL | Age: 88
End: 2023-02-07
Payer: MEDICARE

## 2023-02-07 ENCOUNTER — TRANSCRIBE ORDERS (OUTPATIENT)
Dept: ADMINISTRATIVE | Facility: HOSPITAL | Age: 88
End: 2023-02-07
Payer: MEDICARE

## 2023-02-07 DIAGNOSIS — E78.2 MIXED HYPERLIPIDEMIA: ICD-10-CM

## 2023-02-07 DIAGNOSIS — E78.3 HYPERCHYLOMICRONEMIA: ICD-10-CM

## 2023-02-07 DIAGNOSIS — M94.0 COSTOCHONDRAL JUNCTION SYNDROME: ICD-10-CM

## 2023-02-07 DIAGNOSIS — M94.0 COSTOCHONDRITIS: ICD-10-CM

## 2023-02-07 DIAGNOSIS — E03.9 HYPOTHYROIDISM, UNSPECIFIED TYPE: Primary | ICD-10-CM

## 2023-02-07 DIAGNOSIS — R07.89 OTHER CHEST PAIN: ICD-10-CM

## 2023-02-07 DIAGNOSIS — E03.9 HYPOTHYROIDISM, UNSPECIFIED TYPE: ICD-10-CM

## 2023-02-07 LAB — D DIMER PPP FEU-MCNC: 2.56 MCGFEU/ML (ref 0–0.89)

## 2023-02-07 PROCEDURE — 80053 COMPREHEN METABOLIC PANEL: CPT

## 2023-02-07 PROCEDURE — 82550 ASSAY OF CK (CPK): CPT

## 2023-02-07 PROCEDURE — 71111 X-RAY EXAM RIBS/CHEST4/> VWS: CPT

## 2023-02-07 PROCEDURE — 84165 PROTEIN E-PHORESIS SERUM: CPT

## 2023-02-07 PROCEDURE — 86140 C-REACTIVE PROTEIN: CPT

## 2023-02-07 PROCEDURE — 71111 X-RAY EXAM RIBS/CHEST4/> VWS: CPT | Performed by: RADIOLOGY

## 2023-02-07 PROCEDURE — 85025 COMPLETE CBC W/AUTO DIFF WBC: CPT

## 2023-02-07 PROCEDURE — 36415 COLL VENOUS BLD VENIPUNCTURE: CPT

## 2023-02-07 PROCEDURE — 83690 ASSAY OF LIPASE: CPT

## 2023-02-07 PROCEDURE — 82150 ASSAY OF AMYLASE: CPT

## 2023-02-07 PROCEDURE — 85379 FIBRIN DEGRADATION QUANT: CPT

## 2023-02-08 ENCOUNTER — TRANSCRIBE ORDERS (OUTPATIENT)
Dept: ADMINISTRATIVE | Facility: HOSPITAL | Age: 88
End: 2023-02-08
Payer: MEDICARE

## 2023-02-08 DIAGNOSIS — R07.89 ATYPICAL CHEST PAIN: Primary | ICD-10-CM

## 2023-02-08 DIAGNOSIS — M79.605 LEFT LEG PAIN: Primary | ICD-10-CM

## 2023-02-08 LAB
ALBUMIN SERPL-MCNC: 3.9 G/DL (ref 3.5–5.2)
ALBUMIN/GLOB SERPL: 1.7 G/DL
ALP SERPL-CCNC: 79 U/L (ref 39–117)
ALT SERPL W P-5'-P-CCNC: 18 U/L (ref 1–33)
AMYLASE SERPL-CCNC: 51 U/L (ref 28–100)
ANION GAP SERPL CALCULATED.3IONS-SCNC: 12 MMOL/L (ref 5–15)
AST SERPL-CCNC: 17 U/L (ref 1–32)
BASOPHILS # BLD AUTO: 0.08 10*3/MM3 (ref 0–0.2)
BASOPHILS NFR BLD AUTO: 0.7 % (ref 0–1.5)
BILIRUB SERPL-MCNC: 0.3 MG/DL (ref 0–1.2)
BUN SERPL-MCNC: 25 MG/DL (ref 8–23)
BUN/CREAT SERPL: 18.9 (ref 7–25)
CALCIUM SPEC-SCNC: 10.1 MG/DL (ref 8.6–10.5)
CHLORIDE SERPL-SCNC: 94 MMOL/L (ref 98–107)
CK SERPL-CCNC: 54 U/L (ref 20–180)
CO2 SERPL-SCNC: 28 MMOL/L (ref 22–29)
CREAT SERPL-MCNC: 1.32 MG/DL (ref 0.57–1)
CRP SERPL-MCNC: 1.27 MG/DL (ref 0–0.5)
DEPRECATED RDW RBC AUTO: 49.1 FL (ref 37–54)
EGFRCR SERPLBLD CKD-EPI 2021: 38.7 ML/MIN/1.73
EOSINOPHIL # BLD AUTO: 0.45 10*3/MM3 (ref 0–0.4)
EOSINOPHIL NFR BLD AUTO: 4.2 % (ref 0.3–6.2)
ERYTHROCYTE [DISTWIDTH] IN BLOOD BY AUTOMATED COUNT: 14.3 % (ref 12.3–15.4)
GLOBULIN UR ELPH-MCNC: 2.3 GM/DL
GLUCOSE SERPL-MCNC: 100 MG/DL (ref 65–99)
HCT VFR BLD AUTO: 37 % (ref 34–46.6)
HGB BLD-MCNC: 12.4 G/DL (ref 12–15.9)
IMM GRANULOCYTES # BLD AUTO: 0.07 10*3/MM3 (ref 0–0.05)
IMM GRANULOCYTES NFR BLD AUTO: 0.7 % (ref 0–0.5)
LIPASE SERPL-CCNC: 22 U/L (ref 13–60)
LYMPHOCYTES # BLD AUTO: 1.83 10*3/MM3 (ref 0.7–3.1)
LYMPHOCYTES NFR BLD AUTO: 17 % (ref 19.6–45.3)
MCH RBC QN AUTO: 31.7 PG (ref 26.6–33)
MCHC RBC AUTO-ENTMCNC: 33.5 G/DL (ref 31.5–35.7)
MCV RBC AUTO: 94.6 FL (ref 79–97)
MONOCYTES # BLD AUTO: 0.82 10*3/MM3 (ref 0.1–0.9)
MONOCYTES NFR BLD AUTO: 7.6 % (ref 5–12)
NEUTROPHILS NFR BLD AUTO: 69.8 % (ref 42.7–76)
NEUTROPHILS NFR BLD AUTO: 7.51 10*3/MM3 (ref 1.7–7)
NRBC BLD AUTO-RTO: 0 /100 WBC (ref 0–0.2)
PLATELET # BLD AUTO: 311 10*3/MM3 (ref 140–450)
PMV BLD AUTO: 9.9 FL (ref 6–12)
POTASSIUM SERPL-SCNC: 4.8 MMOL/L (ref 3.5–5.2)
PROT SERPL-MCNC: 6.2 G/DL (ref 6–8.5)
RBC # BLD AUTO: 3.91 10*6/MM3 (ref 3.77–5.28)
SODIUM SERPL-SCNC: 134 MMOL/L (ref 136–145)
WBC NRBC COR # BLD: 10.76 10*3/MM3 (ref 3.4–10.8)

## 2023-02-09 ENCOUNTER — TRANSCRIBE ORDERS (OUTPATIENT)
Dept: ADMINISTRATIVE | Facility: HOSPITAL | Age: 88
End: 2023-02-09
Payer: MEDICARE

## 2023-02-09 DIAGNOSIS — S22.050G: Primary | ICD-10-CM

## 2023-02-09 DIAGNOSIS — M51.04 DISC DISEASE WITH MYELOPATHY, THORACIC: Primary | ICD-10-CM

## 2023-02-09 LAB
ALBUMIN SERPL ELPH-MCNC: 3.3 G/DL (ref 2.9–4.4)
ALBUMIN/GLOB SERPL: 1 {RATIO} (ref 0.7–1.7)
ALPHA1 GLOB SERPL ELPH-MCNC: 0.4 G/DL (ref 0–0.4)
ALPHA2 GLOB SERPL ELPH-MCNC: 1 G/DL (ref 0.4–1)
B-GLOBULIN SERPL ELPH-MCNC: 1 G/DL (ref 0.7–1.3)
GAMMA GLOB SERPL ELPH-MCNC: 0.8 G/DL (ref 0.4–1.8)
GLOBULIN SER CALC-MCNC: 3.2 G/DL (ref 2.2–3.9)
LABORATORY COMMENT REPORT: NORMAL
M PROTEIN SERPL ELPH-MCNC: NORMAL G/DL
PROT SERPL-MCNC: 6.5 G/DL (ref 6–8.5)

## 2023-02-10 ENCOUNTER — HOSPITAL ENCOUNTER (OUTPATIENT)
Dept: CARDIOLOGY | Facility: HOSPITAL | Age: 88
Discharge: HOME | End: 2023-02-10
Admitting: INTERNAL MEDICINE
Payer: MEDICARE

## 2023-02-10 DIAGNOSIS — M79.605 LEFT LEG PAIN: ICD-10-CM

## 2023-02-10 PROCEDURE — 93970 EXTREMITY STUDY: CPT

## 2023-02-10 PROCEDURE — 93970 EXTREMITY STUDY: CPT | Performed by: RADIOLOGY

## 2023-02-20 ENCOUNTER — HOSPITAL ENCOUNTER (OUTPATIENT)
Dept: NUCLEAR MEDICINE | Facility: HOSPITAL | Age: 88
Discharge: HOME | End: 2023-02-20
Payer: MEDICARE

## 2023-02-20 DIAGNOSIS — S22.050G: ICD-10-CM

## 2023-02-20 PROCEDURE — 78306 BONE IMAGING WHOLE BODY: CPT | Performed by: RADIOLOGY

## 2023-02-20 PROCEDURE — 0 TECHNETIUM OXIDRONATE KIT: Performed by: INTERNAL MEDICINE

## 2023-02-20 PROCEDURE — 78306 BONE IMAGING WHOLE BODY: CPT

## 2023-02-20 PROCEDURE — A9561 TC99M OXIDRONATE: HCPCS | Performed by: INTERNAL MEDICINE

## 2023-02-20 RX ADMIN — TECHNETIUM TC 99M OXIDRONATE 1 DOSE: 3.15 INJECTION, POWDER, LYOPHILIZED, FOR SOLUTION INTRAVENOUS at 10:45

## 2023-03-10 ENCOUNTER — HOSPITAL ENCOUNTER (OUTPATIENT)
Dept: CT IMAGING | Facility: HOSPITAL | Age: 88
Discharge: HOME | End: 2023-03-10
Payer: MEDICARE

## 2023-03-10 DIAGNOSIS — M51.04 DISC DISEASE WITH MYELOPATHY, THORACIC: ICD-10-CM

## 2023-03-10 DIAGNOSIS — R07.89 ATYPICAL CHEST PAIN: ICD-10-CM

## 2023-03-10 LAB — CREAT BLDA-MCNC: 1 MG/DL (ref 0.6–1.3)

## 2023-03-10 PROCEDURE — 71250 CT THORAX DX C-: CPT | Performed by: RADIOLOGY

## 2023-03-10 PROCEDURE — 72128 CT CHEST SPINE W/O DYE: CPT

## 2023-03-10 PROCEDURE — 82565 ASSAY OF CREATININE: CPT

## 2023-03-10 PROCEDURE — 71250 CT THORAX DX C-: CPT

## 2023-03-10 PROCEDURE — 72128 CT CHEST SPINE W/O DYE: CPT | Performed by: RADIOLOGY

## 2023-03-21 ENCOUNTER — TRANSCRIBE ORDERS (OUTPATIENT)
Dept: ADMINISTRATIVE | Facility: HOSPITAL | Age: 88
End: 2023-03-21
Payer: MEDICARE

## 2023-03-21 DIAGNOSIS — R91.8 MASS OF LUNG: Primary | ICD-10-CM

## 2023-03-22 ENCOUNTER — ANESTHESIA (OUTPATIENT)
Dept: PERIOP | Facility: HOSPITAL | Age: 88
DRG: 853 | End: 2023-03-22
Payer: MEDICARE

## 2023-03-22 ENCOUNTER — ANESTHESIA EVENT (OUTPATIENT)
Dept: PERIOP | Facility: HOSPITAL | Age: 88
DRG: 853 | End: 2023-03-22
Payer: MEDICARE

## 2023-03-22 ENCOUNTER — APPOINTMENT (OUTPATIENT)
Dept: CT IMAGING | Facility: HOSPITAL | Age: 88
DRG: 853 | End: 2023-03-22
Payer: MEDICARE

## 2023-03-22 ENCOUNTER — APPOINTMENT (OUTPATIENT)
Dept: GENERAL RADIOLOGY | Facility: HOSPITAL | Age: 88
DRG: 853 | End: 2023-03-22
Payer: MEDICARE

## 2023-03-22 ENCOUNTER — HOSPITAL ENCOUNTER (INPATIENT)
Facility: HOSPITAL | Age: 88
LOS: 19 days | Discharge: ANOTHER HOSPITAL | DRG: 853 | End: 2023-04-10
Attending: STUDENT IN AN ORGANIZED HEALTH CARE EDUCATION/TRAINING PROGRAM | Admitting: SURGERY
Payer: MEDICARE

## 2023-03-22 DIAGNOSIS — E44.0 MODERATE MALNUTRITION: ICD-10-CM

## 2023-03-22 DIAGNOSIS — K25.1 ACUTE GASTRIC ULCER WITH PERFORATION: Primary | ICD-10-CM

## 2023-03-22 DIAGNOSIS — E87.6 HYPOKALEMIA: ICD-10-CM

## 2023-03-22 DIAGNOSIS — K22.2 ESOPHAGEAL STRICTURE: ICD-10-CM

## 2023-03-22 DIAGNOSIS — A41.9 SEPSIS WITHOUT ACUTE ORGAN DYSFUNCTION, DUE TO UNSPECIFIED ORGANISM: ICD-10-CM

## 2023-03-22 DIAGNOSIS — K27.5 PERFORATED ULCER: ICD-10-CM

## 2023-03-22 DIAGNOSIS — E87.1 HYPONATREMIA: ICD-10-CM

## 2023-03-22 PROBLEM — R06.02 SHORTNESS OF BREATH: Status: RESOLVED | Noted: 2018-07-17 | Resolved: 2023-03-22

## 2023-03-22 PROBLEM — R06.02 SOB (SHORTNESS OF BREATH): Status: RESOLVED | Noted: 2018-06-05 | Resolved: 2023-03-22

## 2023-03-22 PROBLEM — R07.2 PRECORDIAL PAIN: Status: RESOLVED | Noted: 2018-04-24 | Resolved: 2023-03-22

## 2023-03-22 PROBLEM — R60.0 LEG EDEMA: Status: RESOLVED | Noted: 2018-04-24 | Resolved: 2023-03-22

## 2023-03-22 PROBLEM — R07.9 CHEST PAIN: Status: RESOLVED | Noted: 2018-05-07 | Resolved: 2023-03-22

## 2023-03-22 LAB
A-A DO2: 29.5 MMHG (ref 0–300)
ABO GROUP BLD: NORMAL
ABO GROUP BLD: NORMAL
ALBUMIN SERPL-MCNC: 3.9 G/DL (ref 3.5–5.2)
ALBUMIN/GLOB SERPL: 1.3 G/DL
ALP SERPL-CCNC: 88 U/L (ref 39–117)
ALT SERPL W P-5'-P-CCNC: 29 U/L (ref 1–33)
ANION GAP SERPL CALCULATED.3IONS-SCNC: 19.7 MMOL/L (ref 5–15)
ARTERIAL PATENCY WRIST A: ABNORMAL
AST SERPL-CCNC: 17 U/L (ref 1–32)
ATMOSPHERIC PRESS: 733 MMHG
BASE EXCESS BLDA CALC-SCNC: -3.2 MMOL/L (ref 0–2)
BASOPHILS # BLD AUTO: 0.13 10*3/MM3 (ref 0–0.2)
BASOPHILS NFR BLD AUTO: 0.4 % (ref 0–1.5)
BDY SITE: ABNORMAL
BILIRUB SERPL-MCNC: 0.6 MG/DL (ref 0–1.2)
BLD GP AB SCN SERPL QL: NEGATIVE
BUN SERPL-MCNC: 23 MG/DL (ref 8–23)
BUN/CREAT SERPL: 27.1 (ref 7–25)
CALCIUM SPEC-SCNC: 10 MG/DL (ref 8.6–10.5)
CHLORIDE SERPL-SCNC: 87 MMOL/L (ref 98–107)
CO2 BLDA-SCNC: 21 MMOL/L (ref 22–33)
CO2 SERPL-SCNC: 21.3 MMOL/L (ref 22–29)
COHGB MFR BLD: 1 % (ref 0–5)
CREAT SERPL-MCNC: 0.85 MG/DL (ref 0.57–1)
CRP SERPL-MCNC: 3.44 MG/DL (ref 0–0.5)
D-LACTATE SERPL-SCNC: 2.2 MMOL/L (ref 0.5–2)
D-LACTATE SERPL-SCNC: 2.3 MMOL/L (ref 0.5–2)
D-LACTATE SERPL-SCNC: 2.5 MMOL/L (ref 0.5–2)
D-LACTATE SERPL-SCNC: 3.5 MMOL/L (ref 0.5–2)
DEPRECATED RDW RBC AUTO: 44.7 FL (ref 37–54)
EGFRCR SERPLBLD CKD-EPI 2021: 65.6 ML/MIN/1.73
EOSINOPHIL # BLD AUTO: 0.08 10*3/MM3 (ref 0–0.4)
EOSINOPHIL NFR BLD AUTO: 0.3 % (ref 0.3–6.2)
ERYTHROCYTE [DISTWIDTH] IN BLOOD BY AUTOMATED COUNT: 13.2 % (ref 12.3–15.4)
ETHANOL BLD-MCNC: <10 MG/DL (ref 0–10)
ETHANOL UR QL: <0.01 %
FLUAV RNA RESP QL NAA+PROBE: NOT DETECTED
FLUBV RNA RESP QL NAA+PROBE: NOT DETECTED
GEN 5 2HR TROPONIN T REFLEX: 29 NG/L
GLOBULIN UR ELPH-MCNC: 3.1 GM/DL
GLUCOSE SERPL-MCNC: 139 MG/DL (ref 65–99)
HBA1C MFR BLD: 6.1 % (ref 4.8–5.6)
HCO3 BLDA-SCNC: 20 MMOL/L (ref 20–26)
HCT VFR BLD AUTO: 46.3 % (ref 34–46.6)
HCT VFR BLD CALC: 45.9 % (ref 38–51)
HGB BLD-MCNC: 15.9 G/DL (ref 12–15.9)
HGB BLDA-MCNC: 15 G/DL (ref 13.5–17.5)
IMM GRANULOCYTES # BLD AUTO: 0.25 10*3/MM3 (ref 0–0.05)
IMM GRANULOCYTES NFR BLD AUTO: 0.8 % (ref 0–0.5)
INHALED O2 CONCENTRATION: 21 %
LIPASE SERPL-CCNC: 84 U/L (ref 13–60)
LYMPHOCYTES # BLD AUTO: 1.13 10*3/MM3 (ref 0.7–3.1)
LYMPHOCYTES NFR BLD AUTO: 3.6 % (ref 19.6–45.3)
Lab: ABNORMAL
MAGNESIUM SERPL-MCNC: 1.8 MG/DL (ref 1.6–2.4)
MCH RBC QN AUTO: 31.7 PG (ref 26.6–33)
MCHC RBC AUTO-ENTMCNC: 34.3 G/DL (ref 31.5–35.7)
MCV RBC AUTO: 92.4 FL (ref 79–97)
METHGB BLD QL: <-0.1 % (ref 0–3)
MODALITY: ABNORMAL
MONOCYTES # BLD AUTO: 1.65 10*3/MM3 (ref 0.1–0.9)
MONOCYTES NFR BLD AUTO: 5.3 % (ref 5–12)
NEUTROPHILS NFR BLD AUTO: 27.75 10*3/MM3 (ref 1.7–7)
NEUTROPHILS NFR BLD AUTO: 89.6 % (ref 42.7–76)
NOTE: ABNORMAL
NRBC BLD AUTO-RTO: 0 /100 WBC (ref 0–0.2)
NT-PROBNP SERPL-MCNC: 963.1 PG/ML (ref 0–1800)
OXYHGB MFR BLDV: 96.7 % (ref 94–99)
PCO2 BLDA: 30.6 MM HG (ref 35–45)
PCO2 TEMP ADJ BLD: ABNORMAL MM[HG]
PH BLDA: 7.42 PH UNITS (ref 7.35–7.45)
PH, TEMP CORRECTED: ABNORMAL
PLATELET # BLD AUTO: 468 10*3/MM3 (ref 140–450)
PMV BLD AUTO: 9.2 FL (ref 6–12)
PO2 BLDA: 80.1 MM HG (ref 83–108)
PO2 TEMP ADJ BLD: ABNORMAL MM[HG]
POTASSIUM SERPL-SCNC: 3.3 MMOL/L (ref 3.5–5.2)
PROT SERPL-MCNC: 7 G/DL (ref 6–8.5)
QT INTERVAL: 354 MS
QTC INTERVAL: 463 MS
RBC # BLD AUTO: 5.01 10*6/MM3 (ref 3.77–5.28)
RH BLD: POSITIVE
RH BLD: POSITIVE
SAO2 % BLDCOA: 96.3 % (ref 94–99)
SARS-COV-2 RNA RESP QL NAA+PROBE: NOT DETECTED
SODIUM SERPL-SCNC: 128 MMOL/L (ref 136–145)
T&S EXPIRATION DATE: NORMAL
TROPONIN T DELTA: 4 NG/L
TROPONIN T SERPL HS-MCNC: 25 NG/L
VENTILATOR MODE: ABNORMAL
WBC NRBC COR # BLD: 30.99 10*3/MM3 (ref 3.4–10.8)

## 2023-03-22 PROCEDURE — 25010000002 ONDANSETRON PER 1 MG: Performed by: PHYSICIAN ASSISTANT

## 2023-03-22 PROCEDURE — 43840 GSTRRPHY SUTR DUOL/GSTR ULCR: CPT | Performed by: SURGERY

## 2023-03-22 PROCEDURE — 25010000002 CEFEPIME PER 500 MG: Performed by: SURGERY

## 2023-03-22 PROCEDURE — 71045 X-RAY EXAM CHEST 1 VIEW: CPT

## 2023-03-22 PROCEDURE — 82077 ASSAY SPEC XCP UR&BREATH IA: CPT | Performed by: PHYSICIAN ASSISTANT

## 2023-03-22 PROCEDURE — 82805 BLOOD GASES W/O2 SATURATION: CPT

## 2023-03-22 PROCEDURE — 99223 1ST HOSP IP/OBS HIGH 75: CPT

## 2023-03-22 PROCEDURE — 82375 ASSAY CARBOXYHB QUANT: CPT

## 2023-03-22 PROCEDURE — 49905 OMENTAL FLAP INTRA-ABDOM: CPT

## 2023-03-22 PROCEDURE — 93005 ELECTROCARDIOGRAM TRACING: CPT | Performed by: PHYSICIAN ASSISTANT

## 2023-03-22 PROCEDURE — 86140 C-REACTIVE PROTEIN: CPT | Performed by: PHYSICIAN ASSISTANT

## 2023-03-22 PROCEDURE — 87636 SARSCOV2 & INF A&B AMP PRB: CPT | Performed by: PHYSICIAN ASSISTANT

## 2023-03-22 PROCEDURE — 83605 ASSAY OF LACTIC ACID: CPT | Performed by: PHYSICIAN ASSISTANT

## 2023-03-22 PROCEDURE — 83690 ASSAY OF LIPASE: CPT | Performed by: PHYSICIAN ASSISTANT

## 2023-03-22 PROCEDURE — 25010000002 PIPERACILLIN SOD-TAZOBACTAM PER 1 G: Performed by: PHYSICIAN ASSISTANT

## 2023-03-22 PROCEDURE — 83880 ASSAY OF NATRIURETIC PEPTIDE: CPT | Performed by: PHYSICIAN ASSISTANT

## 2023-03-22 PROCEDURE — 74177 CT ABD & PELVIS W/CONTRAST: CPT | Performed by: RADIOLOGY

## 2023-03-22 PROCEDURE — 87070 CULTURE OTHR SPECIMN AEROBIC: CPT | Performed by: SURGERY

## 2023-03-22 PROCEDURE — 71045 X-RAY EXAM CHEST 1 VIEW: CPT | Performed by: RADIOLOGY

## 2023-03-22 PROCEDURE — 25010000002 HYDROMORPHONE PER 4 MG: Performed by: STUDENT IN AN ORGANIZED HEALTH CARE EDUCATION/TRAINING PROGRAM

## 2023-03-22 PROCEDURE — 93010 ELECTROCARDIOGRAM REPORT: CPT | Performed by: INTERNAL MEDICINE

## 2023-03-22 PROCEDURE — 86901 BLOOD TYPING SEROLOGIC RH(D): CPT

## 2023-03-22 PROCEDURE — 0DQ90ZZ REPAIR DUODENUM, OPEN APPROACH: ICD-10-PCS | Performed by: SURGERY

## 2023-03-22 PROCEDURE — 25010000002 MORPHINE PER 10 MG: Performed by: ANESTHESIOLOGY

## 2023-03-22 PROCEDURE — 80053 COMPREHEN METABOLIC PANEL: CPT | Performed by: PHYSICIAN ASSISTANT

## 2023-03-22 PROCEDURE — 99285 EMERGENCY DEPT VISIT HI MDM: CPT

## 2023-03-22 PROCEDURE — 83036 HEMOGLOBIN GLYCOSYLATED A1C: CPT | Performed by: INTERNAL MEDICINE

## 2023-03-22 PROCEDURE — 0DU907Z SUPPLEMENT DUODENUM WITH AUTOLOGOUS TISSUE SUBSTITUTE, OPEN APPROACH: ICD-10-PCS | Performed by: SURGERY

## 2023-03-22 PROCEDURE — 25010000002 NEOSTIGMINE 10 MG/10ML SOLUTION: Performed by: NURSE ANESTHETIST, CERTIFIED REGISTERED

## 2023-03-22 PROCEDURE — 86900 BLOOD TYPING SEROLOGIC ABO: CPT

## 2023-03-22 PROCEDURE — 84484 ASSAY OF TROPONIN QUANT: CPT | Performed by: PHYSICIAN ASSISTANT

## 2023-03-22 PROCEDURE — 25010000002 ROPIVACAINE PER 1 MG: Performed by: ANESTHESIOLOGY

## 2023-03-22 PROCEDURE — 74177 CT ABD & PELVIS W/CONTRAST: CPT

## 2023-03-22 PROCEDURE — 99223 1ST HOSP IP/OBS HIGH 75: CPT | Performed by: INTERNAL MEDICINE

## 2023-03-22 PROCEDURE — 25510000001 IOPAMIDOL 61 % SOLUTION: Performed by: STUDENT IN AN ORGANIZED HEALTH CARE EDUCATION/TRAINING PROGRAM

## 2023-03-22 PROCEDURE — 25010000002 MORPHINE PER 10 MG: Performed by: STUDENT IN AN ORGANIZED HEALTH CARE EDUCATION/TRAINING PROGRAM

## 2023-03-22 PROCEDURE — 36415 COLL VENOUS BLD VENIPUNCTURE: CPT

## 2023-03-22 PROCEDURE — 25010000002 DEXAMETHASONE PER 1 MG: Performed by: ANESTHESIOLOGY

## 2023-03-22 PROCEDURE — 02HV33Z INSERTION OF INFUSION DEVICE INTO SUPERIOR VENA CAVA, PERCUTANEOUS APPROACH: ICD-10-PCS | Performed by: SURGERY

## 2023-03-22 PROCEDURE — 83735 ASSAY OF MAGNESIUM: CPT | Performed by: INTERNAL MEDICINE

## 2023-03-22 PROCEDURE — 36600 WITHDRAWAL OF ARTERIAL BLOOD: CPT

## 2023-03-22 PROCEDURE — 86850 RBC ANTIBODY SCREEN: CPT | Performed by: ANESTHESIOLOGY

## 2023-03-22 PROCEDURE — 0 POTASSIUM CHLORIDE 10 MEQ/100ML SOLUTION: Performed by: SURGERY

## 2023-03-22 PROCEDURE — 36556 INSERT NON-TUNNEL CV CATH: CPT | Performed by: SURGERY

## 2023-03-22 PROCEDURE — 87040 BLOOD CULTURE FOR BACTERIA: CPT | Performed by: PHYSICIAN ASSISTANT

## 2023-03-22 PROCEDURE — 25010000002 VANCOMYCIN 5 G RECONSTITUTED SOLUTION: Performed by: PHYSICIAN ASSISTANT

## 2023-03-22 PROCEDURE — 25010000002 SUCCINYLCHOLINE PER 20 MG: Performed by: NURSE ANESTHETIST, CERTIFIED REGISTERED

## 2023-03-22 PROCEDURE — 83050 HGB METHEMOGLOBIN QUAN: CPT

## 2023-03-22 PROCEDURE — 85025 COMPLETE CBC W/AUTO DIFF WBC: CPT | Performed by: PHYSICIAN ASSISTANT

## 2023-03-22 PROCEDURE — 86901 BLOOD TYPING SEROLOGIC RH(D): CPT | Performed by: ANESTHESIOLOGY

## 2023-03-22 PROCEDURE — 25010000002 ONDANSETRON PER 1 MG: Performed by: NURSE ANESTHETIST, CERTIFIED REGISTERED

## 2023-03-22 PROCEDURE — 86900 BLOOD TYPING SEROLOGIC ABO: CPT | Performed by: ANESTHESIOLOGY

## 2023-03-22 PROCEDURE — C1751 CATH, INF, PER/CENT/MIDLINE: HCPCS

## 2023-03-22 PROCEDURE — 25010000002 PROPOFOL 10 MG/ML EMULSION: Performed by: NURSE ANESTHETIST, CERTIFIED REGISTERED

## 2023-03-22 PROCEDURE — 87205 SMEAR GRAM STAIN: CPT | Performed by: SURGERY

## 2023-03-22 DEVICE — LIGACLIP EXTRA LIGATING CLIP CARTRIDGES: 6 TITANIUM CLIPS/ CARTRIDGE (SMALL)
Type: IMPLANTABLE DEVICE | Site: ABDOMEN | Status: FUNCTIONAL
Brand: LIGACLIP

## 2023-03-22 DEVICE — IMPLANTABLE DEVICE: Type: IMPLANTABLE DEVICE | Site: ABDOMEN | Status: FUNCTIONAL

## 2023-03-22 DEVICE — LIGACLIP EXTRA LIGATING CLIP CARTRIDGES: 6 TITANIUM CLIPS/ CARTRIDGE (MEDIUM/LARGE)
Type: IMPLANTABLE DEVICE | Site: ABDOMEN | Status: FUNCTIONAL
Brand: LIGACLIP

## 2023-03-22 DEVICE — LIGACLIP EXTRA LIGATING CLIP CARTRIDGES: 6 TITANIUM CLIPS/ CARTRIDGE (MEDIUM)
Type: IMPLANTABLE DEVICE | Site: ABDOMEN | Status: FUNCTIONAL
Brand: LIGACLIP

## 2023-03-22 RX ORDER — VALSARTAN 160 MG/1
320 TABLET ORAL DAILY
Status: DISCONTINUED | OUTPATIENT
Start: 2023-03-23 | End: 2023-03-24

## 2023-03-22 RX ORDER — SODIUM CHLORIDE 0.9 % (FLUSH) 0.9 %
3 SYRINGE (ML) INJECTION EVERY 12 HOURS SCHEDULED
Status: DISCONTINUED | OUTPATIENT
Start: 2023-03-22 | End: 2023-04-10 | Stop reason: HOSPADM

## 2023-03-22 RX ORDER — ONDANSETRON 2 MG/ML
INJECTION INTRAMUSCULAR; INTRAVENOUS AS NEEDED
Status: DISCONTINUED | OUTPATIENT
Start: 2023-03-22 | End: 2023-03-22 | Stop reason: SURG

## 2023-03-22 RX ORDER — SODIUM CHLORIDE 9 MG/ML
125 INJECTION, SOLUTION INTRAVENOUS CONTINUOUS
Status: DISCONTINUED | OUTPATIENT
Start: 2023-03-22 | End: 2023-03-23

## 2023-03-22 RX ORDER — MORPHINE SULFATE 2 MG/ML
2 INJECTION, SOLUTION INTRAMUSCULAR; INTRAVENOUS
Status: DISCONTINUED | OUTPATIENT
Start: 2023-03-22 | End: 2023-03-22 | Stop reason: HOSPADM

## 2023-03-22 RX ORDER — POTASSIUM CHLORIDE 7.45 MG/ML
10 INJECTION INTRAVENOUS
Status: DISCONTINUED | OUTPATIENT
Start: 2023-03-22 | End: 2023-04-10 | Stop reason: HOSPADM

## 2023-03-22 RX ORDER — MAGNESIUM SULFATE HEPTAHYDRATE 40 MG/ML
4 INJECTION, SOLUTION INTRAVENOUS ONCE
Status: COMPLETED | OUTPATIENT
Start: 2023-03-22 | End: 2023-03-23

## 2023-03-22 RX ORDER — SODIUM CHLORIDE 0.9 % (FLUSH) 0.9 %
10 SYRINGE (ML) INJECTION AS NEEDED
Status: DISCONTINUED | OUTPATIENT
Start: 2023-03-22 | End: 2023-04-10 | Stop reason: HOSPADM

## 2023-03-22 RX ORDER — NITROFURANTOIN 25; 75 MG/1; MG/1
1 CAPSULE ORAL 2 TIMES DAILY WITH MEALS
COMMUNITY
Start: 2023-03-20 | End: 2023-04-10 | Stop reason: HOSPADM

## 2023-03-22 RX ORDER — TIZANIDINE 4 MG/1
2 TABLET ORAL 2 TIMES DAILY
Status: DISCONTINUED | OUTPATIENT
Start: 2023-03-23 | End: 2023-03-23

## 2023-03-22 RX ORDER — POTASSIUM CHLORIDE 7.45 MG/ML
10 INJECTION INTRAVENOUS
Status: ACTIVE | OUTPATIENT
Start: 2023-03-22 | End: 2023-03-22

## 2023-03-22 RX ORDER — MAGNESIUM SULFATE HEPTAHYDRATE 40 MG/ML
2 INJECTION, SOLUTION INTRAVENOUS AS NEEDED
Status: DISCONTINUED | OUTPATIENT
Start: 2023-03-22 | End: 2023-03-29

## 2023-03-22 RX ORDER — POTASSIUM CHLORIDE 1.5 G/1.77G
40 POWDER, FOR SOLUTION ORAL AS NEEDED
Status: DISCONTINUED | OUTPATIENT
Start: 2023-03-22 | End: 2023-04-10 | Stop reason: HOSPADM

## 2023-03-22 RX ORDER — ONDANSETRON 2 MG/ML
4 INJECTION INTRAMUSCULAR; INTRAVENOUS EVERY 4 HOURS PRN
Status: DISCONTINUED | OUTPATIENT
Start: 2023-03-22 | End: 2023-04-10 | Stop reason: HOSPADM

## 2023-03-22 RX ORDER — SUCCINYLCHOLINE CHLORIDE 20 MG/ML
INJECTION INTRAMUSCULAR; INTRAVENOUS AS NEEDED
Status: DISCONTINUED | OUTPATIENT
Start: 2023-03-22 | End: 2023-03-22 | Stop reason: SURG

## 2023-03-22 RX ORDER — ONDANSETRON 2 MG/ML
4 INJECTION INTRAMUSCULAR; INTRAVENOUS ONCE
Status: COMPLETED | OUTPATIENT
Start: 2023-03-22 | End: 2023-03-22

## 2023-03-22 RX ORDER — ONDANSETRON 4 MG/1
4 TABLET, FILM COATED ORAL 3 TIMES DAILY PRN
Status: DISCONTINUED | OUTPATIENT
Start: 2023-03-22 | End: 2023-03-23

## 2023-03-22 RX ORDER — IPRATROPIUM BROMIDE AND ALBUTEROL SULFATE 2.5; .5 MG/3ML; MG/3ML
3 SOLUTION RESPIRATORY (INHALATION) ONCE AS NEEDED
Status: DISCONTINUED | OUTPATIENT
Start: 2023-03-22 | End: 2023-03-22 | Stop reason: HOSPADM

## 2023-03-22 RX ORDER — MAGNESIUM HYDROXIDE 1200 MG/15ML
LIQUID ORAL AS NEEDED
Status: DISCONTINUED | OUTPATIENT
Start: 2023-03-22 | End: 2023-03-22 | Stop reason: HOSPADM

## 2023-03-22 RX ORDER — SODIUM CHLORIDE 0.9 % (FLUSH) 0.9 %
10 SYRINGE (ML) INJECTION AS NEEDED
Status: DISCONTINUED | OUTPATIENT
Start: 2023-03-22 | End: 2023-03-22 | Stop reason: HOSPADM

## 2023-03-22 RX ORDER — PROPOFOL 10 MG/ML
VIAL (ML) INTRAVENOUS AS NEEDED
Status: DISCONTINUED | OUTPATIENT
Start: 2023-03-22 | End: 2023-03-22 | Stop reason: SURG

## 2023-03-22 RX ORDER — SODIUM CHLORIDE 0.9 % (FLUSH) 0.9 %
10 SYRINGE (ML) INJECTION EVERY 12 HOURS SCHEDULED
Status: DISCONTINUED | OUTPATIENT
Start: 2023-03-22 | End: 2023-03-22 | Stop reason: HOSPADM

## 2023-03-22 RX ORDER — TIZANIDINE 2 MG/1
2 TABLET ORAL 2 TIMES DAILY
COMMUNITY
End: 2023-04-10 | Stop reason: HOSPADM

## 2023-03-22 RX ORDER — HYDROMORPHONE HYDROCHLORIDE 1 MG/ML
0.5 INJECTION, SOLUTION INTRAMUSCULAR; INTRAVENOUS; SUBCUTANEOUS ONCE
Status: COMPLETED | OUTPATIENT
Start: 2023-03-22 | End: 2023-03-22

## 2023-03-22 RX ORDER — SODIUM CHLORIDE 0.9 % (FLUSH) 0.9 %
10 SYRINGE (ML) INJECTION EVERY 12 HOURS SCHEDULED
Status: DISCONTINUED | OUTPATIENT
Start: 2023-03-22 | End: 2023-03-27

## 2023-03-22 RX ORDER — DEXAMETHASONE SODIUM PHOSPHATE 4 MG/ML
INJECTION, SOLUTION INTRA-ARTICULAR; INTRALESIONAL; INTRAMUSCULAR; INTRAVENOUS; SOFT TISSUE
Status: COMPLETED | OUTPATIENT
Start: 2023-03-22 | End: 2023-03-22

## 2023-03-22 RX ORDER — SODIUM CHLORIDE 9 MG/ML
40 INJECTION, SOLUTION INTRAVENOUS AS NEEDED
Status: DISCONTINUED | OUTPATIENT
Start: 2023-03-22 | End: 2023-04-10 | Stop reason: HOSPADM

## 2023-03-22 RX ORDER — SODIUM CHLORIDE 9 MG/ML
40 INJECTION, SOLUTION INTRAVENOUS AS NEEDED
Status: DISCONTINUED | OUTPATIENT
Start: 2023-03-22 | End: 2023-03-22 | Stop reason: HOSPADM

## 2023-03-22 RX ORDER — LEVOTHYROXINE SODIUM 0.07 MG/1
112 TABLET ORAL
Status: DISCONTINUED | OUTPATIENT
Start: 2023-03-23 | End: 2023-03-23

## 2023-03-22 RX ORDER — LABETALOL HYDROCHLORIDE 5 MG/ML
INJECTION, SOLUTION INTRAVENOUS AS NEEDED
Status: DISCONTINUED | OUTPATIENT
Start: 2023-03-22 | End: 2023-03-22 | Stop reason: SURG

## 2023-03-22 RX ORDER — FAMOTIDINE 10 MG/ML
INJECTION, SOLUTION INTRAVENOUS AS NEEDED
Status: DISCONTINUED | OUTPATIENT
Start: 2023-03-22 | End: 2023-03-22 | Stop reason: SURG

## 2023-03-22 RX ORDER — OXYCODONE HYDROCHLORIDE AND ACETAMINOPHEN 5; 325 MG/1; MG/1
1 TABLET ORAL ONCE AS NEEDED
Status: DISCONTINUED | OUTPATIENT
Start: 2023-03-22 | End: 2023-03-22 | Stop reason: HOSPADM

## 2023-03-22 RX ORDER — SODIUM CHLORIDE 0.9 % (FLUSH) 0.9 %
10 SYRINGE (ML) INJECTION EVERY 12 HOURS SCHEDULED
Status: DISCONTINUED | OUTPATIENT
Start: 2023-03-22 | End: 2023-04-10 | Stop reason: HOSPADM

## 2023-03-22 RX ORDER — PREDNISONE 20 MG/1
20 TABLET ORAL DAILY
COMMUNITY
Start: 2023-03-15 | End: 2023-04-10 | Stop reason: HOSPADM

## 2023-03-22 RX ORDER — PANTOPRAZOLE SODIUM 40 MG/10ML
40 INJECTION, POWDER, LYOPHILIZED, FOR SOLUTION INTRAVENOUS ONCE
Status: COMPLETED | OUTPATIENT
Start: 2023-03-22 | End: 2023-03-22

## 2023-03-22 RX ORDER — POTASSIUM CHLORIDE 7.45 MG/ML
10 INJECTION INTRAVENOUS
Status: COMPLETED | OUTPATIENT
Start: 2023-03-22 | End: 2023-03-23

## 2023-03-22 RX ORDER — MORPHINE SULFATE 2 MG/ML
2 INJECTION, SOLUTION INTRAMUSCULAR; INTRAVENOUS ONCE
Status: DISCONTINUED | OUTPATIENT
Start: 2023-03-22 | End: 2023-03-22

## 2023-03-22 RX ORDER — CARVEDILOL 25 MG/1
25 TABLET ORAL 2 TIMES DAILY WITH MEALS
Status: DISCONTINUED | OUTPATIENT
Start: 2023-03-23 | End: 2023-03-23

## 2023-03-22 RX ORDER — HEPARIN SODIUM 5000 [USP'U]/ML
5000 INJECTION, SOLUTION INTRAVENOUS; SUBCUTANEOUS EVERY 12 HOURS SCHEDULED
Status: DISCONTINUED | OUTPATIENT
Start: 2023-03-23 | End: 2023-04-10 | Stop reason: HOSPADM

## 2023-03-22 RX ORDER — POTASSIUM CHLORIDE 1500 MG/1
40 TABLET, FILM COATED, EXTENDED RELEASE ORAL AS NEEDED
Status: DISCONTINUED | OUTPATIENT
Start: 2023-03-22 | End: 2023-04-10 | Stop reason: HOSPADM

## 2023-03-22 RX ORDER — ASPIRIN 325 MG
325 TABLET ORAL DAILY
Status: DISCONTINUED | OUTPATIENT
Start: 2023-03-23 | End: 2023-03-23

## 2023-03-22 RX ORDER — GLYCOPYRROLATE 0.2 MG/ML
INJECTION INTRAMUSCULAR; INTRAVENOUS AS NEEDED
Status: DISCONTINUED | OUTPATIENT
Start: 2023-03-22 | End: 2023-03-22 | Stop reason: SURG

## 2023-03-22 RX ORDER — SODIUM CHLORIDE 0.9 % (FLUSH) 0.9 %
20 SYRINGE (ML) INJECTION AS NEEDED
Status: DISCONTINUED | OUTPATIENT
Start: 2023-03-22 | End: 2023-03-27

## 2023-03-22 RX ORDER — POTASSIUM CHLORIDE 20 MEQ/1
40 TABLET, EXTENDED RELEASE ORAL ONCE
Status: DISCONTINUED | OUTPATIENT
Start: 2023-03-22 | End: 2023-03-22

## 2023-03-22 RX ORDER — ROPIVACAINE HYDROCHLORIDE 5 MG/ML
INJECTION, SOLUTION EPIDURAL; INFILTRATION; PERINEURAL
Status: COMPLETED | OUTPATIENT
Start: 2023-03-22 | End: 2023-03-22

## 2023-03-22 RX ORDER — NALOXEGOL OXALATE 25 MG/1
1 TABLET, FILM COATED ORAL DAILY PRN
COMMUNITY
Start: 2023-03-13 | End: 2023-04-10 | Stop reason: HOSPADM

## 2023-03-22 RX ORDER — METRONIDAZOLE 500 MG/100ML
500 INJECTION, SOLUTION INTRAVENOUS EVERY 8 HOURS
Status: DISCONTINUED | OUTPATIENT
Start: 2023-03-22 | End: 2023-03-23

## 2023-03-22 RX ORDER — HYDROCODONE BITARTRATE AND ACETAMINOPHEN 5; 325 MG/1; MG/1
1 TABLET ORAL EVERY 6 HOURS PRN
Status: DISCONTINUED | OUTPATIENT
Start: 2023-03-22 | End: 2023-03-23

## 2023-03-22 RX ORDER — CLONIDINE HYDROCHLORIDE 0.1 MG/1
0.1 TABLET ORAL 3 TIMES DAILY PRN
Status: DISCONTINUED | OUTPATIENT
Start: 2023-03-22 | End: 2023-04-10 | Stop reason: HOSPADM

## 2023-03-22 RX ORDER — ONDANSETRON 2 MG/ML
4 INJECTION INTRAMUSCULAR; INTRAVENOUS AS NEEDED
Status: DISCONTINUED | OUTPATIENT
Start: 2023-03-22 | End: 2023-03-22 | Stop reason: HOSPADM

## 2023-03-22 RX ORDER — MORPHINE SULFATE 2 MG/ML
2 INJECTION, SOLUTION INTRAMUSCULAR; INTRAVENOUS
Status: DISCONTINUED | OUTPATIENT
Start: 2023-03-22 | End: 2023-03-29

## 2023-03-22 RX ORDER — ONDANSETRON 4 MG/1
4 TABLET, FILM COATED ORAL 3 TIMES DAILY PRN
Status: ON HOLD | COMMUNITY
Start: 2023-03-20

## 2023-03-22 RX ORDER — SODIUM CHLORIDE, SODIUM LACTATE, POTASSIUM CHLORIDE, CALCIUM CHLORIDE 600; 310; 30; 20 MG/100ML; MG/100ML; MG/100ML; MG/100ML
100 INJECTION, SOLUTION INTRAVENOUS ONCE AS NEEDED
Status: DISCONTINUED | OUTPATIENT
Start: 2023-03-22 | End: 2023-03-22 | Stop reason: HOSPADM

## 2023-03-22 RX ORDER — POTASSIUM CHLORIDE 1.5 G/1.77G
40 POWDER, FOR SOLUTION ORAL AS NEEDED
Status: DISCONTINUED | OUTPATIENT
Start: 2023-03-22 | End: 2023-03-22

## 2023-03-22 RX ORDER — NEOSTIGMINE METHYLSULFATE 1 MG/ML
INJECTION, SOLUTION INTRAVENOUS AS NEEDED
Status: DISCONTINUED | OUTPATIENT
Start: 2023-03-22 | End: 2023-03-22 | Stop reason: SURG

## 2023-03-22 RX ORDER — MONTELUKAST SODIUM 10 MG/1
10 TABLET ORAL NIGHTLY
Status: DISCONTINUED | OUTPATIENT
Start: 2023-03-22 | End: 2023-03-23

## 2023-03-22 RX ORDER — MAGNESIUM SULFATE HEPTAHYDRATE 40 MG/ML
4 INJECTION, SOLUTION INTRAVENOUS AS NEEDED
Status: DISCONTINUED | OUTPATIENT
Start: 2023-03-22 | End: 2023-03-29

## 2023-03-22 RX ORDER — SODIUM CHLORIDE, SODIUM LACTATE, POTASSIUM CHLORIDE, CALCIUM CHLORIDE 600; 310; 30; 20 MG/100ML; MG/100ML; MG/100ML; MG/100ML
INJECTION, SOLUTION INTRAVENOUS CONTINUOUS PRN
Status: DISCONTINUED | OUTPATIENT
Start: 2023-03-22 | End: 2023-03-22 | Stop reason: SURG

## 2023-03-22 RX ORDER — PANTOPRAZOLE SODIUM 40 MG/10ML
40 INJECTION, POWDER, LYOPHILIZED, FOR SOLUTION INTRAVENOUS EVERY 12 HOURS SCHEDULED
Status: DISCONTINUED | OUTPATIENT
Start: 2023-03-22 | End: 2023-03-30 | Stop reason: ALTCHOICE

## 2023-03-22 RX ADMIN — DEXAMETHASONE SODIUM PHOSPHATE 8 MG: 4 INJECTION, SOLUTION INTRA-ARTICULAR; INTRALESIONAL; INTRAMUSCULAR; INTRAVENOUS; SOFT TISSUE at 16:05

## 2023-03-22 RX ADMIN — ONDANSETRON 4 MG: 2 INJECTION INTRAMUSCULAR; INTRAVENOUS at 17:18

## 2023-03-22 RX ADMIN — POTASSIUM CHLORIDE 10 MEQ: 7.46 INJECTION, SOLUTION INTRAVENOUS at 23:42

## 2023-03-22 RX ADMIN — POTASSIUM CHLORIDE 10 MEQ: 7.46 INJECTION, SOLUTION INTRAVENOUS at 22:34

## 2023-03-22 RX ADMIN — Medication 10 ML: at 22:34

## 2023-03-22 RX ADMIN — MORPHINE SULFATE 2 MG: 2 INJECTION, SOLUTION INTRAMUSCULAR; INTRAVENOUS at 18:18

## 2023-03-22 RX ADMIN — Medication 3 ML: at 21:00

## 2023-03-22 RX ADMIN — Medication 10 MG: at 17:57

## 2023-03-22 RX ADMIN — SODIUM CHLORIDE 500 ML: 9 INJECTION, SOLUTION INTRAVENOUS at 11:44

## 2023-03-22 RX ADMIN — SODIUM CHLORIDE, POTASSIUM CHLORIDE, SODIUM LACTATE AND CALCIUM CHLORIDE: 600; 310; 30; 20 INJECTION, SOLUTION INTRAVENOUS at 15:48

## 2023-03-22 RX ADMIN — METRONIDAZOLE 500 MG: 500 INJECTION, SOLUTION INTRAVENOUS at 23:42

## 2023-03-22 RX ADMIN — Medication 10 ML: at 22:35

## 2023-03-22 RX ADMIN — SUCCINYLCHOLINE CHLORIDE 100 MG: 20 INJECTION, SOLUTION INTRAMUSCULAR; INTRAVENOUS at 15:55

## 2023-03-22 RX ADMIN — HYDROMORPHONE HYDROCHLORIDE 0.5 MG: 1 INJECTION, SOLUTION INTRAMUSCULAR; INTRAVENOUS; SUBCUTANEOUS at 13:30

## 2023-03-22 RX ADMIN — NEOSTIGMINE METHYLSULFATE 2.5 MG: 0.5 INJECTION INTRAVENOUS at 17:46

## 2023-03-22 RX ADMIN — PANTOPRAZOLE SODIUM 40 MG: 40 INJECTION, POWDER, FOR SOLUTION INTRAVENOUS at 17:21

## 2023-03-22 RX ADMIN — CEFEPIME 2 G: 2 INJECTION, POWDER, FOR SOLUTION INTRAVENOUS at 21:37

## 2023-03-22 RX ADMIN — GLYCOPYRROLATE 0.4 MG: 0.4 INJECTION INTRAMUSCULAR; INTRAVENOUS at 17:46

## 2023-03-22 RX ADMIN — SODIUM CHLORIDE, POTASSIUM CHLORIDE, SODIUM LACTATE AND CALCIUM CHLORIDE: 600; 310; 30; 20 INJECTION, SOLUTION INTRAVENOUS at 17:46

## 2023-03-22 RX ADMIN — PANTOPRAZOLE SODIUM 40 MG: 40 INJECTION, POWDER, FOR SOLUTION INTRAVENOUS at 21:38

## 2023-03-22 RX ADMIN — VANCOMYCIN HYDROCHLORIDE 1250 MG: 5 INJECTION, POWDER, LYOPHILIZED, FOR SOLUTION INTRAVENOUS at 13:30

## 2023-03-22 RX ADMIN — IOPAMIDOL 90 ML: 612 INJECTION, SOLUTION INTRAVENOUS at 13:13

## 2023-03-22 RX ADMIN — SODIUM CHLORIDE 500 ML: 9 INJECTION, SOLUTION INTRAVENOUS at 12:27

## 2023-03-22 RX ADMIN — ONDANSETRON 4 MG: 2 INJECTION INTRAMUSCULAR; INTRAVENOUS at 11:44

## 2023-03-22 RX ADMIN — PIPERACILLIN SODIUM AND TAZOBACTAM SODIUM 3.38 G: 3; .375 INJECTION, POWDER, LYOPHILIZED, FOR SOLUTION INTRAVENOUS at 12:27

## 2023-03-22 RX ADMIN — FAMOTIDINE 20 MG: 10 INJECTION, SOLUTION INTRAVENOUS at 17:18

## 2023-03-22 RX ADMIN — PROPOFOL 100 MG: 10 INJECTION, EMULSION INTRAVENOUS at 15:55

## 2023-03-22 RX ADMIN — ROPIVACAINE HYDROCHLORIDE 178 MG: 5 INJECTION, SOLUTION EPIDURAL; INFILTRATION; PERINEURAL at 16:05

## 2023-03-22 RX ADMIN — MORPHINE SULFATE 4 MG: 4 INJECTION, SOLUTION INTRAMUSCULAR; INTRAVENOUS at 11:44

## 2023-03-22 NOTE — PROGRESS NOTES
Patient initiated on vancomycin for sepsis and intra-abdominal infection. Based on patient's age, weight and SCr, will start vancomycin at 1 gm daily after the 1250 mg dose already given to target an AUC of 400-600. Will check a trough level at steady state and follow.    Thank you,    Anna Alexander, PharmD  3/22/2023  15:54 EDT

## 2023-03-22 NOTE — H&P
[facility] General Surgery  History & Physical    Patient Identification:  Name:  Christine Garg  Age:  89 y.o.  Sex:  female  :  1933  MRN:  [id number]   Visit Number:  [id number]  Admit Date: 3/22/2023   Primary Care Physician:  Dave Tobar MD    Subjective     Chief complaint abdominal pain    Subjective     Patient is a 89 y.o. female who presents to the emergency department today with complaints of severe abdominal pain.  Patient and signs report that she is having had abdominal pain times the past 4 to 5 days.  States that it has worsened progressively every day and was at its worst today.  They report that patient has been spitting up phlegm and had nausea.  Denies any abdominal surgeries in the past.  Does report that she takes a daily 324 mg aspirin.  Patient reports that her pain is worse on her right side and that she hurts everywhere.  States that it hurts to move.  Patient last reports that she ate at approximately 6 AM and last had a sip of water around 9-10.  However she has vomited since.        ---------------------------------------------------------------------------------------------------------------------   Review of Systems  Review of Systems - General ROS: Positive for 30 pound weight loss  Psychological ROS: negative for - behavioral disorder  Ophthalmic ROS: negative for - dry eyes  ENT ROS: negative for - vertigo or vocal changes  Hematological and Lymphatic ROS: negative for - jaundice or swollen lymph nodes  Respiratory ROS: negative for - sputum changes or stridor  Cardiovascular ROS: negative for - irregular heartbeat or murmur  Gastrointestinal ROS: Positive for abdominal pain, nausea and vomiting.  Genitourinary ROS: negative for - hematuria or incontinence  Musculoskeletal ROS: negative for - gait disturbance      ---------------------------------------------------------------------------------------------------------------------   History  Past Medical  History:   Diagnosis Date   • Advanced age    • CAD (coronary artery disease) 2011    s/p 3v CABG   • Chronic kidney disease (CKD), stage III (moderate) ([facility])    • COPD (chronic obstructive pulmonary disease) ([facility])    • History of tobacco use    • Hyperlipidemia    • Hypertension    • Hypothyroidism    • Pulmonary nodule      Past Surgical History:   Procedure Laterality Date   • BREAST BIOPSY Left     benign   • CATARACT EXTRACTION     • CORONARY ANGIOPLASTY     • CORONARY ARTERY BYPASS GRAFT       Family History   Problem Relation Age of Onset   • Heart disease Mother    • Heart disease Father    • Heart disease Brother    • Breast cancer Neg Hx      Social History     Tobacco Use   • Smoking status: Former     Types: Cigarettes   • Smokeless tobacco: Never   Substance Use Topics   • Alcohol use: No   • Drug use: No     (Not in a hospital admission)    Allergies:  Motrin [ibuprofen] and Novocain [procaine]    Objective     Objective     Vital Signs:  Temp:  [96.8 °F (36 °C)] 96.8 °F (36 °C)  Heart Rate:  [] 98  Resp:  [18-20] 18  BP: (138-183)/(54-91) 183/83      03/22/23  1129   Weight: 56.7 kg (125 lb)     Body mass index is 20.8 kg/m².  ---------------------------------------------------------------------------------------------------------------------       Physical Exam  Constitutional:       Appearance: Normal appearance.   HENT:      Head: Normocephalic and atraumatic.      Right Ear: External ear normal.      Left Ear: External ear normal.   Eyes:      Conjunctiva/sclera: Conjunctivae normal.      Pupils: Pupils are equal, round, and reactive to light.   Cardiovascular:      Rate and Rhythm: Normal rate and regular rhythm.      Pulses: Normal pulses.      Heart sounds: Normal heart sounds.   Pulmonary:      Effort: Pulmonary effort is normal.      Breath sounds: Normal breath sounds.   Abdominal:      General: Abdomen is flat. Bowel sounds are normal.      Palpations: Abdomen is soft.       Tenderness: There is abdominal tenderness (Generalized abdominal tenderness, severe pain upon palpation of right lower quadrant).   Musculoskeletal:         General: Normal range of motion.      Cervical back: Normal range of motion and neck supple.   Skin:     General: Skin is warm and dry.      Capillary Refill: Capillary refill takes less than 2 seconds.   Neurological:      General: No focal deficit present.      Mental Status: She is alert and oriented to person, place, and time.   Psychiatric:         Mood and Affect: Mood normal.         Behavior: Behavior normal.       ---------------------------------------------------------------------------------------------------------------------   Results Review:   Results from last 7 days   Lab Units 03/22/23  1336 03/22/23  1139   HSTROP T ng/L 29* 25*     Results from last 7 days   Lab Units 03/22/23  1139   CRP mg/dL 3.44*   LACTATE mmol/L 3.5*   WBC 10*3/mm3 30.99*   HEMOGLOBIN g/dL 15.9   HEMATOCRIT % 46.3   PLATELETS 10*3/mm3 468*     Results from last 7 days   Lab Units 03/22/23  1145   PH, ARTERIAL pH units 7.424   PO2 ART mm Hg 80.1*   PCO2, ARTERIAL mm Hg 30.6*   HCO3 ART mmol/L 20.0     Results from last 7 days   Lab Units 03/22/23  1139   SODIUM mmol/L 128*   POTASSIUM mmol/L 3.3*   CHLORIDE mmol/L 87*   CO2 mmol/L 21.3*   BUN mg/dL 23   CREATININE mg/dL 0.85   CALCIUM mg/dL 10.0   GLUCOSE mg/dL 139*   ALBUMIN g/dL 3.9   BILIRUBIN mg/dL 0.6   ALK PHOS U/L 88   AST (SGOT) U/L 17   ALT (SGPT) U/L 29   Estimated Creatinine Clearance: 40.2 mL/min (by C-G formula based on SCr of 0.85 mg/dL).  No results found for: AMMONIA      No results found for: BLOODCX  No results found for: URINECX  No results found for: WOUNDCX  No results found for: STOOLCX  ---------------------------------------------------------------------------------------------------------------------  Imaging:  Imaging Results (Last 24 Hours)     Procedure Component Value Units Date/Time    CT  Abdomen Pelvis With Contrast [[id number]] Collected: 03/22/23 1323     Updated: 03/22/23 1328    Narrative:      EXAM:    CT Abdomen and Pelvis With Intravenous Contrast     EXAM DATE:    3/22/2023 12:52 PM     CLINICAL HISTORY:    Right flank and RLQ abdominal pain; leukocytosis; recent UTI     TECHNIQUE:    Axial computed tomography images of the abdomen and pelvis with  intravenous contrast.  Sagittal and coronal reformatted images were  created and reviewed.  This CT exam was performed using one or more of  the following dose reduction techniques:  automated exposure control,  adjustment of the mA and/or kV according to patient size, and/or use of  iterative reconstruction technique.     COMPARISON:    No relevant prior studies available.     FINDINGS:    LUNG BASES:  Unremarkable.  No mass.  No consolidation.    MEDIASTINUM:  Small hiatal hernia.      ABDOMEN:    LIVER:  Unremarkable.  No mass.    GALLBLADDER AND BILE DUCTS:  Mild gallbladder distention.  No  calcified stones.  No ductal dilation.    PANCREAS:  Unremarkable.  No mass.  No ductal dilation.    SPLEEN:  Unremarkable.  No splenomegaly.    ADRENALS:  Unremarkable.  No mass.    KIDNEYS AND URETERS:  Unremarkable.  No solid mass.  No  hydronephrosis.    STOMACH AND BOWEL:  Abundant colonic stool.  No obstruction.  No  mucosal thickening.      PELVIS:    APPENDIX:  The appendix is not well visualized.    BLADDER:  Unremarkable.  No mass.    REPRODUCTIVE:  Unremarkable as visualized.      ABDOMEN and PELVIS:    INTRAPERITONEAL SPACE:  Question gastric antral wall thickening with  some associated adjacent stranding and punctate foci of free air  concerning for potentially perforated ulcer.  Tiny ascites around the  liver.    BONES/JOINTS:  Degenerative disc disease throughout the lumbar spine.   Degenerative facet arthropathy throughout the lumbar spine, most  prominent in the lower lumbar spine.  No acute fracture.  No  dislocation.    SOFT TISSUES:   Unremarkable.    VASCULATURE:  Atherosclerotic disease.  No abdominal aortic aneurysm.    LYMPH NODES:  Unremarkable.  No enlarged lymph nodes.       Impression:      1.  Question gastric antral wall thickening with some associated  adjacent stranding and punctate foci of free air concerning for  potentially perforated ulcer.  2.  Tiny ascites around the liver.  3.  Mild gallbladder distention.  4.  The appendix is not well visualized.  5.  Abundant colonic stool.  6.  Degenerative changes lumbar spine as described.     This report was finalized on 3/22/2023 1:26 PM by Dr. Henry Lozano MD.       XR Chest 1 View [[id number]] Collected: 03/22/23 1243     Updated: 03/22/23 1254    Narrative:      EXAM:    XR Chest, 1 View     EXAM DATE:    3/22/2023 12:09 PM     CLINICAL HISTORY:    chest pain     TECHNIQUE:    Frontal view of the chest.     COMPARISON:    02/07/2023     FINDINGS:    LUNGS:  Coarsened interstitial markings noted throughout the lungs.    PLEURAL SPACE:  Unremarkable.  No pneumothorax.    HEART:  Coronary artery bypass graft (CABG).  Heart size is stable.    MEDIASTINUM:  Unremarkable.    BONES/JOINTS:  Median sternotomy.       Impression:        No acute cardiopulmonary findings identified.     This report was finalized on 3/22/2023 12:43 PM by Dr. Henry Lozano MD.             I have personally reviewed the above radiology images and read the final radiology report on 03/22/23  ---------------------------------------------------------------------------------------------------------------------  Assessment / Plan     Impression: 89-year-old female with possible perforated ulcer and free air in abdomen  Patient Active Problem List   Diagnosis Code   • Essential hypertension I10   • Dyslipidemia E78.5   • ASCVD (arteriosclerotic cardiovascular disease) I25.10   • Palpitations R00.2   • Coronary artery disease  I25.10   • Abnormal PFTs (pulmonary function tests) R94.2   • Chronic obstructive pulmonary  disease (HCC) J44.9   • Mild persistent asthma with allergic rhinitis J45.30   • Pulmonary nodule R91.1   • Former smoker Z87.891   • Colitis K52.9     Impression patient has an acute surgical abdomen.  The etiology is not clear from imaging and there is only a very small amount of free air.    Plan:  Patient will go to the operating room for an exploratory laparotomy       Discussion:  Recommendations discussed with patient and signs.  This could certainly be a perforated ulcer.  Could also be a perforated cancer, ischemic right colon with perforated sigmoid diverticulitis being less likely.  It is also possible that the perforation may have already sealed and may not be identifiable at the time of surgery.        Yonatan Alonzo, APRN  03/22/23  14:43 EDT  ---------------------------------------------------------------------------------------------------------------------     Please note that portions of this note were completed with a voice recognition program.

## 2023-03-22 NOTE — ANESTHESIA PROCEDURE NOTES
Airway  Urgency: elective    Date/Time: 3/22/2023 3:55 PM  Airway not difficult    General Information and Staff    Patient location during procedure: OR  CRNA/CAA: Jane Irby CRNA    Indications and Patient Condition  Indications for airway management: airway protection    Preoxygenated: yes  MILS maintained throughout  Mask difficulty assessment: 0 - not attempted    Final Airway Details  Final airway type: endotracheal airway      Successful airway: ETT  Cuffed: yes   Successful intubation technique: direct laryngoscopy and RSI  Facilitating devices/methods: cricoid pressure  Endotracheal tube insertion site: oral  Blade: Tucker  Blade size: 3  ETT size (mm): 7.5  Cormack-Lehane Classification: grade I - full view of glottis  Placement verified by: chest auscultation   Measured from: lips  ETT/EBT  to lips (cm): 20  Number of attempts at approach: 1  Assessment: lips, teeth, and gum same as pre-op and atraumatic intubation

## 2023-03-22 NOTE — OP NOTE
Central line insertion jugular    Surgeon:  Aurora Kirkland M.D., RAMÓN Garg  3/22/2023    Pre and Post Procedure Diagnosis: acute abdomen    Anesthesia: 1% lidocaine    Procedure:  After obtaining informed consent patient was placed in the Trendelenburg position.  Ultrasound was utilized to confirm the patency of the jugular vein.  With use of the Seldinger technique the right internal jugular vein was cannulated.  At first subclavian was attempted but was unsuccessful.  Guidewire was passed without resistance.  The tract was dilated and the triple-lumen catheter was passed to 15 cm and sutured in place. Good blood return was obtained from all 3 ports.  Dressing was placed.        CXR result: Pending    Blood administered:  none    Complications:  none      Aurora Kirkland MD     Date: 3/22/2023  Time: 18:03 EDT

## 2023-03-22 NOTE — ANESTHESIA PREPROCEDURE EVALUATION
Anesthesia Evaluation     Patient summary reviewed and Nursing notes reviewed   no history of anesthetic complications:  NPO Solid Status: > 8 hours  NPO Liquid Status: > 8 hours           Airway   Mallampati: II  TM distance: >3 FB  Dental - normal exam   (+) edentulous    Pulmonary     breath sounds clear to auscultation  (+) a smoker Former, COPD, asthma,  Cardiovascular     ECG reviewed  Rhythm: regular  Rate: normal    (+) hypertension, CAD (angioplasty in past), CABG (3 vessel when pt was 77 years old.) >6 Months, hyperlipidemia,       Neuro/Psych- negative ROS  GI/Hepatic/Renal/Endo    (+)  PUD,  renal disease, thyroid problem hypothyroidism    Musculoskeletal     (-) arthralgias  Abdominal     Abdomen: tender.   Substance History - negative use     OB/GYN negative ob/gyn ROS         Other - negative ROS                     Anesthesia Plan    ASA 4     general with block     intravenous induction     Anesthetic plan, risks, benefits, and alternatives have been provided, discussed and informed consent has been obtained with: patient.  Pre-procedure education provided  Use of blood products discussed with consented to blood products.       CODE STATUS:

## 2023-03-22 NOTE — OP NOTE
Exploratory laparotomy  Oversew of perforated duodenal ulcer with omental patch and Biopatch    Pre-op: Free air    Post-op: Perforated duodenal ulcer    Surgeon:  Aurora Kirkland M.D., F.A.C.S.    Assistant: Scott morris    Anesthesia:  General with block      Indications: Acute abdomen, free air       Procedure Details   After obtaining informed consent and receiving preoperative antibiotics and with venous compression boots in place, the patient was taken to the operating room and placed under anesthesia.  Barriga catheter was placed.  The abdomen was prepped and draped in a sterile fashion.  An incision was made just above the umbilicus and carried down to the fascia.  Fascia was incised and the peritoneal cavity was entered.  The moderate amount of enteric contents upon opening the abdomen.  Culture was done.  The midline incision was then extended superiorly.  The Bookwalter retractor was placed.  On inspection patient was noted to have a perforated duodenal ulcer.  Omentum was dissected off of the duodenum where it was fairly adherent to.  The area was indurated and the wall was extremely thick.  The gallbladder was also distended but there were no palpable stones.  Next maneuver was to create a omental patch the omentum was freed off the colon and the lesser sac was entered.  This created the omental patch which was then freed off of the duodenum.  Once this was accomplished the repair was done.  This was done in a 2 layer closure transversely.  Interrupted 3-0 Vicryl sutures were placed full-thickness.  All sutures were placed before securing.  Once the 3-0 Vicryl sutures were placed 3-0 silk Lembert sutures were placed as a second layer of closure.  A 2 x 4 cm Stravix Biopatch was then placed over the anastomosis.  On top of this the omental patch was placed and was secured with Vicryl sutures.  The abdomen was irrigated with saline.  A 10 flat SARAH was placed in the right upper quadrant just and position  just deep to the anastomosis.  The fascia was closed with interrupted 0 PDS sutures.  After irrigation of the subcu the skin was closed with staples.  Dressing was placed.  Patient tolerated the procedure well and was taken to the recovery room in stable condition.  Also of note is that the NG was positioned within the stomach prior to closure    Findings:  Perforated duodenal ulcer         Estimated Blood Loss:  Minimal    Blood Administered: none           Drains: 10 flat SARAH           Specimens:   ID Type Source Tests Collected by Time   1 (Not marked as sent) : peritoneal fluid Surgical Site Peritoneum BODY FLUID CULTURE (Canceled) Aurora Kirkland MD 3/22/2023 1639        Grafts and Implants: No       Complications:  none           Disposition: PACU - hemodynamically stable.           Condition: stable

## 2023-03-22 NOTE — ANESTHESIA POSTPROCEDURE EVALUATION
Patient: Christine Garg    Procedure Summary     Date: 03/22/23 Room / Location: [facility] OR 02 /  [facility] OR    Anesthesia Start: 1548 Anesthesia Stop: 1757    Procedure: LAPAROTOMY EXPLORATORY (Abdomen) Diagnosis:       Perforated ulcer (HCC)      (Perforated ulcer (HCC) [K27.5])    Surgeons: Aurora Kirkland MD Provider: Kameron Dean MD    Anesthesia Type: general with block ASA Status: 4          Anesthesia Type: general with block    Vitals  Vitals Value Taken Time   /72 03/22/23 1823   Temp 97.7 °F (36.5 °C) 03/22/23 1758   Pulse 69 03/22/23 1828   Resp 9 03/22/23 1828   SpO2 98 % 03/22/23 1828           Post Anesthesia Care and Evaluation    Patient location during evaluation: PACU  Patient participation: complete - patient participated  Level of consciousness: responsive to verbal stimuli  Pain score: 2  Pain management: adequate    Airway patency: patent  Anesthetic complications: No anesthetic complications  PONV Status: none  Cardiovascular status: hemodynamically stable  Respiratory status: nasal cannula  Hydration status: acceptable    Comments: Morphine 2 mg IV given.. No pain meds given during surgery.

## 2023-03-22 NOTE — ED PROVIDER NOTES
Subjective   History of Present Illness  This is an 89 year old female patient who presents to the ER with chief complaint of abdominal pain. Sons present with her. PMH significant for HTN, HLD, hypothyroidism, CHF, CAD status post CABG, CKD. Patient diagnosed with colitis, COVID-19 and a lung nodule at the beginning of the year. Scheduled for a biopsy of the lung nodule in 2 weeks. Patient was also started on macrobid 2 days ago per her PCP for an assumed UTI but her urine wasn't evaluated. 3 days ago, the she started having right flank and RLQ abdominal pain. Pain is moderate, sharp and was intermittent but has been constant since onset this morning. She has had nausea and vomiting. Denies diarrhea, constipation, current urinary symptoms, fever.         Review of Systems   Constitutional: Negative.  Negative for fever.   HENT: Negative.    Respiratory: Negative.    Cardiovascular: Negative.  Negative for chest pain.   Gastrointestinal: Positive for abdominal pain, nausea and vomiting. Negative for abdominal distention, anal bleeding, blood in stool, constipation, diarrhea and rectal pain.   Endocrine: Negative.    Genitourinary: Positive for flank pain. Negative for decreased urine volume, difficulty urinating, dyspareunia, dysuria, enuresis, frequency, genital sores, hematuria, menstrual problem, pelvic pain, urgency, vaginal bleeding, vaginal discharge and vaginal pain.   Skin: Negative.    Neurological: Negative.    Psychiatric/Behavioral: Negative.    All other systems reviewed and are negative.      Past Medical History:   Diagnosis Date   • Advanced age    • CAD (coronary artery disease) 2011    s/p 3v CABG   • Chronic kidney disease (CKD), stage III (moderate) ([facility])    • COPD (chronic obstructive pulmonary disease) ([facility])    • History of tobacco use    • Hyperlipidemia    • Hypertension    • Hypothyroidism    • Pulmonary nodule        Allergies   Allergen Reactions   • Motrin [Ibuprofen] Anaphylaxis and  Hives   • Novocain [Procaine] Other (See Comments)     Pt state she passes out.       Past Surgical History:   Procedure Laterality Date   • BREAST BIOPSY Left     benign   • CATARACT EXTRACTION     • CORONARY ANGIOPLASTY     • CORONARY ARTERY BYPASS GRAFT         Family History   Problem Relation Age of Onset   • Heart disease Mother    • Heart disease Father    • Heart disease Brother    • Breast cancer Neg Hx        Social History     Socioeconomic History   • Marital status:    Tobacco Use   • Smoking status: Former     Types: Cigarettes   • Smokeless tobacco: Never   Substance and Sexual Activity   • Alcohol use: No   • Drug use: No   • Sexual activity: Defer           Objective   Physical Exam  Vitals and nursing note reviewed.   Constitutional:       General: She is not in acute distress.     Appearance: She is well-developed. She is not diaphoretic.   HENT:      Head: Normocephalic and atraumatic.      Right Ear: External ear normal.      Left Ear: External ear normal.      Nose: Nose normal.   Eyes:      Conjunctiva/sclera: Conjunctivae normal.      Pupils: Pupils are equal, round, and reactive to light.   Neck:      Vascular: No JVD.      Trachea: No tracheal deviation.   Cardiovascular:      Rate and Rhythm: Normal rate and regular rhythm.      Heart sounds: Normal heart sounds. No murmur heard.  Pulmonary:      Effort: Pulmonary effort is normal. No respiratory distress.      Breath sounds: Normal breath sounds. No wheezing.   Abdominal:      General: Bowel sounds are normal.      Palpations: Abdomen is soft.      Tenderness: There is abdominal tenderness in the right lower quadrant. There is right CVA tenderness and rebound. There is no guarding.   Musculoskeletal:         General: No deformity. Normal range of motion.      Cervical back: Normal range of motion and neck supple.   Skin:     General: Skin is warm and dry.      Coloration: Skin is not pale.      Findings: No erythema or rash.    Neurological:      Mental Status: She is alert and oriented to person, place, and time.      Cranial Nerves: No cranial nerve deficit.   Psychiatric:         Behavior: Behavior normal.         Thought Content: Thought content normal.         Procedures        Results for orders placed or performed during the hospital encounter of 03/22/23   COVID-19 and FLU A/B PCR - Swab, Nasopharynx    Specimen: Nasopharynx; Swab   Result Value Ref Range    COVID19 Not Detected Not Detected - Ref. Range    Influenza A PCR Not Detected Not Detected    Influenza B PCR Not Detected Not Detected   Comprehensive Metabolic Panel    Specimen: Arm, Right; Blood   Result Value Ref Range    Glucose 139 (H) 65 - 99 mg/dL    BUN 23 8 - 23 mg/dL    Creatinine 0.85 0.57 - 1.00 mg/dL    Sodium 128 (L) 136 - 145 mmol/L    Potassium 3.3 (L) 3.5 - 5.2 mmol/L    Chloride 87 (L) 98 - 107 mmol/L    CO2 21.3 (L) 22.0 - 29.0 mmol/L    Calcium 10.0 8.6 - 10.5 mg/dL    Total Protein 7.0 6.0 - 8.5 g/dL    Albumin 3.9 3.5 - 5.2 g/dL    ALT (SGPT) 29 1 - 33 U/L    AST (SGOT) 17 1 - 32 U/L    Alkaline Phosphatase 88 39 - 117 U/L    Total Bilirubin 0.6 0.0 - 1.2 mg/dL    Globulin 3.1 gm/dL    A/G Ratio 1.3 g/dL    BUN/Creatinine Ratio 27.1 (H) 7.0 - 25.0    Anion Gap 19.7 (H) 5.0 - 15.0 mmol/L    eGFR 65.6 >60.0 mL/min/1.73   High Sensitivity Troponin T    Specimen: Arm, Right; Blood   Result Value Ref Range    HS Troponin T 25 (H) <10 ng/L   BNP    Specimen: Arm, Right; Blood   Result Value Ref Range    proBNP 963.1 0.0 - 1,800.0 pg/mL   Lipase    Specimen: Arm, Right; Blood   Result Value Ref Range    Lipase 84 (H) 13 - 60 U/L   C-reactive Protein    Specimen: Arm, Right; Blood   Result Value Ref Range    C-Reactive Protein 3.44 (H) 0.00 - 0.50 mg/dL   Lactic Acid, Plasma    Specimen: Arm, Right; Blood   Result Value Ref Range    Lactate 3.5 (C) 0.5 - 2.0 mmol/L   CBC Auto Differential    Specimen: Arm, Right; Blood   Result Value Ref Range    WBC 30.99  (C) 3.40 - 10.80 10*3/mm3    RBC 5.01 3.77 - 5.28 10*6/mm3    Hemoglobin 15.9 12.0 - 15.9 g/dL    Hematocrit 46.3 34.0 - 46.6 %    MCV 92.4 79.0 - 97.0 fL    MCH 31.7 26.6 - 33.0 pg    MCHC 34.3 31.5 - 35.7 g/dL    RDW 13.2 12.3 - 15.4 %    RDW-SD 44.7 37.0 - 54.0 fl    MPV 9.2 6.0 - 12.0 fL    Platelets 468 (H) 140 - 450 10*3/mm3    Neutrophil % 89.6 (H) 42.7 - 76.0 %    Lymphocyte % 3.6 (L) 19.6 - 45.3 %    Monocyte % 5.3 5.0 - 12.0 %    Eosinophil % 0.3 0.3 - 6.2 %    Basophil % 0.4 0.0 - 1.5 %    Immature Grans % 0.8 (H) 0.0 - 0.5 %    Neutrophils, Absolute 27.75 (H) 1.70 - 7.00 10*3/mm3    Lymphocytes, Absolute 1.13 0.70 - 3.10 10*3/mm3    Monocytes, Absolute 1.65 (H) 0.10 - 0.90 10*3/mm3    Eosinophils, Absolute 0.08 0.00 - 0.40 10*3/mm3    Basophils, Absolute 0.13 0.00 - 0.20 10*3/mm3    Immature Grans, Absolute 0.25 (H) 0.00 - 0.05 10*3/mm3    nRBC 0.0 0.0 - 0.2 /100 WBC   Ethanol    Specimen: Arm, Right; Blood   Result Value Ref Range    Ethanol <10 0 - 10 mg/dL    Ethanol % <0.010 %   Blood Gas, Arterial With Co-Ox    Specimen: Arterial Blood   Result Value Ref Range    Site Right Brachial     Leonidas's Test N/A     pH, Arterial 7.424 7.350 - 7.450 pH units    pCO2, Arterial 30.6 (L) 35.0 - 45.0 mm Hg    pO2, Arterial 80.1 (L) 83.0 - 108.0 mm Hg    HCO3, Arterial 20.0 20.0 - 26.0 mmol/L    Base Excess, Arterial -3.2 (L) 0.0 - 2.0 mmol/L    O2 Saturation, Arterial 96.3 94.0 - 99.0 %    Hemoglobin, Blood Gas 15.0 13.5 - 17.5 g/dL    Hematocrit, Blood Gas 45.9 38.0 - 51.0 %    Oxyhemoglobin 96.7 94 - 99 %    Methemoglobin <-0.10 (L) 0.00 - 3.00 %    Carboxyhemoglobin 1.0 0 - 5 %    A-a DO2 29.5 0.0 - 300.0 mmHg    CO2 Content 21.0 (L) 22 - 33 mmol/L    Barometric Pressure for Blood Gas 733 mmHg    Modality Room Air     FIO2 21 %    Ventilator Mode NA     Note      Collected by [id number]     pH, Temp Corrected      pCO2, Temperature Corrected      pO2, Temperature Corrected     ECG 12 Lead Dyspnea   Result Value  Ref Range    QT Interval 354 ms    QTC Interval 463 ms         ED Course  ED Course as of 03/22/23 1358   Wed Mar 22, 2023   1149 ECG 12 Lead Dyspnea  Sinus tachycardia with generalized nonspecific ST and T wave repolarization concerning for possibly acute ischemia.  No acute ST elevation  Ventricular rate 103  QRS 98 QTc 463  Electronically signed by Amy Hartley DO, 03/22/23, 11:49 AM EDT.   [LK]   1255 XR Chest 1 View  IMPRESSION:    No acute cardiopulmonary findings identified.     This report was finalized on 3/22/2023 12:43 PM by Dr. Henry Lozano MD [MM]   6334 Spoke with Dr. Kirkland who would like me to call hospitalist and check on unit bed with house supervisor. We do have a unit bed and hospitalist has been paged.  [MM]   1342 Spoke with Dr. Servin who has agreed to consult on the patient and will also get Dr. Aguilar involved due to the large lung mass found earlier in the year. Dr. Kirkland is agreeable to being the primary attending.  [MM]      ED Course User Index  [LK] Amy Hartley DO  [MM] Alejandrina Gonzalez PA                                           Medical Decision Making    This is an 89 year old female patient who presents to the ER with chief complaint of abdominal pain. Sons present with her. PMH significant for HTN, HLD, hypothyroidism, CHF, CAD status post CABG, CKD. Patient diagnosed with colitis, COVID-19 and a lung nodule at the beginning of the year. Scheduled for a biopsy of the lung nodule in 2 weeks. Patient was also started on macrobid 2 days ago per her PCP for an assumed UTI but her urine wasn't evaluated. 3 days ago, the she started having right flank and RLQ abdominal pain. Pain is moderate, sharp and was intermittent but has been constant since onset this morning. She has had nausea and vomiting. Denies diarrhea, constipation, current urinary symptoms, fever.         Acute gastric ulcer with perforation (HCC): acute illness or injury  Hypokalemia: acute illness or  injury  Hyponatremia: acute illness or injury  Sepsis without acute organ dysfunction, due to unspecified organism (HCC): acute illness or injury  Amount and/or Complexity of Data Reviewed  Labs: ordered. Decision-making details documented in ED Course.  Radiology: ordered. Decision-making details documented in ED Course.  ECG/medicine tests: ordered. Decision-making details documented in ED Course.      Risk  Prescription drug management.  Decision regarding hospitalization.      Critical Care  Total time providing critical care: 30 minutes      Final diagnoses:   Acute gastric ulcer with perforation (HCC)   Hyponatremia   Hypokalemia   Sepsis without acute organ dysfunction, due to unspecified organism (HCC)       ED Disposition  ED Disposition     ED Disposition   Decision to Admit    Condition   --    Comment   --             No follow-up provider specified.       Medication List      No changes were made to your prescriptions during this visit.          Alejandrina Gonzalez PA  03/22/23 4232

## 2023-03-22 NOTE — CONSULTS
[facility] HOSPITALIST CONSULT NOTE     Hospitalist (on-call MD unless specified)  Consult performed by: Kiara Servin MD  Consult ordered by: Alejandrina Gonzalez PA  Reason for consult: Medical management of chronic medical problems          Patient Identification:  Name:  Christine Garg  Age:  89 y.o.  Sex:  female  :  1933  MRN:  [id number]  Visit Number:  [id number]  Room number:  [facility]/  Primary care provider:  Dave Tobar MD    Chief Complaint:    Chief Complaint   Patient presents with   • Abdominal Pain       History of Presenting Illness:  Patient is an 88 yo female that presented to [facility] with severe abdominal pain.  She has a past medical history significant for essential hypertension, hyperlipidemia, hypothyroidism, CKD stage IIIa, CAD s/p 3V CABG, COPD, known left upper lobe pleural based pulmonary nodule that appears to be invading the T6 vertebra, and history of smoking tobacco use.  The history of presenting illness is very difficult to obtain from the patient due to the amount of pain that the patient was in.  At beside was 2 of her 3 sons; they tried to assist in providing the HPI but they were limited in what they could tell me.  The patient was doing well until 2022, at which time she developed COVID-19 and became weak to the point that she could no longer drive.  The patient states that she has never been diagnosed with a peptic ulcer before nor has she ever been diagnosed with H pylori.  She developed abdominal pain a few days ago; she describes it as starting in her back on the left and then extending to the right lower quadrant.  The pain feels like a hot poker going into her abdomen.  She denies any fevers but she has experienced chills and sweats.  In the ED, CT scan showed thickening of the antral part of the stomach with some punctate free air, all of which is concerning for a perforated ulcer.  Thus, the patient is being taken straight  to the OR by Dr. Kirkland and then she will be moved to the CCU post operatively.  We are being asked to help with management of medical issues.  ---------------------------------------------------------------------------------------------------------------------  Review of Systems   Unable to perform ROS: Acuity of condition   Constitutional: Positive for chills, diaphoresis and fatigue. Negative for fever.   Respiratory: Positive for shortness of breath. Negative for cough.    Gastrointestinal: Positive for abdominal pain, diarrhea, nausea and vomiting.   Psychiatric/Behavioral: Negative for agitation and behavioral problems.    ---------------------------------------------------------------------------------------------------------------------   Past History:  Family History   Problem Relation Age of Onset   • Heart disease Mother    • Heart disease Father    • Heart disease Brother    • Breast cancer Neg Hx      Past Medical History:   Diagnosis Date   • Advanced age    • CAD (coronary artery disease) 2011    s/p 3v CABG   • Chronic kidney disease (CKD), stage III (moderate) ([facility])    • COPD (chronic obstructive pulmonary disease) ([facility])    • History of tobacco use    • Hyperlipidemia    • Hypertension    • Hypothyroidism    • Pulmonary nodule      Past Surgical History:   Procedure Laterality Date   • BREAST BIOPSY Left     benign   • CATARACT EXTRACTION     • CORONARY ANGIOPLASTY     • CORONARY ARTERY BYPASS GRAFT       Social History     Socioeconomic History   • Marital status:    Tobacco Use   • Smoking status: Former     Types: Cigarettes   • Smokeless tobacco: Never   Substance and Sexual Activity   • Alcohol use: No   • Drug use: No   • Sexual activity: Defer     ---------------------------------------------------------------------------------------------------------------------   Allergies:  Motrin [ibuprofen] and Novocain  [procaine]  ---------------------------------------------------------------------------------------------------------------------  Medications below are reported home medications pulling from within the system; at this time, these medications have not been reconciled unless otherwise specified and are in the verification process for further verifcation as current home medications.    Prior to Admission Medications     Prescriptions Last Dose Informant Patient Reported? Taking?    acetaminophen (TYLENOL) 500 MG tablet  Self Yes No    Take 500 mg by mouth Daily As Needed for Mild Pain.    aspirin 325 MG tablet  Self Yes No    Take 325 mg by mouth Daily.    carvedilol (COREG) 25 MG tablet  Pharmacy No No    Take 1 tablet by mouth 2 (Two) Times a Day With Meals.    Cholecalciferol (VITAMIN D3) 2000 units capsule  Pharmacy Yes No    Take 2,000 Units by mouth Daily.    ciprofloxacin (CIPRO) 500 MG tablet  Pharmacy Yes No    Take 500 mg by mouth 2 (Two) Times a Day.    cloNIDine (CATAPRES) 0.1 MG tablet  Pharmacy Yes No    Take 0.1 mg by mouth 3 (Three) Times a Day As Needed for High Blood Pressure (SBP > 150).    coenzyme Q10 100 MG capsule  Self Yes No    Take 100 mg by mouth Daily As Needed. 2-3 times a week    docusate sodium (COLACE) 100 MG capsule  Self Yes No    Take 100 mg by mouth 3 (Three) Times a Day.    Evolocumab (Repatha SureClick) solution auto-injector SureClick injection  Pharmacy No No    Inject 1 mL under the skin into the appropriate area as directed Every 14 (Fourteen) Days.    furosemide (LASIX) 20 MG tablet  Pharmacy Yes No    Take 20 mg by mouth Daily As Needed (swelling).    HYDROcodone-acetaminophen (NORCO) 5-325 MG per tablet  Pharmacy Yes No    Take 1 tablet by mouth Daily As Needed.    ibuprofen (ADVIL,MOTRIN) 200 MG tablet  Self Yes No    Take 200 mg by mouth Daily As Needed for Mild Pain.    levothyroxine (SYNTHROID, LEVOTHROID) 112 MCG tablet  Pharmacy Yes No    Take 112 mcg by mouth Daily.     Lidocaine 4 % patch  Self Yes No    Apply 1 patch topically Daily.    metroNIDAZOLE (FLAGYL) 500 MG tablet  Pharmacy Yes No    Take 500 mg by mouth 2 (Two) Times a Day.    montelukast (SINGULAIR) 10 MG tablet  Pharmacy No No    Take 1 tablet by mouth Every Night.    Red Yeast Rice 600 MG capsule  Self Yes No    Take 600 mg by mouth Daily As Needed. 2 to 3 times a week    traMADol (ULTRAM) 50 MG tablet  Pharmacy Yes No    Take 50 mg by mouth Every 12 (Twelve) Hours As Needed for Moderate Pain or Severe Pain.    valsartan (DIOVAN) 320 MG tablet  Pharmacy Yes No    Take 320 mg by mouth Daily.    vitamin B-12 (CYANOCOBALAMIN) 1000 MCG tablet  Self Yes No    Take 1,000 mcg by mouth Daily.        ---------------------------------------------------------------------------------------------------------------------   Objective     Vital Signs:  Temp:  [96.8 °F (36 °C)] 96.8 °F (36 °C)  Heart Rate:  [] 98  Resp:  [18-20] 18  BP: (138-183)/(54-91) 183/83    Mean Arterial Pressure (Non-Invasive) for the past 24 hrs (Last 3 readings):   Noninvasive MAP (mmHg)   03/22/23 1359 116   03/22/23 1344 127   03/22/23 1300 115     SpO2:  [95 %-97 %] 97 %  on   ;   Device (Oxygen Therapy): room air  Body mass index is 20.8 kg/m².    Wt Readings from Last 1 Encounters:   03/22/23 1129 56.7 kg (125 lb)     Wt Readings from Last 3 Encounters:   03/22/23 56.7 kg (125 lb)   01/05/23 68 kg (150 lb)   12/25/22 73.9 kg (163 lb)           ---------------------------------------------------------------------------------------------------------------------   Physical Exam  Vitals and nursing note reviewed. Exam conducted with a chaperone present.   Constitutional:       General: She is awake.      Appearance: Normal appearance. She is well-developed.      Comments: On room air.   HENT:      Head: Normocephalic and atraumatic.      Right Ear: External ear normal.      Left Ear: External ear normal.   Eyes:      General: No scleral icterus.         Right eye: No discharge.         Left eye: No discharge.      Pupils: Pupils are equal, round, and reactive to light.   Cardiovascular:      Rate and Rhythm: Normal rate and regular rhythm.      Pulses: Normal pulses.      Heart sounds: No murmur heard.  Pulmonary:      Effort: Pulmonary effort is normal. No respiratory distress.      Breath sounds: No wheezing or rales.   Abdominal:      General: Bowel sounds are normal. There is no distension.      Tenderness: There is generalized abdominal tenderness. There is guarding and rebound.   Musculoskeletal:         General: No swelling, deformity or signs of injury.   Skin:     Capillary Refill: Capillary refill takes less than 2 seconds.      Coloration: Skin is not jaundiced or pale.      Findings: No bruising.   Neurological:      Mental Status: She is alert and oriented to person, place, and time. Mental status is at baseline.      Cranial Nerves: No cranial nerve deficit.   Psychiatric:         Mood and Affect: Mood normal.         Behavior: Behavior normal. Behavior is cooperative.         Thought Content: Thought content normal.         Judgment: Judgment normal.     ---------------------------------------------------------------------------------------------------------------------   EKG:  Sinus tachycardia with heart rate 103, QTc 463 ms.  There are inverted T waves in the inferior leads, ST elevation in the septal leads, and Q waves in the inferior and lateral leads.  When compared to EKGs dated 12/19/2022 and 3/8/2021, all 3 EKGs appear to be the same.  Please note that I have personally looked at the EKG from this admission, the comparison EKGs in the medical records, and the above is my interpretation of this admission's EKG; I await the final cardiology read.      Telemetry:  Sinus tachycardia with heart rates 's.  Please note that I personally looked at the telemetry strips.      Results for orders placed during the hospital encounter of  03/17/21    Adult Transthoracic Echo Complete W/ Cont if Necessary Per Protocol    Interpretation Summary  · Normal left ventricular cavity size and wall thickness noted. All left ventricular wall segments contract normally  · Left ventricular ejection fraction appears to be 61 - 65%.  · The aortic valve is not well visualized. No aortic valve regurgitation or stenosis is present.  · The mitral valve is structurally normal with no significant stenosis present. Mild mitral valve regurgitation is present.  · There is no evidence of pericardial effusion.    ---------------------------------------------------------------------------------------------------------------------   LABS:    CBC and coagulation:  Results from last 7 days   Lab Units 03/22/23  1435 03/22/23  1139   LACTATE mmol/L 2.2* 3.5*   CRP mg/dL  --  3.44*   WBC 10*3/mm3  --  30.99*   HEMOGLOBIN g/dL  --  15.9   HEMATOCRIT %  --  46.3   MCV fL  --  92.4   MCHC g/dL  --  34.3   PLATELETS 10*3/mm3  --  468*     Acid/base balance:  Results from last 7 days   Lab Units 03/22/23  1145   PH, ARTERIAL pH units 7.424   PO2 ART mm Hg 80.1*   PCO2, ARTERIAL mm Hg 30.6*   HCO3 ART mmol/L 20.0     Renal and electrolytes:  Results from last 7 days   Lab Units 03/22/23  1139   SODIUM mmol/L 128*   POTASSIUM mmol/L 3.3*   MAGNESIUM mg/dL 1.8   CHLORIDE mmol/L 87*   CO2 mmol/L 21.3*   BUN mg/dL 23   CREATININE mg/dL 0.85   CALCIUM mg/dL 10.0   GLUCOSE mg/dL 139*     Estimated Creatinine Clearance: 40.2 mL/min (by C-G formula based on SCr of 0.85 mg/dL).    Liver and pancreatic function:  Results from last 7 days   Lab Units 03/22/23  1139   ALBUMIN g/dL 3.9   BILIRUBIN mg/dL 0.6   ALK PHOS U/L 88   AST (SGOT) U/L 17   ALT (SGPT) U/L 29   LIPASE U/L 84*     Endocrine function:  Lab Results   Component Value Date    HGBA1C 6.10 (H) 03/22/2023     Lab Results   Component Value Date    TSH 1.120 12/19/2022    FREET4 2.07 (H) 12/19/2022     Cardiac:  Results from last 7 days    Lab Units 03/22/23  1336 03/22/23  1139   HSTROP T ng/L 29* 25*   PROBNP pg/mL  --  963.1       Cultures:  Lab Results   Component Value Date    COLORU Yellow 01/06/2023    CLARITYU Clear 01/06/2023    PHUR 6.0 01/06/2023    GLUCOSEU Negative 01/06/2023    KETONESU 15 mg/dL (1+) (A) 01/06/2023    BLOODU Negative 01/06/2023    NITRITEU Negative 01/06/2023    LEUKOCYTESUR Negative 01/06/2023    BILIRUBINUR Negative 01/06/2023    UROBILINOGEN 0.2 E.U./dL 01/06/2023    RBCUA 0-2 12/20/2022    WBCUA 6-12 (A) 12/20/2022    BACTERIA None Seen 12/20/2022     Microbiology Results (last 10 days)     Procedure Component Value - Date/Time    COVID-19 and FLU A/B PCR - Swab, Nasopharynx [[id number]]  (Normal) Collected: 03/22/23 1142    Lab Status: Final result Specimen: Swab from Nasopharynx Updated: 03/22/23 1230     COVID19 Not Detected     Influenza A PCR Not Detected     Influenza B PCR Not Detected    Narrative:      Fact sheet for providers: https://www.fda.gov/media/430154/download    Fact sheet for patients: https://www.fda.gov/media/125876/download    Test performed by PCR.        I have personally looked at the labs and they are summarized above.  ---------------------------------------------------------------------------------------------------------------------   Detailed radiology reports for the last 24 hours:    Imaging Results (Last 24 Hours)     Procedure Component Value Units Date/Time    CT Abdomen Pelvis With Contrast [[id number]] Collected: 03/22/23 1323     Updated: 03/22/23 1328    Narrative:      EXAM:    CT Abdomen and Pelvis With Intravenous Contrast     EXAM DATE:    3/22/2023 12:52 PM     CLINICAL HISTORY:    Right flank and RLQ abdominal pain; leukocytosis; recent UTI     TECHNIQUE:    Axial computed tomography images of the abdomen and pelvis with  intravenous contrast.  Sagittal and coronal reformatted images were  created and reviewed.  This CT exam was performed using one or more of  the  following dose reduction techniques:  automated exposure control,  adjustment of the mA and/or kV according to patient size, and/or use of  iterative reconstruction technique.     COMPARISON:    No relevant prior studies available.     FINDINGS:    LUNG BASES:  Unremarkable.  No mass.  No consolidation.    MEDIASTINUM:  Small hiatal hernia.      ABDOMEN:    LIVER:  Unremarkable.  No mass.    GALLBLADDER AND BILE DUCTS:  Mild gallbladder distention.  No  calcified stones.  No ductal dilation.    PANCREAS:  Unremarkable.  No mass.  No ductal dilation.    SPLEEN:  Unremarkable.  No splenomegaly.    ADRENALS:  Unremarkable.  No mass.    KIDNEYS AND URETERS:  Unremarkable.  No solid mass.  No  hydronephrosis.    STOMACH AND BOWEL:  Abundant colonic stool.  No obstruction.  No  mucosal thickening.      PELVIS:    APPENDIX:  The appendix is not well visualized.    BLADDER:  Unremarkable.  No mass.    REPRODUCTIVE:  Unremarkable as visualized.      ABDOMEN and PELVIS:    INTRAPERITONEAL SPACE:  Question gastric antral wall thickening with  some associated adjacent stranding and punctate foci of free air  concerning for potentially perforated ulcer.  Tiny ascites around the  liver.    BONES/JOINTS:  Degenerative disc disease throughout the lumbar spine.   Degenerative facet arthropathy throughout the lumbar spine, most  prominent in the lower lumbar spine.  No acute fracture.  No  dislocation.    SOFT TISSUES:  Unremarkable.    VASCULATURE:  Atherosclerotic disease.  No abdominal aortic aneurysm.    LYMPH NODES:  Unremarkable.  No enlarged lymph nodes.       Impression:      1.  Question gastric antral wall thickening with some associated  adjacent stranding and punctate foci of free air concerning for  potentially perforated ulcer.  2.  Tiny ascites around the liver.  3.  Mild gallbladder distention.  4.  The appendix is not well visualized.  5.  Abundant colonic stool.  6.  Degenerative changes lumbar spine as described.      This report was finalized on 3/22/2023 1:26 PM by Dr. Henry Lozano MD.       XR Chest 1 View [[id number]] Collected: 03/22/23 1243     Updated: 03/22/23 1254    Narrative:      EXAM:    XR Chest, 1 View     EXAM DATE:    3/22/2023 12:09 PM     CLINICAL HISTORY:    chest pain     TECHNIQUE:    Frontal view of the chest.     COMPARISON:    02/07/2023     FINDINGS:    LUNGS:  Coarsened interstitial markings noted throughout the lungs.    PLEURAL SPACE:  Unremarkable.  No pneumothorax.    HEART:  Coronary artery bypass graft (CABG).  Heart size is stable.    MEDIASTINUM:  Unremarkable.    BONES/JOINTS:  Median sternotomy.       Impression:        No acute cardiopulmonary findings identified.     This report was finalized on 3/22/2023 12:43 PM by Dr. Henry Lozano MD.           Final impressions for the last 30 days of radiology reports:    CT Chest Without Contrast Diagnostic  Result Date: 3/10/2023  1. Aggressive appearing pleural-based soft tissue mass in the left apex posteromedially appears to be invading the adjacent vertebral body. 2. Small nodule in the left apex and small nodular density in the right apex.   This report was finalized on 3/10/2023 4:51 PM by Dr. Danny Nguyen MD.      CT Thoracic Spine Without Contrast  Result Date: 3/10/2023    Pleural-based left upper lobe soft tissue mass at the level of T5 and T6 most likely impinging upon the T5 nerve root on the left and slightly eroding the T6 vertebral body.  This report was finalized on 3/10/2023 4:51 PM by Dr. Danny Nguyen MD.      NM Bone Scan Whole Body  Result Date: 2/20/2023  1.  No scintigraphic evidence of acute osteoblastic process. 2.  Degenerative type uptake of the axial and appendicular skeleton. 3.  Specifically, no intense tracer activity involving the thoracic spine to suggest acute compression fracture.  This report was finalized on 2/20/2023 3:02 PM by Dr. Tobias Stoll MD.        I have personally looked at the radiology images and  read the final radiology report.  ----------------------------------------------------------------------------------------------------------------------  Assessment & Plan      -Severe sepsis that was present on admission (lactic acid 3.5, CRP 3.44, WBC 30,990, heart rates ) due to a suspected perforated antral gastric ulcer  -Acute hypokalemia and acute hypomagnesemia  -History of coronary artery disease s/p 3v CABG in 2011  -History of chronic kidney disease stage IIIa with baseline Cr 0.6-0.85  -History of essential hypertension  -History of hypothyroidism, currently controlled  -History of hyperlipidemia  -History of left upper lobe pleural based pulmonary nodule that appears to be invading the T6 vertebra  -Advanced age  -Prior tobacco smoking history    Will start empiric cefepime, flagyl, and vancomycin per our sepsis protocol for now.  Will hold all antihypertensive medications as the patient is at risk of hypotension due to the severe sepsis.  The goal MAP is 65 mmHg or above.  Will start Levophed if the blood pressures drop below goal.  I have written for potassium and magnesium replacement.  We will avoid QT prolonging agents and continue to monitor the electrolytes closely. In order to lessen the risk of worsening QTc, the goal potassium level is 4-4.5 and goal magnesium level is 2-2.2.  Will repeat labs in the am so that the inflammatory markers, Cr, and electrolytes can be trended.     Thank you for the consult.      Kiara Servin MD  [facility] Hospitalist  03/22/23  15:04 EDT

## 2023-03-22 NOTE — ANESTHESIA PROCEDURE NOTES
"Peripheral Block      Patient reassessed immediately prior to procedure    Patient location during procedure: OR  Start time: 3/22/2023 4:05 PM  Stop time: 3/22/2023 4:08 PM  Reason for block: at surgeon's request and post-op pain management  Performed by  CRNA/CAA: Jane Irby CRNA  Preanesthetic Checklist  Completed: patient identified, IV checked, site marked, risks and benefits discussed, surgical consent, monitors and equipment checked, pre-op evaluation and timeout performed  Prep:  Pt Position: supine  Sterile barriers:cap, gloves, sterile barriers and mask  Prep: ChloraPrep  Patient monitoring: blood pressure monitoring, continuous pulse oximetry and EKG  Procedure    Nursing cardiac assessment comments yes: Sedation, GA, Spinal,Epidural   Performed under: general  Guidance:ultrasound guided    ULTRASOUND INTERPRETATION.  Using ultrasound guidance a 20 G (20g 4\" Stimuplex) gauge needle was placed in close proximity to the nerve, at which point, under ultrasound guidance anesthetic was injected in the area of the nerve and spread of the anesthesia was seen on ultrasound in close proximity thereto.  There were no abnormalities seen on ultrasound; a digital image was taken; and the patient tolerated the procedure with no complications. Images:still images obtained, printed/placed on chart    Laterality:Bilateral  Block Type:TAP  Injection Technique:single-shot  Needle Type:short-bevel  Needle Gauge:20 G  Resistance on Injection: none    Medications Used: ropivacaine (NAROPIN) injection 0.5 % - Peripheral Nerve   178 mg - 3/22/2023 4:05:00 PM  dexamethasone (DECADRON) injection - Injection   8 mg - 3/22/2023 4:05:00 PM      Medications  Preservative Free Saline:20ml  Comment:Block Injection:  Total volume divided equally between Right and Left block        Post Assessment  Injection Assessment: negative aspiration for heme, incremental injection and no paresthesia on injection  Patient Tolerance:comfortable " throughout block  Complications:no  Additional Notes  The pt was in the supine position under general anesthesia.    Under Ultrasound guidance, a BBraun 4inch 360 degree needle was advanced with Normal Saline hydro dissection of tissue.  The Internal Oblique and Transversus Abdominus muscles where visualized.  At or before the aponeurosis of Internal Oblique, local anesthetic spread was visualized in the Transversus Abdominus Plane. Injection was made incrementally with aspiration every 5 mls.  There was no  intravascular injection,  injection pressure was normal, there was no neural injection, and the procedure was completed without difficulty. The same procedure was completed for left and right sided tap blocks. Thank You.

## 2023-03-23 LAB
ANION GAP SERPL CALCULATED.3IONS-SCNC: 7.9 MMOL/L (ref 5–15)
BASOPHILS # BLD AUTO: 0.03 10*3/MM3 (ref 0–0.2)
BASOPHILS NFR BLD AUTO: 0.2 % (ref 0–1.5)
BUN SERPL-MCNC: 22 MG/DL (ref 8–23)
BUN/CREAT SERPL: 31.4 (ref 7–25)
CALCIUM SPEC-SCNC: 8.5 MG/DL (ref 8.6–10.5)
CHLORIDE SERPL-SCNC: 98 MMOL/L (ref 98–107)
CO2 SERPL-SCNC: 23.1 MMOL/L (ref 22–29)
CREAT SERPL-MCNC: 0.7 MG/DL (ref 0.57–1)
D-LACTATE SERPL-SCNC: 1.8 MMOL/L (ref 0.5–2)
DEPRECATED RDW RBC AUTO: 46.8 FL (ref 37–54)
EGFRCR SERPLBLD CKD-EPI 2021: 82.8 ML/MIN/1.73
EOSINOPHIL # BLD AUTO: 0 10*3/MM3 (ref 0–0.4)
EOSINOPHIL NFR BLD AUTO: 0 % (ref 0.3–6.2)
ERYTHROCYTE [DISTWIDTH] IN BLOOD BY AUTOMATED COUNT: 13.7 % (ref 12.3–15.4)
GLUCOSE SERPL-MCNC: 139 MG/DL (ref 65–99)
HCT VFR BLD AUTO: 36.2 % (ref 34–46.6)
HGB BLD-MCNC: 12.1 G/DL (ref 12–15.9)
IMM GRANULOCYTES # BLD AUTO: 0.1 10*3/MM3 (ref 0–0.05)
IMM GRANULOCYTES NFR BLD AUTO: 0.6 % (ref 0–0.5)
LYMPHOCYTES # BLD AUTO: 0.5 10*3/MM3 (ref 0.7–3.1)
LYMPHOCYTES NFR BLD AUTO: 2.9 % (ref 19.6–45.3)
MAGNESIUM SERPL-MCNC: 1.7 MG/DL (ref 1.6–2.4)
MCH RBC QN AUTO: 31.1 PG (ref 26.6–33)
MCHC RBC AUTO-ENTMCNC: 33.4 G/DL (ref 31.5–35.7)
MCV RBC AUTO: 93.1 FL (ref 79–97)
MONOCYTES # BLD AUTO: 0.54 10*3/MM3 (ref 0.1–0.9)
MONOCYTES NFR BLD AUTO: 3.1 % (ref 5–12)
NEUTROPHILS NFR BLD AUTO: 16.18 10*3/MM3 (ref 1.7–7)
NEUTROPHILS NFR BLD AUTO: 93.2 % (ref 42.7–76)
NRBC BLD AUTO-RTO: 0 /100 WBC (ref 0–0.2)
PHOSPHATE SERPL-MCNC: 3.1 MG/DL (ref 2.5–4.5)
PLATELET # BLD AUTO: 365 10*3/MM3 (ref 140–450)
PMV BLD AUTO: 9.3 FL (ref 6–12)
POTASSIUM SERPL-SCNC: 4.8 MMOL/L (ref 3.5–5.2)
RBC # BLD AUTO: 3.89 10*6/MM3 (ref 3.77–5.28)
SODIUM SERPL-SCNC: 129 MMOL/L (ref 136–145)
WBC NRBC COR # BLD: 17.35 10*3/MM3 (ref 3.4–10.8)

## 2023-03-23 PROCEDURE — 0 POTASSIUM CHLORIDE 10 MEQ/100ML SOLUTION: Performed by: SURGERY

## 2023-03-23 PROCEDURE — 83605 ASSAY OF LACTIC ACID: CPT | Performed by: PHYSICIAN ASSISTANT

## 2023-03-23 PROCEDURE — 25010000002 HYDRALAZINE PER 20 MG: Performed by: INTERNAL MEDICINE

## 2023-03-23 PROCEDURE — 80048 BASIC METABOLIC PNL TOTAL CA: CPT | Performed by: SURGERY

## 2023-03-23 PROCEDURE — 84100 ASSAY OF PHOSPHORUS: CPT | Performed by: SURGERY

## 2023-03-23 PROCEDURE — 25010000002 POTASSIUM CHLORIDE PER 2 MEQ OF POTASSIUM: Performed by: SURGERY

## 2023-03-23 PROCEDURE — 0 MAGNESIUM SULFATE 4 GM/100ML SOLUTION: Performed by: SURGERY

## 2023-03-23 PROCEDURE — 99024 POSTOP FOLLOW-UP VISIT: CPT | Performed by: SURGERY

## 2023-03-23 PROCEDURE — 25010000002 CEFEPIME PER 500 MG: Performed by: SURGERY

## 2023-03-23 PROCEDURE — 25010000002 MORPHINE PER 10 MG: Performed by: SURGERY

## 2023-03-23 PROCEDURE — 85025 COMPLETE CBC W/AUTO DIFF WBC: CPT | Performed by: SURGERY

## 2023-03-23 PROCEDURE — 25010000002 PIPERACILLIN SOD-TAZOBACTAM PER 1 G: Performed by: SURGERY

## 2023-03-23 PROCEDURE — 25010000002 ONDANSETRON PER 1 MG: Performed by: SURGERY

## 2023-03-23 PROCEDURE — 83735 ASSAY OF MAGNESIUM: CPT | Performed by: INTERNAL MEDICINE

## 2023-03-23 PROCEDURE — 25010000002 HEPARIN (PORCINE) PER 1000 UNITS: Performed by: SURGERY

## 2023-03-23 PROCEDURE — 99232 SBSQ HOSP IP/OBS MODERATE 35: CPT | Performed by: INTERNAL MEDICINE

## 2023-03-23 PROCEDURE — 94799 UNLISTED PULMONARY SVC/PX: CPT

## 2023-03-23 RX ORDER — DEXTROSE, SODIUM CHLORIDE, AND POTASSIUM CHLORIDE 5; .2; .15 G/100ML; G/100ML; G/100ML
100 INJECTION INTRAVENOUS CONTINUOUS
Status: DISCONTINUED | OUTPATIENT
Start: 2023-03-23 | End: 2023-03-23

## 2023-03-23 RX ORDER — DEXTROSE MONOHYDRATE AND SODIUM CHLORIDE 5; .9 G/100ML; G/100ML
50 INJECTION, SOLUTION INTRAVENOUS CONTINUOUS
Status: DISCONTINUED | OUTPATIENT
Start: 2023-03-24 | End: 2023-03-27

## 2023-03-23 RX ORDER — LEVOTHYROXINE SODIUM ANHYDROUS 100 UG/5ML
100 INJECTION, POWDER, LYOPHILIZED, FOR SOLUTION INTRAVENOUS
Status: DISCONTINUED | OUTPATIENT
Start: 2023-03-23 | End: 2023-03-27

## 2023-03-23 RX ORDER — HYDRALAZINE HYDROCHLORIDE 20 MG/ML
5 INJECTION INTRAMUSCULAR; INTRAVENOUS EVERY 6 HOURS
Status: DISCONTINUED | OUTPATIENT
Start: 2023-03-23 | End: 2023-03-24

## 2023-03-23 RX ORDER — ASPIRIN 81 MG/1
81 TABLET ORAL DAILY
Status: DISCONTINUED | OUTPATIENT
Start: 2023-03-24 | End: 2023-03-23

## 2023-03-23 RX ADMIN — Medication 10 ML: at 21:08

## 2023-03-23 RX ADMIN — Medication 10 ML: at 08:26

## 2023-03-23 RX ADMIN — HYDRALAZINE HYDROCHLORIDE 5 MG: 20 INJECTION INTRAMUSCULAR; INTRAVENOUS at 18:30

## 2023-03-23 RX ADMIN — MORPHINE SULFATE 2 MG: 2 INJECTION, SOLUTION INTRAMUSCULAR; INTRAVENOUS at 00:32

## 2023-03-23 RX ADMIN — MORPHINE SULFATE 2 MG: 2 INJECTION, SOLUTION INTRAMUSCULAR; INTRAVENOUS at 22:49

## 2023-03-23 RX ADMIN — ASPIRIN 325 MG: 325 TABLET ORAL at 08:23

## 2023-03-23 RX ADMIN — ASCORBIC ACID, VITAMIN A PALMITATE, CHOLECALCIFEROL, THIAMINE HYDROCHLORIDE, RIBOFLAVIN-5 PHOSPHATE SODIUM, PYRIDOXINE HYDROCHLORIDE, NIACINAMIDE, DEXPANTHENOL, ALPHA-TOCOPHEROL ACETATE, VITAMIN K1, FOLIC ACID, BIOTIN, CYANOCOBALAMIN 100 ML/HR: 200; 3300; 200; 6; 3.6; 6; 40; 15; 10; 150; 600; 60; 5 INJECTION, SOLUTION INTRAVENOUS at 21:10

## 2023-03-23 RX ADMIN — ONDANSETRON 4 MG: 2 INJECTION INTRAMUSCULAR; INTRAVENOUS at 16:55

## 2023-03-23 RX ADMIN — Medication 3 ML: at 21:09

## 2023-03-23 RX ADMIN — Medication 10 ML: at 08:25

## 2023-03-23 RX ADMIN — TIZANIDINE 2 MG: 4 TABLET ORAL at 08:25

## 2023-03-23 RX ADMIN — VALSARTAN 320 MG: 160 TABLET, FILM COATED ORAL at 08:23

## 2023-03-23 RX ADMIN — HEPARIN SODIUM 5000 UNITS: 5000 INJECTION INTRAVENOUS; SUBCUTANEOUS at 21:08

## 2023-03-23 RX ADMIN — POTASSIUM CHLORIDE 10 MEQ: 7.46 INJECTION, SOLUTION INTRAVENOUS at 00:33

## 2023-03-23 RX ADMIN — POTASSIUM CHLORIDE 100 ML/HR: 149 INJECTION, SOLUTION, CONCENTRATE INTRAVENOUS at 10:48

## 2023-03-23 RX ADMIN — PIPERACILLIN SODIUM AND TAZOBACTAM SODIUM 3.38 G: 3; .375 INJECTION, POWDER, LYOPHILIZED, FOR SOLUTION INTRAVENOUS at 15:12

## 2023-03-23 RX ADMIN — CEFEPIME 2 G: 2 INJECTION, POWDER, FOR SOLUTION INTRAVENOUS at 05:03

## 2023-03-23 RX ADMIN — METRONIDAZOLE 500 MG: 500 INJECTION, SOLUTION INTRAVENOUS at 08:23

## 2023-03-23 RX ADMIN — CLONIDINE HYDROCHLORIDE 0.1 MG: 0.1 TABLET ORAL at 15:11

## 2023-03-23 RX ADMIN — PANTOPRAZOLE SODIUM 40 MG: 40 INJECTION, POWDER, FOR SOLUTION INTRAVENOUS at 21:08

## 2023-03-23 RX ADMIN — Medication 10 ML: at 21:09

## 2023-03-23 RX ADMIN — PIPERACILLIN SODIUM AND TAZOBACTAM SODIUM 3.38 G: 3; .375 INJECTION, POWDER, LYOPHILIZED, FOR SOLUTION INTRAVENOUS at 09:56

## 2023-03-23 RX ADMIN — Medication 3 ML: at 08:26

## 2023-03-23 RX ADMIN — HEPARIN SODIUM 5000 UNITS: 5000 INJECTION INTRAVENOUS; SUBCUTANEOUS at 08:23

## 2023-03-23 RX ADMIN — MORPHINE SULFATE 2 MG: 2 INJECTION, SOLUTION INTRAMUSCULAR; INTRAVENOUS at 16:39

## 2023-03-23 RX ADMIN — LEVOTHYROXINE SODIUM ANHYDROUS 100 MCG: 100 INJECTION, POWDER, LYOPHILIZED, FOR SOLUTION INTRAVENOUS at 16:55

## 2023-03-23 RX ADMIN — CARVEDILOL 25 MG: 25 TABLET, FILM COATED ORAL at 08:23

## 2023-03-23 RX ADMIN — POTASSIUM CHLORIDE 10 MEQ: 7.46 INJECTION, SOLUTION INTRAVENOUS at 01:26

## 2023-03-23 RX ADMIN — PANTOPRAZOLE SODIUM 40 MG: 40 INJECTION, POWDER, FOR SOLUTION INTRAVENOUS at 08:23

## 2023-03-23 RX ADMIN — MAGNESIUM SULFATE HEPTAHYDRATE 4 G: 40 INJECTION, SOLUTION INTRAVENOUS at 05:00

## 2023-03-23 NOTE — PROGRESS NOTES
[facility] HOSPITALIST PROGRESS NOTE     Patient Identification:  Name:  Christine Garg  Age:  89 y.o.  Sex:  female  :  1933  MRN:  [id number]  Visit Number:  [id number]  ROOM: [address]     Primary Care Provider:  Dave Tobar MD     Date of Admission: 3/22/2023    Length of stay in inpatient status:  1    Subjective     Chief Compliant:    Chief Complaint   Patient presents with   • Abdominal Pain     History of Presenting Illness:  Today, the patient is doing well.  She states that her abdomen is less tender.  She denies any chest pain and denies shortness of air/coughing.  She denies any nausea and emesis.      Procedures:  3/22/2023:  Exploratory laparotomy with oversew of perforated duodenal ulcer with omental patch and Biopatch  3/22/2023:  Right IJ triple lumen central line  3/22/2023:  Right nare 18 F nasogastric tube and 16 F Barriga catheter    Objective     Current Hospital Meds:  aspirin, 325 mg, Oral, Daily  carvedilol, 25 mg, Oral, BID With Meals  heparin (porcine), 5,000 Units, Subcutaneous, Q12H  levothyroxine, 112 mcg, Oral, Q AM  montelukast, 10 mg, Oral, Nightly  pantoprazole, 40 mg, Intravenous, Q12H  piperacillin-tazobactam, 3.375 g, Intravenous, Q8H  sodium chloride, 700 mL, Intravenous, Once  sodium chloride, 10 mL, Intravenous, Q12H  sodium chloride, 10 mL, Intravenous, Q12H  sodium chloride, 10 mL, Intravenous, Q12H  sodium chloride, 10 mL, Intravenous, Q12H  sodium chloride, 3 mL, Intravenous, Q12H  tiZANidine, 2 mg, Oral, BID  valsartan, 320 mg, Oral, Daily    custom IV infusion builder, 100 mL/hr, Last Rate: 100 mL/hr (23 1048)      Current Antimicrobial Therapy:  Anti-Infectives (From admission, onward)    Ordered     Dose/Rate Route Frequency Start Stop    23 0849  piperacillin-tazobactam (ZOSYN) IVPB 3.375 g in 100 mL NS VTB        Ordering Provider: Aurora Kirkland MD    3.375 g  over 4 Hours Intravenous Every 8 Hours 23 1600 23  1559    03/23/23 0849  piperacillin-tazobactam (ZOSYN) IVPB 3.375 g in 100 mL NS VTB        Ordering Provider: Aurora Kirkland MD    3.375 g  over 30 Minutes Intravenous Once 03/23/23 0945 03/23/23 1026    03/22/23 1207  piperacillin-tazobactam (ZOSYN) IVPB 3.375 g in 100 mL NS VTB        Ordering Provider: Alejandrina Gonzalez PA    3.375 g  over 30 Minutes Intravenous Once 03/22/23 1209 03/22/23 1329    03/22/23 1207  vancomycin 1250 mg/250 mL 0.9% NS IVPB ([facility])        Ordering Provider: Alejandrina Gonzalez PA    20 mg/kg × 56.7 kg Intravenous Once 03/22/23 1209 03/22/23 1505        Current Diuretic Therapy:  Diuretics (From admission, onward)    None        ----------------------------------------------------------------------------------------------------------------------  Vital Signs:  Temp:  [96.3 °F (35.7 °C)-99.4 °F (37.4 °C)] 99.4 °F (37.4 °C)  Heart Rate:  [] 95  Resp:  [9-22] 20  BP: (113-179)/(53-87) 176/77  SpO2:  [90 %-100 %] 95 %  on  Flow (L/min):  [1-2] 1;   Device (Oxygen Therapy): room air  Body mass index is 26.38 kg/m².    Wt Readings from Last 3 Encounters:   03/22/23 71.9 kg (158 lb 8 oz)   01/05/23 68 kg (150 lb)   12/25/22 73.9 kg (163 lb)     Intake & Output (last 3 days)       03/20 0701 03/21 0700 03/21 0701 03/22 0700 03/22 0701 03/23 0700 03/23 0701 03/24 0700    I.V. (mL/kg)   1025 (14.3)     IV Piggyback   2050 100    Total Intake(mL/kg)   3075 (42.8) 100 (1.4)    Urine (mL/kg/hr)   950 125 (0.2)    Emesis/NG output   290     Drains   150 20    Total Output   1390 145    Net   +1685 -45                NPO Diet NPO Type: Strict NPO  ----------------------------------------------------------------------------------------------------------------------  Physical Exam   Cardiovascular:      Rate and Rhythm: Normal rate and regular rhythm.      Pulses: Normal pulses.      Heart sounds: No murmur heard.  Pulmonary:      Effort: Pulmonary effort is normal. No respiratory distress.       Breath sounds: No wheezing or rales.   Abdominal:      General: Bowel sounds are normal. There is no distension.      Tenderness: There is generalized abdominal tenderness but less than yesterday.   Musculoskeletal:         General: No swelling, deformity or signs of injury.   Neurological:      Mental Status: She is alert and oriented to person, place, and time. Mental status is at baseline.      Cranial Nerves: No cranial nerve deficit.   Psychiatric:         Mood and Affect: Mood normal.         Behavior: Behavior normal. Behavior is cooperative.         Thought Content: Thought content normal.         Judgment: Judgment normal.   ----------------------------------------------------------------------------------------------------------------------  Tele:  NS with heart rates 80-90's.  I have personally reviewed/looked at the telemetry strips.  ----------------------------------------------------------------------------------------------------------------------  LABS:    CBC and coagulation:  Results from last 7 days   Lab Units 03/23/23  0400 03/22/23  2212 03/22/23  1909 03/22/23  1435 03/22/23  1139   LACTATE mmol/L 1.8 2.5* 2.3* 2.2* 3.5*   CRP mg/dL  --   --   --   --  3.44*   WBC 10*3/mm3 17.35*  --   --   --  30.99*   HEMOGLOBIN g/dL 12.1  --   --   --  15.9   HEMATOCRIT % 36.2  --   --   --  46.3   MCV fL 93.1  --   --   --  92.4   MCHC g/dL 33.4  --   --   --  34.3   PLATELETS 10*3/mm3 365  --   --   --  468*     Acid/base balance:  Results from last 7 days   Lab Units 03/22/23  1145   PH, ARTERIAL pH units 7.424   PO2 ART mm Hg 80.1*   PCO2, ARTERIAL mm Hg 30.6*   HCO3 ART mmol/L 20.0     Renal and electrolytes:  Results from last 7 days   Lab Units 03/23/23  0400 03/22/23  1139   SODIUM mmol/L 129* 128*   POTASSIUM mmol/L 4.8 3.3*   MAGNESIUM mg/dL 1.7 1.8   CHLORIDE mmol/L 98 87*   CO2 mmol/L 23.1 21.3*   BUN mg/dL 22 23   CREATININE mg/dL 0.70 0.85   CALCIUM mg/dL 8.5* 10.0   PHOSPHORUS mg/dL 3.1   --    GLUCOSE mg/dL 139* 139*     Estimated Creatinine Clearance: 54.2 mL/min (by C-G formula based on SCr of 0.7 mg/dL).    Liver and pancreatic function:  Results from last 7 days   Lab Units 03/22/23  1139   ALBUMIN g/dL 3.9   BILIRUBIN mg/dL 0.6   ALK PHOS U/L 88   AST (SGOT) U/L 17   ALT (SGPT) U/L 29   LIPASE U/L 84*     Endocrine function:  Lab Results   Component Value Date    HGBA1C 6.10 (H) 03/22/2023     Glucose levels from the CMP:  Results from last 7 days   Lab Units 03/23/23  0400 03/22/23  1139   GLUCOSE mg/dL 139* 139*     Cardiac:  Results from last 7 days   Lab Units 03/22/23  1336 03/22/23  1139   HSTROP T ng/L 29* 25*   PROBNP pg/mL  --  963.1       Cultures:  Microbiology Results (last 10 days)     Procedure Component Value - Date/Time    Wound Culture - Surgical Site, Peritoneum [[id number]] Collected: 03/22/23 1733    Lab Status: Preliminary result Specimen: Surgical Site from Peritoneum Updated: 03/23/23 0832     Wound Culture No growth     Gram Stain Moderate (3+) WBCs seen      No organisms seen    Blood Culture - Blood, Arm, Left [[id number]]  (Normal) Collected: 03/22/23 1200    Lab Status: Preliminary result Specimen: Blood from Arm, Left Updated: 03/23/23 1215     Blood Culture No growth at 24 hours    Blood Culture - Blood, Hand, Left [[id number]]  (Normal) Collected: 03/22/23 1200    Lab Status: Preliminary result Specimen: Blood from Hand, Left Updated: 03/23/23 1215     Blood Culture No growth at 24 hours    COVID-19 and FLU A/B PCR - Swab, Nasopharynx [[id number]]  (Normal) Collected: 03/22/23 1142    Lab Status: Final result Specimen: Swab from Nasopharynx Updated: 03/22/23 1230     COVID19 Not Detected     Influenza A PCR Not Detected     Influenza B PCR Not Detected    Narrative:      Fact sheet for providers: https://www.fda.gov/media/451879/download    Fact sheet for patients: https://www.fda.gov/media/423083/download    Test performed by PCR.        I have personally looked at  the labs and they are summarized above.  ----------------------------------------------------------------------------------------------------------------------  Detailed radiology reports for the last 24 hours:    Imaging Results (Last 24 Hours)     Procedure Component Value Units Date/Time    XR Chest AP [[id number]] Collected: 03/22/23 1843     Updated: 03/22/23 1845    Narrative:      CR Chest 1 Vw    INDICATION:   Evaluate central line in nasogastric tube placement     COMPARISON:    3/22/2023    FINDINGS:  Portable AP view(s) of the chest.    The right central venous catheter tip is in the lower superior vena cava. The nasogastric tube tip is at least in the stomach.    The heart and mediastinal contours are normal. The lungs are clear. No pneumothorax or pleural effusion.       Impression:      Venous catheter is in good position. There is no pneumothorax    Nasogastric tube tip is at least in stomach    There are no infiltrates    Signer Name: Jose Kennedy MD   Signed: 3/22/2023 6:43 PM   Workstation Name: [email or web address]    [facility]        I have personally looked at the radiology images and I have read the available final reports.    Assessment & Plan      -Severe sepsis that was present on admission (temperature 36 degrees C, lactic acid 3.5, CRP 3.44, WBC 30,990, heart rates ) due to a suspected perforated antral gastric ulcer  -Acute hypokalemia and acute hypomagnesemia  -History of coronary artery disease s/p 3v CABG in 2011  -History of chronic kidney disease stage IIIa with baseline Cr 0.6-0.85  -History of essential hypertension  -History of hypothyroidism, currently controlled  -History of hyperlipidemia  -History of left upper lobe pleural based pulmonary nodule that appears to be invading the T6 vertebra  -Advanced age  -Prior tobacco smoking history    Will start IV hydralazine 5 mg IV every 6 hours for the hypertension until she will be able to take meds by mouth.   Antibiotics are on board per surgery and so far the inflammatory markers are decreasing; will continue to monitor these.  The electrolytes are still a little low and thus will continue to monitor these and replace these as needed.    VTE Prophylaxis:   Mechanical Order History:      Ordered        03/22/23 1802  Place sequential compression device  Once                    Pharmalogical Order History:      Ordered     Dose Route Frequency Stop    03/22/23 1802  heparin (porcine) 5000 UNIT/ML injection 5,000 Units         5,000 Units SC Every 12 Hours Scheduled --              The patient is high risk due to the following diagnoses/reasons:  Severe sepsis, perforated antral gastric ulcer    Disposition:  Undetermined at this time    Kiara Servin MD  [facility] Hospitalist  03/23/23  14:47 EDT

## 2023-03-23 NOTE — PLAN OF CARE
Goal Outcome Evaluation:           Progress: no change  Outcome Evaluation: Alert and Oriented. Afebrile. PRN Morphine x 1. 150 out of SARAH. 650 UOP. No acute events over night. Bed locked, at lowest level, with alarm set. Educated on use of call bell.

## 2023-03-23 NOTE — PAYOR COMM NOTE
"  [facility]  NPI: [id number]    Utilization Review   Contact:Chloé Fu MSN, APRN, NP-C  Phone: [phone]  Fax: [phone]      Inpatient Auth Req  Ref# [id number]  Amanda Gomez (89 y.o. Female)     Date of Birth   1933    Social Security Number       Address   [address] [address]    Home Phone   [phone]    MRN   [id number]       [facility]    Marital Status                               Admission Date   3/22/23    Admission Type   Emergency    Admitting Provider   Aurora Kirkland MD    Attending Provider   Aurora Kirkland MD    Department, Room/Bed   [facility] CRITICAL CARE, 03/       Discharge Date       Discharge Disposition       Discharge Destination                               Attending Provider: Aurora Kirkland MD    Allergies: Motrin [Ibuprofen], Novocain [Procaine]    Isolation: None   Infection: None   Code Status: CPR    Ht: 165.1 cm (65\")   Wt: 71.9 kg (158 lb 8 oz)    Admission Cmt: None   Principal Problem: Perforated ulcer (HCC) [K27.5]                 Active Insurance as of 3/22/2023     Primary Coverage     Payor Plan Insurance Group Employer/Plan Group    ANTH MEDICARE REPLACEMENT ANTHEM MEDICARE ADVANTAGE [id number]     Payor Plan Address Payor Plan Phone Number Payor Plan Fax Number Effective Dates    [address] [phone]  2019 - None Entered    [facility] [id number]       Subscriber Name Subscriber Birth Date Member ID       AMANDA GOMEZ 1933 [id number]                 Emergency Contacts      (Rel.) Home Phone Work Phone Mobile Phone    Kaleb Gomez (Son) [phone] -- --    Ziyad Gomez (Son) [phone] -- --               History & Physical      Aurora Kirkland MD at [address] [place] [address] General Surgery  History & Physical    Patient Identification:  Name:  Amanda Gomez  Age:  89 y.o.  Sex:  female  :  1933  MRN:  [id number] "   Visit Number:  [id number]  Admit Date: 3/22/2023   Primary Care Physician:  Dave Tobar MD    Subjective     Chief complaint abdominal pain    Subjective      Patient is a 89 y.o. female who presents to the emergency department today with complaints of severe abdominal pain.  Patient and signs report that she is having had abdominal pain times the past 4 to 5 days.  States that it has worsened progressively every day and was at its worst today.  They report that patient has been spitting up phlegm and had nausea.  Denies any abdominal surgeries in the past.  Does report that she takes a daily 324 mg aspirin.  Patient reports that her pain is worse on her right side and that she hurts everywhere.  States that it hurts to move.  Patient last reports that she ate at approximately 6 AM and last had a sip of water around 9-10.  However she has vomited since.        ---------------------------------------------------------------------------------------------------------------------   Review of Systems  Review of Systems - General ROS: Positive for 30 pound weight loss  Psychological ROS: negative for - behavioral disorder  Ophthalmic ROS: negative for - dry eyes  ENT ROS: negative for - vertigo or vocal changes  Hematological and Lymphatic ROS: negative for - jaundice or swollen lymph nodes  Respiratory ROS: negative for - sputum changes or stridor  Cardiovascular ROS: negative for - irregular heartbeat or murmur  Gastrointestinal ROS: Positive for abdominal pain, nausea and vomiting.  Genitourinary ROS: negative for - hematuria or incontinence  Musculoskeletal ROS: negative for - gait disturbance      ---------------------------------------------------------------------------------------------------------------------   History  Past Medical History:   Diagnosis Date   • Advanced age    • CAD (coronary artery disease) 2011    s/p 3v CABG   • Chronic kidney disease (CKD), stage III (moderate) (HCC)    • COPD  (chronic obstructive pulmonary disease) (HCC)    • History of tobacco use    • Hyperlipidemia    • Hypertension    • Hypothyroidism    • Pulmonary nodule      Past Surgical History:   Procedure Laterality Date   • BREAST BIOPSY Left     benign   • CATARACT EXTRACTION     • CORONARY ANGIOPLASTY     • CORONARY ARTERY BYPASS GRAFT       Family History   Problem Relation Age of Onset   • Heart disease Mother    • Heart disease Father    • Heart disease Brother    • Breast cancer Neg Hx      Social History     Tobacco Use   • Smoking status: Former     Types: Cigarettes   • Smokeless tobacco: Never   Substance Use Topics   • Alcohol use: No   • Drug use: No     (Not in a hospital admission)    Allergies:  Motrin [ibuprofen] and Novocain [procaine]    Objective      Objective     Vital Signs:  Temp:  [96.8 °F (36 °C)] 96.8 °F (36 °C)  Heart Rate:  [] 98  Resp:  [18-20] 18  BP: (138-183)/(54-91) 183/83      03/22/23  1129   Weight: 56.7 kg (125 lb)     Body mass index is 20.8 kg/m².  ---------------------------------------------------------------------------------------------------------------------       Physical Exam  Constitutional:       Appearance: Normal appearance.   HENT:      Head: Normocephalic and atraumatic.      Right Ear: External ear normal.      Left Ear: External ear normal.   Eyes:      Conjunctiva/sclera: Conjunctivae normal.      Pupils: Pupils are equal, round, and reactive to light.   Cardiovascular:      Rate and Rhythm: Normal rate and regular rhythm.      Pulses: Normal pulses.      Heart sounds: Normal heart sounds.   Pulmonary:      Effort: Pulmonary effort is normal.      Breath sounds: Normal breath sounds.   Abdominal:      General: Abdomen is flat. Bowel sounds are normal.      Palpations: Abdomen is soft.      Tenderness: There is abdominal tenderness (Generalized abdominal tenderness, severe pain upon palpation of right lower quadrant).   Musculoskeletal:         General: Normal range  of motion.      Cervical back: Normal range of motion and neck supple.   Skin:     General: Skin is warm and dry.      Capillary Refill: Capillary refill takes less than 2 seconds.   Neurological:      General: No focal deficit present.      Mental Status: She is alert and oriented to person, place, and time.   Psychiatric:         Mood and Affect: Mood normal.         Behavior: Behavior normal.       ---------------------------------------------------------------------------------------------------------------------   Results Review:   Results from last 7 days   Lab Units 03/22/23  1336 03/22/23  1139   HSTROP T ng/L 29* 25*     Results from last 7 days   Lab Units 03/22/23  1139   CRP mg/dL 3.44*   LACTATE mmol/L 3.5*   WBC 10*3/mm3 30.99*   HEMOGLOBIN g/dL 15.9   HEMATOCRIT % 46.3   PLATELETS 10*3/mm3 468*     Results from last 7 days   Lab Units 03/22/23  1145   PH, ARTERIAL pH units 7.424   PO2 ART mm Hg 80.1*   PCO2, ARTERIAL mm Hg 30.6*   HCO3 ART mmol/L 20.0     Results from last 7 days   Lab Units 03/22/23  1139   SODIUM mmol/L 128*   POTASSIUM mmol/L 3.3*   CHLORIDE mmol/L 87*   CO2 mmol/L 21.3*   BUN mg/dL 23   CREATININE mg/dL 0.85   CALCIUM mg/dL 10.0   GLUCOSE mg/dL 139*   ALBUMIN g/dL 3.9   BILIRUBIN mg/dL 0.6   ALK PHOS U/L 88   AST (SGOT) U/L 17   ALT (SGPT) U/L 29   Estimated Creatinine Clearance: 40.2 mL/min (by C-G formula based on SCr of 0.85 mg/dL).  No results found for: AMMONIA      No results found for: BLOODCX  No results found for: URINECX  No results found for: WOUNDCX  No results found for: STOOLCX  ---------------------------------------------------------------------------------------------------------------------  Imaging:  Imaging Results (Last 24 Hours)     Procedure Component Value Units Date/Time    CT Abdomen Pelvis With Contrast [[id number]] Collected: 03/22/23 1323     Updated: 03/22/23 1328    Narrative:      EXAM:    CT Abdomen and Pelvis With Intravenous Contrast     EXAM  DATE:    3/22/2023 12:52 PM     CLINICAL HISTORY:    Right flank and RLQ abdominal pain; leukocytosis; recent UTI     TECHNIQUE:    Axial computed tomography images of the abdomen and pelvis with  intravenous contrast.  Sagittal and coronal reformatted images were  created and reviewed.  This CT exam was performed using one or more of  the following dose reduction techniques:  automated exposure control,  adjustment of the mA and/or kV according to patient size, and/or use of  iterative reconstruction technique.     COMPARISON:    No relevant prior studies available.     FINDINGS:    LUNG BASES:  Unremarkable.  No mass.  No consolidation.    MEDIASTINUM:  Small hiatal hernia.      ABDOMEN:    LIVER:  Unremarkable.  No mass.    GALLBLADDER AND BILE DUCTS:  Mild gallbladder distention.  No  calcified stones.  No ductal dilation.    PANCREAS:  Unremarkable.  No mass.  No ductal dilation.    SPLEEN:  Unremarkable.  No splenomegaly.    ADRENALS:  Unremarkable.  No mass.    KIDNEYS AND URETERS:  Unremarkable.  No solid mass.  No  hydronephrosis.    STOMACH AND BOWEL:  Abundant colonic stool.  No obstruction.  No  mucosal thickening.      PELVIS:    APPENDIX:  The appendix is not well visualized.    BLADDER:  Unremarkable.  No mass.    REPRODUCTIVE:  Unremarkable as visualized.      ABDOMEN and PELVIS:    INTRAPERITONEAL SPACE:  Question gastric antral wall thickening with  some associated adjacent stranding and punctate foci of free air  concerning for potentially perforated ulcer.  Tiny ascites around the  liver.    BONES/JOINTS:  Degenerative disc disease throughout the lumbar spine.   Degenerative facet arthropathy throughout the lumbar spine, most  prominent in the lower lumbar spine.  No acute fracture.  No  dislocation.    SOFT TISSUES:  Unremarkable.    VASCULATURE:  Atherosclerotic disease.  No abdominal aortic aneurysm.    LYMPH NODES:  Unremarkable.  No enlarged lymph nodes.       Impression:      1.  Question  gastric antral wall thickening with some associated  adjacent stranding and punctate foci of free air concerning for  potentially perforated ulcer.  2.  Tiny ascites around the liver.  3.  Mild gallbladder distention.  4.  The appendix is not well visualized.  5.  Abundant colonic stool.  6.  Degenerative changes lumbar spine as described.     This report was finalized on 3/22/2023 1:26 PM by Dr. Henry Lozano MD.       XR Chest 1 View [[id number]] Collected: 03/22/23 1243     Updated: 03/22/23 1254    Narrative:      EXAM:    XR Chest, 1 View     EXAM DATE:    3/22/2023 12:09 PM     CLINICAL HISTORY:    chest pain     TECHNIQUE:    Frontal view of the chest.     COMPARISON:    02/07/2023     FINDINGS:    LUNGS:  Coarsened interstitial markings noted throughout the lungs.    PLEURAL SPACE:  Unremarkable.  No pneumothorax.    HEART:  Coronary artery bypass graft (CABG).  Heart size is stable.    MEDIASTINUM:  Unremarkable.    BONES/JOINTS:  Median sternotomy.       Impression:        No acute cardiopulmonary findings identified.     This report was finalized on 3/22/2023 12:43 PM by Dr. Henry Lozano MD.             I have personally reviewed the above radiology images and read the final radiology report on 03/22/23  ---------------------------------------------------------------------------------------------------------------------  Assessment / Plan     Impression: 89-year-old female with possible perforated ulcer and free air in abdomen  Patient Active Problem List   Diagnosis Code   • Essential hypertension I10   • Dyslipidemia E78.5   • ASCVD (arteriosclerotic cardiovascular disease) I25.10   • Palpitations R00.2   • Coronary artery disease  I25.10   • Abnormal PFTs (pulmonary function tests) R94.2   • Chronic obstructive pulmonary disease (HCC) J44.9   • Mild persistent asthma with allergic rhinitis J45.30   • Pulmonary nodule R91.1   • Former smoker Z87.891   • Colitis K52.9     Impression patient has an acute  surgical abdomen.  The etiology is not clear from imaging and there is only a very small amount of free air.    Plan:  Patient will go to the operating room for an exploratory laparotomy       Discussion:  Recommendations discussed with patient and signs.  This could certainly be a perforated ulcer.  Could also be a perforated cancer, ischemic right colon with perforated sigmoid diverticulitis being less likely.  It is also possible that the perforation may have already sealed and may not be identifiable at the time of surgery.        LUCÍA Shearer  03/22/23  14:43 EDT  ---------------------------------------------------------------------------------------------------------------------     Please note that portions of this note were completed with a voice recognition program.    Electronically signed by Aurora Kirkland MD at 03/22/23 1544          Emergency Department Notes      Alejandrina Gonzalez PA at 03/22/23 1136     Attestation signed by Amy Hartley DO at 03/22/23 1453        SUPERVISE: For this patient encounter, I reviewed the APC's documentation, treatment plan, and medical decision making.  Amy Hartley DO 3/22/2023 14:53 EDT                         Subjective   History of Present Illness  This is an 89 year old female patient who presents to the ER with chief complaint of abdominal pain. Sons present with her. PMH significant for HTN, HLD, hypothyroidism, CHF, CAD status post CABG, CKD. Patient diagnosed with colitis, COVID-19 and a lung nodule at the beginning of the year. Scheduled for a biopsy of the lung nodule in 2 weeks. Patient was also started on macrobid 2 days ago per her PCP for an assumed UTI but her urine wasn't evaluated. 3 days ago, the she started having right flank and RLQ abdominal pain. Pain is moderate, sharp and was intermittent but has been constant since onset this morning. She has had nausea and vomiting. Denies diarrhea, constipation, current urinary symptoms,  fever.         Review of Systems   Constitutional: Negative.  Negative for fever.   HENT: Negative.    Respiratory: Negative.    Cardiovascular: Negative.  Negative for chest pain.   Gastrointestinal: Positive for abdominal pain, nausea and vomiting. Negative for abdominal distention, anal bleeding, blood in stool, constipation, diarrhea and rectal pain.   Endocrine: Negative.    Genitourinary: Positive for flank pain. Negative for decreased urine volume, difficulty urinating, dyspareunia, dysuria, enuresis, frequency, genital sores, hematuria, menstrual problem, pelvic pain, urgency, vaginal bleeding, vaginal discharge and vaginal pain.   Skin: Negative.    Neurological: Negative.    Psychiatric/Behavioral: Negative.    All other systems reviewed and are negative.      Past Medical History:   Diagnosis Date   • Advanced age    • CAD (coronary artery disease) 2011    s/p 3v CABG   • Chronic kidney disease (CKD), stage III (moderate) ([facility])    • COPD (chronic obstructive pulmonary disease) ([facility])    • History of tobacco use    • Hyperlipidemia    • Hypertension    • Hypothyroidism    • Pulmonary nodule        Allergies   Allergen Reactions   • Motrin [Ibuprofen] Anaphylaxis and Hives   • Novocain [Procaine] Other (See Comments)     Pt state she passes out.       Past Surgical History:   Procedure Laterality Date   • BREAST BIOPSY Left     benign   • CATARACT EXTRACTION     • CORONARY ANGIOPLASTY     • CORONARY ARTERY BYPASS GRAFT         Family History   Problem Relation Age of Onset   • Heart disease Mother    • Heart disease Father    • Heart disease Brother    • Breast cancer Neg Hx        Social History     Socioeconomic History   • Marital status:    Tobacco Use   • Smoking status: Former     Types: Cigarettes   • Smokeless tobacco: Never   Substance and Sexual Activity   • Alcohol use: No   • Drug use: No   • Sexual activity: Defer           Objective   Physical Exam  Vitals and nursing note reviewed.    Constitutional:       General: She is not in acute distress.     Appearance: She is well-developed. She is not diaphoretic.   HENT:      Head: Normocephalic and atraumatic.      Right Ear: External ear normal.      Left Ear: External ear normal.      Nose: Nose normal.   Eyes:      Conjunctiva/sclera: Conjunctivae normal.      Pupils: Pupils are equal, round, and reactive to light.   Neck:      Vascular: No JVD.      Trachea: No tracheal deviation.   Cardiovascular:      Rate and Rhythm: Normal rate and regular rhythm.      Heart sounds: Normal heart sounds. No murmur heard.  Pulmonary:      Effort: Pulmonary effort is normal. No respiratory distress.      Breath sounds: Normal breath sounds. No wheezing.   Abdominal:      General: Bowel sounds are normal.      Palpations: Abdomen is soft.      Tenderness: There is abdominal tenderness in the right lower quadrant. There is right CVA tenderness and rebound. There is no guarding.   Musculoskeletal:         General: No deformity. Normal range of motion.      Cervical back: Normal range of motion and neck supple.   Skin:     General: Skin is warm and dry.      Coloration: Skin is not pale.      Findings: No erythema or rash.   Neurological:      Mental Status: She is alert and oriented to person, place, and time.      Cranial Nerves: No cranial nerve deficit.   Psychiatric:         Behavior: Behavior normal.         Thought Content: Thought content normal.         Procedures       Results for orders placed or performed during the hospital encounter of 03/22/23   COVID-19 and FLU A/B PCR - Swab, Nasopharynx    Specimen: Nasopharynx; Swab   Result Value Ref Range    COVID19 Not Detected Not Detected - Ref. Range    Influenza A PCR Not Detected Not Detected    Influenza B PCR Not Detected Not Detected   Comprehensive Metabolic Panel    Specimen: Arm, Right; Blood   Result Value Ref Range    Glucose 139 (H) 65 - 99 mg/dL    BUN 23 8 - 23 mg/dL    Creatinine 0.85 0.57 -  1.00 mg/dL    Sodium 128 (L) 136 - 145 mmol/L    Potassium 3.3 (L) 3.5 - 5.2 mmol/L    Chloride 87 (L) 98 - 107 mmol/L    CO2 21.3 (L) 22.0 - 29.0 mmol/L    Calcium 10.0 8.6 - 10.5 mg/dL    Total Protein 7.0 6.0 - 8.5 g/dL    Albumin 3.9 3.5 - 5.2 g/dL    ALT (SGPT) 29 1 - 33 U/L    AST (SGOT) 17 1 - 32 U/L    Alkaline Phosphatase 88 39 - 117 U/L    Total Bilirubin 0.6 0.0 - 1.2 mg/dL    Globulin 3.1 gm/dL    A/G Ratio 1.3 g/dL    BUN/Creatinine Ratio 27.1 (H) 7.0 - 25.0    Anion Gap 19.7 (H) 5.0 - 15.0 mmol/L    eGFR 65.6 >60.0 mL/min/1.73   High Sensitivity Troponin T    Specimen: Arm, Right; Blood   Result Value Ref Range    HS Troponin T 25 (H) <10 ng/L   BNP    Specimen: Arm, Right; Blood   Result Value Ref Range    proBNP 963.1 0.0 - 1,800.0 pg/mL   Lipase    Specimen: Arm, Right; Blood   Result Value Ref Range    Lipase 84 (H) 13 - 60 U/L   C-reactive Protein    Specimen: Arm, Right; Blood   Result Value Ref Range    C-Reactive Protein 3.44 (H) 0.00 - 0.50 mg/dL   Lactic Acid, Plasma    Specimen: Arm, Right; Blood   Result Value Ref Range    Lactate 3.5 (C) 0.5 - 2.0 mmol/L   CBC Auto Differential    Specimen: Arm, Right; Blood   Result Value Ref Range    WBC 30.99 (C) 3.40 - 10.80 10*3/mm3    RBC 5.01 3.77 - 5.28 10*6/mm3    Hemoglobin 15.9 12.0 - 15.9 g/dL    Hematocrit 46.3 34.0 - 46.6 %    MCV 92.4 79.0 - 97.0 fL    MCH 31.7 26.6 - 33.0 pg    MCHC 34.3 31.5 - 35.7 g/dL    RDW 13.2 12.3 - 15.4 %    RDW-SD 44.7 37.0 - 54.0 fl    MPV 9.2 6.0 - 12.0 fL    Platelets 468 (H) 140 - 450 10*3/mm3    Neutrophil % 89.6 (H) 42.7 - 76.0 %    Lymphocyte % 3.6 (L) 19.6 - 45.3 %    Monocyte % 5.3 5.0 - 12.0 %    Eosinophil % 0.3 0.3 - 6.2 %    Basophil % 0.4 0.0 - 1.5 %    Immature Grans % 0.8 (H) 0.0 - 0.5 %    Neutrophils, Absolute 27.75 (H) 1.70 - 7.00 10*3/mm3    Lymphocytes, Absolute 1.13 0.70 - 3.10 10*3/mm3    Monocytes, Absolute 1.65 (H) 0.10 - 0.90 10*3/mm3    Eosinophils, Absolute 0.08 0.00 - 0.40 10*3/mm3     Basophils, Absolute 0.13 0.00 - 0.20 10*3/mm3    Immature Grans, Absolute 0.25 (H) 0.00 - 0.05 10*3/mm3    nRBC 0.0 0.0 - 0.2 /100 WBC   Ethanol    Specimen: Arm, Right; Blood   Result Value Ref Range    Ethanol <10 0 - 10 mg/dL    Ethanol % <0.010 %   Blood Gas, Arterial With Co-Ox    Specimen: Arterial Blood   Result Value Ref Range    Site Right Brachial     Leonidas's Test N/A     pH, Arterial 7.424 7.350 - 7.450 pH units    pCO2, Arterial 30.6 (L) 35.0 - 45.0 mm Hg    pO2, Arterial 80.1 (L) 83.0 - 108.0 mm Hg    HCO3, Arterial 20.0 20.0 - 26.0 mmol/L    Base Excess, Arterial -3.2 (L) 0.0 - 2.0 mmol/L    O2 Saturation, Arterial 96.3 94.0 - 99.0 %    Hemoglobin, Blood Gas 15.0 13.5 - 17.5 g/dL    Hematocrit, Blood Gas 45.9 38.0 - 51.0 %    Oxyhemoglobin 96.7 94 - 99 %    Methemoglobin <-0.10 (L) 0.00 - 3.00 %    Carboxyhemoglobin 1.0 0 - 5 %    A-a DO2 29.5 0.0 - 300.0 mmHg    CO2 Content 21.0 (L) 22 - 33 mmol/L    Barometric Pressure for Blood Gas 733 mmHg    Modality Room Air     FIO2 21 %    Ventilator Mode NA     Note      Collected by [id number]     pH, Temp Corrected      pCO2, Temperature Corrected      pO2, Temperature Corrected     ECG 12 Lead Dyspnea   Result Value Ref Range    QT Interval 354 ms    QTC Interval 463 ms         ED Course  ED Course as of 03/22/23 1358   Wed Mar 22, 2023   1149 ECG 12 Lead Dyspnea  Sinus tachycardia with generalized nonspecific ST and T wave repolarization concerning for possibly acute ischemia.  No acute ST elevation  Ventricular rate 103  QRS 98 QTc 463  Electronically signed by Amy Hartley DO, 03/22/23, 11:49 AM EDT.   [LK]   1255 XR Chest 1 View  IMPRESSION:    No acute cardiopulmonary findings identified.     This report was finalized on 3/22/2023 12:43 PM by Dr. Henry Lozano MD [MM]   4166 Spoke with Dr. Kirkland who would like me to call hospitalist and check on unit bed with house supervisor. We do have a unit bed and hospitalist has been paged.  [MM]   9643 Spoke  with Dr. Servin who has agreed to consult on the patient and will also get Dr. Aguilar involved due to the large lung mass found earlier in the year. Dr. Kirkland is agreeable to being the primary attending.  [MM]      ED Course User Index  [LK] Amy Hartley DO  [MM] Alejandrina Gonzalez PA                                           Medical Decision Making    This is an 89 year old female patient who presents to the ER with chief complaint of abdominal pain. Sons present with her. PMH significant for HTN, HLD, hypothyroidism, CHF, CAD status post CABG, CKD. Patient diagnosed with colitis, COVID-19 and a lung nodule at the beginning of the year. Scheduled for a biopsy of the lung nodule in 2 weeks. Patient was also started on macrobid 2 days ago per her PCP for an assumed UTI but her urine wasn't evaluated. 3 days ago, the she started having right flank and RLQ abdominal pain. Pain is moderate, sharp and was intermittent but has been constant since onset this morning. She has had nausea and vomiting. Denies diarrhea, constipation, current urinary symptoms, fever.         Acute gastric ulcer with perforation (HCC): acute illness or injury  Hypokalemia: acute illness or injury  Hyponatremia: acute illness or injury  Sepsis without acute organ dysfunction, due to unspecified organism (HCC): acute illness or injury  Amount and/or Complexity of Data Reviewed  Labs: ordered. Decision-making details documented in ED Course.  Radiology: ordered. Decision-making details documented in ED Course.  ECG/medicine tests: ordered. Decision-making details documented in ED Course.      Risk  Prescription drug management.  Decision regarding hospitalization.      Critical Care  Total time providing critical care: 30 minutes      Final diagnoses:   Acute gastric ulcer with perforation (HCC)   Hyponatremia   Hypokalemia   Sepsis without acute organ dysfunction, due to unspecified organism (HCC)       ED Disposition  ED Disposition     ED  Disposition   Decision to Admit    Condition   --    Comment   --             No follow-up provider specified.       Medication List      No changes were made to your prescriptions during this visit.          Alejandrina Gonzalez PA  03/22/23 1358      Electronically signed by Amy Hartley DO at 03/22/23 1453     Alina So, RN at 03/22/23 1502        Informed consent signed by son, witnessed by SUELLEN So RN, place on chart.    Electronically signed by Alina So RN at 03/22/23 1509         Ventilator/Non-Invasive Ventilation Settings (From admission, onward)    None           Operative/Procedure Notes (last 24 hours)      Aurora Kirkland MD at 03/22/23 1636          Exploratory laparotomy  Oversew of perforated duodenal ulcer with omental patch and Biopatch    Pre-op: Free air    Post-op: Perforated duodenal ulcer    Surgeon:  Aurora Kirkland M.D., F.A.C.S.    Assistant: Scott morris    Anesthesia:  General with block      Indications: Acute abdomen, free air       Procedure Details   After obtaining informed consent and receiving preoperative antibiotics and with venous compression boots in place, the patient was taken to the operating room and placed under anesthesia.  Barriga catheter was placed.  The abdomen was prepped and draped in a sterile fashion.  An incision was made just above the umbilicus and carried down to the fascia.  Fascia was incised and the peritoneal cavity was entered.  The moderate amount of enteric contents upon opening the abdomen.  Culture was done.  The midline incision was then extended superiorly.  The Bookwalter retractor was placed.  On inspection patient was noted to have a perforated duodenal ulcer.  Omentum was dissected off of the duodenum where it was fairly adherent to.  The area was indurated and the wall was extremely thick.  The gallbladder was also distended but there were no palpable stones.  Next maneuver was to create a omental patch the omentum was freed  off the colon and the lesser sac was entered.  This created the omental patch which was then freed off of the duodenum.  Once this was accomplished the repair was done.  This was done in a 2 layer closure transversely.  Interrupted 3-0 Vicryl sutures were placed full-thickness.  All sutures were placed before securing.  Once the 3-0 Vicryl sutures were placed 3-0 silk Lembert sutures were placed as a second layer of closure.  A 2 x 4 cm Stravix Biopatch was then placed over the anastomosis.  On top of this the omental patch was placed and was secured with Vicryl sutures.  The abdomen was irrigated with saline.  A 10 flat SARAH was placed in the right upper quadrant just and position just deep to the anastomosis.  The fascia was closed with interrupted 0 PDS sutures.  After irrigation of the subcu the skin was closed with staples.  Dressing was placed.  Patient tolerated the procedure well and was taken to the recovery room in stable condition.  Also of note is that the NG was positioned within the stomach prior to closure    Findings:  Perforated duodenal ulcer         Estimated Blood Loss:  Minimal    Blood Administered: none           Drains: 10 flat SARAH           Specimens:   ID Type Source Tests Collected by Time   1 (Not marked as sent) : peritoneal fluid Surgical Site Peritoneum BODY FLUID CULTURE (Canceled) Aurora Kirkland MD 3/22/2023 1639        Grafts and Implants: No       Complications:  none           Disposition: PACU - hemodynamically stable.           Condition: stable        Electronically signed by Aurora Kirkland MD at 03/22/23 1829     Aurora Kirkland MD at 03/22/23 1636        Central line insertion jugular    Surgeon:  Aurora Kirkland M.D., F.A.CFARRAH Garg  3/22/2023    Pre and Post Procedure Diagnosis: acute abdomen    Anesthesia: 1% lidocaine    Procedure:  After obtaining informed consent patient was placed in the Trendelenburg position.  Ultrasound was utilized to confirm the  patency of the jugular vein.  With use of the Seldinger technique the right internal jugular vein was cannulated.  At first subclavian was attempted but was unsuccessful.  Guidewire was passed without resistance.  The tract was dilated and the triple-lumen catheter was passed to 15 cm and sutured in place. Good blood return was obtained from all 3 ports.  Dressing was placed.        CXR result: Pending    Blood administered:  none    Complications:  none      Aurora Kirkland MD     Date: 3/22/2023  Time: 18:03 EDT        Electronically signed by Aurora Kirkland MD at 23 1804          Consult Notes (last 48 hours)      Kiara Servin MD at 23 1425      Consult Orders    1. Hospitalist (on-call MD unless specified) [[id number]] ordered by Alejandrina Gonzalez PA                   [facility] HOSPITALIST CONSULT NOTE     Hospitalist (on-call MD unless specified)  Consult performed by: Kiara Servin MD  Consult ordered by: Alejandrina Gonzalez PA  Reason for consult: Medical management of chronic medical problems          Patient Identification:  Name:  Christine Garg  Age:  89 y.o.  Sex:  female  :  1933  MRN:  [id number]  Visit Number:  [id number]  Room number:  405/05  Primary care provider:  Dave Tobar MD    Chief Complaint:    Chief Complaint   Patient presents with   • Abdominal Pain       History of Presenting Illness:  Patient is an 89 yo female that presented to [facility] with severe abdominal pain.  She has a past medical history significant for essential hypertension, hyperlipidemia, hypothyroidism, CKD stage IIIa, CAD s/p 3V CABG, COPD, known left upper lobe pleural based pulmonary nodule that appears to be invading the T6 vertebra, and history of smoking tobacco use.  The history of presenting illness is very difficult to obtain from the patient due to the amount of pain that the patient was in.  At beside was 2 of her 3 sons; they tried to assist  in providing the HPI but they were limited in what they could tell me.  The patient was doing well until 12/2022, at which time she developed COVID-19 and became weak to the point that she could no longer drive.  The patient states that she has never been diagnosed with a peptic ulcer before nor has she ever been diagnosed with H pylori.  She developed abdominal pain a few days ago; she describes it as starting in her back on the left and then extending to the right lower quadrant.  The pain feels like a hot poker going into her abdomen.  She denies any fevers but she has experienced chills and sweats.  In the ED, CT scan showed thickening of the antral part of the stomach with some punctate free air, all of which is concerning for a perforated ulcer.  Thus, the patient is being taken straight to the OR by Dr. Kirkland and then she will be moved to the CCU post operatively.  We are being asked to help with management of medical issues.  ---------------------------------------------------------------------------------------------------------------------  Review of Systems   Unable to perform ROS: Acuity of condition   Constitutional: Positive for chills, diaphoresis and fatigue. Negative for fever.   Respiratory: Positive for shortness of breath. Negative for cough.    Gastrointestinal: Positive for abdominal pain, diarrhea, nausea and vomiting.   Psychiatric/Behavioral: Negative for agitation and behavioral problems.    ---------------------------------------------------------------------------------------------------------------------   Past History:  Family History   Problem Relation Age of Onset   • Heart disease Mother    • Heart disease Father    • Heart disease Brother    • Breast cancer Neg Hx      Past Medical History:   Diagnosis Date   • Advanced age    • CAD (coronary artery disease) 2011    s/p 3v CABG   • Chronic kidney disease (CKD), stage III (moderate) (HCC)    • COPD (chronic obstructive pulmonary  disease) (HCC)    • History of tobacco use    • Hyperlipidemia    • Hypertension    • Hypothyroidism    • Pulmonary nodule      Past Surgical History:   Procedure Laterality Date   • BREAST BIOPSY Left     benign   • CATARACT EXTRACTION     • CORONARY ANGIOPLASTY     • CORONARY ARTERY BYPASS GRAFT       Social History     Socioeconomic History   • Marital status:    Tobacco Use   • Smoking status: Former     Types: Cigarettes   • Smokeless tobacco: Never   Substance and Sexual Activity   • Alcohol use: No   • Drug use: No   • Sexual activity: Defer     ---------------------------------------------------------------------------------------------------------------------   Allergies:  Motrin [ibuprofen] and Novocain [procaine]  ---------------------------------------------------------------------------------------------------------------------  Medications below are reported home medications pulling from within the system; at this time, these medications have not been reconciled unless otherwise specified and are in the verification process for further verifcation as current home medications.    Prior to Admission Medications     Prescriptions Last Dose Informant Patient Reported? Taking?    acetaminophen (TYLENOL) 500 MG tablet  Self Yes No    Take 500 mg by mouth Daily As Needed for Mild Pain.    aspirin 325 MG tablet  Self Yes No    Take 325 mg by mouth Daily.    carvedilol (COREG) 25 MG tablet  Pharmacy No No    Take 1 tablet by mouth 2 (Two) Times a Day With Meals.    Cholecalciferol (VITAMIN D3) 2000 units capsule  Pharmacy Yes No    Take 2,000 Units by mouth Daily.    ciprofloxacin (CIPRO) 500 MG tablet  Pharmacy Yes No    Take 500 mg by mouth 2 (Two) Times a Day.    cloNIDine (CATAPRES) 0.1 MG tablet  Pharmacy Yes No    Take 0.1 mg by mouth 3 (Three) Times a Day As Needed for High Blood Pressure (SBP > 150).    coenzyme Q10 100 MG capsule  Self Yes No    Take 100 mg by mouth Daily As Needed. 2-3 times a  week    docusate sodium (COLACE) 100 MG capsule  Self Yes No    Take 100 mg by mouth 3 (Three) Times a Day.    Evolocumab (Repatha SureClick) solution auto-injector SureClick injection  Pharmacy No No    Inject 1 mL under the skin into the appropriate area as directed Every 14 (Fourteen) Days.    furosemide (LASIX) 20 MG tablet  Pharmacy Yes No    Take 20 mg by mouth Daily As Needed (swelling).    HYDROcodone-acetaminophen (NORCO) 5-325 MG per tablet  Pharmacy Yes No    Take 1 tablet by mouth Daily As Needed.    ibuprofen (ADVIL,MOTRIN) 200 MG tablet  Self Yes No    Take 200 mg by mouth Daily As Needed for Mild Pain.    levothyroxine (SYNTHROID, LEVOTHROID) 112 MCG tablet  Pharmacy Yes No    Take 112 mcg by mouth Daily.    Lidocaine 4 % patch  Self Yes No    Apply 1 patch topically Daily.    metroNIDAZOLE (FLAGYL) 500 MG tablet  Pharmacy Yes No    Take 500 mg by mouth 2 (Two) Times a Day.    montelukast (SINGULAIR) 10 MG tablet  Pharmacy No No    Take 1 tablet by mouth Every Night.    Red Yeast Rice 600 MG capsule  Self Yes No    Take 600 mg by mouth Daily As Needed. 2 to 3 times a week    traMADol (ULTRAM) 50 MG tablet  Pharmacy Yes No    Take 50 mg by mouth Every 12 (Twelve) Hours As Needed for Moderate Pain or Severe Pain.    valsartan (DIOVAN) 320 MG tablet  Pharmacy Yes No    Take 320 mg by mouth Daily.    vitamin B-12 (CYANOCOBALAMIN) 1000 MCG tablet  Self Yes No    Take 1,000 mcg by mouth Daily.        ---------------------------------------------------------------------------------------------------------------------   Objective     Vital Signs:  Temp:  [96.8 °F (36 °C)] 96.8 °F (36 °C)  Heart Rate:  [] 98  Resp:  [18-20] 18  BP: (138-183)/(54-91) 183/83    Mean Arterial Pressure (Non-Invasive) for the past 24 hrs (Last 3 readings):   Noninvasive MAP (mmHg)   03/22/23 1359 116   03/22/23 1344 127   03/22/23 1300 115     SpO2:  [95 %-97 %] 97 %  on   ;   Device (Oxygen Therapy): room air  Body mass  index is 20.8 kg/m².    Wt Readings from Last 1 Encounters:   03/22/23 1129 56.7 kg (125 lb)     Wt Readings from Last 3 Encounters:   03/22/23 56.7 kg (125 lb)   01/05/23 68 kg (150 lb)   12/25/22 73.9 kg (163 lb)           ---------------------------------------------------------------------------------------------------------------------   Physical Exam  Vitals and nursing note reviewed. Exam conducted with a chaperone present.   Constitutional:       General: She is awake.      Appearance: Normal appearance. She is well-developed.      Comments: On room air.   HENT:      Head: Normocephalic and atraumatic.      Right Ear: External ear normal.      Left Ear: External ear normal.   Eyes:      General: No scleral icterus.        Right eye: No discharge.         Left eye: No discharge.      Pupils: Pupils are equal, round, and reactive to light.   Cardiovascular:      Rate and Rhythm: Normal rate and regular rhythm.      Pulses: Normal pulses.      Heart sounds: No murmur heard.  Pulmonary:      Effort: Pulmonary effort is normal. No respiratory distress.      Breath sounds: No wheezing or rales.   Abdominal:      General: Bowel sounds are normal. There is no distension.      Tenderness: There is generalized abdominal tenderness. There is guarding and rebound.   Musculoskeletal:         General: No swelling, deformity or signs of injury.   Skin:     Capillary Refill: Capillary refill takes less than 2 seconds.      Coloration: Skin is not jaundiced or pale.      Findings: No bruising.   Neurological:      Mental Status: She is alert and oriented to person, place, and time. Mental status is at baseline.      Cranial Nerves: No cranial nerve deficit.   Psychiatric:         Mood and Affect: Mood normal.         Behavior: Behavior normal. Behavior is cooperative.         Thought Content: Thought content normal.         Judgment: Judgment normal.      ---------------------------------------------------------------------------------------------------------------------   EKG:  Sinus tachycardia with heart rate 103, QTc 463 ms.  There are inverted T waves in the inferior leads, ST elevation in the septal leads, and Q waves in the inferior and lateral leads.  When compared to EKGs dated 12/19/2022 and 3/8/2021, all 3 EKGs appear to be the same.  Please note that I have personally looked at the EKG from this admission, the comparison EKGs in the medical records, and the above is my interpretation of this admission's EKG; I await the final cardiology read.      Telemetry:  Sinus tachycardia with heart rates 's.  Please note that I personally looked at the telemetry strips.      Results for orders placed during the hospital encounter of 03/17/21    Adult Transthoracic Echo Complete W/ Cont if Necessary Per Protocol    Interpretation Summary  · Normal left ventricular cavity size and wall thickness noted. All left ventricular wall segments contract normally  · Left ventricular ejection fraction appears to be 61 - 65%.  · The aortic valve is not well visualized. No aortic valve regurgitation or stenosis is present.  · The mitral valve is structurally normal with no significant stenosis present. Mild mitral valve regurgitation is present.  · There is no evidence of pericardial effusion.    ---------------------------------------------------------------------------------------------------------------------   LABS:    CBC and coagulation:  Results from last 7 days   Lab Units 03/22/23  1435 03/22/23  1139   LACTATE mmol/L 2.2* 3.5*   CRP mg/dL  --  3.44*   WBC 10*3/mm3  --  30.99*   HEMOGLOBIN g/dL  --  15.9   HEMATOCRIT %  --  46.3   MCV fL  --  92.4   MCHC g/dL  --  34.3   PLATELETS 10*3/mm3  --  468*     Acid/base balance:  Results from last 7 days   Lab Units 03/22/23  1145   PH, ARTERIAL pH units 7.424   PO2 ART mm Hg 80.1*   PCO2, ARTERIAL mm Hg 30.6*   HCO3  ART mmol/L 20.0     Renal and electrolytes:  Results from last 7 days   Lab Units 03/22/23  1139   SODIUM mmol/L 128*   POTASSIUM mmol/L 3.3*   MAGNESIUM mg/dL 1.8   CHLORIDE mmol/L 87*   CO2 mmol/L 21.3*   BUN mg/dL 23   CREATININE mg/dL 0.85   CALCIUM mg/dL 10.0   GLUCOSE mg/dL 139*     Estimated Creatinine Clearance: 40.2 mL/min (by C-G formula based on SCr of 0.85 mg/dL).    Liver and pancreatic function:  Results from last 7 days   Lab Units 03/22/23  1139   ALBUMIN g/dL 3.9   BILIRUBIN mg/dL 0.6   ALK PHOS U/L 88   AST (SGOT) U/L 17   ALT (SGPT) U/L 29   LIPASE U/L 84*     Endocrine function:  Lab Results   Component Value Date    HGBA1C 6.10 (H) 03/22/2023     Lab Results   Component Value Date    TSH 1.120 12/19/2022    FREET4 2.07 (H) 12/19/2022     Cardiac:  Results from last 7 days   Lab Units 03/22/23  1336 03/22/23  1139   HSTROP T ng/L 29* 25*   PROBNP pg/mL  --  963.1       Cultures:  Lab Results   Component Value Date    COLORU Yellow 01/06/2023    CLARITYU Clear 01/06/2023    PHUR 6.0 01/06/2023    GLUCOSEU Negative 01/06/2023    KETONESU 15 mg/dL (1+) (A) 01/06/2023    BLOODU Negative 01/06/2023    NITRITEU Negative 01/06/2023    LEUKOCYTESUR Negative 01/06/2023    BILIRUBINUR Negative 01/06/2023    UROBILINOGEN 0.2 E.U./dL 01/06/2023    RBCUA 0-2 12/20/2022    WBCUA 6-12 (A) 12/20/2022    BACTERIA None Seen 12/20/2022     Microbiology Results (last 10 days)     Procedure Component Value - Date/Time    COVID-19 and FLU A/B PCR - Swab, Nasopharynx [[id number]]  (Normal) Collected: 03/22/23 1142    Lab Status: Final result Specimen: Swab from Nasopharynx Updated: 03/22/23 1230     COVID19 Not Detected     Influenza A PCR Not Detected     Influenza B PCR Not Detected    Narrative:      Fact sheet for providers: https://www.fda.gov/media/025985/download    Fact sheet for patients: https://www.fda.gov/media/349998/download    Test performed by PCR.        I have personally looked at the labs and they are  summarized above.  ---------------------------------------------------------------------------------------------------------------------   Detailed radiology reports for the last 24 hours:    Imaging Results (Last 24 Hours)     Procedure Component Value Units Date/Time    CT Abdomen Pelvis With Contrast [[id number]] Collected: 03/22/23 1323     Updated: 03/22/23 1328    Narrative:      EXAM:    CT Abdomen and Pelvis With Intravenous Contrast     EXAM DATE:    3/22/2023 12:52 PM     CLINICAL HISTORY:    Right flank and RLQ abdominal pain; leukocytosis; recent UTI     TECHNIQUE:    Axial computed tomography images of the abdomen and pelvis with  intravenous contrast.  Sagittal and coronal reformatted images were  created and reviewed.  This CT exam was performed using one or more of  the following dose reduction techniques:  automated exposure control,  adjustment of the mA and/or kV according to patient size, and/or use of  iterative reconstruction technique.     COMPARISON:    No relevant prior studies available.     FINDINGS:    LUNG BASES:  Unremarkable.  No mass.  No consolidation.    MEDIASTINUM:  Small hiatal hernia.      ABDOMEN:    LIVER:  Unremarkable.  No mass.    GALLBLADDER AND BILE DUCTS:  Mild gallbladder distention.  No  calcified stones.  No ductal dilation.    PANCREAS:  Unremarkable.  No mass.  No ductal dilation.    SPLEEN:  Unremarkable.  No splenomegaly.    ADRENALS:  Unremarkable.  No mass.    KIDNEYS AND URETERS:  Unremarkable.  No solid mass.  No  hydronephrosis.    STOMACH AND BOWEL:  Abundant colonic stool.  No obstruction.  No  mucosal thickening.      PELVIS:    APPENDIX:  The appendix is not well visualized.    BLADDER:  Unremarkable.  No mass.    REPRODUCTIVE:  Unremarkable as visualized.      ABDOMEN and PELVIS:    INTRAPERITONEAL SPACE:  Question gastric antral wall thickening with  some associated adjacent stranding and punctate foci of free air  concerning for potentially perforated  ulcer.  Tiny ascites around the  liver.    BONES/JOINTS:  Degenerative disc disease throughout the lumbar spine.   Degenerative facet arthropathy throughout the lumbar spine, most  prominent in the lower lumbar spine.  No acute fracture.  No  dislocation.    SOFT TISSUES:  Unremarkable.    VASCULATURE:  Atherosclerotic disease.  No abdominal aortic aneurysm.    LYMPH NODES:  Unremarkable.  No enlarged lymph nodes.       Impression:      1.  Question gastric antral wall thickening with some associated  adjacent stranding and punctate foci of free air concerning for  potentially perforated ulcer.  2.  Tiny ascites around the liver.  3.  Mild gallbladder distention.  4.  The appendix is not well visualized.  5.  Abundant colonic stool.  6.  Degenerative changes lumbar spine as described.     This report was finalized on 3/22/2023 1:26 PM by Dr. Henry Lozano MD.       XR Chest 1 View [[id number]] Collected: 03/22/23 1243     Updated: 03/22/23 1254    Narrative:      EXAM:    XR Chest, 1 View     EXAM DATE:    3/22/2023 12:09 PM     CLINICAL HISTORY:    chest pain     TECHNIQUE:    Frontal view of the chest.     COMPARISON:    02/07/2023     FINDINGS:    LUNGS:  Coarsened interstitial markings noted throughout the lungs.    PLEURAL SPACE:  Unremarkable.  No pneumothorax.    HEART:  Coronary artery bypass graft (CABG).  Heart size is stable.    MEDIASTINUM:  Unremarkable.    BONES/JOINTS:  Median sternotomy.       Impression:        No acute cardiopulmonary findings identified.     This report was finalized on 3/22/2023 12:43 PM by Dr. Henry Lozano MD.           Final impressions for the last 30 days of radiology reports:    CT Chest Without Contrast Diagnostic  Result Date: 3/10/2023  1. Aggressive appearing pleural-based soft tissue mass in the left apex posteromedially appears to be invading the adjacent vertebral body. 2. Small nodule in the left apex and small nodular density in the right apex.   This report was  finalized on 3/10/2023 4:51 PM by Dr. Danny Nguyen MD.      CT Thoracic Spine Without Contrast  Result Date: 3/10/2023    Pleural-based left upper lobe soft tissue mass at the level of T5 and T6 most likely impinging upon the T5 nerve root on the left and slightly eroding the T6 vertebral body.  This report was finalized on 3/10/2023 4:51 PM by Dr. Danny Nguyen MD.      NM Bone Scan Whole Body  Result Date: 2/20/2023  1.  No scintigraphic evidence of acute osteoblastic process. 2.  Degenerative type uptake of the axial and appendicular skeleton. 3.  Specifically, no intense tracer activity involving the thoracic spine to suggest acute compression fracture.  This report was finalized on 2/20/2023 3:02 PM by Dr. Tobias Stoll MD.        I have personally looked at the radiology images and read the final radiology report.  ----------------------------------------------------------------------------------------------------------------------  Assessment & Plan      -Severe sepsis that was present on admission (lactic acid 3.5, CRP 3.44, WBC 30,990, heart rates ) due to a suspected perforated antral gastric ulcer  -Acute hypokalemia and acute hypomagnesemia  -History of coronary artery disease s/p 3v CABG in 2011  -History of chronic kidney disease stage IIIa with baseline Cr 0.6-0.85  -History of essential hypertension  -History of hypothyroidism, currently controlled  -History of hyperlipidemia  -History of left upper lobe pleural based pulmonary nodule that appears to be invading the T6 vertebra  -Advanced age  -Prior tobacco smoking history    Will start empiric cefepime, flagyl, and vancomycin per our sepsis protocol for now.  Will hold all antihypertensive medications as the patient is at risk of hypotension due to the severe sepsis.  The goal MAP is 65 mmHg or above.  Will start Levophed if the blood pressures drop below goal.  I have written for potassium and magnesium replacement.  We will avoid QT  prolonging agents and continue to monitor the electrolytes closely. In order to lessen the risk of worsening QTc, the goal potassium level is 4-4.5 and goal magnesium level is 2-2.2.  Will repeat labs in the am so that the inflammatory markers, Cr, and electrolytes can be trended.     Thank you for the consult.      Kiara Servin MD  [facility]  03/22/23  15:04 EDT    Electronically signed by Kiara Servin MD at 03/22/23 9596

## 2023-03-23 NOTE — PROGRESS NOTES
LOS: 1 day   Patient Care Team:  Dave Tobar MD as PCP - General  Dave Tobar MD as PCP - Family Medicine    Post op day # 1, status post laparotomy with closure of perforated duodenal ulcer with omental patch and Biopatch    Subjective     Interval History:  Patient has done well.  Urine output has been good.  She has been hemodynamically stable.  She has not been out of bed and has not used the incentive spirometer.  SARAH output has been serosanguineous.  NG tube about 200 cc of bilious drainage    History taken from patient.      Objective     Vital Signs  Temp:  [96.3 °F (35.7 °C)-99.6 °F (37.6 °C)] 99.6 °F (37.6 °C)  Heart Rate:  [] 82  Resp:  [9-23] 20  BP: (113-179)/(53-87) 179/67    Physical Exam:  This is a well-developed well-nourished elderly female in no acute distress  HEENT examination: Sclera are anicteric  Lungs: Clear  Abdomen: Bowel sounds are present  Skin/incisions: Surgical dressing intact and dry.  SARAH with serosanguineous fluid     Results Review:    Results from last 7 days   Lab Units 03/22/23  1336 03/22/23  1139   HSTROP T ng/L 29* 25*     Results from last 7 days   Lab Units 03/23/23  0400 03/22/23  2212 03/22/23  1909 03/22/23  1435 03/22/23  1139   CRP mg/dL  --   --   --   --  3.44*   LACTATE mmol/L 1.8 2.5* 2.3*   < > 3.5*   WBC 10*3/mm3 17.35*  --   --   --  30.99*   HEMOGLOBIN g/dL 12.1  --   --   --  15.9   HEMATOCRIT % 36.2  --   --   --  46.3   PLATELETS 10*3/mm3 365  --   --   --  468*    < > = values in this interval not displayed.     Results from last 7 days   Lab Units 03/22/23  1145   PH, ARTERIAL pH units 7.424   PO2 ART mm Hg 80.1*   PCO2, ARTERIAL mm Hg 30.6*   HCO3 ART mmol/L 20.0     Results from last 7 days   Lab Units 03/23/23  0400 03/22/23  1139   SODIUM mmol/L 129* 128*   POTASSIUM mmol/L 4.8 3.3*   MAGNESIUM mg/dL 1.7 1.8   CHLORIDE mmol/L 98 87*   CO2 mmol/L 23.1 21.3*   BUN mg/dL 22 23   CREATININE mg/dL 0.70 0.85   CALCIUM mg/dL 8.5* 10.0    GLUCOSE mg/dL 139* 139*   ALBUMIN g/dL  --  3.9   BILIRUBIN mg/dL  --  0.6   ALK PHOS U/L  --  88   AST (SGOT) U/L  --  17   ALT (SGPT) U/L  --  29   Estimated Creatinine Clearance: 54.2 mL/min (by C-G formula based on SCr of 0.7 mg/dL).  No results found for: AMMONIA      Blood Culture   Date Value Ref Range Status   03/22/2023 No growth at 24 hours  Preliminary   03/22/2023 No growth at 24 hours  Preliminary     No results found for: URINECX  Wound Culture   Date Value Ref Range Status   03/22/2023 No growth  Preliminary     No results found for: STOOLCX    Imaging:  Imaging Results (Last 24 Hours)     Procedure Component Value Units Date/Time    XR Chest AP [[id number]] Collected: 03/22/23 1843     Updated: 03/22/23 1845    Narrative:      CR Chest 1 Vw    INDICATION:   Evaluate central line in nasogastric tube placement     COMPARISON:    3/22/2023    FINDINGS:  Portable AP view(s) of the chest.    The right central venous catheter tip is in the lower superior vena cava. The nasogastric tube tip is at least in the stomach.    The heart and mediastinal contours are normal. The lungs are clear. No pneumothorax or pleural effusion.       Impression:      Venous catheter is in good position. There is no pneumothorax    Nasogastric tube tip is at least in stomach    There are no infiltrates    Signer Name: Jose Kennedy MD   Signed: 3/22/2023 6:43 PM   Workstation Name: [facility]    [facility]           Impression:  Stable course, status post repair of perforated duodenal ulcer    Plan:  Continue n.p.o. and IV antibiotics  Encourage pulmonary toilet and mobilize patient as soon as possible    Aurora Kirkland MD  03/23/23  17:17 EDT      Please note that portions of this note were completed with a voice recognition program.

## 2023-03-23 NOTE — PLAN OF CARE
Goal Outcome Evaluation:  Plan of Care Reviewed With: patient        Progress: improving  Outcome Evaluation: pt VSS. alert and oriented, afebrile and has recieved one prn dose of clonadine in the pm, the SARAH drain has had sufficient output with a pink/ red tinge, the surgical staples and incision appear to be healing well. pt given an incintive spirometer and taught the importance of use and ambulation for recovery. UOP sufficient, greater than 30cc per hr. call light in reach no complaints at this time.

## 2023-03-23 NOTE — CASE MANAGEMENT/SOCIAL WORK
Discharge Planning Assessment  [facility]     Patient Name: Christine Garg  MRN: [id number]  Today's Date: 3/23/2023    Admit Date: 3/22/2023     Discharge Needs Assessment     Row Name 03/23/23 1129       Living Environment    People in Home child(shimon), adult    Name(s) of People in Home Ziyad Garg (son)    Current Living Arrangements home    Potentially Unsafe Housing Conditions none    Primary Care Provided by self    Provides Primary Care For no one    Family Caregiver if Needed child(shimon), adult    Family Caregiver Names Ziyad Garg (son)    Quality of Family Relationships helpful;involved;supportive    Able to Return to Prior Arrangements yes       Resource/Environmental Concerns    Resource/Environmental Concerns none    Transportation Concerns none       Food Insecurity    Within the past 12 months, you worried that your food would run out before you got the money to buy more. Never true    Within the past 12 months, the food you bought just didn't last and you didn't have money to get more. Never true       Transition Planning    Patient/Family Anticipates Transition to home with family    Patient/Family Anticipated Services at Transition none    Transportation Anticipated family or friend will provide       Discharge Needs Assessment    Equipment Currently Used at Home none    Concerns to be Addressed discharge planning    Equipment Needed After Discharge none               Discharge Plan     Row Name 03/23/23 1132       Plan    Plan Pt was admitted on 03/22/23. SS recieved consult for manage transition. SS attempted to speak with Pt but was unsuccessful. SS spoke with Pt's son, Kaleb on this date. Pt lives with her son, Ziyad and plans to return home at discharge. Pt does not utilize home health or DME services. Pt 's PCP is Dave Tobar. Pt does not have a POA/living will. Pt's family to provide transportation. SS to follow.                Jenn Adamson, ADAMW

## 2023-03-24 LAB
ALBUMIN SERPL-MCNC: 2.5 G/DL (ref 3.5–5.2)
ALBUMIN/GLOB SERPL: 1.1 G/DL
ALP SERPL-CCNC: 55 U/L (ref 39–117)
ALT SERPL W P-5'-P-CCNC: 12 U/L (ref 1–33)
ANION GAP SERPL CALCULATED.3IONS-SCNC: 7.8 MMOL/L (ref 5–15)
AST SERPL-CCNC: 11 U/L (ref 1–32)
BASOPHILS # BLD AUTO: 0.02 10*3/MM3 (ref 0–0.2)
BASOPHILS NFR BLD AUTO: 0.1 % (ref 0–1.5)
BILIRUB SERPL-MCNC: 0.4 MG/DL (ref 0–1.2)
BUN SERPL-MCNC: 14 MG/DL (ref 8–23)
BUN/CREAT SERPL: 30.4 (ref 7–25)
CALCIUM SPEC-SCNC: 8.1 MG/DL (ref 8.6–10.5)
CHLORIDE SERPL-SCNC: 100 MMOL/L (ref 98–107)
CO2 SERPL-SCNC: 22.2 MMOL/L (ref 22–29)
CREAT SERPL-MCNC: 0.46 MG/DL (ref 0.57–1)
DEPRECATED RDW RBC AUTO: 48.1 FL (ref 37–54)
EGFRCR SERPLBLD CKD-EPI 2021: 91.6 ML/MIN/1.73
EOSINOPHIL # BLD AUTO: 0.49 10*3/MM3 (ref 0–0.4)
EOSINOPHIL NFR BLD AUTO: 2.9 % (ref 0.3–6.2)
ERYTHROCYTE [DISTWIDTH] IN BLOOD BY AUTOMATED COUNT: 14.1 % (ref 12.3–15.4)
GLOBULIN UR ELPH-MCNC: 2.3 GM/DL
GLUCOSE SERPL-MCNC: 123 MG/DL (ref 65–99)
HCT VFR BLD AUTO: 32.8 % (ref 34–46.6)
HGB BLD-MCNC: 10.9 G/DL (ref 12–15.9)
IMM GRANULOCYTES # BLD AUTO: 0.16 10*3/MM3 (ref 0–0.05)
IMM GRANULOCYTES NFR BLD AUTO: 1 % (ref 0–0.5)
LYMPHOCYTES # BLD AUTO: 1.07 10*3/MM3 (ref 0.7–3.1)
LYMPHOCYTES NFR BLD AUTO: 6.4 % (ref 19.6–45.3)
MAGNESIUM SERPL-MCNC: 2 MG/DL (ref 1.6–2.4)
MCH RBC QN AUTO: 31 PG (ref 26.6–33)
MCHC RBC AUTO-ENTMCNC: 33.2 G/DL (ref 31.5–35.7)
MCV RBC AUTO: 93.2 FL (ref 79–97)
MONOCYTES # BLD AUTO: 1 10*3/MM3 (ref 0.1–0.9)
MONOCYTES NFR BLD AUTO: 6 % (ref 5–12)
NEUTROPHILS NFR BLD AUTO: 13.88 10*3/MM3 (ref 1.7–7)
NEUTROPHILS NFR BLD AUTO: 83.6 % (ref 42.7–76)
NRBC BLD AUTO-RTO: 0 /100 WBC (ref 0–0.2)
PHOSPHATE SERPL-MCNC: 1.4 MG/DL (ref 2.5–4.5)
PLATELET # BLD AUTO: 318 10*3/MM3 (ref 140–450)
PMV BLD AUTO: 9.6 FL (ref 6–12)
POTASSIUM SERPL-SCNC: 3.7 MMOL/L (ref 3.5–5.2)
PROT SERPL-MCNC: 4.8 G/DL (ref 6–8.5)
RBC # BLD AUTO: 3.52 10*6/MM3 (ref 3.77–5.28)
SODIUM SERPL-SCNC: 130 MMOL/L (ref 136–145)
WBC NRBC COR # BLD: 16.62 10*3/MM3 (ref 3.4–10.8)

## 2023-03-24 PROCEDURE — 94799 UNLISTED PULMONARY SVC/PX: CPT

## 2023-03-24 PROCEDURE — 94761 N-INVAS EAR/PLS OXIMETRY MLT: CPT

## 2023-03-24 PROCEDURE — 25010000002 HYDRALAZINE PER 20 MG: Performed by: INTERNAL MEDICINE

## 2023-03-24 PROCEDURE — 99232 SBSQ HOSP IP/OBS MODERATE 35: CPT | Performed by: INTERNAL MEDICINE

## 2023-03-24 PROCEDURE — 25010000002 HEPARIN (PORCINE) PER 1000 UNITS: Performed by: SURGERY

## 2023-03-24 PROCEDURE — 25010000002 PIPERACILLIN SOD-TAZOBACTAM PER 1 G: Performed by: SURGERY

## 2023-03-24 PROCEDURE — 99024 POSTOP FOLLOW-UP VISIT: CPT | Performed by: SURGERY

## 2023-03-24 PROCEDURE — 80053 COMPREHEN METABOLIC PANEL: CPT | Performed by: INTERNAL MEDICINE

## 2023-03-24 PROCEDURE — 25010000002 MORPHINE PER 10 MG: Performed by: SURGERY

## 2023-03-24 PROCEDURE — 84100 ASSAY OF PHOSPHORUS: CPT | Performed by: INTERNAL MEDICINE

## 2023-03-24 PROCEDURE — 25010000002 ONDANSETRON PER 1 MG: Performed by: SURGERY

## 2023-03-24 PROCEDURE — 85025 COMPLETE CBC W/AUTO DIFF WBC: CPT | Performed by: INTERNAL MEDICINE

## 2023-03-24 PROCEDURE — 83735 ASSAY OF MAGNESIUM: CPT | Performed by: INTERNAL MEDICINE

## 2023-03-24 RX ORDER — LIDOCAINE 50 MG/G
1 PATCH TOPICAL
Status: DISCONTINUED | OUTPATIENT
Start: 2023-03-24 | End: 2023-04-05 | Stop reason: RX

## 2023-03-24 RX ORDER — MAGNESIUM SULFATE HEPTAHYDRATE 40 MG/ML
4 INJECTION, SOLUTION INTRAVENOUS ONCE
Status: DISCONTINUED | OUTPATIENT
Start: 2023-03-24 | End: 2023-04-01

## 2023-03-24 RX ORDER — HYDRALAZINE HYDROCHLORIDE 20 MG/ML
10 INJECTION INTRAMUSCULAR; INTRAVENOUS EVERY 6 HOURS
Status: DISCONTINUED | OUTPATIENT
Start: 2023-03-24 | End: 2023-03-28

## 2023-03-24 RX ADMIN — Medication 10 ML: at 21:00

## 2023-03-24 RX ADMIN — HYDRALAZINE HYDROCHLORIDE 5 MG: 20 INJECTION INTRAMUSCULAR; INTRAVENOUS at 00:41

## 2023-03-24 RX ADMIN — LIDOCAINE 1 PATCH: 700 PATCH TOPICAL at 19:24

## 2023-03-24 RX ADMIN — Medication 10 ML: at 08:20

## 2023-03-24 RX ADMIN — HYDRALAZINE HYDROCHLORIDE 10 MG: 20 INJECTION INTRAMUSCULAR; INTRAVENOUS at 21:20

## 2023-03-24 RX ADMIN — MORPHINE SULFATE 2 MG: 2 INJECTION, SOLUTION INTRAMUSCULAR; INTRAVENOUS at 06:54

## 2023-03-24 RX ADMIN — MORPHINE SULFATE 2 MG: 2 INJECTION, SOLUTION INTRAMUSCULAR; INTRAVENOUS at 10:44

## 2023-03-24 RX ADMIN — HYDRALAZINE HYDROCHLORIDE 10 MG: 20 INJECTION INTRAMUSCULAR; INTRAVENOUS at 17:09

## 2023-03-24 RX ADMIN — POTASSIUM PHOSPHATE, MONOBASIC AND POTASSIUM PHOSPHATE, DIBASIC 30 MMOL: 224; 236 INJECTION, SOLUTION, CONCENTRATE INTRAVENOUS at 05:03

## 2023-03-24 RX ADMIN — HEPARIN SODIUM 5000 UNITS: 5000 INJECTION INTRAVENOUS; SUBCUTANEOUS at 08:20

## 2023-03-24 RX ADMIN — PIPERACILLIN SODIUM AND TAZOBACTAM SODIUM 3.38 G: 3; .375 INJECTION, POWDER, LYOPHILIZED, FOR SOLUTION INTRAVENOUS at 17:09

## 2023-03-24 RX ADMIN — PANTOPRAZOLE SODIUM 40 MG: 40 INJECTION, POWDER, FOR SOLUTION INTRAVENOUS at 08:20

## 2023-03-24 RX ADMIN — Medication 3 ML: at 08:20

## 2023-03-24 RX ADMIN — ONDANSETRON 4 MG: 2 INJECTION INTRAMUSCULAR; INTRAVENOUS at 21:20

## 2023-03-24 RX ADMIN — Medication 10 ML: at 21:01

## 2023-03-24 RX ADMIN — MORPHINE SULFATE 2 MG: 2 INJECTION, SOLUTION INTRAMUSCULAR; INTRAVENOUS at 03:06

## 2023-03-24 RX ADMIN — Medication 3 ML: at 21:01

## 2023-03-24 RX ADMIN — HYDRALAZINE HYDROCHLORIDE 5 MG: 20 INJECTION INTRAMUSCULAR; INTRAVENOUS at 05:03

## 2023-03-24 RX ADMIN — ONDANSETRON 4 MG: 2 INJECTION INTRAMUSCULAR; INTRAVENOUS at 14:37

## 2023-03-24 RX ADMIN — HYDRALAZINE HYDROCHLORIDE 5 MG: 20 INJECTION INTRAMUSCULAR; INTRAVENOUS at 13:52

## 2023-03-24 RX ADMIN — ONDANSETRON 4 MG: 2 INJECTION INTRAMUSCULAR; INTRAVENOUS at 10:44

## 2023-03-24 RX ADMIN — PIPERACILLIN SODIUM AND TAZOBACTAM SODIUM 3.38 G: 3; .375 INJECTION, POWDER, LYOPHILIZED, FOR SOLUTION INTRAVENOUS at 00:41

## 2023-03-24 RX ADMIN — MORPHINE SULFATE 2 MG: 2 INJECTION, SOLUTION INTRAMUSCULAR; INTRAVENOUS at 21:20

## 2023-03-24 RX ADMIN — HEPARIN SODIUM 5000 UNITS: 5000 INJECTION INTRAVENOUS; SUBCUTANEOUS at 21:00

## 2023-03-24 RX ADMIN — MORPHINE SULFATE 2 MG: 2 INJECTION, SOLUTION INTRAMUSCULAR; INTRAVENOUS at 14:37

## 2023-03-24 RX ADMIN — ONDANSETRON 4 MG: 2 INJECTION INTRAMUSCULAR; INTRAVENOUS at 06:54

## 2023-03-24 RX ADMIN — DEXTROSE AND SODIUM CHLORIDE 100 ML/HR: 5; 900 INJECTION, SOLUTION INTRAVENOUS at 08:15

## 2023-03-24 RX ADMIN — ASCORBIC ACID, VITAMIN A PALMITATE, CHOLECALCIFEROL, THIAMINE HYDROCHLORIDE, RIBOFLAVIN-5 PHOSPHATE SODIUM, PYRIDOXINE HYDROCHLORIDE, NIACINAMIDE, DEXPANTHENOL, ALPHA-TOCOPHEROL ACETATE, VITAMIN K1, FOLIC ACID, BIOTIN, CYANOCOBALAMIN 100 ML/HR: 200; 3300; 200; 6; 3.6; 6; 40; 15; 10; 150; 600; 60; 5 INJECTION, SOLUTION INTRAVENOUS at 21:01

## 2023-03-24 RX ADMIN — PANTOPRAZOLE SODIUM 40 MG: 40 INJECTION, POWDER, FOR SOLUTION INTRAVENOUS at 21:00

## 2023-03-24 RX ADMIN — LEVOTHYROXINE SODIUM ANHYDROUS 100 MCG: 100 INJECTION, POWDER, LYOPHILIZED, FOR SOLUTION INTRAVENOUS at 11:52

## 2023-03-24 RX ADMIN — PIPERACILLIN SODIUM AND TAZOBACTAM SODIUM 3.38 G: 3; .375 INJECTION, POWDER, LYOPHILIZED, FOR SOLUTION INTRAVENOUS at 08:20

## 2023-03-24 NOTE — PLAN OF CARE
Goal Outcome Evaluation:  Plan of Care Reviewed With: patient        Progress: no change  Outcome Evaluation: Alert and oriented. Remains afebrile. RA. 50 out SARAH. 450 UOP. PRN morphine given x 2. Critical phos reported. Replacement protocol initiated. Bed locked, at lowest level, with alarm set. Call bell in reach.

## 2023-03-24 NOTE — PROGRESS NOTES
LOS: 2 days   Patient Care Team:  Dave Tobar MD as PCP - General  Dave Tobar MD as PCP - Family Medicine    Post op day # 2, status post laparotomy with closure of perforated duodenal ulcer with omental patch and Biopatch    Subjective     Interval History:  Patient has done well.  Urine output has been good.  She has been hemodynamically stable.  She has not been out of bed.   SARAH output has been serosanguineous.  NG tube about 300 cc of bilious drainage    History taken from patient.      Objective     Vital Signs  Temp:  [98.7 °F (37.1 °C)-99.6 °F (37.6 °C)] 98.7 °F (37.1 °C)  Heart Rate:  [] 106  Resp:  [12-23] 16  BP: (137-192)/(53-85) 192/85    Physical Exam:  This is a well-developed well-nourished elderly female in no acute distress  HEENT examination: Sclera are anicteric  Lungs: Clear  Abdomen: Bowel sounds are present  Skin/incisions: Surgical dressing intact and dry.  SARAH with serosanguineous fluid     Results Review:    Results from last 7 days   Lab Units 03/22/23  1336 03/22/23  1139   HSTROP T ng/L 29* 25*     Results from last 7 days   Lab Units 03/24/23  0032 03/23/23  0400 03/22/23  2212 03/22/23  1909 03/22/23  1435 03/22/23  1139   CRP mg/dL  --   --   --   --   --  3.44*   LACTATE mmol/L  --  1.8 2.5* 2.3*   < > 3.5*   WBC 10*3/mm3 16.62* 17.35*  --   --   --  30.99*   HEMOGLOBIN g/dL 10.9* 12.1  --   --   --  15.9   HEMATOCRIT % 32.8* 36.2  --   --   --  46.3   PLATELETS 10*3/mm3 318 365  --   --   --  468*    < > = values in this interval not displayed.     Results from last 7 days   Lab Units 03/22/23  1145   PH, ARTERIAL pH units 7.424   PO2 ART mm Hg 80.1*   PCO2, ARTERIAL mm Hg 30.6*   HCO3 ART mmol/L 20.0     Results from last 7 days   Lab Units 03/24/23  0032 03/23/23  0400 03/22/23  1139   SODIUM mmol/L 130* 129* 128*   POTASSIUM mmol/L 3.7 4.8 3.3*   MAGNESIUM mg/dL 2.0 1.7 1.8   CHLORIDE mmol/L 100 98 87*   CO2 mmol/L 22.2 23.1 21.3*   BUN mg/dL 14 22 23    CREATININE mg/dL 0.46* 0.70 0.85   CALCIUM mg/dL 8.1* 8.5* 10.0   GLUCOSE mg/dL 123* 139* 139*   ALBUMIN g/dL 2.5*  --  3.9   BILIRUBIN mg/dL 0.4  --  0.6   ALK PHOS U/L 55  --  88   AST (SGOT) U/L 11  --  17   ALT (SGPT) U/L 12  --  29   Estimated Creatinine Clearance: 82.5 mL/min (A) (by C-G formula based on SCr of 0.46 mg/dL (L)).  No results found for: AMMONIA      Blood Culture   Date Value Ref Range Status   03/22/2023 No growth at 24 hours  Preliminary   03/22/2023 No growth at 24 hours  Preliminary     No results found for: URINECX  Wound Culture   Date Value Ref Range Status   03/22/2023 No growth  Preliminary     No results found for: STOOLCX    Imaging:  Imaging Results (Last 24 Hours)     ** No results found for the last 24 hours. **           Impression:  Stable course, status post repair of perforated duodenal ulcer    Plan:  Continue n.p.o. and IV antibiotics  Remove NG but keep NPO  Out of bed  Encourage pulmonary toilet and mobilize patient as soon as possible  Possible transfer to floor later today    Aurora Kirkland MD  03/24/23  10:40 EDT      Please note that portions of this note were completed with a voice recognition program.

## 2023-03-24 NOTE — CASE MANAGEMENT/SOCIAL WORK
Discharge Planning Assessment   Jose Daniel     Patient Name: Christine Garg  MRN: [id number]  Today's Date: 3/24/2023    Admit Date: 3/22/2023     Discharge Plan     Row Name 03/24/23 1454       Plan    Plan Pt was admitted on 03/22/23. Pt lives with her son, Ziyad and plans to return home at discharge. Pt does not utilize home health or DME services. SS to follow.              ADAM BernabeW

## 2023-03-24 NOTE — PLAN OF CARE
Problem: Adult Inpatient Plan of Care  Goal: Plan of Care Review  Outcome: Ongoing, Progressing  Goal: Patient-Specific Goal (Individualized)  Outcome: Ongoing, Progressing  Goal: Absence of Hospital-Acquired Illness or Injury  Outcome: Ongoing, Progressing  Intervention: Identify and Manage Fall Risk  Recent Flowsheet Documentation  Taken 3/24/2023 1800 by Ingrid Parson RN  Safety Promotion/Fall Prevention: safety round/check completed  Taken 3/24/2023 1700 by Ingrid Parson RN  Safety Promotion/Fall Prevention: safety round/check completed  Taken 3/24/2023 1600 by Ingrid Parson RN  Safety Promotion/Fall Prevention: safety round/check completed  Taken 3/24/2023 1500 by Ingrid Parson RN  Safety Promotion/Fall Prevention: safety round/check completed  Taken 3/24/2023 1400 by Ingrid Parson RN  Safety Promotion/Fall Prevention: safety round/check completed  Taken 3/24/2023 1300 by Ingrid Parson RN  Safety Promotion/Fall Prevention: safety round/check completed  Taken 3/24/2023 1200 by Ingrid Parson RN  Safety Promotion/Fall Prevention: safety round/check completed  Taken 3/24/2023 1100 by Ingrid Parson RN  Safety Promotion/Fall Prevention: safety round/check completed  Taken 3/24/2023 1000 by Ingrid Parson RN  Safety Promotion/Fall Prevention: safety round/check completed  Taken 3/24/2023 0900 by Ingrid Parson RN  Safety Promotion/Fall Prevention: safety round/check completed  Taken 3/24/2023 0800 by Ingrid Parson RN  Safety Promotion/Fall Prevention: safety round/check completed  Taken 3/24/2023 0700 by Ingrid Parson RN  Safety Promotion/Fall Prevention: safety round/check completed  Intervention: Prevent Skin Injury  Recent Flowsheet Documentation  Taken 3/24/2023 1800 by Ingrid Parson RN  Body Position:   turned   left   side-lying  Taken 3/24/2023 1600 by Ingrid Parson RN  Body Position: neutral body alignment  Taken 3/24/2023 1400 by Ingrid Parson RN  Body Position:   turned   left    side-lying  Skin Protection: tubing/devices free from skin contact  Taken 3/24/2023 1200 by Ingrid Parson RN  Body Position:   side-lying   turned   right  Taken 3/24/2023 1000 by Ingrid Parson RN  Body Position:   side-lying   turned   left  Taken 3/24/2023 0800 by Ingrid Parson RN  Body Position:   side-lying   right   turned  Skin Protection:   tubing/devices free from skin contact   adhesive use limited   incontinence pads utilized  Intervention: Prevent and Manage VTE (Venous Thromboembolism) Risk  Recent Flowsheet Documentation  Taken 3/24/2023 1800 by Ingrid Parson RN  Activity Management: back to bed  Taken 3/24/2023 1600 by Ingrid Parson RN  Activity Management: up in chair  Taken 3/24/2023 1400 by Ingrid Parson RN  Activity Management: bedrest  VTE Prevention/Management:   bilateral   sequential compression devices on  Range of Motion: active ROM (range of motion) encouraged  Taken 3/24/2023 1200 by Ingrid Parson RN  Activity Management: bedrest  Taken 3/24/2023 1000 by Ingrid Parson RN  Activity Management: bedrest  Taken 3/24/2023 0800 by Ingrid Parson RN  Activity Management: bedrest  VTE Prevention/Management: (see MAR)   bilateral   sequential compression devices on   other (see comments)  Range of Motion: active ROM (range of motion) encouraged  Goal: Optimal Comfort and Wellbeing  Outcome: Ongoing, Progressing  Intervention: Provide Person-Centered Care  Recent Flowsheet Documentation  Taken 3/24/2023 1400 by Ingrid Parson RN  Trust Relationship/Rapport:   care explained   questions answered   questions encouraged   reassurance provided   thoughts/feelings acknowledged  Taken 3/24/2023 0800 by Ingrid Parson RN  Trust Relationship/Rapport:   care explained   questions answered   thoughts/feelings acknowledged  Goal: Readiness for Transition of Care  Outcome: Ongoing, Progressing     Problem: Fall Injury Risk  Goal: Absence of Fall and Fall-Related Injury  Outcome: Ongoing,  Progressing  Intervention: Identify and Manage Contributors  Recent Flowsheet Documentation  Taken 3/24/2023 1800 by Ingrid Parson RN  Medication Review/Management: medications reviewed  Taken 3/24/2023 1600 by Ingrid Parson RN  Medication Review/Management: medications reviewed  Taken 3/24/2023 1400 by Ingrid Parson RN  Medication Review/Management: medications reviewed  Taken 3/24/2023 1200 by Ingrid Parson RN  Medication Review/Management: medications reviewed  Taken 3/24/2023 1000 by Ingrid Parson RN  Medication Review/Management: medications reviewed  Taken 3/24/2023 0800 by Ingrid Parson RN  Medication Review/Management: medications reviewed  Intervention: Promote Injury-Free Environment  Recent Flowsheet Documentation  Taken 3/24/2023 1800 by Ingrid Parson RN  Safety Promotion/Fall Prevention: safety round/check completed  Taken 3/24/2023 1700 by Ingrid Parson RN  Safety Promotion/Fall Prevention: safety round/check completed  Taken 3/24/2023 1600 by Ingrid Parson RN  Safety Promotion/Fall Prevention: safety round/check completed  Taken 3/24/2023 1500 by Ingrid Parson RN  Safety Promotion/Fall Prevention: safety round/check completed  Taken 3/24/2023 1400 by Ingrid Parson RN  Safety Promotion/Fall Prevention: safety round/check completed  Taken 3/24/2023 1300 by Ingrid Parson RN  Safety Promotion/Fall Prevention: safety round/check completed  Taken 3/24/2023 1200 by Ingrid Parson RN  Safety Promotion/Fall Prevention: safety round/check completed  Taken 3/24/2023 1100 by Ingrid Parson RN  Safety Promotion/Fall Prevention: safety round/check completed  Taken 3/24/2023 1000 by Ingrid Parson RN  Safety Promotion/Fall Prevention: safety round/check completed  Taken 3/24/2023 0900 by Ingrid Parson RN  Safety Promotion/Fall Prevention: safety round/check completed  Taken 3/24/2023 0800 by Ingrid Parson RN  Safety Promotion/Fall Prevention: safety round/check completed  Taken  3/24/2023 0700 by Ingrid Parson RN  Safety Promotion/Fall Prevention: safety round/check completed     Problem: Skin Injury Risk Increased  Goal: Skin Health and Integrity  Outcome: Ongoing, Progressing  Intervention: Optimize Skin Protection  Recent Flowsheet Documentation  Taken 3/24/2023 1800 by Ingrid Parson RN  Head of Bed (HOB) Positioning: HOB at 30-45 degrees  Taken 3/24/2023 1400 by Ingrid Parson RN  Pressure Reduction Techniques: weight shift assistance provided  Head of Bed (HOB) Positioning: HOB at 30-45 degrees  Pressure Reduction Devices:   heel offloading device utilized   positioning supports utilized   pressure-redistributing mattress utilized  Skin Protection: tubing/devices free from skin contact  Taken 3/24/2023 1200 by Ingrid Parson RN  Head of Bed (HOB) Positioning: HOB at 30-45 degrees  Taken 3/24/2023 1000 by Ingrid Parson RN  Head of Bed (HOB) Positioning: HOB at 30-45 degrees  Taken 3/24/2023 0800 by Ingrid Parson RN  Pressure Reduction Techniques:   weight shift assistance provided   heels elevated off bed   frequent weight shift encouraged  Head of Bed (HOB) Positioning: HOB elevated  Pressure Reduction Devices:   heel offloading device utilized   positioning supports utilized   pressure-redistributing mattress utilized  Skin Protection:   tubing/devices free from skin contact   adhesive use limited   incontinence pads utilized

## 2023-03-24 NOTE — PROGRESS NOTES
[facility] HOSPITALIST PROGRESS NOTE     Patient Identification:  Name:  Christine Garg  Age:  89 y.o.  Sex:  female  :  1933  MRN:  [id number]  Visit Number:  [id number]  ROOM: [address]     Primary Care Provider:  Dave Tobar MD     Date of Admission: 3/22/2023    Length of stay in inpatient status:  2    Subjective     Chief Compliant:    Chief Complaint   Patient presents with   • Abdominal Pain     History of Presenting Illness: Patient was sitting in a bedside chair when I saw her today.  She states that she is still in pain but she is overall improved.  She denies nausea.  She does have diffuse abdominal pain that is dull.  She denies chest pain and denies trouble breathing.    Procedures:  3/22/2023:  Exploratory laparotomy with oversew of perforated duodenal ulcer with omental patch and Biopatch  3/22/2023:  Right IJ triple lumen central line  3/22/2023-3/24/2023:  Right nare 18 F nasogastric tube and 16 F Barriga catheter    Objective     Current Hospital Meds:  custom IV infusion builder, 100 mL/hr, Intravenous, Daily  heparin (porcine), 5,000 Units, Subcutaneous, Q12H  hydrALAZINE, 5 mg, Intravenous, Q6H  levothyroxine sodium, 100 mcg, Intravenous, Daily  magnesium sulfate, 4 g, Intravenous, Once  pantoprazole, 40 mg, Intravenous, Q12H  piperacillin-tazobactam, 3.375 g, Intravenous, Q8H  sodium chloride, 10 mL, Intravenous, Q12H  sodium chloride, 10 mL, Intravenous, Q12H  sodium chloride, 10 mL, Intravenous, Q12H  sodium chloride, 10 mL, Intravenous, Q12H  sodium chloride, 3 mL, Intravenous, Q12H  valsartan, 320 mg, Oral, Daily    dextrose 5 % and sodium chloride 0.9 %, 100 mL/hr, Last Rate: 100 mL/hr (23 0815)      Current Antimicrobial Therapy:  Anti-Infectives (From admission, onward)    Ordered     Dose/Rate Route Frequency Start Stop    23 0849  piperacillin-tazobactam (ZOSYN) IVPB 3.375 g in 100 mL NS VTB        Ordering Provider: Aurora Kirkland MD    3.375  g  over 4 Hours Intravenous Every 8 Hours 03/23/23 1600 03/28/23 1559    03/23/23 0849  piperacillin-tazobactam (ZOSYN) IVPB 3.375 g in 100 mL NS VTB        Ordering Provider: Aurora Kirkland MD    3.375 g  over 30 Minutes Intravenous Once 03/23/23 0945 03/23/23 1026    03/22/23 1207  piperacillin-tazobactam (ZOSYN) IVPB 3.375 g in 100 mL NS VTB        Ordering Provider: Alejandrina Gonzalez PA    3.375 g  over 30 Minutes Intravenous Once 03/22/23 1209 03/22/23 1329    03/22/23 1207  vancomycin 1250 mg/250 mL 0.9% NS IVPB ([facility])        Ordering Provider: Alejandrina Gonzalez PA    20 mg/kg × 56.7 kg Intravenous Once 03/22/23 1209 03/22/23 1505        Current Diuretic Therapy:  Diuretics (From admission, onward)    None        ----------------------------------------------------------------------------------------------------------------------  Vital Signs:  Temp:  [98.2 °F (36.8 °C)-99.6 °F (37.6 °C)] 98.2 °F (36.8 °C)  Heart Rate:  [] 105  Resp:  [12-20] 16  BP: (137-192)/(53-85) 183/69  SpO2:  [93 %-96 %] 95 %  on   ;   Device (Oxygen Therapy): room air  Body mass index is 26.38 kg/m².    Wt Readings from Last 3 Encounters:   03/22/23 71.9 kg (158 lb 8 oz)   01/05/23 68 kg (150 lb)   12/25/22 73.9 kg (163 lb)     Intake & Output (last 3 days)       03/21 0701 03/22 0700 03/22 0701 03/23 0700 03/23 0701 03/24 0700 03/24 0701 03/25 0700    I.V. (mL/kg)  1025 (14.3) 548.3 (7.6)     IV Piggyback  2050 300     Total Intake(mL/kg)  3075 (42.8) 848.3 (11.8)     Urine (mL/kg/hr)  950 750 (0.4)     Emesis/NG output  290 300     Drains  150 95     Total Output  1390 1145     Net  +1685 -296.7                 NPO Diet NPO Type: Strict NPO  ----------------------------------------------------------------------------------------------------------------------  Physical Exam; her exam is same as yesterday; however, the nasogastric tube has been removed.  Cardiovascular:      Rate and Rhythm: Normal rate and regular  rhythm.      Pulses: Normal pulses.      Heart sounds: No murmur heard.  Pulmonary:      Effort: Pulmonary effort is normal. No respiratory distress.      Breath sounds: No wheezing or rales.   Abdominal:      General: Bowel sounds are slightly decreased. There is no distension.      Tenderness: There is generalized abdominal tenderness but less than yesterday.   Musculoskeletal:         General: No swelling, deformity or signs of injury.   Neurological:      Mental Status: She is alert and oriented to person, place, and time. Mental status is at baseline.      Cranial Nerves: No cranial nerve deficit.   Psychiatric:         Mood and Affect: Mood normal.         Behavior: Behavior normal. Behavior is cooperative.         Thought Content: Thought content normal.         Judgment: Judgment normal.    ----------------------------------------------------------------------------------------------------------------------  Tele:  Sinus tachycardia 's.  I have personally reviewed/looked at the telemetry strips.  ----------------------------------------------------------------------------------------------------------------------  LABS:    CBC and coagulation:  Results from last 7 days   Lab Units 03/24/23  0032 03/23/23  0400 03/22/23  2212 03/22/23  1909 03/22/23  1435 03/22/23  1139   LACTATE mmol/L  --  1.8 2.5* 2.3* 2.2* 3.5*   CRP mg/dL  --   --   --   --   --  3.44*   WBC 10*3/mm3 16.62* 17.35*  --   --   --  30.99*   HEMOGLOBIN g/dL 10.9* 12.1  --   --   --  15.9   HEMATOCRIT % 32.8* 36.2  --   --   --  46.3   MCV fL 93.2 93.1  --   --   --  92.4   MCHC g/dL 33.2 33.4  --   --   --  34.3   PLATELETS 10*3/mm3 318 365  --   --   --  468*     Renal and electrolytes:  Results from last 7 days   Lab Units 03/24/23  0032 03/23/23  0400 03/22/23  1139   SODIUM mmol/L 130* 129* 128*   POTASSIUM mmol/L 3.7 4.8 3.3*   MAGNESIUM mg/dL 2.0 1.7 1.8   CHLORIDE mmol/L 100 98 87*   CO2 mmol/L 22.2 23.1 21.3*   BUN mg/dL 14 22  23   CREATININE mg/dL 0.46* 0.70 0.85   CALCIUM mg/dL 8.1* 8.5* 10.0   PHOSPHORUS mg/dL 1.4* 3.1  --    GLUCOSE mg/dL 123* 139* 139*     Estimated Creatinine Clearance: 82.5 mL/min (A) (by C-G formula based on SCr of 0.46 mg/dL (L)).    Liver and pancreatic function:  Results from last 7 days   Lab Units 03/24/23  0032 03/22/23  1139   ALBUMIN g/dL 2.5* 3.9   BILIRUBIN mg/dL 0.4 0.6   ALK PHOS U/L 55 88   AST (SGOT) U/L 11 17   ALT (SGPT) U/L 12 29   LIPASE U/L  --  84*     Endocrine function:  Lab Results   Component Value Date    HGBA1C 6.10 (H) 03/22/2023     Glucose levels from the CMP:  Results from last 7 days   Lab Units 03/24/23  0032 03/23/23  0400 03/22/23  1139   GLUCOSE mg/dL 123* 139* 139*       Cultures:  Microbiology Results (last 10 days)     Procedure Component Value - Date/Time    Wound Culture - Surgical Site, Peritoneum [[id number]] Collected: 03/22/23 1733    Lab Status: Preliminary result Specimen: Surgical Site from Peritoneum Updated: 03/24/23 1402     Wound Culture No growth at 2 days     Gram Stain Moderate (3+) WBCs seen      No organisms seen    Blood Culture - Blood, Arm, Left [[id number]]  (Normal) Collected: 03/22/23 1200    Lab Status: Preliminary result Specimen: Blood from Arm, Left Updated: 03/24/23 1216     Blood Culture No growth at 2 days    Blood Culture - Blood, Hand, Left [[id number]]  (Normal) Collected: 03/22/23 1200    Lab Status: Preliminary result Specimen: Blood from Hand, Left Updated: 03/24/23 1216     Blood Culture No growth at 2 days       I have personally looked at the labs and they are summarized above.    Assessment & Plan      -Severe sepsis that was present on admission (temperature 36 degrees C, lactic acid 3.5, CRP 3.44, WBC 30,990, heart rates ) due to a suspected perforated antral gastric ulcer  -Acute hypokalemia and acute hypomagnesemia and acute hypophosphatemia  -History of coronary artery disease s/p 3v CABG in 2011  -History of chronic kidney  disease stage IIIa with baseline Cr 0.6-0.85  -History of essential hypertension  -History of hypothyroidism, currently controlled  -History of hyperlipidemia  -History of left upper lobe pleural based pulmonary nodule that appears to be invading the T6 vertebra  -Advanced age  -Prior tobacco smoking history    I will increase the hydralazine to 10 mg IV every 6 hours as her blood pressures are still uncontrolled and the patient is still not allowed to have anything by mouth.  I will continue to monitor her electrolytes closely.  Potassium, magnesium, and phosphorus replacement have already been ordered by general surgery.  I will order repeat labs in the morning and we will continue to monitor.  The patient with the general surgery team    VTE Prophylaxis:   Mechanical Order History:      Ordered        03/22/23 1802  Place sequential compression device  Once                    Pharmalogical Order History:      Ordered     Dose Route Frequency Stop    03/22/23 1802  heparin (porcine) 5000 UNIT/ML injection 5,000 Units         5,000 Units SC Every 12 Hours Scheduled --            The patient is high risk due to the following diagnoses/reasons:  Severe sepsis, perforated antral gastric ulcer     Disposition:  Undetermined at this time    Kiara Servin MD  [facility] Hospitalist  03/24/23  14:51 EDT

## 2023-03-25 LAB
ANION GAP SERPL CALCULATED.3IONS-SCNC: 10.2 MMOL/L (ref 5–15)
BACTERIA SPEC AEROBE CULT: NORMAL
BUN SERPL-MCNC: 9 MG/DL (ref 8–23)
BUN/CREAT SERPL: 25 (ref 7–25)
CALCIUM SPEC-SCNC: 8.2 MG/DL (ref 8.6–10.5)
CHLORIDE SERPL-SCNC: 102 MMOL/L (ref 98–107)
CO2 SERPL-SCNC: 20.8 MMOL/L (ref 22–29)
CREAT SERPL-MCNC: 0.36 MG/DL (ref 0.57–1)
DEPRECATED RDW RBC AUTO: 50.4 FL (ref 37–54)
EGFRCR SERPLBLD CKD-EPI 2021: 97.2 ML/MIN/1.73
ERYTHROCYTE [DISTWIDTH] IN BLOOD BY AUTOMATED COUNT: 14.2 % (ref 12.3–15.4)
GLUCOSE SERPL-MCNC: 125 MG/DL (ref 65–99)
GRAM STN SPEC: NORMAL
GRAM STN SPEC: NORMAL
HCT VFR BLD AUTO: 34.1 % (ref 34–46.6)
HGB BLD-MCNC: 11.2 G/DL (ref 12–15.9)
MAGNESIUM SERPL-MCNC: 1.8 MG/DL (ref 1.6–2.4)
MCH RBC QN AUTO: 31.4 PG (ref 26.6–33)
MCHC RBC AUTO-ENTMCNC: 32.8 G/DL (ref 31.5–35.7)
MCV RBC AUTO: 95.5 FL (ref 79–97)
PHOSPHATE SERPL-MCNC: 1.8 MG/DL (ref 2.5–4.5)
PHOSPHATE SERPL-MCNC: 1.9 MG/DL (ref 2.5–4.5)
PLATELET # BLD AUTO: 327 10*3/MM3 (ref 140–450)
PMV BLD AUTO: 9.9 FL (ref 6–12)
POTASSIUM SERPL-SCNC: 3.4 MMOL/L (ref 3.5–5.2)
POTASSIUM SERPL-SCNC: 3.8 MMOL/L (ref 3.5–5.2)
RBC # BLD AUTO: 3.57 10*6/MM3 (ref 3.77–5.28)
SODIUM SERPL-SCNC: 133 MMOL/L (ref 136–145)
WBC NRBC COR # BLD: 19 10*3/MM3 (ref 3.4–10.8)

## 2023-03-25 PROCEDURE — 85027 COMPLETE CBC AUTOMATED: CPT | Performed by: SURGERY

## 2023-03-25 PROCEDURE — 84100 ASSAY OF PHOSPHORUS: CPT | Performed by: SURGERY

## 2023-03-25 PROCEDURE — 0 POTASSIUM CHLORIDE 10 MEQ/100ML SOLUTION: Performed by: SURGERY

## 2023-03-25 PROCEDURE — 25010000002 MORPHINE PER 10 MG: Performed by: SURGERY

## 2023-03-25 PROCEDURE — 84132 ASSAY OF SERUM POTASSIUM: CPT | Performed by: SURGERY

## 2023-03-25 PROCEDURE — 0 MAGNESIUM SULFATE 4 GM/100ML SOLUTION: Performed by: SURGERY

## 2023-03-25 PROCEDURE — 80048 BASIC METABOLIC PNL TOTAL CA: CPT | Performed by: SURGERY

## 2023-03-25 PROCEDURE — 25010000002 HYDRALAZINE PER 20 MG: Performed by: INTERNAL MEDICINE

## 2023-03-25 PROCEDURE — 25010000002 ONDANSETRON PER 1 MG: Performed by: SURGERY

## 2023-03-25 PROCEDURE — 94799 UNLISTED PULMONARY SVC/PX: CPT

## 2023-03-25 PROCEDURE — 99232 SBSQ HOSP IP/OBS MODERATE 35: CPT | Performed by: INTERNAL MEDICINE

## 2023-03-25 PROCEDURE — 83735 ASSAY OF MAGNESIUM: CPT | Performed by: INTERNAL MEDICINE

## 2023-03-25 PROCEDURE — 25010000002 PIPERACILLIN SOD-TAZOBACTAM PER 1 G: Performed by: SURGERY

## 2023-03-25 PROCEDURE — 84100 ASSAY OF PHOSPHORUS: CPT | Performed by: INTERNAL MEDICINE

## 2023-03-25 PROCEDURE — 25010000002 HEPARIN (PORCINE) PER 1000 UNITS: Performed by: SURGERY

## 2023-03-25 RX ORDER — LABETALOL HYDROCHLORIDE 5 MG/ML
10 INJECTION, SOLUTION INTRAVENOUS EVERY 6 HOURS PRN
Status: DISCONTINUED | OUTPATIENT
Start: 2023-03-25 | End: 2023-04-10 | Stop reason: HOSPADM

## 2023-03-25 RX ORDER — MAGNESIUM SULFATE HEPTAHYDRATE 40 MG/ML
4 INJECTION, SOLUTION INTRAVENOUS ONCE
Status: COMPLETED | OUTPATIENT
Start: 2023-03-25 | End: 2023-03-25

## 2023-03-25 RX ORDER — POTASSIUM CHLORIDE 7.45 MG/ML
10 INJECTION INTRAVENOUS
Status: COMPLETED | OUTPATIENT
Start: 2023-03-25 | End: 2023-03-25

## 2023-03-25 RX ADMIN — HEPARIN SODIUM 5000 UNITS: 5000 INJECTION INTRAVENOUS; SUBCUTANEOUS at 20:38

## 2023-03-25 RX ADMIN — Medication 10 ML: at 08:11

## 2023-03-25 RX ADMIN — DEXTROSE AND SODIUM CHLORIDE 100 ML/HR: 5; 900 INJECTION, SOLUTION INTRAVENOUS at 08:14

## 2023-03-25 RX ADMIN — LEVOTHYROXINE SODIUM ANHYDROUS 100 MCG: 100 INJECTION, POWDER, LYOPHILIZED, FOR SOLUTION INTRAVENOUS at 10:32

## 2023-03-25 RX ADMIN — HYDRALAZINE HYDROCHLORIDE 10 MG: 20 INJECTION INTRAMUSCULAR; INTRAVENOUS at 10:26

## 2023-03-25 RX ADMIN — HYDRALAZINE HYDROCHLORIDE 10 MG: 20 INJECTION INTRAMUSCULAR; INTRAVENOUS at 22:48

## 2023-03-25 RX ADMIN — PANTOPRAZOLE SODIUM 40 MG: 40 INJECTION, POWDER, FOR SOLUTION INTRAVENOUS at 20:37

## 2023-03-25 RX ADMIN — HEPARIN SODIUM 5000 UNITS: 5000 INJECTION INTRAVENOUS; SUBCUTANEOUS at 08:08

## 2023-03-25 RX ADMIN — PIPERACILLIN SODIUM AND TAZOBACTAM SODIUM 3.38 G: 3; .375 INJECTION, POWDER, LYOPHILIZED, FOR SOLUTION INTRAVENOUS at 15:18

## 2023-03-25 RX ADMIN — Medication 10 ML: at 20:39

## 2023-03-25 RX ADMIN — POTASSIUM PHOSPHATE, MONOBASIC AND POTASSIUM PHOSPHATE, DIBASIC 15 MMOL: 224; 236 INJECTION, SOLUTION, CONCENTRATE INTRAVENOUS at 17:45

## 2023-03-25 RX ADMIN — LIDOCAINE 1 PATCH: 700 PATCH TOPICAL at 08:11

## 2023-03-25 RX ADMIN — PIPERACILLIN SODIUM AND TAZOBACTAM SODIUM 3.38 G: 3; .375 INJECTION, POWDER, LYOPHILIZED, FOR SOLUTION INTRAVENOUS at 08:10

## 2023-03-25 RX ADMIN — HYDRALAZINE HYDROCHLORIDE 10 MG: 20 INJECTION INTRAMUSCULAR; INTRAVENOUS at 15:18

## 2023-03-25 RX ADMIN — POTASSIUM CHLORIDE 10 MEQ: 7.46 INJECTION, SOLUTION INTRAVENOUS at 03:34

## 2023-03-25 RX ADMIN — MORPHINE SULFATE 2 MG: 2 INJECTION, SOLUTION INTRAMUSCULAR; INTRAVENOUS at 13:55

## 2023-03-25 RX ADMIN — MORPHINE SULFATE 2 MG: 2 INJECTION, SOLUTION INTRAMUSCULAR; INTRAVENOUS at 20:38

## 2023-03-25 RX ADMIN — PANTOPRAZOLE SODIUM 40 MG: 40 INJECTION, POWDER, FOR SOLUTION INTRAVENOUS at 08:10

## 2023-03-25 RX ADMIN — MAGNESIUM SULFATE HEPTAHYDRATE 4 G: 40 INJECTION, SOLUTION INTRAVENOUS at 03:34

## 2023-03-25 RX ADMIN — POTASSIUM PHOSPHATE, MONOBASIC AND POTASSIUM PHOSPHATE, DIBASIC 15 MMOL: 224; 236 INJECTION, SOLUTION, CONCENTRATE INTRAVENOUS at 06:21

## 2023-03-25 RX ADMIN — MORPHINE SULFATE 2 MG: 2 INJECTION, SOLUTION INTRAMUSCULAR; INTRAVENOUS at 02:05

## 2023-03-25 RX ADMIN — PIPERACILLIN SODIUM AND TAZOBACTAM SODIUM 3.38 G: 3; .375 INJECTION, POWDER, LYOPHILIZED, FOR SOLUTION INTRAVENOUS at 01:22

## 2023-03-25 RX ADMIN — ONDANSETRON 4 MG: 2 INJECTION INTRAMUSCULAR; INTRAVENOUS at 20:47

## 2023-03-25 RX ADMIN — HYDRALAZINE HYDROCHLORIDE 10 MG: 20 INJECTION INTRAMUSCULAR; INTRAVENOUS at 03:35

## 2023-03-25 RX ADMIN — Medication 10 ML: at 20:38

## 2023-03-25 RX ADMIN — Medication 3 ML: at 08:11

## 2023-03-25 RX ADMIN — ONDANSETRON 4 MG: 2 INJECTION INTRAMUSCULAR; INTRAVENOUS at 13:55

## 2023-03-25 RX ADMIN — Medication 3 ML: at 20:39

## 2023-03-25 RX ADMIN — MORPHINE SULFATE 2 MG: 2 INJECTION, SOLUTION INTRAMUSCULAR; INTRAVENOUS at 08:46

## 2023-03-25 RX ADMIN — ASCORBIC ACID, VITAMIN A PALMITATE, CHOLECALCIFEROL, THIAMINE HYDROCHLORIDE, RIBOFLAVIN-5 PHOSPHATE SODIUM, PYRIDOXINE HYDROCHLORIDE, NIACINAMIDE, DEXPANTHENOL, ALPHA-TOCOPHEROL ACETATE, VITAMIN K1, FOLIC ACID, BIOTIN, CYANOCOBALAMIN 100 ML/HR: 200; 3300; 200; 6; 3.6; 6; 40; 15; 10; 150; 600; 60; 5 INJECTION, SOLUTION INTRAVENOUS at 19:36

## 2023-03-25 RX ADMIN — POTASSIUM CHLORIDE 10 MEQ: 7.46 INJECTION, SOLUTION INTRAVENOUS at 04:31

## 2023-03-25 RX ADMIN — ONDANSETRON 4 MG: 2 INJECTION INTRAMUSCULAR; INTRAVENOUS at 08:45

## 2023-03-25 NOTE — PROGRESS NOTES
Postop day 3    Patient up in the chair this morning.  Barriga catheter has been removed last night.  She reports some abdominal and back pain, was given a dose of morphine.  She reported some nausea without vomiting and was given Zofran.    Afebrile, vital stable    No acute distress  Abdomen is soft and appropriately tender to palpation.  Incision is clean, dry and intact with laurie in place.  SARAH drain is serosanguineous     White blood cell count increased slightly    89-year-old female postop day 3 status post exploratory laparotomy and Bin patch repair of perforated duodenal ulcer.    Continue twice daily PPI  We will trial ice chips until the patient's nausea subsides.  Continue to monitor SARAH drain quality  Continue antibiotics for 5 days postop  Likely stable for de-escalation to floor

## 2023-03-25 NOTE — PLAN OF CARE
Goal Outcome Evaluation:  Plan of Care Reviewed With: patient        Progress: no change  Outcome Evaluation: Patient is alert and oriented, afebrile, SR to ST  tonight. BP elevated, meds given as ordered. Morphine for pain prn. SARAH drain striped, and has seroussang. drainage, 20cc total. UOP adequate. Surgical dressings changed, CHG bath complete. Picture taken and uploaded to [facility], this has been a previous wound. Patient has been up to BSC with assist. Potassium, Mag and Phos replacement has been ordered for low labs. Screening positive for simple sepsis r/t WBC increasing to 19.

## 2023-03-25 NOTE — PROGRESS NOTES
[facility] HOSPITALIST PROGRESS NOTE     Patient Identification:  Name:  Christine Garg  Age:  89 y.o.  Sex:  female  :  1933  MRN:  [id number]  Visit Number:  [id number]  ROOM: [address]     Primary Care Provider:  Dave Tobar MD     Date of Admission: 3/22/2023    Length of stay in inpatient status:  3    Subjective     Chief Compliant:    Chief Complaint   Patient presents with   • Abdominal Pain     History of Presenting Illness:  The patient is doing well so far with ice chips.  No flatus yet and no BM yet.  The abdominal pain has improved.  The patient denies chest pain, denies shortness of air, denies nausea, denies emesis despite the NG tube being removed recently.      Objective     Current Hospital Meds:  custom IV infusion builder, 100 mL/hr, Intravenous, Q24H  heparin (porcine), 5,000 Units, Subcutaneous, Q12H  hydrALAZINE, 10 mg, Intravenous, Q6H  levothyroxine sodium, 100 mcg, Intravenous, Daily  lidocaine, 1 patch, Transdermal, Q24H  magnesium sulfate, 4 g, Intravenous, Once  pantoprazole, 40 mg, Intravenous, Q12H  piperacillin-tazobactam, 3.375 g, Intravenous, Q8H  sodium chloride, 10 mL, Intravenous, Q12H  sodium chloride, 10 mL, Intravenous, Q12H  sodium chloride, 10 mL, Intravenous, Q12H  sodium chloride, 10 mL, Intravenous, Q12H  sodium chloride, 3 mL, Intravenous, Q12H    dextrose 5 % and sodium chloride 0.9 %, 100 mL/hr, Last Rate: 100 mL/hr (23 0814)      Current Antimicrobial Therapy:  Anti-Infectives (From admission, onward)    Ordered     Dose/Rate Route Frequency Start Stop    23 0849  piperacillin-tazobactam (ZOSYN) IVPB 3.375 g in 100 mL NS VTB        Ordering Provider: Aurora Kirkland MD    3.375 g  over 4 Hours Intravenous Every 8 Hours 23 1600 23 1559    23 0849  piperacillin-tazobactam (ZOSYN) IVPB 3.375 g in 100 mL NS VTB        Ordering Provider: Aurora Kirkland MD    3.375 g  over 30 Minutes Intravenous Once 23  0945 03/23/23 1026    03/22/23 1207  piperacillin-tazobactam (ZOSYN) IVPB 3.375 g in 100 mL NS VTB        Ordering Provider: Alejandrina Gonzalez PA    3.375 g  over 30 Minutes Intravenous Once 03/22/23 1209 03/22/23 1329    03/22/23 1207  vancomycin 1250 mg/250 mL 0.9% NS IVPB ([facility])        Ordering Provider: Alejandrina Gonzalez PA    20 mg/kg × 56.7 kg Intravenous Once 03/22/23 1209 03/22/23 1505        Current Diuretic Therapy:  Diuretics (From admission, onward)    None        ----------------------------------------------------------------------------------------------------------------------  Vital Signs:  Temp:  [97.1 °F (36.2 °C)-99.2 °F (37.3 °C)] 99.2 °F (37.3 °C)  Heart Rate:  [] 102  Resp:  [14-27] 18  BP: (144-204)/() 185/81  SpO2:  [92 %-98 %] 96 %  on   ;   Device (Oxygen Therapy): room air  Body mass index is 25.91 kg/m².    Wt Readings from Last 3 Encounters:   03/25/23 70.6 kg (155 lb 11.2 oz)   01/05/23 68 kg (150 lb)   12/25/22 73.9 kg (163 lb)     Intake & Output (last 3 days)       03/22 0701 03/23 0700 03/23 0701 03/24 0700 03/24 0701 03/25 0700 03/25 0701 03/26 0700    P.O.   0     I.V. (mL/kg) 1025 (14.3) 548.3 (7.6) 990 (14)     IV Piggyback 2050 300 300 100    Total Intake(mL/kg) 3075 (42.8) 848.3 (11.8) 1290 (18.3) 100 (1.4)    Urine (mL/kg/hr) 950 750 (0.4) 250 (0.1) 300 (1.1)    Emesis/NG output 290 300 0     Drains 150 95 20     Stool   0     Total Output 1390 1145 270 300    Net +1685 -296.7 +1020 -200            Stool Unmeasured Occurrence   0 x     Emesis Unmeasured Occurrence   0 x         NPO Diet NPO Type: Ice Chips  ----------------------------------------------------------------------------------------------------------------------  Physical Exam; same as yesterday.   Cardiovascular:      Rate and Rhythm: Normal rate and regular rhythm.      Pulses: Normal pulses.      Heart sounds: No murmur heard.  Pulmonary:      Effort: Pulmonary effort is normal. No respiratory  distress.      Breath sounds: No wheezing or rales.   Abdominal:      General: Bowel sounds are slightly decreased. There is no distension.      Tenderness: There is generalized abdominal tenderness but less than yesterday.   Musculoskeletal:         General: No swelling, deformity or signs of injury.   Neurological:      Mental Status: She is alert and oriented to person, place, and time. Mental status is at baseline.      Cranial Nerves: No cranial nerve deficit.   Psychiatric:         Mood and Affect: Mood normal.         Behavior: Behavior normal. Behavior is cooperative.         Thought Content: Thought content normal.         Judgment: Judgment normal.     ----------------------------------------------------------------------------------------------------------------------  Tele:  Sinus tachycardia with heart rates 's.  I have personally reviewed/looked at the telemetry strips.  ----------------------------------------------------------------------------------------------------------------------  LABS:    CBC and coagulation:  Results from last 7 days   Lab Units 03/25/23  0040 03/24/23  0032 03/23/23  0400 03/22/23  2212 03/22/23  1909 03/22/23  1435 03/22/23  1139   LACTATE mmol/L  --   --  1.8 2.5* 2.3* 2.2* 3.5*   CRP mg/dL  --   --   --   --   --   --  3.44*   WBC 10*3/mm3 19.00* 16.62* 17.35*  --   --   --  30.99*   HEMOGLOBIN g/dL 11.2* 10.9* 12.1  --   --   --  15.9   HEMATOCRIT % 34.1 32.8* 36.2  --   --   --  46.3   MCV fL 95.5 93.2 93.1  --   --   --  92.4   MCHC g/dL 32.8 33.2 33.4  --   --   --  34.3   PLATELETS 10*3/mm3 327 318 365  --   --   --  468*     Acid/base balance:  Results from last 7 days   Lab Units 03/22/23  1145   PH, ARTERIAL pH units 7.424   PO2 ART mm Hg 80.1*   PCO2, ARTERIAL mm Hg 30.6*   HCO3 ART mmol/L 20.0     Renal and electrolytes:  Results from last 7 days   Lab Units 03/25/23  0040 03/24/23  0032 03/23/23  0400 03/22/23  1139   SODIUM mmol/L 133* 130* 129* 128*    POTASSIUM mmol/L 3.4* 3.7 4.8 3.3*   MAGNESIUM mg/dL 1.8 2.0 1.7 1.8   CHLORIDE mmol/L 102 100 98 87*   CO2 mmol/L 20.8* 22.2 23.1 21.3*   BUN mg/dL 9 14 22 23   CREATININE mg/dL 0.36* 0.46* 0.70 0.85   CALCIUM mg/dL 8.2* 8.1* 8.5* 10.0   PHOSPHORUS mg/dL 1.8* 1.4* 3.1  --    GLUCOSE mg/dL 125* 123* 139* 139*     Estimated Creatinine Clearance: 104.4 mL/min (A) (by C-G formula based on SCr of 0.36 mg/dL (L)).    Liver and pancreatic function:  Results from last 7 days   Lab Units 03/24/23  0032 03/22/23  1139   ALBUMIN g/dL 2.5* 3.9   BILIRUBIN mg/dL 0.4 0.6   ALK PHOS U/L 55 88   AST (SGOT) U/L 11 17   ALT (SGPT) U/L 12 29   LIPASE U/L  --  84*     Endocrine function:  Lab Results   Component Value Date    HGBA1C 6.10 (H) 03/22/2023     Glucose levels from the CMP:  Results from last 7 days   Lab Units 03/25/23  0040 03/24/23  0032 03/23/23  0400 03/22/23  1139   GLUCOSE mg/dL 125* 123* 139* 139*     Lab Results   Component Value Date    TSH 1.120 12/19/2022    FREET4 2.07 (H) 12/19/2022     Cardiac:  Results from last 7 days   Lab Units 03/22/23  1336 03/22/23  1139   HSTROP T ng/L 29* 25*   PROBNP pg/mL  --  963.1       Cultures:  Microbiology Results (last 10 days)     Procedure Component Value - Date/Time    Wound Culture - Surgical Site, Peritoneum [[id number]] Collected: 03/22/23 1733    Lab Status: Preliminary result Specimen: Surgical Site from Peritoneum Updated: 03/24/23 1402     Wound Culture No growth at 2 days     Gram Stain Moderate (3+) WBCs seen      No organisms seen    Blood Culture - Blood, Arm, Left [[id number]]  (Normal) Collected: 03/22/23 1200    Lab Status: Preliminary result Specimen: Blood from Arm, Left Updated: 03/24/23 1216     Blood Culture No growth at 2 days    Blood Culture - Blood, Hand, Left [[id number]]  (Normal) Collected: 03/22/23 1200    Lab Status: Preliminary result Specimen: Blood from Hand, Left Updated: 03/24/23 1216     Blood Culture No growth at 2 days    COVID-19 and  FLU A/B PCR - Swab, Nasopharynx [[id number]]  (Normal) Collected: 03/22/23 1142    Lab Status: Final result Specimen: Swab from Nasopharynx Updated: 03/22/23 1230     COVID19 Not Detected     Influenza A PCR Not Detected     Influenza B PCR Not Detected    Narrative:      Fact sheet for providers: https://www.fda.gov/media/603680/download    Fact sheet for patients: https://www.fda.gov/media/910629/download    Test performed by PCR.      I have personally looked at the labs and they are summarized above.    Assessment & Plan      -Severe sepsis that was present on admission (temperature 36 degrees C, lactic acid 3.5, CRP 3.44, WBC 30,990, heart rates ) due to a suspected perforated antral gastric ulcer  -Acute hypokalemia and acute hypomagnesemia and acute hypophosphatemia, improved with supplementation  -History of coronary artery disease s/p 3v CABG in 2011  -History of chronic kidney disease stage IIIa with baseline Cr 0.6-0.85   -History of essential hypertension  -History of hypothyroidism, currently controlled  -History of hyperlipidemia  -History of left upper lobe pleural based pulmonary nodule that appears to be invading the T6 vertebra  -Advanced age  -Prior tobacco smoking history    Will continue with the current dose of antihypertensives scheduled but will give prn labetalol.  I am being cautious about scheduled antihypertensives as the patient is needing pain and nausea medication, indicating that some of her elevated levels could be pain driven.  Plus, the patient needs a higher blood pressure to prevent any orthostatic issues when she is transitioning from a bed to a chair and vice versa.  Once the patient is able to take pills by mouth, then we will restart her home medications.  Please note that the patient is currently on ice chips only.  We will continue monitoring her electrolytes and replacing them per our protocol.  We will repeat the blood work morning.    VTE Prophylaxis:   Mechanical  Order History:      Ordered        03/22/23 1802  Place sequential compression device  Once                    Pharmalogical Order History:      Ordered     Dose Route Frequency Stop    03/22/23 1802  heparin (porcine) 5000 UNIT/ML injection 5,000 Units         5,000 Units SC Every 12 Hours Scheduled --            The patient is high risk due to the following diagnoses/reasons:  Severe sepsis, perforated antral gastric ulcer     Disposition:  Undetermined at this time    Kiara Servin MD  [facility] Hospitalist  03/25/23  10:46 EDT

## 2023-03-25 NOTE — PLAN OF CARE
Goal Outcome Evaluation:      Patient continues to show gradual improvement with mobility, pain management.  Abdominal binder in place for comfort/support.  SARAH drain in place.  External catheter in place per pt preference; however, pt uses bedside commode with assist of two.  BP elevated at times; scheduled hydralazine given.  PRN morphine and zofran per order.  Son at bedside and updated on status.

## 2023-03-25 NOTE — PROGRESS NOTES
Nurse Practitioner - Brief Progress Note  PERMANENT  03/25/2023 04:36    [facility] [place] [facility] [place] ([facility]    AMANDA GOMEZ    Date of Service 03/25/2023 04:36    HPI/Events of Note [facility] Provider Intervention Note    Pt s/p Exploratory laparotomy with oversew of perforated duodenal ulcer with omental patch and Biopatch on 3/22/2023.  WBC 30.99 -> 19, temp 98.1 F, on Zosyn, D5NS at 100mL/hr, NPO, right IJ central line 3/22/2023, abdominal incision well approximated,   SARAH drain. Lactate 3.5 -> 1.8. Potassium 3.4, Phos 1.8, magnesium 1.8, creatinine 0.36. On electrolyte replacement protocol. Current VS:  ST, /76, RR 17, O2 sat 92% on room air, GCS 15. Pain is under controlled with Morphine PRN. Will continue   to monitor.       __Y___   Video Assessment performed    D/W bedside RN. Contact [facility] for any needs if bedside physician is not present.      Interventions Intermediate-Communication with other healthcare providers and/or family        Electronically Signed by: Mer Baltazar (ANP,CCRN) on 03/25/2023 04:47

## 2023-03-26 ENCOUNTER — APPOINTMENT (OUTPATIENT)
Dept: CARDIOLOGY | Facility: HOSPITAL | Age: 88
DRG: 853 | End: 2023-03-26
Payer: MEDICARE

## 2023-03-26 ENCOUNTER — APPOINTMENT (OUTPATIENT)
Dept: GENERAL RADIOLOGY | Facility: HOSPITAL | Age: 88
DRG: 853 | End: 2023-03-26
Payer: MEDICARE

## 2023-03-26 LAB
ALBUMIN SERPL-MCNC: 2.3 G/DL (ref 3.5–5.2)
ALBUMIN/GLOB SERPL: 0.9 G/DL
ALP SERPL-CCNC: 106 U/L (ref 39–117)
ALT SERPL W P-5'-P-CCNC: 36 U/L (ref 1–33)
ANION GAP SERPL CALCULATED.3IONS-SCNC: 8.4 MMOL/L (ref 5–15)
AST SERPL-CCNC: 30 U/L (ref 1–32)
BASOPHILS # BLD AUTO: 0.03 10*3/MM3 (ref 0–0.2)
BASOPHILS NFR BLD AUTO: 0.2 % (ref 0–1.5)
BH CV ECHO MEAS - ACS: 1.9 CM
BH CV ECHO MEAS - AO MAX PG: 7.2 MMHG
BH CV ECHO MEAS - AO MEAN PG: 4 MMHG
BH CV ECHO MEAS - AO ROOT DIAM: 2.7 CM
BH CV ECHO MEAS - AO V2 MAX: 134 CM/SEC
BH CV ECHO MEAS - AO V2 VTI: 25.6 CM
BH CV ECHO MEAS - AVA(I,D): 3 CM2
BH CV ECHO MEAS - EDV(CUBED): 59.3 ML
BH CV ECHO MEAS - EDV(MOD-SP4): 52.2 ML
BH CV ECHO MEAS - EF(MOD-BP): 60 %
BH CV ECHO MEAS - EF(MOD-SP4): 60.3 %
BH CV ECHO MEAS - ESV(CUBED): 17.6 ML
BH CV ECHO MEAS - ESV(MOD-SP4): 20.7 ML
BH CV ECHO MEAS - FS: 33.3 %
BH CV ECHO MEAS - IVS/LVPW: 0.85 CM
BH CV ECHO MEAS - IVSD: 0.85 CM
BH CV ECHO MEAS - LAT PEAK E' VEL: 9.5 CM/SEC
BH CV ECHO MEAS - LV DIASTOLIC VOL/BSA (35-75): 29.9 CM2
BH CV ECHO MEAS - LV MASS(C)D: 109.4 GRAMS
BH CV ECHO MEAS - LV MAX PG: 5.9 MMHG
BH CV ECHO MEAS - LV MEAN PG: 3 MMHG
BH CV ECHO MEAS - LV SYSTOLIC VOL/BSA (12-30): 11.9 CM2
BH CV ECHO MEAS - LV V1 MAX: 121 CM/SEC
BH CV ECHO MEAS - LV V1 VTI: 24.7 CM
BH CV ECHO MEAS - LVIDD: 3.9 CM
BH CV ECHO MEAS - LVIDS: 2.6 CM
BH CV ECHO MEAS - LVOT AREA: 3.1 CM2
BH CV ECHO MEAS - LVOT DIAM: 2 CM
BH CV ECHO MEAS - LVPWD: 1 CM
BH CV ECHO MEAS - MED PEAK E' VEL: 6.7 CM/SEC
BH CV ECHO MEAS - MR MAX PG: 156.3 MMHG
BH CV ECHO MEAS - MR MAX VEL: 625 CM/SEC
BH CV ECHO MEAS - MR MEAN PG: 115 MMHG
BH CV ECHO MEAS - MR MEAN VEL: 511 CM/SEC
BH CV ECHO MEAS - MR VTI: 204 CM
BH CV ECHO MEAS - MV A MAX VEL: 125 CM/SEC
BH CV ECHO MEAS - MV E MAX VEL: 103 CM/SEC
BH CV ECHO MEAS - MV E/A: 0.82
BH CV ECHO MEAS - PA ACC TIME: 0.09 SEC
BH CV ECHO MEAS - PA PR(ACCEL): 37.6 MMHG
BH CV ECHO MEAS - PI END-D VEL: 124 CM/SEC
BH CV ECHO MEAS - RAP SYSTOLE: 10 MMHG
BH CV ECHO MEAS - RVSP: 52.5 MMHG
BH CV ECHO MEAS - SI(MOD-SP4): 18 ML/M2
BH CV ECHO MEAS - SV(LVOT): 77.6 ML
BH CV ECHO MEAS - SV(MOD-SP4): 31.5 ML
BH CV ECHO MEAS - TAPSE (>1.6): 1.56 CM
BH CV ECHO MEAS - TR MAX PG: 42.5 MMHG
BH CV ECHO MEAS - TR MAX VEL: 326 CM/SEC
BH CV ECHO MEASUREMENTS AVERAGE E/E' RATIO: 12.72
BILIRUB SERPL-MCNC: 0.4 MG/DL (ref 0–1.2)
BUN SERPL-MCNC: 7 MG/DL (ref 8–23)
BUN/CREAT SERPL: 19.4 (ref 7–25)
CALCIUM SPEC-SCNC: 8 MG/DL (ref 8.6–10.5)
CHLORIDE SERPL-SCNC: 107 MMOL/L (ref 98–107)
CO2 SERPL-SCNC: 21.6 MMOL/L (ref 22–29)
CREAT SERPL-MCNC: 0.36 MG/DL (ref 0.57–1)
DEPRECATED RDW RBC AUTO: 51.4 FL (ref 37–54)
EGFRCR SERPLBLD CKD-EPI 2021: 97.2 ML/MIN/1.73
EOSINOPHIL # BLD AUTO: 0.39 10*3/MM3 (ref 0–0.4)
EOSINOPHIL NFR BLD AUTO: 3 % (ref 0.3–6.2)
ERYTHROCYTE [DISTWIDTH] IN BLOOD BY AUTOMATED COUNT: 14.5 % (ref 12.3–15.4)
GLOBULIN UR ELPH-MCNC: 2.6 GM/DL
GLUCOSE SERPL-MCNC: 142 MG/DL (ref 65–99)
HCT VFR BLD AUTO: 31.4 % (ref 34–46.6)
HGB BLD-MCNC: 10.2 G/DL (ref 12–15.9)
IMM GRANULOCYTES # BLD AUTO: 0.15 10*3/MM3 (ref 0–0.05)
IMM GRANULOCYTES NFR BLD AUTO: 1.2 % (ref 0–0.5)
LYMPHOCYTES # BLD AUTO: 0.9 10*3/MM3 (ref 0.7–3.1)
LYMPHOCYTES NFR BLD AUTO: 7 % (ref 19.6–45.3)
MAGNESIUM SERPL-MCNC: 1.9 MG/DL (ref 1.6–2.4)
MAXIMAL PREDICTED HEART RATE: 131 BPM
MCH RBC QN AUTO: 31.3 PG (ref 26.6–33)
MCHC RBC AUTO-ENTMCNC: 32.5 G/DL (ref 31.5–35.7)
MCV RBC AUTO: 96.3 FL (ref 79–97)
MONOCYTES # BLD AUTO: 0.92 10*3/MM3 (ref 0.1–0.9)
MONOCYTES NFR BLD AUTO: 7.2 % (ref 5–12)
NEUTROPHILS NFR BLD AUTO: 10.41 10*3/MM3 (ref 1.7–7)
NEUTROPHILS NFR BLD AUTO: 81.4 % (ref 42.7–76)
NRBC BLD AUTO-RTO: 0 /100 WBC (ref 0–0.2)
PHOSPHATE SERPL-MCNC: 1.9 MG/DL (ref 2.5–4.5)
PLATELET # BLD AUTO: 287 10*3/MM3 (ref 140–450)
PMV BLD AUTO: 9.8 FL (ref 6–12)
POTASSIUM SERPL-SCNC: 3.4 MMOL/L (ref 3.5–5.2)
POTASSIUM SERPL-SCNC: 3.6 MMOL/L (ref 3.5–5.2)
PROT SERPL-MCNC: 4.9 G/DL (ref 6–8.5)
RBC # BLD AUTO: 3.26 10*6/MM3 (ref 3.77–5.28)
SODIUM SERPL-SCNC: 137 MMOL/L (ref 136–145)
STRESS TARGET HR: 111 BPM
WBC NRBC COR # BLD: 12.8 10*3/MM3 (ref 3.4–10.8)

## 2023-03-26 PROCEDURE — 25010000002 MORPHINE PER 10 MG: Performed by: SURGERY

## 2023-03-26 PROCEDURE — 85025 COMPLETE CBC W/AUTO DIFF WBC: CPT | Performed by: INTERNAL MEDICINE

## 2023-03-26 PROCEDURE — 25010000002 PIPERACILLIN SOD-TAZOBACTAM PER 1 G: Performed by: SURGERY

## 2023-03-26 PROCEDURE — 80053 COMPREHEN METABOLIC PANEL: CPT | Performed by: INTERNAL MEDICINE

## 2023-03-26 PROCEDURE — 0 POTASSIUM CHLORIDE 10 MEQ/100ML SOLUTION: Performed by: SURGERY

## 2023-03-26 PROCEDURE — 99232 SBSQ HOSP IP/OBS MODERATE 35: CPT | Performed by: INTERNAL MEDICINE

## 2023-03-26 PROCEDURE — 93306 TTE W/DOPPLER COMPLETE: CPT

## 2023-03-26 PROCEDURE — 25010000002 HYDRALAZINE PER 20 MG: Performed by: INTERNAL MEDICINE

## 2023-03-26 PROCEDURE — 71045 X-RAY EXAM CHEST 1 VIEW: CPT

## 2023-03-26 PROCEDURE — 94799 UNLISTED PULMONARY SVC/PX: CPT

## 2023-03-26 PROCEDURE — 83735 ASSAY OF MAGNESIUM: CPT | Performed by: SURGERY

## 2023-03-26 PROCEDURE — 0 MAGNESIUM SULFATE 4 GM/100ML SOLUTION: Performed by: SURGERY

## 2023-03-26 PROCEDURE — 84132 ASSAY OF SERUM POTASSIUM: CPT | Performed by: INTERNAL MEDICINE

## 2023-03-26 PROCEDURE — 84100 ASSAY OF PHOSPHORUS: CPT | Performed by: INTERNAL MEDICINE

## 2023-03-26 PROCEDURE — 25010000002 HEPARIN (PORCINE) PER 1000 UNITS: Performed by: SURGERY

## 2023-03-26 PROCEDURE — 25010000002 ONDANSETRON PER 1 MG: Performed by: SURGERY

## 2023-03-26 PROCEDURE — 93306 TTE W/DOPPLER COMPLETE: CPT | Performed by: INTERNAL MEDICINE

## 2023-03-26 RX ORDER — BISACODYL 10 MG
10 SUPPOSITORY, RECTAL RECTAL ONCE
Status: COMPLETED | OUTPATIENT
Start: 2023-03-26 | End: 2023-03-26

## 2023-03-26 RX ORDER — POTASSIUM CHLORIDE 1.5 G/1.77G
40 POWDER, FOR SOLUTION ORAL EVERY 4 HOURS
Status: DISCONTINUED | OUTPATIENT
Start: 2023-03-26 | End: 2023-03-26 | Stop reason: ALTCHOICE

## 2023-03-26 RX ORDER — MAGNESIUM SULFATE HEPTAHYDRATE 40 MG/ML
4 INJECTION, SOLUTION INTRAVENOUS ONCE
Status: COMPLETED | OUTPATIENT
Start: 2023-03-26 | End: 2023-03-26

## 2023-03-26 RX ORDER — POTASSIUM CHLORIDE 7.45 MG/ML
10 INJECTION INTRAVENOUS
Status: COMPLETED | OUTPATIENT
Start: 2023-03-26 | End: 2023-03-27

## 2023-03-26 RX ADMIN — LEVOTHYROXINE SODIUM ANHYDROUS 100 MCG: 100 INJECTION, POWDER, LYOPHILIZED, FOR SOLUTION INTRAVENOUS at 11:52

## 2023-03-26 RX ADMIN — Medication 10 ML: at 08:22

## 2023-03-26 RX ADMIN — MORPHINE SULFATE 2 MG: 2 INJECTION, SOLUTION INTRAMUSCULAR; INTRAVENOUS at 07:49

## 2023-03-26 RX ADMIN — PANTOPRAZOLE SODIUM 40 MG: 40 INJECTION, POWDER, FOR SOLUTION INTRAVENOUS at 08:22

## 2023-03-26 RX ADMIN — POTASSIUM CHLORIDE 10 MEQ: 7.46 INJECTION, SOLUTION INTRAVENOUS at 23:00

## 2023-03-26 RX ADMIN — Medication 3 ML: at 20:21

## 2023-03-26 RX ADMIN — MORPHINE SULFATE 2 MG: 2 INJECTION, SOLUTION INTRAMUSCULAR; INTRAVENOUS at 12:46

## 2023-03-26 RX ADMIN — HYDRALAZINE HYDROCHLORIDE 10 MG: 20 INJECTION INTRAMUSCULAR; INTRAVENOUS at 16:00

## 2023-03-26 RX ADMIN — Medication 10 ML: at 20:21

## 2023-03-26 RX ADMIN — MORPHINE SULFATE 2 MG: 2 INJECTION, SOLUTION INTRAMUSCULAR; INTRAVENOUS at 04:33

## 2023-03-26 RX ADMIN — POTASSIUM CHLORIDE 10 MEQ: 7.46 INJECTION, SOLUTION INTRAVENOUS at 22:00

## 2023-03-26 RX ADMIN — PIPERACILLIN SODIUM AND TAZOBACTAM SODIUM 3.38 G: 3; .375 INJECTION, POWDER, LYOPHILIZED, FOR SOLUTION INTRAVENOUS at 16:05

## 2023-03-26 RX ADMIN — PIPERACILLIN SODIUM AND TAZOBACTAM SODIUM 3.38 G: 3; .375 INJECTION, POWDER, LYOPHILIZED, FOR SOLUTION INTRAVENOUS at 00:15

## 2023-03-26 RX ADMIN — HYDRALAZINE HYDROCHLORIDE 10 MG: 20 INJECTION INTRAMUSCULAR; INTRAVENOUS at 09:38

## 2023-03-26 RX ADMIN — HEPARIN SODIUM 5000 UNITS: 5000 INJECTION INTRAVENOUS; SUBCUTANEOUS at 20:20

## 2023-03-26 RX ADMIN — POTASSIUM PHOSPHATE, MONOBASIC AND POTASSIUM PHOSPHATE, DIBASIC 15 MMOL: 224; 236 INJECTION, SOLUTION, CONCENTRATE INTRAVENOUS at 07:11

## 2023-03-26 RX ADMIN — ONDANSETRON 4 MG: 2 INJECTION INTRAMUSCULAR; INTRAVENOUS at 16:00

## 2023-03-26 RX ADMIN — HYDRALAZINE HYDROCHLORIDE 10 MG: 20 INJECTION INTRAMUSCULAR; INTRAVENOUS at 04:09

## 2023-03-26 RX ADMIN — ONDANSETRON 4 MG: 2 INJECTION INTRAMUSCULAR; INTRAVENOUS at 20:33

## 2023-03-26 RX ADMIN — Medication 10 MG: at 02:41

## 2023-03-26 RX ADMIN — MORPHINE SULFATE 2 MG: 2 INJECTION, SOLUTION INTRAMUSCULAR; INTRAVENOUS at 20:32

## 2023-03-26 RX ADMIN — ONDANSETRON 4 MG: 2 INJECTION INTRAMUSCULAR; INTRAVENOUS at 07:49

## 2023-03-26 RX ADMIN — HEPARIN SODIUM 5000 UNITS: 5000 INJECTION INTRAVENOUS; SUBCUTANEOUS at 08:21

## 2023-03-26 RX ADMIN — PANTOPRAZOLE SODIUM 40 MG: 40 INJECTION, POWDER, FOR SOLUTION INTRAVENOUS at 20:19

## 2023-03-26 RX ADMIN — POTASSIUM CHLORIDE 10 MEQ: 7.46 INJECTION, SOLUTION INTRAVENOUS at 21:03

## 2023-03-26 RX ADMIN — MAGNESIUM SULFATE HEPTAHYDRATE 4 G: 40 INJECTION, SOLUTION INTRAVENOUS at 05:39

## 2023-03-26 RX ADMIN — Medication 3 ML: at 08:22

## 2023-03-26 RX ADMIN — PIPERACILLIN SODIUM AND TAZOBACTAM SODIUM 3.38 G: 3; .375 INJECTION, POWDER, LYOPHILIZED, FOR SOLUTION INTRAVENOUS at 07:49

## 2023-03-26 RX ADMIN — MORPHINE SULFATE 2 MG: 2 INJECTION, SOLUTION INTRAMUSCULAR; INTRAVENOUS at 00:46

## 2023-03-26 RX ADMIN — BISACODYL 10 MG: 10 SUPPOSITORY RECTAL at 09:38

## 2023-03-26 RX ADMIN — DEXTROSE AND SODIUM CHLORIDE 100 ML/HR: 5; 900 INJECTION, SOLUTION INTRAVENOUS at 06:33

## 2023-03-26 RX ADMIN — LIDOCAINE 1 PATCH: 700 PATCH TOPICAL at 08:22

## 2023-03-26 RX ADMIN — ONDANSETRON 4 MG: 2 INJECTION INTRAMUSCULAR; INTRAVENOUS at 00:46

## 2023-03-26 RX ADMIN — HYDRALAZINE HYDROCHLORIDE 10 MG: 20 INJECTION INTRAMUSCULAR; INTRAVENOUS at 21:03

## 2023-03-26 RX ADMIN — Medication 10 MG: at 11:57

## 2023-03-26 RX ADMIN — CLONIDINE HYDROCHLORIDE 0.1 MG: 0.1 TABLET ORAL at 14:54

## 2023-03-26 RX ADMIN — POTASSIUM CHLORIDE 40 MEQ: 1.5 POWDER, FOR SOLUTION ORAL at 20:20

## 2023-03-26 RX ADMIN — ASCORBIC ACID, VITAMIN A PALMITATE, CHOLECALCIFEROL, THIAMINE HYDROCHLORIDE, RIBOFLAVIN-5 PHOSPHATE SODIUM, PYRIDOXINE HYDROCHLORIDE, NIACINAMIDE, DEXPANTHENOL, ALPHA-TOCOPHEROL ACETATE, VITAMIN K1, FOLIC ACID, BIOTIN, CYANOCOBALAMIN 100 ML/HR: 200; 3300; 200; 6; 3.6; 6; 40; 15; 10; 150; 600; 60; 5 INJECTION, SOLUTION INTRAVENOUS at 20:20

## 2023-03-26 NOTE — PLAN OF CARE
Goal Outcome Evaluation:              Outcome Evaluation: Pt is alert and oriented.  Complaints of abdominal pain and doctor made aware.  NADN pt assisted up to bedside commode.  SARAH drain output noted in chart.  Pain mainly with movement.  Tolerating diet.

## 2023-03-26 NOTE — PLAN OF CARE
Goal Outcome Evaluation:  Plan of Care Reviewed With: patient        Problem: Adult Inpatient Plan of Care  Goal: Plan of Care Review  3/26/2023 0552 by Isabella Julien RN  Outcome: Ongoing, Progressing  Flowsheets (Taken 3/26/2023 0545)  Outcome Evaluation: Patient is alert and oriented, afebrile.  to SR 80's tonight. BP elevated, meds given as oredered and prn labatolol given as well. Morphine for pain x3, and zofran. SARAH drain has been striped, 25 cc output. UOP 400cc for the night. Surgical dressings changed, CHG bath complete. Patient has been up several times to BSC with assist. Mag, K and Phos replacement per protocol ordered. WBC decreased to 12. Orders for telemetry floor placed. CVP discontinued.  3/26/2023 0545 by Isabella Julien RN  Outcome: Ongoing, Progressing  Flowsheets (Taken 3/26/2023 0545)  Progress: improving  Plan of Care Reviewed With: patient  Outcome Evaluation: Patient is alert and oriented, afebrile.  to SR 80's tonight. BP elevated, meds given as oredered and prn labatolol given as well. Morphine for pain x3, and zofran. SARAH drain has been striped, 25 cc output. UOP 400cc for the night. Surgical dressings changed, CHG bath complete. Patient has been up several times to BSC with assist. Mag, K and Phos replacement per protocol ordered. WBC decreased to 12. Orders for telemetry floor placed. CVP discontinued.

## 2023-03-26 NOTE — PROGRESS NOTES
Postop day 4    Patient states she feels better this morning.  Nausea improved but she is belching some with ice chips.  Denies passing flatus or bowel movement.  SARAH drain 55 cc yesterday    Afebrile, vital signs stable    No acute distress  Abdomen is soft, nondistended and nontender.  Dressing is dry.  SARAH drain serous    White blood cell count improved down to 12.8.  Hemoglobin relatively stable at 10.    89-year -old female who is postop day 4 status post Bin patch repair of perforated duodenal ulcer.    Trial clear liquid diet.  No carbonated beverages.  If she tolerates breakfast and lunch without issue, we may then begin to transition to oral medications this afternoon.  Dulcolax suppository  Continue antibiotics

## 2023-03-26 NOTE — PROGRESS NOTES
[facility] HOSPITALIST PROGRESS NOTE     Patient Identification:  Name:  Christine Garg  Age:  89 y.o.  Sex:  female  :  1933  MRN:  [id number]  Visit Number:  [id number]  ROOM: [address]     Primary Care Provider:  Dave Tobar MD    Length of stay in inpatient status:  4    Subjective     Chief Compliant:    Chief Complaint   Patient presents with   • Abdominal Pain     History of Presenting Illness:    Patient remains ill but stable today, no acute events overnight, no new complaints, denies any fevers or chills, does still have some abdominal tenderness, denies passing gas or bowel movements, General Surgery following as primary, plan to trial liquid diet today with possibly starting oral medications soon thereafter, this AM with signs and symptoms volume overload, endorsed bilateral hand swelling, mild worsening dyspnea, cracks on examination, decreased IVF rate, has had prior CABG, will repeat formal echocardiogram, obtained CXR showing new pleural effusion, faint LLL opacities, currently don't suspect Pneumonia, likely fluid overload, may consider stopping IVFs soon if oral intake is adequate, already on Zosyn for intra-abdominal coverage per General Surgery, patient endorsing weakness, will make sure PT/OT following, states she would be open to placement if therapy felt this would benefit her.   Objective     Current Hospital Meds:  custom IV infusion builder, 100 mL/hr, Intravenous, Q24H  heparin (porcine), 5,000 Units, Subcutaneous, Q12H  hydrALAZINE, 10 mg, Intravenous, Q6H  levothyroxine sodium, 100 mcg, Intravenous, Daily  lidocaine, 1 patch, Transdermal, Q24H  magnesium sulfate, 4 g, Intravenous, Once  pantoprazole, 40 mg, Intravenous, Q12H  piperacillin-tazobactam, 3.375 g, Intravenous, Q8H  dextrose 5 % and sodium chloride 0.9 %, 50 mL/hr, Last Rate: 50 mL/hr (23  0825)    ----------------------------------------------------------------------------------------------------------------------  Vital Signs:  Temp:  [98.2 °F (36.8 °C)-98.4 °F (36.9 °C)] 98.3 °F (36.8 °C)  Heart Rate:  [] 90  Resp:  [13-35] 20  BP: (144-204)/(61-94) 177/72  SpO2:  [62 %-100 %] 96 %  on  Flow (L/min):  [1-2] 1;   Device (Oxygen Therapy): nasal cannula  Body mass index is 24.84 kg/m².      Intake/Output Summary (Last 24 hours) at 3/26/2023 1134  Last data filed at 3/26/2023 0845  Gross per 24 hour   Intake 2318.34 ml   Output 1030 ml   Net 1288.34 ml      ----------------------------------------------------------------------------------------------------------------------  Physical exam:  Constitutional:  Elderly, No acute distress.      HENT:  Head:  Normocephalic and atraumatic.  Mouth:  Moist mucous membranes.    Eyes:  Conjunctivae and EOM are normal. No scleral icterus.    Neck:  Neck supple.  No JVD present.    Cardiovascular:  Normal rate, regular rhythm and normal heart sounds with no murmur.  Pulmonary/Chest:  No respiratory distress, no wheezes, faint crackles, on minimal NC  Abdominal:  Soft. No distension and mild tenderness to palpation   Musculoskeletal:  No tenderness, and no deformity.  No red or swollen joints anywhere.    Neurological:  Alert and oriented to person, place, and time.  No cranial nerve deficit.    Skin:  Skin is warm and dry. No rash noted. No pallor.   Peripheral vascular:  No clubbing, no cyanosis, no edema.  Psychiatric: Appropriate mood and affect    Edited by: Rodriguez Robin MD at 3/26/2023 1128  ----------------------------------------------------------------------------------------------------------------------  WBC/HGB/HCT/PLT   12.80/10.2/31.4/287 (03/26 0403)  BUN/CREAT/GLUC/ALT/AST/OSIRIS/LIP    7/0.36/142/36/30/--/-- (03/26 0403)  LYTES - Na/K/Cl/CO2: 137/3.6/107/21.6* (03/26 0403)     No results found for: URINECX  Blood Culture   Date Value Ref Range  Status   03/22/2023 No growth at 3 days  Preliminary   03/22/2023 No growth at 3 days  Preliminary     I have personally looked at the labs and they are summarized above.  ----------------------------------------------------------------------------------------------------------------------  Detailed radiology reports for the last 24 hours:  XR Chest 1 View    Result Date: 3/26/2023  1. Interval removal of NG tube. 2. Small left pleural effusion, new. 3. Ill-defined left lung base opacity Signer Name: Anderson Menendez MD  Signed: 3/26/2023 10:03 AM  Workstation Name: [id number]  [facility]    Assessment & Plan    #Severe sepsis that was present on admission (temperature 36 degrees C, lactic acid 3.5, CRP 3.44, WBC 30,990, heart rates ) due to a suspected perforated antral gastric ulcer  #Acute hypokalemia and acute hypomagnesemia and acute hypophosphatemia, improved with supplementation  #History of coronary artery disease status post 3v CABG in 2011  #History of chronic kidney disease stage IIIa with baseline Cr 0.6-0.85   #History of essential hypertension  #History of hypothyroidism, currently controlled  #History of hyperlipidemia  #History of left upper lobe pleural based pulmonary nodule that appears to be invading the T6 vertebra  #Advanced age  #Former Smoker  #Debility   - General Surgery following as primary, status post surgical intervention, plan to advance diet today, possibly transition to oral medications   - Continue Zosyn x 5 days as previously started per primary, follow up cultures  - Continue IV PPI, Levothyroxine, Hydralazine for now, though hopefully can transition to oral regimen soon  - Will review home oral medications at that time and resume as indicated   - today with increased dyspnea, lung crackles and hand swelling on examination, decreased IVFs, concern for volume overload given underlying heart issues, repeat formal echocardiogram, decreased IVFs to  50cc/hr, will discuss need to continue IVFs with General Surgery soon  - CXR with ill-defined LLL opacities, patient already on zosyn, clinically not suspicious of Pneumonia, suspect fluid for now, though follow up labs and clinically, may consider CT to further tease out if has Pneumonia or not, currently do not suspect, hold on any antibiotics changes for now.   - Continue Tylenol as needed for fevers, Duonebs as needed  - Admitted to CCU, monitor on telemetry, continue 02 but wean as able     F: Oral, mIVFs  E: Monitor & Replace PRN  N: Diet: Liquid Diets; Clear Liquid; No Carbonated Beverages, No Straw; Texture: Regular Texture (IDDSI 7); Fluid Consistency: Thin (IDDSI 0)   Ppx: SQH  Code Status (Patient has no pulse and is not breathing): CPR (Attempt to Resuscitate)  Medical Interventions (Patient has pulse or is breathing): Full Support     Dispo: Pending workup and clinical improvement, PT/OT consulted and following, may need placement     *This patient is considered high risk due to severe sepsis, bowel perforation, surgical intervention, volume overload, debility     Edited by: Rodriguez Robin MD at 3/26/2023 1134  [facility]

## 2023-03-27 LAB
ALBUMIN SERPL-MCNC: 2.5 G/DL (ref 3.5–5.2)
ALBUMIN/GLOB SERPL: 0.9 G/DL
ALP SERPL-CCNC: 126 U/L (ref 39–117)
ALT SERPL W P-5'-P-CCNC: 68 U/L (ref 1–33)
ANION GAP SERPL CALCULATED.3IONS-SCNC: 8.4 MMOL/L (ref 5–15)
AST SERPL-CCNC: 46 U/L (ref 1–32)
BACTERIA SPEC AEROBE CULT: NORMAL
BACTERIA SPEC AEROBE CULT: NORMAL
BASOPHILS # BLD AUTO: 0.04 10*3/MM3 (ref 0–0.2)
BASOPHILS NFR BLD AUTO: 0.3 % (ref 0–1.5)
BILIRUB SERPL-MCNC: 0.5 MG/DL (ref 0–1.2)
BUN SERPL-MCNC: 5 MG/DL (ref 8–23)
BUN/CREAT SERPL: 11.9 (ref 7–25)
CALCIUM SPEC-SCNC: 8.4 MG/DL (ref 8.6–10.5)
CHLORIDE SERPL-SCNC: 106 MMOL/L (ref 98–107)
CO2 SERPL-SCNC: 22.6 MMOL/L (ref 22–29)
CREAT SERPL-MCNC: 0.42 MG/DL (ref 0.57–1)
DEPRECATED RDW RBC AUTO: 51.9 FL (ref 37–54)
EGFRCR SERPLBLD CKD-EPI 2021: 93.6 ML/MIN/1.73
EOSINOPHIL # BLD AUTO: 0.67 10*3/MM3 (ref 0–0.4)
EOSINOPHIL NFR BLD AUTO: 5 % (ref 0.3–6.2)
ERYTHROCYTE [DISTWIDTH] IN BLOOD BY AUTOMATED COUNT: 14.6 % (ref 12.3–15.4)
GLOBULIN UR ELPH-MCNC: 2.8 GM/DL
GLUCOSE SERPL-MCNC: 126 MG/DL (ref 65–99)
HCT VFR BLD AUTO: 32.2 % (ref 34–46.6)
HGB BLD-MCNC: 10.4 G/DL (ref 12–15.9)
IMM GRANULOCYTES # BLD AUTO: 0.12 10*3/MM3 (ref 0–0.05)
IMM GRANULOCYTES NFR BLD AUTO: 0.9 % (ref 0–0.5)
LYMPHOCYTES # BLD AUTO: 1.31 10*3/MM3 (ref 0.7–3.1)
LYMPHOCYTES NFR BLD AUTO: 9.7 % (ref 19.6–45.3)
MAGNESIUM SERPL-MCNC: 2 MG/DL (ref 1.6–2.4)
MCH RBC QN AUTO: 31.2 PG (ref 26.6–33)
MCHC RBC AUTO-ENTMCNC: 32.3 G/DL (ref 31.5–35.7)
MCV RBC AUTO: 96.7 FL (ref 79–97)
MONOCYTES # BLD AUTO: 0.92 10*3/MM3 (ref 0.1–0.9)
MONOCYTES NFR BLD AUTO: 6.8 % (ref 5–12)
NEUTROPHILS NFR BLD AUTO: 10.45 10*3/MM3 (ref 1.7–7)
NEUTROPHILS NFR BLD AUTO: 77.3 % (ref 42.7–76)
NRBC BLD AUTO-RTO: 0 /100 WBC (ref 0–0.2)
PHOSPHATE SERPL-MCNC: 2 MG/DL (ref 2.5–4.5)
PLATELET # BLD AUTO: 294 10*3/MM3 (ref 140–450)
PMV BLD AUTO: 9.9 FL (ref 6–12)
POTASSIUM SERPL-SCNC: 4 MMOL/L (ref 3.5–5.2)
POTASSIUM SERPL-SCNC: 4.3 MMOL/L (ref 3.5–5.2)
PROT SERPL-MCNC: 5.3 G/DL (ref 6–8.5)
RBC # BLD AUTO: 3.33 10*6/MM3 (ref 3.77–5.28)
SODIUM SERPL-SCNC: 137 MMOL/L (ref 136–145)
WBC NRBC COR # BLD: 13.51 10*3/MM3 (ref 3.4–10.8)

## 2023-03-27 PROCEDURE — 25010000002 PIPERACILLIN SOD-TAZOBACTAM PER 1 G: Performed by: SURGERY

## 2023-03-27 PROCEDURE — 84132 ASSAY OF SERUM POTASSIUM: CPT | Performed by: SURGERY

## 2023-03-27 PROCEDURE — 25010000002 HYDRALAZINE PER 20 MG: Performed by: INTERNAL MEDICINE

## 2023-03-27 PROCEDURE — 85025 COMPLETE CBC W/AUTO DIFF WBC: CPT | Performed by: INTERNAL MEDICINE

## 2023-03-27 PROCEDURE — 94761 N-INVAS EAR/PLS OXIMETRY MLT: CPT

## 2023-03-27 PROCEDURE — 97162 PT EVAL MOD COMPLEX 30 MIN: CPT

## 2023-03-27 PROCEDURE — 99024 POSTOP FOLLOW-UP VISIT: CPT | Performed by: SURGERY

## 2023-03-27 PROCEDURE — 0 POTASSIUM CHLORIDE 10 MEQ/100ML SOLUTION: Performed by: SURGERY

## 2023-03-27 PROCEDURE — 25010000002 ONDANSETRON PER 1 MG: Performed by: SURGERY

## 2023-03-27 PROCEDURE — 99232 SBSQ HOSP IP/OBS MODERATE 35: CPT | Performed by: INTERNAL MEDICINE

## 2023-03-27 PROCEDURE — 83735 ASSAY OF MAGNESIUM: CPT | Performed by: SURGERY

## 2023-03-27 PROCEDURE — 25010000002 MORPHINE PER 10 MG: Performed by: SURGERY

## 2023-03-27 PROCEDURE — 84100 ASSAY OF PHOSPHORUS: CPT | Performed by: INTERNAL MEDICINE

## 2023-03-27 PROCEDURE — 80053 COMPREHEN METABOLIC PANEL: CPT | Performed by: INTERNAL MEDICINE

## 2023-03-27 PROCEDURE — 94799 UNLISTED PULMONARY SVC/PX: CPT

## 2023-03-27 PROCEDURE — 25010000002 HEPARIN (PORCINE) PER 1000 UNITS: Performed by: SURGERY

## 2023-03-27 RX ORDER — LEVOTHYROXINE SODIUM 0.07 MG/1
112 TABLET ORAL
Status: DISCONTINUED | OUTPATIENT
Start: 2023-03-28 | End: 2023-04-10 | Stop reason: HOSPADM

## 2023-03-27 RX ORDER — VALSARTAN 80 MG/1
80 TABLET ORAL
Status: DISCONTINUED | OUTPATIENT
Start: 2023-03-27 | End: 2023-03-28

## 2023-03-27 RX ORDER — DEXTROSE, SODIUM CHLORIDE, AND POTASSIUM CHLORIDE 5; .45; .15 G/100ML; G/100ML; G/100ML
50 INJECTION INTRAVENOUS CONTINUOUS
Status: DISCONTINUED | OUTPATIENT
Start: 2023-03-27 | End: 2023-03-29

## 2023-03-27 RX ORDER — CASTOR OIL AND BALSAM, PERU 788; 87 MG/G; MG/G
1 OINTMENT TOPICAL EVERY 12 HOURS SCHEDULED
Status: DISCONTINUED | OUTPATIENT
Start: 2023-03-27 | End: 2023-04-10 | Stop reason: HOSPADM

## 2023-03-27 RX ORDER — CARVEDILOL 6.25 MG/1
6.25 TABLET ORAL 2 TIMES DAILY WITH MEALS
Status: DISCONTINUED | OUTPATIENT
Start: 2023-03-27 | End: 2023-03-28

## 2023-03-27 RX ADMIN — Medication 10 ML: at 21:21

## 2023-03-27 RX ADMIN — MORPHINE SULFATE 2 MG: 2 INJECTION, SOLUTION INTRAMUSCULAR; INTRAVENOUS at 08:41

## 2023-03-27 RX ADMIN — MORPHINE SULFATE 2 MG: 2 INJECTION, SOLUTION INTRAMUSCULAR; INTRAVENOUS at 21:21

## 2023-03-27 RX ADMIN — PIPERACILLIN SODIUM AND TAZOBACTAM SODIUM 3.38 G: 3; .375 INJECTION, POWDER, LYOPHILIZED, FOR SOLUTION INTRAVENOUS at 17:26

## 2023-03-27 RX ADMIN — VALSARTAN 80 MG: 80 TABLET, FILM COATED ORAL at 17:25

## 2023-03-27 RX ADMIN — HYDRALAZINE HYDROCHLORIDE 10 MG: 20 INJECTION INTRAMUSCULAR; INTRAVENOUS at 21:21

## 2023-03-27 RX ADMIN — PANTOPRAZOLE SODIUM 40 MG: 40 INJECTION, POWDER, FOR SOLUTION INTRAVENOUS at 21:21

## 2023-03-27 RX ADMIN — Medication 10 ML: at 08:42

## 2023-03-27 RX ADMIN — CASTOR OIL AND BALSAM, PERU 1 APPLICATION: 788; 87 OINTMENT TOPICAL at 16:30

## 2023-03-27 RX ADMIN — PIPERACILLIN SODIUM AND TAZOBACTAM SODIUM 3.38 G: 3; .375 INJECTION, POWDER, LYOPHILIZED, FOR SOLUTION INTRAVENOUS at 23:35

## 2023-03-27 RX ADMIN — LIDOCAINE 1 PATCH: 700 PATCH TOPICAL at 08:41

## 2023-03-27 RX ADMIN — Medication 10 MG: at 00:06

## 2023-03-27 RX ADMIN — MORPHINE SULFATE 2 MG: 2 INJECTION, SOLUTION INTRAMUSCULAR; INTRAVENOUS at 00:07

## 2023-03-27 RX ADMIN — HEPARIN SODIUM 5000 UNITS: 5000 INJECTION INTRAVENOUS; SUBCUTANEOUS at 21:20

## 2023-03-27 RX ADMIN — Medication 10 MG: at 16:34

## 2023-03-27 RX ADMIN — PIPERACILLIN SODIUM AND TAZOBACTAM SODIUM 3.38 G: 3; .375 INJECTION, POWDER, LYOPHILIZED, FOR SOLUTION INTRAVENOUS at 00:06

## 2023-03-27 RX ADMIN — HYDRALAZINE HYDROCHLORIDE 10 MG: 20 INJECTION INTRAMUSCULAR; INTRAVENOUS at 09:49

## 2023-03-27 RX ADMIN — CASTOR OIL AND BALSAM, PERU 1 APPLICATION: 788; 87 OINTMENT TOPICAL at 21:21

## 2023-03-27 RX ADMIN — POTASSIUM CHLORIDE 10 MEQ: 7.46 INJECTION, SOLUTION INTRAVENOUS at 00:02

## 2023-03-27 RX ADMIN — HEPARIN SODIUM 5000 UNITS: 5000 INJECTION INTRAVENOUS; SUBCUTANEOUS at 09:49

## 2023-03-27 RX ADMIN — Medication 10 MG: at 07:43

## 2023-03-27 RX ADMIN — CARVEDILOL 6.25 MG: 6.25 TABLET, FILM COATED ORAL at 17:25

## 2023-03-27 RX ADMIN — LEVOTHYROXINE SODIUM ANHYDROUS 100 MCG: 100 INJECTION, POWDER, LYOPHILIZED, FOR SOLUTION INTRAVENOUS at 11:55

## 2023-03-27 RX ADMIN — PIPERACILLIN SODIUM AND TAZOBACTAM SODIUM 3.38 G: 3; .375 INJECTION, POWDER, LYOPHILIZED, FOR SOLUTION INTRAVENOUS at 08:41

## 2023-03-27 RX ADMIN — Medication 3 ML: at 08:42

## 2023-03-27 RX ADMIN — CLONIDINE HYDROCHLORIDE 0.1 MG: 0.1 TABLET ORAL at 11:56

## 2023-03-27 RX ADMIN — HYDRALAZINE HYDROCHLORIDE 10 MG: 20 INJECTION INTRAMUSCULAR; INTRAVENOUS at 04:38

## 2023-03-27 RX ADMIN — SODIUM PHOSPHATE, MONOBASIC, MONOHYDRATE AND SODIUM PHOSPHATE, DIBASIC, ANHYDROUS 15 MMOL: 142; 276 INJECTION, SOLUTION INTRAVENOUS at 04:38

## 2023-03-27 RX ADMIN — MORPHINE SULFATE 2 MG: 2 INJECTION, SOLUTION INTRAMUSCULAR; INTRAVENOUS at 15:11

## 2023-03-27 RX ADMIN — PANTOPRAZOLE SODIUM 40 MG: 40 INJECTION, POWDER, FOR SOLUTION INTRAVENOUS at 08:41

## 2023-03-27 RX ADMIN — HYDRALAZINE HYDROCHLORIDE 10 MG: 20 INJECTION INTRAMUSCULAR; INTRAVENOUS at 15:11

## 2023-03-27 RX ADMIN — ONDANSETRON 4 MG: 2 INJECTION INTRAMUSCULAR; INTRAVENOUS at 08:41

## 2023-03-27 NOTE — PROGRESS NOTES
Nutrition Services    Patient Name:  Christine Garg  YOB: 1933  MRN: [id number]  Admit Date:  3/22/2023  Day 5 NPO/Clear - Boost breeze TID - ordered today - if diet cannot be advanced to full liquids in 48-72 hrs recommend evaluate for nutrition support    Electronically signed by:  Katerin Bynum RD  03/27/23 11:00 EDT

## 2023-03-27 NOTE — CASE MANAGEMENT/SOCIAL WORK
Discharge Planning Assessment   Jose Daniel     Patient Name: Christine Garg  MRN: [id number]  Today's Date: 3/27/2023    Admit Date: 3/22/2023       Discharge Plan     Row Name 03/27/23 1326       Plan    Plan Pt was admitted on 03/22/23. Pt transferred from CCU to [address] [place] [address] on this date. Pt lives with her son, Ziyad and plans to return home at discharge. Pt does not utilize home health or DME services. PT/OT is following, may need placement. SS to follow.    15:58pm: SS spoke with Pt at bedside about discharge planning. PT recommended IPR/SNF vs home health. Pt declined SNF was agreeable to IPR if a candidate. SS notified Dr Kirkland and Dr. Robin. SS to follow.                   ZA Bernabe

## 2023-03-27 NOTE — PLAN OF CARE
Goal Outcome Evaluation:  Progress: no change   Pt transferred from CCU this shift. Assessment completed. Abdominal binder removed and bandages examined with Dr. Kirkland. Dressings C,D,I and 25 mL of output via SARAH Drain. Abdominal binder replaced with Dr. Kirkland. Pt resting in bed, watching television. Pt has tolerated all interventions. No complaints/concerns. No acute distress noted. Will continue to follow the plan of care.

## 2023-03-27 NOTE — THERAPY EVALUATION
Acute Care - Physical Therapy Initial Evaluation  [facility]     Patient Name: Christine Garg  : 1933  MRN: [id number]  Today's Date: 3/27/2023   Onset of Illness/Injury or Date of Surgery: 23  Visit Dx:     ICD-10-CM ICD-9-CM   1. Acute gastric ulcer with perforation (HCC)  K25.1 531.10   2. Hyponatremia  E87.1 276.1   3. Hypokalemia  E87.6 276.8   4. Sepsis without acute organ dysfunction, due to unspecified organism (HCC)  A41.9 038.9     995.91   5. Perforated ulcer (HCC)  K27.5 533.50     Patient Active Problem List   Diagnosis   • Essential hypertension   • Dyslipidemia   • ASCVD (arteriosclerotic cardiovascular disease)   • Palpitations   • Coronary artery disease    • Abnormal PFTs (pulmonary function tests)   • Chronic obstructive pulmonary disease (HCC)   • Mild persistent asthma with allergic rhinitis   • Pulmonary nodule   • Former smoker   • Colitis   • Perforated ulcer (HCC)   • Acute gastric ulcer with perforation (HCC)     Past Medical History:   Diagnosis Date   • Advanced age    • CAD (coronary artery disease)     s/p 3v CABG   • Chronic kidney disease (CKD), stage III (moderate) (HCC)    • COPD (chronic obstructive pulmonary disease) (HCC)    • History of tobacco use    • Hyperlipidemia    • Hypertension    • Hypothyroidism    • PONV (postoperative nausea and vomiting)    • Pulmonary nodule      Past Surgical History:   Procedure Laterality Date   • BREAST BIOPSY Left     benign   • CATARACT EXTRACTION     • CORONARY ANGIOPLASTY     • CORONARY ARTERY BYPASS GRAFT     • EXPLORATORY LAPAROTOMY N/A 3/22/2023    Procedure: LAPAROTOMY EXPLORATORY WITH OVER SEW OF DUODENAL ULCER WITH OMENTAL PATCH AND BIOPATCH AND CENTRAL LINE PLACEMENT;  Surgeon: Aurora Kirkland MD;  Location: [facility];  Service: General;  Laterality: N/A;     PT Assessment (last 12 hours)     PT Evaluation and Treatment     Row Name 23 0830          Physical Therapy Time and Intention    Subjective  Information complains of;dyspnea  -CS     Document Type evaluation  -CS     Mode of Treatment physical therapy  -CS     Patient Effort adequate  -CS     Comment Pt seen bedside this AM.  Pt reports slow decline in function since December and required AD for household ambulation.  Pt agreed to evaluation.  -CS     Row Name 03/27/23 0830          General Information    Patient Profile Reviewed yes  -CS     Onset of Illness/Injury or Date of Surgery 03/22/23  -CS     Referring Physician Isael  -CS     Patient Observations alert;cooperative;agree to therapy  -CS     General Observations of Patient Pt supine, all CCU lines intact  -CS     Risks Reviewed patient:;LOB;dizziness;increased discomfort;change in vital signs;lines disloged  -CS     Benefits Reviewed patient:;improve function;increase independence;increase strength;increase balance;decrease pain;decrease risk of DVT;improve skin integrity;increase knowledge  -CS     Row Name 03/27/23 0830          Pain    Additional Documentation --  no c/o  -CS     Row Name 03/27/23 0830          Cognition    Orientation Status (Cognition) oriented x 3  -CS     Row Name 03/27/23 0830          Range of Motion (ROM)    Range of Motion --  (B)LE WFL  -CS     Row Name 03/27/23 0830          Strength Comprehensive (MMT)    Comment, General Manual Muscle Testing (MMT) Assessment (R)LE grossly 4/5, (L)LE grossly 4-/5  -CS     Row Name 03/27/23 0830          Bed Mobility    Bed Mobility bed mobility (all) activities  -CS     All Activities, Wilcox (Bed Mobility) minimum assist (75% patient effort)  -CS     Row Name 03/27/23 0830          Transfers    Comment, (Transfers) Deferred 2/2 pt request/dyspnea  -CS     Row Name 03/27/23 0830          Gait/Stairs (Locomotion)    Comment, (Gait/Stairs) Deferred 2/2 pt request/dyspnea  -CS     Row Name             Wound 03/22/23 1640 abdomen Incision    Wound - Properties Group Placement Date: 03/22/23  - Placement Time: 1640  -  Location: abdomen  -MC Primary Wound Type: Incision  -MC    Retired Wound - Properties Group Placement Date: 03/22/23  - Placement Time: 1640  -MC Location: abdomen  -MC Primary Wound Type: Incision  -MC    Retired Wound - Properties Group Date first assessed: 03/22/23  - Time first assessed: 1640  -MC Location: abdomen  -MC Primary Wound Type: Incision  -MC    Row Name             Wound 01/06/23 1300 sacral spine    Wound - Properties Group Placement Date: 01/06/23  -SS Placement Time: 1300  -SS Present on Hospital Admission: Y  -SS Location: sacral spine  -SS    Retired Wound - Properties Group Placement Date: 01/06/23  -SS Placement Time: 1300  -SS Present on Hospital Admission: Y  -SS Location: sacral spine  -SS    Retired Wound - Properties Group Date first assessed: 01/06/23  -SS Time first assessed: 1300  -SS Present on Hospital Admission: Y  -SS Location: sacral spine  -SS    Row Name 03/27/23 0830          Plan of Care Review    Plan of Care Reviewed With patient  -CS     Progress no change  -CS     Outcome Evaluation Pt presents w/ decreased mobility from her baseline.  Pt would benefit from skilled PT to maximize functional potential.  D/C dispostion depends on progress while in house: IRF/SNF vs. home health.  -CS     Row Name 03/27/23 0830          Positioning and Restraints    Pre-Treatment Position in bed  -CS     Post Treatment Position bed  -CS     In Bed supine;call light within reach;encouraged to call for assist;with nsg  -CS     Row Name 03/27/23 0830          Therapy Assessment/Plan (PT)    Functional Level at Time of Evaluation (PT) Moderate assist  -CS     PT Diagnosis (PT) impaired functional mobility  -CS     Rehab Potential (PT) good, to achieve stated therapy goals  -CS     Criteria for Skilled Interventions Met (PT) yes;meets criteria;skilled treatment is necessary  -CS     Therapy Frequency (PT) 2 times/wk  2-5x/week  -CS     Predicted Duration of Therapy Intervention (PT) while in  house  -CS     Problem List (PT) problems related to;balance;mobility;strength  -CS     Activity Limitations Related to Problem List (PT) unable to ambulate safely;unable to transfer safely  -CS     Comment, Therapy Assessment/Plan (PT) Pt presents w/ decreased mobility from her baseline.  Pt would benefit from skilled PT to maximize functional potential.  D/C dispostion depends on progress while in house: IRF/SNF vs. home health.  -CS     Row Name 03/27/23 0830          PT Evaluation Complexity    History, PT Evaluation Complexity 3 or more personal factors and/or comorbidities  -CS     Examination of Body Systems (PT Eval Complexity) total of 3 or more elements  -CS     Clinical Presentation (PT Evaluation Complexity) evolving  -CS     Clinical Decision Making (PT Evaluation Complexity) moderate complexity  -CS     Overall Complexity (PT Evaluation Complexity) moderate complexity  -CS     Row Name 03/27/23 0830          Therapy Plan Review/Discharge Plan (PT)    Therapy Plan Review (PT) evaluation/treatment results reviewed;care plan/treatment goals reviewed;risks/benefits reviewed;current/potential barriers reviewed;participants voiced agreement with care plan;participants included;patient  -CS     Row Name 03/27/23 0830          Physical Therapy Goals    Bed Mobility Goal Selection (PT) bed mobility, PT goal 1  -CS     Transfer Goal Selection (PT) transfer, PT goal 1  -CS     Gait Training Goal Selection (PT) gait training, PT goal 1  -CS     Row Name 03/27/23 0830          Bed Mobility Goal 1 (PT)    Activity/Assistive Device (Bed Mobility Goal 1, PT) bed mobility activities, all  -CS     Perkins Level/Cues Needed (Bed Mobility Goal 1, PT) standby assist  -CS     Time Frame (Bed Mobility Goal 1, PT) long term goal (LTG);by discharge  -CS     Row Name 03/27/23 0830          Transfer Goal 1 (PT)    Activity/Assistive Device (Transfer Goal 1, PT) transfers, all  -CS     Perkins Level/Cues Needed  (Transfer Goal 1, PT) standby assist  -CS     Time Frame (Transfer Goal 1, PT) long term goal (LTG);by discharge  -     Row Name 03/27/23 0830          Gait Training Goal 1 (PT)    Activity/Assistive Device (Gait Training Goal 1, PT) gait (walking locomotion);assistive device use  -     Parish Level (Gait Training Goal 1, PT) standby assist  -CS     Distance (Gait Training Goal 1, PT) 50'  -CS     Time Frame (Gait Training Goal 1, PT) long term goal (LTG);by discharge  -           User Key  (r) = Recorded By, (t) = Taken By, (c) = Cosigned By    Initials Name Provider Type     Penelope Roe, RN Registered Nurse    Teresa Rod RN Registered Nurse    Mina Flores, PT Physical Therapist                Physical Therapy Education     Title: PT OT SLP Therapies (Done)     Topic: Physical Therapy (Done)     Point: Mobility training (Done)     Learning Progress Summary           Patient Acceptance, E,TB, VU by  at 3/27/2023 0921                   Point: Home exercise program (Done)     Learning Progress Summary           Patient Acceptance, E,TB, VU by  at 3/27/2023 0921                   Point: Body mechanics (Done)     Learning Progress Summary           Patient Acceptance, E,TB, VU by  at 3/27/2023 0921                   Point: Precautions (Done)     Learning Progress Summary           Patient Acceptance, E,TB, VU by  at 3/27/2023 0921                               User Key     Initials Effective Dates Name Provider Type Discipline     05/24/22 -  Mina Zavala, PT Physical Therapist PT              PT Recommendation and Plan  Anticipated Discharge Disposition (PT):  (TBD)  Planned Therapy Interventions (PT): balance training, bed mobility training, gait training, home exercise program, neuromuscular re-education, patient/family education, strengthening, transfer training  Therapy Frequency (PT): 2 times/wk (2-5x/week)  Plan of Care Reviewed With: patient  Progress: no  change  Outcome Evaluation: Pt presents w/ decreased mobility from her baseline.  Pt would benefit from skilled PT to maximize functional potential.  D/C dispostion depends on progress while in house: IRF/SNF vs. home health.       Time Calculation:    PT Charges     Row Name 03/27/23 0921             Time Calculation    Start Time 0830  -CS      PT Received On 03/27/23  -      PT Goal Re-Cert Due Date 04/10/23  -            User Key  (r) = Recorded By, (t) = Taken By, (c) = Cosigned By    Initials Name Provider Type    CS Mina Zavala, PT Physical Therapist              Therapy Charges for Today     Code Description Service Date Service Provider Modifiers Qty    46328875618 HC PT EVAL MOD COMPLEXITY 4 3/27/2023 Mina Zavala, PT GP 1          PT G-Codes  AM-PAC 6 Clicks Score (PT): 12    Mina Zavala PT  3/27/2023

## 2023-03-27 NOTE — PLAN OF CARE
Goal Outcome Evaluation:  Plan of Care Reviewed With: patient        Progress: no change  Outcome Evaluation: Pt presents w/ decreased mobility from her baseline.  Pt would benefit from skilled PT to maximize functional potential.  D/C dispostion depends on progress while in house: IRF/SNF vs. home health.

## 2023-03-27 NOTE — PLAN OF CARE
Problem: Adult Inpatient Plan of Care  Goal: Plan of Care Review  Outcome: Ongoing, Progressing  Goal: Patient-Specific Goal (Individualized)  Outcome: Ongoing, Progressing  Goal: Absence of Hospital-Acquired Illness or Injury  Outcome: Ongoing, Progressing  Intervention: Identify and Manage Fall Risk  Recent Flowsheet Documentation  Taken 3/27/2023 0500 by Anum Ray RN  Safety Promotion/Fall Prevention: safety round/check completed  Taken 3/27/2023 0400 by Anum Ray RN  Safety Promotion/Fall Prevention: safety round/check completed  Taken 3/27/2023 0300 by Anum Ray RN  Safety Promotion/Fall Prevention: safety round/check completed  Taken 3/27/2023 0200 by Anum Ray RN  Safety Promotion/Fall Prevention: safety round/check completed  Taken 3/27/2023 0100 by Anum Ray RN  Safety Promotion/Fall Prevention: safety round/check completed  Taken 3/27/2023 0000 by Rosenbalm, Anum, RN  Safety Promotion/Fall Prevention: safety round/check completed  Taken 3/26/2023 2300 by Anum Ray RN  Safety Promotion/Fall Prevention: safety round/check completed  Taken 3/26/2023 2200 by Anum Ray RN  Safety Promotion/Fall Prevention: safety round/check completed  Taken 3/26/2023 2100 by Anum Ray RN  Safety Promotion/Fall Prevention: safety round/check completed  Taken 3/26/2023 2000 by Rosenbalm, Anum, RN  Safety Promotion/Fall Prevention:   safety round/check completed   assistive device/personal items within reach   fall prevention program maintained  Taken 3/26/2023 1900 by Rosenbalm, Anum, RN  Safety Promotion/Fall Prevention: safety round/check completed  Intervention: Prevent Skin Injury  Recent Flowsheet Documentation  Taken 3/27/2023 0400 by Anum Ray RN  Body Position:   left   turned  Taken 3/27/2023 0200 by Anum Ray RN  Body Position:   right   turned  Taken 3/27/2023 0000 by Anum Ray  RN  Body Position:   left   turned  Taken 3/26/2023 2200 by Anum Ray RN  Body Position:   turned   right  Taken 3/26/2023 2000 by Anum Ray RN  Body Position:   left   turned  Intervention: Prevent and Manage VTE (Venous Thromboembolism) Risk  Recent Flowsheet Documentation  Taken 3/27/2023 0400 by Anum Ray RN  Activity Management: activity adjusted per tolerance  Taken 3/27/2023 0200 by Anum Ray RN  Activity Management: activity adjusted per tolerance  VTE Prevention/Management:   bilateral   sequential compression devices on  Taken 3/27/2023 0000 by Anum Ray RN  Activity Management: activity adjusted per tolerance  Taken 3/26/2023 2200 by Anum Ray RN  Activity Management: activity adjusted per tolerance  Taken 3/26/2023 2000 by Anum Ray RN  Activity Management: activity adjusted per tolerance  VTE Prevention/Management: (subq hep)   bilateral   sequential compression devices on  Goal: Optimal Comfort and Wellbeing  Outcome: Ongoing, Progressing  Intervention: Provide Person-Centered Care  Recent Flowsheet Documentation  Taken 3/27/2023 0200 by Anum Ray RN  Trust Relationship/Rapport:   care explained   choices provided  Taken 3/26/2023 2000 by Anum Ray RN  Trust Relationship/Rapport:   choices provided   care explained  Goal: Readiness for Transition of Care  Outcome: Ongoing, Progressing     Problem: Fall Injury Risk  Goal: Absence of Fall and Fall-Related Injury  Outcome: Ongoing, Progressing  Intervention: Identify and Manage Contributors  Recent Flowsheet Documentation  Taken 3/27/2023 0500 by Anum Ray RN  Medication Review/Management: medications reviewed  Taken 3/27/2023 0400 by Anum Ray RN  Medication Review/Management: medications reviewed  Taken 3/27/2023 0300 by Anum Ray RN  Medication Review/Management: medications reviewed  Taken 3/27/2023 0200 by Flor  JOSUE Rowan  Medication Review/Management: medications reviewed  Taken 3/27/2023 0100 by Anum Ray RN  Medication Review/Management: medications reviewed  Taken 3/27/2023 0000 by Anum Ray RN  Medication Review/Management: medications reviewed  Taken 3/26/2023 2300 by Anum Ray RN  Medication Review/Management: medications reviewed  Taken 3/26/2023 2200 by Anum Ray RN  Medication Review/Management: medications reviewed  Taken 3/26/2023 2100 by Anum Ray RN  Medication Review/Management: medications reviewed  Taken 3/26/2023 2000 by Anum Ray RN  Medication Review/Management: medications reviewed  Taken 3/26/2023 1900 by Anum Ray RN  Medication Review/Management: medications reviewed  Intervention: Promote Injury-Free Environment  Recent Flowsheet Documentation  Taken 3/27/2023 0500 by Anum Ray RN  Safety Promotion/Fall Prevention: safety round/check completed  Taken 3/27/2023 0400 by Anum Ray RN  Safety Promotion/Fall Prevention: safety round/check completed  Taken 3/27/2023 0300 by Anum Ray RN  Safety Promotion/Fall Prevention: safety round/check completed  Taken 3/27/2023 0200 by Anum Ray RN  Safety Promotion/Fall Prevention: safety round/check completed  Taken 3/27/2023 0100 by Anum Ray RN  Safety Promotion/Fall Prevention: safety round/check completed  Taken 3/27/2023 0000 by Rosenbalm, Anum, RN  Safety Promotion/Fall Prevention: safety round/check completed  Taken 3/26/2023 2300 by Anum Ray RN  Safety Promotion/Fall Prevention: safety round/check completed  Taken 3/26/2023 2200 by Anum Ray RN  Safety Promotion/Fall Prevention: safety round/check completed  Taken 3/26/2023 2100 by Anum Ray RN  Safety Promotion/Fall Prevention: safety round/check completed  Taken 3/26/2023 2000 by Rosenbalm, Anum, RN  Safety Promotion/Fall  Prevention:   safety round/check completed   assistive device/personal items within reach   fall prevention program maintained  Taken 3/26/2023 1900 by Rosenbalm, Anum, RN  Safety Promotion/Fall Prevention: safety round/check completed     Problem: Skin Injury Risk Increased  Goal: Skin Health and Integrity  Outcome: Ongoing, Progressing  Intervention: Optimize Skin Protection  Recent Flowsheet Documentation  Taken 3/27/2023 0400 by Anum Ray RN  Head of Bed (HOB) Positioning: HOB at 30-45 degrees  Taken 3/27/2023 0200 by Anum Ray RN  Head of Bed (HOB) Positioning: HOB at 30-45 degrees  Taken 3/27/2023 0000 by Anum Ray RN  Head of Bed (HOB) Positioning: HOB at 30-45 degrees  Taken 3/26/2023 2200 by Anum Ray RN  Head of Bed (HOB) Positioning: HOB at 30-45 degrees  Taken 3/26/2023 2000 by Anum Ray RN  Head of Bed (HOB) Positioning: HOB at 30-45 degrees   Goal Outcome Evaluation:

## 2023-03-27 NOTE — PROGRESS NOTES
LOS: 5 days   Patient Care Team:  Dave Tobar MD as PCP - General  Dave Tobar MD as PCP - Family Medicine    Post op day # 5, status post laparotomy with closure of perforated duodenal ulcer with omental patch and Biopatch    Subjective     Interval History:  Patient has done well.  Was transferred to [facility]. today.  Patient states she is only taking a very small amount of clear liquids.  She did have a bowel movement.  She is getting up and using the bedside commode to void.  Patient does complain of some hand swelling  History taken from patient and nurse.      Objective     Vital Signs  Temp:  [97.5 °F (36.4 °C)-98.2 °F (36.8 °C)] 98.2 °F (36.8 °C)  Heart Rate:  [] 104  Resp:  [18-20] 18  BP: (138-192)/() 173/71    Physical Exam:  This is a well-developed well-nourished elderly female in no acute distress  HEENT examination: Sclera are anicteric  Lungs: Clear  Abdomen: Bowel sounds are present  Skin/incisions: Surgical dressing intact and dry.  SARAH with serosanguineous fluid     Results Review:    Results from last 7 days   Lab Units 03/22/23  1336 03/22/23  1139   HSTROP T ng/L 29* 25*     Results from last 7 days   Lab Units 03/27/23  0757 03/26/23  0403 03/25/23  0040 03/24/23  0032 03/23/23  0400 03/22/23  2212 03/22/23  1909 03/22/23  1435 03/22/23  1139   CRP mg/dL  --   --   --   --   --   --   --   --  3.44*   LACTATE mmol/L  --   --   --   --  1.8 2.5* 2.3*   < > 3.5*   WBC 10*3/mm3 13.51* 12.80* 19.00*   < > 17.35*  --   --   --  30.99*   HEMOGLOBIN g/dL 10.4* 10.2* 11.2*   < > 12.1  --   --   --  15.9   HEMATOCRIT % 32.2* 31.4* 34.1   < > 36.2  --   --   --  46.3   PLATELETS 10*3/mm3 294 287 327   < > 365  --   --   --  468*    < > = values in this interval not displayed.     Results from last 7 days   Lab Units 03/22/23  1145   PH, ARTERIAL pH units 7.424   PO2 ART mm Hg 80.1*   PCO2, ARTERIAL mm Hg 30.6*   HCO3 ART mmol/L 20.0     Results from last 7 days   Lab Units  03/27/23  0757 03/27/23  0538 03/27/23  0052 03/26/23  1648 03/26/23  0403 03/25/23  1148 03/25/23  0040 03/24/23  0032   SODIUM mmol/L 137  --   --   --  137  --  133* 130*   POTASSIUM mmol/L 4.3 4.0  --  3.4* 3.6   < > 3.4* 3.7   MAGNESIUM mg/dL  --   --  2.0  --  1.9  --  1.8 2.0   CHLORIDE mmol/L 106  --   --   --  107  --  102 100   CO2 mmol/L 22.6  --   --   --  21.6*  --  20.8* 22.2   BUN mg/dL 5*  --   --   --  7*  --  9 14   CREATININE mg/dL 0.42*  --   --   --  0.36*  --  0.36* 0.46*   CALCIUM mg/dL 8.4*  --   --   --  8.0*  --  8.2* 8.1*   GLUCOSE mg/dL 126*  --   --   --  142*  --  125* 123*   ALBUMIN g/dL 2.5*  --   --   --  2.3*  --   --  2.5*   BILIRUBIN mg/dL 0.5  --   --   --  0.4  --   --  0.4   ALK PHOS U/L 126*  --   --   --  106  --   --  55   AST (SGOT) U/L 46*  --   --   --  30  --   --  11   ALT (SGPT) U/L 68*  --   --   --  36*  --   --  12    < > = values in this interval not displayed.   Estimated Creatinine Clearance: 97.1 mL/min (A) (by C-G formula based on SCr of 0.42 mg/dL (L)).  No results found for: AMMONIA      Blood Culture   Date Value Ref Range Status   03/22/2023 No growth at 24 hours  Preliminary   03/22/2023 No growth at 24 hours  Preliminary     No results found for: URINECX  Wound Culture   Date Value Ref Range Status   03/22/2023 No growth  Preliminary     No results found for: STOOLCX    Imaging:  Imaging Results (Last 24 Hours)     ** No results found for the last 24 hours. **           Impression:  Stable course, status post repair of perforated duodenal ulcer    Plan:  IV fluids changed to D5 half-normal saline with 20 KCl at 50 cc/h as sodium has now normalized.  Physical therapy  Will advance diet as tolerated  Aurora Kirkland MD  03/27/23  13:22 EDT      Please note that portions of this note were completed with a voice recognition program.

## 2023-03-27 NOTE — NURSING NOTE
Patient with DTPI to sacrum.  Maroon to purple and nonblanchable.  No drainage at this time.  1.5x1cm.  Will treat with Venelex and leave open to air.     PI prevention orders initiated.       03/27/23 1114   Wound 01/06/23 1300 sacral spine   Placement Date/Time: 01/06/23 1300   Present on Hospital Admission: Yes  Location: sacral spine   Pressure Injury Stage DTPI   Dressing Appearance open to air   Closure None   Base maroon/purple;non-blanchable   Periwound intact;pink   Periwound Temperature warm   Periwound Skin Turgor soft   Edges rolled/closed   Wound Length (cm) 1.5 cm   Wound Width (cm) 1 cm   Wound Surface Area (cm^2) 1.5 cm^2   Drainage Amount none

## 2023-03-27 NOTE — PROGRESS NOTES
[facility] HOSPITALIST PROGRESS NOTE     Patient Identification:  Name:  Christine Garg  Age:  89 y.o.  Sex:  female  :  1933  MRN:  [id number]  Visit Number:  [id number]  ROOM: [address]     Primary Care Provider:  Dave Tobar MD    Length of stay in inpatient status:  5    Subjective     Chief Compliant:    Chief Complaint   Patient presents with   • Abdominal Pain     History of Presenting Illness:    Patient remains ill but stable today, no acute events overnight, denies any fevers or chills, complaining of increased upper extremity hand swelling today, discontinued IVFs, remains on room air to minimal NC, hold on diuresis for now, follow up volume status tomorrow, General Surgery following and advancing diet as tolerated, encouraged ambulation, PT/OT consulted and following, labs reviewed and stable overall.    Objective     Current Hospital Meds:  castor oil-balsam peru, 1 application, Topical, Q12H  custom IV infusion builder, 100 mL/hr, Intravenous, Q24H  heparin (porcine), 5,000 Units, Subcutaneous, Q12H  hydrALAZINE, 10 mg, Intravenous, Q6H  levothyroxine sodium, 100 mcg, Intravenous, Daily  lidocaine, 1 patch, Transdermal, Q24H  magnesium sulfate, 4 g, Intravenous, Once  pantoprazole, 40 mg, Intravenous, Q12H  piperacillin-tazobactam, 3.375 g, Intravenous, Q8H  dextrose 5 % and sodium chloride 0.9 %, 50 mL/hr, Last Rate: 50 mL/hr (23 0825)      ----------------------------------------------------------------------------------------------------------------------  Vital Signs:  Temp:  [97.5 °F (36.4 °C)-98.2 °F (36.8 °C)] 98.2 °F (36.8 °C)  Heart Rate:  [] 81  Resp:  [18-20] 18  BP: (135-192)/() 173/71  SpO2:  [94 %-98 %] 96 %  on  Flow (L/min):  [1] 1;   Device (Oxygen Therapy): nasal cannula  Body mass index is 24.84 kg/m².      Intake/Output Summary (Last 24 hours) at 3/27/2023 1150  Last data filed at 3/27/2023 0830  Gross per 24 hour   Intake 713 ml   Output  715 ml   Net -2 ml      ----------------------------------------------------------------------------------------------------------------------  Physical exam:  Constitutional:  Elderly, No acute distress.      HENT:  Head:  Normocephalic and atraumatic.  Mouth:  Moist mucous membranes.    Eyes:  Conjunctivae and EOM are normal. No scleral icterus.    Neck:  Neck supple.  No JVD present.    Cardiovascular:  Normal rate, regular rhythm and normal heart sounds with no murmur.  Pulmonary/Chest:  No respiratory distress, no wheezes, faint crackles, on minimal NC  Abdominal:  Soft. No distension and mild tenderness to palpation   Musculoskeletal:  No tenderness, and no deformity.  No red or swollen joints anywhere.    Neurological:  Alert and oriented to person, place, and time.  No gross focal deficits     Skin:  Skin is warm and dry. No rash noted. No pallor.   Peripheral vascular:  No clubbing, no cyanosis, trace edema, bilateral upper extremity 1+ edema  Psychiatric: Appropriate mood and affect    Edited by: Rodriguez Robin MD at 3/27/2023 1150  ----------------------------------------------------------------------------------------------------------------------  WBC/HGB/HCT/PLT   13.51/10.4/32.2/294 (03/27 0757)  BUN/CREAT/GLUC/ALT/AST/OSIRIS/LIP    5/0.42/126/68/46/--/-- (03/27 0757)  LYTES - Na/K/Cl/CO2: 137/4.3/106/22.6 (03/27 0757)     No results found for: URINECX  Blood Culture   Date Value Ref Range Status   03/22/2023 No growth at 4 days  Preliminary   03/22/2023 No growth at 4 days  Preliminary     I have personally looked at the labs and they are summarized above.  ----------------------------------------------------------------------------------------------------------------------  Detailed radiology reports for the last 24 hours:  XR Chest 1 View    Result Date: 3/26/2023  1. Interval removal of NG tube. 2. Small left pleural effusion, new. 3. Ill-defined left lung base opacity Signer Name: Anderson Menendez,  MD  Signed: 3/26/2023 10:03 AM  Workstation Name: [id number]  [facility]    Assessment & Plan    #Severe sepsis that was present on admission (temperature 36 degrees C, lactic acid 3.5, CRP 3.44, WBC 30,990, heart rates ) due to a suspected perforated antral gastric ulcer  #Acute hypokalemia and acute hypomagnesemia and acute hypophosphatemia, improved with supplementation  #History of coronary artery disease status post 3v CABG in 2011  #History of chronic kidney disease stage IIIa with baseline Cr 0.6-0.85   #History of essential hypertension  #History of hypothyroidism, currently controlled  #History of hyperlipidemia  #History of left upper lobe pleural based pulmonary nodule that appears to be invading the T6 vertebra  #Advanced age  #Former Smoker  #Debility   - General Surgery following as primary, status post surgical intervention, advancing diet as tolerated  - Continue Zosyn x 5 days as previously started per primary, should complete soon  - Continue IV PPI, Levothyroxine, Hydralazine for now, though hopefully can transition to oral regimen soon  - Will review home oral medications at that time and resume as indicated   - CXR with ill-defined LLL opacities, patient already on zosyn, clinically not suspicious of Pneumonia, suspect fluid overload, having increased hand swelling, discontinued IVFs as now tolerating some oral intake, hold on diuresis unless hypoxia worsens, LFTs trending up, suspect cardiac congestion, trend CMP in AM, formal echocardiogram reviewed with diastolic dysfunction and mild Pulmonary Hypertension.  - Continue Tylenol as needed for fevers, Duonebs as needed  - Admitted to CCU, monitor on telemetry, continue 02 but wean as able     F: Oral, mIVFs  E: Monitor & Replace as needed   N: Diet: Liquid Diets; Clear Liquid; No Carbonated Beverages, No Straw; Texture: Regular Texture (IDDSI 7); Fluid Consistency: Thin (IDDSI 0)   Ppx: [facility]  Code Status (Patient  has no pulse and is not breathing): CPR (Attempt to Resuscitate)  Medical Interventions (Patient has pulse or is breathing): Full Support     Dispo: Pending workup and clinical improvement, PT/OT consulted and following, may need placement     *This patient is considered high risk due to severe sepsis, bowel perforation, surgical intervention, volume overload, debility     Edited by: Rodriguez Robin MD at 3/27/2023 1150  [facility]

## 2023-03-28 LAB
ALBUMIN SERPL-MCNC: 2.4 G/DL (ref 3.5–5.2)
ALBUMIN/GLOB SERPL: 0.9 G/DL
ALP SERPL-CCNC: 112 U/L (ref 39–117)
ALT SERPL W P-5'-P-CCNC: 59 U/L (ref 1–33)
ANION GAP SERPL CALCULATED.3IONS-SCNC: 8.8 MMOL/L (ref 5–15)
AST SERPL-CCNC: 31 U/L (ref 1–32)
BILIRUB SERPL-MCNC: 0.5 MG/DL (ref 0–1.2)
BUN SERPL-MCNC: 6 MG/DL (ref 8–23)
BUN/CREAT SERPL: 15 (ref 7–25)
CALCIUM SPEC-SCNC: 8.7 MG/DL (ref 8.6–10.5)
CHLORIDE SERPL-SCNC: 103 MMOL/L (ref 98–107)
CO2 SERPL-SCNC: 23.2 MMOL/L (ref 22–29)
CREAT SERPL-MCNC: 0.4 MG/DL (ref 0.57–1)
DEPRECATED RDW RBC AUTO: 52.4 FL (ref 37–54)
EGFRCR SERPLBLD CKD-EPI 2021: 94.7 ML/MIN/1.73
ERYTHROCYTE [DISTWIDTH] IN BLOOD BY AUTOMATED COUNT: 14.6 % (ref 12.3–15.4)
GLOBULIN UR ELPH-MCNC: 2.8 GM/DL
GLUCOSE SERPL-MCNC: 113 MG/DL (ref 65–99)
HCT VFR BLD AUTO: 34.7 % (ref 34–46.6)
HGB BLD-MCNC: 11 G/DL (ref 12–15.9)
MCH RBC QN AUTO: 31.2 PG (ref 26.6–33)
MCHC RBC AUTO-ENTMCNC: 31.7 G/DL (ref 31.5–35.7)
MCV RBC AUTO: 98.3 FL (ref 79–97)
PLATELET # BLD AUTO: 274 10*3/MM3 (ref 140–450)
PMV BLD AUTO: 9.7 FL (ref 6–12)
POTASSIUM SERPL-SCNC: 3.7 MMOL/L (ref 3.5–5.2)
PROT SERPL-MCNC: 5.2 G/DL (ref 6–8.5)
RBC # BLD AUTO: 3.53 10*6/MM3 (ref 3.77–5.28)
SODIUM SERPL-SCNC: 135 MMOL/L (ref 136–145)
WBC NRBC COR # BLD: 13.43 10*3/MM3 (ref 3.4–10.8)

## 2023-03-28 PROCEDURE — 97166 OT EVAL MOD COMPLEX 45 MIN: CPT

## 2023-03-28 PROCEDURE — 25010000002 HYDRALAZINE PER 20 MG: Performed by: INTERNAL MEDICINE

## 2023-03-28 PROCEDURE — 97530 THERAPEUTIC ACTIVITIES: CPT

## 2023-03-28 PROCEDURE — 99024 POSTOP FOLLOW-UP VISIT: CPT | Performed by: SURGERY

## 2023-03-28 PROCEDURE — 25010000002 PIPERACILLIN SOD-TAZOBACTAM PER 1 G: Performed by: SURGERY

## 2023-03-28 PROCEDURE — 25010000002 HEPARIN (PORCINE) PER 1000 UNITS: Performed by: SURGERY

## 2023-03-28 PROCEDURE — 25010000002 MORPHINE PER 10 MG: Performed by: SURGERY

## 2023-03-28 PROCEDURE — 85027 COMPLETE CBC AUTOMATED: CPT | Performed by: INTERNAL MEDICINE

## 2023-03-28 PROCEDURE — 25010000002 ONDANSETRON PER 1 MG: Performed by: SURGERY

## 2023-03-28 PROCEDURE — 80053 COMPREHEN METABOLIC PANEL: CPT | Performed by: INTERNAL MEDICINE

## 2023-03-28 PROCEDURE — 99232 SBSQ HOSP IP/OBS MODERATE 35: CPT | Performed by: INTERNAL MEDICINE

## 2023-03-28 RX ORDER — CARVEDILOL 25 MG/1
25 TABLET ORAL 2 TIMES DAILY WITH MEALS
Status: DISCONTINUED | OUTPATIENT
Start: 2023-03-28 | End: 2023-04-10 | Stop reason: HOSPADM

## 2023-03-28 RX ORDER — VALSARTAN 160 MG/1
160 TABLET ORAL
Status: DISCONTINUED | OUTPATIENT
Start: 2023-03-28 | End: 2023-03-30

## 2023-03-28 RX ORDER — HYDRALAZINE HYDROCHLORIDE 25 MG/1
25 TABLET, FILM COATED ORAL EVERY 8 HOURS SCHEDULED
Status: DISCONTINUED | OUTPATIENT
Start: 2023-03-28 | End: 2023-03-30

## 2023-03-28 RX ORDER — HYDRALAZINE HYDROCHLORIDE 20 MG/ML
10 INJECTION INTRAMUSCULAR; INTRAVENOUS EVERY 6 HOURS PRN
Status: DISCONTINUED | OUTPATIENT
Start: 2023-03-28 | End: 2023-04-10 | Stop reason: HOSPADM

## 2023-03-28 RX ORDER — MULTIVIT WITH IRON,MINERALS
15 LIQUID (ML) ORAL DAILY
Status: DISCONTINUED | OUTPATIENT
Start: 2023-03-28 | End: 2023-03-28 | Stop reason: CLARIF

## 2023-03-28 RX ORDER — PEDIATRIC MULTIPLE VITAMIN LIQ
10 LIQUID ORAL DAILY
Status: DISCONTINUED | OUTPATIENT
Start: 2023-03-28 | End: 2023-04-10 | Stop reason: HOSPADM

## 2023-03-28 RX ADMIN — PANTOPRAZOLE SODIUM 40 MG: 40 INJECTION, POWDER, FOR SOLUTION INTRAVENOUS at 21:06

## 2023-03-28 RX ADMIN — HYDRALAZINE HYDROCHLORIDE 25 MG: 50 TABLET, FILM COATED ORAL at 16:08

## 2023-03-28 RX ADMIN — Medication 3 ML: at 21:00

## 2023-03-28 RX ADMIN — Medication 10 ML: at 08:13

## 2023-03-28 RX ADMIN — PIPERACILLIN SODIUM AND TAZOBACTAM SODIUM 3.38 G: 3; .375 INJECTION, POWDER, LYOPHILIZED, FOR SOLUTION INTRAVENOUS at 09:38

## 2023-03-28 RX ADMIN — CASTOR OIL AND BALSAM, PERU 1 APPLICATION: 788; 87 OINTMENT TOPICAL at 08:12

## 2023-03-28 RX ADMIN — MORPHINE SULFATE 2 MG: 2 INJECTION, SOLUTION INTRAMUSCULAR; INTRAVENOUS at 08:12

## 2023-03-28 RX ADMIN — PIPERACILLIN SODIUM AND TAZOBACTAM SODIUM 3.38 G: 3; .375 INJECTION, POWDER, LYOPHILIZED, FOR SOLUTION INTRAVENOUS at 16:07

## 2023-03-28 RX ADMIN — PIPERACILLIN SODIUM AND TAZOBACTAM SODIUM 3.38 G: 3; .375 INJECTION, POWDER, LYOPHILIZED, FOR SOLUTION INTRAVENOUS at 23:38

## 2023-03-28 RX ADMIN — CARVEDILOL 25 MG: 25 TABLET, FILM COATED ORAL at 09:39

## 2023-03-28 RX ADMIN — Medication 10 ML: at 08:12

## 2023-03-28 RX ADMIN — MORPHINE SULFATE 2 MG: 2 INJECTION, SOLUTION INTRAMUSCULAR; INTRAVENOUS at 23:38

## 2023-03-28 RX ADMIN — HEPARIN SODIUM 5000 UNITS: 5000 INJECTION INTRAVENOUS; SUBCUTANEOUS at 09:38

## 2023-03-28 RX ADMIN — MORPHINE SULFATE 2 MG: 2 INJECTION, SOLUTION INTRAMUSCULAR; INTRAVENOUS at 04:29

## 2023-03-28 RX ADMIN — CASTOR OIL AND BALSAM, PERU 1 APPLICATION: 788; 87 OINTMENT TOPICAL at 21:07

## 2023-03-28 RX ADMIN — Medication 10 ML: at 16:07

## 2023-03-28 RX ADMIN — Medication 3 ML: at 08:13

## 2023-03-28 RX ADMIN — ONDANSETRON 4 MG: 2 INJECTION INTRAMUSCULAR; INTRAVENOUS at 22:29

## 2023-03-28 RX ADMIN — HYDRALAZINE HYDROCHLORIDE 25 MG: 50 TABLET, FILM COATED ORAL at 21:06

## 2023-03-28 RX ADMIN — Medication 10 ML: at 21:06

## 2023-03-28 RX ADMIN — LIDOCAINE 1 PATCH: 700 PATCH TOPICAL at 08:12

## 2023-03-28 RX ADMIN — LEVOTHYROXINE SODIUM 112 MCG: 0.07 TABLET ORAL at 05:38

## 2023-03-28 RX ADMIN — Medication 10 ML: at 21:08

## 2023-03-28 RX ADMIN — CARVEDILOL 25 MG: 25 TABLET, FILM COATED ORAL at 16:10

## 2023-03-28 RX ADMIN — HYDRALAZINE HYDROCHLORIDE 25 MG: 50 TABLET, FILM COATED ORAL at 09:39

## 2023-03-28 RX ADMIN — PANTOPRAZOLE SODIUM 40 MG: 40 INJECTION, POWDER, FOR SOLUTION INTRAVENOUS at 08:12

## 2023-03-28 RX ADMIN — VALSARTAN 160 MG: 160 TABLET, FILM COATED ORAL at 09:39

## 2023-03-28 RX ADMIN — Medication 10 ML: at 21:09

## 2023-03-28 RX ADMIN — HYDRALAZINE HYDROCHLORIDE 10 MG: 20 INJECTION INTRAMUSCULAR; INTRAVENOUS at 08:12

## 2023-03-28 RX ADMIN — MORPHINE SULFATE 2 MG: 2 INJECTION, SOLUTION INTRAMUSCULAR; INTRAVENOUS at 14:11

## 2023-03-28 RX ADMIN — HEPARIN SODIUM 5000 UNITS: 5000 INJECTION INTRAVENOUS; SUBCUTANEOUS at 21:06

## 2023-03-28 RX ADMIN — HYDRALAZINE HYDROCHLORIDE 10 MG: 20 INJECTION INTRAMUSCULAR; INTRAVENOUS at 04:29

## 2023-03-28 NOTE — PROGRESS NOTES
In view of national shortage of IV multivitamins , spoke to neel Scott to switch to liquid vitamin as alternative, similar to diet orders for liquids.

## 2023-03-28 NOTE — CASE MANAGEMENT/SOCIAL WORK
Discharge Planning Assessment   Jose Daniel     Patient Name: Christine Garg  MRN: [id number]  Today's Date: 3/28/2023    Admit Date: 3/22/2023    Plan: SS spoke with pt at bedside on this date.  Pt continues to request inpt rehab consult if a candidate.  SS will follow.     Discharge Plan     Row Name 03/28/23 8930       Plan    Plan SS spoke with pt at bedside on this date.  Pt continues to request inpt rehab consult if a candidate and refusing short term NHP for rehab.  SS will follow.              ADAM MartinsW

## 2023-03-28 NOTE — PLAN OF CARE
Goal Outcome Evaluation: Patient remains very pleasant today. Compliant with all medications and care as ordered. She remains on 1L/NC per patient request, saturations maintaining well above 9*0%. She was Hypertensive this AM, PRN Hydralazine given. She has also complained of pain this evening, PRN Morphine given. SCDS remain in place. She has been setup for each meal, poor intake noted. She is currently resting in bed. Will continue with plan of care.

## 2023-03-28 NOTE — THERAPY EVALUATION
Patient Name: Christine Garg  : 1933    MRN: [id number]                              Today's Date: 3/28/2023       Admit Date: 3/22/2023    Visit Dx:     ICD-10-CM ICD-9-CM   1. Acute gastric ulcer with perforation (HCC)  K25.1 531.10   2. Hyponatremia  E87.1 276.1   3. Hypokalemia  E87.6 276.8   4. Sepsis without acute organ dysfunction, due to unspecified organism (HCC)  A41.9 038.9     995.91   5. Perforated ulcer (HCC)  K27.5 533.50     Patient Active Problem List   Diagnosis   • Essential hypertension   • Dyslipidemia   • ASCVD (arteriosclerotic cardiovascular disease)   • Palpitations   • Coronary artery disease    • Abnormal PFTs (pulmonary function tests)   • Chronic obstructive pulmonary disease (HCC)   • Mild persistent asthma with allergic rhinitis   • Pulmonary nodule   • Former smoker   • Colitis   • Perforated ulcer (HCC)   • Acute gastric ulcer with perforation (HCC)     Past Medical History:   Diagnosis Date   • Advanced age    • CAD (coronary artery disease)     s/p 3v CABG   • Chronic kidney disease (CKD), stage III (moderate) (HCC)    • COPD (chronic obstructive pulmonary disease) (HCC)    • History of tobacco use    • Hyperlipidemia    • Hypertension    • Hypothyroidism    • PONV (postoperative nausea and vomiting)    • Pulmonary nodule      Past Surgical History:   Procedure Laterality Date   • BREAST BIOPSY Left     benign   • CATARACT EXTRACTION     • CORONARY ANGIOPLASTY     • CORONARY ARTERY BYPASS GRAFT     • EXPLORATORY LAPAROTOMY N/A 3/22/2023    Procedure: LAPAROTOMY EXPLORATORY WITH OVER SEW OF DUODENAL ULCER WITH OMENTAL PATCH AND BIOPATCH AND CENTRAL LINE PLACEMENT;  Surgeon: Aurora Kirkland MD;  Location: [facility];  Service: General;  Laterality: N/A;      General Information     Row Name 23 1442          OT Time and Intention    Document Type evaluation  -     Mode of Treatment individual therapy;occupational therapy  -     Row Name 23 1442           General Information    Patient Profile Reviewed yes  -     Prior Level of Function independent:;all household mobility;ADL's;cooking;home management  functional decline since hospitalization late 2022; no assistive device; shower chair; son has been residing with patient since hospitalization  -     Existing Precautions/Restrictions fall  -     Barriers to Rehab none identified  -     Row Name 03/28/23 1442          Occupational Profile    Reason for Services/Referral (Occupational Profile) Patient admitted to [facility] on [facility]/22/[facility]23. She was referred for OT evaluation due to change in functional performance with ADLs, functional mobility, and/or transfers.  -     Row Name 03/28/23 1442          Living Environment    People in Home child(shimon), adult  -     Row Name 03/28/23 1442          Cognition    Orientation Status (Cognition) oriented x 3  -     Row Name 03/28/23 1442          Safety Issues, Functional Mobility    Impairments Affecting Function (Mobility) endurance/activity tolerance;strength;balance  -           User Key  (r) = Recorded By, (t) = Taken By, (c) = Cosigned By    Initials Name Provider Type     Reta Kapoor, OT Occupational Therapist                 Mobility/ADL's     Row Name 03/28/23 1449          Bed Mobility    Bed Mobility bed mobility (all) activities  -     All Activities, [place] (Bed Mobility) minimum assist (75% patient effort)  -     Assistive Device (Bed Mobility) bed rails;head of bed elevated  -     Row Name 03/28/23 1449          Transfers    Transfers toilet transfer  -     Row Name 03/28/23 1449          Toilet Transfer    Type (Toilet Transfer) stand pivot/stand step  -     [place] Level (Toilet Transfer) minimum assist (75% patient effort)  -     Assistive Device (Toilet Transfer) commode, 3-in-1  -     Row Name 03/28/23 1449          Activities of Daily Living    BADL Assessment/Intervention bathing;upper body  dressing;lower body dressing;grooming;feeding;toileting  -[facility] Name 03/28/23 1449          Bathing Assessment/Intervention    [place] Level (Bathing) bathing skills;moderate assist (50% patient effort)  -[facility] Name 03/28/23 1449          Upper Body Dressing Assessment/Training    [place] Level (Upper Body Dressing) upper body dressing skills;minimum assist (75% patient effort)  -[facility] Name 03/28/23 1449          Lower Body Dressing Assessment/Training    [place] Level (Lower Body Dressing) lower body dressing skills;moderate assist (50% patient effort)  -[facility] Name 03/28/23 1449          Grooming Assessment/Training    [place] Level (Grooming) grooming skills;minimum assist (75% patient effort)  -[facility] Name 03/28/23 1449          Self-Feeding Assessment/Training    [place] Level (Feeding) feeding skills;set up  -KP     Row Name 03/28/23 1449          Toileting Assessment/Training    [place] Level (Toileting) toileting skills;minimum assist (75% patient effort);moderate assist (50% patient effort)  -           User Key  (r) = Recorded By, (t) = Taken By, (c) = Cosigned By    Initials Name Provider Type     Reta Kapoor, OT Occupational Therapist               Obj/Interventions     Row Name 03/28/23 1450          Sensory Assessment (Somatosensory)    Sensory Assessment (Somatosensory) UE sensation intact  -KP     Row Name 03/28/23 1450          Vision Assessment/Intervention    Visual Impairment/Limitations WFL  -KP     Row Name 03/28/23 1450          Range of Motion Comprehensive    General Range of Motion bilateral upper extremity ROM WFL  -KP     Row Name 03/28/23 1450          Strength Comprehensive (MMT)    Comment, General Manual Muscle Testing (MMT) Assessment 4+/5 MMT in BUEs  -KP     Row Name 03/28/23 1450          Motor Skills    Motor Skills coordination;functional endurance  -     Coordination WFL;bilateral;upper extremity  -     Functional  Endurance fair  -     Row Name 03/28/23 1450          Balance    Balance Assessment sitting static balance;standing static balance  -     Static Sitting Balance standby assist  -     Static Standing Balance minimal assist;contact guard  -           User Key  (r) = Recorded By, (t) = Taken By, (c) = Cosigned By    Initials Name Provider Type    Reta David OT Occupational Therapist               Goals/Plan     [facility] Name 03/28/23 1453          Transfer Goal 1 (OT)    Activity/Assistive Device (Transfer Goal 1, OT) toilet  -     [place] Level/Cues Needed (Transfer Goal 1, OT) standby assist  -     Time Frame (Transfer Goal 1, OT) by discharge  -[facility] Name 03/28/23 1453          Dressing Goal 1 (OT)    Activity/Device (Dressing Goal 1, OT) dressing skills, all  -KP     [place]/Cues Needed (Dressing Goal 1, OT) standby assist  -     Time Frame (Dressing Goal 1, OT) by discharge  -[facility] Name 03/28/23 1453          Problem Specific Goal 1 (OT)    Problem Specific Goal 1 (OT) Patient will perform sustained activity X15 minutes to promote functional endurance/activity tolerance needed for daily routine.  -     Time Frame (Problem Specific Goal 1, OT) by discharge  -[facility] Name 03/28/23 1453          Therapy Assessment/Plan (OT)    Planned Therapy Interventions (OT) activity tolerance training;BADL retraining;functional balance retraining;occupation/activity based interventions;ROM/therapeutic exercise;patient/caregiver education/training;strengthening exercise;transfer/mobility retraining  -           User Key  (r) = Recorded By, (t) = Taken By, (c) = Cosigned By    Initials Name Provider Type    Reta David OT Occupational Therapist               Clinical Impression     Row Name 03/28/23 1451          Pain Assessment    Pretreatment Pain Rating 0/10 - no pain  -     Posttreatment Pain Rating 0/10 - no pain  -[facility] Name 03/28/23 1451          Plan of Care Review     Plan of Care Reviewed With patient  -     Progress no change  -     Outcome Evaluation Patient seen for OT evaluation. She currently presents with functional limitations with generalized weakness, impaired activity tolerance/functional endurance, and impaired balance. Patient would benefit from skilled OT services to address functional deficits and promote highest level of independence and safety prior to discharge.  -     Row Name 03/28/23 1451          Therapy Assessment/Plan (OT)    Rehab Potential (OT) good, to achieve stated therapy goals  -     Criteria for Skilled Therapeutic Interventions Met (OT) yes;meets criteria;skilled treatment is necessary  -     Therapy Frequency (OT) 3 times/wk  3-5x/week as able and available to promote functional progress  -     Predicted Duration of Therapy Intervention (OT) discharge  -     Row Name 03/28/23 1451          Therapy Plan Review/Discharge Plan (OT)    Anticipated Discharge Disposition (OT) other (see comments)  to be determines; patient would be a good candidate for IPR due to independent prior level  -     Row Name 03/28/23 1451          Vital Signs    O2 Delivery Pre Treatment supplemental O2  -     O2 Delivery Intra Treatment supplemental O2  -     O2 Delivery Post Treatment supplemental O2  -     Row Name 03/28/23 1459          Positioning and Restraints    Pre-Treatment Position in bed  -     Post Treatment Position bed  -     In Bed call light within reach;with family/caregiver  -           User Key  (r) = Recorded By, (t) = Taken By, (c) = Cosigned By    Initials Name Provider Type    Reta David, OT Occupational Therapist               Outcome Measures    No documentation.                   OT Recommendation and Plan  Planned Therapy Interventions (OT): activity tolerance training, BADL retraining, functional balance retraining, occupation/activity based interventions, ROM/therapeutic exercise, patient/caregiver  education/training, strengthening exercise, transfer/mobility retraining  Therapy Frequency (OT): 3 times/wk (3-5x/week as able and available to promote functional progress)  Plan of Care Review  Plan of Care Reviewed With: patient  Progress: no change  Outcome Evaluation: Patient seen for OT evaluation. She currently presents with functional limitations with generalized weakness, impaired activity tolerance/functional endurance, and impaired balance. Patient would benefit from skilled OT services to address functional deficits and promote highest level of independence and safety prior to discharge.     Time Calculation:    Time Calculation- OT     Row Name 03/28/23 1454             Time Calculation- OT    OT Received On 03/28/23  -            User Key  (r) = Recorded By, (t) = Taken By, (c) = Cosigned By    Initials Name Provider Type     Reta Kaporo OT Occupational Therapist              Therapy Charges for Today     Code Description Service Date Service Provider Modifiers Qty    [id number]  OT EVAL MOD COMPLEXITY 4 3/28/2023 Reta Kapoor OT GO 1               Reta Kapoor OT  3/28/2023

## 2023-03-28 NOTE — PROGRESS NOTES
[facility] HOSPITALIST PROGRESS NOTE     Patient Identification:  Name:  Christine Garg  Age:  89 y.o.  Sex:  female  :  1933  MRN:  [id number]  Visit Number:  [id number]  ROOM: [address] [place] [address]     Primary Care Provider:  Dave Tobar MD    Length of stay in inpatient status:  6    Subjective     Chief Compliant:    Chief Complaint   Patient presents with   • Abdominal Pain     History of Presenting Illness:    Patient remains ill but stable today, no acute events overnight, no new complaints, denies any fevers or chills, reports her bilateral hand swelling has improved, her dyspnea is persisting but improved since holding IVFs, remains on clear liquid diet, no bowel movements today though did endorse passing gas, blood pressure improved, transitioning to oral regimen, PRNs on board. PT/OT consulted and following. Likely to need placement.   Objective     Current Hospital Meds:  carvedilol, 25 mg, Oral, BID With Meals  castor oil-balsam peru, 1 application, Topical, Q12H  custom IV infusion builder, 100 mL/hr, Intravenous, Q24H  heparin (porcine), 5,000 Units, Subcutaneous, Q12H  hydrALAZINE, 25 mg, Oral, Q8H  levothyroxine, 112 mcg, Oral, Q AM  lidocaine, 1 patch, Transdermal, Q24H  magnesium sulfate, 4 g, Intravenous, Once  pantoprazole, 40 mg, Intravenous, Q12H  piperacillin-tazobactam, 3.375 g, Intravenous, Q8H  valsartan, 160 mg, Oral, Q24H  ----------------------------------------------------------------------------------------------------------------------  Vital Signs:  Temp:  [97.5 °F (36.4 °C)-98.9 °F (37.2 °C)] 98 °F (36.7 °C)  Heart Rate:  [] 97  Resp:  [16-20] 16  BP: (148-210)/(62-92) 148/62  SpO2:  [97 %-99 %] 99 %  on  Flow (L/min):  [1] 1;   Device (Oxygen Therapy): nasal cannula  Body mass index is 24.6 kg/m².      Intake/Output Summary (Last 24 hours) at 3/28/2023 1301  Last data filed at 3/28/2023 1033  Gross per 24 hour   Intake 200 ml   Output 45 ml   Net 155 ml       ----------------------------------------------------------------------------------------------------------------------  Physical exam:  Constitutional:  Elderly, No acute distress.      HENT:  Head:  Normocephalic and atraumatic.  Mouth:  Moist mucous membranes.    Eyes:  Conjunctivae and EOM are normal. No scleral icterus.    Neck:  Neck supple.  No JVD present.    Cardiovascular:  Normal rate, regular rhythm and normal heart sounds with no murmur.  Pulmonary/Chest:  No respiratory distress, no wheezes, improved crackles, on minimal NC  Abdominal:  Soft. No distension and mild tenderness to palpation   Musculoskeletal:  No tenderness, and no deformity.  No red or swollen joints anywhere.    Neurological:  Alert and oriented to person, place, and time.  No gross focal deficits     Skin:  Skin is warm and dry. No rash noted. No pallor.   Peripheral vascular:  No clubbing, no cyanosis, trace edema, bilateral upper extremity edema much improved today.  Psychiatric: Appropriate mood and affect    Edited by: Rodriguez Robin MD at 3/28/2023 1258  ----------------------------------------------------------------------------------------------------------------------  WBC/HGB/HCT/PLT   13.43/11.0/34.7/274 (03/28 0817)  BUN/CREAT/GLUC/ALT/AST/OSIRIS/LIP    6/0.40/113/59/31/--/-- (03/28 0817)  LYTES - Na/K/Cl/CO2: 135*/3.7/103/23.2 (03/28 0817)     No results found for: URINECX  Blood Culture   Date Value Ref Range Status   03/22/2023 No growth at 5 days  Final   03/22/2023 No growth at 5 days  Final     I have personally looked at the labs and they are summarized above.  ----------------------------------------------------------------------------------------------------------------------  Detailed radiology reports for the last 24 hours:  No radiology results for the last day  Assessment & Plan    #Severe sepsis that was present on admission (temperature 36 degrees C, lactic acid 3.5, CRP 3.44, WBC 30,990, heart rates )  due to a suspected perforated antral gastric ulcer  #Acute hypokalemia and acute hypomagnesemia and acute hypophosphatemia, improved with supplementation  #History of coronary artery disease status post 3v CABG in 2011  #History of chronic kidney disease stage IIIa with baseline Cr 0.6-0.85   #History of essential hypertension  #History of hypothyroidism, currently controlled  #History of hyperlipidemia  #History of left upper lobe pleural based pulmonary nodule that appears to be invading the T6 vertebra  #Advanced age  #Former Smoker  #Debility   - General Surgery following as primary, status post surgical intervention, advancing diet as tolerated  - Continue Zosyn x 5 days as previously started per primary, should complete soon  - IV PPI for now, Levothyroxine transitioned to oral  - Transitioned to oral blood pressure regimen, continue home beta blocker, lower than home dose ARB, new oral Hydralazine, with PRNs on board as needed for systolic blood pressure > 180mmHg  - CXR with ill-defined LLL opacities, patient already on zosyn, clinically not suspicious of Pneumonia, suspect fluid overload, though has improved with holding IVFs, LFTs downtrending, remains afebrile, WBC count stable at 13K, remains on room air.  - Continue Tylenol as needed for fevers, monitor for clinical improvement.      F: Oral  E: Monitor & Replace as needed   N: Diet: Liquid Diets; Clear Liquid; No Carbonated Beverages, No Straw; Texture: Regular Texture (IDDSI 7); Fluid Consistency: Thin (IDDSI 0)   Ppx: SQH  Code Status (Patient has no pulse and is not breathing): CPR (Attempt to Resuscitate)  Medical Interventions (Patient has pulse or is breathing): Full Support     Dispo: Pending clinical improvement, PT/OT consulted and following, Social Work to assist with placement.     *This patient is considered high risk due to severe sepsis, bowel perforation, surgical intervention, volume overload, debility     Edited by: Rodriguez Robin MD  at 3/28/2023 1300  [facility]

## 2023-03-28 NOTE — PLAN OF CARE
Goal Outcome Evaluation:  Plan of Care Reviewed With: patient        Progress: improving   Patient has been resting this shift. Patient got up to bedside commode with assist. SARAH drain in place and serosanguineous drainage noted. No s/s of acute distress noted. Will continue with plan of care.

## 2023-03-28 NOTE — PROGRESS NOTES
LOS: 6 days   Patient Care Team:  Dave Tobar MD as PCP - General  Dave Tobar MD as PCP - Family Medicine    Post op day # 6, status post laparotomy with closure of perforated duodenal ulcer with omental patch and Biopatch    Subjective     Interval History:  Patient has done well.  Was transferred to 3 S. Monday.  Patient states she is only taking some clear liquids.  She did have a bowel movement.  She got up with physical therapy today and was embarrassed that she urinated on herself.    Objective     Vital Signs  Temp:  [97.5 °F (36.4 °C)-98.9 °F (37.2 °C)] 98 °F (36.7 °C)  Heart Rate:  [] 97  Resp:  [16-20] 16  BP: (148-210)/(62-92) 148/62    Physical Exam:  This is a well-developed well-nourished elderly female in no acute distress  HEENT examination: Sclera are anicteric  Lungs: Clear  Abdomen: Bowel sounds are present  Skin/incisions: Surgical  Incision intact and dry.  SARAH with serous fluid     Results Review:    Results from last 7 days   Lab Units 03/22/23  1336 03/22/23  1139   HSTROP T ng/L 29* 25*     Results from last 7 days   Lab Units 03/28/23  0817 03/27/23  0757 03/26/23  0403 03/24/23  0032 03/23/23  0400 03/22/23  2212 03/22/23  1909 03/22/23  1435 03/22/23  1139   CRP mg/dL  --   --   --   --   --   --   --   --  3.44*   LACTATE mmol/L  --   --   --   --  1.8 2.5* 2.3*   < > 3.5*   WBC 10*3/mm3 13.43* 13.51* 12.80*   < > 17.35*  --   --   --  30.99*   HEMOGLOBIN g/dL 11.0* 10.4* 10.2*   < > 12.1  --   --   --  15.9   HEMATOCRIT % 34.7 32.2* 31.4*   < > 36.2  --   --   --  46.3   PLATELETS 10*3/mm3 274 294 287   < > 365  --   --   --  468*    < > = values in this interval not displayed.     Results from last 7 days   Lab Units 03/22/23  1145   PH, ARTERIAL pH units 7.424   PO2 ART mm Hg 80.1*   PCO2, ARTERIAL mm Hg 30.6*   HCO3 ART mmol/L 20.0     Results from last 7 days   Lab Units 03/28/23  0817 03/27/23  0757 03/27/23  0538 03/27/23  0052 03/26/23  1648 03/26/23  0403  03/25/23  1148 03/25/23  0040   SODIUM mmol/L 135* 137  --   --   --  137  --  133*   POTASSIUM mmol/L 3.7 4.3 4.0  --    < > 3.6   < > 3.4*   MAGNESIUM mg/dL  --   --   --  2.0  --  1.9  --  1.8   CHLORIDE mmol/L 103 106  --   --   --  107  --  102   CO2 mmol/L 23.2 22.6  --   --   --  21.6*  --  20.8*   BUN mg/dL 6* 5*  --   --   --  7*  --  9   CREATININE mg/dL 0.40* 0.42*  --   --   --  0.36*  --  0.36*   CALCIUM mg/dL 8.7 8.4*  --   --   --  8.0*  --  8.2*   GLUCOSE mg/dL 113* 126*  --   --   --  142*  --  125*   ALBUMIN g/dL 2.4* 2.5*  --   --   --  2.3*  --   --    BILIRUBIN mg/dL 0.5 0.5  --   --   --  0.4  --   --    ALK PHOS U/L 112 126*  --   --   --  106  --   --    AST (SGOT) U/L 31 46*  --   --   --  30  --   --    ALT (SGPT) U/L 59* 68*  --   --   --  36*  --   --     < > = values in this interval not displayed.   Estimated Creatinine Clearance: 100.8 mL/min (A) (by C-G formula based on SCr of 0.4 mg/dL (L)).  No results found for: AMMONIA      Blood Culture   Date Value Ref Range Status   03/22/2023 No growth at 24 hours  Preliminary   03/22/2023 No growth at 24 hours  Preliminary     No results found for: URINECX  Wound Culture   Date Value Ref Range Status   03/22/2023 No growth  Preliminary     No results found for: STOOLCX    Imaging:  Imaging Results (Last 24 Hours)     ** No results found for the last 24 hours. **           Impression:  Stable course, status post repair of perforated duodenal ulcer    Plan:  Diet advanced  Physical therapy - not sure that patient will accept temporary placement  Aurora Kirkland MD  03/28/23  13:28 EDT      Please note that portions of this note were completed with a voice recognition program.

## 2023-03-28 NOTE — THERAPY TREATMENT NOTE
Acute Care - Physical Therapy Treatment Note  [facility]     Patient Name: Christine Garg  : 1933  MRN: [id number]  Today's Date: 3/28/2023   Onset of Illness/Injury or Date of Surgery: 23  Visit Dx:     ICD-10-CM ICD-9-CM   1. Acute gastric ulcer with perforation (HCC)  K25.1 531.10   2. Hyponatremia  E87.1 276.1   3. Hypokalemia  E87.6 276.8   4. Sepsis without acute organ dysfunction, due to unspecified organism (HCC)  A41.9 038.9     995.91   5. Perforated ulcer (HCC)  K27.5 533.50     Patient Active Problem List   Diagnosis   • Essential hypertension   • Dyslipidemia   • ASCVD (arteriosclerotic cardiovascular disease)   • Palpitations   • Coronary artery disease    • Abnormal PFTs (pulmonary function tests)   • Chronic obstructive pulmonary disease (HCC)   • Mild persistent asthma with allergic rhinitis   • Pulmonary nodule   • Former smoker   • Colitis   • Perforated ulcer (HCC)   • Acute gastric ulcer with perforation (HCC)     Past Medical History:   Diagnosis Date   • Advanced age    • CAD (coronary artery disease)     s/p 3v CABG   • Chronic kidney disease (CKD), stage III (moderate) (HCC)    • COPD (chronic obstructive pulmonary disease) (HCC)    • History of tobacco use    • Hyperlipidemia    • Hypertension    • Hypothyroidism    • PONV (postoperative nausea and vomiting)    • Pulmonary nodule      Past Surgical History:   Procedure Laterality Date   • BREAST BIOPSY Left     benign   • CATARACT EXTRACTION     • CORONARY ANGIOPLASTY     • CORONARY ARTERY BYPASS GRAFT     • EXPLORATORY LAPAROTOMY N/A 3/22/2023    Procedure: LAPAROTOMY EXPLORATORY WITH OVER SEW OF DUODENAL ULCER WITH OMENTAL PATCH AND BIOPATCH AND CENTRAL LINE PLACEMENT;  Surgeon: Aurora Kirkland MD;  Location: [facility];  Service: General;  Laterality: N/A;     PT Assessment (last 12 hours)     PT Evaluation and Treatment     Row Name 23 1430          Physical Therapy Time and Intention    Subjective Information no  complaints  -CS     Document Type therapy note (daily note)  -CS     Mode of Treatment physical therapy  -CS     Patient Effort adequate  -CS     Comment Pt seen bedside this AM.  Pt agreed to PT.  -CS     Row Name 03/28/23 1430          General Information    Patient Profile Reviewed yes  -CS     Row Name 03/28/23 1430          Living Environment    Primary Care Provided by self  -CS     Row Name 03/28/23 1430          Home Use of Assistive/Adaptive Equipment    Equipment Currently Used at Home none  -CS     Row Name 03/28/23 1430          Pain    Additional Documentation --  no c/o  -CS     Row Name 03/28/23 1430          Cognition    Orientation Status (Cognition) oriented x 3  -CS     Row Name 03/28/23 1430          Bed Mobility    Bed Mobility bed mobility (all) activities  -CS     All Activities, [place] (Bed Mobility) minimum assist (75% patient effort)  -CS     Row Name 03/28/23 1430          Transfers    Comment, (Transfers) Sit<-->stand from bedside and BSC w/ min(A)  -CS     Row Name 03/28/23 1430          Gait/Stairs (Locomotion)    Gait/Stairs Locomotion gait/ambulation independence  -CS     [place] Level (Gait) minimum assist (75% patient effort)  -CS     Distance in Feet (Gait) 2-3 steps to/from BSC  -CS     Comment, (Gait/Stairs) Pt c/o fatigue after BSC and requested to return to bed.  -CS     Row Name 03/28/23 1430          Respiratory WDL    Respiratory WDL X;breath sounds  -CS     Rhythm/Pattern, Respiratory unlabored;pattern regular;depth regular;shortness of breath  Per patient.  -CS     Expansion/Accessory Muscles/Retractions expansion symmetric;no retractions;no use of accessory muscles  -CS     Nailbeds no discoloration  -CS     Mucous Membranes pink;intact;moist  -CS     Cough Frequency no cough  -CS     Cough Type congested  -CS     Row Name 03/28/23 1430          Breath Sounds    Breath Sounds All Fields  -CS     All Lung Fields Breath Sounds Anterior:;diminished  -CS     LUIS  Breath Sounds diminished  -CS     LLL Breath Sounds diminished  -CS     RUL Breath Sounds diminished  -CS     RML Breath Sounds diminished  -CS     RLL Breath Sounds diminished  -CS     Row Name 03/28/23 1430          Skin WDL    Skin WDL X;characteristics  -CS     Skin Color/Characteristics maroon/purple;redness blanchable;pale  -CS     Skin Temperature warm  -CS     Skin Moisture flaky  -CS     Skin Elasticity slow return to original state  -CS     Skin Integrity bruised (ecchymotic);excoriation;incision;pressure injury  -CS     Row Name 03/28/23 1430          Wound 03/22/23 1640 abdomen Incision    Wound - Properties Group Placement Date: 03/22/23  - Placement Time: 1640  - Location: abdomen  -MC Primary Wound Type: Incision  -MC    Closure Adhesive bandage  -CS     Base dressing in place, unable to visualize  -CS     Periwound blanchable;intact;pink  -CS     Periwound Temperature warm  -CS     Periwound Skin Turgor soft  -CS     Drainage Characteristics/Odor serosanguineous  -CS     Drainage Amount scant  -CS     Retired Wound - Properties Group Placement Date: 03/22/23  - Placement Time: 1640  - Location: abdomen  -[facility] Primary Wound Type: Incision  -MC    Retired Wound - Properties Group Date first assessed: 03/22/23  - Time first assessed: 1640  - Location: abdomen  -[facility] Primary Wound Type: Incision  -MC    Row Name 03/28/23 1430          Wound 01/06/23 1300 sacral spine    Wound - Properties Group Placement Date: 01/06/23  -SS Placement Time: 1300  -SS Present on Hospital Admission: Y  -SS Location: sacral spine  -SS    Closure None  -CS     Base maroon/purple;non-blanchable  -CS     Periwound intact;pink  -CS     Periwound Temperature warm  -CS     Periwound Skin Turgor soft  -CS     Edges rolled/closed  -CS     Drainage Characteristics/Odor bleeding controlled  -CS     Drainage Amount none  -CS     Retired Wound - Properties Group Placement Date: 01/06/23  -SS Placement Time: 1300  -SS Present on  Hospital Admission: Y  -SS Location: sacral spine  -SS    Retired Wound - Properties Group Date first assessed: 01/06/23  -SS Time first assessed: 1300  -SS Present on Hospital Admission: Y  -SS Location: sacral spine  -SS    Row Name 03/28/23 1430          Coping    Observed Emotional State calm;cooperative;pleasant  -CS     Verbalized Emotional State acceptance  -CS     Family/Support Persons family  -CS     Involvement in Care not present at bedside  -CS     Row Name 03/28/23 1430          Plan of Care Review    Plan of Care Reviewed With patient  -CS     Progress improving  -CS     Outcome Evaluation Pt w/ fair bedside mobility limited by pain and fatigue this day.  Pt would benefit from continued skilled PT.  -CS     Row Name 03/28/23 1430          Positioning and Restraints    Pre-Treatment Position in bed  -CS     Post Treatment Position bed  -CS     In Bed with nsg  -CS     Row Name 03/28/23 1430          Therapy Assessment/Plan (PT)    Rehab Potential (PT) good, to achieve stated therapy goals  -CS     Criteria for Skilled Interventions Met (PT) yes;meets criteria;skilled treatment is necessary  -CS     Therapy Frequency (PT) 2 times/wk  2-5x/week  -CS     Problem List (PT) problems related to;balance;mobility;strength  -CS     Activity Limitations Related to Problem List (PT) unable to ambulate safely;unable to transfer safely  -CS     Row Name 03/28/23 1430          Progress Summary (PT)    Progress Toward Functional Goals (PT) progress toward functional goals is fair  -CS     Daily Progress Summary (PT) Pt w/ fair bedside mobility limited by pain and fatigue this day.  Pt would benefit from continued skilled PT.  -CS     Row Name 03/28/23 1430          Physical Therapy Goals    Bed Mobility Goal Selection (PT) bed mobility, PT goal 1  -CS     Transfer Goal Selection (PT) transfer, PT goal 1  -CS     Gait Training Goal Selection (PT) gait training, PT goal 1  -CS     Row Name 03/28/23 1430          Bed  Mobility Goal 1 (PT)    Activity/Assistive Device (Bed Mobility Goal 1, PT) bed mobility activities, all  -CS     [place] Level/Cues Needed (Bed Mobility Goal 1, PT) standby assist  -CS     Time Frame (Bed Mobility Goal 1, PT) long term goal (LTG);by discharge  -CS     Row Name 03/28/23 1430          Transfer Goal 1 (PT)    Activity/Assistive Device (Transfer Goal 1, PT) transfers, all  -CS     [place] Level/Cues Needed (Transfer Goal 1, PT) standby assist  -CS     Time Frame (Transfer Goal 1, PT) long term goal (LTG);by discharge  -CS     Row Name 03/28/23 1430          Gait Training Goal 1 (PT)    Activity/Assistive Device (Gait Training Goal 1, PT) gait (walking locomotion);assistive device use  -CS     [place] Level (Gait Training Goal 1, PT) standby assist  -CS     Distance (Gait Training Goal 1, PT) 50'  -CS     Time Frame (Gait Training Goal 1, PT) long term goal (LTG);by discharge  -CS           User Key  (r) = Recorded By, (t) = Taken By, (c) = Cosigned By    Initials Name Provider Type     Penelope Roe, RN Registered Nurse    Teresa Rod, RN Registered Nurse    Mina Flores, PT Physical Therapist                Physical Therapy Education     Title: PT OT SLP Therapies (In Progress)     Topic: Physical Therapy (In Progress)     Point: Mobility training (In Progress)     Learning Progress Summary           Patient Acceptance, E, NR by RD at 3/28/2023 0953    Acceptance, E, NR by SB at 3/27/2023 1828    Acceptance, E,TB, VU by FREDDY at 3/27/2023 0921                   Point: Home exercise program (In Progress)     Learning Progress Summary           Patient Acceptance, E, NR by RD at 3/28/2023 0953    Acceptance, E, NR by SB at 3/27/2023 1828    Acceptance, E,TB, VU by FREDDY at 3/27/2023 0921                   Point: Body mechanics (In Progress)     Learning Progress Summary           Patient Acceptance, E, NR by RD at 3/28/2023 0953    Acceptance, E, NR by SB at 3/27/2023 1828     Acceptance, E,TB, VU by CS at 3/27/2023 0921                   Point: Precautions (In Progress)     Learning Progress Summary           Patient Acceptance, E, NR by RD at 3/28/2023 0953    Acceptance, E, NR by SB at 3/27/2023 1828    Acceptance, E,TB, VU by CS at 3/27/2023 0921                               User Key     Initials Effective Dates Name Provider Type Discipline    RD 06/16/21 -  China Harkins, RN Registered Nurse Nurse    SB 01/19/22 -  Leon Hagen, RN Registered Nurse Nurse    FREDDY 05/24/22 -  Mina Zavala, PT Physical Therapist PT              PT Recommendation and Plan  Anticipated Discharge Disposition (PT):  (TBD)  Planned Therapy Interventions (PT): balance training, bed mobility training, gait training, home exercise program, neuromuscular re-education, patient/family education, strengthening, transfer training  Therapy Frequency (PT): 2 times/wk (2-5x/week)  Progress Summary (PT)  Progress Toward Functional Goals (PT): progress toward functional goals is fair  Daily Progress Summary (PT): Pt w/ fair bedside mobility limited by pain and fatigue this day.  Pt would benefit from continued skilled PT.  Plan of Care Reviewed With: patient  Progress: improving  Outcome Evaluation: Pt w/ fair bedside mobility limited by pain and fatigue this day.  Pt would benefit from continued skilled PT.       Time Calculation:    PT Charges     Row Name 03/28/23 1434             Time Calculation    Start Time 1145  -CS      PT Received On 03/28/23  -CS      PT Goal Re-Cert Due Date 04/10/23  -CS         Timed Charges    68179 - PT Therapeutic Activity Minutes 23  -CS         Total Minutes    Timed Charges Total Minutes 23  -CS       Total Minutes 23  -CS            User Key  (r) = Recorded By, (t) = Taken By, (c) = Cosigned By    Initials Name Provider Type    CS Mnia Zavala, PT Physical Therapist              Therapy Charges for Today     Code Description Service Date Service Provider Modifiers Qty     12660262565 HC PT EVAL MOD COMPLEXITY 4 3/27/2023 Mina Zavala, PT GP 1    86757601475 HC PT THERAPEUTIC ACT EA 15 MIN 3/28/2023 Mina Zavala, PT GP 2          PT G-Codes  AM-PAC 6 Clicks Score (PT): 12    Mina Zavala, PT  3/28/2023

## 2023-03-29 ENCOUNTER — APPOINTMENT (OUTPATIENT)
Dept: CT IMAGING | Facility: HOSPITAL | Age: 88
DRG: 853 | End: 2023-03-29
Payer: MEDICARE

## 2023-03-29 LAB
ALBUMIN SERPL-MCNC: 2.3 G/DL (ref 3.5–5.2)
ALBUMIN/GLOB SERPL: 1 G/DL
ALP SERPL-CCNC: 89 U/L (ref 39–117)
ALT SERPL W P-5'-P-CCNC: 50 U/L (ref 1–33)
ANION GAP SERPL CALCULATED.3IONS-SCNC: 5.7 MMOL/L (ref 5–15)
AST SERPL-CCNC: 27 U/L (ref 1–32)
BILIRUB SERPL-MCNC: 0.3 MG/DL (ref 0–1.2)
BUN SERPL-MCNC: 8 MG/DL (ref 8–23)
BUN/CREAT SERPL: 18.6 (ref 7–25)
CALCIUM SPEC-SCNC: 8.5 MG/DL (ref 8.6–10.5)
CHLORIDE SERPL-SCNC: 103 MMOL/L (ref 98–107)
CO2 SERPL-SCNC: 26.3 MMOL/L (ref 22–29)
CREAT SERPL-MCNC: 0.43 MG/DL (ref 0.57–1)
DEPRECATED RDW RBC AUTO: 51 FL (ref 37–54)
EGFRCR SERPLBLD CKD-EPI 2021: 93.1 ML/MIN/1.73
ERYTHROCYTE [DISTWIDTH] IN BLOOD BY AUTOMATED COUNT: 14.5 % (ref 12.3–15.4)
GLOBULIN UR ELPH-MCNC: 2.2 GM/DL
GLUCOSE SERPL-MCNC: 130 MG/DL (ref 65–99)
HCT VFR BLD AUTO: 28.4 % (ref 34–46.6)
HGB BLD-MCNC: 9.3 G/DL (ref 12–15.9)
MCH RBC QN AUTO: 31.4 PG (ref 26.6–33)
MCHC RBC AUTO-ENTMCNC: 32.7 G/DL (ref 31.5–35.7)
MCV RBC AUTO: 95.9 FL (ref 79–97)
PLATELET # BLD AUTO: 255 10*3/MM3 (ref 140–450)
PMV BLD AUTO: 10.4 FL (ref 6–12)
POTASSIUM SERPL-SCNC: 3.8 MMOL/L (ref 3.5–5.2)
PROT SERPL-MCNC: 4.5 G/DL (ref 6–8.5)
RBC # BLD AUTO: 2.96 10*6/MM3 (ref 3.77–5.28)
SODIUM SERPL-SCNC: 135 MMOL/L (ref 136–145)
WBC NRBC COR # BLD: 11.69 10*3/MM3 (ref 3.4–10.8)

## 2023-03-29 PROCEDURE — 72128 CT CHEST SPINE W/O DYE: CPT | Performed by: RADIOLOGY

## 2023-03-29 PROCEDURE — 72125 CT NECK SPINE W/O DYE: CPT

## 2023-03-29 PROCEDURE — 25010000002 PIPERACILLIN SOD-TAZOBACTAM PER 1 G: Performed by: SURGERY

## 2023-03-29 PROCEDURE — 94761 N-INVAS EAR/PLS OXIMETRY MLT: CPT

## 2023-03-29 PROCEDURE — 94799 UNLISTED PULMONARY SVC/PX: CPT

## 2023-03-29 PROCEDURE — 85027 COMPLETE CBC AUTOMATED: CPT | Performed by: INTERNAL MEDICINE

## 2023-03-29 PROCEDURE — 25010000002 HEPARIN (PORCINE) PER 1000 UNITS: Performed by: SURGERY

## 2023-03-29 PROCEDURE — 72128 CT CHEST SPINE W/O DYE: CPT

## 2023-03-29 PROCEDURE — 25010000002 MORPHINE PER 10 MG: Performed by: SURGERY

## 2023-03-29 PROCEDURE — 97530 THERAPEUTIC ACTIVITIES: CPT

## 2023-03-29 PROCEDURE — 72125 CT NECK SPINE W/O DYE: CPT | Performed by: RADIOLOGY

## 2023-03-29 PROCEDURE — 99232 SBSQ HOSP IP/OBS MODERATE 35: CPT | Performed by: INTERNAL MEDICINE

## 2023-03-29 PROCEDURE — 99024 POSTOP FOLLOW-UP VISIT: CPT | Performed by: SURGERY

## 2023-03-29 PROCEDURE — 25010000002 ONDANSETRON PER 1 MG: Performed by: SURGERY

## 2023-03-29 PROCEDURE — 80053 COMPREHEN METABOLIC PANEL: CPT | Performed by: INTERNAL MEDICINE

## 2023-03-29 RX ORDER — HYDROCODONE BITARTRATE AND ACETAMINOPHEN 7.5; 325 MG/1; MG/1
1 TABLET ORAL EVERY 4 HOURS PRN
Status: DISPENSED | OUTPATIENT
Start: 2023-03-29 | End: 2023-04-08

## 2023-03-29 RX ORDER — POLYETHYLENE GLYCOL 3350 17 G/17G
17 POWDER, FOR SOLUTION ORAL DAILY
Status: DISCONTINUED | OUTPATIENT
Start: 2023-03-29 | End: 2023-04-10 | Stop reason: HOSPADM

## 2023-03-29 RX ADMIN — Medication 10 ML: at 21:02

## 2023-03-29 RX ADMIN — POLYETHYLENE GLYCOL 3350 17 G: 17 POWDER, FOR SOLUTION ORAL at 15:54

## 2023-03-29 RX ADMIN — Medication 3 ML: at 09:13

## 2023-03-29 RX ADMIN — PIPERACILLIN SODIUM AND TAZOBACTAM SODIUM 3.38 G: 3; .375 INJECTION, POWDER, LYOPHILIZED, FOR SOLUTION INTRAVENOUS at 23:46

## 2023-03-29 RX ADMIN — VALSARTAN 160 MG: 160 TABLET, FILM COATED ORAL at 09:10

## 2023-03-29 RX ADMIN — Medication 3 ML: at 21:03

## 2023-03-29 RX ADMIN — PANTOPRAZOLE SODIUM 40 MG: 40 INJECTION, POWDER, FOR SOLUTION INTRAVENOUS at 09:11

## 2023-03-29 RX ADMIN — PIPERACILLIN SODIUM AND TAZOBACTAM SODIUM 3.38 G: 3; .375 INJECTION, POWDER, LYOPHILIZED, FOR SOLUTION INTRAVENOUS at 09:11

## 2023-03-29 RX ADMIN — MORPHINE SULFATE 2 MG: 2 INJECTION, SOLUTION INTRAMUSCULAR; INTRAVENOUS at 09:04

## 2023-03-29 RX ADMIN — MORPHINE SULFATE 2 MG: 2 INJECTION, SOLUTION INTRAMUSCULAR; INTRAVENOUS at 12:09

## 2023-03-29 RX ADMIN — LIDOCAINE 1 PATCH: 700 PATCH TOPICAL at 09:11

## 2023-03-29 RX ADMIN — Medication 10 ML: at 21:00

## 2023-03-29 RX ADMIN — HYDRALAZINE HYDROCHLORIDE 25 MG: 50 TABLET, FILM COATED ORAL at 06:01

## 2023-03-29 RX ADMIN — LEVOTHYROXINE SODIUM 112 MCG: 0.07 TABLET ORAL at 06:00

## 2023-03-29 RX ADMIN — Medication 10 ML: at 09:13

## 2023-03-29 RX ADMIN — PIPERACILLIN SODIUM AND TAZOBACTAM SODIUM 3.38 G: 3; .375 INJECTION, POWDER, LYOPHILIZED, FOR SOLUTION INTRAVENOUS at 15:54

## 2023-03-29 RX ADMIN — CASTOR OIL AND BALSAM, PERU 1 APPLICATION: 788; 87 OINTMENT TOPICAL at 09:12

## 2023-03-29 RX ADMIN — HYDROCODONE BITARTRATE AND ACETAMINOPHEN 1 TABLET: 7.5; 325 TABLET ORAL at 16:01

## 2023-03-29 RX ADMIN — HEPARIN SODIUM 5000 UNITS: 5000 INJECTION INTRAVENOUS; SUBCUTANEOUS at 09:10

## 2023-03-29 RX ADMIN — PANTOPRAZOLE SODIUM 40 MG: 40 INJECTION, POWDER, FOR SOLUTION INTRAVENOUS at 23:46

## 2023-03-29 RX ADMIN — Medication 10 ML: at 09:12

## 2023-03-29 RX ADMIN — HYDRALAZINE HYDROCHLORIDE 25 MG: 50 TABLET, FILM COATED ORAL at 21:01

## 2023-03-29 RX ADMIN — CASTOR OIL AND BALSAM, PERU 1 APPLICATION: 788; 87 OINTMENT TOPICAL at 21:00

## 2023-03-29 RX ADMIN — HYDROCODONE BITARTRATE AND ACETAMINOPHEN 1 TABLET: 7.5; 325 TABLET ORAL at 21:01

## 2023-03-29 RX ADMIN — HEPARIN SODIUM 5000 UNITS: 5000 INJECTION INTRAVENOUS; SUBCUTANEOUS at 21:01

## 2023-03-29 RX ADMIN — HYDRALAZINE HYDROCHLORIDE 25 MG: 50 TABLET, FILM COATED ORAL at 13:08

## 2023-03-29 RX ADMIN — ONDANSETRON 4 MG: 2 INJECTION INTRAMUSCULAR; INTRAVENOUS at 17:32

## 2023-03-29 RX ADMIN — CARVEDILOL 25 MG: 25 TABLET, FILM COATED ORAL at 09:10

## 2023-03-29 RX ADMIN — CARVEDILOL 25 MG: 25 TABLET, FILM COATED ORAL at 17:32

## 2023-03-29 NOTE — PROGRESS NOTES
LOS: 7 days   Patient Care Team:  Dave Tobar MD as PCP - General  Dave Tobar MD as PCP - Family Medicine    Post op day # 7, status post laparotomy with closure of perforated duodenal ulcer with omental patch and Biopatch    Subjective     Interval History:  Patient has done well.  Was transferred to 3 S. Monday.  Patient was advanced to a mechanical soft diet yesterday.  She is not taking a lot in but she states that she is passing gas and did have a bowel movement.  The patient states that she was in too much pain to work with physical therapy today.  She states that the patient in the next bed got her pain medicine instead of her.  The patient is using the morphine for her chronic back pain    Objective     Vital Signs  Temp:  [97.7 °F (36.5 °C)-98.3 °F (36.8 °C)] 97.8 °F (36.6 °C)  Heart Rate:  [76-88] 76  Resp:  [18] 18  BP: (128-182)/(55-77) 156/71    Physical Exam:  This is a well-developed well-nourished elderly female in no acute distress  HEENT examination: Sclera are anicteric  Lungs: Clear  Abdomen: Bowel sounds are present  Skin/incisions: Surgical  Incision intact and dry.  SARAH with serous fluid.  Fluid is somewhat cloudy today although there is no bile in it.     Results Review:    Results from last 7 days   Lab Units 03/22/23  1336   HSTROP T ng/L 29*     Results from last 7 days   Lab Units 03/29/23  0143 03/28/23  0817 03/27/23  0757 03/24/23  0032 03/23/23  0400 03/22/23  2212 03/22/23  1909   LACTATE mmol/L  --   --   --   --  1.8 2.5* 2.3*   WBC 10*3/mm3 11.69* 13.43* 13.51*   < > 17.35*  --   --    HEMOGLOBIN g/dL 9.3* 11.0* 10.4*   < > 12.1  --   --    HEMATOCRIT % 28.4* 34.7 32.2*   < > 36.2  --   --    PLATELETS 10*3/mm3 255 274 294   < > 365  --   --     < > = values in this interval not displayed.         Results from last 7 days   Lab Units 03/29/23  0143 03/28/23  0817 03/27/23  0757 03/27/23  0538 03/27/23  0052 03/26/23  1648 03/26/23  0403 03/25/23  1148  03/25/23  0040   SODIUM mmol/L 135* 135* 137  --   --   --  137  --  133*   POTASSIUM mmol/L 3.8 3.7 4.3   < >  --    < > 3.6   < > 3.4*   MAGNESIUM mg/dL  --   --   --   --  2.0  --  1.9  --  1.8   CHLORIDE mmol/L 103 103 106  --   --   --  107  --  102   CO2 mmol/L 26.3 23.2 22.6  --   --   --  21.6*  --  20.8*   BUN mg/dL 8 6* 5*  --   --   --  7*  --  9   CREATININE mg/dL 0.43* 0.40* 0.42*  --   --   --  0.36*  --  0.36*   CALCIUM mg/dL 8.5* 8.7 8.4*  --   --   --  8.0*  --  8.2*   GLUCOSE mg/dL 130* 113* 126*  --   --   --  142*  --  125*   ALBUMIN g/dL 2.3* 2.4* 2.5*  --   --   --  2.3*  --   --    BILIRUBIN mg/dL 0.3 0.5 0.5  --   --   --  0.4  --   --    ALK PHOS U/L 89 112 126*  --   --   --  106  --   --    AST (SGOT) U/L 27 31 46*  --   --   --  30  --   --    ALT (SGPT) U/L 50* 59* 68*  --   --   --  36*  --   --     < > = values in this interval not displayed.   Estimated Creatinine Clearance: 91.3 mL/min (A) (by C-G formula based on SCr of 0.43 mg/dL (L)).  No results found for: AMMONIA      Blood Culture   Date Value Ref Range Status   03/22/2023 No growth at 24 hours  Preliminary   03/22/2023 No growth at 24 hours  Preliminary     No results found for: URINECX  Wound Culture   Date Value Ref Range Status   03/22/2023 No growth  Preliminary     No results found for: STOOLCX    Imaging:  Imaging Results (Last 24 Hours)     ** No results found for the last 24 hours. **           Impression:  Stable course, status post repair of perforated duodenal ulcer    Plan:  Diet advanced  Awaiting decision about rehab  Patient was switched over to oral pain medication but given that patient is using the morphine for her chronic back pain and not postsurgical pain we will go ahead and get a bone scan and try to evaluate this further.  Aurora Kirkland MD  03/29/23  13:06 EDT      Please note that portions of this note were completed with a voice recognition program.

## 2023-03-29 NOTE — CASE MANAGEMENT/SOCIAL WORK
Discharge Planning Assessment   Jose Daniel     Patient Name: Christine Garg  MRN: [id number]  Today's Date: 3/29/2023    Admit Date: 3/22/2023    Plan: SS noted inpt rehab consult noted and inpt rehab following pt.  SS will follow.     Discharge Plan     Row Name 03/29/23 1504       Plan    Plan SS noted inpt rehab consult noted and inpt rehab following pt.  SS will follow.    Row Name 03/29/23 6663       Plan    Plan Pt requesting inpt rehab referral if appropriate.  Pt discussed on interdisciplinary rounds on this date.  Inpt rehab following pt for possible admit and will need an official referral per Dayla.  SS will follow.                ZA Martins

## 2023-03-29 NOTE — THERAPY TREATMENT NOTE
Acute Care - Physical Therapy Treatment Note  [facility]     Patient Name: Christine Garg  : 1933  MRN: [id number]  Today's Date: 3/29/2023   Onset of Illness/Injury or Date of Surgery: 23  Visit Dx:     ICD-10-CM ICD-9-CM   1. Acute gastric ulcer with perforation (HCC)  K25.1 531.10   2. Hyponatremia  E87.1 276.1   3. Hypokalemia  E87.6 276.8   4. Sepsis without acute organ dysfunction, due to unspecified organism (HCC)  A41.9 038.9     995.91   5. Perforated ulcer (HCC)  K27.5 533.50     Patient Active Problem List   Diagnosis   • Essential hypertension   • Dyslipidemia   • ASCVD (arteriosclerotic cardiovascular disease)   • Palpitations   • Coronary artery disease    • Abnormal PFTs (pulmonary function tests)   • Chronic obstructive pulmonary disease (HCC)   • Mild persistent asthma with allergic rhinitis   • Pulmonary nodule   • Former smoker   • Colitis   • Perforated ulcer (HCC)   • Acute gastric ulcer with perforation (HCC)     Past Medical History:   Diagnosis Date   • Advanced age    • CAD (coronary artery disease)     s/p 3v CABG   • Chronic kidney disease (CKD), stage III (moderate) (HCC)    • COPD (chronic obstructive pulmonary disease) (HCC)    • History of tobacco use    • Hyperlipidemia    • Hypertension    • Hypothyroidism    • PONV (postoperative nausea and vomiting)    • Pulmonary nodule      Past Surgical History:   Procedure Laterality Date   • BREAST BIOPSY Left     benign   • CATARACT EXTRACTION     • CORONARY ANGIOPLASTY     • CORONARY ARTERY BYPASS GRAFT     • EXPLORATORY LAPAROTOMY N/A 3/22/2023    Procedure: LAPAROTOMY EXPLORATORY WITH OVER SEW OF DUODENAL ULCER WITH OMENTAL PATCH AND BIOPATCH AND CENTRAL LINE PLACEMENT;  Surgeon: Aurora Kirkland MD;  Location: [facility];  Service: General;  Laterality: N/A;     PT Assessment (last 12 hours)     PT Evaluation and Treatment     Row Name 23 1358          Physical Therapy Time and Intention    Document Type therapy  note (daily note)  -CS     Mode of Treatment physical therapy  -CS     Patient Effort adequate  -CS     Comment Pt seen bedside this PM.  Pt agreed to PT.  -CS     Row Name 03/29/23 1358          General Information    Patient Profile Reviewed yes  -CS     Row Name 03/29/23 1358          Living Environment    Primary Care Provided by self  -CS     Row Name 03/29/23 1358          Home Use of Assistive/Adaptive Equipment    Equipment Currently Used at Home none  -CS     Row Name 03/29/23 1358          Cognition    Orientation Status (Cognition) oriented x 3  -CS     Row Name 03/29/23 1358          Bed Mobility    Bed Mobility bed mobility (all) activities  -CS     All Activities, [place] (Bed Mobility) minimum assist (75% patient effort)  -CS     Row Name 03/29/23 1358          Transfers    Comment, (Transfers) Sit<-->stand from bedside w/ min(A)  -CS     Row Name 03/29/23 1358          Gait/Stairs (Locomotion)    Gait/Stairs Locomotion gait/ambulation independence  -CS     [place] Level (Gait) minimum assist (75% patient effort)  -CS     Comment, (Gait/Stairs) Pt c/o pain and requested to return to bed.  -CS     Row Name 03/29/23 1350          Respiratory WDL    Respiratory WDL X;breath sounds  -CS     Rhythm/Pattern, Respiratory unlabored;pattern regular;depth regular;shortness of breath  Per patient.  -CS     Expansion/Accessory Muscles/Retractions expansion symmetric;no retractions;no use of accessory muscles  -CS     Nailbeds no discoloration  -CS     Mucous Membranes pink;intact;moist  -CS     Cough Frequency no cough  -CS     Cough Type congested  -CS     Row Name 03/29/23 1358          Breath Sounds    Breath Sounds All Fields  -CS     All Lung Fields Breath Sounds Anterior:;diminished  -CS     LUIS Breath Sounds diminished  -CS     LLL Breath Sounds diminished  -CS     RUL Breath Sounds diminished  -CS     RML Breath Sounds diminished  -CS     RLL Breath Sounds diminished  -CS     Row Name 03/29/23  1358          Skin WDL    Skin WDL X;characteristics  -CS     Skin Color/Characteristics maroon/purple;redness blanchable;pale  -CS     Skin Temperature warm  -CS     Skin Moisture flaky  -CS     Skin Elasticity slow return to original state  -CS     Skin Integrity bruised (ecchymotic);excoriation;incision;pressure injury  -CS     Row Name 03/29/23 1358          Wound 03/22/23 1640 abdomen Incision    Wound - Properties Group Placement Date: 03/22/23  - Placement Time: 1640  - Location: abdomen  -MC Primary Wound Type: Incision  -MC    Closure Adhesive bandage  -CS     Base dressing in place, unable to visualize  -CS     Periwound blanchable;intact;pink  -CS     Periwound Temperature warm  -CS     Periwound Skin Turgor soft  -CS     Drainage Characteristics/Odor serosanguineous  -CS     Drainage Amount scant  -CS     Retired Wound - Properties Group Placement Date: 03/22/23  - Placement Time: 1640  - Location: abdomen  -MC Primary Wound Type: Incision  -MC    Retired Wound - Properties Group Date first assessed: 03/22/23  - Time first assessed: 1640  - Location: abdomen  -MC Primary Wound Type: Incision  -MC    Row Name 03/29/23 1358          Wound 01/06/23 1300 sacral spine    Wound - Properties Group Placement Date: 01/06/23  -SS Placement Time: 1300  -SS Present on Hospital Admission: Y  -SS Location: sacral spine  -SS    Closure None  -CS     Base maroon/purple;non-blanchable  -CS     Periwound intact;pink  -CS     Periwound Temperature warm  -CS     Periwound Skin Turgor soft  -CS     Edges rolled/closed  -CS     Drainage Characteristics/Odor bleeding controlled  -CS     Drainage Amount none  -CS     Retired Wound - Properties Group Placement Date: 01/06/23  -SS Placement Time: 1300  -SS Present on Hospital Admission: Y  -SS Location: sacral spine  -SS    Retired Wound - Properties Group Date first assessed: 01/06/23  -SS Time first assessed: 1300  -SS Present on Hospital Admission: Y  -SS Location:  sacral spine  -SS    Row Name 03/29/23 1355          Coping    Observed Emotional State calm;cooperative;pleasant  -CS     Verbalized Emotional State acceptance  -CS     Family/Support Persons family  -CS     Involvement in Care not present at bedside  -CS     Row Name 03/29/23 1357          Plan of Care Review    Plan of Care Reviewed With patient  -CS     Progress no change  -CS     Outcome Evaluation Pt w/ good bedside mobility but limited by pain this day.  Pt would benefit from continued skilled PT.  -     Row Name 03/29/23 1350          Positioning and Restraints    Pre-Treatment Position in bed  -CS     Post Treatment Position bed  -CS     In Bed call light within reach;encouraged to call for assist  -CS     Row Name 03/29/23 2249          Therapy Assessment/Plan (PT)    Rehab Potential (PT) good, to achieve stated therapy goals  -CS     Criteria for Skilled Interventions Met (PT) yes;meets criteria;skilled treatment is necessary  -CS     Therapy Frequency (PT) 2 times/wk  2-5x/week  -CS     Problem List (PT) problems related to;balance;mobility;strength  -CS     Activity Limitations Related to Problem List (PT) unable to ambulate safely;unable to transfer safely  -     Row Name 03/29/23 1726          Progress Summary (PT)    Progress Toward Functional Goals (PT) progress toward functional goals is fair  -CS     Daily Progress Summary (PT) Pt w/ good bedside mobility but limited by pain this day.  Pt would benefit from continued skilled PT.  -     Row Name 03/29/23 7435          Physical Therapy Goals    Bed Mobility Goal Selection (PT) bed mobility, PT goal 1  -CS     Transfer Goal Selection (PT) transfer, PT goal 1  -CS     Gait Training Goal Selection (PT) gait training, PT goal 1  -     Row Name 03/29/23 7971          Bed Mobility Goal 1 (PT)    Activity/Assistive Device (Bed Mobility Goal 1, PT) bed mobility activities, all  -CS     [place] Level/Cues Needed (Bed Mobility Goal 1, PT) standby  assist  -CS     Time Frame (Bed Mobility Goal 1, PT) long term goal (LTG);by discharge  -CS     Row Name 03/29/23 1358          Transfer Goal 1 (PT)    Activity/Assistive Device (Transfer Goal 1, PT) transfers, all  -CS     [place] Level/Cues Needed (Transfer Goal 1, PT) standby assist  -CS     Time Frame (Transfer Goal 1, PT) long term goal (LTG);by discharge  -CS     Row Name 03/29/23 1358          Gait Training Goal 1 (PT)    Activity/Assistive Device (Gait Training Goal 1, PT) gait (walking locomotion);assistive device use  -CS     [place] Level (Gait Training Goal 1, PT) standby assist  -CS     Distance (Gait Training Goal 1, PT) 50'  -CS     Time Frame (Gait Training Goal 1, PT) long term goal (LTG);by discharge  -CS           User Key  (r) = Recorded By, (t) = Taken By, (c) = Cosigned By    Initials Name Provider Type     Penelope Roe, RN Registered Nurse    Teresa Rod RN Registered Nurse    Mina Flores, PT Physical Therapist                Physical Therapy Education     Title: PT OT SLP Therapies (In Progress)     Topic: Physical Therapy (In Progress)     Point: Mobility training (In Progress)     Learning Progress Summary           Patient Acceptance, E, NR by RD at 3/28/2023 0953    Acceptance, E, NR by SB at 3/27/2023 1828    Acceptance, E,TB, VU by FREDDY at 3/27/2023 0921                   Point: Home exercise program (In Progress)     Learning Progress Summary           Patient Acceptance, E, NR by RD at 3/28/2023 0953    Acceptance, E, NR by SB at 3/27/2023 1828    Acceptance, E,TB, VU by FREDDY at 3/27/2023 0921                   Point: Body mechanics (In Progress)     Learning Progress Summary           Patient Acceptance, E, NR by RD at 3/28/2023 0953    Acceptance, E, NR by SB at 3/27/2023 1828    Acceptance, E,TB, VU by FREDDY at 3/27/2023 0921                   Point: Precautions (In Progress)     Learning Progress Summary           Patient Acceptance, E, NR by RD at  3/28/2023 0953    Acceptance, E, NR by SB at 3/27/2023 1828    Acceptance, E,TB, VU by CS at 3/27/2023 0921                               User Key     Initials Effective Dates Name Provider Type Discipline    RD 06/16/21 -  China Harkins, RN Registered Nurse Nurse    SB 01/19/22 -  Leon Hagen, RN Registered Nurse Nurse    CS 05/24/22 -  Mina Zavala, PT Physical Therapist PT              PT Recommendation and Plan  Anticipated Discharge Disposition (PT):  (TBD)  Planned Therapy Interventions (PT): balance training, bed mobility training, gait training, home exercise program, neuromuscular re-education, patient/family education, strengthening, transfer training  Therapy Frequency (PT): 2 times/wk (2-5x/week)  Progress Summary (PT)  Progress Toward Functional Goals (PT): progress toward functional goals is fair  Daily Progress Summary (PT): Pt w/ good bedside mobility but limited by pain this day.  Pt would benefit from continued skilled PT.  Plan of Care Reviewed With: patient  Progress: no change  Outcome Evaluation: Pt w/ good bedside mobility but limited by pain this day.  Pt would benefit from continued skilled PT.       Time Calculation:    PT Charges     Row Name 03/29/23 1409             Time Calculation    Start Time 1345  -CS      PT Received On 03/29/23  -CS      PT Goal Re-Cert Due Date 04/10/23  -CS         Timed Charges    36112 - PT Therapeutic Activity Minutes 23  -CS         Total Minutes    Timed Charges Total Minutes 23  -CS       Total Minutes 23  -CS            User Key  (r) = Recorded By, (t) = Taken By, (c) = Cosigned By    Initials Name Provider Type    CS Mina Zavala, PT Physical Therapist              Therapy Charges for Today     Code Description Service Date Service Provider Modifiers Qty    35379890991 HC PT THERAPEUTIC ACT EA 15 MIN 3/28/2023 Mina Zavala, PT GP 2    49063936844 HC PT THERAPEUTIC ACT EA 15 MIN 3/29/2023 Mina Zavala, PT GP 2          PT G-Codes  AM-PAC 6 Clicks  Score (PT): 12    Mina Zavala, PT  3/29/2023

## 2023-03-29 NOTE — PROGRESS NOTES
[facility] HOSPITALIST PROGRESS NOTE     Patient Identification:  Name:  Christine Garg  Age:  89 y.o.  Sex:  female  :  1933  MRN:  [id number]  Visit Number:  [id number]  ROOM: [address] [place] [address]     Primary Care Provider:  Dave Tobar MD    Length of stay in inpatient status:  7    Subjective     Chief Compliant:    Chief Complaint   Patient presents with   • Abdominal Pain     History of Presenting Illness:    Patient remains ill but stable today, no acute events overnight, no new complaints, denies any fevers or chills, does complain of worsening back pain, reports has been having, has at home, notes it resolves with her as needed medications, should be getting morning medications this AM, blood pressure up as well but suspected due to pain, hadn't had AM medications this morning, repeat blood pressure have been improved, should complete Zosyn soon, Social Work looking for inpatient rehab placement, primary advancing diet today, patient endorsing passing gas and bowel movements, though does not she hasn't been eating as much due to pain, WBC count improved today.   Objective     Current Hospital Meds:  carvedilol, 25 mg, Oral, BID With Meals  castor oil-balsam peru, 1 application, Topical, Q12H  heparin (porcine), 5,000 Units, Subcutaneous, Q12H  hydrALAZINE, 25 mg, Oral, Q8H  levothyroxine, 112 mcg, Oral, Q AM  lidocaine, 1 patch, Transdermal, Q24H  magnesium sulfate, 4 g, Intravenous, Once  multivitamin, 10 mL, Oral, Daily  pantoprazole, 40 mg, Intravenous, Q12H  piperacillin-tazobactam, 3.375 g, Intravenous, Q8H  valsartan, 160 mg, Oral, Q24H  ----------------------------------------------------------------------------------------------------------------------  Vital Signs:  Temp:  [97.7 °F (36.5 °C)-98.3 °F (36.8 °C)] 97.8 °F (36.6 °C)  Heart Rate:  [76-88] 76  Resp:  [18] 18  BP: (128-182)/(55-77) 156/71  SpO2:  [96 %-98 %] 96 %  on  Flow (L/min):  [1-2] 2;   Device (Oxygen Therapy): nasal  cannula  Body mass index is 23.93 kg/m².      Intake/Output Summary (Last 24 hours) at 3/29/2023 1258  Last data filed at 3/29/2023 0422  Gross per 24 hour   Intake --   Output 175 ml   Net -175 ml      ----------------------------------------------------------------------------------------------------------------------  Physical exam:  Constitutional:  Elderly, No acute distress.      HENT:  Head:  Normocephalic and atraumatic.  Mouth:  Moist mucous membranes.    Eyes:  Conjunctivae and EOM are normal. No scleral icterus.    Neck:  Neck supple.  No JVD present.    Cardiovascular:  Normal rate, regular rhythm and normal heart sounds with no murmur.  Pulmonary/Chest:  No respiratory distress, no wheezes, improved crackles, on minimal NC  Abdominal:  Soft. No distension and mild tenderness to palpation   Musculoskeletal:  No tenderness, and no deformity.  No red or swollen joints anywhere.    Neurological:  Alert and oriented to person, place, and time.  No gross focal deficits     Skin:  Skin is warm and dry. No rash noted. No pallor.   Peripheral vascular:  No clubbing, no cyanosis, trace edema, upper extremity edema resolved  Psychiatric: Appropriate mood and affect    Edited by: Rodriguez Robin MD at 3/29/2023 1256  ----------------------------------------------------------------------------------------------------------------------  WBC/HGB/HCT/PLT   11.69/9.3/28.4/255 (03/29 0143)  BUN/CREAT/GLUC/ALT/AST/OSIRIS/LIP    8/0.43/130/50/27/--/-- (03/29 0143)  LYTES - Na/K/Cl/CO2: 135*/3.8/103/26.3 (03/29 0143)     No results found for: URINECX  No results found for: BLOODCX    I have personally looked at the labs and they are summarized above.  ----------------------------------------------------------------------------------------------------------------------  Detailed radiology reports for the last 24 hours:  No radiology results for the last day  Assessment & Plan    #Severe sepsis that was present on admission  (temperature 36 degrees C, lactic acid 3.5, CRP 3.44, WBC 30,990, heart rates ) due to a suspected perforated antral gastric ulcer  #Acute hypokalemia and acute hypomagnesemia and acute hypophosphatemia, improved with supplementation  #History of coronary artery disease status post 3v CABG in 2011  #History of chronic kidney disease stage IIIa with baseline Cr 0.6-0.85   #History of essential hypertension  #History of hypothyroidism, currently controlled  #History of hyperlipidemia  #History of left upper lobe pleural based pulmonary nodule that appears to be invading the T6 vertebra  #Advanced age  #Former Smoker  #Debility   - General Surgery following as primary, status post surgical intervention, advancing diet as tolerated  - Status post Zosyn per primary, should complete order soon   - IV PPI for now, remainder of medications transitioned to oral   - Continue home beta blocker, lower than home dose ARB, new Hydralazine, titrate as indicated  - Continue as needed antihypertensives for systolic blood pressure > 180mmHg  - Volume overload resolving with holding mIVFs  - Patient having increased back pain today, has as needed medications on board  - Continue Tylenol as needed for fevers, monitor for clinical improvement.      F: Oral  E: Monitor & Replace as needed   N: Diet: Regular/House Diet; No Carbonated Beverages, No Straw; Texture: Soft to Chew (NDD 3); Soft to Chew: Whole Meat; Fluid Consistency: Thin (IDDSI 0)   Ppx: SQH  Code Status (Patient has no pulse and is not breathing): CPR (Attempt to Resuscitate)  Medical Interventions (Patient has pulse or is breathing): Full Support     Dispo: Pending clinical improvement, PT/OT consulted and following, Social Work to assist with placement, looking in to inpatient rehab      *This patient is considered high risk due to severe sepsis, bowel perforation, surgical intervention, volume overload, debility     Edited by: Rodriguez Robin MD at 3/29/2023  [id number]  [facility]

## 2023-03-29 NOTE — PLAN OF CARE
Goal Outcome Evaluation: Patient remains very pleasant. Compliant with all medications and care as ordered. She remains on 1L/NC, saturations maintaining well above 90%. She has complained of pain several times during shift, PRN medication given as ordered. She has been setup for each meal, fair intake noted. Dressing to ABD remains C/D/I. SARAH Drain emptied. She is currently resting in bed. Will continue with plan of care.

## 2023-03-29 NOTE — CASE MANAGEMENT/SOCIAL WORK
Discharge Planning Assessment   Jose Daniel     Patient Name: Christine Garg  MRN: [id number]  Today's Date: 3/29/2023    Admit Date: 3/22/2023    Plan: Pt requesting inpt rehab referral if appropriate.  Pt discussed on interdisciplinary rounds on this date.  Inpt rehab following pt for possible admit and will need an official referral per Dayla.  SS will follow.     Discharge Plan     Row Name 03/29/23 1359       Plan    Plan Pt requesting inpt rehab referral if appropriate.  Pt discussed on interdisciplinary rounds on this date.  Inpt rehab following pt for possible admit and will need an official referral per Dayla.  SS notified Physician of need for official referral. SS will follow.    Row Name 03/29/23 1906       Plan    Plan Comments POD#7 Prn meds, had BM, SARAH drain , Prn hydralazine for ^ BP, PT/OT, con. Zosyn IV, reg diet, sats 96% on 2lnc, bun 8/cr 0.43, wbc 11.69, hgb 9.3. plans for possible IRF, if candidate.         Estella Christianson, BSW

## 2023-03-29 NOTE — PLAN OF CARE
Pt resting in room. Assisted to bedside commode last night with 1 person assist- had moderate sized BM. Pt c/o some pain- PRN morphine administered- pt reported morphine helped ease pain. Emptied SARAH drain at 0422- 75 mL of serous drainage. Took medications whole with no issues. Tolerating IV antibiotics well. Remains on 1 Liter O2 per pt request. SCDs remain in place. Pt has slept well all night. Running sinus rhythm on the monitor. No acute signs of distress noted. Continue plan of care.

## 2023-03-30 LAB
ALBUMIN SERPL-MCNC: 2.2 G/DL (ref 3.5–5.2)
ALBUMIN/GLOB SERPL: 1 G/DL
ALP SERPL-CCNC: 83 U/L (ref 39–117)
ALT SERPL W P-5'-P-CCNC: 42 U/L (ref 1–33)
ANION GAP SERPL CALCULATED.3IONS-SCNC: 6.2 MMOL/L (ref 5–15)
AST SERPL-CCNC: 19 U/L (ref 1–32)
BILIRUB SERPL-MCNC: 0.2 MG/DL (ref 0–1.2)
BUN SERPL-MCNC: 8 MG/DL (ref 8–23)
BUN/CREAT SERPL: 18.2 (ref 7–25)
CALCIUM SPEC-SCNC: 8.3 MG/DL (ref 8.6–10.5)
CHLORIDE SERPL-SCNC: 103 MMOL/L (ref 98–107)
CO2 SERPL-SCNC: 28.8 MMOL/L (ref 22–29)
CREAT SERPL-MCNC: 0.44 MG/DL (ref 0.57–1)
DEPRECATED RDW RBC AUTO: 49.7 FL (ref 37–54)
EGFRCR SERPLBLD CKD-EPI 2021: 92.6 ML/MIN/1.73
ERYTHROCYTE [DISTWIDTH] IN BLOOD BY AUTOMATED COUNT: 14.1 % (ref 12.3–15.4)
GEN 5 2HR TROPONIN T REFLEX: 12 NG/L
GLOBULIN UR ELPH-MCNC: 2.3 GM/DL
GLUCOSE SERPL-MCNC: 104 MG/DL (ref 65–99)
HCT VFR BLD AUTO: 27.5 % (ref 34–46.6)
HGB BLD-MCNC: 9.1 G/DL (ref 12–15.9)
MCH RBC QN AUTO: 32 PG (ref 26.6–33)
MCHC RBC AUTO-ENTMCNC: 33.1 G/DL (ref 31.5–35.7)
MCV RBC AUTO: 96.8 FL (ref 79–97)
PLATELET # BLD AUTO: 238 10*3/MM3 (ref 140–450)
PMV BLD AUTO: 9.7 FL (ref 6–12)
POTASSIUM SERPL-SCNC: 3.5 MMOL/L (ref 3.5–5.2)
PROT SERPL-MCNC: 4.5 G/DL (ref 6–8.5)
QT INTERVAL: 398 MS
QTC INTERVAL: 432 MS
RBC # BLD AUTO: 2.84 10*6/MM3 (ref 3.77–5.28)
SODIUM SERPL-SCNC: 138 MMOL/L (ref 136–145)
TROPONIN T DELTA: -5 NG/L
TROPONIN T SERPL HS-MCNC: 17 NG/L
WBC NRBC COR # BLD: 8.93 10*3/MM3 (ref 3.4–10.8)

## 2023-03-30 PROCEDURE — 93010 ELECTROCARDIOGRAM REPORT: CPT | Performed by: INTERNAL MEDICINE

## 2023-03-30 PROCEDURE — 99024 POSTOP FOLLOW-UP VISIT: CPT | Performed by: SURGERY

## 2023-03-30 PROCEDURE — 93005 ELECTROCARDIOGRAM TRACING: CPT | Performed by: SURGERY

## 2023-03-30 PROCEDURE — 80053 COMPREHEN METABOLIC PANEL: CPT | Performed by: INTERNAL MEDICINE

## 2023-03-30 PROCEDURE — 99232 SBSQ HOSP IP/OBS MODERATE 35: CPT | Performed by: INTERNAL MEDICINE

## 2023-03-30 PROCEDURE — 25010000002 PIPERACILLIN SOD-TAZOBACTAM PER 1 G: Performed by: SURGERY

## 2023-03-30 PROCEDURE — 97110 THERAPEUTIC EXERCISES: CPT

## 2023-03-30 PROCEDURE — 85027 COMPLETE CBC AUTOMATED: CPT | Performed by: INTERNAL MEDICINE

## 2023-03-30 PROCEDURE — 97116 GAIT TRAINING THERAPY: CPT

## 2023-03-30 PROCEDURE — 25010000002 HEPARIN (PORCINE) PER 1000 UNITS: Performed by: SURGERY

## 2023-03-30 PROCEDURE — 84484 ASSAY OF TROPONIN QUANT: CPT | Performed by: SURGERY

## 2023-03-30 RX ORDER — POTASSIUM CHLORIDE 20 MEQ/1
40 TABLET, EXTENDED RELEASE ORAL EVERY 4 HOURS
Status: COMPLETED | OUTPATIENT
Start: 2023-03-30 | End: 2023-03-30

## 2023-03-30 RX ORDER — PANTOPRAZOLE SODIUM 40 MG/1
40 TABLET, DELAYED RELEASE ORAL
Status: DISCONTINUED | OUTPATIENT
Start: 2023-03-30 | End: 2023-04-10 | Stop reason: HOSPADM

## 2023-03-30 RX ORDER — HYDRALAZINE HYDROCHLORIDE 50 MG/1
50 TABLET, FILM COATED ORAL EVERY 8 HOURS SCHEDULED
Status: DISCONTINUED | OUTPATIENT
Start: 2023-03-30 | End: 2023-03-31

## 2023-03-30 RX ADMIN — HYDROCODONE BITARTRATE AND ACETAMINOPHEN 1 TABLET: 7.5; 325 TABLET ORAL at 11:18

## 2023-03-30 RX ADMIN — VALSARTAN 160 MG: 160 TABLET, FILM COATED ORAL at 08:29

## 2023-03-30 RX ADMIN — PIPERACILLIN SODIUM AND TAZOBACTAM SODIUM 3.38 G: 3; .375 INJECTION, POWDER, LYOPHILIZED, FOR SOLUTION INTRAVENOUS at 08:33

## 2023-03-30 RX ADMIN — Medication 10 ML: at 10:20

## 2023-03-30 RX ADMIN — CARVEDILOL 25 MG: 25 TABLET, FILM COATED ORAL at 17:04

## 2023-03-30 RX ADMIN — PANTOPRAZOLE SODIUM 40 MG: 40 TABLET, DELAYED RELEASE ORAL at 16:10

## 2023-03-30 RX ADMIN — HYDRALAZINE HYDROCHLORIDE 50 MG: 50 TABLET, FILM COATED ORAL at 14:06

## 2023-03-30 RX ADMIN — HYDRALAZINE HYDROCHLORIDE 50 MG: 50 TABLET, FILM COATED ORAL at 21:00

## 2023-03-30 RX ADMIN — HYDRALAZINE HYDROCHLORIDE 25 MG: 50 TABLET, FILM COATED ORAL at 05:58

## 2023-03-30 RX ADMIN — Medication 10 ML: at 20:35

## 2023-03-30 RX ADMIN — Medication 10 ML: at 10:21

## 2023-03-30 RX ADMIN — PANTOPRAZOLE SODIUM 40 MG: 40 INJECTION, POWDER, FOR SOLUTION INTRAVENOUS at 08:30

## 2023-03-30 RX ADMIN — HYDROCODONE BITARTRATE AND ACETAMINOPHEN 1 TABLET: 7.5; 325 TABLET ORAL at 16:10

## 2023-03-30 RX ADMIN — HYDROCODONE BITARTRATE AND ACETAMINOPHEN 1 TABLET: 7.5; 325 TABLET ORAL at 20:33

## 2023-03-30 RX ADMIN — LIDOCAINE 1 PATCH: 700 PATCH TOPICAL at 08:31

## 2023-03-30 RX ADMIN — CASTOR OIL AND BALSAM, PERU 1 APPLICATION: 788; 87 OINTMENT TOPICAL at 20:33

## 2023-03-30 RX ADMIN — CASTOR OIL AND BALSAM, PERU 1 APPLICATION: 788; 87 OINTMENT TOPICAL at 08:34

## 2023-03-30 RX ADMIN — POTASSIUM CHLORIDE 40 MEQ: 20 TABLET, EXTENDED RELEASE ORAL at 12:28

## 2023-03-30 RX ADMIN — LEVOTHYROXINE SODIUM 112 MCG: 0.07 TABLET ORAL at 05:58

## 2023-03-30 RX ADMIN — CARVEDILOL 25 MG: 25 TABLET, FILM COATED ORAL at 08:28

## 2023-03-30 RX ADMIN — HEPARIN SODIUM 5000 UNITS: 5000 INJECTION INTRAVENOUS; SUBCUTANEOUS at 08:29

## 2023-03-30 RX ADMIN — Medication 10 ML: at 08:33

## 2023-03-30 RX ADMIN — HEPARIN SODIUM 5000 UNITS: 5000 INJECTION INTRAVENOUS; SUBCUTANEOUS at 20:33

## 2023-03-30 RX ADMIN — POTASSIUM CHLORIDE 40 MEQ: 20 TABLET, EXTENDED RELEASE ORAL at 08:27

## 2023-03-30 RX ADMIN — Medication 3 ML: at 10:21

## 2023-03-30 RX ADMIN — POLYETHYLENE GLYCOL 3350 17 G: 17 POWDER, FOR SOLUTION ORAL at 08:30

## 2023-03-30 RX ADMIN — Medication 10 ML: at 08:34

## 2023-03-30 NOTE — CASE MANAGEMENT/SOCIAL WORK
Discharge Planning Assessment   Jose Daniel     Patient Name: Christine Garg  MRN: [id number]  Today's Date: 3/30/2023    Admit Date: 3/22/2023    Plan: SS noted inpt rehab has submitted pt's information to insurance for possible admit.  SS will follow.     Discharge Plan     Row Name 03/30/23 1035       Plan    Plan SS noted inpt rehab has submitted pt's information to insurance for possible admit.  SS will follow.                ZA Martins

## 2023-03-30 NOTE — THERAPY TREATMENT NOTE
Acute Care - Physical Therapy Treatment Note  [facility]     Patient Name: Christine Garg  : 1933  MRN: [id number]  Today's Date: 3/30/2023   Onset of Illness/Injury or Date of Surgery: 23  Visit Dx:     ICD-10-CM ICD-9-CM   1. Acute gastric ulcer with perforation (HCC)  K25.1 531.10   2. Hyponatremia  E87.1 276.1   3. Hypokalemia  E87.6 276.8   4. Sepsis without acute organ dysfunction, due to unspecified organism (HCC)  A41.9 038.9     995.91   5. Perforated ulcer (HCC)  K27.5 533.50     Patient Active Problem List   Diagnosis   • Essential hypertension   • Dyslipidemia   • ASCVD (arteriosclerotic cardiovascular disease)   • Palpitations   • Coronary artery disease    • Abnormal PFTs (pulmonary function tests)   • Chronic obstructive pulmonary disease (HCC)   • Mild persistent asthma with allergic rhinitis   • Pulmonary nodule   • Former smoker   • Colitis   • Perforated ulcer (HCC)   • Acute gastric ulcer with perforation (HCC)     Past Medical History:   Diagnosis Date   • Advanced age    • CAD (coronary artery disease)     s/p 3v CABG   • Chronic kidney disease (CKD), stage III (moderate) (HCC)    • COPD (chronic obstructive pulmonary disease) (HCC)    • History of tobacco use    • Hyperlipidemia    • Hypertension    • Hypothyroidism    • PONV (postoperative nausea and vomiting)    • Pulmonary nodule      Past Surgical History:   Procedure Laterality Date   • BREAST BIOPSY Left     benign   • CATARACT EXTRACTION     • CORONARY ANGIOPLASTY     • CORONARY ARTERY BYPASS GRAFT     • EXPLORATORY LAPAROTOMY N/A 3/22/2023    Procedure: LAPAROTOMY EXPLORATORY WITH OVER SEW OF DUODENAL ULCER WITH OMENTAL PATCH AND BIOPATCH AND CENTRAL LINE PLACEMENT;  Surgeon: Aurora Kirkland MD;  Location: [facility];  Service: General;  Laterality: N/A;     PT Assessment (last 12 hours)     PT Evaluation and Treatment     Row Name 23 1055          Physical Therapy Time and Intention    Subjective Information  complains of;weakness;dyspnea  -     Document Type therapy note (daily note)  -     Mode of Treatment physical therapy  -     Patient Effort adequate  -     Comment Pt does well on this date and took 6 side steps at EOB both toward FOB and HOB, required RW and min A. Pt completed sitting exercises with minimal cueing from PTA until tolerance. Pt would benefit from continued skilled physical therapy    -     Row Name 03/30/23 1055          General Information    Patient Profile Reviewed yes  -     Row Name 03/30/23 1055          Living Environment    Primary Care Provided by self  -     Row Name 03/30/23 1055          Home Use of Assistive/Adaptive Equipment    Equipment Currently Used at Home none  -     Row Name 03/30/23 1055          Cognition    Orientation Status (Cognition) oriented x 3  -     Personal Safety Interventions fall prevention program maintained;gait belt;nonskid shoes/slippers when out of bed;supervised activity  -     Row Name 03/30/23 1055          Bed Mobility    Bed Mobility bed mobility (all) activities;scooting/bridging  -     All Activities, [place] (Bed Mobility) minimum assist (75% patient effort);moderate assist (50% patient effort)  -     Scooting/Bridging [place] (Bed Mobility) maximum assist (25% patient effort);2 person assist  -     Row Name 03/30/23 1055          Transfers    Transfers sit-stand transfer;stand-sit transfer  -     Row Name 03/30/23 1055          Sit-Stand Transfer    Sit-Stand [place] (Transfers) minimum assist (75% patient effort)  -     Assistive Device (Sit-Stand Transfers) walker, front-wheeled  -     Row Name 03/30/23 1055          Stand-Sit Transfer    Stand-Sit [place] (Transfers) minimum assist (75% patient effort)  -     Assistive Device (Stand-Sit Transfers) walker, front-wheeled  -     Row Name 03/30/23 1055          Gait/Stairs (Locomotion)    Gait/Stairs Locomotion gait/ambulation independence  -      [place] Level (Gait) minimum assist (75% patient effort)  -     Assistive Device (Gait) walker, front-wheeled  -     Distance in Feet (Gait) 6 side steps in both directions at EOB  -     Pattern (Gait) step-to  -     Row Name 03/30/23 1055          Motor Skills    Therapeutic Exercise other (see comments)  Sitting: AP, LAQ, March, knees in/out  -     Row Name 03/30/23 1055          Respiratory WDL    Respiratory WDL X;breath sounds  -     Rhythm/Pattern, Respiratory unlabored;pattern regular;depth regular;shortness of breath  Per patient.  -     Expansion/Accessory Muscles/Retractions expansion symmetric;no retractions;no use of accessory muscles  -     Nailbeds no discoloration  -     Mucous Membranes pink;intact;moist  -     Cough Frequency no cough  -     Cough Type congested  -     Row Name 03/30/23 1055          Breath Sounds    Breath Sounds All Fields  -     All Lung Fields Breath Sounds Anterior:;diminished  -     LUSI Breath Sounds diminished  -     LLL Breath Sounds diminished  -     RUL Breath Sounds diminished  -     RML Breath Sounds diminished  -     RLL Breath Sounds diminished  -     Row Name 03/30/23 1055          Skin WDL    Skin WDL X;characteristics  -     Skin Color/Characteristics maroon/purple;redness blanchable;pale  -     Skin Temperature warm  -     Skin Moisture flaky  -     Skin Elasticity slow return to original state  -     Skin Integrity bruised (ecchymotic);excoriation;incision;pressure injury  -     Row Name 03/30/23 1055          Wound 03/22/23 1640 abdomen Incision    Wound - Properties Group Placement Date: 03/22/23  - Placement Time: 1640 - Location: abdomen  - Primary Wound Type: Incision  -MC    Closure Adhesive bandage  -     Base dressing in place, unable to visualize  -     Periwound blanchable;intact;pink  -     Periwound Temperature warm  -     Periwound Skin Turgor soft  -     Drainage  Characteristics/Odor serosanguineous  -HC     Drainage Amount scant  -HC     Retired Wound - Properties Group Placement Date: 03/22/23  -MC Placement Time: 1640  -MC Location: abdomen  -MC Primary Wound Type: Incision  -MC    Retired Wound - Properties Group Date first assessed: 03/22/23  -MC Time first assessed: 1640  -MC Location: abdomen  -MC Primary Wound Type: Incision  -MC    Row Name 03/30/23 1055          Wound 01/06/23 1300 sacral spine    Wound - Properties Group Placement Date: 01/06/23  -SS Placement Time: 1300  -SS Present on Hospital Admission: Y  -SS Location: sacral spine  -SS    Closure None  -HC     Base maroon/purple;non-blanchable  -HC     Periwound intact;pink  -HC     Periwound Temperature warm  -HC     Periwound Skin Turgor soft  -HC     Edges rolled/closed  -HC     Drainage Characteristics/Odor bleeding controlled  -HC     Drainage Amount none  -HC     Retired Wound - Properties Group Placement Date: 01/06/23 -SS Placement Time: 1300  -SS Present on Hospital Admission: Y  -SS Location: sacral spine  -SS    Retired Wound - Properties Group Date first assessed: 01/06/23  - Time first assessed: 1300  -SS Present on Hospital Admission: Y  -SS Location: sacral spine  -SS    Row Name 03/30/23 1055          Coping    Observed Emotional State calm;cooperative;pleasant  -HC     Verbalized Emotional State acceptance  -HC     Family/Support Persons family  -HC     Involvement in Care not present at bedside  -HC     Row Name 03/30/23 1055          Positioning and Restraints    Pre-Treatment Position in bed  -HC     Post Treatment Position bed  -HC     In Bed fowlers;call light within reach;encouraged to call for assist;exit alarm on;side rails up x2  -HC     Row Name 03/30/23 1058          Therapy Assessment/Plan (PT)    Rehab Potential (PT) good, to achieve stated therapy goals  -     Criteria for Skilled Interventions Met (PT) yes;meets criteria;skilled treatment is necessary  -     Therapy  Frequency (PT) 2 times/wk  2-5x/week  -     Problem List (PT) problems related to;balance;mobility;strength  -     Activity Limitations Related to Problem List (PT) unable to ambulate safely;unable to transfer safely  -     Row Name 03/30/23 1055          Physical Therapy Goals    Bed Mobility Goal Selection (PT) bed mobility, PT goal 1  -HC     Transfer Goal Selection (PT) transfer, PT goal 1  -HC     Gait Training Goal Selection (PT) gait training, PT goal 1  -     Row Name 03/30/23 1055          Bed Mobility Goal 1 (PT)    Activity/Assistive Device (Bed Mobility Goal 1, PT) bed mobility activities, all  -HC     [place] Level/Cues Needed (Bed Mobility Goal 1, PT) standby assist  -HC     Time Frame (Bed Mobility Goal 1, PT) long term goal (LTG);by discharge  -     Row Name 03/30/23 1054          Transfer Goal 1 (PT)    Activity/Assistive Device (Transfer Goal 1, PT) transfers, all  -HC     [place] Level/Cues Needed (Transfer Goal 1, PT) standby assist  -HC     Time Frame (Transfer Goal 1, PT) long term goal (LTG);by discharge  -     Row Name 03/30/23 1054          Gait Training Goal 1 (PT)    Activity/Assistive Device (Gait Training Goal 1, PT) gait (walking locomotion);assistive device use  -HC     [place] Level (Gait Training Goal 1, PT) standby assist  -HC     Distance (Gait Training Goal 1, PT) 50'  -HC     Time Frame (Gait Training Goal 1, PT) long term goal (LTG);by discharge  -           User Key  (r) = Recorded By, (t) = Taken By, (c) = Cosigned By    Initials Name Provider Type     Penelope Roe, RN Registered Nurse    Teresa Rod RN Registered Nurse    Ethel Caldwell PTA Physical Therapist Assistant                Physical Therapy Education     Title: PT OT SLP Therapies (In Progress)     Topic: Physical Therapy (In Progress)     Point: Mobility training (In Progress)     Learning Progress Summary           Patient Acceptance, E, NR by RIGO at  3/29/2023 1712    Acceptance, E, NR by RD at 3/28/2023 0953    Acceptance, E, NR by SB at 3/27/2023 1828    Acceptance, E,TB, VU by CS at 3/27/2023 0921                   Point: Home exercise program (In Progress)     Learning Progress Summary           Patient Acceptance, E, NR by RD at 3/29/2023 1712    Acceptance, E, NR by RD at 3/28/2023 0953    Acceptance, E, NR by SB at 3/27/2023 1828    Acceptance, E,TB, VU by CS at 3/27/2023 0921                   Point: Body mechanics (In Progress)     Learning Progress Summary           Patient Acceptance, E, NR by RD at 3/29/2023 1712    Acceptance, E, NR by RD at 3/28/2023 0953    Acceptance, E, NR by SB at 3/27/2023 1828    Acceptance, E,TB, VU by CS at 3/27/2023 0921                   Point: Precautions (In Progress)     Learning Progress Summary           Patient Acceptance, E, NR by RD at 3/29/2023 1712    Acceptance, E, NR by RD at 3/28/2023 0953    Acceptance, E, NR by SB at 3/27/2023 1828    Acceptance, E,TB, VU by CS at 3/27/2023 0921                               User Key     Initials Effective Dates Name Provider Type Discipline    RD 06/16/21 -  China Harkins, RN Registered Nurse Nurse    SB 01/19/22 -  Leon Hagen, RN Registered Nurse Nurse    CS 05/24/22 -  Mina Zavala, PT Physical Therapist PT              PT Recommendation and Plan  Anticipated Discharge Disposition (PT):  (TBD)  Therapy Frequency (PT): 2 times/wk (2-5x/week)          Time Calculation:    PT Charges     Row Name 03/30/23 1101             Time Calculation    PT Received On 03/30/23  -HC         Time Calculation- PT    Total Timed Code Minutes- PT 23 minute(s)  -HC            User Key  (r) = Recorded By, (t) = Taken By, (c) = Cosigned By    Initials Name Provider Type    HC Ethel Obrien, PTA Physical Therapist Assistant              Therapy Charges for Today     Code Description Service Date Service Provider Modifiers Qty    31067823165 HC GAIT TRAINING EA 15 MIN 3/30/2023  Ethel Obrien, CASSY GP, CQ 1    90567374574 HC PT THER PROC EA 15 MIN 3/30/2023 Ethel Obrien, CASSY GP, CQ 1          PT G-Codes  AM-PAC 6 Clicks Score (PT): 12    Ethel Obrien PTA  3/30/2023

## 2023-03-30 NOTE — SIGNIFICANT NOTE
03/30/23 1009   Post Acute Pre-Cert Documentation   Date Post Acute Pre-Cert Inititated per Facility 03/30/23   Verification from Payer Yes   Post Acute Pre-Cert Initiated Comment Rehab consult received and reviewed.  Prior auth request for Rehab has been submitted to [employer] [facility] via [employer] portal with pending reference # [id number].  Will notify when determination is received.  Left message for Estella in SS.

## 2023-03-30 NOTE — PLAN OF CARE
Pt resting in bed. Remains compliant with all medications and interventions per orders. Tolerating IV antibiotics well. Remains on 1 Liter O2. Running sinus austin to sinus rhythm on the monitor. Emptied SARAH drain at 0416- drained 50 mL of serous fluid. Took PRN Norco with night meds per pt request. No complaints or symptoms of acute distress noted. Continue plan of care.

## 2023-03-30 NOTE — NURSING NOTE
Rehab consult received and reviewed.  Prior auth request for Rehab has been submitted to [employer] via [employer] portal with pending reference # [id number].  Will notify when determination is received.  Left message for Estella ROBLEDO.

## 2023-03-30 NOTE — PROGRESS NOTES
[facility] HOSPITALIST PROGRESS NOTE     Patient Identification:  Name:  Christine Garg  Age:  89 y.o.  Sex:  female  :  1933  MRN:  [id number]  Visit Number:  [id number]  ROOM: [address] [place] [address]     Primary Care Provider:  Dave Tobar MD    Length of stay in inpatient status:  8    Subjective     Chief Compliant:    Chief Complaint   Patient presents with   • Abdominal Pain     History of Presenting Illness:    Patient remains ill but stable today, no acute events overnight, no new complaints, denies any fevers or chills, back pain has much improved, heart rate stable, volume status stable, swelling resolved, creatinine stable, blood pressure improved yesterday though trending back up today prior to AM medications, even up after receiving them, increased ARB dose for tomorrow, almost to home dose, increased Hydralazine dose today too, diet has been advanced and tolerating, still stooling and passing gas, remains on IV PPI for now, would defer transition to oral to General Surgery, inpatient rehab consulted and follow up on acceptance.   Objective     Current Hospital Meds:  carvedilol, 25 mg, Oral, BID With Meals  castor oil-balsam peru, 1 application, Topical, Q12H  heparin (porcine), 5,000 Units, Subcutaneous, Q12H  hydrALAZINE, 50 mg, Oral, Q8H  levothyroxine, 112 mcg, Oral, Q AM  lidocaine, 1 patch, Transdermal, Q24H  magnesium sulfate, 4 g, Intravenous, Once  multivitamin, 10 mL, Oral, Daily  pantoprazole, 40 mg, Intravenous, Q12H  piperacillin-tazobactam, 3.375 g, Intravenous, Q8H  polyethylene glycol, 17 g, Oral, Daily  potassium chloride, 40 mEq, Oral, Q4H  [START ON 3/31/2023] valsartan, 240 mg, Oral, Q24H  ----------------------------------------------------------------------------------------------------------------------  Vital Signs:  Temp:  [97.2 °F (36.2 °C)-98.2 °F (36.8 °C)] 97.2 °F (36.2 °C)  Heart Rate:  [63-85] 85  Resp:  [18] 18  BP: (141-192)/(51-96) 192/85  SpO2:  [96 %-97  %] 96 %  on  Flow (L/min):  [1-2] 1;   Device (Oxygen Therapy): nasal cannula  Body mass index is 24.01 kg/m².      Intake/Output Summary (Last 24 hours) at 3/30/2023 1017  Last data filed at 3/30/2023 0830  Gross per 24 hour   Intake 360 ml   Output 150 ml   Net 210 ml      ----------------------------------------------------------------------------------------------------------------------  Physical exam:  Constitutional:  Elderly, No acute distress.      HENT:  Head:  Normocephalic and atraumatic.  Mouth:  Moist mucous membranes.    Eyes:  Conjunctivae and EOM are normal. No scleral icterus.    Neck:  Neck supple.  No JVD present.    Cardiovascular:  Normal rate, regular rhythm and normal heart sounds with no murmur.  Pulmonary/Chest:  No respiratory distress, no wheezes, improved crackles, on minimal NC  Abdominal:  Soft. No distension and mild tenderness to palpation   Musculoskeletal:  No tenderness, and no deformity.  No red or swollen joints anywhere.    Neurological:  Alert and oriented to person, place, and time.  No gross focal deficits     Skin:  Skin is warm and dry. No rash noted. No pallor.   Peripheral vascular:  No clubbing, no cyanosis, no edema  Psychiatric: Appropriate mood and affect    Edited by: Rodriguez Robin MD at 3/30/2023 1015  ----------------------------------------------------------------------------------------------------------------------  WBC/HGB/HCT/PLT   8.93/9.1/27.5/238 (03/30 0139)  BUN/CREAT/GLUC/ALT/AST/OSIRIS/LIP    8/0.44/104/42/19/--/-- (03/30 0139)  LYTES - Na/K/Cl/CO2: 138/3.5/103/28.8 (03/30 0139)     No results found for: URINECX  No results found for: BLOODCX    I have personally looked at the labs and they are summarized above.  ----------------------------------------------------------------------------------------------------------------------  Detailed radiology reports for the last 24 hours:  CT Cervical Spine Without Contrast    Result Date: 3/29/2023   1.  Arthritic change  2. No acute cervical abnormality.  3. Grade 1 anterolisthesis of C7 on T1  4. Parenchymal nodules in the lung apices bilaterally  This report was finalized on 3/29/2023 3:29 PM by Dr. Danny Nguyen MD.      CT Thoracic Spine Without Contrast    Result Date: 3/29/2023  1.  Again noted is abnormal low attenuation mass on the left at the T5-T6 level almost certainly compromising the exiting nerve root. There is some erosion of the adjacent vertebral body. 2.  Interval development of small bibasilar pleural effusions and bibasilar consolidation. 3.  No acute compression abnormality.  This report was finalized on 3/29/2023 4:56 PM by Dr. Danny Nguyen MD.      Assessment & Plan    #Severe sepsis that was present on admission (temperature 36 degrees C, lactic acid 3.5, CRP 3.44, WBC 30,990, heart rates ) due to a suspected perforated antral gastric ulcer  #Acute hypokalemia and acute hypomagnesemia and acute hypophosphatemia, improved with supplementation  #History of coronary artery disease status post 3v CABG in 2011  #History of chronic kidney disease stage IIIa with baseline Cr 0.6-0.85   #History of essential hypertension  #History of hypothyroidism, currently controlled  #History of hyperlipidemia  #History of left upper lobe pleural based pulmonary nodule that appears to be invading the T6 vertebra  #Advanced age  #Former Smoker  #Debility   - General Surgery following as primary, status post surgical intervention, advancing diet as tolerated  - Status post Zosyn per primary  - IV PPI for now, remainder of medications transitioned to oral   - Continue home beta blocker, lower than home dose ARB though increased dose for tomorrow, new Hydralazine and titrate dose again today, titrate as indicated  - Continue as needed antihypertensives for systolic blood pressure > 180mmHg  - Volume overload resolving with holding mIVFs  - Continue Tylenol as needed for fevers, monitor for clinical  improvement.      F: Oral  E: Monitor & Replace as needed   N: Diet: Regular/House Diet; No Carbonated Beverages, No Straw; Texture: Soft to Chew (NDD 3); Soft to Chew: Whole Meat; Fluid Consistency: Thin (IDDSI 0)  Ppx: SQH  Code Status (Patient has no pulse and is not breathing): CPR (Attempt to Resuscitate)  Medical Interventions (Patient has pulse or is breathing): Full Support     Dispo: Pending clinical improvement, PT/OT consulted and following, Social Work to assist with placement, looking in to inpatient rehab      *This patient is considered high risk due to severe sepsis, bowel perforation, surgical intervention, volume overload, debility     Edited by: Rodriguez Robin MD at 3/30/2023 1017  [facility]

## 2023-03-31 LAB
ANION GAP SERPL CALCULATED.3IONS-SCNC: 9.4 MMOL/L (ref 5–15)
BUN SERPL-MCNC: 9 MG/DL (ref 8–23)
BUN/CREAT SERPL: 20.5 (ref 7–25)
CALCIUM SPEC-SCNC: 8.9 MG/DL (ref 8.6–10.5)
CHLORIDE SERPL-SCNC: 103 MMOL/L (ref 98–107)
CO2 SERPL-SCNC: 25.6 MMOL/L (ref 22–29)
CREAT SERPL-MCNC: 0.44 MG/DL (ref 0.57–1)
EGFRCR SERPLBLD CKD-EPI 2021: 92.6 ML/MIN/1.73
GLUCOSE SERPL-MCNC: 108 MG/DL (ref 65–99)
MAGNESIUM SERPL-MCNC: 1.7 MG/DL (ref 1.6–2.4)
POTASSIUM SERPL-SCNC: 4.7 MMOL/L (ref 3.5–5.2)
POTASSIUM SERPL-SCNC: 4.8 MMOL/L (ref 3.5–5.2)
SODIUM SERPL-SCNC: 138 MMOL/L (ref 136–145)

## 2023-03-31 PROCEDURE — 99497 ADVNCD CARE PLAN 30 MIN: CPT | Performed by: INTERNAL MEDICINE

## 2023-03-31 PROCEDURE — 80048 BASIC METABOLIC PNL TOTAL CA: CPT | Performed by: INTERNAL MEDICINE

## 2023-03-31 PROCEDURE — 97530 THERAPEUTIC ACTIVITIES: CPT

## 2023-03-31 PROCEDURE — 25010000002 ONDANSETRON PER 1 MG: Performed by: SURGERY

## 2023-03-31 PROCEDURE — 84132 ASSAY OF SERUM POTASSIUM: CPT

## 2023-03-31 PROCEDURE — 25010000002 HEPARIN (PORCINE) PER 1000 UNITS: Performed by: SURGERY

## 2023-03-31 PROCEDURE — 83735 ASSAY OF MAGNESIUM: CPT

## 2023-03-31 PROCEDURE — 25010000002 MAGNESIUM SULFATE 2 GM/50ML SOLUTION: Performed by: SURGERY

## 2023-03-31 PROCEDURE — 25010000002 HYDRALAZINE PER 20 MG: Performed by: INTERNAL MEDICINE

## 2023-03-31 PROCEDURE — 99232 SBSQ HOSP IP/OBS MODERATE 35: CPT | Performed by: INTERNAL MEDICINE

## 2023-03-31 PROCEDURE — 99024 POSTOP FOLLOW-UP VISIT: CPT | Performed by: SURGERY

## 2023-03-31 RX ORDER — MAGNESIUM SULFATE 1 G/100ML
1 INJECTION INTRAVENOUS AS NEEDED
Status: DISCONTINUED | OUTPATIENT
Start: 2023-03-31 | End: 2023-03-31

## 2023-03-31 RX ORDER — MAGNESIUM SULFATE HEPTAHYDRATE 40 MG/ML
2 INJECTION, SOLUTION INTRAVENOUS AS NEEDED
Status: DISCONTINUED | OUTPATIENT
Start: 2023-03-31 | End: 2023-03-31

## 2023-03-31 RX ORDER — VALSARTAN 160 MG/1
320 TABLET ORAL
Status: DISCONTINUED | OUTPATIENT
Start: 2023-03-31 | End: 2023-04-10 | Stop reason: HOSPADM

## 2023-03-31 RX ORDER — MAGNESIUM SULFATE HEPTAHYDRATE 40 MG/ML
4 INJECTION, SOLUTION INTRAVENOUS AS NEEDED
Status: DISCONTINUED | OUTPATIENT
Start: 2023-03-31 | End: 2023-03-31

## 2023-03-31 RX ORDER — MAGNESIUM SULFATE HEPTAHYDRATE 40 MG/ML
2 INJECTION, SOLUTION INTRAVENOUS ONCE
Status: COMPLETED | OUTPATIENT
Start: 2023-03-31 | End: 2023-03-31

## 2023-03-31 RX ADMIN — ONDANSETRON 4 MG: 2 INJECTION INTRAMUSCULAR; INTRAVENOUS at 11:37

## 2023-03-31 RX ADMIN — HYDROCODONE BITARTRATE AND ACETAMINOPHEN 1 TABLET: 7.5; 325 TABLET ORAL at 23:11

## 2023-03-31 RX ADMIN — CASTOR OIL AND BALSAM, PERU 1 APPLICATION: 788; 87 OINTMENT TOPICAL at 08:31

## 2023-03-31 RX ADMIN — CARVEDILOL 25 MG: 25 TABLET, FILM COATED ORAL at 07:03

## 2023-03-31 RX ADMIN — HYDRALAZINE HYDROCHLORIDE 50 MG: 50 TABLET, FILM COATED ORAL at 05:00

## 2023-03-31 RX ADMIN — HYDRALAZINE HYDROCHLORIDE 75 MG: 50 TABLET, FILM COATED ORAL at 21:03

## 2023-03-31 RX ADMIN — PANTOPRAZOLE SODIUM 40 MG: 40 TABLET, DELAYED RELEASE ORAL at 17:51

## 2023-03-31 RX ADMIN — CASTOR OIL AND BALSAM, PERU 1 APPLICATION: 788; 87 OINTMENT TOPICAL at 20:41

## 2023-03-31 RX ADMIN — LEVOTHYROXINE SODIUM 112 MCG: 0.07 TABLET ORAL at 05:00

## 2023-03-31 RX ADMIN — Medication 10 ML: at 20:41

## 2023-03-31 RX ADMIN — Medication 10 ML: at 08:30

## 2023-03-31 RX ADMIN — MAGNESIUM SULFATE HEPTAHYDRATE 2 G: 40 INJECTION, SOLUTION INTRAVENOUS at 02:56

## 2023-03-31 RX ADMIN — HYDRALAZINE HYDROCHLORIDE 75 MG: 50 TABLET, FILM COATED ORAL at 13:37

## 2023-03-31 RX ADMIN — HEPARIN SODIUM 5000 UNITS: 5000 INJECTION INTRAVENOUS; SUBCUTANEOUS at 08:31

## 2023-03-31 RX ADMIN — HEPARIN SODIUM 5000 UNITS: 5000 INJECTION INTRAVENOUS; SUBCUTANEOUS at 20:37

## 2023-03-31 RX ADMIN — VALSARTAN 320 MG: 160 TABLET, FILM COATED ORAL at 08:30

## 2023-03-31 RX ADMIN — CARVEDILOL 25 MG: 25 TABLET, FILM COATED ORAL at 18:43

## 2023-03-31 RX ADMIN — Medication 10 ML: at 08:31

## 2023-03-31 RX ADMIN — Medication 10 ML: at 08:32

## 2023-03-31 RX ADMIN — Medication 10 ML: at 08:49

## 2023-03-31 RX ADMIN — PANTOPRAZOLE SODIUM 40 MG: 40 TABLET, DELAYED RELEASE ORAL at 07:01

## 2023-03-31 RX ADMIN — HYDRALAZINE HYDROCHLORIDE 10 MG: 20 INJECTION INTRAMUSCULAR; INTRAVENOUS at 15:38

## 2023-03-31 RX ADMIN — Medication 3 ML: at 08:49

## 2023-03-31 RX ADMIN — HYDROCODONE BITARTRATE AND ACETAMINOPHEN 1 TABLET: 7.5; 325 TABLET ORAL at 18:10

## 2023-03-31 RX ADMIN — HYDROCODONE BITARTRATE AND ACETAMINOPHEN 1 TABLET: 7.5; 325 TABLET ORAL at 04:59

## 2023-03-31 RX ADMIN — HYDRALAZINE HYDROCHLORIDE 10 MG: 20 INJECTION INTRAMUSCULAR; INTRAVENOUS at 23:56

## 2023-03-31 RX ADMIN — LIDOCAINE 1 PATCH: 700 PATCH TOPICAL at 08:31

## 2023-03-31 NOTE — SIGNIFICANT NOTE
03/31/23 1329   Post Acute Pre-Cert Documentation   Date Post Acute Pre-Cert Completed 03/31/23   Response Denied   Post Acute Pre-Cert Initiated Comment peer to peer offered by [place] [employer] by calling [phone] before 04/03/23 at 12:00 CST

## 2023-03-31 NOTE — DISCHARGE PLACEMENT REQUEST
"Amanda Gomez (89 y.o. Female)     Date of Birth   06/08/1933    Social Security Number       Address   [address] [id number]    Home Phone   [phone]    MRN   [id number]       Advent   [facility]    Marital Status                               Admission Date   3/22/23    Admission Type   Emergency    Admitting Provider   Aurora Kirkland MD    Attending Provider   Aurora Kirkland MD    Department, Room/Bed   [address], 3306/2S       Discharge Date       Discharge Disposition       Discharge Destination                               Attending Provider: Aurora Kirkland MD    Allergies: Motrin [Ibuprofen], Novocain [Procaine]    Isolation: None   Infection: None   Code Status: No CPR    Ht: 165.1 cm (65\")   Wt: 66.3 kg (146 lb 1.6 oz)    Admission Cmt: None   Principal Problem: Perforated ulcer (HCC) [K27.5]                 Active Insurance as of 3/22/2023     Primary Coverage     Payor Plan Insurance Group Employer/Plan Group    ANTH MEDICARE REPLACEMENT ANTH MEDICARE ADVANTAGE [id number]     Payor Plan Address Payor Plan Phone Number Payor Plan Fax Number Effective Dates    [address] [phone]  1/1/2019 - None Entered    [facility] [id number]       Subscriber Name Subscriber Birth Date Member ID       AMANDA GOMEZ 6/8/1933 [id number]                 Emergency Contacts      (Rel.) Home Phone Work Phone Mobile Phone    Kaleb Gomez (healthcare surrogate) (Son) -- -- [phone]    Ziyad Gomez (alternative healthcare surrogate) (Son) -- -- [phone]    Filiberto Gomez (second alternative HCS) (Son) -- -- [phone]            Emergency Contact Information     Name Relation Home Work Mobile    Kaleb Gomez (healthcare surrogate) Son   [phone]    Ziyad Gomez (alternative healthcare surrogate) Son   [phone]    Filiberto Gomez (second alternative HCS) Son   [phone]          Insurance Information                ANTHEM " MEDICARE REPLACEMENT/ANTHEM MEDICARE ADVANTAGE Phone: [phone]    Subscriber: Christine Garg Subscriber#: [id number]    Group#: [id number] Precert#: --             History & Physical      Aurora Kirkland MD at 23 1442              [facility] General Surgery  History & Physical    Patient Identification:  Name:  Christine Garg  Age:  89 y.o.  Sex:  female  :  1933  MRN:  [id number]   Visit Number:  [id number]  Admit Date: 3/22/2023   Primary Care Physician:  Dave Tobar MD    Subjective     Chief complaint abdominal pain    Subjective      Patient is a 89 y.o. female who presents to the emergency department today with complaints of severe abdominal pain.  Patient and signs report that she is having had abdominal pain times the past 4 to 5 days.  States that it has worsened progressively every day and was at its worst today.  They report that patient has been spitting up phlegm and had nausea.  Denies any abdominal surgeries in the past.  Does report that she takes a daily 324 mg aspirin.  Patient reports that her pain is worse on her right side and that she hurts everywhere.  States that it hurts to move.  Patient last reports that she ate at approximately 6 AM and last had a sip of water around 9-10.  However she has vomited since.        ---------------------------------------------------------------------------------------------------------------------   Review of Systems  Review of Systems - General ROS: Positive for 30 pound weight loss  Psychological ROS: negative for - behavioral disorder  Ophthalmic ROS: negative for - dry eyes  ENT ROS: negative for - vertigo or vocal changes  Hematological and Lymphatic ROS: negative for - jaundice or swollen lymph nodes  Respiratory ROS: negative for - sputum changes or stridor  Cardiovascular ROS: negative for - irregular heartbeat or murmur  Gastrointestinal ROS: Positive for abdominal pain, nausea and vomiting.  Genitourinary ROS:  negative for - hematuria or incontinence  Musculoskeletal ROS: negative for - gait disturbance      ---------------------------------------------------------------------------------------------------------------------   History  Past Medical History:   Diagnosis Date   • Advanced age    • CAD (coronary artery disease) 2011    s/p 3v CABG   • Chronic kidney disease (CKD), stage III (moderate) ([facility])    • COPD (chronic obstructive pulmonary disease) ([facility])    • History of tobacco use    • Hyperlipidemia    • Hypertension    • Hypothyroidism    • Pulmonary nodule      Past Surgical History:   Procedure Laterality Date   • BREAST BIOPSY Left     benign   • CATARACT EXTRACTION     • CORONARY ANGIOPLASTY     • CORONARY ARTERY BYPASS GRAFT       Family History   Problem Relation Age of Onset   • Heart disease Mother    • Heart disease Father    • Heart disease Brother    • Breast cancer Neg Hx      Social History     Tobacco Use   • Smoking status: Former     Types: Cigarettes   • Smokeless tobacco: Never   Substance Use Topics   • Alcohol use: No   • Drug use: No     (Not in a hospital admission)    Allergies:  Motrin [ibuprofen] and Novocain [procaine]    Objective      Objective     Vital Signs:  Temp:  [96.8 °F (36 °C)] 96.8 °F (36 °C)  Heart Rate:  [] 98  Resp:  [18-20] 18  BP: (138-183)/(54-91) 183/83      03/22/23  1129   Weight: 56.7 kg (125 lb)     Body mass index is 20.8 kg/m².  ---------------------------------------------------------------------------------------------------------------------       Physical Exam  Constitutional:       Appearance: Normal appearance.   HENT:      Head: Normocephalic and atraumatic.      Right Ear: External ear normal.      Left Ear: External ear normal.   Eyes:      Conjunctiva/sclera: Conjunctivae normal.      Pupils: Pupils are equal, round, and reactive to light.   Cardiovascular:      Rate and Rhythm: Normal rate and regular rhythm.      Pulses: Normal pulses.      Heart  sounds: Normal heart sounds.   Pulmonary:      Effort: Pulmonary effort is normal.      Breath sounds: Normal breath sounds.   Abdominal:      General: Abdomen is flat. Bowel sounds are normal.      Palpations: Abdomen is soft.      Tenderness: There is abdominal tenderness (Generalized abdominal tenderness, severe pain upon palpation of right lower quadrant).   Musculoskeletal:         General: Normal range of motion.      Cervical back: Normal range of motion and neck supple.   Skin:     General: Skin is warm and dry.      Capillary Refill: Capillary refill takes less than 2 seconds.   Neurological:      General: No focal deficit present.      Mental Status: She is alert and oriented to person, place, and time.   Psychiatric:         Mood and Affect: Mood normal.         Behavior: Behavior normal.       ---------------------------------------------------------------------------------------------------------------------   Results Review:   Results from last 7 days   Lab Units 03/22/23  1336 03/22/23  1139   HSTROP T ng/L 29* 25*     Results from last 7 days   Lab Units 03/22/23  1139   CRP mg/dL 3.44*   LACTATE mmol/L 3.5*   WBC 10*3/mm3 30.99*   HEMOGLOBIN g/dL 15.9   HEMATOCRIT % 46.3   PLATELETS 10*3/mm3 468*     Results from last 7 days   Lab Units 03/22/23  1145   PH, ARTERIAL pH units 7.424   PO2 ART mm Hg 80.1*   PCO2, ARTERIAL mm Hg 30.6*   HCO3 ART mmol/L 20.0     Results from last 7 days   Lab Units 03/22/23  1139   SODIUM mmol/L 128*   POTASSIUM mmol/L 3.3*   CHLORIDE mmol/L 87*   CO2 mmol/L 21.3*   BUN mg/dL 23   CREATININE mg/dL 0.85   CALCIUM mg/dL 10.0   GLUCOSE mg/dL 139*   ALBUMIN g/dL 3.9   BILIRUBIN mg/dL 0.6   ALK PHOS U/L 88   AST (SGOT) U/L 17   ALT (SGPT) U/L 29   Estimated Creatinine Clearance: 40.2 mL/min (by C-G formula based on SCr of 0.85 mg/dL).  No results found for: AMMONIA      No results found for: BLOODCX  No results found for: URINECX  No results found for: WOUNDCX  No results  found for: STOOLCX  ---------------------------------------------------------------------------------------------------------------------  Imaging:  Imaging Results (Last 24 Hours)     Procedure Component Value Units Date/Time    CT Abdomen Pelvis With Contrast [[id number]] Collected: 03/22/23 1323     Updated: 03/22/23 1328    Narrative:      EXAM:    CT Abdomen and Pelvis With Intravenous Contrast     EXAM DATE:    3/22/2023 12:52 PM     CLINICAL HISTORY:    Right flank and RLQ abdominal pain; leukocytosis; recent UTI     TECHNIQUE:    Axial computed tomography images of the abdomen and pelvis with  intravenous contrast.  Sagittal and coronal reformatted images were  created and reviewed.  This CT exam was performed using one or more of  the following dose reduction techniques:  automated exposure control,  adjustment of the mA and/or kV according to patient size, and/or use of  iterative reconstruction technique.     COMPARISON:    No relevant prior studies available.     FINDINGS:    LUNG BASES:  Unremarkable.  No mass.  No consolidation.    MEDIASTINUM:  Small hiatal hernia.      ABDOMEN:    LIVER:  Unremarkable.  No mass.    GALLBLADDER AND BILE DUCTS:  Mild gallbladder distention.  No  calcified stones.  No ductal dilation.    PANCREAS:  Unremarkable.  No mass.  No ductal dilation.    SPLEEN:  Unremarkable.  No splenomegaly.    ADRENALS:  Unremarkable.  No mass.    KIDNEYS AND URETERS:  Unremarkable.  No solid mass.  No  hydronephrosis.    STOMACH AND BOWEL:  Abundant colonic stool.  No obstruction.  No  mucosal thickening.      PELVIS:    APPENDIX:  The appendix is not well visualized.    BLADDER:  Unremarkable.  No mass.    REPRODUCTIVE:  Unremarkable as visualized.      ABDOMEN and PELVIS:    INTRAPERITONEAL SPACE:  Question gastric antral wall thickening with  some associated adjacent stranding and punctate foci of free air  concerning for potentially perforated ulcer.  Tiny ascites around the  liver.     BONES/JOINTS:  Degenerative disc disease throughout the lumbar spine.   Degenerative facet arthropathy throughout the lumbar spine, most  prominent in the lower lumbar spine.  No acute fracture.  No  dislocation.    SOFT TISSUES:  Unremarkable.    VASCULATURE:  Atherosclerotic disease.  No abdominal aortic aneurysm.    LYMPH NODES:  Unremarkable.  No enlarged lymph nodes.       Impression:      1.  Question gastric antral wall thickening with some associated  adjacent stranding and punctate foci of free air concerning for  potentially perforated ulcer.  2.  Tiny ascites around the liver.  3.  Mild gallbladder distention.  4.  The appendix is not well visualized.  5.  Abundant colonic stool.  6.  Degenerative changes lumbar spine as described.     This report was finalized on 3/22/2023 1:26 PM by Dr. Henry Lozano MD.       XR Chest 1 View [[id number]] Collected: 03/22/23 1243     Updated: 03/22/23 1254    Narrative:      EXAM:    XR Chest, 1 View     EXAM DATE:    3/22/2023 12:09 PM     CLINICAL HISTORY:    chest pain     TECHNIQUE:    Frontal view of the chest.     COMPARISON:    02/07/2023     FINDINGS:    LUNGS:  Coarsened interstitial markings noted throughout the lungs.    PLEURAL SPACE:  Unremarkable.  No pneumothorax.    HEART:  Coronary artery bypass graft (CABG).  Heart size is stable.    MEDIASTINUM:  Unremarkable.    BONES/JOINTS:  Median sternotomy.       Impression:        No acute cardiopulmonary findings identified.     This report was finalized on 3/22/2023 12:43 PM by Dr. Henry Lozano MD.             I have personally reviewed the above radiology images and read the final radiology report on 03/22/23  ---------------------------------------------------------------------------------------------------------------------  Assessment / Plan     Impression: 89-year-old female with possible perforated ulcer and free air in abdomen  Patient Active Problem List   Diagnosis Code   • Essential hypertension I10    • Dyslipidemia E78.5   • ASCVD (arteriosclerotic cardiovascular disease) I25.10   • Palpitations R00.2   • Coronary artery disease  I25.10   • Abnormal PFTs (pulmonary function tests) R94.2   • Chronic obstructive pulmonary disease (HCC) J44.9   • Mild persistent asthma with allergic rhinitis J45.30   • Pulmonary nodule R91.1   • Former smoker Z87.891   • Colitis K52.9     Impression patient has an acute surgical abdomen.  The etiology is not clear from imaging and there is only a very small amount of free air.    Plan:  Patient will go to the operating room for an exploratory laparotomy       Discussion:  Recommendations discussed with patient and signs.  This could certainly be a perforated ulcer.  Could also be a perforated cancer, ischemic right colon with perforated sigmoid diverticulitis being less likely.  It is also possible that the perforation may have already sealed and may not be identifiable at the time of surgery.        Yonatan Alonzo, LUCÍA  03/22/23  14:43 EDT  ---------------------------------------------------------------------------------------------------------------------     Please note that portions of this note were completed with a voice recognition program.    Electronically signed by Aurora Kirkland MD at 03/22/23 1544       Lines, Drains & Airways     Active LDAs     Name Placement date Placement time Site Days    Peripheral IV 03/31/23 0920 Anterior;Left Hand 03/31/23 (P)   0920 (P)   Hand (P)   less than 1                  Current Facility-Administered Medications   Medication Dose Route Frequency Provider Last Rate Last Admin   • carvedilol (COREG) tablet 25 mg  25 mg Oral BID With Meals Rodriguez Robin MD   25 mg at 03/31/23 0703   • castor oil-balsam peru (VENELEX) ointment 1 application  1 application Topical Q12H Aurora Kirkland MD   1 application at 03/31/23 0831   • cloNIDine (CATAPRES) tablet 0.1 mg  0.1 mg Oral TID PRN Aurora Kirkland MD   0.1 mg at 03/27/23 1017    • heparin (porcine) 5000 UNIT/ML injection 5,000 Units  5,000 Units Subcutaneous Q12H Aurora Kirkland MD   5,000 Units at 03/31/23 0831   • hydrALAZINE (APRESOLINE) injection 10 mg  10 mg Intravenous Q6H PRN Rodriguez Robin MD   10 mg at 03/31/23 1538   • hydrALAZINE (APRESOLINE) tablet 75 mg  75 mg Oral Q8H Rodriguez Robin MD   75 mg at 03/31/23 1337   • HYDROcodone-acetaminophen (NORCO) 7.5-325 MG per tablet 1 tablet  1 tablet Oral Q4H PRN Aurora Kirkland MD   1 tablet at 03/31/23 0459   • labetalol (NORMODYNE,TRANDATE) injection 10 mg  10 mg Intravenous Q6H PRN Kiara Servin MD   10 mg at 03/27/23 1634   • levothyroxine (SYNTHROID, LEVOTHROID) tablet 112 mcg  112 mcg Oral Q AM Aurora Kirkland MD   112 mcg at 03/31/23 0500   • lidocaine (LIDODERM) 5 % 1 patch  1 patch Transdermal Q24H Kiara Servin MD   1 patch at 03/31/23 0831   • Magnesium Sulfate - Total Dose 4 grams - Magnesium 1.6 - 1.9  4 g Intravenous Once Aurora iKrkland MD       • multivitamin (MULTI-DELYN) liquid 10 mL  10 mL Oral Daily Rodriguez Robin MD   10 mL at 03/31/23 0830   • ondansetron (ZOFRAN) injection 4 mg  4 mg Intravenous Q4H PRN Aurora Kirkland MD   4 mg at 03/31/23 1137   • pantoprazole (PROTONIX) EC tablet 40 mg  40 mg Oral BID AC Aurora Kirkland MD   40 mg at 03/31/23 0701   • polyethylene glycol (MIRALAX) packet 17 g  17 g Oral Daily Aurora Kirkland MD   17 g at 03/30/23 0830   • potassium chloride (K-DUR,KLOR-CON) ER tablet 40 mEq  40 mEq Oral PRN Aurora Kirkland MD        Or   • potassium chloride 10 mEq in 100 mL IVPB  10 mEq Intravenous Q1H PRN Aurora Kirkland MD       • potassium chloride (KLOR-CON) packet 40 mEq  40 mEq Oral PRN Aurora Kirkland MD       • potassium phosphate 45 mmol in sodium chloride 0.9 % 500 mL infusion  45 mmol Intravenous PRN Winnie Tillman DO        Or   • potassium phosphate 30 mmol in sodium chloride 0.9 % 250 mL infusion  30 mmol Intravenous PRN Winnie Tillman  Iain, DO        Or   • potassium phosphate 15 mmol in sodium chloride 0.9 % 100 mL infusion  15 mmol Intravenous PRN Winnie Tillman, DO        Or   • sodium phosphates 45 mmol in sodium chloride 0.9 % 500 mL IVPB  45 mmol Intravenous PRN Winnie Tillman, DO        Or   • sodium phosphates 30 mmol in sodium chloride 0.9 % 250 mL IVPB  30 mmol Intravenous PRN Winnie Tillman, DO        Or   • sodium phosphates 15 mmol in sodium chloride 0.9 % 250 mL IVPB  15 mmol Intravenous PRN Winnie Tillman, DO       • sodium chloride 0.9 % flush 10 mL  10 mL Intravenous PRN Aurora Kirkland MD       • sodium chloride 0.9 % flush 10 mL  10 mL Intravenous PRN Aurora Kirkland MD       • sodium chloride 0.9 % flush 10 mL  10 mL Intravenous Q12H Eb Hong, DO   10 mL at 03/30/23 2035   • sodium chloride 0.9 % flush 10 mL  10 mL Intravenous PRN Eb Hong DO   10 mL at 03/31/23 0832   • sodium chloride 0.9 % flush 10 mL  10 mL Intravenous Q12H Eb Hong, DO   10 mL at 03/31/23 0849   • sodium chloride 0.9 % flush 10 mL  10 mL Intravenous Q12H Eb Hong, DO   10 mL at 03/31/23 0831   • sodium chloride 0.9 % flush 10 mL  10 mL Intravenous PRN Eb Hong DO       • sodium chloride 0.9 % flush 3 mL  3 mL Intravenous Q12H Aurora Kirkland MD   3 mL at 03/31/23 0849   • sodium chloride 0.9 % infusion 40 mL  40 mL Intravenous PRN Aurroa Kirkland MD       • sodium chloride 0.9 % infusion 40 mL  40 mL Intravenous PRN Eb Hong DO       • sodium chloride 0.9 % infusion 40 mL  40 mL Intravenous PRN Eb Hong DO       • valsartan (DIOVAN) tablet 320 mg  320 mg Oral Q24H Rodriguez Robin MD   320 mg at 03/31/23 0830       Lab Results (last 24 hours)     Procedure Component Value Units Date/Time    Basic Metabolic Panel [[id number]]  (Abnormal) Collected: 03/31/23 0012    Specimen: Blood Updated: 03/31/23 0749      Glucose 108 mg/dL      BUN 9 mg/dL      Creatinine 0.44 mg/dL      Sodium 138 mmol/L      Potassium 4.8 mmol/L      Comment: Slight hemolysis detected by analyzer. Results may be affected.        Chloride 103 mmol/L      CO2 25.6 mmol/L      Calcium 8.9 mg/dL      BUN/Creatinine Ratio 20.5     Anion Gap 9.4 mmol/L      eGFR 92.6 mL/min/1.73     Narrative:      GFR Normal >60  Chronic Kidney Disease <60  Kidney Failure <15    The GFR formula is only valid for adults with stable renal function between ages 18 and 70.    Magnesium [[id number]]  (Normal) Collected: 03/31/23 0012    Specimen: Blood Updated: 03/31/23 0032     Magnesium 1.7 mg/dL     Potassium [[id number]]  (Normal) Collected: 03/31/23 0012    Specimen: Blood Updated: 03/31/23 0032     Potassium 4.7 mmol/L      Comment: Slight hemolysis detected by analyzer. Results may be affected.           Orders (last 24 hrs)      Start     Ordered    04/01/23 0600  Magnesium  Morning Draw,   Status:  Canceled         03/31/23 0515    04/01/23 0600  CBC & Differential  Morning Draw,   Status:  Canceled         03/31/23 1036    04/01/23 0600  Basic Metabolic Panel  Morning Draw,   Status:  Canceled         03/31/23 1036    03/31/23 1800  Dietary Nutrition Supplements Boost Plus (Ensure Enlive, Ensure Plus); vanilla  Daily With Breakfast, Lunch & Dinner       03/31/23 1426    03/31/23 1800  Dietary Nutrition Supplements Other (See Comment); rotate vanilla and orange  magic cup  Daily With Breakfast & Dinner       03/31/23 1432    03/31/23 1421  Discontinue Cardiac Monitoring  Once         03/31/23 1420    03/31/23 1420  Remove SARAH drain  Nursing Communication  Until Discontinued        Comments: Remove SARAH drain    03/31/23 1420    03/31/23 1420  Start peripheral iv (hep lock) and remove central line  Nursing Communication  Once        Comments: Start peripheral iv (hep lock) and remove central line    03/31/23 1420    03/31/23 1400  hydrALAZINE (APRESOLINE) tablet 75 mg   Every 8 Hours Scheduled         03/31/23 1035    03/31/23 1353  Code Status and Medical Interventions:  Continuous         03/31/23 1352    03/31/23 1036  Inpatient Palliative Care MD Consult  Once        Specialty:  Hospice and Palliative Medicine  Provider:  Angela Gonzalez APRN    03/31/23 1035    03/31/23 0900  valsartan (DIOVAN) tablet 240 mg  Every 24 Hours Scheduled,   Status:  Discontinued         03/30/23 1016    03/31/23 0900  valsartan (DIOVAN) tablet 320 mg  Every 24 Hours Scheduled         03/31/23 0731    03/31/23 0732  Basic Metabolic Panel  STAT         03/31/23 0731    03/31/23 0300  Magnesium Sulfate - Total Dose 2 grams - Magnesium 1.6 - 1.9  Once         03/31/23 0121    03/31/23 0053  Magnesium Sulfate - Total Dose 4 grams - Magnesium 1 or Less  As Needed,   Status:  Discontinued        See Hyperspace for full Linked Orders Report.    03/31/23 0053    03/31/23 0053  Magnesium Sulfate - Total Dose 3 grams - Magnesium 1.1 - 1.5  As Needed,   Status:  Discontinued        See Hyperspace for full Linked Orders Report.    03/31/23 0053    03/31/23 0053  Magnesium Sulfate - Total Dose 2 grams - Magnesium 1.6 - 1.9  As Needed,   Status:  Discontinued        See Hyperspace for full Linked Orders Report.    03/31/23 0053    03/31/23 0053  Magnesium  As Needed,   Status:  Canceled      Comments: In AM After Magnesium Supplementation      03/31/23 0053    03/30/23 2354  Potassium  STAT         03/30/23 2354    03/30/23 2354  Magnesium  STAT         03/30/23 2354    03/30/23 1730  pantoprazole (PROTONIX) EC tablet 40 mg  2 Times Daily Before Meals         03/30/23 1214    03/30/23 1400  hydrALAZINE (APRESOLINE) tablet 50 mg  Every 8 Hours Scheduled,   Status:  Discontinued         03/30/23 1016    03/29/23 1430  polyethylene glycol (MIRALAX) packet 17 g  Daily         03/29/23 1342    03/29/23 1340  HYDROcodone-acetaminophen (NORCO) 7.5-325 MG per tablet 1 tablet  Every 4 Hours PRN         03/29/23 1342     03/28/23 1800  Dietary Nutrition Supplements Other (See Comment); vanilla  Daily With Breakfast, Lunch & Dinner,   Status:  Canceled       03/28/23 1523    03/28/23 1800  Dietary Nutrition Supplements Other (See Comment); vanilla magic cup  Daily With Breakfast & Dinner,   Status:  Canceled       03/28/23 1526    03/28/23 1700  multivitamin (MULTI-DELYN) liquid 10 mL  Daily         03/28/23 1430    03/28/23 0900  carvedilol (COREG) tablet 25 mg  2 Times Daily With Meals         03/28/23 0743    03/28/23 0743  hydrALAZINE (APRESOLINE) injection 10 mg  Every 6 Hours PRN         03/28/23 0743    03/28/23 0600  levothyroxine (SYNTHROID, LEVOTHROID) tablet 112 mcg  Every Early Morning         03/27/23 1326    03/27/23 1300  castor oil-balsam peru (VENELEX) ointment 1 application  Every 12 Hours Scheduled         03/27/23 1121    03/25/23 1453  labetalol (NORMODYNE,TRANDATE) injection 10 mg  Every 6 Hours PRN         03/25/23 1453    03/24/23 1900  lidocaine (LIDODERM) 5 % 1 patch  Every 24 Hours Scheduled         03/24/23 1749    03/24/23 0247  Patient Currently On Electrolyte Replacement Protocol - Please Refer to MAR for Protocol Details  Misc Nursing Order (Specify)  Daily      Comments: Patient Currently On Electrolyte Replacement Protocol - Please Refer to MAR for Protocol Details    03/24/23 0246    03/24/23 0246  potassium phosphate 45 mmol in sodium chloride 0.9 % 500 mL infusion  As Needed        See Hyperspace for full Linked Orders Report.    03/24/23 0246    03/24/23 0246  potassium phosphate 30 mmol in sodium chloride 0.9 % 250 mL infusion  As Needed        See Hyperspace for full Linked Orders Report.    03/24/23 0246    03/24/23 0246  potassium phosphate 15 mmol in sodium chloride 0.9 % 100 mL infusion  As Needed        See Hyperspace for full Linked Orders Report.    03/24/23 0246    03/24/23 0246  sodium phosphates 45 mmol in sodium chloride 0.9 % 500 mL IVPB  As Needed        See Hyperspace for full  Linked Orders Report.    03/24/23 0246    03/24/23 0246  sodium phosphates 30 mmol in sodium chloride 0.9 % 250 mL IVPB  As Needed        See Hyperspace for full Linked Orders Report.    03/24/23 0246    03/24/23 0246  sodium phosphates 15 mmol in sodium chloride 0.9 % 250 mL IVPB  As Needed        See Hyperspace for full Linked Orders Report.    03/24/23 0246    03/24/23 0246  Potassium  As Needed,   Status:  Canceled       03/24/23 0246    03/24/23 0230  Magnesium Sulfate - Total Dose 4 grams - Magnesium 1.6 - 1.9  Once         03/24/23 0124    03/23/23 1800  Incentive Spirometry  Every 4 Hours While Awake       03/23/23 1542    03/23/23 0900  heparin (porcine) 5000 UNIT/ML injection 5,000 Units  Every 12 Hours Scheduled         03/22/23 1802 03/22/23 2315  sodium chloride 0.9 % flush 10 mL  Every 12 Hours Scheduled         03/22/23 2218    03/22/23 2315  sodium chloride 0.9 % flush 10 mL  Every 12 Hours Scheduled         03/22/23 2218    03/22/23 2315  sodium chloride 0.9 % flush 10 mL  Every 12 Hours Scheduled         03/22/23 2218    03/22/23 2218  sodium chloride 0.9 % flush 10 mL  As Needed         03/22/23 2218    03/22/23 2218  sodium chloride 0.9 % infusion 40 mL  As Needed         03/22/23 2218    03/22/23 2218  sodium chloride 0.9 % flush 10 mL  As Needed         03/22/23 2218    03/22/23 2218  sodium chloride 0.9 % infusion 40 mL  As Needed         03/22/23 2218    03/22/23 2100  sodium chloride 0.9 % flush 3 mL  Every 12 Hours Scheduled         03/22/23 1802 03/22/23 2000  Strip SARAH drain  Every 4 Hours       03/22/23 1802 03/22/23 1917  cloNIDine (CATAPRES) tablet 0.1 mg  3 Times Daily PRN         03/22/23 1920    03/22/23 1800  ondansetron (ZOFRAN) injection 4 mg  Every 4 Hours PRN         03/22/23 1802 03/22/23 1800  Strict Intake and Output  Every 2 Hours       03/22/23 1802    03/22/23 1800  Vital Signs  Every Hour       03/22/23 1802    03/22/23 1800  Turn Cough & Deep Breathe  Every  Hour       03/22/23 1802    03/22/23 1759  Drain to Bulb Suction  Every Shift       03/22/23 1802    03/22/23 1759  Incentive Spirometry  Every Hour While Awake       03/22/23 1802    03/22/23 1757  Intake and Output  Every Shift       03/22/23 1802    03/22/23 1757  Change Dressing  Every Shift       03/22/23 1802 03/22/23 1756  sodium chloride 0.9 % flush 10 mL  As Needed         03/22/23 1802 03/22/23 1756  sodium chloride 0.9 % infusion 40 mL  As Needed         03/22/23 1802 03/22/23 1522  potassium chloride (KLOR-CON) packet 40 mEq  As Needed         03/22/23 1522    03/22/23 1513  Patient Currently On Electrolyte Replacement Protocol - Please Refer to MAR for Protocol Details  Misc Nursing Order (Specify)  Daily      Comments: Patient Currently On Electrolyte Replacement Protocol - Please Refer to MAR for Protocol Details    03/22/23 1512    03/22/23 1512  potassium chloride (K-DUR,KLOR-CON) ER tablet 40 mEq  As Needed        See Hyperspace for full Linked Orders Report.    03/22/23 1512    03/22/23 1512  potassium chloride 10 mEq in 100 mL IVPB  Every 1 Hour PRN        See Hyperspace for full Linked Orders Report.    03/22/23 1512    03/22/23 1512  Potassium  As Needed,   Status:  Canceled       03/22/23 1512    03/22/23 1133  sodium chloride 0.9 % flush 10 mL  As Needed        See Hyperspace for full Linked Orders Report.    03/22/23 1134    03/20/23 0000  ondansetron (ZOFRAN) 4 MG tablet  3 Times Daily PRN         03/22/23 1536    03/13/23 0000  Movantik 25 MG tablet  Daily PRN         03/22/23 1536    Unscheduled  Initiate & Follow Electrolyte Replacement Protocol  As Needed       03/22/23 1512    Unscheduled  Change Dressing to IV Site As Needed When Damp, Loose or Soiled  As Needed       03/22/23 2218    Unscheduled  Insert New Peripheral IV  As Needed      Comments: Frequency Per Facility Policy    03/22/23 2218    Unscheduled  Initiate & Follow Electrolyte Replacement Protocol  As Needed        03/24/23 0246    Unscheduled  Bladder Scan if Patient Unable to Void 4-6 Hours After Catheter Removal  As Needed         03/24/23 0834    Unscheduled  If Bladder Scan Volume is Less Than 500mL & Patient is Without Symptoms of Bladder Discomfort / Distention Monitor Every 1-2 Hours for Spontaneous Void  As Needed       03/24/23 0834    Unscheduled  Straight Cath Every 4-6 Hours As Needed If Patient is Unable to Void After 4-6 Hours, Bladder Scan Volume is Greater Than 500mL & Patient Has Symptoms of Bladder Discomfort / Distention  As Needed       03/24/23 0834    Unscheduled  Schedule / Prompt Voiding For Patients With Urinary Incontinence  As Needed       03/24/23 0834    Unscheduled  Up In Chair  As Needed       03/24/23 0834    Unscheduled  Apply Moisture Barrier After Any Incontinence  As Needed       03/27/23 1122    Unscheduled  Initiate & Follow Electrolyte Replacement Protocol  As Needed       03/31/23 0053    --  tiZANidine (ZANAFLEX) 2 MG half tablet  2 Times Daily         03/22/23 1535    --  SCANNED - TELEMETRY           03/22/23 0000    --  SCANNED - TELEMETRY           03/22/23 0000    --  SCANNED - TELEMETRY           03/22/23 0000    --  SCANNED - TELEMETRY           03/22/23 0000    --  SCANNED - TELEMETRY           03/22/23 0000    --  SCANNED - TELEMETRY           03/22/23 0000    --  SCANNED - TELEMETRY           03/22/23 0000    --  SCANNED - TELEMETRY           03/22/23 0000    --  SCANNED - TELEMETRY           03/22/23 0000    --  SCANNED - TELEMETRY           03/22/23 0000    --  SCANNED - TELEMETRY           03/22/23 0000    --  SCANNED - TELEMETRY           03/22/23 0000    --  SCANNED - TELEMETRY           03/22/23 0000    --  SCANNED - TELEMETRY           03/22/23 0000    --  SCANNED - TELEMETRY           03/22/23 0000    --  SCANNED - TELEMETRY           03/22/23 0000    --  SCANNED - TELEMETRY           03/22/23 0000    --  SCANNED - TELEMETRY           03/22/23 0000    --  SCANNED -  TELEMETRY           03/22/23 0000    --  SCANNED - TELEMETRY           03/22/23 0000                   Physician Progress Notes (last 24 hours)      Aurora Kirkland MD at 03/31/23 1447               LOS: 9 days   Patient Care Team:  Dave Tobar MD as PCP - General  Dave Tobar MD as PCP - Family Medicine    Post op day # 9, status post laparotomy with closure of perforated duodenal ulcer with omental patch and Biopatch    Subjective     Interval History:  Patient has done well.  No acute changes overnight.  bowels are working.  Patient is now on oral oral medications.  Inpatient rehab was denied.  Referral will now be made for nursing home placement.   Objective     Vital Signs  Temp:  [97.5 °F (36.4 °C)-98.1 °F (36.7 °C)] 97.5 °F (36.4 °C)  Heart Rate:  [67-80] 70  Resp:  [18] 18  BP: (150-185)/() 173/81    Physical Exam:  This is a well-developed well-nourished elderly female in no acute distress  HEENT examination: Sclera are anicteric  Lungs: Clear  Abdomen: Bowel sounds are present  Skin/incisions: Surgical  Incision intact and dry.  SARAH with serous fluid.       Results Review:    Results from last 7 days   Lab Units 03/30/23  1410 03/30/23  1132   HSTROP T ng/L 12* 17*     Results from last 7 days   Lab Units 03/30/23  0139 03/29/23  0143 03/28/23  0817   WBC 10*3/mm3 8.93 11.69* 13.43*   HEMOGLOBIN g/dL 9.1* 9.3* 11.0*   HEMATOCRIT % 27.5* 28.4* 34.7   PLATELETS 10*3/mm3 238 255 274         Results from last 7 days   Lab Units 03/31/23  0012 03/30/23  0139 03/29/23  0143 03/28/23  0817 03/27/23  0538 03/27/23  0052 03/26/23  1648 03/26/23  0403   SODIUM mmol/L 138 138 135* 135*   < >  --   --  137   POTASSIUM mmol/L 4.8  4.7 3.5 3.8 3.7   < >  --    < > 3.6   MAGNESIUM mg/dL 1.7  --   --   --   --  2.0  --  1.9   CHLORIDE mmol/L 103 103 103 103   < >  --   --  107   CO2 mmol/L 25.6 28.8 26.3 23.2   < >  --   --  21.6*   BUN mg/dL 9 8 8 6*   < >  --   --  7*   CREATININE mg/dL 0.44* 0.44*  0.43* 0.40*   < >  --   --  0.36*   CALCIUM mg/dL 8.9 8.3* 8.5* 8.7   < >  --   --  8.0*   GLUCOSE mg/dL 108* 104* 130* 113*   < >  --   --  142*   ALBUMIN g/dL  --  2.2* 2.3* 2.4*   < >  --   --  2.3*   BILIRUBIN mg/dL  --  0.2 0.3 0.5   < >  --   --  0.4   ALK PHOS U/L  --  83 89 112   < >  --   --  106   AST (SGOT) U/L  --  19 27 31   < >  --   --  30   ALT (SGPT) U/L  --  42* 50* 59*   < >  --   --  36*    < > = values in this interval not displayed.   Estimated Creatinine Clearance: 90.7 mL/min (A) (by C-G formula based on SCr of 0.44 mg/dL (L)).  No results found for: AMMONIA      Blood Culture   Date Value Ref Range Status   2023 No growth at 24 hours  Preliminary   2023 No growth at 24 hours  Preliminary     No results found for: URINECX  Wound Culture   Date Value Ref Range Status   2023 No growth  Preliminary     No results found for: STOOLCX    Imaging:  Imaging Results (Last 24 Hours)     ** No results found for the last 24 hours. **           Impression:  Stable course, status post repair of perforated duodenal ulcer    Plan:  Diet advanced  Remove SARAH and central line   Labs have been stable.  We will discontinue daily lab draw  T5/T6 mass with nerve root impingement which will require outpatient work-up  Patient is stable for discharge when placement can be determined  Aurora Kirkland MD  23  14:47 EDT      Please note that portions of this note were completed with a voice recognition program.      Electronically signed by Aurora Kirkland MD at 23 [id number]     Rodriguez Robin MD at 23 1051              [facility] HOSPITALIST PROGRESS NOTE     Patient Identification:  Name:  Christine Garg  Age:  89 y.o.  Sex:  female  :  1933  MRN:  [id number]  Visit Number:  [id number]  ROOM: 3306/2S     Primary Care Provider:  Dave Tobar MD    Length of stay in inpatient status:  9    Subjective     Chief Compliant:    Chief Complaint   Patient presents  "with   • Abdominal Pain     History of Presenting Illness:    Patient remains ill but stable today, no acute events overnight, no new complaints, denies any fevers or chills, blood pressure remains elevated, titrating blood pressure further, back pain is improved, CT's only showing known mass, patient reports her PCP has been working up and was supposed to have an outpatient follow up with \"someone\" to discuss biopsy though she has been admitted to the hospital and will likely miss appointment, will need to confirm with family who appointment is with and will need to be rescheduled upon discharge, General Surgery following as primary, we are working toward inpatient rehab placement.  Today given recent admission and possible underlying malignancy we discussed Advanced Care Planning topics, patient was amenable to conversation and interested in designating her children as her healthcare surrogates and possibly filling out a living will, consulted Palliative Care and discussed case with them.   Objective     Current Hospital Meds:  carvedilol, 25 mg, Oral, BID With Meals  castor oil-balsam peru, 1 application, Topical, Q12H  heparin (porcine), 5,000 Units, Subcutaneous, Q12H  hydrALAZINE, 75 mg, Oral, Q8H  levothyroxine, 112 mcg, Oral, Q AM  lidocaine, 1 patch, Transdermal, Q24H  magnesium sulfate, 4 g, Intravenous, Once  multivitamin, 10 mL, Oral, Daily  pantoprazole, 40 mg, Oral, BID AC  polyethylene glycol, 17 g, Oral, Daily  valsartan, 320 mg, Oral, Q24H  ----------------------------------------------------------------------------------------------------------------------  Vital Signs:  Temp:  [97.6 °F (36.4 °C)-98.1 °F (36.7 °C)] 97.8 °F (36.6 °C)  Heart Rate:  [67-80] 78  Resp:  [18] 18  BP: (146-185)/() 185/75  SpO2:  [97 %-99 %] 97 %  on  Flow (L/min):  [1.5] 1.5;   Device (Oxygen Therapy): nasal cannula  Body mass index is 24.31 kg/m².      Intake/Output Summary (Last 24 hours) at 3/31/2023 1051  Last " data filed at 3/30/2023 7697  Gross per 24 hour   Intake --   Output 100 ml   Net -100 ml      ----------------------------------------------------------------------------------------------------------------------  Physical exam:  Constitutional:  Elderly, No acute distress. Mild discomfort at times   HENT:  Head:  Normocephalic and atraumatic.  Mouth:  Moist mucous membranes.    Eyes:  Conjunctivae and EOM are normal. No scleral icterus.    Neck:  Neck supple.  No JVD present.    Cardiovascular:  Normal rate, regular rhythm and normal heart sounds with no murmur.  Pulmonary/Chest:  No respiratory distress, no wheezes, no crackles, on minimal NC  Abdominal:  Soft. No distension and mild tenderness to palpation   Musculoskeletal:  No tenderness, and no deformity.  No red or swollen joints anywhere.    Neurological:  Alert and oriented to person, place, and time.  No gross focal deficits     Skin:  Skin is warm and dry. No rash noted. No pallor.   Peripheral vascular:  No clubbing, no cyanosis, no edema  Psychiatric: Appropriate mood and affect    Edited by: Rodriguez Robin MD at 3/31/2023 1036  ----------------------------------------------------------------------------------------------------------------------  BUN/CREAT/GLUC/ALT/AST/OSIRIS/LIP    9/0.44/108/--/--/--/-- (03/31 0012)  WAYNE - Na/K/Cl/CO2: 138/4.7, 4.8/103/25.6 (03/31 0012)     No results found for: URINECX  No results found for: BLOODCX    I have personally looked at the labs and they are summarized above.  ----------------------------------------------------------------------------------------------------------------------  Detailed radiology reports for the last 24 hours:  CT Cervical Spine Without Contrast    Result Date: 3/29/2023   1. Arthritic change  2. No acute cervical abnormality.  3. Grade 1 anterolisthesis of C7 on T1  4. Parenchymal nodules in the lung apices bilaterally  This report was finalized on 3/29/2023 3:29 PM by Dr. Danny Nguyen,  MD.      CT Thoracic Spine Without Contrast    Result Date: 3/29/2023  1.  Again noted is abnormal low attenuation mass on the left at the T5-T6 level almost certainly compromising the exiting nerve root. There is some erosion of the adjacent vertebral body. 2.  Interval development of small bibasilar pleural effusions and bibasilar consolidation. 3.  No acute compression abnormality.  This report was finalized on 3/29/2023 4:56 PM by Dr. Danny Nguyen MD.      Assessment & Plan    #Severe sepsis that was present on admission (temperature 36 degrees C, lactic acid 3.5, CRP 3.44, WBC 30,990, heart rates ) due to a suspected perforated antral gastric ulcer, though also appears do to concurrent Pneumonia, Bacterial, already completed sufficient course of antibiotics.   #History of left upper lobe pleural based pulmonary nodule that appears to be invading the T6 vertebra  #Acute hypokalemia and acute hypomagnesemia and acute hypophosphatemia, improved with supplementation  #History of coronary artery disease status post 3v CABG in 2011  #History of chronic kidney disease stage IIIa with baseline Cr 0.6-0.85   #History of essential hypertension  #History of hypothyroidism, currently controlled  #History of hyperlipidemia  #Advanced age  #Former Smoker  #Debility   - General Surgery following as primary, status post surgical intervention  - Status post Zosyn per primary, CT's recently noting Pneumonia but clinically doesn't appear to be active infection, has already completed 7 day course of Zosyn, trend CBC   - IV PPI for now, remainder of medications transitioned to oral   - Continue home beta blocker, home ARB, new Hydralazine and titrate dose again today, titrate as indicated  - Continue as needed antihypertensives for systolic blood pressure > 180mmHg  - Volume overload resolving with holding mIVFs  - Regarding spinal lesion patient reports her PCP is working up as outpatient, is known, was supposed to have  outpatient appointment but is now admitted to the hospital, will need to reschedule upon discharge, would need to confirm with family who appointment is with as patient is unsure  - Continue Tylenol as needed for fevers, monitor for clinical improvement.     #Advanced Care Planning  - Due to patient's advanced age and acute illness requiring admission to the hospital, along with spinal lesion concerning for malignancy, I have approached conversations regarding ACP.  Pertinent diagnoses to this discussion include patient's perforated ulcer and concerning spinal lesion which could be malignancy. At the time of our discussion only the patient was present. I have consented her regarding her willingness to discuss ACP services and she has agreed. Our discussion included her possible malignancy given the lesion on her back, we discussed her coming in acutely ill as well, she stated she has 3 children who usually can agree on things but we also discussed that end of life care can be different based on different family members relation ship with her and that it's easier to just ask her now while she can make decisions to take some of that burden of them if they were need to make decisions, we did discuss some examples and she was sure she wouldn't want a feeding tube for example.  We also discussed the various ACP documents that could be potentially addressed and filled out at this time including Living Will, Instruction Directives, designation of a Healthcare Proxy, designation of a Healthcare Power of . At this time patient is considering proceeding to fill out these documents, she did mention she would like to designate all three of her children as her healthcare surrogates;  Will consult Palliative Care. I have spent 26 minutes of time (9:25-9:51AM) in face-to-face conversation on the above described ACP services      F: Oral  E: Monitor & Replace as needed   N: Diet: Regular/House Diet; No Carbonated Beverages,  No Straw; Texture: Soft to Chew (NDD 3); Soft to Chew: Whole Meat; Fluid Consistency: Thin (IDDSI 0)   Ppx: SQH  Code Status (Patient has no pulse and is not breathing): CPR (Attempt to Resuscitate)  Medical Interventions (Patient has pulse or is breathing): Full Support     Dispo: Pending clinical improvement, PT/OT consulted and following, Social Work to assist with placement, looking in to inpatient rehab      *This patient is considered high risk due to severe sepsis, bowel perforation, surgical intervention, volume overload, debility     Edited by: Rodriguez Robin MD at 3/31/2023 1051  [facility]        Electronically signed by Rodriguez Robin MD at 03/31/23 1053     Aurora Kirkland MD at 03/30/23 2030               LOS: 8 days   Patient Care Team:  Dave Tobar MD as PCP - General  Dave Tobar MD as PCP - Family Medicine    Post op day # 8, status post laparotomy with closure of perforated duodenal ulcer with omental patch and Biopatch    Subjective     Interval History:  Patient has done well.  Patient did much better with physical therapy today.  Bowels are working.  Yesterday she was converted from IV morphine to oral pain meds which she is using for her back pain.  CT of the C-spine and T-spine yesterday demonstrated a mass associated with T5/T6, likely impinging on the nerve root.  This had been noted before and according to the patient and family the patient has been scheduled for a PET scan as an outpatient next week  Objective     Vital Signs  Temp:  [97.2 °F (36.2 °C)-98.2 °F (36.8 °C)] 98.1 °F (36.7 °C)  Heart Rate:  [67-85] 67  Resp:  [18] 18  BP: (146-192)/(52-96) 160/70    Physical Exam:  This is a well-developed well-nourished elderly female in no acute distress  HEENT examination: Sclera are anicteric  Lungs: Clear  Abdomen: Bowel sounds are present  Skin/incisions: Surgical  Incision intact and dry.  SARAH with serous fluid.       Results Review:    Results from  last 7 days   Lab Units 03/30/23  1410 03/30/23  1132   HSTROP T ng/L 12* 17*     Results from last 7 days   Lab Units 03/30/23  0139 03/29/23  0143 03/28/23  0817   WBC 10*3/mm3 8.93 11.69* 13.43*   HEMOGLOBIN g/dL 9.1* 9.3* 11.0*   HEMATOCRIT % 27.5* 28.4* 34.7   PLATELETS 10*3/mm3 238 255 274         Results from last 7 days   Lab Units 03/30/23  0139 03/29/23  0143 03/28/23  0817 03/27/23  0538 03/27/23  0052 03/26/23  1648 03/26/23  0403 03/25/23  1148 03/25/23  0040   SODIUM mmol/L 138 135* 135*   < >  --   --  137  --  133*   POTASSIUM mmol/L 3.5 3.8 3.7   < >  --    < > 3.6   < > 3.4*   MAGNESIUM mg/dL  --   --   --   --  2.0  --  1.9  --  1.8   CHLORIDE mmol/L 103 103 103   < >  --   --  107  --  102   CO2 mmol/L 28.8 26.3 23.2   < >  --   --  21.6*  --  20.8*   BUN mg/dL 8 8 6*   < >  --   --  7*  --  9   CREATININE mg/dL 0.44* 0.43* 0.40*   < >  --   --  0.36*  --  0.36*   CALCIUM mg/dL 8.3* 8.5* 8.7   < >  --   --  8.0*  --  8.2*   GLUCOSE mg/dL 104* 130* 113*   < >  --   --  142*  --  125*   ALBUMIN g/dL 2.2* 2.3* 2.4*   < >  --   --  2.3*  --   --    BILIRUBIN mg/dL 0.2 0.3 0.5   < >  --   --  0.4  --   --    ALK PHOS U/L 83 89 112   < >  --   --  106  --   --    AST (SGOT) U/L 19 27 31   < >  --   --  30  --   --    ALT (SGPT) U/L 42* 50* 59*   < >  --   --  36*  --   --     < > = values in this interval not displayed.   Estimated Creatinine Clearance: 89.6 mL/min (A) (by C-G formula based on SCr of 0.44 mg/dL (L)).  No results found for: AMMONIA      Blood Culture   Date Value Ref Range Status   03/22/2023 No growth at 24 hours  Preliminary   03/22/2023 No growth at 24 hours  Preliminary     No results found for: URINECX  Wound Culture   Date Value Ref Range Status   03/22/2023 No growth  Preliminary     No results found for: STOOLCX    Imaging:  Imaging Results (Last 24 Hours)     ** No results found for the last 24 hours. **           Impression:  Stable course, status post repair of perforated  duodenal ulcer    Plan:  Diet advanced  Awaiting decision about rehab  T5/T6 mass with nerve root impingement which will require outpatient work-up  Remove SARAH  Convert Protonix to oral  Aurora Kirkland MD  23  20:30 EDT      Please note that portions of this note were completed with a voice recognition program.      Electronically signed by Aurora Kirkland MD at 23       Consult Notes (last 24 hours)  Notes from 23 1546 through 23 1546   No notes of this type exist for this encounter.            Physical Therapy Notes (most recent note)      Mina Zavala, PT at 23 1300  Version 1 of 1         Acute Care - Physical Therapy Treatment Note   Jose Daniel     Patient Name: Christine Garg  : 1933  MRN: [id number]  Today's Date: 3/31/2023   Onset of Illness/Injury or Date of Surgery: 23  Visit Dx:     ICD-10-CM ICD-9-CM   1. Acute gastric ulcer with perforation (HCC)  K25.1 531.10   2. Hyponatremia  E87.1 276.1   3. Hypokalemia  E87.6 276.8   4. Sepsis without acute organ dysfunction, due to unspecified organism (HCC)  A41.9 038.9     995.91   5. Perforated ulcer (HCC)  K27.5 533.50     Patient Active Problem List   Diagnosis   • Essential hypertension   • Dyslipidemia   • ASCVD (arteriosclerotic cardiovascular disease)   • Palpitations   • Coronary artery disease    • Abnormal PFTs (pulmonary function tests)   • Chronic obstructive pulmonary disease (HCC)   • Mild persistent asthma with allergic rhinitis   • Pulmonary nodule   • Former smoker   • Colitis   • Perforated ulcer (HCC)   • Acute gastric ulcer with perforation (HCC)     Past Medical History:   Diagnosis Date   • Advanced age    • CAD (coronary artery disease)     s/p 3v CABG   • Chronic kidney disease (CKD), stage III (moderate) (HCC)    • COPD (chronic obstructive pulmonary disease) ([facility])    • History of tobacco use    • Hyperlipidemia    • Hypertension    • Hypothyroidism    • PONV (postoperative nausea  and vomiting)    • Pulmonary nodule      Past Surgical History:   Procedure Laterality Date   • BREAST BIOPSY Left     benign   • CATARACT EXTRACTION     • CORONARY ANGIOPLASTY     • CORONARY ARTERY BYPASS GRAFT     • EXPLORATORY LAPAROTOMY N/A 3/22/2023    Procedure: LAPAROTOMY EXPLORATORY WITH OVER SEW OF DUODENAL ULCER WITH OMENTAL PATCH AND BIOPATCH AND CENTRAL LINE PLACEMENT;  Surgeon: Aurora Kirkland MD;  Location: [facility];  Service: General;  Laterality: N/A;     PT Assessment (last 12 hours)     PT Evaluation and Treatment     Row Name 03/31/23 [facility]          Physical Therapy Time and Intention    Subjective Information complains of;weakness  -CS     Document Type therapy note (daily note)  -CS     Mode of Treatment physical therapy  -CS     Patient Effort adequate  -CS     Comment Pt seen bedside this PM.  Pt agreed to PT.  -CS     Row Name 03/31/23 1351          General Information    Patient Profile Reviewed yes  -CS     Row Name 03/31/23 1351          Living Environment    Primary Care Provided by self  -CS     Row Name 03/31/23 1351          Home Use of Assistive/Adaptive Equipment    Equipment Currently Used at Home none  -CS     Row Name 03/31/23 1351          Pain    Additional Documentation --  mod c/o  -CS     Row Name 03/31/23 1351          Cognition    Orientation Status (Cognition) oriented x 3  -CS     Row Name 03/31/23 1351          Bed Mobility    Bed Mobility bed mobility (all) activities;scooting/bridging  -CS     All Activities, [place] (Bed Mobility) minimum assist (75% patient effort);moderate assist (50% patient effort)  -CS     Scooting/Bridging [place] (Bed Mobility) maximum assist (25% patient effort);2 person assist  -CS     Row Name 03/31/23 1351          Transfers    Transfers sit-stand transfer;stand-sit transfer  -CS     Comment, (Transfers) Pt does well on this date and took 6 side steps at EOB both toward FOB and HOB, required RW and min A. Pt completed sitting  exercises with minimal cueing from PT until tolerance.  -CS     Row Name 03/31/23 1351          Sit-Stand Transfer    Sit-Stand [place] (Transfers) minimum assist (75% patient effort)  -CS     Assistive Device (Sit-Stand Transfers) walker, front-wheeled  -CS     Row Name 03/31/23 1351          Stand-Sit Transfer    Stand-Sit [place] (Transfers) minimum assist (75% patient effort)  -CS     Assistive Device (Stand-Sit Transfers) walker, front-wheeled  -CS     Row Name 03/31/23 [facility]          Gait/Stairs (Locomotion)    Gait/Stairs Locomotion gait/ambulation independence  -CS     [place] Level (Gait) minimum assist (75% patient effort)  -CS     Assistive Device (Gait) walker, front-wheeled  -CS     Distance in Feet (Gait) 6 side steps in both directions at EOB  -CS     Pattern (Gait) step-to  -CS     Comment, (Gait/Stairs) Pt c/o fatgiue/pain and requested to return to bed.  -CS     Row Name 03/31/23 [facility]          Motor Skills    Therapeutic Exercise --  Sitting: AP, LAQ, [place], knees in/out  -CS     Row Name 03/31/23 [facility]          Respiratory WDL    Respiratory WDL X;breath sounds  -CS     Rhythm/Pattern, Respiratory unlabored;pattern regular;depth regular;shortness of breath  Per patient.  -CS     Expansion/Accessory Muscles/Retractions expansion symmetric;no retractions;no use of accessory muscles  -CS     Nailbeds no discoloration  -CS     Mucous Membranes pink;intact;moist  -CS     Cough Frequency no cough  -CS     Cough Type congested  -CS     Row Name 03/31/23 [facility]          Breath Sounds    Breath Sounds All Fields  -CS     All Lung Fields Breath Sounds Anterior:;diminished  -CS     LUIS Breath Sounds diminished  -CS     LLL Breath Sounds diminished  -CS     RUL Breath Sounds diminished  -CS     RML Breath Sounds diminished  -CS     RLL Breath Sounds diminished  -CS     Row Name 03/31/23 1351          Skin WDL    Skin WDL X;characteristics  -CS     Skin Color/Characteristics maroon/purple;redness  blanchable;pale  -CS     Skin Temperature warm  -CS     Skin Moisture flaky  -CS     Skin Elasticity slow return to original state  -CS     Skin Integrity bruised (ecchymotic);excoriation;incision;pressure injury  -CS     Row Name 03/31/23 1351          Wound 03/22/23 1640 abdomen Incision    Wound - Properties Group Placement Date: 03/22/23  - Placement Time: 1640  - Location: abdomen  -MC Primary Wound Type: Incision  -MC    Closure Adhesive bandage  -CS     Base dressing in place, unable to visualize  -CS     Periwound blanchable;intact;pink  -CS     Periwound Temperature warm  -CS     Periwound Skin Turgor soft  -CS     Drainage Characteristics/Odor serosanguineous  -CS     Drainage Amount scant  -CS     Retired Wound - Properties Group Placement Date: 03/22/23  - Placement Time: 1640  - Location: abdomen  -MC Primary Wound Type: Incision  -MC    Retired Wound - Properties Group Date first assessed: 03/22/23  - Time first assessed: 1640  - Location: abdomen  -MC Primary Wound Type: Incision  -MC    Row Name 03/31/23 1351          Wound 01/06/23 1300 sacral spine    Wound - Properties Group Placement Date: 01/06/23  - Placement Time: 1300  -SS Present on Hospital Admission: Y  -SS Location: sacral spine  -SS    Closure None  -CS     Base maroon/purple;non-blanchable  -CS     Periwound intact;pink  -CS     Periwound Temperature warm  -CS     Periwound Skin Turgor soft  -CS     Edges rolled/closed  -CS     Drainage Characteristics/Odor bleeding controlled  -CS     Drainage Amount none  -CS     Retired Wound - Properties Group Placement Date: 01/06/23  -SS Placement Time: 1300  -SS Present on Hospital Admission: Y  -SS Location: sacral spine  -SS    Retired Wound - Properties Group Date first assessed: 01/06/23  -SS Time first assessed: 1300  -SS Present on Hospital Admission: Y  -SS Location: sacral spine  -SS    Row Name 03/31/23 1351          Coping    Observed Emotional State  calm;cooperative;pleasant  -CS     Verbalized Emotional State acceptance  -CS     Family/Support Persons family  -CS     Involvement in Care not present at bedside  -CS     Row Name 03/31/23 1351          Plan of Care Review    Plan of Care Reviewed With patient  -CS     Progress no change  -CS     Outcome Evaluation Pt w/ fair participation limited by fatigue this day.  Pt would benefit from continued skilled PT.  -CS     Row Name 03/31/23 1351          Positioning and Restraints    Pre-Treatment Position in bed  -CS     Post Treatment Position bed  -CS     In Bed call light within reach  -CS     Row Name 03/31/23 1351          Therapy Assessment/Plan (PT)    Rehab Potential (PT) good, to achieve stated therapy goals  -CS     Criteria for Skilled Interventions Met (PT) yes;meets criteria;skilled treatment is necessary  -CS     Therapy Frequency (PT) 2 times/wk  2-5x/week  -CS     Problem List (PT) problems related to;balance;mobility;strength  -CS     Activity Limitations Related to Problem List (PT) unable to ambulate safely;unable to transfer safely  -CS     Row Name 03/31/23 1351          Progress Summary (PT)    Progress Toward Functional Goals (PT) progress toward functional goals is gradual;progress toward functional goals is fair  -CS     Daily Progress Summary (PT) Pt w/ fair participation limited by fatigue this day.  Pt would benefit from continued skilled PT.  -CS     Row Name 03/31/23 1351          Physical Therapy Goals    Bed Mobility Goal Selection (PT) bed mobility, PT goal 1  -CS     Transfer Goal Selection (PT) transfer, PT goal 1  -CS     Gait Training Goal Selection (PT) gait training, PT goal 1  -     Row Name 03/31/23 1351          Bed Mobility Goal 1 (PT)    Activity/Assistive Device (Bed Mobility Goal 1, PT) bed mobility activities, all  -CS     [place] Level/Cues Needed (Bed Mobility Goal 1, PT) standby assist  -CS     Time Frame (Bed Mobility Goal 1, PT) long term goal (LTG);by  discharge  -CS     Row Name 03/31/23 1351          Transfer Goal 1 (PT)    Activity/Assistive Device (Transfer Goal 1, PT) transfers, all  -CS     [place] Level/Cues Needed (Transfer Goal 1, PT) standby assist  -CS     Time Frame (Transfer Goal 1, PT) long term goal (LTG);by discharge  -CS     Row Name 03/31/23 1351          Gait Training Goal 1 (PT)    Activity/Assistive Device (Gait Training Goal 1, PT) gait (walking locomotion);assistive device use  -CS     [place] Level (Gait Training Goal 1, PT) standby assist  -CS     Distance (Gait Training Goal 1, PT) 50'  -CS     Time Frame (Gait Training Goal 1, PT) long term goal (LTG);by discharge  -CS           User Key  (r) = Recorded By, (t) = Taken By, (c) = Cosigned By    Initials Name Provider Type     Penelope Roe, RN Registered Nurse    Teresa Rod RN Registered Nurse    Mina Flores, PT Physical Therapist                Physical Therapy Education     Title: PT OT SLP Therapies (In Progress)     Topic: Physical Therapy (In Progress)     Point: Mobility training (In Progress)     Learning Progress Summary           Patient Acceptance, E, NR by RD at 3/29/2023 1712    Acceptance, E, NR by RD at 3/28/2023 0953    Acceptance, E, NR by SB at 3/27/2023 1828    Acceptance, E,TB, VU by CS at 3/27/2023 0921                   Point: Home exercise program (In Progress)     Learning Progress Summary           Patient Acceptance, E, NR by RD at 3/29/2023 1712    Acceptance, E, NR by RD at 3/28/2023 0953    Acceptance, E, NR by SB at 3/27/2023 1828    Acceptance, E,TB, VU by CS at 3/27/2023 0921                   Point: Body mechanics (In Progress)     Learning Progress Summary           Patient Acceptance, E, NR by RD at 3/29/2023 1712    Acceptance, E, NR by RD at 3/28/2023 0953    Acceptance, E, NR by SB at 3/27/2023 1828    Acceptance, E,TB, VU by CS at 3/27/2023 0921                   Point: Precautions (In Progress)     Learning Progress  Summary           Patient Acceptance, E, NR by RD at 3/29/2023 1712    Acceptance, E, NR by RD at 3/28/2023 0953    Acceptance, E, NR by SB at 3/27/2023 1828    Acceptance, E,TB, VU by FREDDY at 3/27/2023 0921                               User Key     Initials Effective Dates Name Provider Type Discipline    RD 06/16/21 -  China Harkins, RN Registered Nurse Nurse    RILEY 01/19/22 -  Leon Hagen, RN Registered Nurse Nurse    FREDDY 05/24/22 -  Mina Zavala, PT Physical Therapist PT              PT Recommendation and Plan  Anticipated Discharge Disposition (PT):  (TBD)  Planned Therapy Interventions (PT): balance training, bed mobility training, gait training, home exercise program, neuromuscular re-education, patient/family education, strengthening, transfer training  Therapy Frequency (PT): 2 times/wk (2-5x/week)  Progress Summary (PT)  Progress Toward Functional Goals (PT): progress toward functional goals is gradual, progress toward functional goals is fair  Daily Progress Summary (PT): Pt w/ fair participation limited by fatigue this day.  Pt would benefit from continued skilled PT.  Plan of Care Reviewed With: patient  Progress: no change  Outcome Evaluation: Pt w/ fair participation limited by fatigue this day.  Pt would benefit from continued skilled PT.       Time Calculation:    PT Charges     Row Name 03/31/23 1357             Time Calculation    Start Time 1300  -CS      PT Received On 03/31/23  -CS      PT Goal Re-Cert Due Date 04/10/23  -CS         Timed Charges    34950 - PT Therapeutic Activity Minutes 23  -CS         Total Minutes    Timed Charges Total Minutes 23  -CS       Total Minutes 23  -CS            User Key  (r) = Recorded By, (t) = Taken By, (c) = Cosigned By    Initials Name Provider Type    CS Mina Zavala, PT Physical Therapist              Therapy Charges for Today     Code Description Service Date Service Provider Modifiers Qty    26338782198 HC PT THERAPEUTIC ACT EA 15 MIN 3/31/2023  Mina Zavala, PT GP 2          PT G-Codes  AM-PAC 6 Clicks Score (PT): 13    Mina Zavala, PT  3/31/2023      Electronically signed by Mina Zavala, PT at 23 0979          Occupational Therapy Notes (most recent note)      Reta Kapoor, OT at 23 7929          Patient Name: Christine Garg  : 1933    MRN: [id number]                              Today's Date: 3/28/2023       Admit Date: 3/22/2023    Visit Dx:     ICD-10-CM ICD-9-CM   1. Acute gastric ulcer with perforation (HCC)  K25.1 531.10   2. Hyponatremia  E87.1 276.1   3. Hypokalemia  E87.6 276.8   4. Sepsis without acute organ dysfunction, due to unspecified organism (HCC)  A41.9 038.9     995.91   5. Perforated ulcer (HCC)  K27.5 533.50     Patient Active Problem List   Diagnosis   • Essential hypertension   • Dyslipidemia   • ASCVD (arteriosclerotic cardiovascular disease)   • Palpitations   • Coronary artery disease    • Abnormal PFTs (pulmonary function tests)   • Chronic obstructive pulmonary disease (HCC)   • Mild persistent asthma with allergic rhinitis   • Pulmonary nodule   • Former smoker   • Colitis   • Perforated ulcer (HCC)   • Acute gastric ulcer with perforation (HCC)     Past Medical History:   Diagnosis Date   • Advanced age    • CAD (coronary artery disease)     s/p 3v CABG   • Chronic kidney disease (CKD), stage III (moderate) (HCC)    • COPD (chronic obstructive pulmonary disease) (HCC)    • History of tobacco use    • Hyperlipidemia    • Hypertension    • Hypothyroidism    • PONV (postoperative nausea and vomiting)    • Pulmonary nodule      Past Surgical History:   Procedure Laterality Date   • BREAST BIOPSY Left     benign   • CATARACT EXTRACTION     • CORONARY ANGIOPLASTY     • CORONARY ARTERY BYPASS GRAFT     • EXPLORATORY LAPAROTOMY N/A 3/22/2023    Procedure: LAPAROTOMY EXPLORATORY WITH OVER SEW OF DUODENAL ULCER WITH OMENTAL PATCH AND BIOPATCH AND CENTRAL LINE PLACEMENT;  Surgeon: Aurora Kirkland MD;   Location: [facility];  Service: General;  Laterality: N/A;      General Information     Row Name 03/28/23 1442          OT Time and Intention    Document Type evaluation  -     Mode of Treatment individual therapy;occupational therapy  -     Row Name 03/28/23 1442          General Information    Patient Profile Reviewed yes  -     Prior Level of Function independent:;all household mobility;ADL's;cooking;home management  functional decline since hospitalization late 2022; no assistive device; shower chair; son has been residing with patient since hospitalization  -     Existing Precautions/Restrictions fall  -     Barriers to Rehab none identified  -     Row Name 03/28/23 1442          Occupational Profile    Reason for Services/Referral (Occupational Profile) Patient admitted to [facility] on [facility]/22/2023. She was referred for OT evaluation due to change in functional performance with ADLs, functional mobility, and/or transfers.  -     Row Name 03/28/23 1442          Living Environment    People in Home child(shimon), adult  -     Row Name 03/28/23 1442          Cognition    Orientation Status (Cognition) oriented x 3  -     Row Name 03/28/23 1442          Safety Issues, Functional Mobility    Impairments Affecting Function (Mobility) endurance/activity tolerance;strength;balance  -           User Key  (r) = Recorded By, (t) = Taken By, (c) = Cosigned By    Initials Name Provider Type     Reta Kapoor, AMAIRANI Occupational Therapist                 Mobility/ADL's     Row Name 03/28/23 1449          Bed Mobility    Bed Mobility bed mobility (all) activities  -     All Activities, [place] (Bed Mobility) minimum assist (75% patient effort)  -     Assistive Device (Bed Mobility) bed rails;head of bed elevated  -     Row Name 03/28/23 1449          Transfers    Transfers toilet transfer  -     Row Name 03/28/23 1449          Toilet Transfer    Type (Toilet Transfer) stand pivot/stand  step  -     [place] Level (Toilet Transfer) minimum assist (75% patient effort)  -     Assistive Device (Toilet Transfer) commode, 3-in-1  -[facility] Name 03/28/23 1449          Activities of Daily Living    BADL Assessment/Intervention bathing;upper body dressing;lower body dressing;grooming;feeding;toileting  -[facility] Name 03/28/23 1449          Bathing Assessment/Intervention    [place] Level (Bathing) bathing skills;moderate assist (50% patient effort)  -[facility] Name 03/28/23 1449          Upper Body Dressing Assessment/Training    [place] Level (Upper Body Dressing) upper body dressing skills;minimum assist (75% patient effort)  -[facility] Name 03/28/23 1449          Lower Body Dressing Assessment/Training    [place] Level (Lower Body Dressing) lower body dressing skills;moderate assist (50% patient effort)  -[facility] Name 03/28/23 1449          Grooming Assessment/Training    [place] Level (Grooming) grooming skills;minimum assist (75% patient effort)  -[facility] Name 03/28/23 1449          Self-Feeding Assessment/Training    [place] Level (Feeding) feeding skills;set up  -[facility] Name 03/28/23 1449          Toileting Assessment/Training    [place] Level (Toileting) toileting skills;minimum assist (75% patient effort);moderate assist (50% patient effort)  -           User Key  (r) = Recorded By, (t) = Taken By, (c) = Cosigned By    Initials Name Provider Type     Reta Kapoor OT Occupational Therapist               Obj/Interventions     [facility] Name 03/28/23 1450          Sensory Assessment (Somatosensory)    Sensory Assessment (Somatosensory) UE sensation intact  -[facility] Name 03/28/23 1450          Vision Assessment/Intervention    Visual Impairment/Limitations WFL  -[facility] Name 03/28/23 1450          Range of Motion Comprehensive    General Range of Motion bilateral upper extremity ROM [facility]  -[facility] Name 03/28/23 1450          Strength  Comprehensive (MMT)    Comment, General Manual Muscle Testing (MMT) Assessment 4+/5 MMT in BUEs  -     Row Name 03/28/23 1456          Motor Skills    Motor Skills coordination;functional endurance  -     Coordination WFL;bilateral;upper extremity  -     Functional Endurance fair  -KP     Row Name 03/28/23 1450          Balance    Balance Assessment sitting static balance;standing static balance  -KP     Static Sitting Balance standby assist  -     Static Standing Balance minimal assist;contact guard  -           User Key  (r) = Recorded By, (t) = Taken By, (c) = Cosigned By    Initials Name Provider Type    Reta David, OT Occupational Therapist               Goals/Plan     Row Name 03/28/23 1385          Transfer Goal 1 (OT)    Activity/Assistive Device (Transfer Goal 1, OT) toilet  -     [place] Level/Cues Needed (Transfer Goal 1, OT) standby assist  -     Time Frame (Transfer Goal 1, OT) by discharge  -[facility] Name 03/28/23 9080          Dressing Goal 1 (OT)    Activity/Device (Dressing Goal 1, OT) dressing skills, all  -KP     [place]/Cues Needed (Dressing Goal 1, OT) standby assist  -     Time Frame (Dressing Goal 1, OT) by discharge  -     Row Name 03/28/23 0365          Problem Specific Goal 1 (OT)    Problem Specific Goal 1 (OT) Patient will perform sustained activity X15 minutes to promote functional endurance/activity tolerance needed for daily routine.  -     Time Frame (Problem Specific Goal 1, OT) by discharge  -     Row Name 03/28/23 4652          Therapy Assessment/Plan (OT)    Planned Therapy Interventions (OT) activity tolerance training;BADL retraining;functional balance retraining;occupation/activity based interventions;ROM/therapeutic exercise;patient/caregiver education/training;strengthening exercise;transfer/mobility retraining  -           User Key  (r) = Recorded By, (t) = Taken By, (c) = Cosigned By    Initials Name Provider Type    CLAY Kapoor  AMAIRANI Mcduffie Occupational Therapist               Clinical Impression     Row Name 03/28/23 1451          Pain Assessment    Pretreatment Pain Rating 0/10 - no pain  -     Posttreatment Pain Rating 0/10 - no pain  -     Row Name 03/28/23 1451          Plan of Care Review    Plan of Care Reviewed With patient  -     Progress no change  -     Outcome Evaluation Patient seen for OT evaluation. She currently presents with functional limitations with generalized weakness, impaired activity tolerance/functional endurance, and impaired balance. Patient would benefit from skilled OT services to address functional deficits and promote highest level of independence and safety prior to discharge.  -     Row Name 03/28/23 1451          Therapy Assessment/Plan (OT)    Rehab Potential (OT) good, to achieve stated therapy goals  -     Criteria for Skilled Therapeutic Interventions Met (OT) yes;meets criteria;skilled treatment is necessary  -     Therapy Frequency (OT) 3 times/wk  3-5x/week as able and available to promote functional progress  -     Predicted Duration of Therapy Intervention (OT) discharge  -     Row Name 03/28/23 1451          Therapy Plan Review/Discharge Plan (OT)    Anticipated Discharge Disposition (OT) other (see comments)  to be determines; patient would be a good candidate for IPR due to independent prior level  -     Row Name 03/28/23 1451          Vital Signs    O2 Delivery Pre Treatment supplemental O2  -     O2 Delivery Intra Treatment supplemental O2  -     O2 Delivery Post Treatment supplemental O2  -     Row Name 03/28/23 1451          Positioning and Restraints    Pre-Treatment Position in bed  -     Post Treatment Position bed  -KP     In Bed call light within reach;with family/caregiver  -           User Key  (r) = Recorded By, (t) = Taken By, (c) = Cosigned By    Initials Name Provider Type    Reta David, OT Occupational Therapist               Outcome Measures     No documentation.                   OT Recommendation and Plan  Planned Therapy Interventions (OT): activity tolerance training, BADL retraining, functional balance retraining, occupation/activity based interventions, ROM/therapeutic exercise, patient/caregiver education/training, strengthening exercise, transfer/mobility retraining  Therapy Frequency (OT): 3 times/wk (3-5x/week as able and available to promote functional progress)  Plan of Care Review  Plan of Care Reviewed With: patient  Progress: no change  Outcome Evaluation: Patient seen for OT evaluation. She currently presents with functional limitations with generalized weakness, impaired activity tolerance/functional endurance, and impaired balance. Patient would benefit from skilled OT services to address functional deficits and promote highest level of independence and safety prior to discharge.     Time Calculation:    Time Calculation- OT     Row Name 03/28/23 1454             Time Calculation- OT    OT Received On 03/28/23  -            User Key  (r) = Recorded By, (t) = Taken By, (c) = Cosigned By    Initials Name Provider Type     Reta Kapoor OT Occupational Therapist              Therapy Charges for Today     Code Description Service Date Service Provider Modifiers Qty    54072579985  OT EVAL MOD COMPLEXITY 4 3/28/2023 Reta Kapoor OT GO 1               Reta Kapoor OT  3/28/2023    Electronically signed by Reta Kapoor OT at 03/28/23 3016       ADL Documentation (last day)     Date/Time Transferring Toileting Bathing Dressing Eating Communication Swallowing Equipment Currently Used at Home    03/31/23 1351 -- -- -- -- -- -- -- none    03/31/23 0725 2 - assistive person 2 - assistive person 2 - assistive person 2 - assistive person 0 - independent 0 - understands/communicates without difficulty 0 - swallows foods/liquids without difficulty --    03/30/23 2030 2 - assistive person 2 - assistive person 2 - assistive person 2 -  assistive person 0 - independent 0 - understands/communicates without difficulty 0 - swallows foods/liquids without difficulty --    03/30/23 1055 -- -- -- -- -- -- -- none    03/30/23 0830 2 - assistive person 3 - assistive equipment and person 2 - assistive person 2 - assistive person 0 - independent 0 - understands/communicates without difficulty 0 - swallows foods/liquids without difficulty --

## 2023-03-31 NOTE — THERAPY TREATMENT NOTE
Acute Care - Physical Therapy Treatment Note  [facility]     Patient Name: Christine Garg  : 1933  MRN: [id number]  Today's Date: 3/31/2023   Onset of Illness/Injury or Date of Surgery: 23  Visit Dx:     ICD-10-CM ICD-9-CM   1. Acute gastric ulcer with perforation (HCC)  K25.1 531.10   2. Hyponatremia  E87.1 276.1   3. Hypokalemia  E87.6 276.8   4. Sepsis without acute organ dysfunction, due to unspecified organism (HCC)  A41.9 038.9     995.91   5. Perforated ulcer (HCC)  K27.5 533.50     Patient Active Problem List   Diagnosis   • Essential hypertension   • Dyslipidemia   • ASCVD (arteriosclerotic cardiovascular disease)   • Palpitations   • Coronary artery disease    • Abnormal PFTs (pulmonary function tests)   • Chronic obstructive pulmonary disease (HCC)   • Mild persistent asthma with allergic rhinitis   • Pulmonary nodule   • Former smoker   • Colitis   • Perforated ulcer (HCC)   • Acute gastric ulcer with perforation (HCC)     Past Medical History:   Diagnosis Date   • Advanced age    • CAD (coronary artery disease)     s/p 3v CABG   • Chronic kidney disease (CKD), stage III (moderate) (HCC)    • COPD (chronic obstructive pulmonary disease) (HCC)    • History of tobacco use    • Hyperlipidemia    • Hypertension    • Hypothyroidism    • PONV (postoperative nausea and vomiting)    • Pulmonary nodule      Past Surgical History:   Procedure Laterality Date   • BREAST BIOPSY Left     benign   • CATARACT EXTRACTION     • CORONARY ANGIOPLASTY     • CORONARY ARTERY BYPASS GRAFT     • EXPLORATORY LAPAROTOMY N/A 3/22/2023    Procedure: LAPAROTOMY EXPLORATORY WITH OVER SEW OF DUODENAL ULCER WITH OMENTAL PATCH AND BIOPATCH AND CENTRAL LINE PLACEMENT;  Surgeon: Aurora Kirkland MD;  Location: [facility];  Service: General;  Laterality: N/A;     PT Assessment (last 12 hours)     PT Evaluation and Treatment     Row Name 23 1351          Physical Therapy Time and Intention    Subjective Information  complains of;weakness  -CS     Document Type therapy note (daily note)  -CS     Mode of Treatment physical therapy  -CS     Patient Effort adequate  -CS     Comment Pt seen bedside this PM.  Pt agreed to PT.  -CS     Row Name 03/31/23 1351          General Information    Patient Profile Reviewed yes  -CS     Row Name 03/31/23 1351          Living Environment    Primary Care Provided by self  -CS     Row Name 03/31/23 1351          Home Use of Assistive/Adaptive Equipment    Equipment Currently Used at Home none  -CS     Row Name 03/31/23 1351          Pain    Additional Documentation --  mod c/o  -CS     Row Name 03/31/23 1351          Cognition    Orientation Status (Cognition) oriented x 3  -CS     Row Name 03/31/23 1351          Bed Mobility    Bed Mobility bed mobility (all) activities;scooting/bridging  -CS     All Activities, [place]ay (Bed Mobility) minimum assist (75% patient effort);moderate assist (50% patient effort)  -CS     Scooting/Bridging Quay (Bed Mobility) maximum assist (25% patient effort);2 person assist  -     Row Name 03/31/23 [facility]          Transfers    Transfers sit-stand transfer;stand-sit transfer  -CS     Comment, (Transfers) Pt does well on this date and took 6 side steps at EOB both toward FOB and HOB, required RW and min A. Pt completed sitting exercises with minimal cueing from PT until tolerance.  -CS     Row Name 03/31/23 1351          Sit-Stand Transfer    Sit-Stand Quay (Transfers) minimum assist (75% patient effort)  -CS     Assistive Device (Sit-Stand Transfers) walker, front-wheeled  -CS     Row Name 03/31/23 1351          Stand-Sit Transfer    Stand-Sit Quay (Transfers) minimum assist (75% patient effort)  -CS     Assistive Device (Stand-Sit Transfers) walker, front-wheeled  -CS     Row Name 03/31/23 1351          Gait/Stairs (Locomotion)    Gait/Stairs Locomotion gait/ambulation independence  -CS     Quay Level (Gait) minimum assist (75%  patient effort)  -CS     Assistive Device (Gait) walker, front-wheeled  -CS     Distance in Feet (Gait) 6 side steps in both directions at EOB  -CS     Pattern (Gait) step-to  -CS     Comment, (Gait/Stairs) Pt c/o fatgiue/pain and requested to return to bed.  -CS     Row Name 03/31/23 135          Motor Skills    Therapeutic Exercise --  Sitting: AP, LAQ, March, knees in/out  -CS     Row Name 03/31/23 [facility]          Respiratory WDL    Respiratory WDL X;breath sounds  -CS     Rhythm/Pattern, Respiratory unlabored;pattern regular;depth regular;shortness of breath  Per patient.  -CS     Expansion/Accessory Muscles/Retractions expansion symmetric;no retractions;no use of accessory muscles  -CS     Nailbeds no discoloration  -CS     Mucous Membranes pink;intact;moist  -CS     Cough Frequency no cough  -CS     Cough Type congested  -CS     Row Name 03/31/23 [facility]          Breath Sounds    Breath Sounds All Fields  -CS     All Lung Fields Breath Sounds Anterior:;diminished  -CS     LUIS Breath Sounds diminished  -CS     LLL Breath Sounds diminished  -CS     RUL Breath Sounds diminished  -CS     RML Breath Sounds diminished  -CS     RLL Breath Sounds diminished  -CS     Row Name 03/31/23 [facility]          Skin WDL    Skin WDL X;characteristics  -CS     Skin Color/Characteristics maroon/purple;redness blanchable;pale  -CS     Skin Temperature warm  -CS     Skin Moisture flaky  -CS     Skin Elasticity slow return to original state  -CS     Skin Integrity bruised (ecchymotic);excoriation;incision;pressure injury  -CS     Row Name 03/31/23 1351          Wound 03/22/23 1640 abdomen Incision    Wound - Properties Group Placement Date: 03/22/23  - Placement Time: 1640  - Location: abdomen  - Primary Wound Type: Incision  -MC    Closure Adhesive bandage  -CS     Base dressing in place, unable to visualize  -CS     Periwound blanchable;intact;pink  -CS     Periwound Temperature warm  -CS     Periwound Skin Turgor soft  -CS      Drainage Characteristics/Odor serosanguineous  -CS     Drainage Amount scant  -CS     Retired Wound - Properties Group Placement Date: 03/22/23  -MC Placement Time: 1640  -MC Location: abdomen  -MC Primary Wound Type: Incision  -MC    Retired Wound - Properties Group Date first assessed: 03/22/23  - Time first assessed: 1640  -MC Location: abdomen  -MC Primary Wound Type: Incision  -MC    Row Name 03/31/23 1351          Wound 01/06/23 1300 sacral spine    Wound - Properties Group Placement Date: 01/06/23  -SS Placement Time: 1300  -SS Present on Hospital Admission: Y  -SS Location: sacral spine  -SS    Closure None  -CS     Base maroon/purple;non-blanchable  -CS     Periwound intact;pink  -CS     Periwound Temperature warm  -CS     Periwound Skin Turgor soft  -CS     Edges rolled/closed  -CS     Drainage Characteristics/Odor bleeding controlled  -CS     Drainage Amount none  -CS     Retired Wound - Properties Group Placement Date: 01/06/23  -SS Placement Time: 1300  -SS Present on Hospital Admission: Y  -SS Location: sacral spine  -SS    Retired Wound - Properties Group Date first assessed: 01/06/23  - Time first assessed: 1300  -SS Present on Hospital Admission: Y  -SS Location: sacral spine  -SS    Row Name 03/31/23 1351          Coping    Observed Emotional State calm;cooperative;pleasant  -CS     Verbalized Emotional State acceptance  -CS     Family/Support Persons family  -CS     Involvement in Care not present at bedside  -CS     Row Name 03/31/23 1351          Plan of Care Review    Plan of Care Reviewed With patient  -CS     Progress no change  -CS     Outcome Evaluation Pt w/ fair participation limited by fatigue this day.  Pt would benefit from continued skilled PT.  -CS     Row Name 03/31/23 1351          Positioning and Restraints    Pre-Treatment Position in bed  -CS     Post Treatment Position bed  -CS     In Bed call light within reach  -CS     Row Name 03/31/23 1351          Therapy  Assessment/Plan (PT)    Rehab Potential (PT) good, to achieve stated therapy goals  -     Criteria for Skilled Interventions Met (PT) yes;meets criteria;skilled treatment is necessary  -CS     Therapy Frequency (PT) 2 times/wk  2-5x/week  -CS     Problem List (PT) problems related to;balance;mobility;strength  -CS     Activity Limitations Related to Problem List (PT) unable to ambulate safely;unable to transfer safely  -     Row Name 03/31/23 2251          Progress Summary (PT)    Progress Toward Functional Goals (PT) progress toward functional goals is gradual;progress toward functional goals is fair  -     Daily Progress Summary (PT) Pt w/ fair participation limited by fatigue this day.  Pt would benefit from continued skilled PT.  -     Row Name 03/31/23 0621          Physical Therapy Goals    Bed Mobility Goal Selection (PT) bed mobility, PT goal 1  -CS     Transfer Goal Selection (PT) transfer, PT goal 1  -CS     Gait Training Goal Selection (PT) gait training, PT goal 1  -     Row Name 03/31/23 [facility]          Bed Mobility Goal 1 (PT)    Activity/Assistive Device (Bed Mobility Goal 1, PT) bed mobility activities, all  -CS     [place] Level/Cues Needed (Bed Mobility Goal 1, PT) standby assist  -CS     Time Frame (Bed Mobility Goal 1, PT) long term goal (LTG);by discharge  -     Row Name 03/31/23 9361          Transfer Goal 1 (PT)    Activity/Assistive Device (Transfer Goal 1, PT) transfers, all  -CS     [place] Level/Cues Needed (Transfer Goal 1, PT) standby assist  -CS     Time Frame (Transfer Goal 1, PT) long term goal (LTG);by discharge  -     Row Name 03/31/23 600          Gait Training Goal 1 (PT)    Activity/Assistive Device (Gait Training Goal 1, PT) gait (walking locomotion);assistive device use  -CS     [place] Level (Gait Training Goal 1, PT) standby assist  -CS     Distance (Gait Training Goal 1, PT) 50'  -CS     Time Frame (Gait Training Goal 1, PT) long term goal  (LTG);by discharge  -           User Key  (r) = Recorded By, (t) = Taken By, (c) = Cosigned By    Initials Name Provider Type     Penelope Roe, RN Registered Nurse    Teresa Rod RN Registered Nurse    Mina Flores, PT Physical Therapist                Physical Therapy Education     Title: PT OT SLP Therapies (In Progress)     Topic: Physical Therapy (In Progress)     Point: Mobility training (In Progress)     Learning Progress Summary           Patient Acceptance, E, NR by RD at 3/29/2023 1712    Acceptance, E, NR by RD at 3/28/2023 0953    Acceptance, E, NR by SB at 3/27/2023 1828    Acceptance, E,TB, VU by CS at 3/27/2023 0921                   Point: Home exercise program (In Progress)     Learning Progress Summary           Patient Acceptance, E, NR by RD at 3/29/2023 1712    Acceptance, E, NR by RD at 3/28/2023 0953    Acceptance, E, NR by SB at 3/27/2023 1828    Acceptance, E,TB, VU by CS at 3/27/2023 0921                   Point: Body mechanics (In Progress)     Learning Progress Summary           Patient Acceptance, E, NR by RD at 3/29/2023 1712    Acceptance, E, NR by RD at 3/28/2023 0953    Acceptance, E, NR by SB at 3/27/2023 1828    Acceptance, E,TB, VU by CS at 3/27/2023 0921                   Point: Precautions (In Progress)     Learning Progress Summary           Patient Acceptance, E, NR by RD at 3/29/2023 1712    Acceptance, E, NR by RD at 3/28/2023 0953    Acceptance, E, NR by SB at 3/27/2023 1828    Acceptance, E,TB, VU by CS at 3/27/2023 0921                               User Key     Initials Effective Dates Name Provider Type Discipline    RD 06/16/21 -  China Harkins, RN Registered Nurse Nurse    RILEY 01/19/22 -  Leon Hagen, RN Registered Nurse Nurse    FREDDY 05/24/22 -  Mina Zavala, PT Physical Therapist PT              PT Recommendation and Plan  Anticipated Discharge Disposition (PT):  (TBD)  Planned Therapy Interventions (PT): balance training, bed mobility  training, gait training, home exercise program, neuromuscular re-education, patient/family education, strengthening, transfer training  Therapy Frequency (PT): 2 times/wk (2-5x/week)  Progress Summary (PT)  Progress Toward Functional Goals (PT): progress toward functional goals is gradual, progress toward functional goals is fair  Daily Progress Summary (PT): Pt w/ fair participation limited by fatigue this day.  Pt would benefit from continued skilled PT.  Plan of Care Reviewed With: patient  Progress: no change  Outcome Evaluation: Pt w/ fair participation limited by fatigue this day.  Pt would benefit from continued skilled PT.       Time Calculation:    PT Charges     Row Name 03/31/23 1357             Time Calculation    Start Time 1300  -CS      PT Received On 03/31/23  -CS      PT Goal Re-Cert Due Date 04/10/23  -CS         Timed Charges    57931 - PT Therapeutic Activity Minutes 23  -CS         Total Minutes    Timed Charges Total Minutes 23  -CS       Total Minutes 23  -CS            User Key  (r) = Recorded By, (t) = Taken By, (c) = Cosigned By    Initials Name Provider Type    CS Mina Zavala, PT Physical Therapist              Therapy Charges for Today     Code Description Service Date Service Provider Modifiers Qty    29188828722  PT THERAPEUTIC ACT EA 15 MIN 3/31/2023 Mina Zavala, PT GP 2          PT G-Codes  AM-PAC 6 Clicks Score (PT): 13    Mina Zavala PT  3/31/2023

## 2023-03-31 NOTE — PLAN OF CARE
Goal Outcome Evaluation:  Plan of Care Reviewed With: patient        Progress: improving  Outcome Evaluation: Pt's resting in bed, A&O, O2 at 1L n/c in use. Pt gets up to BSC with help. Pain/ nausea controlled with PRN medication. SARAH drain and central line removed per MD order. Will con't to monitor.

## 2023-03-31 NOTE — SIGNIFICANT NOTE
03/31/23 1326   Post-Acute Peer to Peer   Level of Care Denied IRF   Insurance ID [id number]   Contact Name [employer]   Contact Phone Number [phone]   Peer to Peer Scheduled For   (Can be completed by 04/03/23 before 12:00 pm)

## 2023-03-31 NOTE — PLAN OF CARE
Goal Outcome Evaluation:  Plan of Care Reviewed With: patient           Outcome Evaluation: Pt is resting in bed with eyes closed. Chest is rising and falling evenly. No s/s of acute distress noted. Pt had complaints of back pain. Gave PRN pain meds. See mar. Wound care completed per orders. Pt had a 18 beat run of PSVT and HR got up to 140s. Made ANA Mckeon aware. See orders. Replaced mag this shift. No complaints verbalized at this time.

## 2023-03-31 NOTE — SIGNIFICANT NOTE
03/31/23 1350   Post-Acute Peer to Peer   Level of Care Denied IRF   Peer to Peer Review Status Completed  (p2p completed by Dr. Taevras, and denial was upheld.)

## 2023-03-31 NOTE — CASE MANAGEMENT/SOCIAL WORK
Discharge Planning Assessment  [facility]     Patient Name: Christine Garg  MRN: [id number]  Today's Date: 3/31/2023    Admit Date: 3/22/2023    Plan: [facility] per Odette will review pt am Monday.  SS will follow.     Discharge Plan     Row Name 03/31/23 7032       Plan    Plan [facility] per Odette will review pt am Monday.  SS will follow.    Row Name 03/31/23 7645       Plan    Plan SS noted pt's insurance has denied pt admit to inpt rehab.  SS discussed discharge options with pt and sons per Kaleb with pt's permission.  Pt and sons requests short term nursing home placement for rehab rather than home with home health at this time.  Pt and family request nursing home [facility] in [facility].  Pt stated preference for [facility].  SS will send pt's referral to [facility] for review.    Row Name 03/31/23 4795       Plan    Plan Pt lives at home with son, Ziyad.  Pt currently does not utilize home health services or DME.  Pt stated no POA or Living Will.  Pt interested in [facility].  Inpt rehab per Dottie submitted pt's information to insurance for possible approval and awaiting acceptance or denial.  SS will follow.              Continued Care and Services - Admitted Since 3/22/2023     Destination     Service Provider Request Status Selected Services Address Phone Fax Patient Preferred    [facility] Pending - Request Sent N/A [address] [facility] [address] [address] [phone] --                [facility] Martins

## 2023-03-31 NOTE — PROGRESS NOTES
"    [facility] HOSPITALIST PROGRESS NOTE     Patient Identification:  Name:  Christine Garg  Age:  89 y.o.  Sex:  female  :  1933  MRN:  [id number]  Visit Number:  [id number]  ROOM: [address] [place] [address]     Primary Care Provider:  Dave Tobar MD    Length of stay in inpatient status:  9    Subjective     Chief Compliant:    Chief Complaint   Patient presents with   • Abdominal Pain     History of Presenting Illness:    Patient remains ill but stable today, no acute events overnight, no new complaints, denies any fevers or chills, blood pressure remains elevated, titrating blood pressure further, back pain is improved, CT's only showing known mass, patient reports her PCP has been working up and was supposed to have an outpatient follow up with \"someone\" to discuss biopsy though she has been admitted to the hospital and will likely miss appointment, will need to confirm with family who appointment is with and will need to be rescheduled upon discharge, General Surgery following as primary, we are working toward inpatient rehab placement.  Today given recent admission and possible underlying malignancy we discussed Advanced Care Planning topics, patient was amenable to conversation and interested in designating her children as her healthcare surrogates and possibly filling out a living will, consulted Palliative Care and discussed case with them.   Objective     Current Hospital Meds:  carvedilol, 25 mg, Oral, BID With Meals  castor oil-balsam peru, 1 application, Topical, Q12H  heparin (porcine), 5,000 Units, Subcutaneous, Q12H  hydrALAZINE, 75 mg, Oral, Q8H  levothyroxine, 112 mcg, Oral, Q AM  lidocaine, 1 patch, Transdermal, Q24H  magnesium sulfate, 4 g, Intravenous, Once  multivitamin, 10 mL, Oral, Daily  pantoprazole, 40 mg, Oral, BID AC  polyethylene glycol, 17 g, Oral, Daily  valsartan, 320 mg, Oral, " Q24H  ----------------------------------------------------------------------------------------------------------------------  Vital Signs:  Temp:  [97.6 °F (36.4 °C)-98.1 °F (36.7 °C)] 97.8 °F (36.6 °C)  Heart Rate:  [67-80] 78  Resp:  [18] 18  BP: (146-185)/() 185/75  SpO2:  [97 %-99 %] 97 %  on  Flow (L/min):  [1.5] 1.5;   Device (Oxygen Therapy): nasal cannula  Body mass index is 24.31 kg/m².      Intake/Output Summary (Last 24 hours) at 3/31/2023 1051  Last data filed at 3/30/2023 2357  Gross per 24 hour   Intake --   Output 100 ml   Net -100 ml      ----------------------------------------------------------------------------------------------------------------------  Physical exam:  Constitutional:  Elderly, No acute distress. Mild discomfort at times   HENT:  Head:  Normocephalic and atraumatic.  Mouth:  Moist mucous membranes.    Eyes:  Conjunctivae and EOM are normal. No scleral icterus.    Neck:  Neck supple.  No JVD present.    Cardiovascular:  Normal rate, regular rhythm and normal heart sounds with no murmur.  Pulmonary/Chest:  No respiratory distress, no wheezes, no crackles, on minimal NC  Abdominal:  Soft. No distension and mild tenderness to palpation   Musculoskeletal:  No tenderness, and no deformity.  No red or swollen joints anywhere.    Neurological:  Alert and oriented to person, place, and time.  No gross focal deficits     Skin:  Skin is warm and dry. No rash noted. No pallor.   Peripheral vascular:  No clubbing, no cyanosis, no edema  Psychiatric: Appropriate mood and affect    Edited by: Rodriguez Robin MD at 3/31/2023 1036  ----------------------------------------------------------------------------------------------------------------------  BUN/CREAT/GLUC/ALT/AST/OSIRIS/LIP    9/0.44/108/--/--/--/-- (03/31 0012)  LYTES - Na/K/Cl/CO2: 138/4.7, 4.8/103/25.6 (03/31 0012)     No results found for: URINECX  No results found for: BLOODCX    I have personally looked at the labs and they are  summarized above.  ----------------------------------------------------------------------------------------------------------------------  Detailed radiology reports for the last 24 hours:  CT Cervical Spine Without Contrast    Result Date: 3/29/2023   1. Arthritic change  2. No acute cervical abnormality.  3. Grade 1 anterolisthesis of C7 on T1  4. Parenchymal nodules in the lung apices bilaterally  This report was finalized on 3/29/2023 3:29 PM by Dr. Danny Nguyen MD.      CT Thoracic Spine Without Contrast    Result Date: 3/29/2023  1.  Again noted is abnormal low attenuation mass on the left at the T5-T6 level almost certainly compromising the exiting nerve root. There is some erosion of the adjacent vertebral body. 2.  Interval development of small bibasilar pleural effusions and bibasilar consolidation. 3.  No acute compression abnormality.  This report was finalized on 3/29/2023 4:56 PM by Dr. Danny Nguyen MD.      Assessment & Plan    #Severe sepsis that was present on admission (temperature 36 degrees C, lactic acid 3.5, CRP 3.44, WBC 30,990, heart rates ) due to a suspected perforated antral gastric ulcer, though also appears do to concurrent Pneumonia, Bacterial, already completed sufficient course of antibiotics.   #History of left upper lobe pleural based pulmonary nodule that appears to be invading the T6 vertebra  #Acute hypokalemia and acute hypomagnesemia and acute hypophosphatemia, improved with supplementation  #History of coronary artery disease status post 3v CABG in 2011  #History of chronic kidney disease stage IIIa with baseline Cr 0.6-0.85   #History of essential hypertension  #History of hypothyroidism, currently controlled  #History of hyperlipidemia  #Advanced age  #Former Smoker  #Debility   - General Surgery following as primary, status post surgical intervention  - Status post Zosyn per primary, CT's recently noting Pneumonia but clinically doesn't appear to be active  infection, has already completed 7 day course of Zosyn, trend CBC   - IV PPI for now, remainder of medications transitioned to oral   - Continue home beta blocker, home ARB, new Hydralazine and titrate dose again today, titrate as indicated  - Continue as needed antihypertensives for systolic blood pressure > 180mmHg  - Volume overload resolving with holding mIVFs  - Regarding spinal lesion patient reports her PCP is working up as outpatient, is known, was supposed to have outpatient appointment but is now admitted to the hospital, will need to reschedule upon discharge, would need to confirm with family who appointment is with as patient is unsure  - Continue Tylenol as needed for fevers, monitor for clinical improvement.     #Advanced Care Planning  - Due to patient's advanced age and acute illness requiring admission to the hospital, along with spinal lesion concerning for malignancy, I have approached conversations regarding ACP.  Pertinent diagnoses to this discussion include patient's perforated ulcer and concerning spinal lesion which could be malignancy. At the time of our discussion only the patient was present. I have consented her regarding her willingness to discuss ACP services and she has agreed. Our discussion included her possible malignancy given the lesion on her back, we discussed her coming in acutely ill as well, she stated she has 3 children who usually can agree on things but we also discussed that end of life care can be different based on different family members relation ship with her and that it's easier to just ask her now while she can make decisions to take some of that burden of them if they were need to make decisions, we did discuss some examples and she was sure she wouldn't want a feeding tube for example.  We also discussed the various ACP documents that could be potentially addressed and filled out at this time including Living Will, Instruction Directives, designation of a  Healthcare Proxy, designation of a Healthcare Power of . At this time patient is considering proceeding to fill out these documents, she did mention she would like to designate all three of her children as her healthcare surrogates;  Will consult Palliative Care. I have spent 26 minutes of time (9:25-9:51AM) in face-to-face conversation on the above described ACP services      F: Oral  E: Monitor & Replace as needed   N: Diet: Regular/House Diet; No Carbonated Beverages, No Straw; Texture: Soft to Chew (NDD 3); Soft to Chew: Whole Meat; Fluid Consistency: Thin (IDDSI 0)   Ppx: SQH  Code Status (Patient has no pulse and is not breathing): CPR (Attempt to Resuscitate)  Medical Interventions (Patient has pulse or is breathing): Full Support     Dispo: Pending clinical improvement, PT/OT consulted and following, Social Work to assist with placement, looking in to inpatient rehab      *This patient is considered high risk due to severe sepsis, bowel perforation, surgical intervention, volume overload, debility     Edited by: Rodriguez Robin MD at 3/31/2023 1051  [facility]

## 2023-03-31 NOTE — PROGRESS NOTES
LOS: 8 days   Patient Care Team:  Dave Tobar MD as PCP - General  Dave Tobar MD as PCP - Family Medicine    Post op day # 8, status post laparotomy with closure of perforated duodenal ulcer with omental patch and Biopatch    Subjective     Interval History:  Patient has done well.  Patient did much better with physical therapy today.  Bowels are working.  Yesterday she was converted from IV morphine to oral pain meds which she is using for her back pain.  CT of the C-spine and T-spine yesterday demonstrated a mass associated with T5/T6, likely impinging on the nerve root.  This had been noted before and according to the patient and family the patient has been scheduled for a PET scan as an outpatient next week  Objective     Vital Signs  Temp:  [97.2 °F (36.2 °C)-98.2 °F (36.8 °C)] 98.1 °F (36.7 °C)  Heart Rate:  [67-85] 67  Resp:  [18] 18  BP: (146-192)/(52-96) 160/70    Physical Exam:  This is a well-developed well-nourished elderly female in no acute distress  HEENT examination: Sclera are anicteric  Lungs: Clear  Abdomen: Bowel sounds are present  Skin/incisions: Surgical  Incision intact and dry.  SARAH with serous fluid.       Results Review:    Results from last 7 days   Lab Units 03/30/23  1410 03/30/23  1132   HSTROP T ng/L 12* 17*     Results from last 7 days   Lab Units 03/30/23  0139 03/29/23  0143 03/28/23  0817   WBC 10*3/mm3 8.93 11.69* 13.43*   HEMOGLOBIN g/dL 9.1* 9.3* 11.0*   HEMATOCRIT % 27.5* 28.4* 34.7   PLATELETS 10*3/mm3 238 255 274         Results from last 7 days   Lab Units 03/30/23  0139 03/29/23  0143 03/28/23  0817 03/27/23  0538 03/27/23  0052 03/26/23  1648 03/26/23  0403 03/25/23  1148 03/25/23  0040   SODIUM mmol/L 138 135* 135*   < >  --   --  137  --  133*   POTASSIUM mmol/L 3.5 3.8 3.7   < >  --    < > 3.6   < > 3.4*   MAGNESIUM mg/dL  --   --   --   --  2.0  --  1.9  --  1.8   CHLORIDE mmol/L 103 103 103   < >  --   --  107  --  102   CO2 mmol/L 28.8 26.3 23.2   <  >  --   --  21.6*  --  20.8*   BUN mg/dL 8 8 6*   < >  --   --  7*  --  9   CREATININE mg/dL 0.44* 0.43* 0.40*   < >  --   --  0.36*  --  0.36*   CALCIUM mg/dL 8.3* 8.5* 8.7   < >  --   --  8.0*  --  8.2*   GLUCOSE mg/dL 104* 130* 113*   < >  --   --  142*  --  125*   ALBUMIN g/dL 2.2* 2.3* 2.4*   < >  --   --  2.3*  --   --    BILIRUBIN mg/dL 0.2 0.3 0.5   < >  --   --  0.4  --   --    ALK PHOS U/L 83 89 112   < >  --   --  106  --   --    AST (SGOT) U/L 19 27 31   < >  --   --  30  --   --    ALT (SGPT) U/L 42* 50* 59*   < >  --   --  36*  --   --     < > = values in this interval not displayed.   Estimated Creatinine Clearance: 89.6 mL/min (A) (by C-G formula based on SCr of 0.44 mg/dL (L)).  No results found for: AMMONIA      Blood Culture   Date Value Ref Range Status   03/22/2023 No growth at 24 hours  Preliminary   03/22/2023 No growth at 24 hours  Preliminary     No results found for: URINECX  Wound Culture   Date Value Ref Range Status   03/22/2023 No growth  Preliminary     No results found for: STOOLCX    Imaging:  Imaging Results (Last 24 Hours)     ** No results found for the last 24 hours. **           Impression:  Stable course, status post repair of perforated duodenal ulcer    Plan:  Diet advanced  Awaiting decision about rehab  T5/T6 mass with nerve root impingement which will require outpatient work-up  Remove SARAH  Convert Protonix to oral  Aurora Kirkland MD  03/30/23  20:30 EDT      Please note that portions of this note were completed with a voice recognition program.

## 2023-03-31 NOTE — CASE MANAGEMENT/SOCIAL WORK
Discharge Planning Assessment  [facility]     Patient Name: Christine Garg  MRN: [id number]  Today's Date: 3/31/2023    Admit Date: 3/22/2023    Plan: SS noted pt's insurance has denied pt admit to inpt rehab.  SS discussed discharge options with pt and sons per Kaleb with pt's permission.  Pt and sons requests short term nursing home placement for rehab rather than home with home health at this time.  Pt and family request nursing home placement in [facility].  Pt stated preference for [facility].  SS will send pt's referral to [facility] for review.     Discharge Plan     Row Name 03/31/23 1542       Plan    Plan SS noted pt's insurance has denied pt admit to inpt rehab.  SS discussed discharge options with pt and sons per Kaleb with pt's permission.  Pt and sons requests short term nursing home placement for rehab rather than home with home health at this time.  Pt and family request nursing home placement in [facility].  Pt stated preference for [facility].  SS will send pt's referral to [facility] for review.    Row Name 03/31/23 1254       Plan    Plan Pt lives at home with son, Ziyad.  Pt currently does not utilize home health services or DME.  Pt stated no POA or Living Will.  Pt interested in [facility].  Inpt rehab per Dottie submitted pt's information to insurance for possible approval and awaiting acceptance or denial.  SS will follow.              ZA Martins

## 2023-03-31 NOTE — PROGRESS NOTES
LOS: 9 days   Patient Care Team:  Dave Tobar MD as PCP - General  Dave Tobar MD as PCP - Family Medicine    Post op day # 9, status post laparotomy with closure of perforated duodenal ulcer with omental patch and Biopatch    Subjective     Interval History:  Patient has done well.  No acute changes overnight.  bowels are working.  Patient is now on oral oral medications.  Inpatient rehab was denied.  Referral will now be made for nursing home placement.   Objective     Vital Signs  Temp:  [97.5 °F (36.4 °C)-98.1 °F (36.7 °C)] 97.5 °F (36.4 °C)  Heart Rate:  [67-80] 70  Resp:  [18] 18  BP: (150-185)/() 173/81    Physical Exam:  This is a well-developed well-nourished elderly female in no acute distress  HEENT examination: Sclera are anicteric  Lungs: Clear  Abdomen: Bowel sounds are present  Skin/incisions: Surgical  Incision intact and dry.  SARAH with serous fluid.       Results Review:    Results from last 7 days   Lab Units 03/30/23  1410 03/30/23  1132   HSTROP T ng/L 12* 17*     Results from last 7 days   Lab Units 03/30/23  0139 03/29/23  0143 03/28/23  0817   WBC 10*3/mm3 8.93 11.69* 13.43*   HEMOGLOBIN g/dL 9.1* 9.3* 11.0*   HEMATOCRIT % 27.5* 28.4* 34.7   PLATELETS 10*3/mm3 238 255 274         Results from last 7 days   Lab Units 03/31/23  0012 03/30/23  0139 03/29/23  0143 03/28/23  0817 03/27/23  0538 03/27/23  0052 03/26/23  1648 03/26/23  0403   SODIUM mmol/L 138 138 135* 135*   < >  --   --  137   POTASSIUM mmol/L 4.8  4.7 3.5 3.8 3.7   < >  --    < > 3.6   MAGNESIUM mg/dL 1.7  --   --   --   --  2.0  --  1.9   CHLORIDE mmol/L 103 103 103 103   < >  --   --  107   CO2 mmol/L 25.6 28.8 26.3 23.2   < >  --   --  21.6*   BUN mg/dL 9 8 8 6*   < >  --   --  7*   CREATININE mg/dL 0.44* 0.44* 0.43* 0.40*   < >  --   --  0.36*   CALCIUM mg/dL 8.9 8.3* 8.5* 8.7   < >  --   --  8.0*   GLUCOSE mg/dL 108* 104* 130* 113*   < >  --   --  142*   ALBUMIN g/dL  --  2.2* 2.3* 2.4*   < >  --   --   2.3*   BILIRUBIN mg/dL  --  0.2 0.3 0.5   < >  --   --  0.4   ALK PHOS U/L  --  83 89 112   < >  --   --  106   AST (SGOT) U/L  --  19 27 31   < >  --   --  30   ALT (SGPT) U/L  --  42* 50* 59*   < >  --   --  36*    < > = values in this interval not displayed.   Estimated Creatinine Clearance: 90.7 mL/min (A) (by C-G formula based on SCr of 0.44 mg/dL (L)).  No results found for: AMMONIA      Blood Culture   Date Value Ref Range Status   03/22/2023 No growth at 24 hours  Preliminary   03/22/2023 No growth at 24 hours  Preliminary     No results found for: URINECX  Wound Culture   Date Value Ref Range Status   03/22/2023 No growth  Preliminary     No results found for: STOOLCX    Imaging:  Imaging Results (Last 24 Hours)     ** No results found for the last 24 hours. **           Impression:  Stable course, status post repair of perforated duodenal ulcer    Plan:  Diet advanced  Remove SARAH and central line   Labs have been stable.  We will discontinue daily lab draw  T5/T6 mass with nerve root impingement which will require outpatient work-up  Patient is stable for discharge when placement can be determined  Aurora Kirkland MD  03/31/23  14:47 EDT      Please note that portions of this note were completed with a voice recognition program.

## 2023-03-31 NOTE — CONSULTS
Palliative Care Initial Consult     Attending Physician: Aurora Kirkland MD  Referring Provider: Rodriguez Robin MD    Palliative care reason for consult: GOC/ACP, healthcare surrogacy  Code Status:   Code Status and Medical Interventions:   Ordered at: 03/31/23 1352     Medical Intervention Limits:    NO intubation (DNI)     Level Of Support Discussed With:    Patient     Code Status (Patient has no pulse and is not breathing):    No CPR (Do Not Attempt to Resuscitate)     Medical Interventions (Patient has pulse or is breathing):    Limited Support     Release to patient:    Routine Release      Advanced Directives: Advance Directive Status: Patient does not have advance directive   Healthcare surrogate: Kaleb/Son EVERARDO, and alternate HCS son Ziyad Garg, and second surrogate Filiberto Garg  Goals of Care: After discussion with Christine she stated that she would not wanted to be resuscitated or to be pur on a ventilator, she also completed a living will naming her sons surrogate her surrogates in the event she can not speak for herself.    HPI:  Christine Garg is a 89 y.o. female admitted on 3/22/2023 with severe abdominal pain. Son reported she had been having pain for 4-5 days.She had been nauseated and had increased mucous. Pt had a decrease in appetite due to her pain and nausea. Pt was taken for exploratory laparotomy on 3/22/2023 and was found to have a perforated duodenal ulcer and given a omental patch. Pt did well with procedure per Dr Kirkland's note and has done well since procedure. Pts had CT of thoracic spine on 3/29 that showed T5-T6 low attenuation mass development of small bibasilar pleural effusions and consolidation, also on cervical spine CT showed Parenchymal nodules in lung apices and Grade 1 anterolisthesis of C7 on T1. Per Christine conversation with Dr Robin this is a known mass and her primary care had been working on outpatient follow up to discuss Biopsy. Dr Robin began advanced care planning  conversations in light of suspect underlying malignancy.        ROS: Negative except as above in HPI.     Past Medical History:   Diagnosis Date   • Advanced age    • CAD (coronary artery disease) 2011    s/p 3v CABG   • Chronic kidney disease (CKD), stage III (moderate) (HCC)    • COPD (chronic obstructive pulmonary disease) (HCC)    • History of tobacco use    • Hyperlipidemia    • Hypertension    • Hypothyroidism    • PONV (postoperative nausea and vomiting)    • Pulmonary nodule      Past Surgical History:   Procedure Laterality Date   • BREAST BIOPSY Left     benign   • CATARACT EXTRACTION     • CORONARY ANGIOPLASTY     • CORONARY ARTERY BYPASS GRAFT     • EXPLORATORY LAPAROTOMY N/A 3/22/2023    Procedure: LAPAROTOMY EXPLORATORY WITH OVER SEW OF DUODENAL ULCER WITH OMENTAL PATCH AND BIOPATCH AND CENTRAL LINE PLACEMENT;  Surgeon: Aurora Kirkland MD;  Location: [facility];  Service: General;  Laterality: N/A;     Social History     Socioeconomic History   • Marital status:    Tobacco Use   • Smoking status: Former     Types: Cigarettes   • Smokeless tobacco: Never   Vaping Use   • Vaping Use: Never used   Substance and Sexual Activity   • Alcohol use: No   • Drug use: No   • Sexual activity: Defer     Family History   Problem Relation Age of Onset   • Heart disease Mother    • Heart disease Father    • Heart disease Brother    • Breast cancer Neg Hx        Allergies   Allergen Reactions   • Motrin [Ibuprofen] Anaphylaxis and Hives   • Novocain [Procaine] Other (See Comments)     Pt state she passes out.       Current Facility-Administered Medications   Medication Dose Route Frequency Provider Last Rate Last Admin   • carvedilol (COREG) tablet 25 mg  25 mg Oral BID With Meals Rodriguez Robin MD   25 mg at 03/31/23 0703   • castor oil-balsam peru (VENELEX) ointment 1 application  1 application Topical Q12H Aurora Kirkland MD   1 application at 03/31/23 0831   • cloNIDine (CATAPRES) tablet 0.1 mg  0.1 mg  Oral TID PRN Aurora Kirkland MD   0.1 mg at 03/27/23 1156   • heparin (porcine) 5000 UNIT/ML injection 5,000 Units  5,000 Units Subcutaneous Q12H Aurora Kirkland MD   5,000 Units at 03/31/23 0831   • hydrALAZINE (APRESOLINE) injection 10 mg  10 mg Intravenous Q6H PRN Rodriguez Robin MD   10 mg at 03/31/23 1538   • hydrALAZINE (APRESOLINE) tablet 75 mg  75 mg Oral Q8H Rodriguez Robin MD   75 mg at 03/31/23 1337   • HYDROcodone-acetaminophen (NORCO) 7.5-325 MG per tablet 1 tablet  1 tablet Oral Q4H PRN Aurora Kirkland MD   1 tablet at 03/31/23 0459   • labetalol (NORMODYNE,TRANDATE) injection 10 mg  10 mg Intravenous Q6H PRN Kiara Servin MD   10 mg at 03/27/23 1634   • levothyroxine (SYNTHROID, LEVOTHROID) tablet 112 mcg  112 mcg Oral Q AM Aurora Kirkland MD   112 mcg at 03/31/23 0500   • lidocaine (LIDODERM) 5 % 1 patch  1 patch Transdermal Q24H Kiara Servin MD   1 patch at 03/31/23 0831   • Magnesium Sulfate - Total Dose 4 grams - Magnesium 1.6 - 1.9  4 g Intravenous Once Aurora Kirkland MD       • multivitamin (MULTI-DELYN) liquid 10 mL  10 mL Oral Daily Rodriguez Robin MD   10 mL at 03/31/23 0830   • ondansetron (ZOFRAN) injection 4 mg  4 mg Intravenous Q4H PRN Aurora Kirkland MD   4 mg at 03/31/23 1137   • pantoprazole (PROTONIX) EC tablet 40 mg  40 mg Oral BID AC Aurora Kirkland MD   40 mg at 03/31/23 0701   • polyethylene glycol (MIRALAX) packet 17 g  17 g Oral Daily Aurora Kirkland MD   17 g at 03/30/23 0830   • potassium chloride (K-DUR,KLOR-CON) ER tablet 40 mEq  40 mEq Oral PRN Aurora Kirkland MD        Or   • potassium chloride 10 mEq in 100 mL IVPB  10 mEq Intravenous Q1H PRN Aurora Kirkland MD       • potassium chloride (KLOR-CON) packet 40 mEq  40 mEq Oral PRN Aurora Kirkland MD       • potassium phosphate 45 mmol in sodium chloride 0.9 % 500 mL infusion  45 mmol Intravenous PRN Winnie Tillman, DO        Or   • potassium phosphate 30 mmol in sodium  chloride 0.9 % 250 mL infusion  30 mmol Intravenous PRN Winnie Tillman, DO        Or   • potassium phosphate 15 mmol in sodium chloride 0.9 % 100 mL infusion  15 mmol Intravenous PRN Winnie Tillman, DO        Or   • sodium phosphates 45 mmol in sodium chloride 0.9 % 500 mL IVPB  45 mmol Intravenous PRN iWnnie Tillman, DO        Or   • sodium phosphates 30 mmol in sodium chloride 0.9 % 250 mL IVPB  30 mmol Intravenous PRN Winnie Tillman, DO        Or   • sodium phosphates 15 mmol in sodium chloride 0.9 % 250 mL IVPB  15 mmol Intravenous PRN LayneWinnie, DO       • sodium chloride 0.9 % flush 10 mL  10 mL Intravenous PRN Aurora Kirkland MD       • sodium chloride 0.9 % flush 10 mL  10 mL Intravenous PRN Aurora Kirkland MD       • sodium chloride 0.9 % flush 10 mL  10 mL Intravenous Q12H Eb Hong, DO   10 mL at 03/30/23 2035   • sodium chloride 0.9 % flush 10 mL  10 mL Intravenous PRN Eb Hong, DO   10 mL at 03/31/23 0832   • sodium chloride 0.9 % flush 10 mL  10 mL Intravenous Q12H Eb Hong, DO   10 mL at 03/31/23 0849   • sodium chloride 0.9 % flush 10 mL  10 mL Intravenous Q12H Eb Hong, DO   10 mL at 03/31/23 0831   • sodium chloride 0.9 % flush 10 mL  10 mL Intravenous PRN Eb Hong DO       • sodium chloride 0.9 % flush 3 mL  3 mL Intravenous Q12H Aurora Kirkland MD   3 mL at 03/31/23 0849   • sodium chloride 0.9 % infusion 40 mL  40 mL Intravenous PRN Aurora Kirkland MD       • sodium chloride 0.9 % infusion 40 mL  40 mL Intravenous PRN Eb Hong DO       • sodium chloride 0.9 % infusion 40 mL  40 mL Intravenous PRN Eb Hong DO       • valsartan (DIOVAN) tablet 320 mg  320 mg Oral Q24H Rodriguez Robin MD   320 mg at 03/31/23 0830        •  cloNIDine  •  hydrALAZINE  •  HYDROcodone-acetaminophen  •  labetalol  •  ondansetron  •  potassium chloride **OR**  "[DISCONTINUED] potassium chloride **OR** potassium chloride  •  potassium chloride  •  potassium phosphate infusion greater than 15 mMoles **OR** potassium phosphate infusion greater than 15 mMoles **OR** potassium phosphate **OR** sodium phosphate IVPB **OR** sodium phosphate IVPB **OR** sodium phosphate IVPB  •  [COMPLETED] Insert Peripheral IV **AND** sodium chloride  •  sodium chloride  •  sodium chloride  •  sodium chloride  •  sodium chloride  •  sodium chloride  •  sodium chloride    Current medication reviewed for route, type, dose and frequency and are current per MAR.    Palliative Performance Scale Score:     BP (!) 181/85   Pulse 70   Temp 97.5 °F (36.4 °C) (Oral)   Resp 18   Ht 165.1 cm (65\")   Wt 66.3 kg (146 lb 1.6 oz)   SpO2 98%   BMI 24.31 kg/m²     Intake/Output Summary (Last 24 hours) at 3/31/2023 1549  Last data filed at 3/31/2023 0830  Gross per 24 hour   Intake 240 ml   Output 100 ml   Net 140 ml       PE:  General Appearance:    Chronically ill appearing, alert, cooperative, mild pain   HEENT:    NC/AT, without obvious abnormality, EOMI, anicteric    Neck:   supple, trachea midline, no JVD   Lungs:     Unlabored respirations, no wheezes rhonchi or rales notes    Heart:    RRR, normal S1 and S2, no M/R/G   Abdomen:     Soft, mild tenderness, ND, NABS    Extremities:   Moves all extremities, no edema   Pulses:   Pulses palpable and equal bilaterally   Skin:   Warm, dry   Neurologic:   A/Ox3, cooperative   Psych:   Calm, appropriate       Labs:   Results from last 7 days   Lab Units 03/30/23  0139   WBC 10*3/mm3 8.93   HEMOGLOBIN g/dL 9.1*   HEMATOCRIT % 27.5*   PLATELETS 10*3/mm3 238     Results from last 7 days   Lab Units 03/31/23  0012   SODIUM mmol/L 138   POTASSIUM mmol/L 4.8  4.7   CHLORIDE mmol/L 103   CO2 mmol/L 25.6   BUN mg/dL 9   CREATININE mg/dL 0.44*   GLUCOSE mg/dL 108*   CALCIUM mg/dL 8.9     Results from last 7 days   Lab Units 03/31/23  0012 03/30/23  0139   SODIUM " mmol/L 138 138   POTASSIUM mmol/L 4.8  4.7 3.5   CHLORIDE mmol/L 103 103   CO2 mmol/L 25.6 28.8   BUN mg/dL 9 8   CREATININE mg/dL 0.44* 0.44*   CALCIUM mg/dL 8.9 8.3*   BILIRUBIN mg/dL  --  0.2   ALK PHOS U/L  --  83   ALT (SGPT) U/L  --  42*   AST (SGOT) U/L  --  19   GLUCOSE mg/dL 108* 104*     Imaging Results (Last 72 Hours)     Procedure Component Value Units Date/Time    CT Thoracic Spine Without Contrast [[id number]] Collected: 03/29/23 1654     Updated: 03/29/23 1658    Narrative:      EXAM:    CT Thoracic Spine Without Intravenous Contrast     EXAM DATE:    3/29/2023 2:38 PM     CLINICAL HISTORY:    Worsening neck and back pain; K25.1-Acute gastric ulcer with  perforation; E87.0-Ifar-uiwxrtydhi and hyponatremia; E87.6-Hypokalemia;  A41.9-Sepsis, unspecified organism; K27.5-Chronic or unspecified peptic  ulcer, site unspecified, with perforation     TECHNIQUE:    Axial computed tomography images of the thoracic spine without  intravenous contrast.  Sagittal and coronal reformatted images were  created and reviewed.  This CT exam was performed using one or more of  the following dose reduction techniques:  automated exposure control,  adjustment of the mA and/or kV according to patient size, and/or use of  iterative reconstruction technique.     COMPARISON:    03/10/2023     FINDINGS:    VERTEBRAE:  See below.      DISCS/SPINAL CANAL/NEURAL FORAMINA:  Again noted is abnormal low  attenuation mass on the left at the T5-T6 level almost certainly  compromising the exiting nerve root. There is some erosion of the  adjacent vertebral body.  No spinal canal stenosis.    SOFT TISSUES:  Unremarkable.    LUNGS AND PLEURAL SPACES:  Interval development of small bibasilar  pleural effusions and bibasilar consolidation.    OTHER FINDINGS:  No acute compression abnormality.       Impression:      1.  Again noted is abnormal low attenuation mass on the left at the  T5-T6 level almost certainly compromising the exiting  nerve root. There  is some erosion of the adjacent vertebral body.  2.  Interval development of small bibasilar pleural effusions and  bibasilar consolidation.  3.  No acute compression abnormality.     This report was finalized on 3/29/2023 4:56 PM by Dr. Danny Nguyen MD.       CT Cervical Spine Without Contrast [[id number]] Collected: 03/29/23 1528     Updated: 03/29/23 1531    Narrative:      CT CERVICAL SPINE WO CONTRAST-     CLINICAL INDICATION: Worsening neck and back pain; K25.1-Acute gastric  ulcer with perforation; E87.2-Xvqz-vwvsanerhi and hyponatremia;  E87.6-Hypokalemia; A41.9-Sepsis, unspecified organism; K27.5-Chronic or  unspecified peptic ulcer, site unspecified, with perforation        COMPARISON: None available      TECHNIQUE: Axial images of the cervical spine were acquired with out any  intravenous contrast. Reformatted images were then created in the  sagittal and coronal planes.     DOSE:      Radiation dose reduction techniques were utilized per ALARA protocol.  Automated exposure control was initiated through either or App[employer] or  DoseRight software packages by  protocol.           FINDINGS:   The provided study demonstrates preservation of the vertebral body  heights in the sagittally reconstructed images.     Parenchymal nodules in the apices of both lungs.     Alignment is near anatomic.     Diffuse disc space narrowing and multiple spurs     I see no acute cervical spine fracture.       Impression:         1. Arthritic change     2. No acute cervical abnormality.     3. Grade 1 anterolisthesis of C7 on T1     4. Parenchymal nodules in the lung apices bilaterally     This report was finalized on 3/29/2023 3:29 PM by Dr. Danny Nguyen MD.             Diagnostics: Reviewed    A: Crhistine Garg is a 89 y.o. female  admitted on 3/22/2023 with severe abdominal pain. Son reported she had been having pain for 4-5 days.She had been nauseated and had increased mucous. Pt had a decrease  in appetite due to her pain and nausea. Pt was taken for exploratory laparotomy on 3/22/2023 and was found to have a perforated duodenal ulcer and given a omental patch. Pt did well with procedure per Dr Kirkland's note and has done well since procedure. Pts had CT of thoracic spine on 3/29 that showed T5-T6 low attenuation mass development of small bibasilar pleural effusions and consolidation, also on cervical spine CT showed Parenchymal nodules in lung apices and Grade 1 anterolisthesis of C7 on T1. Per Christine conversation with Dr Robin this is a known mass and her primary care had been working on outpatient follow up to discuss Biopsy. Dr Robin began advanced care planning conversations in light of suspect underlying malignancy.      P:   Palliative care was consulted to discuss GOC/ACP and healthcare surrogacy.  I was able to discuss with Christine what she would want in certain circumstances, if her heart were to stop or she go into respiratory distress would she want to be resuscitated or to be put on a ventilator.She very quickly stated that no she would not want either CPR or intubated on a ventilator. She stated that she did want to continue with her PCP to have suspect malignancy worked up . We discussed that she should not be able to speak for herself we could complete a living will naming a surrogate, she was agreeable. Living will was completed name her son Kaleb as her HCS and her other two sons as alternates. Sons were called and are aware of Christine's decision and of the living will. Code status was changed to DNR/DNI and JOSUE Joyce and Dr Robin aware. Living will stat faxed to medical records and copies were given to patient and placed on chart.    We appreciate the consult and the opportunity to participate in Christine Garg's care. We will continue to follow along. Please do not hesitate to contact us regarding further symptom management or goals of care needs, including after hours or on weekends via  our on call provider at [phone].     Time: 55 minutes spent reviewing medical and medication records, assessing and examining patient, discussing with family, answering questions, providing some guidance about a plan and documentation of care, and coordinating care with other healthcare members, with > 50% time spent face to face.     Angela Gonzalez, APRN    3/31/2023

## 2023-03-31 NOTE — CASE MANAGEMENT/SOCIAL WORK
Discharge Planning Assessment  [facility]     Patient Name: Christine Garg  MRN: [id number]  Today's Date: 3/31/2023    Admit Date: 3/22/2023    Plan: Pt lives at home with son, Ziyad.  Pt currently does not utilize home health services or DME.  Pt stated no POA or Living Will.  Pt interested in [facility].  Inpt rehab per Dottie submitted pt's information to insurance for possible approval and awaiting acceptance or denial.  SS will follow.     Discharge Plan     Row Name 03/31/23 1254       Plan    Plan Pt lives at home with sonZiyad.  Pt currently does not utilize home health services or DME.  Pt stated no POA or Living Will.  Pt interested in [facility].  Inpt rehab per Dottie submitted pt's information to insurance for possible approval and awaiting acceptance or denial.  SS will follow.              ZA Martins

## 2023-04-01 LAB — MAGNESIUM SERPL-MCNC: 1.9 MG/DL (ref 1.6–2.4)

## 2023-04-01 PROCEDURE — 25010000002 ONDANSETRON PER 1 MG: Performed by: SURGERY

## 2023-04-01 PROCEDURE — 99024 POSTOP FOLLOW-UP VISIT: CPT | Performed by: SURGERY

## 2023-04-01 PROCEDURE — 25010000002 HEPARIN (PORCINE) PER 1000 UNITS: Performed by: SURGERY

## 2023-04-01 PROCEDURE — 99231 SBSQ HOSP IP/OBS SF/LOW 25: CPT | Performed by: INTERNAL MEDICINE

## 2023-04-01 PROCEDURE — 83735 ASSAY OF MAGNESIUM: CPT | Performed by: INTERNAL MEDICINE

## 2023-04-01 RX ADMIN — HYDRALAZINE HYDROCHLORIDE 75 MG: 50 TABLET, FILM COATED ORAL at 15:08

## 2023-04-01 RX ADMIN — PANTOPRAZOLE SODIUM 40 MG: 40 TABLET, DELAYED RELEASE ORAL at 17:31

## 2023-04-01 RX ADMIN — VALSARTAN 320 MG: 160 TABLET, FILM COATED ORAL at 08:12

## 2023-04-01 RX ADMIN — Medication 10 ML: at 08:16

## 2023-04-01 RX ADMIN — Medication 10 ML: at 08:22

## 2023-04-01 RX ADMIN — CARVEDILOL 25 MG: 25 TABLET, FILM COATED ORAL at 07:01

## 2023-04-01 RX ADMIN — HYDRALAZINE HYDROCHLORIDE 75 MG: 50 TABLET, FILM COATED ORAL at 21:09

## 2023-04-01 RX ADMIN — Medication 10 ML: at 21:09

## 2023-04-01 RX ADMIN — HYDROCODONE BITARTRATE AND ACETAMINOPHEN 1 TABLET: 7.5; 325 TABLET ORAL at 05:16

## 2023-04-01 RX ADMIN — LIDOCAINE 1 PATCH: 700 PATCH TOPICAL at 08:14

## 2023-04-01 RX ADMIN — HYDROCODONE BITARTRATE AND ACETAMINOPHEN 1 TABLET: 7.5; 325 TABLET ORAL at 17:34

## 2023-04-01 RX ADMIN — Medication 10 ML: at 08:27

## 2023-04-01 RX ADMIN — CASTOR OIL AND BALSAM, PERU 1 APPLICATION: 788; 87 OINTMENT TOPICAL at 08:15

## 2023-04-01 RX ADMIN — HEPARIN SODIUM 5000 UNITS: 5000 INJECTION INTRAVENOUS; SUBCUTANEOUS at 08:13

## 2023-04-01 RX ADMIN — CLONIDINE HYDROCHLORIDE 0.1 MG: 0.1 TABLET ORAL at 10:26

## 2023-04-01 RX ADMIN — CASTOR OIL AND BALSAM, PERU 1 APPLICATION: 788; 87 OINTMENT TOPICAL at 21:09

## 2023-04-01 RX ADMIN — HEPARIN SODIUM 5000 UNITS: 5000 INJECTION INTRAVENOUS; SUBCUTANEOUS at 21:09

## 2023-04-01 RX ADMIN — Medication 10 ML: at 08:20

## 2023-04-01 RX ADMIN — ONDANSETRON 4 MG: 2 INJECTION INTRAMUSCULAR; INTRAVENOUS at 08:29

## 2023-04-01 RX ADMIN — PANTOPRAZOLE SODIUM 40 MG: 40 TABLET, DELAYED RELEASE ORAL at 08:13

## 2023-04-01 RX ADMIN — HYDROCODONE BITARTRATE AND ACETAMINOPHEN 1 TABLET: 7.5; 325 TABLET ORAL at 21:33

## 2023-04-01 RX ADMIN — LEVOTHYROXINE SODIUM 112 MCG: 0.07 TABLET ORAL at 05:17

## 2023-04-01 RX ADMIN — CARVEDILOL 25 MG: 25 TABLET, FILM COATED ORAL at 17:31

## 2023-04-01 RX ADMIN — CLONIDINE HYDROCHLORIDE 0.1 MG: 0.1 TABLET ORAL at 23:54

## 2023-04-01 RX ADMIN — HYDRALAZINE HYDROCHLORIDE 75 MG: 50 TABLET, FILM COATED ORAL at 05:16

## 2023-04-01 NOTE — PLAN OF CARE
Goal Outcome Evaluation:  Plan of Care Reviewed With: patient           Outcome Evaluation: Pt is resting in bed with eyes closed. Chest is rising and falling evenly. No s/s of acute distress noted. Gave PRN pain medication for back pain. See mar. No complaints verbalized at this time.   Gave PRN hydralazine for /88 at 2356. Made Mike aware. Pt's BP was 187/88 at 0627. Gave scheduled Coreg. Made Dr. Robin aware.

## 2023-04-01 NOTE — PLAN OF CARE
Goal Outcome Evaluation:  Plan of Care Reviewed With: patient        Progress: improving  Outcome Evaluation: Resting comfortably in bed.  Vomited small amount brown fluid this AM at breakfast.  Zofran relieved symptoms.  Up to BSC several times.  Moderate smount brown stool formed.  Voiding well.  Continue to monitor.

## 2023-04-01 NOTE — PROGRESS NOTES
POD#10 perforated ulcer    She has been having nausea and vomiting after eating or drinking anything. afeb and vss. Wounds ok and abdomen is soft. WBC 8.93 and Hgb 9.1. check UGI to see if the stomach is emptying ok.

## 2023-04-01 NOTE — PROGRESS NOTES
[facility] HOSPITALIST PROGRESS NOTE     Patient Identification:  Name:  Christine Garg  Age:  89 y.o.  Sex:  female  :  1933  MRN:  [id number]  Visit Number:  [id number]  ROOM: [address] [place] [address]     Primary Care Provider:  Dave Tobar MD    Length of stay in inpatient status:  10    Subjective     Chief Compliant:    Chief Complaint   Patient presents with   • Abdominal Pain     History of Presenting Illness:    Patient remains ill but stable today, no acute events overnight, denies any fevers or chills, still endorsing bowel movements and passing gas, did get nauseas today, gave IV Zofran, General Surgery planning for UGI today, labs and vitals stable overall, blood pressure improved throughout yesterday and down to 140's systolic though up again this 's, hadn't had AM Medications and Nurse gave while I was in room, follow up repeat blood pressure soon, also patient was denied inpatient rehab, follow up Social Work plan for placement Monday   Objective     Current Hospital Meds:  carvedilol, 25 mg, Oral, BID With Meals  castor oil-balsam peru, 1 application, Topical, Q12H  heparin (porcine), 5,000 Units, Subcutaneous, Q12H  hydrALAZINE, 75 mg, Oral, Q8H  levothyroxine, 112 mcg, Oral, Q AM  lidocaine, 1 patch, Transdermal, Q24H  magnesium sulfate, 4 g, Intravenous, Once  multivitamin, 10 mL, Oral, Daily  pantoprazole, 40 mg, Oral, BID AC  polyethylene glycol, 17 g, Oral, Daily  valsartan, 320 mg, Oral, Q24H  ----------------------------------------------------------------------------------------------------------------------  Vital Signs:  Temp:  [97.5 °F (36.4 °C)-98.3 °F (36.8 °C)] 97.7 °F (36.5 °C)  Heart Rate:  [70-82] 82  Resp:  [18] 18  BP: (159-191)/(58-88) 188/70  SpO2:  [6 %-98 %] 6 %  on  Flow (L/min):  [1] 1;   Device (Oxygen Therapy): nasal cannula  Body mass index is 24.28 kg/m².      Intake/Output Summary (Last 24 hours) at 2023 0951  Last data filed at 2023  0800  Gross per 24 hour   Intake 60 ml   Output 40 ml   Net 20 ml      ----------------------------------------------------------------------------------------------------------------------  Physical exam:  Constitutional:  Elderly, No acute distress. Mild discomfort at times   HENT:  Head:  Normocephalic and atraumatic.  Mouth:  Moist mucous membranes.    Eyes:  Conjunctivae and EOM are normal. No scleral icterus.    Neck:  Neck supple.  No JVD present.    Cardiovascular:  Normal rate, regular rhythm and normal heart sounds with no murmur.  Pulmonary/Chest:  No respiratory distress, no wheezes, no crackles, on minimal NC  Abdominal:  Soft. No distension and mild tenderness to palpation   Musculoskeletal:  No tenderness, and no deformity.  No red or swollen joints anywhere.    Neurological:  Alert and oriented to person, place, and time.  No gross focal deficits     Skin:  Skin is warm and dry. No rash noted. No pallor.   Peripheral vascular:  No clubbing, no cyanosis, no edema  Psychiatric: Appropriate mood and affect    *Examination stable today 4/1    Edited by: Rodriguez Robin MD at 4/1/2023 0949  ----------------------------------------------------------------------------------------------------------------------   No results found for: URINECX  No results found for: BLOODCX    I have personally looked at the labs and they are summarized above.  ----------------------------------------------------------------------------------------------------------------------  Detailed radiology reports for the last 24 hours:  No radiology results for the last day  Assessment & Plan    #Severe sepsis that was present on admission (temperature 36 degrees C, lactic acid 3.5, CRP 3.44, WBC 30,990, heart rates ) due to a suspected perforated antral gastric ulcer, though also appears do to concurrent Pneumonia, Bacterial, already completed sufficient course of antibiotics.   #History of left upper lobe pleural based  pulmonary nodule that appears to be invading the T6 vertebra  #Acute hypokalemia and acute hypomagnesemia and acute hypophosphatemia, improved with supplementation  #History of coronary artery disease status post 3v CABG in 2011  #History of chronic kidney disease stage IIIa with baseline Cr 0.6-0.85   #History of essential hypertension  #History of hypothyroidism, currently controlled  #History of hyperlipidemia  #Advanced age  #Former Smoker  #Debility   - General Surgery following as primary, status post surgical intervention, checking UGI today   - Status post Zosyn per primary, CT's recently noting Pneumonia but clinically doesn't appear to be active infection, has already completed 7 day course of Zosyn, trend CBC   - IV PPI for now, remainder of medications transitioned to oral   - Continue home beta blocker, home ARB, new Hydralazine and titrate dose again today, titrate as indicated  - Continue as needed antihypertensives for systolic blood pressure > 180mmHg  - Regarding spinal lesion patient reports her PCP is working up as outpatient, is known, was supposed to have outpatient appointment but is now admitted to the hospital, will need to reschedule upon discharge, would need to confirm with family who appointment is with as patient is unsure  - Continue Tylenol as needed for fevers, monitor for clinical improvement.     #Advanced Care Planning  - Conversations 3/31; Palliative Care consulted and following      F: Oral  E: Monitor & Replace as needed   N: Diet: Regular/House Diet; No Carbonated Beverages, No Straw; Texture: Soft to Chew (NDD 3); Soft to Chew: Whole Meat; Fluid Consistency: Thin (IDDSI 0)   Ppx: SQH  Code Status (Patient has no pulse and is not breathing): CPR (Attempt to Resuscitate)  Medical Interventions (Patient has pulse or is breathing): Full Support     Dispo: Pending clinical improvement, PT/OT consulted and following, Social Work to assist with placement, denied inpatient rehab and  upheld on P2P, follow up Monday for additional placement options     *This patient is considered high risk due to severe sepsis, bowel perforation, surgical intervention, volume overload, debility     Edited by: Rodriguez Robin MD at 4/1/2023 0988  [facility]

## 2023-04-02 LAB
ANION GAP SERPL CALCULATED.3IONS-SCNC: 6.6 MMOL/L (ref 5–15)
BUN SERPL-MCNC: 9 MG/DL (ref 8–23)
BUN/CREAT SERPL: 18.4 (ref 7–25)
CALCIUM SPEC-SCNC: 9 MG/DL (ref 8.6–10.5)
CHLORIDE SERPL-SCNC: 98 MMOL/L (ref 98–107)
CO2 SERPL-SCNC: 30.4 MMOL/L (ref 22–29)
CREAT SERPL-MCNC: 0.49 MG/DL (ref 0.57–1)
DEPRECATED RDW RBC AUTO: 50 FL (ref 37–54)
EGFRCR SERPLBLD CKD-EPI 2021: 90.2 ML/MIN/1.73
ERYTHROCYTE [DISTWIDTH] IN BLOOD BY AUTOMATED COUNT: 14 % (ref 12.3–15.4)
GLUCOSE SERPL-MCNC: 82 MG/DL (ref 65–99)
HCT VFR BLD AUTO: 30.9 % (ref 34–46.6)
HGB BLD-MCNC: 10 G/DL (ref 12–15.9)
MCH RBC QN AUTO: 31.4 PG (ref 26.6–33)
MCHC RBC AUTO-ENTMCNC: 32.4 G/DL (ref 31.5–35.7)
MCV RBC AUTO: 97.2 FL (ref 79–97)
PLATELET # BLD AUTO: 280 10*3/MM3 (ref 140–450)
PMV BLD AUTO: 10.1 FL (ref 6–12)
POTASSIUM SERPL-SCNC: 4 MMOL/L (ref 3.5–5.2)
RBC # BLD AUTO: 3.18 10*6/MM3 (ref 3.77–5.28)
SODIUM SERPL-SCNC: 135 MMOL/L (ref 136–145)
WBC NRBC COR # BLD: 10.89 10*3/MM3 (ref 3.4–10.8)

## 2023-04-02 PROCEDURE — 25010000002 HEPARIN (PORCINE) PER 1000 UNITS: Performed by: SURGERY

## 2023-04-02 PROCEDURE — 80048 BASIC METABOLIC PNL TOTAL CA: CPT | Performed by: INTERNAL MEDICINE

## 2023-04-02 PROCEDURE — 99024 POSTOP FOLLOW-UP VISIT: CPT | Performed by: SURGERY

## 2023-04-02 PROCEDURE — 25010000002 ONDANSETRON PER 1 MG: Performed by: SURGERY

## 2023-04-02 PROCEDURE — 99232 SBSQ HOSP IP/OBS MODERATE 35: CPT | Performed by: INTERNAL MEDICINE

## 2023-04-02 PROCEDURE — 85027 COMPLETE CBC AUTOMATED: CPT | Performed by: SURGERY

## 2023-04-02 RX ORDER — HYDRALAZINE HYDROCHLORIDE 25 MG/1
25 TABLET, FILM COATED ORAL EVERY 8 HOURS SCHEDULED
Status: DISCONTINUED | OUTPATIENT
Start: 2023-04-02 | End: 2023-04-04

## 2023-04-02 RX ADMIN — LEVOTHYROXINE SODIUM 112 MCG: 0.07 TABLET ORAL at 06:04

## 2023-04-02 RX ADMIN — Medication 10 ML: at 10:07

## 2023-04-02 RX ADMIN — CASTOR OIL AND BALSAM, PERU 1 APPLICATION: 788; 87 OINTMENT TOPICAL at 21:07

## 2023-04-02 RX ADMIN — Medication 10 ML: at 21:05

## 2023-04-02 RX ADMIN — Medication 10 ML: at 08:18

## 2023-04-02 RX ADMIN — HEPARIN SODIUM 5000 UNITS: 5000 INJECTION INTRAVENOUS; SUBCUTANEOUS at 10:07

## 2023-04-02 RX ADMIN — HYDROCODONE BITARTRATE AND ACETAMINOPHEN 1 TABLET: 7.5; 325 TABLET ORAL at 18:42

## 2023-04-02 RX ADMIN — Medication 10 ML: at 10:10

## 2023-04-02 RX ADMIN — LIDOCAINE 1 PATCH: 700 PATCH TOPICAL at 10:08

## 2023-04-02 RX ADMIN — CARVEDILOL 25 MG: 25 TABLET, FILM COATED ORAL at 17:19

## 2023-04-02 RX ADMIN — PANTOPRAZOLE SODIUM 40 MG: 40 TABLET, DELAYED RELEASE ORAL at 10:06

## 2023-04-02 RX ADMIN — CARVEDILOL 25 MG: 25 TABLET, FILM COATED ORAL at 10:06

## 2023-04-02 RX ADMIN — HYDRALAZINE HYDROCHLORIDE 25 MG: 50 TABLET, FILM COATED ORAL at 21:07

## 2023-04-02 RX ADMIN — CASTOR OIL AND BALSAM, PERU 1 APPLICATION: 788; 87 OINTMENT TOPICAL at 10:07

## 2023-04-02 RX ADMIN — HYDROCODONE BITARTRATE AND ACETAMINOPHEN 1 TABLET: 7.5; 325 TABLET ORAL at 06:06

## 2023-04-02 RX ADMIN — Medication 3 ML: at 21:05

## 2023-04-02 RX ADMIN — ONDANSETRON 4 MG: 2 INJECTION INTRAMUSCULAR; INTRAVENOUS at 08:18

## 2023-04-02 RX ADMIN — VALSARTAN 320 MG: 160 TABLET, FILM COATED ORAL at 10:06

## 2023-04-02 RX ADMIN — PANTOPRAZOLE SODIUM 40 MG: 40 TABLET, DELAYED RELEASE ORAL at 17:19

## 2023-04-02 RX ADMIN — HEPARIN SODIUM 5000 UNITS: 5000 INJECTION INTRAVENOUS; SUBCUTANEOUS at 21:07

## 2023-04-02 RX ADMIN — HYDRALAZINE HYDROCHLORIDE 75 MG: 50 TABLET, FILM COATED ORAL at 06:05

## 2023-04-02 RX ADMIN — Medication 10 MG: at 02:50

## 2023-04-02 RX ADMIN — Medication 3 ML: at 10:10

## 2023-04-02 RX ADMIN — HYDROCODONE BITARTRATE AND ACETAMINOPHEN 1 TABLET: 7.5; 325 TABLET ORAL at 14:33

## 2023-04-02 RX ADMIN — HYDROCODONE BITARTRATE AND ACETAMINOPHEN 1 TABLET: 7.5; 325 TABLET ORAL at 10:08

## 2023-04-02 NOTE — PLAN OF CARE
Goal Outcome Evaluation:   Patient has had an uneventful day. She has rested with no complaints. Pt did have some pain so PRN pain medication given. She has had family visit and been very pleasant. Abdominal incision is clean and dry, with no complaints. Will continue plan of care.

## 2023-04-02 NOTE — PROGRESS NOTES
Post perforated duodenal ulcer    She still has nausea and vomiting if she takes anything po. The UGI has not been done yet. afeb and vss. Abdomen soft and wound ok. WBC 10.89. Follow up on UGI.

## 2023-04-02 NOTE — PLAN OF CARE
Goal Outcome Evaluation:  Plan of Care Reviewed With: patient           Outcome Evaluation: Pt is resting in bed with eyes closed. Chest is rising and falling evenly. Gave PRN pain meds for back pain. See Mar. Gave PRN Clonidine for /70. See Mar. Gave PRN labetalol for /62. See Mar. Pt did get up to bed side this shift. No complaints verbalized at this time. Pt has rested well this shift.

## 2023-04-02 NOTE — PROGRESS NOTES
[facility] HOSPITALIST PROGRESS NOTE     Patient Identification:  Name:  Christine Garg  Age:  89 y.o.  Sex:  female  :  1933  MRN:  [id number]  Visit Number:  [id number]  ROOM: [address] [place] [address]     Primary Care Provider:  Dave Tobar MD    Length of stay in inpatient status:  11    Subjective     Chief Compliant:    Chief Complaint   Patient presents with   • Abdominal Pain     History of Presenting Illness:    Patient remains ill today, no acute events overnight, denies any fevers or chills, still having nausea and decreased oral intake, UGI series from yesterday per primary still pending, overall appears she is stable, vitals stable, afebrile, blood pressure much improved now, checking BMP to follow up on kidney function with decreased oral intake last day or so, may bolus accordingly.  Still working toward placement, follow up Social Work on Monday.   Objective     Current Hospital Meds:  carvedilol, 25 mg, Oral, BID With Meals  castor oil-balsam peru, 1 application, Topical, Q12H  heparin (porcine), 5,000 Units, Subcutaneous, Q12H  hydrALAZINE, 75 mg, Oral, Q8H  levothyroxine, 112 mcg, Oral, Q AM  lidocaine, 1 patch, Transdermal, Q24H  multivitamin, 10 mL, Oral, Daily  pantoprazole, 40 mg, Oral, BID AC  polyethylene glycol, 17 g, Oral, Daily  sodium chloride, 10 mL, Intravenous, Q12H  sodium chloride, 10 mL, Intravenous, Q12H  sodium chloride, 10 mL, Intravenous, Q12H  sodium chloride, 3 mL, Intravenous, Q12H  valsartan, 320 mg, Oral, Q24H         ----------------------------------------------------------------------------------------------------------------------  Vital Signs:  Temp:  [97.3 °F (36.3 °C)-98 °F (36.7 °C)] 97.9 °F (36.6 °C)  Heart Rate:  [69-83] 76  Resp:  [18] 18  BP: (111-178)/(54-72) 148/70  SpO2:  [96 %-98 %] 97 %  on  Flow (L/min):  [1] 1;   Device (Oxygen Therapy): nasal cannula  Body mass index is 24.55 kg/m².      Intake/Output Summary (Last 24 hours) at 2023  1022  Last data filed at 4/1/2023 1427  Gross per 24 hour   Intake 360 ml   Output --   Net 360 ml      ----------------------------------------------------------------------------------------------------------------------  Physical exam:  Constitutional:  Elderly, No acute distress. No significant discomfort  HENT:  Head:  Normocephalic and atraumatic.  Mouth:  Moist mucous membranes.    Eyes:  Conjunctivae and EOM are normal. No scleral icterus.    Neck:  Neck supple.  No JVD present.    Cardiovascular:  Normal rate, regular rhythm and normal heart sounds with no murmur.  Pulmonary/Chest:  No respiratory distress, no wheezes, no crackles, on minimal NC  Abdominal:  Soft. No distension and mild tenderness to palpation   Musculoskeletal:  No tenderness, and no deformity.  No red or swollen joints anywhere.    Neurological:  Alert and oriented to person, place, and time.  No gross focal deficits     Skin:  Skin is warm and dry. No rash noted. No pallor.   Peripheral vascular:  No clubbing, no cyanosis, no edema  Psychiatric: Appropriate mood and affect  Edited by: Rodriguez Robin MD at 4/2/2023 1022  ----------------------------------------------------------------------------------------------------------------------  WBC/HGB/HCT/PLT   10.89/10.0/30.9/280 (04/02 0254)  No results found for: URINECX  No results found for: BLOODCX    I have personally looked at the labs and they are summarized above.  ----------------------------------------------------------------------------------------------------------------------  Detailed radiology reports for the last 24 hours:  No radiology results for the last day  Assessment & Plan    #Severe sepsis that was present on admission (temperature 36 degrees C, lactic acid 3.5, CRP 3.44, WBC 30,990, heart rates ) due to a suspected perforated antral gastric ulcer, though also appears do to concurrent Pneumonia, Bacterial, already completed sufficient course of antibiotics.    #History of left upper lobe pleural based pulmonary nodule that appears to be invading the T6 vertebra  #Acute hypokalemia and acute hypomagnesemia and acute hypophosphatemia, improved with supplementation  #History of coronary artery disease status post 3v CABG in 2011  #History of chronic kidney disease stage IIIa with baseline Cr 0.6-0.85   #History of essential hypertension  #History of hypothyroidism, currently controlled  #History of hyperlipidemia  #Advanced age  #Former Smoker  #Debility   - General Surgery following as primary, status post surgical intervention, UGI series ordered yesterday still pending  - Status post Zosyn per primary, CT's recently noting Pneumonia but clinically doesn't appear to be active infection, has already completed 7 day course of Zosyn, trend CBC   - IV PPI for now, remainder of medications transitioned to oral   - Continue home beta blocker, home ARB, new Hydralazine, titrate as indicated  - Continue as needed antihypertensives for systolic blood pressure > 180mmHg  - Regarding spinal lesion patient reports her PCP is working up as outpatient, is known, was supposed to have outpatient appointment but is now admitted to the hospital, will need to reschedule upon discharge, would need to confirm with family who appointment is with as patient is unsure  - Continue Tylenol as needed for fevers, monitor for clinical improvement.     #Advanced Care Planning  - Conversations 3/31; Palliative Care consulted and following      F: Oral  E: Monitor & Replace as needed   N: Diet: Regular/House Diet; No Carbonated Beverages, No Straw; Texture: Soft to Chew (NDD 3); Soft to Chew: Whole Meat; Fluid Consistency: Thin (IDDSI 0)   Ppx: SQH  Code Status (Patient has no pulse and is not breathing): CPR (Attempt to Resuscitate)  Medical Interventions (Patient has pulse or is breathing): Full Support     Dispo: Pending clinical improvement, PT/OT consulted and following, Social Work to assist with  placement, denied inpatient rehab and upheld on P2P, follow up Monday for additional placement options     *This patient is considered high risk due to severe sepsis, bowel perforation, surgical intervention, volume overload, debility     Edited by: Rodriguez Robin MD at 4/2/2023 [facility]  [facility]

## 2023-04-03 ENCOUNTER — APPOINTMENT (OUTPATIENT)
Dept: GENERAL RADIOLOGY | Facility: HOSPITAL | Age: 88
DRG: 853 | End: 2023-04-03
Payer: MEDICARE

## 2023-04-03 LAB — MAGNESIUM SERPL-MCNC: 1.9 MG/DL (ref 1.6–2.4)

## 2023-04-03 PROCEDURE — 74240 X-RAY XM UPR GI TRC 1CNTRST: CPT

## 2023-04-03 PROCEDURE — 94799 UNLISTED PULMONARY SVC/PX: CPT

## 2023-04-03 PROCEDURE — 88360 TUMOR IMMUNOHISTOCHEM/MANUAL: CPT

## 2023-04-03 PROCEDURE — 25010000002 HEPARIN (PORCINE) PER 1000 UNITS: Performed by: SURGERY

## 2023-04-03 PROCEDURE — 25010000002 MAGNESIUM SULFATE 2 GM/50ML SOLUTION

## 2023-04-03 PROCEDURE — 25010000002 ONDANSETRON PER 1 MG: Performed by: SURGERY

## 2023-04-03 PROCEDURE — 99223 1ST HOSP IP/OBS HIGH 75: CPT | Performed by: INTERNAL MEDICINE

## 2023-04-03 PROCEDURE — 94761 N-INVAS EAR/PLS OXIMETRY MLT: CPT

## 2023-04-03 PROCEDURE — 99232 SBSQ HOSP IP/OBS MODERATE 35: CPT | Performed by: INTERNAL MEDICINE

## 2023-04-03 PROCEDURE — 99024 POSTOP FOLLOW-UP VISIT: CPT | Performed by: SURGERY

## 2023-04-03 PROCEDURE — 83735 ASSAY OF MAGNESIUM: CPT | Performed by: HOSPITALIST

## 2023-04-03 PROCEDURE — 97530 THERAPEUTIC ACTIVITIES: CPT

## 2023-04-03 PROCEDURE — 88305 TISSUE EXAM BY PATHOLOGIST: CPT

## 2023-04-03 PROCEDURE — 88341 IMHCHEM/IMCYTCHM EA ADD ANTB: CPT

## 2023-04-03 PROCEDURE — 88342 IMHCHEM/IMCYTCHM 1ST ANTB: CPT

## 2023-04-03 PROCEDURE — 74248 X-RAY SM INT F-THRU STD: CPT

## 2023-04-03 PROCEDURE — 74248 X-RAY SM INT F-THRU STD: CPT | Performed by: RADIOLOGY

## 2023-04-03 PROCEDURE — 74240 X-RAY XM UPR GI TRC 1CNTRST: CPT | Performed by: RADIOLOGY

## 2023-04-03 RX ORDER — MAGNESIUM SULFATE HEPTAHYDRATE 40 MG/ML
2 INJECTION, SOLUTION INTRAVENOUS ONCE
Status: COMPLETED | OUTPATIENT
Start: 2023-04-03 | End: 2023-04-03

## 2023-04-03 RX ORDER — MAGNESIUM SULFATE HEPTAHYDRATE 40 MG/ML
2 INJECTION, SOLUTION INTRAVENOUS AS NEEDED
Status: DISCONTINUED | OUTPATIENT
Start: 2023-04-03 | End: 2023-04-10 | Stop reason: HOSPADM

## 2023-04-03 RX ORDER — MAGNESIUM SULFATE HEPTAHYDRATE 40 MG/ML
4 INJECTION, SOLUTION INTRAVENOUS AS NEEDED
Status: DISCONTINUED | OUTPATIENT
Start: 2023-04-03 | End: 2023-04-10 | Stop reason: HOSPADM

## 2023-04-03 RX ORDER — MAGNESIUM SULFATE 1 G/100ML
1 INJECTION INTRAVENOUS AS NEEDED
Status: DISCONTINUED | OUTPATIENT
Start: 2023-04-03 | End: 2023-04-10 | Stop reason: HOSPADM

## 2023-04-03 RX ADMIN — MAGNESIUM SULFATE HEPTAHYDRATE 2 G: 40 INJECTION, SOLUTION INTRAVENOUS at 05:26

## 2023-04-03 RX ADMIN — PANTOPRAZOLE SODIUM 40 MG: 40 TABLET, DELAYED RELEASE ORAL at 17:25

## 2023-04-03 RX ADMIN — Medication 10 ML: at 10:04

## 2023-04-03 RX ADMIN — HYDRALAZINE HYDROCHLORIDE 25 MG: 50 TABLET, FILM COATED ORAL at 05:24

## 2023-04-03 RX ADMIN — HYDROCODONE BITARTRATE AND ACETAMINOPHEN 1 TABLET: 7.5; 325 TABLET ORAL at 10:04

## 2023-04-03 RX ADMIN — HYDROCODONE BITARTRATE AND ACETAMINOPHEN 1 TABLET: 7.5; 325 TABLET ORAL at 18:46

## 2023-04-03 RX ADMIN — LIDOCAINE 1 PATCH: 700 PATCH TOPICAL at 10:04

## 2023-04-03 RX ADMIN — LEVOTHYROXINE SODIUM 112 MCG: 0.07 TABLET ORAL at 05:25

## 2023-04-03 RX ADMIN — HYDRALAZINE HYDROCHLORIDE 25 MG: 50 TABLET, FILM COATED ORAL at 15:03

## 2023-04-03 RX ADMIN — ONDANSETRON 4 MG: 2 INJECTION INTRAMUSCULAR; INTRAVENOUS at 10:54

## 2023-04-03 RX ADMIN — Medication 10 ML: at 21:28

## 2023-04-03 RX ADMIN — HYDRALAZINE HYDROCHLORIDE 25 MG: 50 TABLET, FILM COATED ORAL at 21:29

## 2023-04-03 RX ADMIN — CARVEDILOL 25 MG: 25 TABLET, FILM COATED ORAL at 17:25

## 2023-04-03 RX ADMIN — Medication 3 ML: at 21:28

## 2023-04-03 RX ADMIN — HEPARIN SODIUM 5000 UNITS: 5000 INJECTION INTRAVENOUS; SUBCUTANEOUS at 10:03

## 2023-04-03 RX ADMIN — HYDROCODONE BITARTRATE AND ACETAMINOPHEN 1 TABLET: 7.5; 325 TABLET ORAL at 15:07

## 2023-04-03 RX ADMIN — VALSARTAN 320 MG: 160 TABLET, FILM COATED ORAL at 10:03

## 2023-04-03 RX ADMIN — HEPARIN SODIUM 5000 UNITS: 5000 INJECTION INTRAVENOUS; SUBCUTANEOUS at 21:29

## 2023-04-03 RX ADMIN — CASTOR OIL AND BALSAM, PERU 1 APPLICATION: 788; 87 OINTMENT TOPICAL at 10:03

## 2023-04-03 RX ADMIN — HYDROCODONE BITARTRATE AND ACETAMINOPHEN 1 TABLET: 7.5; 325 TABLET ORAL at 05:46

## 2023-04-03 RX ADMIN — CASTOR OIL AND BALSAM, PERU 1 APPLICATION: 788; 87 OINTMENT TOPICAL at 21:27

## 2023-04-03 RX ADMIN — PANTOPRAZOLE SODIUM 40 MG: 40 TABLET, DELAYED RELEASE ORAL at 10:03

## 2023-04-03 RX ADMIN — CARVEDILOL 25 MG: 25 TABLET, FILM COATED ORAL at 10:03

## 2023-04-03 NOTE — PROGRESS NOTES
LOS: 12 days   Patient Care Team:  Dave Tobar MD as PCP - General  Dave Tobar MD as PCP - Family Medicine    Post op day # 12, status post laparotomy with closure of perforated duodenal ulcer with omental patch and Biopatch    Subjective     Interval History:  Patient states she is doing well.  Her bowels are working.  She is voiding.  However when questioned she states she cannot eat anything.  An upper GI was ordered which demonstrated significant extrinsic compression of the esophagus in the mid thoracic level likely from a necrotic lymph node.  Patient also had delayed gastric emptying. Objective     Vital Signs  Temp:  [97.6 °F (36.4 °C)-98.6 °F (37 °C)] 97.7 °F (36.5 °C)  Heart Rate:  [72-86] 76  Resp:  [17-20] 18  BP: (124-173)/(45-70) 124/59    Physical Exam:  This is a well-developed well-nourished elderly female in no acute distress  HEENT examination: Sclera are anicteric  Lungs: Clear  Abdomen: Bowel sounds are present  Skin/incisions: Surgical  Incision intact and dry.       Results Review:    Results from last 7 days   Lab Units 03/30/23  1410 03/30/23  1132   HSTROP T ng/L 12* 17*     Results from last 7 days   Lab Units 04/02/23  0254 03/30/23  0139 03/29/23  0143   WBC 10*3/mm3 10.89* 8.93 11.69*   HEMOGLOBIN g/dL 10.0* 9.1* 9.3*   HEMATOCRIT % 30.9* 27.5* 28.4*   PLATELETS 10*3/mm3 280 238 255         Results from last 7 days   Lab Units 04/03/23  0247 04/02/23  1040 04/01/23  0858 03/31/23  0012 03/30/23  0139 03/29/23  0143 03/28/23  0817   SODIUM mmol/L  --  135*  --  138 138 135* 135*   POTASSIUM mmol/L  --  4.0  --  4.8  4.7 3.5 3.8 3.7   MAGNESIUM mg/dL 1.9  --  1.9 1.7  --   --   --    CHLORIDE mmol/L  --  98  --  103 103 103 103   CO2 mmol/L  --  30.4*  --  25.6 28.8 26.3 23.2   BUN mg/dL  --  9  --  9 8 8 6*   CREATININE mg/dL  --  0.49*  --  0.44* 0.44* 0.43* 0.40*   CALCIUM mg/dL  --  9.0  --  8.9 8.3* 8.5* 8.7   GLUCOSE mg/dL  --  82  --  108* 104* 130* 113*    ALBUMIN g/dL  --   --   --   --  2.2* 2.3* 2.4*   BILIRUBIN mg/dL  --   --   --   --  0.2 0.3 0.5   ALK PHOS U/L  --   --   --   --  83 89 112   AST (SGOT) U/L  --   --   --   --  19 27 31   ALT (SGPT) U/L  --   --   --   --  42* 50* 59*   Estimated Creatinine Clearance: 80.4 mL/min (A) (by C-G formula based on SCr of 0.49 mg/dL (L)).  No results found for: AMMONIA      Blood Culture   Date Value Ref Range Status   03/22/2023 No growth at 24 hours  Preliminary   03/22/2023 No growth at 24 hours  Preliminary     No results found for: URINECX  Wound Culture   Date Value Ref Range Status   03/22/2023 No growth  Preliminary     No results found for: STOOLCX    Imaging:  Imaging Results (Last 24 Hours)     Procedure Component Value Units Date/Time    FL Upper GI Single Contrast SBFT [[id number]] Collected: 04/03/23 1105     Updated: 04/03/23 1114    Narrative:      EXAM:    FL Upper Gastrointestinal Tract With Small Bowel Follow Through     EXAM DATE:    4/3/2023 8:17 AM     CLINICAL HISTORY:    nausea and vomiting after surgery for perforated ulcer; K25.1-Acute  gastric ulcer with perforation; E87.1-Gjhg-zmlgnoiufo and hyponatremia;  E87.6-Hypokalemia; A41.9-Sepsis, unspecified organism; K27.5-Chronic or  unspecified peptic ulcer, site unspecified, with perforation     TECHNIQUE:    Fluoroscopy of the upper gastrointestinal tract with oral contrast and  with or without KUB followed by serial images of the small bowel.   Fluoroscopic guidance was provided by a physician. Fluoroscopy exposure  time of approximately 1 minute or less.     COMPARISON:    12/24/2022     FINDINGS:    Esophagus:  Fixed area of esophageal narrowing is noted involving the  mid thoracic esophagus just distal to the bala and has features that  would suggest extrinsic mass effect.  Esophageal tertiary contractions  noted with mild dysmotility.  No reflux.    Stomach:  Unremarkable.  No mass.  Normal mucosa.    Duodenum:  Unremarkable.  No mass.   Normal mucosa.    Small bowel:  Unremarkable.  No mass or abnormal filling defect.    Colon:  Small bowel is unremarkable with normal small bowel transit.  Contrast reaches the colon within 1.5 hours.    Tubes, lines and devices:  Markedly delayed gastric emptying is noted  with only small amounts of contrast passing across the pylorus. Limited  assessment of the gastroduodenal junction due to cardiac leads and  surgical staples but no definite contrast leak identified.    Other findings:  Small hiatal hernia.  No bowel obstruction.       Impression:      1.  Fixed area of esophageal narrowing is noted involving the mid  thoracic esophagus just distal to the bala and has features that would  suggest extrinsic mass effect. Correlate with cross-sectional imaging.  2.  Esophageal tertiary contractions noted with mild dysmotility.  3.  Small hiatal hernia.  4.  Markedly delayed gastric emptying is noted with only small amounts  of contrast passing across the pylorus. Limited assessment of the  gastroduodenal junction due to cardiac leads and surgical staples but no  definite contrast leak identified.  5.  Small bowel is unremarkable with normal small bowel transit.  Contrast reaches the colon within 1.5 hours.  6.  No bowel obstruction.     This report was finalized on 4/3/2023 11:08 AM by Dr. Tobias Stoll MD.              Impression:  Stable course, status post repair of perforated duodenal ulcer  Aggressive pleural based lesion with rib destruction and now extrinsic compression of the mid thoracic esophagus    Plan:  I spoke to the patient and son about the findings.  I also spoke to oncology and consult placed.  Tissue diagnosis will be needed.  There is no pulmonologist onsite this week and therefore trans bronchial biopsy is not available.  I did speak with radiology and pleural-based nodule is amenable to CT biopsy.  This will be done tomorrow.  Medical oncology to see patient today.  If patient is felt to  potentially be a candidate for chemotherapy then nursing home placement would not be an option for her.  In addition if patient is to undergo treatment then she is going to need more aggressive nutrition than the ice chips and popsicles that she is taking in now.  Patient will not drink boost.  Ensure clears ordered.  However if patient ultimately undergoes treatment she will require a feeding tube of some kind.  Spoke to case management and plans for nursing home placement placed on hold pending outcome of biopsy and medical oncology evaluation.  Aurora Kirkland MD  04/03/23  14:30 EDT      Please note that portions of this note were completed with a voice recognition program.

## 2023-04-03 NOTE — SIGNIFICANT NOTE
04/03/23 1535   OTHER   Discipline physical therapist   Rehab Time/Intention   Session Not Performed patient/family declined, not feeling well

## 2023-04-03 NOTE — THERAPY TREATMENT NOTE
Patient Name: Christine Garg  : 1933    MRN: [id number]                              Today's Date: 4/3/2023       Admit Date: 3/22/2023    Visit Dx:     ICD-10-CM ICD-9-CM   1. Acute gastric ulcer with perforation  K25.1 531.10   2. Hyponatremia  E87.1 276.1   3. Hypokalemia  E87.6 276.8   4. Sepsis without acute organ dysfunction, due to unspecified organism  A41.9 038.9     995.91   5. Perforated ulcer  K27.5 533.50     Patient Active Problem List   Diagnosis   • Essential hypertension   • Dyslipidemia   • ASCVD (arteriosclerotic cardiovascular disease)   • Palpitations   • Coronary artery disease    • Abnormal PFTs (pulmonary function tests)   • Chronic obstructive pulmonary disease   • Mild persistent asthma with allergic rhinitis   • Pulmonary nodule   • Former smoker   • Colitis   • Perforated ulcer   • Acute gastric ulcer with perforation     Past Medical History:   Diagnosis Date   • Advanced age    • CAD (coronary artery disease)     s/p 3v CABG   • Chronic kidney disease (CKD), stage III (moderate)    • COPD (chronic obstructive pulmonary disease)    • History of tobacco use    • Hyperlipidemia    • Hypertension    • Hypothyroidism    • PONV (postoperative nausea and vomiting)    • Pulmonary nodule      Past Surgical History:   Procedure Laterality Date   • BREAST BIOPSY Left     benign   • CATARACT EXTRACTION     • CORONARY ANGIOPLASTY     • CORONARY ARTERY BYPASS GRAFT     • EXPLORATORY LAPAROTOMY N/A 3/22/2023    Procedure: LAPAROTOMY EXPLORATORY WITH OVER SEW OF DUODENAL ULCER WITH OMENTAL PATCH AND BIOPATCH AND CENTRAL LINE PLACEMENT;  Surgeon: Aurora Kirkland MD;  Location: [facility];  Service: General;  Laterality: N/A;      General Information     Row Name 23 1403          OT Time and Intention    Document Type therapy note (daily note)  -     Mode of Treatment individual therapy;occupational therapy  -     Row Name 23 1403          General Information    Patient  Profile Reviewed yes  -     Existing Precautions/Restrictions fall  -     Barriers to Rehab none identified  -[facility] Name 04/03/23 1403          Cognition    Orientation Status (Cognition) oriented x 4  -[facility] Name 04/03/23 1403          Safety Issues, Functional Mobility    Impairments Affecting Function (Mobility) endurance/activity tolerance;strength;balance  -           User Key  (r) = Recorded By, (t) = Taken By, (c) = Cosigned By    Initials Name Provider Type     Reta Kapoor OT Occupational Therapist                 Mobility/ADL's     [facility] Name 04/03/23 1403          Bed Mobility    Bed Mobility bed mobility (all) activities;scooting/bridging  -     All Activities, [place] (Bed Mobility) minimum assist (75% patient effort);moderate assist (50% patient effort)  -     Assistive Device (Bed Mobility) bed rails;head of bed elevated  -[facility] Name 04/03/23 1403          Toilet Transfer    Type (Toilet Transfer) stand pivot/stand step  -     [place] Level (Toilet Transfer) minimum assist (75% patient effort)  -     Assistive Device (Toilet Transfer) commode, 3-in-1  -[facility] Name 04/03/23 1403          Activities of Daily Living    BADL Assessment/Intervention bathing;upper body dressing;lower body dressing;grooming;toileting  -[facility] Name 04/03/23 1403          Bathing Assessment/Intervention    [place] Level (Bathing) bathing skills;moderate assist (50% patient effort)  -[facility] Name 04/03/23 1403          Upper Body Dressing Assessment/Training    [place] Level (Upper Body Dressing) upper body dressing skills;minimum assist (75% patient effort)  -[facility] Name 04/03/23 1403          Lower Body Dressing Assessment/Training    [place] Level (Lower Body Dressing) lower body dressing skills;moderate assist (50% patient effort)  -[facility] Name 04/03/23 1403          Grooming Assessment/Training    [place] Level (Grooming) grooming  skills;minimum assist (75% patient effort)  -     Row Name 04/03/23 1403          Toileting Assessment/Training    [place] Level (Toileting) toileting skills;minimum assist (75% patient effort);moderate assist (50% patient effort)  -           User Key  (r) = Recorded By, (t) = Taken By, (c) = Cosigned By    Initials Name Provider Type    Reta David OT Occupational Therapist               Obj/Interventions     Row Name 04/03/23 1404          Motor Skills    Functional Endurance fair  -           User Key  (r) = Recorded By, (t) = Taken By, (c) = Cosigned By    Initials Name Provider Type    Reta David OT Occupational Therapist               Goals/Plan    No documentation.                Clinical Impression     Row Name 04/03/23 1405          Pain Assessment    Pretreatment Pain Rating 0/10 - no pain  -     Posttreatment Pain Rating 0/10 - no pain  -     Row Name 04/03/23 1405          Plan of Care Review    Plan of Care Reviewed With patient  -     Progress improving  -     Outcome Evaluation Patient continues to present with functional limitations including decreased functional endurance/activity tolerance, impaired balance, and generalized weakness which impact functional transfers and ADL performance. She has been participating in skilled services with improvement in endurance noted, but still relies on staff/therapy assist beyond prior level of function.  -     Row Name 04/03/23 1405          Positioning and Restraints    Pre-Treatment Position in bed  -     Post Treatment Position bed  -     In Bed call light within reach;with family/caregiver  -           User Key  (r) = Recorded By, (t) = Taken By, (c) = Cosigned By    Initials Name Provider Type    Reta David OT Occupational Therapist               Outcome Measures    No documentation.                   OT Recommendation and Plan  Planned Therapy Interventions (OT): activity tolerance training, BADL  retraining, functional balance retraining, occupation/activity based interventions, ROM/therapeutic exercise, patient/caregiver education/training, strengthening exercise, transfer/mobility retraining  Therapy Frequency (OT): 3 times/wk (3-5x/week as able and available to promote functional progress)  Plan of Care Review  Plan of Care Reviewed With: patient  Progress: improving  Outcome Evaluation: Patient continues to present with functional limitations including decreased functional endurance/activity tolerance, impaired balance, and generalized weakness which impact functional transfers and ADL performance. She has been participating in skilled services with improvement in endurance noted, but still relies on staff/therapy assist beyond prior level of function.     Time Calculation:    Time Calculation- OT     Row Name 04/03/23 1407             Time Calculation- OT    OT Received On 04/03/23  -            User Key  (r) = Recorded By, (t) = Taken By, (c) = Cosigned By    Initials Name Provider Type    Reta David OT Occupational Therapist              Therapy Charges for Today     Code Description Service Date Service Provider Modifiers Qty    [id number]  OT THERAPEUTIC ACT EA 15 MIN 4/3/2023 Reta Kapoor OT GO 1               Reta Kapoor OT  4/3/2023

## 2023-04-03 NOTE — CONSULTS
Date:  04/03/23  Referring Provider: Dr. Kirkland  Reason for Consultation: Malignant appearing pleural based mass    Patient Care Team:  Dave Tobar MD as PCP - General  Dave Tobar MD as PCP - Family Medicine    Chief complaint: Difficulty swallowing    History of present illness:  Christine Garg is a 89 y.o. year-old female seen today in the presence of her two sons and DIL at the request of   for evaluation and treatment of apparent malignancy.  Ms. Garg was formerly doing quite well at home until about December of last year.  She says since that time, she has had one problem after another - colitis, UTI, COVID.  She presented to the hospital 3/22 with sudden onset of very severe abdominal pain which was found to be due to a perforated duodenal ulcer.  Dr. Kirkland performed omental patch.  She has been healing well from her surgery but was noted to have difficulty swallowing.  She says often when she tries to eat or even drink, food will come back up.  She has had substernal discomfort going back for the last month or so.  She says she has had severe back pain for the last 2-3 years which she says has become worse in the last month or so.  Imaging has revealed a posterior, pleural based LUIS soft tissue mass which erodes into the T5/T6 vertebral bodies.  She also has a large, para-esophageal necrotic LN which impinges on her esophagus, causing external compression and narrowing of the esophageal lumen., and she has bilateral apical pulmonary nodules.  PET-CT had been ordered and bronchoscopy had been planned, but she was admitted to the hospital with a perforated duodenal ulcer.   Ms Garg is scheduled for image-guided biopsy of the pleural based lung mass tomorrow.        History  Past Medical History:   Diagnosis Date   • Advanced age    • CAD (coronary artery disease) 2011    s/p 3v CABG   • Chronic kidney disease (CKD), stage III (moderate)    • COPD (chronic obstructive pulmonary  disease)    • History of tobacco use    • Hyperlipidemia    • Hypertension    • Hypothyroidism    • PONV (postoperative nausea and vomiting)    • Pulmonary nodule        Past Surgical History:   Procedure Laterality Date   • BREAST BIOPSY Left     benign   • CATARACT EXTRACTION     • CORONARY ANGIOPLASTY     • CORONARY ARTERY BYPASS GRAFT     • EXPLORATORY LAPAROTOMY N/A 3/22/2023    Procedure: LAPAROTOMY EXPLORATORY WITH OVER SEW OF DUODENAL ULCER WITH OMENTAL PATCH AND BIOPATCH AND CENTRAL LINE PLACEMENT;  Surgeon: Aurora Kirkland MD;  Location: [facility];  Service: General;  Laterality: N/A;       Family History   Problem Relation Age of Onset   • Heart disease Mother    • Heart disease Father    • Heart disease Brother    • Breast cancer Neg Hx        Social History     Tobacco Use   • Smoking status: Former     Types: Cigarettes   • Smokeless tobacco: Never   Vaping Use   • Vaping Use: Never used   Substance Use Topics   • Alcohol use: No   • Drug use: No       Allergies:  Motrin [ibuprofen] and Novocain [procaine]    Outpatient Medications:  Medications Prior to Admission   Medication Sig Dispense Refill Last Dose   • aspirin 325 MG tablet Take 1 tablet by mouth Daily.   Unknown   • carvedilol (COREG) 25 MG tablet Take 1 tablet by mouth 2 (Two) Times a Day With Meals. 60 tablet 0 Unknown   • cloNIDine (CATAPRES) 0.1 MG tablet Take 1 tablet by mouth 3 (Three) Times a Day As Needed for High Blood Pressure (SBP > 150).   Unknown   • Evolocumab (Repatha SureClick) solution auto-injector SureClick injection Inject 1 mL under the skin into the appropriate area as directed Every 14 (Fourteen) Days. 6 mL 1 Unknown   • HYDROcodone-acetaminophen (NORCO) 5-325 MG per tablet Take 1 tablet by mouth Daily As Needed.   Unknown   • levothyroxine (SYNTHROID, LEVOTHROID) 112 MCG tablet Take 1 tablet by mouth Every Morning.   Unknown   • montelukast (SINGULAIR) 10 MG tablet Take 1 tablet by mouth Every Night. 30 tablet 11  Unknown   • Movantik 25 MG tablet Take 1 tablet by mouth Daily As Needed for Constipation.   Unknown   • [] nitrofurantoin, macrocrystal-monohydrate, (MACROBID) 100 MG capsule Take 1 capsule by mouth 2 (Two) Times a Day With Meals. 3/20   Unknown   • ondansetron (ZOFRAN) 4 MG tablet Take 1 tablet by mouth 3 (Three) Times a Day As Needed.   Unknown   • [] predniSONE (DELTASONE) 20 MG tablet Take 1 tablet by mouth Daily.   Unknown   • tiZANidine (ZANAFLEX) 2 MG half tablet Take 1 half tablet by mouth 2 (Two) Times a Day.   Unknown   • valsartan (DIOVAN) 320 MG tablet Take 1 tablet by mouth Daily.   Unknown       Inpatient Medications:  Scheduled Meds:  carvedilol, 25 mg, Oral, BID With Meals  castor oil-balsam peru, 1 application, Topical, Q12H  heparin (porcine), 5,000 Units, Subcutaneous, Q12H  hydrALAZINE, 25 mg, Oral, Q8H  levothyroxine, 112 mcg, Oral, Q AM  lidocaine, 1 patch, Transdermal, Q24H  multivitamin, 10 mL, Oral, Daily  pantoprazole, 40 mg, Oral, BID AC  polyethylene glycol, 17 g, Oral, Daily  sodium chloride, 10 mL, Intravenous, Q12H  sodium chloride, 10 mL, Intravenous, Q12H  sodium chloride, 10 mL, Intravenous, Q12H  sodium chloride, 3 mL, Intravenous, Q12H  valsartan, 320 mg, Oral, Q24H        Continuous Infusions:       PRN Meds:  •  cloNIDine  •  hydrALAZINE  •  HYDROcodone-acetaminophen  •  labetalol  •  magnesium sulfate **OR** magnesium sulfate in D5W 1g/100mL (PREMIX) **OR** magnesium sulfate  •  ondansetron  •  potassium chloride **OR** [DISCONTINUED] potassium chloride **OR** potassium chloride  •  potassium chloride  •  potassium phosphate infusion greater than 15 mMoles **OR** potassium phosphate infusion greater than 15 mMoles **OR** potassium phosphate **OR** sodium phosphate IVPB **OR** sodium phosphate IVPB **OR** sodium phosphate IVPB  •  [COMPLETED] Insert Peripheral IV **AND** sodium chloride  •  sodium chloride  •  sodium chloride  •  sodium chloride  •  sodium  chloride  •  sodium chloride  •  sodium chloride    Review of Systems  A comprehensive 14 point review of systems was performed.  Significant findings as mentioned above.  All other systems reviewed and are negative.       Physical Exam:  Vital Signs   Patient Vitals for the past 24 hrs:   BP Temp Temp src Pulse Resp SpO2 Weight   04/03/23 1443 149/57 97.7 °F (36.5 °C) Oral 68 18 96 % --   04/03/23 1016 124/59 97.7 °F (36.5 °C) Oral 76 18 96 % --   04/03/23 0700 173/66 97.6 °F (36.4 °C) Oral 72 20 96 % --   04/03/23 0524 171/69 -- -- 86 -- -- --   04/03/23 0500 -- -- -- -- -- -- 65.4 kg (144 lb 1.6 oz)   04/03/23 0301 164/65 98.6 °F (37 °C) Oral 80 18 94 % --   04/03/23 0018 165/70 98.6 °F (37 °C) Axillary 82 17 96 % --   04/02/23 2106 157/45 -- -- 80 -- -- --       General:  Awake, alert and oriented, answers all questions appropriately.  Appears chronically ill.  HEENT:  Pupils are equal, round and reactive to light and accommodation  Neck:  No JVD, thyromegaly or lymphadenopathy  CV:  Regular rate and rhythm, no murmurs, rubs or gallops  Resp:  Lungs are clear to auscultation bilaterally with a few scattered coarse wheezes present  Abd:  Soft, non-tender, non-distended, bowel sounds present, no organomegaly.  Surgical incision healing nicely.  Ext:  No clubbing, cyanosis or edema  Lymph:  No cervical, supraclavicular, axillary, inguinal or femoral adenopathy  Neuro:  MS as above, CN II-XII intact, grossly non-focal exam      Labs / Studies:  Lab Results   Component Value Date    WBC 10.89 (H) 04/02/2023    HGB 10.0 (L) 04/02/2023    HCT 30.9 (L) 04/02/2023    MCV 97.2 (H) 04/02/2023    RDW 14.0 04/02/2023     04/02/2023    NEUTRORELPCT 77.3 (H) 03/27/2023    LYMPHORELPCT 9.7 (L) 03/27/2023    MONORELPCT 6.8 03/27/2023    EOSRELPCT 5.0 03/27/2023    BASORELPCT 0.3 03/27/2023    NEUTROABS 10.45 (H) 03/27/2023    LYMPHSABS 1.31 03/27/2023       Lab Results   Component Value Date     (L) 04/02/2023    K  4.0 04/02/2023    CO2 30.4 (H) 04/02/2023    CL 98 04/02/2023    BUN 9 04/02/2023    CREATININE 0.49 (L) 04/02/2023    EGFRIFNONA 47 (L) 05/08/2018    GLUCOSE 82 04/02/2023    CALCIUM 9.0 04/02/2023    ALKPHOS 83 03/30/2023    AST 19 03/30/2023    ALT 42 (H) 03/30/2023    BILITOT 0.2 03/30/2023    ALBUMIN 2.2 (L) 03/30/2023    PROTEINTOT 4.5 (L) 03/30/2023    MG 1.9 04/03/2023    PHOS 2.0 (L) 03/27/2023     CT Imaging reviewed with Ms. Forest and her family      Imaging Results (Last 72 Hours)     Procedure Component Value Units Date/Time    FL Upper GI Single Contrast SBFT [[id number]] Collected: 04/03/23 1105     Updated: 04/03/23 1114    Narrative:      EXAM:    FL Upper Gastrointestinal Tract With Small Bowel Follow Through     EXAM DATE:    4/3/2023 8:17 AM     CLINICAL HISTORY:    nausea and vomiting after surgery for perforated ulcer; K25.1-Acute  gastric ulcer with perforation; E87.7-Phta-kmaytalfhj and hyponatremia;  E87.6-Hypokalemia; A41.9-Sepsis, unspecified organism; K27.5-Chronic or  unspecified peptic ulcer, site unspecified, with perforation     TECHNIQUE:    Fluoroscopy of the upper gastrointestinal tract with oral contrast and  with or without KUB followed by serial images of the small bowel.   Fluoroscopic guidance was provided by a physician. Fluoroscopy exposure  time of approximately 1 minute or less.     COMPARISON:    12/24/2022     FINDINGS:    Esophagus:  Fixed area of esophageal narrowing is noted involving the  mid thoracic esophagus just distal to the bala and has features that  would suggest extrinsic mass effect.  Esophageal tertiary contractions  noted with mild dysmotility.  No reflux.    Stomach:  Unremarkable.  No mass.  Normal mucosa.    Duodenum:  Unremarkable.  No mass.  Normal mucosa.    Small bowel:  Unremarkable.  No mass or abnormal filling defect.    Colon:  Small bowel is unremarkable with normal small bowel transit.  Contrast reaches the colon within 1.5 hours.    Tubes,  lines and devices:  Markedly delayed gastric emptying is noted  with only small amounts of contrast passing across the pylorus. Limited  assessment of the gastroduodenal junction due to cardiac leads and  surgical staples but no definite contrast leak identified.    Other findings:  Small hiatal hernia.  No bowel obstruction.       Impression:      1.  Fixed area of esophageal narrowing is noted involving the mid  thoracic esophagus just distal to the bala and has features that would  suggest extrinsic mass effect. Correlate with cross-sectional imaging.  2.  Esophageal tertiary contractions noted with mild dysmotility.  3.  Small hiatal hernia.  4.  Markedly delayed gastric emptying is noted with only small amounts  of contrast passing across the pylorus. Limited assessment of the  gastroduodenal junction due to cardiac leads and surgical staples but no  definite contrast leak identified.  5.  Small bowel is unremarkable with normal small bowel transit.  Contrast reaches the colon within 1.5 hours.  6.  No bowel obstruction.     This report was finalized on 4/3/2023 11:08 AM by Dr. Tobias Stoll MD.               Assessment & Plan:  Christine Garg is a 89 y.o. 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    1.  Concern for metastatic malignancy:  -  Reviewed Ct imaging with Ms. Garg's family today.  -  Await biopsy of lung mass which is planned for tomorrow.  -  Treatment options will depend on pathology but I am concerned that treatment may prove to be difficult given her advanced age, impaired functional status and advanced nature of the tumor.  She was doing quite well until Decmeber.  Will get biopsy  "and then we will follow-up with recommendations once this is back.    2.  Dysphagia:  -  This may not have a simple solution.  -  Appears to be caused by external compression of the esophagus by a necrotic para-esophageal LN.    -  I am concerned that radiation to this area would prove to be quite toxic.  -  Could consider placement of feeding tube.  She had evidence of slow gastric emptying which may necessitate G/J tube.  -  Depending on the result of her biopsy , she may or may not want to proceed with this.    3.  Goals of Care:  -  Ms. Garg tells me that she is \"ready to go.\"  She says she may not want to take treatment for malignancy if this is indeed diagnosed.  She does say that her children aren't ready for her to go yet and they reinforced this.    -  Will get biopsy and then discuss options once pathology is available.     Discussed w/ Dr. Isael Balbuena MD  04/03/23  19:47 EDT    "

## 2023-04-03 NOTE — PROGRESS NOTES
[facility] HOSPITALIST PROGRESS NOTE     Patient Identification:  Name:  Christine Garg  Age:  89 y.o.  Sex:  female  :  1933  MRN:  [id number]  Visit Number:  [id number]  ROOM: [address] [place] [address]     Primary Care Provider:  Dave Tobar MD     Date of Admission: 3/22/2023    Length of stay in inpatient status:  12    Subjective     Chief Compliant:    Chief Complaint   Patient presents with   • Abdominal Pain     History of Presenting Illness:  Patient states that she has nausea anytime she drinks; she is able to handle popsicles.  She is making bowel movements.  She does not have chest pain, trouble breathing, diarrhea, emesis.    Objective     Current Hospital Meds:  carvedilol, 25 mg, Oral, BID With Meals  castor oil-balsam peru, 1 application, Topical, Q12H  heparin (porcine), 5,000 Units, Subcutaneous, Q12H  hydrALAZINE, 25 mg, Oral, Q8H  levothyroxine, 112 mcg, Oral, Q AM  lidocaine, 1 patch, Transdermal, Q24H  multivitamin, 10 mL, Oral, Daily  pantoprazole, 40 mg, Oral, BID AC  polyethylene glycol, 17 g, Oral, Daily  sodium chloride, 10 mL, Intravenous, Q12H  sodium chloride, 10 mL, Intravenous, Q12H  sodium chloride, 10 mL, Intravenous, Q12H  sodium chloride, 3 mL, Intravenous, Q12H  valsartan, 320 mg, Oral, Q24H       Current Antimicrobial Therapy:  Anti-Infectives (From admission, onward)    Ordered     Dose/Rate Route Frequency Start Stop    23 0849  piperacillin-tazobactam (ZOSYN) IVPB 3.375 g in 100 mL NS VTB        Ordering Provider: Aurora Kirkland MD    3.375 g  over 4 Hours Intravenous Every 8 Hours 23 1600 23 1233    23 0849  piperacillin-tazobactam (ZOSYN) IVPB 3.375 g in 100 mL NS VTB        Ordering Provider: Aurora Kirkland MD    3.375 g  over 30 Minutes Intravenous Once 23 0945 23 1026    23 1207  piperacillin-tazobactam (ZOSYN) IVPB 3.375 g in 100 mL NS VTB        Ordering Provider: Alejandrina Gonzalez PA    3.375 g  over 30  Minutes Intravenous Once 03/22/23 1209 03/22/23 1329    03/22/23 1207  vancomycin 1250 mg/250 mL 0.9% NS IVPB ([facility])        Ordering Provider: Alejandrina Gonzalez PA    20 mg/kg × 56.7 kg Intravenous Once 03/22/23 1209 03/22/23 1505        Current Diuretic Therapy:  Diuretics (From admission, onward)    None        ----------------------------------------------------------------------------------------------------------------------  Vital Signs:  Temp:  [97.6 °F (36.4 °C)-98.6 °F (37 °C)] 97.7 °F (36.5 °C)  Heart Rate:  [70-86] 76  Resp:  [17-20] 18  BP: (115-173)/(45-70) 124/59  SpO2:  [94 %-97 %] 96 %  on  Flow (L/min):  [1] 1;   Device (Oxygen Therapy): nasal cannula  Body mass index is 23.98 kg/m².    Wt Readings from Last 3 Encounters:   04/03/23 65.4 kg (144 lb 1.6 oz)   01/05/23 68 kg (150 lb)   12/25/22 73.9 kg (163 lb)     Intake & Output (last 3 days)       03/31 0701  04/01 0700 04/01 0701  04/02 0700 04/02 0701 04/03 0700 04/03 0701  04/04 0700    P.O. 300 840 480 0    Total Intake(mL/kg) 300 (4.5) 840 (12.6) 480 (7.3) 0 (0)    Urine (mL/kg/hr)  0 (0) 325 (0.2)     Emesis/NG output  40      Drains        Stool  0      Total Output  40 325     Net +300 +800 +155 0            Urine Unmeasured Occurrence 3 x 5 x 1 x     Stool Unmeasured Occurrence 2 x 1 x          Diet: Regular/House Diet; No Carbonated Beverages, No Straw; Texture: Soft to Chew (NDD 3); Soft to Chew: Whole Meat; Fluid Consistency: Thin (IDDSI 0)  ----------------------------------------------------------------------------------------------------------------------  Physical Exam  Vitals and nursing note reviewed.   Constitutional:       General: She is not in acute distress.     Appearance: She is not ill-appearing.   HENT:      Head: Normocephalic and atraumatic.      Right Ear: External ear normal.      Left Ear: External ear normal.   Eyes:      General: No scleral icterus.        Right eye: No discharge.         Left eye: No discharge.       Conjunctiva/sclera: Conjunctivae normal.      Pupils: Pupils are equal, round, and reactive to light.   Cardiovascular:      Rate and Rhythm: Normal rate and regular rhythm.      Pulses: Normal pulses.      Heart sounds: No murmur heard.  Pulmonary:      Effort: Pulmonary effort is normal. No respiratory distress.      Breath sounds: No wheezing or rales.   Abdominal:      General: Bowel sounds are normal. There is no distension.      Palpations: Abdomen is soft.   Musculoskeletal:         General: No swelling, deformity or signs of injury.   Skin:     Capillary Refill: Capillary refill takes less than 2 seconds.      Coloration: Skin is not jaundiced or pale.   Neurological:      Mental Status: She is alert and oriented to person, place, and time. Mental status is at baseline.      Cranial Nerves: No cranial nerve deficit.   Psychiatric:         Mood and Affect: Mood normal.         Behavior: Behavior normal.       ----------------------------------------------------------------------------------------------------------------------  Tele:  NS with heart rates 50-60's.  I have personally reviewed/looked at the telemetry strips.  ----------------------------------------------------------------------------------------------------------------------  LABS:    CBC and coagulation:  Results from last 7 days   Lab Units 04/02/23  0254 03/30/23  0139 03/29/23  0143 03/28/23  0817   WBC 10*3/mm3 10.89* 8.93 11.69* 13.43*   HEMOGLOBIN g/dL 10.0* 9.1* 9.3* 11.0*   HEMATOCRIT % 30.9* 27.5* 28.4* 34.7   MCV fL 97.2* 96.8 95.9 98.3*   MCHC g/dL 32.4 33.1 32.7 31.7   PLATELETS 10*3/mm3 280 238 255 274     Renal and electrolytes:  Results from last 7 days   Lab Units 04/03/23  0247 04/02/23  1040 04/01/23  0858 03/31/23  0012 03/30/23  0139 03/29/23  0143 03/28/23  0817   SODIUM mmol/L  --  135*  --  138 138 135* 135*   POTASSIUM mmol/L  --  4.0  --  4.8  4.7 3.5 3.8 3.7   MAGNESIUM mg/dL 1.9  --  1.9 1.7  --   --   --     CHLORIDE mmol/L  --  98  --  103 103 103 103   CO2 mmol/L  --  30.4*  --  25.6 28.8 26.3 23.2   BUN mg/dL  --  9  --  9 8 8 6*   CREATININE mg/dL  --  0.49*  --  0.44* 0.44* 0.43* 0.40*   CALCIUM mg/dL  --  9.0  --  8.9 8.3* 8.5* 8.7   GLUCOSE mg/dL  --  82  --  108* 104* 130* 113*     Estimated Creatinine Clearance: 80.4 mL/min (A) (by C-G formula based on SCr of 0.49 mg/dL (L)).    Liver and pancreatic function:  Results from last 7 days   Lab Units 03/30/23  0139 03/29/23 0143 03/28/23  0817   ALBUMIN g/dL 2.2* 2.3* 2.4*   BILIRUBIN mg/dL 0.2 0.3 0.5   ALK PHOS U/L 83 89 112   AST (SGOT) U/L 19 27 31   ALT (SGPT) U/L 42* 50* 59*     Endocrine function:  Lab Results   Component Value Date    HGBA1C 6.10 (H) 03/22/2023     Glucose levels from the [facility]:  Results from last 7 days   Lab Units 04/02/23  1040 03/31/23  0012 03/30/23 0139 03/29/23 0143 03/28/23  0817   GLUCOSE mg/dL 82 108* 104* 130* 113*         I have personally looked at the labs and they are summarized above.  ----------------------------------------------------------------------------------------------------------------------  Detailed radiology reports for the last 24 hours:    Imaging Results (Last 24 Hours)     Procedure Component Value Units Date/Time    FL Upper GI Single Contrast SBFT [[id number]] Collected: 04/03/23 1105     Updated: 04/03/23 1114    Narrative:      EXAM:    FL Upper Gastrointestinal Tract With Small Bowel Follow Through     EXAM DATE:    4/3/2023 8:17 AM     CLINICAL HISTORY:    nausea and vomiting after surgery for perforated ulcer; K25.1-Acute  gastric ulcer with perforation; E87.2-Wepn-ogobvbyrjh and hyponatremia;  E87.6-Hypokalemia; A41.9-Sepsis, unspecified organism; K27.5-Chronic or  unspecified peptic ulcer, site unspecified, with perforation     TECHNIQUE:    Fluoroscopy of the upper gastrointestinal tract with oral contrast and  with or without KUB followed by serial images of the small bowel.   Fluoroscopic  guidance was provided by a physician. Fluoroscopy exposure  time of approximately 1 minute or less.     COMPARISON:    12/24/2022     FINDINGS:    Esophagus:  Fixed area of esophageal narrowing is noted involving the  mid thoracic esophagus just distal to the bala and has features that  would suggest extrinsic mass effect.  Esophageal tertiary contractions  noted with mild dysmotility.  No reflux.    Stomach:  Unremarkable.  No mass.  Normal mucosa.    Duodenum:  Unremarkable.  No mass.  Normal mucosa.    Small bowel:  Unremarkable.  No mass or abnormal filling defect.    Colon:  Small bowel is unremarkable with normal small bowel transit.  Contrast reaches the colon within 1.5 hours.    Tubes, lines and devices:  Markedly delayed gastric emptying is noted  with only small amounts of contrast passing across the pylorus. Limited  assessment of the gastroduodenal junction due to cardiac leads and  surgical staples but no definite contrast leak identified.    Other findings:  Small hiatal hernia.  No bowel obstruction.       Impression:      1.  Fixed area of esophageal narrowing is noted involving the mid  thoracic esophagus just distal to the bala and has features that would  suggest extrinsic mass effect. Correlate with cross-sectional imaging.  2.  Esophageal tertiary contractions noted with mild dysmotility.  3.  Small hiatal hernia.  4.  Markedly delayed gastric emptying is noted with only small amounts  of contrast passing across the pylorus. Limited assessment of the  gastroduodenal junction due to cardiac leads and surgical staples but no  definite contrast leak identified.  5.  Small bowel is unremarkable with normal small bowel transit.  Contrast reaches the colon within 1.5 hours.  6.  No bowel obstruction.     This report was finalized on 4/3/2023 11:08 AM by Dr. Tobias Stoll MD.           I have personally looked at the radiology images and I have read the available final report.    Assessment &  Plan      -Severe sepsis that was present on admission (temperature 36 degrees C, lactic acid 3.5, CRP 3.44, WBC 30,990, heart rates ) due to a suspected perforated antral gastric ulcer, status post oversewing of the ulcer with an omental patch and Biopatch  -Acute hypokalemia and acute hypomagnesemia and acute hypophosphatemia, improved with supplementation  -Extrinsic mass effect of the mid thoracic esophagus, suspect from the T5-T6 level lymph node, with the node compromising the exiting nerve root and erosion of the adjacent vertebral body, causing difficulty swallowing  -History of coronary artery disease s/p 3v CABG in 2011  -History of chronic kidney disease stage IIIa with baseline Cr 0.6-0.85   -History of essential hypertension  -History of hypothyroidism, currently controlled  -History of hyperlipidemia  -History of left upper lobe pleural based pulmonary nodule that appears to be invading the T6 vertebra  -Advanced age  -Prior tobacco smoking history    Note the new oncology consult and CT scan biopsy that has been planned.  As the her medical conditions, blood pressures are high but are acceptable at this time.  Also, electrolytes for the most part are at goal; will continue with daily blood work to follow this.      VTE Prophylaxis:   Mechanical Order History:      Ordered        03/22/23 1802  Place sequential compression device  Once                    Pharmalogical Order History:      Ordered     Dose Route Frequency Stop    03/22/23 1802  heparin (porcine) 5000 UNIT/ML injection 5,000 Units         5,000 Units SC Every 12 Hours Scheduled --            Disposition:  Undetermined at this time    Kiara Servin MD  [facility] Hospitalist  04/03/23  11:46 EDT

## 2023-04-03 NOTE — PLAN OF CARE
Goal Outcome Evaluation:   Pt has been resting this shift. No signs and symptoms of acute distress noted. No complaints of chest pain or SOB reported.

## 2023-04-03 NOTE — CASE MANAGEMENT/SOCIAL WORK
Discharge Planning Assessment  [facility]     Patient Name: Christine Garg  MRN: [id number]  Today's Date: 4/3/2023    Admit Date: 3/22/2023    Plan: [facility] per Odette continues to review pt for possible admit assessing insurance.  SS will follow.     Discharge Plan     Row Name 04/03/23 1034       Plan    Plan [facility] per Odette continues to review pt for possible admit assessing insurance.  SS will follow.              Continued Care and Services - Admitted Since 3/22/2023     Destination     Service Provider Request Status Selected Services Address Phone Fax Patient Preferred    [facility] Pending - Request Sent N/A [id number] [facility] [address] [phone] --                       ZA Martins

## 2023-04-03 NOTE — CASE MANAGEMENT/SOCIAL WORK
Discharge Planning Assessment   [facility]     Patient Name: Christine Garg  MRN: [id number]  Today's Date: 4/3/2023    Admit Date: 3/22/2023         Discharge Plan     Row Name 04/03/23 1614       Plan    Plan [facility] per Odette agreeable to accepting pt if treatment would be placed on hold and will begin pre authorization.    SS spoke with Physician on this date and nursing home placement is on hold at this time pending outcome of oncology evaluation.  SS will follow.                ZA Martins

## 2023-04-04 ENCOUNTER — APPOINTMENT (OUTPATIENT)
Dept: CT IMAGING | Facility: HOSPITAL | Age: 88
DRG: 853 | End: 2023-04-04
Payer: MEDICARE

## 2023-04-04 LAB
ALBUMIN SERPL-MCNC: 2.5 G/DL (ref 3.5–5.2)
ALBUMIN/GLOB SERPL: 1.1 G/DL
ALP SERPL-CCNC: 82 U/L (ref 39–117)
ALT SERPL W P-5'-P-CCNC: 17 U/L (ref 1–33)
ANION GAP SERPL CALCULATED.3IONS-SCNC: 7.4 MMOL/L (ref 5–15)
APTT PPP: 30.4 SECONDS (ref 26.5–34.5)
AST SERPL-CCNC: 13 U/L (ref 1–32)
BASOPHILS # BLD AUTO: 0.08 10*3/MM3 (ref 0–0.2)
BASOPHILS NFR BLD AUTO: 0.7 % (ref 0–1.5)
BILIRUB SERPL-MCNC: 0.2 MG/DL (ref 0–1.2)
BUN SERPL-MCNC: 10 MG/DL (ref 8–23)
BUN/CREAT SERPL: 20.8 (ref 7–25)
CALCIUM SPEC-SCNC: 8.6 MG/DL (ref 8.6–10.5)
CHLORIDE SERPL-SCNC: 101 MMOL/L (ref 98–107)
CO2 SERPL-SCNC: 29.6 MMOL/L (ref 22–29)
CREAT SERPL-MCNC: 0.48 MG/DL (ref 0.57–1)
DEPRECATED RDW RBC AUTO: 49.3 FL (ref 37–54)
EGFRCR SERPLBLD CKD-EPI 2021: 90.7 ML/MIN/1.73
EOSINOPHIL # BLD AUTO: 0.61 10*3/MM3 (ref 0–0.4)
EOSINOPHIL NFR BLD AUTO: 5.3 % (ref 0.3–6.2)
ERYTHROCYTE [DISTWIDTH] IN BLOOD BY AUTOMATED COUNT: 14.3 % (ref 12.3–15.4)
GLOBULIN UR ELPH-MCNC: 2.3 GM/DL
GLUCOSE BLDC GLUCOMTR-MCNC: 92 MG/DL (ref 70–130)
GLUCOSE SERPL-MCNC: 91 MG/DL (ref 65–99)
HCT VFR BLD AUTO: 29.3 % (ref 34–46.6)
HGB BLD-MCNC: 9.6 G/DL (ref 12–15.9)
IMM GRANULOCYTES # BLD AUTO: 0.06 10*3/MM3 (ref 0–0.05)
IMM GRANULOCYTES NFR BLD AUTO: 0.5 % (ref 0–0.5)
INR PPP: 0.87 (ref 0.9–1.1)
LYMPHOCYTES # BLD AUTO: 1.31 10*3/MM3 (ref 0.7–3.1)
LYMPHOCYTES NFR BLD AUTO: 11.4 % (ref 19.6–45.3)
MAGNESIUM SERPL-MCNC: 2.1 MG/DL (ref 1.6–2.4)
MCH RBC QN AUTO: 31.1 PG (ref 26.6–33)
MCHC RBC AUTO-ENTMCNC: 32.8 G/DL (ref 31.5–35.7)
MCV RBC AUTO: 94.8 FL (ref 79–97)
MONOCYTES # BLD AUTO: 0.54 10*3/MM3 (ref 0.1–0.9)
MONOCYTES NFR BLD AUTO: 4.7 % (ref 5–12)
NEUTROPHILS NFR BLD AUTO: 77.4 % (ref 42.7–76)
NEUTROPHILS NFR BLD AUTO: 8.87 10*3/MM3 (ref 1.7–7)
NRBC BLD AUTO-RTO: 0 /100 WBC (ref 0–0.2)
PHOSPHATE SERPL-MCNC: 3.6 MG/DL (ref 2.5–4.5)
PLATELET # BLD AUTO: 298 10*3/MM3 (ref 140–450)
PMV BLD AUTO: 10 FL (ref 6–12)
POTASSIUM SERPL-SCNC: 3.4 MMOL/L (ref 3.5–5.2)
POTASSIUM SERPL-SCNC: 4.4 MMOL/L (ref 3.5–5.2)
PROT SERPL-MCNC: 4.8 G/DL (ref 6–8.5)
PROTHROMBIN TIME: 12 SECONDS (ref 12.1–14.7)
RBC # BLD AUTO: 3.09 10*6/MM3 (ref 3.77–5.28)
SODIUM SERPL-SCNC: 138 MMOL/L (ref 136–145)
WBC NRBC COR # BLD: 11.47 10*3/MM3 (ref 3.4–10.8)

## 2023-04-04 PROCEDURE — 85610 PROTHROMBIN TIME: CPT | Performed by: INTERNAL MEDICINE

## 2023-04-04 PROCEDURE — 99024 POSTOP FOLLOW-UP VISIT: CPT | Performed by: SURGERY

## 2023-04-04 PROCEDURE — 32400 NEEDLE BIOPSY CHEST LINING: CPT | Performed by: RADIOLOGY

## 2023-04-04 PROCEDURE — 80053 COMPREHEN METABOLIC PANEL: CPT | Performed by: INTERNAL MEDICINE

## 2023-04-04 PROCEDURE — 25010000002 ONDANSETRON PER 1 MG: Performed by: SURGERY

## 2023-04-04 PROCEDURE — 84100 ASSAY OF PHOSPHORUS: CPT | Performed by: INTERNAL MEDICINE

## 2023-04-04 PROCEDURE — 99232 SBSQ HOSP IP/OBS MODERATE 35: CPT | Performed by: INTERNAL MEDICINE

## 2023-04-04 PROCEDURE — 82962 GLUCOSE BLOOD TEST: CPT

## 2023-04-04 PROCEDURE — 85025 COMPLETE CBC W/AUTO DIFF WBC: CPT | Performed by: INTERNAL MEDICINE

## 2023-04-04 PROCEDURE — 0BBJ3ZX EXCISION OF LEFT LOWER LUNG LOBE, PERCUTANEOUS APPROACH, DIAGNOSTIC: ICD-10-PCS | Performed by: SURGERY

## 2023-04-04 PROCEDURE — 85730 THROMBOPLASTIN TIME PARTIAL: CPT | Performed by: INTERNAL MEDICINE

## 2023-04-04 PROCEDURE — 84132 ASSAY OF SERUM POTASSIUM: CPT | Performed by: SURGERY

## 2023-04-04 PROCEDURE — 83735 ASSAY OF MAGNESIUM: CPT | Performed by: INTERNAL MEDICINE

## 2023-04-04 PROCEDURE — 25010000002 HEPARIN (PORCINE) PER 1000 UNITS: Performed by: SURGERY

## 2023-04-04 PROCEDURE — 77012 CT SCAN FOR NEEDLE BIOPSY: CPT

## 2023-04-04 RX ORDER — POTASSIUM CHLORIDE 20 MEQ/1
40 TABLET, EXTENDED RELEASE ORAL EVERY 4 HOURS
Status: COMPLETED | OUTPATIENT
Start: 2023-04-04 | End: 2023-04-04

## 2023-04-04 RX ORDER — HYDRALAZINE HYDROCHLORIDE 50 MG/1
50 TABLET, FILM COATED ORAL EVERY 8 HOURS SCHEDULED
Status: DISCONTINUED | OUTPATIENT
Start: 2023-04-04 | End: 2023-04-10 | Stop reason: HOSPADM

## 2023-04-04 RX ADMIN — CASTOR OIL AND BALSAM, PERU 1 APPLICATION: 788; 87 OINTMENT TOPICAL at 20:15

## 2023-04-04 RX ADMIN — Medication 10 ML: at 20:16

## 2023-04-04 RX ADMIN — HYDROCODONE BITARTRATE AND ACETAMINOPHEN 1 TABLET: 7.5; 325 TABLET ORAL at 12:51

## 2023-04-04 RX ADMIN — POTASSIUM CHLORIDE 40 MEQ: 20 TABLET, EXTENDED RELEASE ORAL at 04:56

## 2023-04-04 RX ADMIN — HYDROCODONE BITARTRATE AND ACETAMINOPHEN 1 TABLET: 7.5; 325 TABLET ORAL at 16:56

## 2023-04-04 RX ADMIN — HEPARIN SODIUM 5000 UNITS: 5000 INJECTION INTRAVENOUS; SUBCUTANEOUS at 20:15

## 2023-04-04 RX ADMIN — HYDRALAZINE HYDROCHLORIDE 25 MG: 50 TABLET, FILM COATED ORAL at 15:09

## 2023-04-04 RX ADMIN — PANTOPRAZOLE SODIUM 40 MG: 40 TABLET, DELAYED RELEASE ORAL at 08:56

## 2023-04-04 RX ADMIN — Medication 10 ML: at 08:57

## 2023-04-04 RX ADMIN — CARVEDILOL 25 MG: 25 TABLET, FILM COATED ORAL at 17:38

## 2023-04-04 RX ADMIN — HYDRALAZINE HYDROCHLORIDE 50 MG: 50 TABLET, FILM COATED ORAL at 21:10

## 2023-04-04 RX ADMIN — ONDANSETRON 4 MG: 2 INJECTION INTRAMUSCULAR; INTRAVENOUS at 04:53

## 2023-04-04 RX ADMIN — HYDRALAZINE HYDROCHLORIDE 25 MG: 50 TABLET, FILM COATED ORAL at 05:04

## 2023-04-04 RX ADMIN — PANTOPRAZOLE SODIUM 40 MG: 40 TABLET, DELAYED RELEASE ORAL at 16:56

## 2023-04-04 RX ADMIN — Medication 3 ML: at 20:16

## 2023-04-04 RX ADMIN — VALSARTAN 320 MG: 160 TABLET, FILM COATED ORAL at 08:56

## 2023-04-04 RX ADMIN — LIDOCAINE 1 PATCH: 700 PATCH TOPICAL at 08:55

## 2023-04-04 RX ADMIN — Medication 10 ML: at 08:56

## 2023-04-04 RX ADMIN — HYDROCODONE BITARTRATE AND ACETAMINOPHEN 1 TABLET: 7.5; 325 TABLET ORAL at 04:53

## 2023-04-04 RX ADMIN — CARVEDILOL 25 MG: 25 TABLET, FILM COATED ORAL at 08:56

## 2023-04-04 RX ADMIN — CASTOR OIL AND BALSAM, PERU 1 APPLICATION: 788; 87 OINTMENT TOPICAL at 08:57

## 2023-04-04 RX ADMIN — Medication 3 ML: at 08:57

## 2023-04-04 RX ADMIN — POTASSIUM CHLORIDE 40 MEQ: 20 TABLET, EXTENDED RELEASE ORAL at 08:56

## 2023-04-04 RX ADMIN — LEVOTHYROXINE SODIUM 112 MCG: 0.07 TABLET ORAL at 05:04

## 2023-04-04 RX ADMIN — HYDROCODONE BITARTRATE AND ACETAMINOPHEN 1 TABLET: 7.5; 325 TABLET ORAL at 20:23

## 2023-04-04 NOTE — NURSING NOTE
Procedure completed. Patient tolerated well, denies needs or complaints at this time. Will continue to monitor patient  post procedure. Report called to pt primary RN Rosa Elena & pt transported back to room via transport team.

## 2023-04-04 NOTE — PLAN OF CARE
Goal Outcome Evaluation:   Pt has been resting this shift. Pt's potassium was 3.4 this shift, currently replacing. Pt did complain of pain and nausea this shift. PRN medication was administered, see MAR. No signs and symptoms of acute distress noted. No complaints of chest pain or SOB reported.

## 2023-04-04 NOTE — PROGRESS NOTES
[facility] HOSPITALIST PROGRESS NOTE     Patient Identification:  Name:  Christine Garg  Age:  89 y.o.  Sex:  female  :  1933  MRN:  [id number]  Visit Number:  [id number]  ROOM: [address] [place] [address]     Primary Care Provider:  Dave Tobar MD     Date of Admission: 3/22/2023    Length of stay in inpatient status:  13    Subjective     Chief Compliant:    Chief Complaint   Patient presents with   • Abdominal Pain     History of Presenting Illness: I saw the patient when she arrived back to her room from CT guided lung biopsy.  Patient was still sedated from the oral medication given her in the radiology department.  However, the patient stated that the area where they performed the biopsy was not painful.  She otherwise denied chest pain, nausea, vomiting, and diarrhea.  At bedside during my evaluation was the patient's son and daughter-in-law.    Objective     Current Hospital Meds:  carvedilol, 25 mg, Oral, BID With Meals  castor oil-balsam peru, 1 application, Topical, Q12H  heparin (porcine), 5,000 Units, Subcutaneous, Q12H  hydrALAZINE, 25 mg, Oral, Q8H  levothyroxine, 112 mcg, Oral, Q AM  lidocaine, 1 patch, Transdermal, Q24H  multivitamin, 10 mL, Oral, Daily  pantoprazole, 40 mg, Oral, BID AC  polyethylene glycol, 17 g, Oral, Daily  sodium chloride, 10 mL, Intravenous, Q12H  sodium chloride, 10 mL, Intravenous, Q12H  sodium chloride, 10 mL, Intravenous, Q12H  sodium chloride, 3 mL, Intravenous, Q12H  valsartan, 320 mg, Oral, Q24H       Current Antimicrobial Therapy:  Anti-Infectives (From admission, onward)    Ordered     Dose/Rate Route Frequency Start Stop    23 0849  piperacillin-tazobactam (ZOSYN) IVPB 3.375 g in 100 mL NS VTB        Ordering Provider: Aurora Kirkland MD    3.375 g  over 4 Hours Intravenous Every 8 Hours 23 1600 23 1233    23 0849  piperacillin-tazobactam (ZOSYN) IVPB 3.375 g in 100 mL NS VTB        Ordering Provider: Aurora Kirkland MD     3.375 g  over 30 Minutes Intravenous Once 03/23/23 0945 03/23/23 1026    03/22/23 1207  piperacillin-tazobactam (ZOSYN) IVPB 3.375 g in 100 mL NS VTB        Ordering Provider: Alejandrina Gonzalez PA    3.375 g  over 30 Minutes Intravenous Once 03/22/23 1209 03/22/23 1329    03/22/23 1207  vancomycin 1250 mg/250 mL 0.9% NS IVPB ([facility])        Ordering Provider: Alejandrina Gonzalez PA    20 mg/kg × 56.7 kg Intravenous Once 03/22/23 1209 03/22/23 1505        Current Diuretic Therapy:  Diuretics (From admission, onward)    None        ----------------------------------------------------------------------------------------------------------------------  Vital Signs:  Temp:  [97.9 °F (36.6 °C)-98.4 °F (36.9 °C)] 97.9 °F (36.6 °C)  Heart Rate:  [66-84] 71  Resp:  [16-18] 18  BP: (139-191)/(53-87) 187/76  SpO2:  [96 %-99 %] 97 %  on  Flow (L/min):  [1-2] 2;   Device (Oxygen Therapy): nasal cannula  Body mass index is 24.15 kg/m².    Wt Readings from Last 3 Encounters:   04/04/23 65.8 kg (145 lb 1.6 oz)   01/05/23 68 kg (150 lb)   12/25/22 73.9 kg (163 lb)     Intake & Output (last 3 days)       04/01 0701  04/02 0700 04/02 0701  04/03 0700 04/03 0701  04/04 0700 04/04 0701  04/05 0700    P.O. 840 480 0 360    Total Intake(mL/kg) 840 (12.6) 480 (7.3) 0 (0) 360 (5.5)    Urine (mL/kg/hr) 0 (0) 325 (0.2)      Emesis/NG output 40       Stool 0       Total Output 40 325      Net +800 +155 0 +360            Urine Unmeasured Occurrence 5 x 1 x 1 x 1 x    Stool Unmeasured Occurrence 1 x  1 x         Diet: Liquid Diets; Full Liquid; No Carbonated Beverages, No Straw; Texture: Regular Texture (IDDSI 7); Fluid Consistency: Thin (IDDSI 0)  ----------------------------------------------------------------------------------------------------------------------  Physical Exam   Constitutional:       General: She is not in acute distress.  She is still slightly sedated from the sedatives given to her prior to her biopsy.     Appearance: She is  not ill-appearing.  Cardiovascular:      Rate and Rhythm: Normal rate and regular rhythm.      Pulses: Normal pulses.      Heart sounds: No murmur heard.  Pulmonary:      Effort: Pulmonary effort is normal. No respiratory distress.      Breath sounds: No wheezing or rales.   Neurological:      Mental Status: She is alert and oriented to person, place, and time. Mental status is at baseline.      Cranial Nerves: No cranial nerve deficit.   ----------------------------------------------------------------------------------------------------------------------  Tele: Normal sinus rhythm with heart rates in the 70s.  Personally reviewed the telemetry strips.  ----------------------------------------------------------------------------------------------------------------------  LABS:    CBC and coagulation:  Results from last 7 days   Lab Units 04/04/23  0231 04/02/23  0254 03/30/23  0139 03/29/23  0143   WBC 10*3/mm3 11.47* 10.89* 8.93 11.69*   HEMOGLOBIN g/dL 9.6* 10.0* 9.1* 9.3*   HEMATOCRIT % 29.3* 30.9* 27.5* 28.4*   MCV fL 94.8 97.2* 96.8 95.9   MCHC g/dL 32.8 32.4 33.1 32.7   PLATELETS 10*3/mm3 298 280 238 255   INR  0.87*  --   --   --      Renal and electrolytes:  Results from last 7 days   Lab Units 04/04/23  1751 04/04/23  0231 04/03/23  0247 04/02/23  1040 04/01/23  0858 03/31/23  0012 03/30/23  0139 03/29/23  0143   SODIUM mmol/L  --  138  --  135*  --  138 138 135*   POTASSIUM mmol/L 4.4 3.4*  --  4.0  --  4.8  4.7 3.5 3.8   MAGNESIUM mg/dL  --  2.1 1.9  --  1.9 1.7  --   --    CHLORIDE mmol/L  --  101  --  98  --  103 103 103   CO2 mmol/L  --  29.6*  --  30.4*  --  25.6 28.8 26.3   BUN mg/dL  --  10  --  9  --  9 8 8   CREATININE mg/dL  --  0.48*  --  0.49*  --  0.44* 0.44* 0.43*   CALCIUM mg/dL  --  8.6  --  9.0  --  8.9 8.3* 8.5*   PHOSPHORUS mg/dL  --  3.6  --   --   --   --   --   --    GLUCOSE mg/dL  --  91  --  82  --  108* 104* 130*     Estimated Creatinine Clearance: 82.5 mL/min (A) (by C-G formula  based on SCr of 0.48 mg/dL (L)).    Liver and pancreatic function:  Results from last 7 days   Lab Units 04/04/23  0231 03/30/23  0139 03/29/23  0143   ALBUMIN g/dL 2.5* 2.2* 2.3*   BILIRUBIN mg/dL 0.2 0.2 0.3   ALK PHOS U/L 82 83 89   AST (SGOT) U/L 13 19 27   ALT (SGPT) U/L 17 42* 50*     Endocrine function:  Point of care bedside glucose levels:  Results from last 7 days   Lab Units 04/04/23  1712   GLUCOSE mg/dL 92     Glucose levels from the [facility]:  Results from last 7 days   Lab Units 04/04/23  0231 04/02/23  1040 03/31/23  0012 03/30/23  0139 03/29/23  0143   GLUCOSE mg/dL 91 82 108* 104* 130*       I have personally looked at the labs and they are summarized above.  ----------------------------------------------------------------------------------------------------------------------  Detailed radiology reports for the last 24 hours:    Imaging Results (Last 24 Hours)     Procedure Component Value Units Date/Time    CT Needle Biopsy Pleura [[id number]] Collected: 04/04/23 1328     Updated: 04/04/23 1333    Narrative:      EXAMINATION: CT NEEDLE BIOPSY PLEURA-      CLINICAL INDICATION:pleural based mass; K25.1-Acute gastric ulcer with  perforation; E87.5-Xbxt-fcqoqsaryk and hyponatremia; E87.6-Hypokalemia;  A41.9-Sepsis, unspecified organism; K27.5-Chronic or unspecified peptic  ulcer, site unspecified, with perforation     COMPARISON: CT 03/29/2023      TECHNIQUE/FINDINGS:   Informed consent was obtained regarding risks associated with the  procedure including infection, bleeding, and injury to adjacent  structures. Preprocedure CT imaging redemonstrates pleural-based mass  that is essentially paraspinal in location involving the left lower lobe  at the T6 level that is targeted for CT-guided biopsy.  The posterior chest was prepped and draped in sterile fashion. 1%  lidocaine was used to achieve local anesthesia. Under CT fluoroscopy  guidance, 3 18-gauge core needle samples were obtained using a 10  cm  18-gauge core needle biopsy system. Specimens were cemented in formalin  for eventual histopathologic analysis. No complication encountered  during or immediately following the procedure. The biopsy site was  cleansed and bandaged.       Impression:      CT-guided biopsy of a left lower lobe region pleural-based mass.     This report was finalized on 4/4/2023 1:31 PM by Dr. Tobias Stoll MD.           I have personally looked at the radiology images and I have read the available final report.    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  Patient's blood pressures are currently elevated at this time, even considering that she had the sedative medications for her CT lung biopsy.  Therefore, I will increase the hydralazine to 50 by mouth 3 times a day we will continue to monitor her blood pressures closely.  Due to her advanced age, I do not want her systolic blood pressure to drop below 140 mmHg because her risk of orthostatic hypotension is  high.  Her kidney function is stable.  Her electrolytes are intermittently deranged and thus we will continue to monitor these.    VTE Prophylaxis:   Mechanical Order History:      Ordered        03/22/23 1802  Place sequential compression device  Once                    Pharmalogical Order History:      Ordered     Dose Route Frequency Stop    03/22/23 1802  heparin (porcine) 5000 UNIT/ML injection 5,000 Units         5,000 Units SC Every 12 Hours Scheduled --            Disposition:  Undetermined at this time    Kiara Servin MD  [facility] Hospitalist  04/04/23  18:47 EDT

## 2023-04-04 NOTE — PROGRESS NOTES
LOS: 13 days   Patient Care Team:  Dave Tobar MD as PCP - General  Dave Tobar MD as PCP - Family Medicine    Post op day # 13, status post laparotomy with closure of perforated duodenal ulcer with omental patch and Biopatch    Subjective     Interval History:  Patient voices no complaints.  Her bowels are working.  She is voiding.  She is taking ice chips but does not care for the boost.  She does not appear to like the Ensure clear equivalent.     Objective     Vital Signs  Temp:  [97.7 °F (36.5 °C)-98.4 °F (36.9 °C)] 98 °F (36.7 °C)  Heart Rate:  [66-78] 77  Resp:  [18] 18  BP: (124-168)/(53-78) 151/62    Physical Exam:  This is a well-developed well-nourished elderly female in no acute distress  HEENT examination: Sclera are anicteric  Lungs: Clear  Abdomen: Bowel sounds are present  Skin/incisions: Surgical  Incision intact and dry.       Results Review:    Results from last 7 days   Lab Units 03/30/23  1410 03/30/23  1132   HSTROP T ng/L 12* 17*     Results from last 7 days   Lab Units 04/04/23  0231 04/02/23  0254 03/30/23  0139   WBC 10*3/mm3 11.47* 10.89* 8.93   HEMOGLOBIN g/dL 9.6* 10.0* 9.1*   HEMATOCRIT % 29.3* 30.9* 27.5*   PLATELETS 10*3/mm3 298 280 238   INR  0.87*  --   --          Results from last 7 days   Lab Units 04/04/23  0231 04/03/23  0247 04/02/23  1040 04/01/23  0858 03/31/23  0012 03/30/23  0139 03/30/23  0139 03/29/23  0143   SODIUM mmol/L 138  --  135*  --  138  --  138 135*   POTASSIUM mmol/L 3.4*  --  4.0  --  4.8  4.7  --  3.5 3.8   MAGNESIUM mg/dL 2.1 1.9  --  1.9 1.7   < >  --   --    CHLORIDE mmol/L 101  --  98  --  103  --  103 103   CO2 mmol/L 29.6*  --  30.4*  --  25.6  --  28.8 26.3   BUN mg/dL 10  --  9  --  9  --  8 8   CREATININE mg/dL 0.48*  --  0.49*  --  0.44*  --  0.44* 0.43*   CALCIUM mg/dL 8.6  --  9.0  --  8.9  --  8.3* 8.5*   GLUCOSE mg/dL 91  --  82  --  108*  --  104* 130*   ALBUMIN g/dL 2.5*  --   --   --   --   --  2.2* 2.3*   BILIRUBIN mg/dL  0.2  --   --   --   --   --  0.2 0.3   ALK PHOS U/L 82  --   --   --   --   --  83 89   AST (SGOT) U/L 13  --   --   --   --   --  19 27   ALT (SGPT) U/L 17  --   --   --   --   --  42* 50*    < > = values in this interval not displayed.   Estimated Creatinine Clearance: 82.5 mL/min (A) (by C-G formula based on SCr of 0.48 mg/dL (L)).  No results found for: AMMONIA      Blood Culture   Date Value Ref Range Status   03/22/2023 No growth at 24 hours  Preliminary   03/22/2023 No growth at 24 hours  Preliminary     No results found for: URINECX  Wound Culture   Date Value Ref Range Status   03/22/2023 No growth  Preliminary     No results found for: STOOLCX    Imaging:  Imaging Results (Last 24 Hours)     Procedure Component Value Units Date/Time    FL Upper GI Single Contrast SBFT [[id number]] Collected: 04/03/23 1105     Updated: 04/03/23 1114    Narrative:      EXAM:    FL Upper Gastrointestinal Tract With Small Bowel Follow Through     EXAM DATE:    4/3/2023 8:17 AM     CLINICAL HISTORY:    nausea and vomiting after surgery for perforated ulcer; K25.1-Acute  gastric ulcer with perforation; E87.3-Bieh-sbyinpzkmr and hyponatremia;  E87.6-Hypokalemia; A41.9-Sepsis, unspecified organism; K27.5-Chronic or  unspecified peptic ulcer, site unspecified, with perforation     TECHNIQUE:    Fluoroscopy of the upper gastrointestinal tract with oral contrast and  with or without KUB followed by serial images of the small bowel.   Fluoroscopic guidance was provided by a physician. Fluoroscopy exposure  time of approximately 1 minute or less.     COMPARISON:    12/24/2022     FINDINGS:    Esophagus:  Fixed area of esophageal narrowing is noted involving the  mid thoracic esophagus just distal to the bala and has features that  would suggest extrinsic mass effect.  Esophageal tertiary contractions  noted with mild dysmotility.  No reflux.    Stomach:  Unremarkable.  No mass.  Normal mucosa.    Duodenum:  Unremarkable.  No mass.   Normal mucosa.    Small bowel:  Unremarkable.  No mass or abnormal filling defect.    Colon:  Small bowel is unremarkable with normal small bowel transit.  Contrast reaches the colon within 1.5 hours.    Tubes, lines and devices:  Markedly delayed gastric emptying is noted  with only small amounts of contrast passing across the pylorus. Limited  assessment of the gastroduodenal junction due to cardiac leads and  surgical staples but no definite contrast leak identified.    Other findings:  Small hiatal hernia.  No bowel obstruction.       Impression:      1.  Fixed area of esophageal narrowing is noted involving the mid  thoracic esophagus just distal to the bala and has features that would  suggest extrinsic mass effect. Correlate with cross-sectional imaging.  2.  Esophageal tertiary contractions noted with mild dysmotility.  3.  Small hiatal hernia.  4.  Markedly delayed gastric emptying is noted with only small amounts  of contrast passing across the pylorus. Limited assessment of the  gastroduodenal junction due to cardiac leads and surgical staples but no  definite contrast leak identified.  5.  Small bowel is unremarkable with normal small bowel transit.  Contrast reaches the colon within 1.5 hours.  6.  No bowel obstruction.     This report was finalized on 4/3/2023 11:08 AM by Dr. Tobias Stoll MD.              Impression:  Stable course, status post repair of perforated duodenal ulcer  Aggressive pleural based lesion with rib destruction and now extrinsic compression of the mid thoracic esophagus    Plan:  Patient seen by medical oncology yesterday and input greatly appreciated.  Patient to undergo CT-guided biopsy of pleural-based lesion today.  Further treatment recommendations on hold until pathology available    Regarding patient's nutrition I do think that a feeding tube would be feasible technically.  Although the patient does have delayed gastric emptying I suspect that this is transient from the  recent surgery and should correct itself.  Whether or not the patient chooses to have any therapy it would be a reasonable option if the patient desires  Aurora Kirkland MD  04/04/23  09:31 EDT      Please note that portions of this note were completed with a voice recognition program.

## 2023-04-04 NOTE — NURSING NOTE
Received report from Rosa Elena SALAS. Called report and pt then transferred to . No signs and symptoms of acute distress noted.

## 2023-04-04 NOTE — PLAN OF CARE
Goal Outcome Evaluation:      Patient has been resting in bed this shift. Removed every other staple from abdominal incision per MD Isael. 5 staples removed. No s/s of acute distress noted at this time. Will continue to follow plan of care.

## 2023-04-04 NOTE — SIGNIFICANT NOTE
04/04/23 1512   OTHER   Discipline physical therapist   Rehab Time/Intention   Session Not Performed patient unavailable for treatment

## 2023-04-05 PROBLEM — E44.0 MODERATE MALNUTRITION: Status: ACTIVE | Noted: 2023-03-22

## 2023-04-05 LAB
ANION GAP SERPL CALCULATED.3IONS-SCNC: 5.5 MMOL/L (ref 5–15)
BUN SERPL-MCNC: 9 MG/DL (ref 8–23)
BUN/CREAT SERPL: 17.6 (ref 7–25)
CALCIUM SPEC-SCNC: 9.1 MG/DL (ref 8.6–10.5)
CHLORIDE SERPL-SCNC: 103 MMOL/L (ref 98–107)
CO2 SERPL-SCNC: 29.5 MMOL/L (ref 22–29)
CREAT SERPL-MCNC: 0.51 MG/DL (ref 0.57–1)
EGFRCR SERPLBLD CKD-EPI 2021: 89.4 ML/MIN/1.73
GLUCOSE SERPL-MCNC: 97 MG/DL (ref 65–99)
MAGNESIUM SERPL-MCNC: 1.8 MG/DL (ref 1.6–2.4)
PHOSPHATE SERPL-MCNC: 3.3 MG/DL (ref 2.5–4.5)
POTASSIUM SERPL-SCNC: 4.6 MMOL/L (ref 3.5–5.2)
SODIUM SERPL-SCNC: 138 MMOL/L (ref 136–145)

## 2023-04-05 PROCEDURE — 99232 SBSQ HOSP IP/OBS MODERATE 35: CPT | Performed by: INTERNAL MEDICINE

## 2023-04-05 PROCEDURE — 25010000002 HYDRALAZINE PER 20 MG: Performed by: INTERNAL MEDICINE

## 2023-04-05 PROCEDURE — 94799 UNLISTED PULMONARY SVC/PX: CPT

## 2023-04-05 PROCEDURE — 99024 POSTOP FOLLOW-UP VISIT: CPT | Performed by: SURGERY

## 2023-04-05 PROCEDURE — 84100 ASSAY OF PHOSPHORUS: CPT | Performed by: INTERNAL MEDICINE

## 2023-04-05 PROCEDURE — 83735 ASSAY OF MAGNESIUM: CPT | Performed by: INTERNAL MEDICINE

## 2023-04-05 PROCEDURE — 94761 N-INVAS EAR/PLS OXIMETRY MLT: CPT

## 2023-04-05 PROCEDURE — 25010000002 HEPARIN (PORCINE) PER 1000 UNITS: Performed by: SURGERY

## 2023-04-05 PROCEDURE — 25010000002 ONDANSETRON PER 1 MG: Performed by: SURGERY

## 2023-04-05 PROCEDURE — 25010000002 MAGNESIUM SULFATE 2 GM/50ML SOLUTION: Performed by: SURGERY

## 2023-04-05 PROCEDURE — 80048 BASIC METABOLIC PNL TOTAL CA: CPT | Performed by: INTERNAL MEDICINE

## 2023-04-05 RX ORDER — MAGNESIUM SULFATE HEPTAHYDRATE 40 MG/ML
2 INJECTION, SOLUTION INTRAVENOUS ONCE
Status: COMPLETED | OUTPATIENT
Start: 2023-04-05 | End: 2023-04-05

## 2023-04-05 RX ORDER — DEXTROSE AND SODIUM CHLORIDE 5; .45 G/100ML; G/100ML
50 INJECTION, SOLUTION INTRAVENOUS CONTINUOUS
Status: DISCONTINUED | OUTPATIENT
Start: 2023-04-05 | End: 2023-04-08

## 2023-04-05 RX ORDER — LIDOCAINE 4 G/G
1 PATCH TOPICAL
Status: DISCONTINUED | OUTPATIENT
Start: 2023-04-05 | End: 2023-04-10 | Stop reason: HOSPADM

## 2023-04-05 RX ADMIN — CASTOR OIL AND BALSAM, PERU 1 APPLICATION: 788; 87 OINTMENT TOPICAL at 08:38

## 2023-04-05 RX ADMIN — Medication 10 ML: at 22:00

## 2023-04-05 RX ADMIN — HYDRALAZINE HYDROCHLORIDE 50 MG: 50 TABLET, FILM COATED ORAL at 22:25

## 2023-04-05 RX ADMIN — HYDROCODONE BITARTRATE AND ACETAMINOPHEN 1 TABLET: 7.5; 325 TABLET ORAL at 22:41

## 2023-04-05 RX ADMIN — CARVEDILOL 25 MG: 25 TABLET, FILM COATED ORAL at 09:00

## 2023-04-05 RX ADMIN — POLYETHYLENE GLYCOL 3350 17 G: 17 POWDER, FOR SOLUTION ORAL at 08:39

## 2023-04-05 RX ADMIN — MAGNESIUM SULFATE HEPTAHYDRATE 2 G: 40 INJECTION, SOLUTION INTRAVENOUS at 12:18

## 2023-04-05 RX ADMIN — CASTOR OIL AND BALSAM, PERU 1 APPLICATION: 788; 87 OINTMENT TOPICAL at 22:26

## 2023-04-05 RX ADMIN — HEPARIN SODIUM 5000 UNITS: 5000 INJECTION INTRAVENOUS; SUBCUTANEOUS at 08:38

## 2023-04-05 RX ADMIN — Medication 3 ML: at 08:40

## 2023-04-05 RX ADMIN — HYDRALAZINE HYDROCHLORIDE 10 MG: 20 INJECTION INTRAMUSCULAR; INTRAVENOUS at 06:38

## 2023-04-05 RX ADMIN — HYDRALAZINE HYDROCHLORIDE 50 MG: 50 TABLET, FILM COATED ORAL at 05:42

## 2023-04-05 RX ADMIN — HYDRALAZINE HYDROCHLORIDE 50 MG: 50 TABLET, FILM COATED ORAL at 15:02

## 2023-04-05 RX ADMIN — LEVOTHYROXINE SODIUM 112 MCG: 0.07 TABLET ORAL at 05:42

## 2023-04-05 RX ADMIN — PANTOPRAZOLE SODIUM 40 MG: 40 TABLET, DELAYED RELEASE ORAL at 06:39

## 2023-04-05 RX ADMIN — Medication 3 ML: at 22:00

## 2023-04-05 RX ADMIN — Medication 10 ML: at 08:39

## 2023-04-05 RX ADMIN — HEPARIN SODIUM 5000 UNITS: 5000 INJECTION INTRAVENOUS; SUBCUTANEOUS at 22:25

## 2023-04-05 RX ADMIN — Medication 10 ML: at 08:40

## 2023-04-05 RX ADMIN — DEXTROSE AND SODIUM CHLORIDE 50 ML/HR: 5; 450 INJECTION, SOLUTION INTRAVENOUS at 13:54

## 2023-04-05 RX ADMIN — Medication 10 MG: at 03:03

## 2023-04-05 RX ADMIN — ONDANSETRON 4 MG: 2 INJECTION INTRAMUSCULAR; INTRAVENOUS at 08:54

## 2023-04-05 RX ADMIN — VALSARTAN 320 MG: 160 TABLET, FILM COATED ORAL at 09:01

## 2023-04-05 RX ADMIN — CARVEDILOL 25 MG: 25 TABLET, FILM COATED ORAL at 17:18

## 2023-04-05 RX ADMIN — HYDROCODONE BITARTRATE AND ACETAMINOPHEN 1 TABLET: 7.5; 325 TABLET ORAL at 05:44

## 2023-04-05 RX ADMIN — HYDROCODONE BITARTRATE AND ACETAMINOPHEN 1 TABLET: 7.5; 325 TABLET ORAL at 11:28

## 2023-04-05 RX ADMIN — LIDOCAINE 1 PATCH: 4 PATCH TOPICAL at 15:52

## 2023-04-05 RX ADMIN — Medication 10 ML: at 08:38

## 2023-04-05 RX ADMIN — PANTOPRAZOLE SODIUM 40 MG: 40 TABLET, DELAYED RELEASE ORAL at 17:18

## 2023-04-05 NOTE — PROGRESS NOTES
Christine Garg  [id number]  1933    Referring Provider:   Dr. Kirkland    Reason for Followup:   Pleural based lung mass    Patient Care Team:  Dave Tobar MD as PCP - General  Dave Tobar MD as PCP - Family Medicine    Chief Complaint:  Lung mass    Subjective:    Patient underwent biopsy yesterday. She reports pain at the site otherwise no significant complaints this am and otherwise tolerated biopsy well yesterday. She does continue to have difficulty swallowing as well as intermittent nausea. Pathology is currently pending.         Allergies:    Motrin [ibuprofen] and Novocain [procaine]    Outpatient Medications:  Medications Prior to Admission   Medication Sig Dispense Refill Last Dose   • aspirin 325 MG tablet Take 1 tablet by mouth Daily.   Unknown   • carvedilol (COREG) 25 MG tablet Take 1 tablet by mouth 2 (Two) Times a Day With Meals. 60 tablet 0 Unknown   • cloNIDine (CATAPRES) 0.1 MG tablet Take 1 tablet by mouth 3 (Three) Times a Day As Needed for High Blood Pressure (SBP > 150).   Unknown   • Evolocumab (Repatha SureClick) solution auto-injector SureClick injection Inject 1 mL under the skin into the appropriate area as directed Every 14 (Fourteen) Days. 6 mL 1 Unknown   • HYDROcodone-acetaminophen (NORCO) 5-325 MG per tablet Take 1 tablet by mouth Daily As Needed.   Unknown   • levothyroxine (SYNTHROID, LEVOTHROID) 112 MCG tablet Take 1 tablet by mouth Every Morning.   Unknown   • montelukast (SINGULAIR) 10 MG tablet Take 1 tablet by mouth Every Night. 30 tablet 11 Unknown   • Movantik 25 MG tablet Take 1 tablet by mouth Daily As Needed for Constipation.   Unknown   • [] nitrofurantoin, macrocrystal-monohydrate, (MACROBID) 100 MG capsule Take 1 capsule by mouth 2 (Two) Times a Day With Meals. 3/20   Unknown   • ondansetron (ZOFRAN) 4 MG tablet Take 1 tablet by mouth 3 (Three) Times a Day As Needed.   Unknown   • [] predniSONE (DELTASONE) 20 MG tablet Take 1  tablet by mouth Daily.   Unknown   • tiZANidine (ZANAFLEX) 2 MG half tablet Take 1 half tablet by mouth 2 (Two) Times a Day.   Unknown   • valsartan (DIOVAN) 320 MG tablet Take 1 tablet by mouth Daily.   Unknown       Inpatient Medications:  Scheduled Meds:  carvedilol, 25 mg, Oral, BID With Meals  castor oil-balsam peru, 1 application, Topical, Q12H  heparin (porcine), 5,000 Units, Subcutaneous, Q12H  hydrALAZINE, 50 mg, Oral, Q8H  levothyroxine, 112 mcg, Oral, Q AM  lidocaine, 1 patch, Transdermal, Q24H  multivitamin, 10 mL, Oral, Daily  pantoprazole, 40 mg, Oral, BID AC  polyethylene glycol, 17 g, Oral, Daily  sodium chloride, 10 mL, Intravenous, Q12H  sodium chloride, 10 mL, Intravenous, Q12H  sodium chloride, 10 mL, Intravenous, Q12H  sodium chloride, 3 mL, Intravenous, Q12H  valsartan, 320 mg, Oral, Q24H        Continuous Infusions:       PRN Meds:  •  cloNIDine  •  hydrALAZINE  •  HYDROcodone-acetaminophen  •  labetalol  •  magnesium sulfate **OR** magnesium sulfate in D5W 1g/100mL (PREMIX) **OR** magnesium sulfate  •  ondansetron  •  potassium chloride **OR** [DISCONTINUED] potassium chloride **OR** potassium chloride  •  potassium chloride  •  potassium phosphate infusion greater than 15 mMoles **OR** potassium phosphate infusion greater than 15 mMoles **OR** potassium phosphate **OR** sodium phosphate IVPB **OR** sodium phosphate IVPB **OR** sodium phosphate IVPB  •  [COMPLETED] Insert Peripheral IV **AND** sodium chloride  •  sodium chloride  •  sodium chloride  •  sodium chloride  •  sodium chloride  •  sodium chloride  •  sodium chloride      Review of Systems:  Pertinent positives are listed as per history of present of illness, all other systems reviewed and are negative.      Physical Exam:  Vital Signs: These were reviewed and listed as per patient’s electronic medical chart  Vitals:    04/05/23 0753   BP:    Pulse:    Resp:    Temp:    SpO2: 97%     General: Awake, alert and oriented, in no  distress  HEENT: Head is atraumatic, normocephalic, extraocular movements full, oropharynx clear, no scleral icterus, pink moist mucous membranes, NC in place  Neck: supple, no jvd, lymphadenopathy or masses  Cardiovascular: regular rate and rhythm without murmurs, rubs or gallops  Pulmonary: non-labored, clear to auscultation bilaterally, no wheezing  Abdomen: soft, non-tender, non-distended, normal active bowel sounds present, no organomegaly  Extremities: No clubbing, cyanosis or edema  Lymph: No cervical, supraclavicular adenopathy  Neurologic: Mental status as above, alert, awake and oriented, grossly non-focal exam  Skin: warm, dry, intact, ecchymosis on extremities        Labs / Studies:  Lab Results (last 72 hours)     Procedure Component Value Units Date/Time    Magnesium [[id number]]  (Normal) Collected: 04/05/23 0048    Specimen: Blood Updated: 04/05/23 0129     Magnesium 1.8 mg/dL     Basic Metabolic Panel [[id number]]  (Abnormal) Collected: 04/05/23 0048    Specimen: Blood Updated: 04/05/23 0129     Glucose 97 mg/dL      BUN 9 mg/dL      Creatinine 0.51 mg/dL      Sodium 138 mmol/L      Potassium 4.6 mmol/L      Chloride 103 mmol/L      CO2 29.5 mmol/L      Calcium 9.1 mg/dL      BUN/Creatinine Ratio 17.6     Anion Gap 5.5 mmol/L      eGFR 89.4 mL/min/1.73     Narrative:      GFR Normal >60  Chronic Kidney Disease <60  Kidney Failure <15    The GFR formula is only valid for adults with stable renal function between ages 18 and 70.    Phosphorus [[id number]]  (Normal) Collected: 04/05/23 0048    Specimen: Blood Updated: 04/05/23 0129     Phosphorus 3.3 mg/dL     Potassium [[id number]]  (Normal) Collected: 04/04/23 1751    Specimen: Blood Updated: 04/04/23 1843     Potassium 4.4 mmol/L     POC Glucose Once [[id number]]  (Normal) Collected: 04/04/23 1712    Specimen: Blood Updated: 04/04/23 1719     Glucose 92 mg/dL      Comment: Meter: [id number] : [id number] ENIO CHI       TISSUE EXAM, [facility]  (RUSLAN,COR,[facility]AD) [[id number]] Collected: 04/04/23 1202    Specimen: Tissue Updated: 04/04/23 1251    Magnesium [[id number]]  (Normal) Collected: 04/04/23 0231    Specimen: Blood Updated: 04/04/23 0336     Magnesium 2.1 mg/dL     Comprehensive Metabolic Panel [[id number]]  (Abnormal) Collected: 04/04/23 0231    Specimen: Blood Updated: 04/04/23 0336     Glucose 91 mg/dL      BUN 10 mg/dL      Creatinine 0.48 mg/dL      Sodium 138 mmol/L      Potassium 3.4 mmol/L      Chloride 101 mmol/L      CO2 29.6 mmol/L      Calcium 8.6 mg/dL      Total Protein 4.8 g/dL      Albumin 2.5 g/dL      ALT (SGPT) 17 U/L      AST (SGOT) 13 U/L      Alkaline Phosphatase 82 U/L      Total Bilirubin 0.2 mg/dL      Globulin 2.3 gm/dL      A/G Ratio 1.1 g/dL      BUN/Creatinine Ratio 20.8     Anion Gap 7.4 mmol/L      eGFR 90.7 mL/min/1.73     Narrative:      GFR Normal >60  Chronic Kidney Disease <60  Kidney Failure <15    The GFR formula is only valid for adults with stable renal function between ages 18 and 70.    Phosphorus [[id number]]  (Normal) Collected: 04/04/23 0231    Specimen: Blood Updated: 04/04/23 0336     Phosphorus 3.6 mg/dL     aPTT [[id number]]  (Normal) Collected: 04/04/23 0231    Specimen: Blood Updated: 04/04/23 0321     PTT 30.4 seconds     Narrative:      PTT Heparin Therapeutic Range:  59 - 95 seconds      Protime-INR [[id number]]  (Abnormal) Collected: 04/04/23 0231    Specimen: Blood Updated: 04/04/23 0321     Protime 12.0 Seconds      INR 0.87    Narrative:      Suggested INR therapeutic range for stable oral anticoagulant therapy:    Low Intensity therapy:   1.5-2.0  Moderate Intensity therapy:   2.0-3.0  High Intensity therapy:   2.5-4.0    CBC & Differential [[id number]]  (Abnormal) Collected: 04/04/23 0231    Specimen: Blood Updated: 04/04/23 0311    Narrative:      The following orders were created for panel order CBC & Differential.  Procedure                               Abnormality         Status                      ---------                               -----------         ------                     CBC Auto Differential[[id number]]        Abnormal            Final result                 Please view results for these tests on the individual orders.    CBC Auto Differential [[id number]]  (Abnormal) Collected: 04/04/23 0231    Specimen: Blood Updated: 04/04/23 0311     WBC 11.47 10*3/mm3      RBC 3.09 10*6/mm3      Hemoglobin 9.6 g/dL      Hematocrit 29.3 %      MCV 94.8 fL      MCH 31.1 pg      MCHC 32.8 g/dL      RDW 14.3 %      RDW-SD 49.3 fl      MPV 10.0 fL      Platelets 298 10*3/mm3      Neutrophil % 77.4 %      Lymphocyte % 11.4 %      Monocyte % 4.7 %      Eosinophil % 5.3 %      Basophil % 0.7 %      Immature Grans % 0.5 %      Neutrophils, Absolute 8.87 10*3/mm3      Lymphocytes, Absolute 1.31 10*3/mm3      Monocytes, Absolute 0.54 10*3/mm3      Eosinophils, Absolute 0.61 10*3/mm3      Basophils, Absolute 0.08 10*3/mm3      Immature Grans, Absolute 0.06 10*3/mm3      nRBC 0.0 /100 WBC     Magnesium [[id number]]  (Normal) Collected: 04/03/23 0247    Specimen: Blood Updated: 04/03/23 0353     Magnesium 1.9 mg/dL     Basic Metabolic Panel [[id number]]  (Abnormal) Collected: 04/02/23 1040    Specimen: Blood Updated: 04/02/23 1221     Glucose 82 mg/dL      BUN 9 mg/dL      Creatinine 0.49 mg/dL      Sodium 135 mmol/L      Potassium 4.0 mmol/L      Comment: Slight hemolysis detected by analyzer. Results may be affected.        Chloride 98 mmol/L      CO2 30.4 mmol/L      Calcium 9.0 mg/dL      BUN/Creatinine Ratio 18.4     Anion Gap 6.6 mmol/L      eGFR 90.2 mL/min/1.73     Narrative:      GFR Normal >60  Chronic Kidney Disease <60  Kidney Failure <15    The GFR formula is only valid for adults with stable renal function between ages 18 and 70.            Imaging Results (Last 72 Hours)     Procedure Component Value Units Date/Time    CT Needle Biopsy Pleura [[id number]] Collected: 04/04/23 1328     Updated: 04/04/23 1333     Narrative:      EXAMINATION: CT NEEDLE BIOPSY PLEURA-      CLINICAL INDICATION:pleural based mass; K25.1-Acute gastric ulcer with  perforation; E87.6-Dtdu-hotongvetw and hyponatremia; E87.6-Hypokalemia;  A41.9-Sepsis, unspecified organism; K27.5-Chronic or unspecified peptic  ulcer, site unspecified, with perforation     COMPARISON: CT 03/29/2023      TECHNIQUE/FINDINGS:   Informed consent was obtained regarding risks associated with the  procedure including infection, bleeding, and injury to adjacent  structures. Preprocedure CT imaging redemonstrates pleural-based mass  that is essentially paraspinal in location involving the left lower lobe  at the T6 level that is targeted for CT-guided biopsy.  The posterior chest was prepped and draped in sterile fashion. 1%  lidocaine was used to achieve local anesthesia. Under CT fluoroscopy  guidance, 3 18-gauge core needle samples were obtained using a 10 cm  18-gauge core needle biopsy system. Specimens were cemented in formalin  for eventual histopathologic analysis. No complication encountered  during or immediately following the procedure. The biopsy site was  cleansed and bandaged.       Impression:      CT-guided biopsy of a left lower lobe region pleural-based mass.     This report was finalized on 4/4/2023 1:31 PM by Dr. Tobias Stoll MD.       FL Upper GI Single Contrast SBFT [[id number]] Collected: 04/03/23 1105     Updated: 04/03/23 1114    Narrative:      EXAM:    FL Upper Gastrointestinal Tract With Small Bowel Follow Through     EXAM DATE:    4/3/2023 8:17 AM     CLINICAL HISTORY:    nausea and vomiting after surgery for perforated ulcer; K25.1-Acute  gastric ulcer with perforation; E87.3-Lvdx-wijrxizbhg and hyponatremia;  E87.6-Hypokalemia; A41.9-Sepsis, unspecified organism; K27.5-Chronic or  unspecified peptic ulcer, site unspecified, with perforation     TECHNIQUE:    Fluoroscopy of the upper gastrointestinal tract with oral contrast and  with or without  KUB followed by serial images of the small bowel.   Fluoroscopic guidance was provided by a physician. Fluoroscopy exposure  time of approximately 1 minute or less.     COMPARISON:    12/24/2022     FINDINGS:    Esophagus:  Fixed area of esophageal narrowing is noted involving the  mid thoracic esophagus just distal to the bala and has features that  would suggest extrinsic mass effect.  Esophageal tertiary contractions  noted with mild dysmotility.  No reflux.    Stomach:  Unremarkable.  No mass.  Normal mucosa.    Duodenum:  Unremarkable.  No mass.  Normal mucosa.    Small bowel:  Unremarkable.  No mass or abnormal filling defect.    Colon:  Small bowel is unremarkable with normal small bowel transit.  Contrast reaches the colon within 1.5 hours.    Tubes, lines and devices:  Markedly delayed gastric emptying is noted  with only small amounts of contrast passing across the pylorus. Limited  assessment of the gastroduodenal junction due to cardiac leads and  surgical staples but no definite contrast leak identified.    Other findings:  Small hiatal hernia.  No bowel obstruction.       Impression:      1.  Fixed area of esophageal narrowing is noted involving the mid  thoracic esophagus just distal to the bala and has features that would  suggest extrinsic mass effect. Correlate with cross-sectional imaging.  2.  Esophageal tertiary contractions noted with mild dysmotility.  3.  Small hiatal hernia.  4.  Markedly delayed gastric emptying is noted with only small amounts  of contrast passing across the pylorus. Limited assessment of the  gastroduodenal junction due to cardiac leads and surgical staples but no  definite contrast leak identified.  5.  Small bowel is unremarkable with normal small bowel transit.  Contrast reaches the colon within 1.5 hours.  6.  No bowel obstruction.     This report was finalized on 4/3/2023 11:08 AM by Dr. Tobias Stoll MD.                   Assessment & Plan:  Christine Garg is a  89 y.o. year-old female who presented with a perforated duodenal ulcer and found to have a left pleural based mass impinging on the new root and eroding into the T6 vertebral body.     1. Left pleural lesion concerning for malignancy  - Patient underwent biopsy yesterday and pathology is currently pending. There was no family at bedside at present. Treatment options will again be dependent on pathology however treatment may be difficult for the patient given her age and functional status.   - Patient has stated that she may not want treatment however she has stated that her children weren't ready for her to go.    2. Dysphagia  - Appears to be secondary to compression of the esophagus from lymph node. Radiation to this area may be quite toxic for the patient and may need to consider feeding tube should she want to be aggressive and want to proceed with treatment.    Will await pathology.       Katy Patrick MD  04/05/23  09:07 EDT

## 2023-04-05 NOTE — PLAN OF CARE
Goal Outcome Evaluation:  Plan of Care Reviewed With: patient        Progress: no change  Outcome Evaluation: Patient has rested this shift, Pain controlled per MAR, family at bedside, VSS, No requests or complaints at this time, no acute distress noted Will continue POC.

## 2023-04-05 NOTE — PROGRESS NOTES
[facility] HOSPITALIST PROGRESS NOTE     Patient Identification:  Name:  Christine Garg  Age:  89 y.o.  Sex:  female  :  1933  MRN:  [id number]  Visit Number:  [id number]  ROOM: [address] [place] [address]     Primary Care Provider:  Dave Tobar MD     Date of Admission: 3/22/2023    Length of stay in inpatient status:  14    Subjective     Chief Compliant:    Chief Complaint   Patient presents with   • Abdominal Pain     History of Presenting Illness: The patient's son and granddaughter were at bedside today.  The patient has not really felt like eating, though she is able to drink some.  She denies chest pain and trouble breathing.  She denies nausea, vomiting, and diarrhea.    Objective     Current Hospital Meds:  carvedilol, 25 mg, Oral, BID With Meals  castor oil-balsam peru, 1 application, Topical, Q12H  [START ON 2023] ceFAZolin, 2 g, Intravenous, On Call to OR  heparin (porcine), 5,000 Units, Subcutaneous, Q12H  hydrALAZINE, 50 mg, Oral, Q8H  levothyroxine, 112 mcg, Oral, Q AM  Lidocaine, 1 patch, Apply externally, Q24H  multivitamin, 10 mL, Oral, Daily  pantoprazole, 40 mg, Oral, BID AC  polyethylene glycol, 17 g, Oral, Daily  sodium chloride, 10 mL, Intravenous, Q12H  sodium chloride, 10 mL, Intravenous, Q12H  sodium chloride, 10 mL, Intravenous, Q12H  sodium chloride, 3 mL, Intravenous, Q12H  valsartan, 320 mg, Oral, Q24H    dextrose 5 % and sodium chloride 0.45 %, 50 mL/hr, Last Rate: 50 mL/hr (23 1354)      Current Antimicrobial Therapy:  Anti-Infectives (From admission, onward)    Ordered     Dose/Rate Route Frequency Start Stop    23 1320  ceFAZolin 2 gm IVPB in 100 mL NS (VTB)        Ordering Provider: Aurora Kirkland MD    2 g  over 30 Minutes Intravenous On Call to O.R. 23 0600 23 0559    23 0849  piperacillin-tazobactam (ZOSYN) IVPB 3.375 g in 100 mL NS VTB        Ordering Provider: Aurora Kirkland MD    3.375 g  over 4 Hours Intravenous Every 8  Hours 03/23/23 1600 03/30/23 1233    03/23/23 0849  piperacillin-tazobactam (ZOSYN) IVPB 3.375 g in 100 mL NS VTB        Ordering Provider: Aurora Kirkland MD    3.375 g  over 30 Minutes Intravenous Once 03/23/23 0945 03/23/23 1026    03/22/23 1207  piperacillin-tazobactam (ZOSYN) IVPB 3.375 g in 100 mL NS VTB        Ordering Provider: Alejandrina Gonzalez PA    3.375 g  over 30 Minutes Intravenous Once 03/22/23 1209 03/22/23 1329    03/22/23 1207  vancomycin 1250 mg/250 mL 0.9% NS IVPB ([facility])        Ordering Provider: Alejandrina Gonzalez PA    20 mg/kg × 56.7 kg Intravenous Once 03/22/23 1209 03/22/23 1505        Current Diuretic Therapy:  Diuretics (From admission, onward)    None        ----------------------------------------------------------------------------------------------------------------------  Vital Signs:  Temp:  [97.4 °F (36.3 °C)-98.2 °F (36.8 °C)] 97.7 °F (36.5 °C)  Heart Rate:  [65-79] 65  Resp:  [18-20] 18  BP: (122-191)/(53-69) 122/53  SpO2:  [97 %] 97 %  on  Flow (L/min):  [2] 2;   Device (Oxygen Therapy): nasal cannula  Body mass index is 24.15 kg/m².    Wt Readings from Last 3 Encounters:   04/04/23 65.8 kg (145 lb 1.6 oz)   01/05/23 68 kg (150 lb)   12/25/22 73.9 kg (163 lb)     Intake & Output (last 3 days)       04/03 0701 04/04 0700 04/04 0701 04/05 0700 04/05 0701 04/06 0700    P.O. 0 420 460    Total Intake(mL/kg) 0 (0) 420 (6.4) 460 (7)    Urine (mL/kg/hr)  300 (0.2) 300 (0.4)    Total Output  300 300    Net 0 +120 +160           Urine Unmeasured Occurrence 1 x 1 x 1 x    Stool Unmeasured Occurrence 1 x          Diet: Liquid Diets; Full Liquid; No Carbonated Beverages, No Straw; Texture: Regular Texture (IDDSI 7); Fluid Consistency: Thin (IDDSI 0)  NPO Diet NPO Type: Strict NPO, Sips with Meds  ----------------------------------------------------------------------------------------------------------------------  Physical Exam; her exam is same as yesterday.  Constitutional:        General: She is not in acute distress.  She is still slightly sedated from the sedatives given to her prior to her biopsy.     Appearance: She is not ill-appearing.  Cardiovascular:      Rate and Rhythm: Normal rate and regular rhythm.      Pulses: Normal pulses.      Heart sounds: No murmur heard.  Pulmonary:      Effort: Pulmonary effort is normal. No respiratory distress.      Breath sounds: No wheezing or rales.   Neurological:      Mental Status: She is alert and oriented to person, place, and time. Mental status is at baseline.      Cranial Nerves: No cranial nerve deficit.   ----------------------------------------------------------------------------------------------------------------------  Tele: Normal sinus rhythm with heart rate 60s to 70s.  I personally reviewed the telemetry strips.  ----------------------------------------------------------------------------------------------------------------------  LABS:    CBC and coagulation:  Results from last 7 days   Lab Units 04/04/23  0231 04/02/23  0254 03/30/23  0139   WBC 10*3/mm3 11.47* 10.89* 8.93   HEMOGLOBIN g/dL 9.6* 10.0* 9.1*   HEMATOCRIT % 29.3* 30.9* 27.5*   MCV fL 94.8 97.2* 96.8   MCHC g/dL 32.8 32.4 33.1   PLATELETS 10*3/mm3 298 280 238   INR  0.87*  --   --      Renal and electrolytes:  Results from last 7 days   Lab Units 04/05/23  0048 04/04/23  1751 04/04/23  0231 04/03/23  0247 04/02/23  1040 04/01/23  0858 03/31/23  0012 03/30/23  0139   SODIUM mmol/L 138  --  138  --  135*  --  138 138   POTASSIUM mmol/L 4.6 4.4 3.4*  --  4.0  --  4.8  4.7 3.5   MAGNESIUM mg/dL 1.8  --  2.1 1.9  --  1.9 1.7  --    CHLORIDE mmol/L 103  --  101  --  98  --  103 103   CO2 mmol/L 29.5*  --  29.6*  --  30.4*  --  25.6 28.8   BUN mg/dL 9  --  10  --  9  --  9 8   CREATININE mg/dL 0.51*  --  0.48*  --  0.49*  --  0.44* 0.44*   CALCIUM mg/dL 9.1  --  8.6  --  9.0  --  8.9 8.3*   PHOSPHORUS mg/dL 3.3  --  3.6  --   --   --   --   --    GLUCOSE mg/dL 97  --   91  --  82  --  108* 104*     Estimated Creatinine Clearance: 77.7 mL/min (A) (by C-G formula based on SCr of 0.51 mg/dL (L)).    Liver and pancreatic function:  Results from last 7 days   Lab Units 04/04/23  0231 03/30/23  0139   ALBUMIN g/dL 2.5* 2.2*   BILIRUBIN mg/dL 0.2 0.2   ALK PHOS U/L 82 83   AST (SGOT) U/L 13 19   ALT (SGPT) U/L 17 42*     Endocrine function:  Lab Results   Component Value Date    HGBA1C 6.10 (H) 03/22/2023     Point of care bedside glucose levels:  Results from last 7 days   Lab Units 04/04/23  1712   GLUCOSE mg/dL 92     Glucose levels from the [facility]:  Results from last 7 days   Lab Units 04/05/23  0048 04/04/23  0231 04/02/23  1040 03/31/23  0012 03/30/23  0139   GLUCOSE mg/dL 97 91 82 108* 104*       I have personally looked at the labs and they are summarized above.    Assessment & Plan      -Severe sepsis that was present on admission (temperature 36 degrees C, lactic acid 3.5, CRP 3.44, WBC 30,990, heart rates ) due to a suspected perforated antral gastric ulcer, status post oversewing of the ulcer with an omental patch and Biopatch  -Acute hypokalemia and acute hypomagnesemia and acute hypophosphatemia, improved with supplementation  -Extrinsic mass effect of the mid thoracic esophagus, suspect from the T5-T6 level lymph node, with the node compromising the exiting nerve root and erosion of the adjacent vertebral body, causing difficulty swallowing  -History of coronary artery disease s/p 3v CABG in 2011  -History of chronic kidney disease stage IIIa with baseline Cr 0.6-0.85   -History of essential hypertension  -History of hypothyroidism, currently controlled  -History of hyperlipidemia  -History of left upper lobe pleural based pulmonary nodule that appears to be invading the T6 vertebra  -Advanced age  -Prior tobacco smoking history    I talked to the patient and the family about possible TPN while we await the PEG tube placement.  However, the patient currently only has a  wrist IV.  Therefore, I will ask the nurses to see if they are able to get an ACE IV.  I will repeat the patient's blood work in the morning and replace her electrolytes as needed.  Please note that her blood pressures have improved with increasing the hydralazine yesterday.  Therefore, we will continue to monitor blood pressures closely and I do not plan on changing any of her antihypertensives today.    VTE Prophylaxis:   Mechanical Order History:      Ordered        03/22/23 1802  Place sequential compression device  Once                    Pharmalogical Order History:      Ordered     Dose Route Frequency Stop    03/22/23 1802  heparin (porcine) 5000 UNIT/ML injection 5,000 Units         5,000 Units SC Every 12 Hours Scheduled --            Disposition: Anticipate short-term rehab with PEG tube    Kiara Servin MD  [facility] Hospitalist  04/05/23  19:38 EDT

## 2023-04-05 NOTE — CASE MANAGEMENT/SOCIAL WORK
Discharge Planning Assessment  [facility]     Patient Name: Christine Garg  MRN: [id number]  Today's Date: 4/5/2023    Admit Date: 3/22/2023    Plan:  was notified on this date by Dr. Kirkland that pt carlota be getting a PEG tube placed on friday 4/7/23 that SS can continue NH placement for pt at this time. SS notified [facility] per Odette. Odette states pt will require a pre-auth before going to their facility. SS faxed updated clinical notes via [employer]. SS notified [facility] per Odette.  to follow.         Discharge Plan     Row Name 04/05/23 1407       Plan    Plan SS was notified on this date by Dr. Kirkland that pt carlota be getting a PEG tube placed on friday 4/7/23 that SS can continue NH placement for pt at this time. SS notified [facility] per Odette. Odette states pt will require a pre-auth before going to their facility. SS faxed updated clinical notes via [employer]. SS notified [facility] per Odette.  to follow.                ZA Monteiro

## 2023-04-05 NOTE — PLAN OF CARE
Goal Outcome Evaluation:              Outcome Evaluation: patient has rested well during the shift. prn medication given. no problems noted.

## 2023-04-05 NOTE — PROGRESS NOTES
LOS: 14 days   Patient Care Team:  Dave Tobar MD as PCP - General  Dave Tobar MD as PCP - Family Medicine    Post op day # 14, status post laparotomy with closure of perforated duodenal ulcer with omental patch and Biopatch    Subjective     Interval History:  Patient underwent CT-guided biopsy of pleural-based mass yesterday.  Pathology pending.  Patient continues to have difficulty with swallowing secondary to extrinsic compression.  She states she got dizzy today after taking a pain pill.   Objective     Vital Signs  Temp:  [97.4 °F (36.3 °C)-98.2 °F (36.8 °C)] 97.8 °F (36.6 °C)  Heart Rate:  [67-84] 72  Resp:  [16-20] 20  BP: (136-191)/(60-87) 136/61    Physical Exam:  This is a well-developed well-nourished elderly female in no acute distress  HEENT examination: Sclera are anicteric  Lungs: Clear  Abdomen: Bowel sounds are present  Skin/incisions: Surgical  Incision intact and dry.       Results Review:    Results from last 7 days   Lab Units 03/30/23  1410 03/30/23  1132   HSTROP T ng/L 12* 17*     Results from last 7 days   Lab Units 04/04/23  0231 04/02/23  0254 03/30/23  0139   WBC 10*3/mm3 11.47* 10.89* 8.93   HEMOGLOBIN g/dL 9.6* 10.0* 9.1*   HEMATOCRIT % 29.3* 30.9* 27.5*   PLATELETS 10*3/mm3 298 280 238   INR  0.87*  --   --          Results from last 7 days   Lab Units 04/05/23  0048 04/04/23  1751 04/04/23  0231 04/03/23  0247 04/02/23  1040 03/31/23  0012 03/30/23  0139   SODIUM mmol/L 138  --  138  --  135*   < > 138   POTASSIUM mmol/L 4.6 4.4 3.4*  --  4.0   < > 3.5   MAGNESIUM mg/dL 1.8  --  2.1 1.9  --    < >  --    CHLORIDE mmol/L 103  --  101  --  98   < > 103   CO2 mmol/L 29.5*  --  29.6*  --  30.4*   < > 28.8   BUN mg/dL 9  --  10  --  9   < > 8   CREATININE mg/dL 0.51*  --  0.48*  --  0.49*   < > 0.44*   CALCIUM mg/dL 9.1  --  8.6  --  9.0   < > 8.3*   GLUCOSE mg/dL 97  --  91  --  82   < > 104*   ALBUMIN g/dL  --   --  2.5*  --   --   --  2.2*   BILIRUBIN mg/dL  --   --   0.2  --   --   --  0.2   ALK PHOS U/L  --   --  82  --   --   --  83   AST (SGOT) U/L  --   --  13  --   --   --  19   ALT (SGPT) U/L  --   --  17  --   --   --  42*    < > = values in this interval not displayed.   Estimated Creatinine Clearance: 77.7 mL/min (A) (by C-G formula based on SCr of 0.51 mg/dL (L)).  No results found for: AMMONIA      Blood Culture   Date Value Ref Range Status   03/22/2023 No growth at 24 hours  Preliminary   03/22/2023 No growth at 24 hours  Preliminary     No results found for: URINECX  Wound Culture   Date Value Ref Range Status   03/22/2023 No growth  Preliminary     No results found for: STOOLCX    Imaging:  Imaging Results (Last 24 Hours)     Procedure Component Value Units Date/Time    CT Needle Biopsy Pleura [[id number]] Collected: 04/04/23 1328     Updated: 04/04/23 1333    Narrative:      EXAMINATION: CT NEEDLE BIOPSY PLEURA-      CLINICAL INDICATION:pleural based mass; K25.1-Acute gastric ulcer with  perforation; E87.4-Camv-xbaxielfmi and hyponatremia; E87.6-Hypokalemia;  A41.9-Sepsis, unspecified organism; K27.5-Chronic or unspecified peptic  ulcer, site unspecified, with perforation     COMPARISON: CT 03/29/2023      TECHNIQUE/FINDINGS:   Informed consent was obtained regarding risks associated with the  procedure including infection, bleeding, and injury to adjacent  structures. Preprocedure CT imaging redemonstrates pleural-based mass  that is essentially paraspinal in location involving the left lower lobe  at the T6 level that is targeted for CT-guided biopsy.  The posterior chest was prepped and draped in sterile fashion. 1%  lidocaine was used to achieve local anesthesia. Under CT fluoroscopy  guidance, 3 18-gauge core needle samples were obtained using a 10 cm  18-gauge core needle biopsy system. Specimens were cemented in formalin  for eventual histopathologic analysis. No complication encountered  during or immediately following the procedure. The biopsy site  was  cleansed and bandaged.       Impression:      CT-guided biopsy of a left lower lobe region pleural-based mass.     This report was finalized on 4/4/2023 1:31 PM by Dr. Tobias Stoll MD.              Impression:  Stable course, status post repair of perforated duodenal ulcer  Aggressive pleural based lesion with rib destruction and now extrinsic compression of the mid thoracic esophagus.    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  Additional stains are pending.    Plan:  PEG tube placement Friday  Nutrition consult  Restart IV fluids at 50 cc an hour as p.o. intake poor    Patient's case and x-rays were reviewed at multidisciplinary tumor board with diagnostic radiology, medical oncology, radiation oncology and surgery.  At this point the patient's main problem which is impacting on her recovery and longevity is her inability to eat because of the extrinsic esophageal narrowing.  Do feel that a PEG tube is feasible.  Have recommended PEG tube placement with discharge to [facility].  At that point in time patient can complete her outpatient work-up to include a PET scan which had been previously scheduled but was canceled due to patient's inpatient status.  She then can follow-up with medical oncology as an outpatient to determine what if any additional treatment could be offered.  Aurora Kirkland MD  04/05/23  09:49 EDT      Please note that portions of this note were completed with a voice recognition program.

## 2023-04-05 NOTE — DISCHARGE PLACEMENT REQUEST
"Amanda Gomez (89 y.o. Female)     Date of Birth   06/08/1933    Social Security Number       Address   [address] [id number]    Home Phone   [phone]    MRN   [id number]       Nondenominational   Latter day    Marital Status                               Admission Date   3/22/23    Admission Type   Emergency    Admitting Provider   Aurora Kirkland MD    Attending Provider   Aurora Kirkland MD    Department, Room/Bed   [address], 3334/       Discharge Date       Discharge Disposition       Discharge Destination                               Attending Provider: Aurora Kirkland MD    Allergies: Motrin [Ibuprofen], Novocain [Procaine]    Isolation: None   Infection: None   Code Status: No CPR    Ht: 165.1 cm (65\")   Wt: 65.8 kg (145 lb 1.6 oz)    Admission Cmt: None   Principal Problem: Perforated ulcer [K27.5]                 Active Insurance as of 3/22/2023     Primary Coverage     Payor Plan Insurance Group Employer/Plan Group    ANTHEM MEDICARE REPLACEMENT [employer] MEDICARE ADVANTAGE [id number]     Payor Plan Address Payor Plan Phone Number Payor Plan Fax Number Effective Dates    [address] [phone]  1/1/2019 - None Entered    [facility] [address]       Subscriber Name Subscriber Birth Date Member ID       AMANDA GOMEZ 6/8/1933 [id number]                 Emergency Contacts      (Rel.) Home Phone Work Phone Mobile Phone    Kaleb Gomez (healthcare surrogate) (Son) -- -- [phone]    Ziyad Gomez (alternative healthcare surrogate) (Son) -- -- [phone]    Filiberto Gomez (second alternative HCS) (Son) -- -- [phone]            Insurance Information                ANTHEM MEDICARE REPLACEMENT/ANTHEM MEDICARE ADVANTAGE Phone: [phone]    Subscriber: Amanda Gomez Subscriber#: [id number]    Group#: [id number] Precert#: --             History & Physical      Aurora Kirkland MD at 03/22/23 1442              [facility] " Surgery  History & Physical    Patient Identification:  Name:  Christine Garg  Age:  89 y.o.  Sex:  female  :  1933  MRN:  [id number]   Visit Number:  [id number]  Admit Date: 3/22/2023   Primary Care Physician:  Dave Tobar MD    Subjective     Chief complaint abdominal pain    Subjective      Patient is a 89 y.o. female who presents to the emergency department today with complaints of severe abdominal pain.  Patient and signs report that she is having had abdominal pain times the past 4 to 5 days.  States that it has worsened progressively every day and was at its worst today.  They report that patient has been spitting up phlegm and had nausea.  Denies any abdominal surgeries in the past.  Does report that she takes a daily 324 mg aspirin.  Patient reports that her pain is worse on her right side and that she hurts everywhere.  States that it hurts to move.  Patient last reports that she ate at approximately 6 AM and last had a sip of water around 9-10.  However she has vomited since.        ---------------------------------------------------------------------------------------------------------------------   Review of Systems  Review of Systems - General ROS: Positive for 30 pound weight loss  Psychological ROS: negative for - behavioral disorder  Ophthalmic ROS: negative for - dry eyes  ENT ROS: negative for - vertigo or vocal changes  Hematological and Lymphatic ROS: negative for - jaundice or swollen lymph nodes  Respiratory ROS: negative for - sputum changes or stridor  Cardiovascular ROS: negative for - irregular heartbeat or murmur  Gastrointestinal ROS: Positive for abdominal pain, nausea and vomiting.  Genitourinary ROS: negative for - hematuria or incontinence  Musculoskeletal ROS: negative for - gait disturbance      ---------------------------------------------------------------------------------------------------------------------   History  Past Medical History:   Diagnosis Date   •  Advanced age    • CAD (coronary artery disease) 2011    s/p 3v CABG   • Chronic kidney disease (CKD), stage III (moderate) ([facility])    • COPD (chronic obstructive pulmonary disease) ([facility])    • History of tobacco use    • Hyperlipidemia    • Hypertension    • Hypothyroidism    • Pulmonary nodule      Past Surgical History:   Procedure Laterality Date   • BREAST BIOPSY Left     benign   • CATARACT EXTRACTION     • CORONARY ANGIOPLASTY     • CORONARY ARTERY BYPASS GRAFT       Family History   Problem Relation Age of Onset   • Heart disease Mother    • Heart disease Father    • Heart disease Brother    • Breast cancer Neg Hx      Social History     Tobacco Use   • Smoking status: Former     Types: Cigarettes   • Smokeless tobacco: Never   Substance Use Topics   • Alcohol use: No   • Drug use: No     (Not in a hospital admission)    Allergies:  Motrin [ibuprofen] and Novocain [procaine]    Objective      Objective     Vital Signs:  Temp:  [96.8 °F (36 °C)] 96.8 °F (36 °C)  Heart Rate:  [] 98  Resp:  [18-20] 18  BP: (138-183)/(54-91) 183/83      03/22/23  1129   Weight: 56.7 kg (125 lb)     Body mass index is 20.8 kg/m².  ---------------------------------------------------------------------------------------------------------------------       Physical Exam  Constitutional:       Appearance: Normal appearance.   HENT:      Head: Normocephalic and atraumatic.      Right Ear: External ear normal.      Left Ear: External ear normal.   Eyes:      Conjunctiva/sclera: Conjunctivae normal.      Pupils: Pupils are equal, round, and reactive to light.   Cardiovascular:      Rate and Rhythm: Normal rate and regular rhythm.      Pulses: Normal pulses.      Heart sounds: Normal heart sounds.   Pulmonary:      Effort: Pulmonary effort is normal.      Breath sounds: Normal breath sounds.   Abdominal:      General: Abdomen is flat. Bowel sounds are normal.      Palpations: Abdomen is soft.      Tenderness: There is abdominal  tenderness (Generalized abdominal tenderness, severe pain upon palpation of right lower quadrant).   Musculoskeletal:         General: Normal range of motion.      Cervical back: Normal range of motion and neck supple.   Skin:     General: Skin is warm and dry.      Capillary Refill: Capillary refill takes less than 2 seconds.   Neurological:      General: No focal deficit present.      Mental Status: She is alert and oriented to person, place, and time.   Psychiatric:         Mood and Affect: Mood normal.         Behavior: Behavior normal.       ---------------------------------------------------------------------------------------------------------------------   Results Review:   Results from last 7 days   Lab Units 03/22/23  1336 03/22/23  1139   HSTROP T ng/L 29* 25*     Results from last 7 days   Lab Units 03/22/23  1139   CRP mg/dL 3.44*   LACTATE mmol/L 3.5*   WBC 10*3/mm3 30.99*   HEMOGLOBIN g/dL 15.9   HEMATOCRIT % 46.3   PLATELETS 10*3/mm3 468*     Results from last 7 days   Lab Units 03/22/23  1145   PH, ARTERIAL pH units 7.424   PO2 ART mm Hg 80.1*   PCO2, ARTERIAL mm Hg 30.6*   HCO3 ART mmol/L 20.0     Results from last 7 days   Lab Units 03/22/23  1139   SODIUM mmol/L 128*   POTASSIUM mmol/L 3.3*   CHLORIDE mmol/L 87*   CO2 mmol/L 21.3*   BUN mg/dL 23   CREATININE mg/dL 0.85   CALCIUM mg/dL 10.0   GLUCOSE mg/dL 139*   ALBUMIN g/dL 3.9   BILIRUBIN mg/dL 0.6   ALK PHOS U/L 88   AST (SGOT) U/L 17   ALT (SGPT) U/L 29   Estimated Creatinine Clearance: 40.2 mL/min (by C-G formula based on SCr of 0.85 mg/dL).  No results found for: AMMONIA      No results found for: BLOODCX  No results found for: URINECX  No results found for: WOUNDCX  No results found for: STOOLCX  ---------------------------------------------------------------------------------------------------------------------  Imaging:  Imaging Results (Last 24 Hours)     Procedure Component Value Units Date/Time    CT Abdomen Pelvis With Contrast  [[id number]] Collected: 03/22/23 1323     Updated: 03/22/23 1328    Narrative:      EXAM:    CT Abdomen and Pelvis With Intravenous Contrast     EXAM DATE:    3/22/2023 12:52 PM     CLINICAL HISTORY:    Right flank and RLQ abdominal pain; leukocytosis; recent UTI     TECHNIQUE:    Axial computed tomography images of the abdomen and pelvis with  intravenous contrast.  Sagittal and coronal reformatted images were  created and reviewed.  This CT exam was performed using one or more of  the following dose reduction techniques:  automated exposure control,  adjustment of the mA and/or kV according to patient size, and/or use of  iterative reconstruction technique.     COMPARISON:    No relevant prior studies available.     FINDINGS:    LUNG BASES:  Unremarkable.  No mass.  No consolidation.    MEDIASTINUM:  Small hiatal hernia.      ABDOMEN:    LIVER:  Unremarkable.  No mass.    GALLBLADDER AND BILE DUCTS:  Mild gallbladder distention.  No  calcified stones.  No ductal dilation.    PANCREAS:  Unremarkable.  No mass.  No ductal dilation.    SPLEEN:  Unremarkable.  No splenomegaly.    ADRENALS:  Unremarkable.  No mass.    KIDNEYS AND URETERS:  Unremarkable.  No solid mass.  No  hydronephrosis.    STOMACH AND BOWEL:  Abundant colonic stool.  No obstruction.  No  mucosal thickening.      PELVIS:    APPENDIX:  The appendix is not well visualized.    BLADDER:  Unremarkable.  No mass.    REPRODUCTIVE:  Unremarkable as visualized.      ABDOMEN and PELVIS:    INTRAPERITONEAL SPACE:  Question gastric antral wall thickening with  some associated adjacent stranding and punctate foci of free air  concerning for potentially perforated ulcer.  Tiny ascites around the  liver.    BONES/JOINTS:  Degenerative disc disease throughout the lumbar spine.   Degenerative facet arthropathy throughout the lumbar spine, most  prominent in the lower lumbar spine.  No acute fracture.  No  dislocation.    SOFT TISSUES:  Unremarkable.    VASCULATURE:   Atherosclerotic disease.  No abdominal aortic aneurysm.    LYMPH NODES:  Unremarkable.  No enlarged lymph nodes.       Impression:      1.  Question gastric antral wall thickening with some associated  adjacent stranding and punctate foci of free air concerning for  potentially perforated ulcer.  2.  Tiny ascites around the liver.  3.  Mild gallbladder distention.  4.  The appendix is not well visualized.  5.  Abundant colonic stool.  6.  Degenerative changes lumbar spine as described.     This report was finalized on 3/22/2023 1:26 PM by Dr. Henry Lozano MD.       XR Chest 1 View [[id number]] Collected: 03/22/23 1243     Updated: 03/22/23 1254    Narrative:      EXAM:    XR Chest, 1 View     EXAM DATE:    3/22/2023 12:09 PM     CLINICAL HISTORY:    chest pain     TECHNIQUE:    Frontal view of the chest.     COMPARISON:    02/07/2023     FINDINGS:    LUNGS:  Coarsened interstitial markings noted throughout the lungs.    PLEURAL SPACE:  Unremarkable.  No pneumothorax.    HEART:  Coronary artery bypass graft (CABG).  Heart size is stable.    MEDIASTINUM:  Unremarkable.    BONES/JOINTS:  Median sternotomy.       Impression:        No acute cardiopulmonary findings identified.     This report was finalized on 3/22/2023 12:43 PM by Dr. Henry Lozano MD.             I have personally reviewed the above radiology images and read the final radiology report on 03/22/23  ---------------------------------------------------------------------------------------------------------------------  Assessment / Plan     Impression: 89-year-old female with possible perforated ulcer and free air in abdomen  Patient Active Problem List   Diagnosis Code   • Essential hypertension I10   • Dyslipidemia E78.5   • ASCVD (arteriosclerotic cardiovascular disease) I25.10   • Palpitations R00.2   • Coronary artery disease  I25.10   • Abnormal PFTs (pulmonary function tests) R94.2   • Chronic obstructive pulmonary disease (HCC) J44.9   • Mild  persistent asthma with allergic rhinitis J45.30   • Pulmonary nodule R91.1   • Former smoker Z87.891   • Colitis K52.9     Impression patient has an acute surgical abdomen.  The etiology is not clear from imaging and there is only a very small amount of free air.    Plan:  Patient will go to the operating room for an exploratory laparotomy       Discussion:  Recommendations discussed with patient and signs.  This could certainly be a perforated ulcer.  Could also be a perforated cancer, ischemic right colon with perforated sigmoid diverticulitis being less likely.  It is also possible that the perforation may have already sealed and may not be identifiable at the time of surgery.        Yonatan Alonzo, APRN  03/22/23  14:43 EDT  ---------------------------------------------------------------------------------------------------------------------     Please note that portions of this note were completed with a voice recognition program.    Electronically signed by Aurora Kirkland MD at 03/22/23 1544         Current Facility-Administered Medications   Medication Dose Route Frequency Provider Last Rate Last Admin   • carvedilol (COREG) tablet 25 mg  25 mg Oral BID With Meals Rodriguez Robin MD   25 mg at 04/05/23 0900   • castor oil-balsam peru (VENELEX) ointment 1 application  1 application Topical Q12H Aurora Kirkland MD   1 application at 04/05/23 0838   • [START ON 4/6/2023] ceFAZolin 2 gm IVPB in 100 mL NS (VTB)  2 g Intravenous On Call to OR Aurora Kirkland MD       • cloNIDine (CATAPRES) tablet 0.1 mg  0.1 mg Oral TID PRN Aurora Kirkland MD   0.1 mg at 04/01/23 7921   • dextrose 5 % and sodium chloride 0.45 % infusion  50 mL/hr Intravenous Continuous Aurora Kirkland MD 50 mL/hr at 04/05/23 1354 50 mL/hr at 04/05/23 1354   • heparin (porcine) 5000 UNIT/ML injection 5,000 Units  5,000 Units Subcutaneous Q12H Aurora Kirkland MD   5,000 Units at 04/05/23 0838   • hydrALAZINE (APRESOLINE)  injection 10 mg  10 mg Intravenous Q6H PRN Rodriguez Robin MD   10 mg at 04/05/23 0638   • hydrALAZINE (APRESOLINE) tablet 50 mg  50 mg Oral Q8H Kiara Servin MD   50 mg at 04/05/23 0542   • HYDROcodone-acetaminophen (NORCO) 7.5-325 MG per tablet 1 tablet  1 tablet Oral Q4H PRN Aurora Kirkland MD   1 tablet at 04/05/23 1128   • labetalol (NORMODYNE,TRANDATE) injection 10 mg  10 mg Intravenous Q6H PRN Kiara Servin MD   10 mg at 04/05/23 0303   • levothyroxine (SYNTHROID, LEVOTHROID) tablet 112 mcg  112 mcg Oral Q AM Aurora Kirkland MD   112 mcg at 04/05/23 0542   • Lidocaine 4 % patch 1 patch  1 patch Apply externally Q24H Aurora Kirkland MD       • Magnesium Sulfate - Total Dose 4 grams - Magnesium 1 or Less  4 g Intravenous PRN Lola Mckeon PA-C        Or   • Magnesium Sulfate - Total Dose 3 grams - Magnesium 1.1 - 1.5  1 g Intravenous PRN Lola Mckeon PA-C        Or   • Magnesium Sulfate - Total Dose 2 grams - Magnesium 1.6 - 1.9  2 g Intravenous PRN Lola Mckeon PA-C       • Magnesium Sulfate - Total Dose 2 grams - Magnesium 1.6 - 1.9  2 g Intravenous Once Aurora Kirkland MD 25 mL/hr at 04/05/23 1218 2 g at 04/05/23 1218   • multivitamin (MULTI-DELYN) liquid 10 mL  10 mL Oral Daily Rodriguez Robin MD   10 mL at 04/05/23 0838   • ondansetron (ZOFRAN) injection 4 mg  4 mg Intravenous Q4H PRN Aurora Kirkland MD   4 mg at 04/05/23 0854   • pantoprazole (PROTONIX) EC tablet 40 mg  40 mg Oral BID AC Aurora Kirkland MD   40 mg at 04/05/23 0639   • polyethylene glycol (MIRALAX) packet 17 g  17 g Oral Daily Aurora Kirkland MD   17 g at 04/05/23 0839   • potassium chloride (K-DUR,KLOR-CON) ER tablet 40 mEq  40 mEq Oral PRN Aurora Kirkland MD        Or   • potassium chloride 10 mEq in 100 mL IVPB  10 mEq Intravenous Q1H PRN Aurora Kirkland MD       • potassium chloride (KLOR-CON) packet 40 mEq  40 mEq Oral PRN Aurora Kirkland MD       • potassium phosphate 45 mmol in sodium  chloride 0.9 % 500 mL infusion  45 mmol Intravenous PRN LayneWinnie no, DO        Or   • potassium phosphate 30 mmol in sodium chloride 0.9 % 250 mL infusion  30 mmol Intravenous PRN LayneWinnie Iain, DO        Or   • potassium phosphate 15 mmol in sodium chloride 0.9 % 100 mL infusion  15 mmol Intravenous PRN Layne, Winnie Iain, DO        Or   • sodium phosphates 45 mmol in sodium chloride 0.9 % 500 mL IVPB  45 mmol Intravenous PRN Layne, Winnie Timmons, DO        Or   • sodium phosphates 30 mmol in sodium chloride 0.9 % 250 mL IVPB  30 mmol Intravenous PRN Layne, Winnie Iain, DO        Or   • sodium phosphates 15 mmol in sodium chloride 0.9 % 250 mL IVPB  15 mmol Intravenous PRN LayneWinnie no, DO       • sodium chloride 0.9 % flush 10 mL  10 mL Intravenous PRN Aurora Kirkland MD       • sodium chloride 0.9 % flush 10 mL  10 mL Intravenous PRN Aurora Kirkland MD       • sodium chloride 0.9 % flush 10 mL  10 mL Intravenous Q12H Eb Hong, DO   10 mL at 04/05/23 0840   • sodium chloride 0.9 % flush 10 mL  10 mL Intravenous PRN Eb Hong, DO   10 mL at 03/31/23 0832   • sodium chloride 0.9 % flush 10 mL  10 mL Intravenous Q12H Eb Hong, DO   10 mL at 04/05/23 0839   • sodium chloride 0.9 % flush 10 mL  10 mL Intravenous Q12H Eb Hong, DO   10 mL at 04/05/23 0839   • sodium chloride 0.9 % flush 10 mL  10 mL Intravenous PRN Eb Hong, DO       • sodium chloride 0.9 % flush 3 mL  3 mL Intravenous Q12H Aurora Kirkland MD   3 mL at 04/05/23 0840   • sodium chloride 0.9 % infusion 40 mL  40 mL Intravenous PRN Aurora Kirkland MD       • sodium chloride 0.9 % infusion 40 mL  40 mL Intravenous PRN Eb Hong DO       • sodium chloride 0.9 % infusion 40 mL  40 mL Intravenous PRN Eb Hong DO       • valsartan (DIOVAN) tablet 320 mg  320 mg Oral Q24H Rodriguez Robin MD   320 mg at 04/05/23  0901        Physician Progress Notes (most recent note)      Aurora Kirkland MD at 04/05/23 0949               LOS: 14 days   Patient Care Team:  Dave Tobar MD as PCP - General  Dave Tobar MD as PCP - Family Medicine    Post op day # 14, status post laparotomy with closure of perforated duodenal ulcer with omental patch and Biopatch    Subjective     Interval History:  Patient underwent CT-guided biopsy of pleural-based mass yesterday.  Pathology pending.  Patient continues to have difficulty with swallowing secondary to extrinsic compression.  She states she got dizzy today after taking a pain pill.   Objective     Vital Signs  Temp:  [97.4 °F (36.3 °C)-98.2 °F (36.8 °C)] 97.8 °F (36.6 °C)  Heart Rate:  [67-84] 72  Resp:  [16-20] 20  BP: (136-191)/(60-87) 136/61    Physical Exam:  This is a well-developed well-nourished elderly female in no acute distress  HEENT examination: Sclera are anicteric  Lungs: Clear  Abdomen: Bowel sounds are present  Skin/incisions: Surgical  Incision intact and dry.       Results Review:    Results from last 7 days   Lab Units 03/30/23  1410 03/30/23  1132   HSTROP T ng/L 12* 17*     Results from last 7 days   Lab Units 04/04/23  0231 04/02/23  0254 03/30/23  0139   WBC 10*3/mm3 11.47* 10.89* 8.93   HEMOGLOBIN g/dL 9.6* 10.0* 9.1*   HEMATOCRIT % 29.3* 30.9* 27.5*   PLATELETS 10*3/mm3 298 280 238   INR  0.87*  --   --          Results from last 7 days   Lab Units 04/05/23  0048 04/04/23  1751 04/04/23  0231 04/03/23  0247 04/02/23  1040 03/31/23  0012 03/30/23  0139   SODIUM mmol/L 138  --  138  --  135*   < > 138   POTASSIUM mmol/L 4.6 4.4 3.4*  --  4.0   < > 3.5   MAGNESIUM mg/dL 1.8  --  2.1 1.9  --    < >  --    CHLORIDE mmol/L 103  --  101  --  98   < > 103   CO2 mmol/L 29.5*  --  29.6*  --  30.4*   < > 28.8   BUN mg/dL 9  --  10  --  9   < > 8   CREATININE mg/dL 0.51*  --  0.48*  --  0.49*   < > 0.44*   CALCIUM mg/dL 9.1  --  8.6  --  9.0   < > 8.3*   GLUCOSE mg/dL  97  --  91  --  82   < > 104*   ALBUMIN g/dL  --   --  2.5*  --   --   --  2.2*   BILIRUBIN mg/dL  --   --  0.2  --   --   --  0.2   ALK PHOS U/L  --   --  82  --   --   --  83   AST (SGOT) U/L  --   --  13  --   --   --  19   ALT (SGPT) U/L  --   --  17  --   --   --  42*    < > = values in this interval not displayed.   Estimated Creatinine Clearance: 77.7 mL/min (A) (by C-G formula based on SCr of 0.51 mg/dL (L)).  No results found for: AMMONIA      Blood Culture   Date Value Ref Range Status   03/22/2023 No growth at 24 hours  Preliminary   03/22/2023 No growth at 24 hours  Preliminary     No results found for: URINECX  Wound Culture   Date Value Ref Range Status   03/22/2023 No growth  Preliminary     No results found for: STOOLCX    Imaging:  Imaging Results (Last 24 Hours)     Procedure Component Value Units Date/Time    CT Needle Biopsy Pleura [[id number]] Collected: 04/04/23 1328     Updated: 04/04/23 1333    Narrative:      EXAMINATION: CT NEEDLE BIOPSY PLEURA-      CLINICAL INDICATION:pleural based mass; K25.1-Acute gastric ulcer with  perforation; E87.6-Gsgz-bzczeuovsq and hyponatremia; E87.6-Hypokalemia;  A41.9-Sepsis, unspecified organism; K27.5-Chronic or unspecified peptic  ulcer, site unspecified, with perforation     COMPARISON: CT 03/29/2023      TECHNIQUE/FINDINGS:   Informed consent was obtained regarding risks associated with the  procedure including infection, bleeding, and injury to adjacent  structures. Preprocedure CT imaging redemonstrates pleural-based mass  that is essentially paraspinal in location involving the left lower lobe  at the T6 level that is targeted for CT-guided biopsy.  The posterior chest was prepped and draped in sterile fashion. 1%  lidocaine was used to achieve local anesthesia. Under CT fluoroscopy  guidance, 3 18-gauge core needle samples were obtained using a 10 cm  18-gauge core needle biopsy system. Specimens were cemented in formalin  for eventual histopathologic  analysis. No complication encountered  during or immediately following the procedure. The biopsy site was  cleansed and bandaged.       Impression:      CT-guided biopsy of a left lower lobe region pleural-based mass.     This report was finalized on 4/4/2023 1:31 PM by Dr. Tobias Stoll MD.              Impression:  Stable course, status post repair of perforated duodenal ulcer  Aggressive pleural based lesion with rib destruction and now extrinsic compression of the mid thoracic esophagus.    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  Additional stains are pending.    Plan:  PEG tube placement Friday  Nutrition consult  Restart IV fluids at 50 cc an hour as p.o. intake poor    Patient's case and x-rays were reviewed at multidisciplinary tumor board with diagnostic radiology, medical oncology, radiation oncology and surgery.  At this point the patient's main problem which is impacting on her recovery and longevity is her inability to eat because of the extrinsic esophageal narrowing.  Do feel that a PEG tube is feasible.  Have recommended PEG tube placement with discharge to [facility].  At that point in time patient can complete her outpatient work-up to include a PET scan which had been previously scheduled but was canceled due to patient's inpatient status.  She then can follow-up with medical oncology as an outpatient to determine what if any additional treatment could be offered.  Aurora Kirkland MD  04/05/23  09:49 EDT      Please note that portions of this note were completed with a voice recognition program.      Electronically signed by Aurora Kirkland MD at 04/05/23 1317          Physical Therapy Notes (most recent note)      Bernadette Sosa, PT at 04/04/23 1512  Version 1 of 1            04/04/23 1512   OTHER   Discipline physical therapist   Rehab Time/Intention   Session Not Performed patient unavailable for treatment         Electronically signed by Ian  Bernadette, PT at 04/04/23 1512          Occupational Therapy Notes (most recent note)      Reta Kapoor, OT at 04/04/23 1309        Patient off unit.     Electronically signed by Reta Kapoor OT at 04/04/23 1309       Speech Language Pathology Notes (most recent note)    No notes exist for this encounter.         ADL Documentation (most recent)    Flowsheet Row Most Recent Value   Transferring 2 - assistive person   Toileting 3 - assistive equipment and person   Bathing 2 - assistive person   Dressing 2 - assistive person   Eating 0 - independent   Communication 0 - understands/communicates without difficulty   Swallowing 0 - swallows foods/liquids without difficulty   Equipment Currently Used at Home none        Discharge Summary    No notes of this type exist for this encounter.       Discharge Order (From admission, onward)    None

## 2023-04-06 LAB
ALBUMIN SERPL-MCNC: 2.5 G/DL (ref 3.5–5.2)
ALBUMIN/GLOB SERPL: 1.2 G/DL
ALP SERPL-CCNC: 88 U/L (ref 39–117)
ALT SERPL W P-5'-P-CCNC: 11 U/L (ref 1–33)
ANION GAP SERPL CALCULATED.3IONS-SCNC: 7.4 MMOL/L (ref 5–15)
AST SERPL-CCNC: 11 U/L (ref 1–32)
BASOPHILS # BLD AUTO: 0.1 10*3/MM3 (ref 0–0.2)
BASOPHILS NFR BLD AUTO: 1 % (ref 0–1.5)
BILIRUB SERPL-MCNC: <0.2 MG/DL (ref 0–1.2)
BUN SERPL-MCNC: 7 MG/DL (ref 8–23)
BUN/CREAT SERPL: 13.5 (ref 7–25)
CALCIUM SPEC-SCNC: 8.6 MG/DL (ref 8.6–10.5)
CHLORIDE SERPL-SCNC: 100 MMOL/L (ref 98–107)
CO2 SERPL-SCNC: 27.6 MMOL/L (ref 22–29)
CREAT SERPL-MCNC: 0.52 MG/DL (ref 0.57–1)
DEPRECATED RDW RBC AUTO: 55.2 FL (ref 37–54)
EGFRCR SERPLBLD CKD-EPI 2021: 88.9 ML/MIN/1.73
EOSINOPHIL # BLD AUTO: 0.82 10*3/MM3 (ref 0–0.4)
EOSINOPHIL NFR BLD AUTO: 7.9 % (ref 0.3–6.2)
ERYTHROCYTE [DISTWIDTH] IN BLOOD BY AUTOMATED COUNT: 14.6 % (ref 12.3–15.4)
GLOBULIN UR ELPH-MCNC: 2.1 GM/DL
GLUCOSE SERPL-MCNC: 127 MG/DL (ref 65–99)
HCT VFR BLD AUTO: 32.6 % (ref 34–46.6)
HGB BLD-MCNC: 10.1 G/DL (ref 12–15.9)
IMM GRANULOCYTES # BLD AUTO: 0.04 10*3/MM3 (ref 0–0.05)
IMM GRANULOCYTES NFR BLD AUTO: 0.4 % (ref 0–0.5)
LYMPHOCYTES # BLD AUTO: 1.18 10*3/MM3 (ref 0.7–3.1)
LYMPHOCYTES NFR BLD AUTO: 11.4 % (ref 19.6–45.3)
MAGNESIUM SERPL-MCNC: 2.1 MG/DL (ref 1.6–2.4)
MCH RBC QN AUTO: 32.3 PG (ref 26.6–33)
MCHC RBC AUTO-ENTMCNC: 31 G/DL (ref 31.5–35.7)
MCV RBC AUTO: 104.2 FL (ref 79–97)
MONOCYTES # BLD AUTO: 0.61 10*3/MM3 (ref 0.1–0.9)
MONOCYTES NFR BLD AUTO: 5.9 % (ref 5–12)
NEUTROPHILS NFR BLD AUTO: 7.63 10*3/MM3 (ref 1.7–7)
NEUTROPHILS NFR BLD AUTO: 73.4 % (ref 42.7–76)
NRBC BLD AUTO-RTO: 0 /100 WBC (ref 0–0.2)
PHOSPHATE SERPL-MCNC: 3.5 MG/DL (ref 2.5–4.5)
PLATELET # BLD AUTO: 249 10*3/MM3 (ref 140–450)
PMV BLD AUTO: 9.4 FL (ref 6–12)
POTASSIUM SERPL-SCNC: 4.1 MMOL/L (ref 3.5–5.2)
PROT SERPL-MCNC: 4.6 G/DL (ref 6–8.5)
RBC # BLD AUTO: 3.13 10*6/MM3 (ref 3.77–5.28)
REF LAB TEST METHOD: NORMAL
SODIUM SERPL-SCNC: 135 MMOL/L (ref 136–145)
WBC NRBC COR # BLD: 10.38 10*3/MM3 (ref 3.4–10.8)

## 2023-04-06 PROCEDURE — 80053 COMPREHEN METABOLIC PANEL: CPT | Performed by: INTERNAL MEDICINE

## 2023-04-06 PROCEDURE — 85025 COMPLETE CBC W/AUTO DIFF WBC: CPT | Performed by: INTERNAL MEDICINE

## 2023-04-06 PROCEDURE — 84100 ASSAY OF PHOSPHORUS: CPT | Performed by: INTERNAL MEDICINE

## 2023-04-06 PROCEDURE — 25010000002 CEFAZOLIN PER 500 MG: Performed by: SURGERY

## 2023-04-06 PROCEDURE — 83735 ASSAY OF MAGNESIUM: CPT | Performed by: INTERNAL MEDICINE

## 2023-04-06 PROCEDURE — 99232 SBSQ HOSP IP/OBS MODERATE 35: CPT | Performed by: INTERNAL MEDICINE

## 2023-04-06 PROCEDURE — 97110 THERAPEUTIC EXERCISES: CPT

## 2023-04-06 PROCEDURE — 25010000002 ONDANSETRON PER 1 MG: Performed by: SURGERY

## 2023-04-06 PROCEDURE — 99024 POSTOP FOLLOW-UP VISIT: CPT | Performed by: SURGERY

## 2023-04-06 PROCEDURE — 25010000002 HYDRALAZINE PER 20 MG: Performed by: INTERNAL MEDICINE

## 2023-04-06 PROCEDURE — 25010000002 HEPARIN (PORCINE) PER 1000 UNITS: Performed by: SURGERY

## 2023-04-06 RX ADMIN — HEPARIN SODIUM 5000 UNITS: 5000 INJECTION INTRAVENOUS; SUBCUTANEOUS at 20:40

## 2023-04-06 RX ADMIN — LIDOCAINE 1 PATCH: 4 PATCH TOPICAL at 08:11

## 2023-04-06 RX ADMIN — CEFAZOLIN 2 G: 2 INJECTION, POWDER, FOR SOLUTION INTRAMUSCULAR; INTRAVENOUS at 05:48

## 2023-04-06 RX ADMIN — Medication 10 ML: at 20:41

## 2023-04-06 RX ADMIN — HYDRALAZINE HYDROCHLORIDE 50 MG: 50 TABLET, FILM COATED ORAL at 22:35

## 2023-04-06 RX ADMIN — ONDANSETRON 4 MG: 2 INJECTION INTRAMUSCULAR; INTRAVENOUS at 12:57

## 2023-04-06 RX ADMIN — POLYETHYLENE GLYCOL 3350 17 G: 17 POWDER, FOR SOLUTION ORAL at 08:12

## 2023-04-06 RX ADMIN — Medication 10 ML: at 20:42

## 2023-04-06 RX ADMIN — PANTOPRAZOLE SODIUM 40 MG: 40 TABLET, DELAYED RELEASE ORAL at 05:50

## 2023-04-06 RX ADMIN — Medication 10 ML: at 08:11

## 2023-04-06 RX ADMIN — HYDRALAZINE HYDROCHLORIDE 50 MG: 50 TABLET, FILM COATED ORAL at 13:36

## 2023-04-06 RX ADMIN — DEXTROSE AND SODIUM CHLORIDE 50 ML/HR: 5; 450 INJECTION, SOLUTION INTRAVENOUS at 12:58

## 2023-04-06 RX ADMIN — CARVEDILOL 25 MG: 25 TABLET, FILM COATED ORAL at 08:06

## 2023-04-06 RX ADMIN — Medication 3 ML: at 20:42

## 2023-04-06 RX ADMIN — CARVEDILOL 25 MG: 25 TABLET, FILM COATED ORAL at 17:09

## 2023-04-06 RX ADMIN — HYDROCODONE BITARTRATE AND ACETAMINOPHEN 1 TABLET: 7.5; 325 TABLET ORAL at 20:51

## 2023-04-06 RX ADMIN — HYDROCODONE BITARTRATE AND ACETAMINOPHEN 1 TABLET: 7.5; 325 TABLET ORAL at 17:16

## 2023-04-06 RX ADMIN — CASTOR OIL AND BALSAM, PERU 1 APPLICATION: 788; 87 OINTMENT TOPICAL at 20:40

## 2023-04-06 RX ADMIN — HYDRALAZINE HYDROCHLORIDE 50 MG: 50 TABLET, FILM COATED ORAL at 05:49

## 2023-04-06 RX ADMIN — HYDROCODONE BITARTRATE AND ACETAMINOPHEN 1 TABLET: 7.5; 325 TABLET ORAL at 12:57

## 2023-04-06 RX ADMIN — LEVOTHYROXINE SODIUM 112 MCG: 0.07 TABLET ORAL at 05:49

## 2023-04-06 RX ADMIN — HEPARIN SODIUM 5000 UNITS: 5000 INJECTION INTRAVENOUS; SUBCUTANEOUS at 08:05

## 2023-04-06 RX ADMIN — Medication 10 ML: at 08:12

## 2023-04-06 RX ADMIN — HYDRALAZINE HYDROCHLORIDE 10 MG: 20 INJECTION INTRAMUSCULAR; INTRAVENOUS at 17:16

## 2023-04-06 RX ADMIN — Medication 10 MG: at 07:40

## 2023-04-06 RX ADMIN — HYDROCODONE BITARTRATE AND ACETAMINOPHEN 1 TABLET: 7.5; 325 TABLET ORAL at 07:15

## 2023-04-06 RX ADMIN — Medication 3 ML: at 08:12

## 2023-04-06 RX ADMIN — PANTOPRAZOLE SODIUM 40 MG: 40 TABLET, DELAYED RELEASE ORAL at 17:09

## 2023-04-06 RX ADMIN — CASTOR OIL AND BALSAM, PERU 1 APPLICATION: 788; 87 OINTMENT TOPICAL at 08:11

## 2023-04-06 RX ADMIN — VALSARTAN 320 MG: 160 TABLET, FILM COATED ORAL at 08:05

## 2023-04-06 NOTE — PROGRESS NOTES
[facility] HOSPITALIST PROGRESS NOTE     Patient Identification:  Name:  Christine Garg  Age:  89 y.o.  Sex:  female  :  1933  MRN:  [id number]  Visit Number:  [id number]  ROOM: [address] [place] [address]     Primary Care Provider:  Dave Tobar MD     Date of Admission: 3/22/2023    Length of stay in inpatient status:  15    Subjective     Chief Compliant:    Chief Complaint   Patient presents with   • Abdominal Pain     History of Presenting Illness:  The patient is having trouble breathing today and is becoming short of air while trying to talk to me; thus, I could not obtain a history from her today.  She did have 2 family members at bedside.      Objective     Current Hospital Meds:  carvedilol, 25 mg, Oral, BID With Meals  castor oil-balsam peru, 1 application, Topical, Q12H  heparin (porcine), 5,000 Units, Subcutaneous, Q12H  hydrALAZINE, 50 mg, Oral, Q8H  levothyroxine, 112 mcg, Oral, Q AM  Lidocaine, 1 patch, Apply externally, Q24H  multivitamin, 10 mL, Oral, Daily  pantoprazole, 40 mg, Oral, BID AC  polyethylene glycol, 17 g, Oral, Daily  sodium chloride, 10 mL, Intravenous, Q12H  sodium chloride, 10 mL, Intravenous, Q12H  sodium chloride, 10 mL, Intravenous, Q12H  sodium chloride, 3 mL, Intravenous, Q12H  valsartan, 320 mg, Oral, Q24H    dextrose 5 % and sodium chloride 0.45 %, 50 mL/hr, Last Rate: 50 mL/hr (23 1354)      Current Antimicrobial Therapy:  Anti-Infectives (From admission, onward)    Ordered     Dose/Rate Route Frequency Start Stop    23 1320  ceFAZolin 2 gm IVPB in 100 mL NS (VTB)        Ordering Provider: Aurora Kirkland MD    2 g  over 30 Minutes Intravenous On Call to O.R. 23 0600 23 0618    23 0849  piperacillin-tazobactam (ZOSYN) IVPB 3.375 g in 100 mL NS VTB        Ordering Provider: Aurora Kirkland MD    3.375 g  over 4 Hours Intravenous Every 8 Hours 23 1600 23 1233    23 0849  piperacillin-tazobactam (ZOSYN) IVPB 3.375  g in 100 mL NS VTB        Ordering Provider: Aurora Kirkland MD    3.375 g  over 30 Minutes Intravenous Once 03/23/23 0945 03/23/23 1026    03/22/23 1207  piperacillin-tazobactam (ZOSYN) IVPB 3.375 g in 100 mL NS VTB        Ordering Provider: Alejandrina Gonzalez PA    3.375 g  over 30 Minutes Intravenous Once 03/22/23 1209 03/22/23 1329    03/22/23 1207  vancomycin 1250 mg/250 mL 0.9% NS IVPB ([facility])        Ordering Provider: Alejandrina Gonzalez PA    20 mg/kg × 56.7 kg Intravenous Once 03/22/23 1209 03/22/23 1505        Current Diuretic Therapy:  Diuretics (From admission, onward)    None        ----------------------------------------------------------------------------------------------------------------------  Vital Signs:  Temp:  [97.6 °F (36.4 °C)-97.8 °F (36.6 °C)] 97.6 °F (36.4 °C)  Heart Rate:  [65-78] 71  Resp:  [18-20] 20  BP: (122-190)/(53-69) 170/69     on  Flow (L/min):  [2] 2;   Device (Oxygen Therapy): nasal cannula  Body mass index is 24.15 kg/m².    Wt Readings from Last 3 Encounters:   04/04/23 65.8 kg (145 lb 1.6 oz)   01/05/23 68 kg (150 lb)   12/25/22 73.9 kg (163 lb)     Intake & Output (last 3 days)       04/03 0701  04/04 0700 04/04 0701  04/05 0700 04/05 0701  04/06 0700 04/06 0701  04/07 0700    P.O. 0 420 520 120    Total Intake(mL/kg) 0 (0) 420 (6.4) 520 (7.9) 120 (1.8)    Urine (mL/kg/hr)  300 (0.2) 900 (0.6) 200 (0.5)    Stool    0    Total Output  300 900 200    Net 0 +120 -380 -80            Urine Unmeasured Occurrence 1 x 1 x 1 x     Stool Unmeasured Occurrence 1 x   1 x        Diet: Liquid Diets; Full Liquid; No Carbonated Beverages, No Straw; Texture: Regular Texture (IDDSI 7); Fluid Consistency: Thin (IDDSI 0)  NPO Diet NPO Type: Strict NPO, Sips with Meds  ----------------------------------------------------------------------------------------------------------------------  Physical Exam  Constitutional:       General: She is in acute distress.      Appearance: She is  ill-appearing.   Cardiovascular:      Rate and Rhythm: Normal rate and regular rhythm.      Pulses: Normal pulses.      Heart sounds: No murmur heard.  Pulmonary:      Effort: Accessory muscle usage and respiratory distress present. No prolonged expiration.      Breath sounds: Examination of the right-upper field reveals decreased breath sounds. Examination of the right-middle field reveals decreased breath sounds. Examination of the right-lower field reveals decreased breath sounds. Decreased breath sounds present. No wheezing or rales.   Neurological:      Mental Status: She is alert and oriented to person, place, and time. Mental status is at baseline.      Cranial Nerves: No cranial nerve deficit.   Psychiatric:         Mood and Affect: Mood normal.         Behavior: Behavior normal.       ----------------------------------------------------------------------------------------------------------------------  Tele:  NS with heart rates 50-60's.  I have personally reviewed/looked at the telemetry strips.  ----------------------------------------------------------------------------------------------------------------------  LABS:    CBC and coagulation:  Results from last 7 days   Lab Units 04/06/23  0139 04/04/23  0231 04/02/23  0254   WBC 10*3/mm3 10.38 11.47* 10.89*   HEMOGLOBIN g/dL 10.1* 9.6* 10.0*   HEMATOCRIT % 32.6* 29.3* 30.9*   MCV fL 104.2* 94.8 97.2*   MCHC g/dL 31.0* 32.8 32.4   PLATELETS 10*3/mm3 249 298 280   INR   --  0.87*  --      Renal and electrolytes:  Results from last 7 days   Lab Units 04/06/23  0139 04/05/23  0048 04/04/23  1751 04/04/23  0231 04/03/23  0247 04/02/23  1040 04/01/23  0858 03/31/23  0012   SODIUM mmol/L 135* 138  --  138  --  135*  --  138   POTASSIUM mmol/L 4.1 4.6 4.4 3.4*  --  4.0  --  4.8  4.7   MAGNESIUM mg/dL 2.1 1.8  --  2.1 1.9  --  1.9 1.7   CHLORIDE mmol/L 100 103  --  101  --  98  --  103   CO2 mmol/L 27.6 29.5*  --  29.6*  --  30.4*  --  25.6   BUN mg/dL 7* 9  --   10  --  9  --  9   CREATININE mg/dL 0.52* 0.51*  --  0.48*  --  0.49*  --  0.44*   CALCIUM mg/dL 8.6 9.1  --  8.6  --  9.0  --  8.9   PHOSPHORUS mg/dL 3.5 3.3  --  3.6  --   --   --   --    GLUCOSE mg/dL 127* 97  --  91  --  82  --  108*     Estimated Creatinine Clearance: 76.2 mL/min (A) (by C-G formula based on SCr of 0.52 mg/dL (L)).    Liver and pancreatic function:  Results from last 7 days   Lab Units 04/06/23  0139 04/04/23  0231   ALBUMIN g/dL 2.5* 2.5*   BILIRUBIN mg/dL <0.2 0.2   ALK PHOS U/L 88 82   AST (SGOT) U/L 11 13   ALT (SGPT) U/L 11 17     Endocrine function:  Lab Results   Component Value Date    HGBA1C 6.10 (H) 03/22/2023     Point of care bedside glucose levels:  Results from last 7 days   Lab Units 04/04/23  1712   GLUCOSE mg/dL 92     Glucose levels from the [facility]:  Results from last 7 days   Lab Units 04/06/23  0139 04/05/23  0048 04/04/23  0231 04/02/23  1040 03/31/23  0012   GLUCOSE mg/dL 127* 97 91 82 108*       I have personally looked at the labs and they are summarized above.    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  Will inform the palliative care team that the pathology report came back after they saw the patient today so that more goals of care can be discussed.  The blood pressures are higher today but she is having trouble breathing and swallowing and thus will just continue with the current medicine regimen as she is able to swallow these pills.  Glucose levels are not low and thus will continue to monitor the levels.    VTE Prophylaxis:   Mechanical Order History:      Ordered        03/22/23 1802  Place sequential compression device  Once                    Pharmalogical Order History:      Ordered     Dose Route Frequency Stop    03/22/23 1802  heparin (porcine) 5000 UNIT/ML injection 5,000 Units         5,000 Units SC Every 12 Hours Scheduled --            Disposition:  Undetermined at this time.    Kiara Servin MD  [facility]  04/06/23  12:44 EDT

## 2023-04-06 NOTE — PROGRESS NOTES
Christine Garg  [id number]  1933    Referring Provider:   Dr. Kirkland    Reason for Followup:   Pleural based lung mass    Patient Care Team:  Dave Tobar MD as PCP - General  Dave Tobar MD as PCP - Family Medicine    Chief Complaint:  Dysphagia    Subjective:    Patient underwent biopsy of pleural lesion on 22. Her case was discussed in tumor board with radiology, pathology, surgery, radiation oncology, and medical oncology. 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 She continues to complain of ongoing dysphagia as well as back and shoulder pain. She also notes constipation.           Allergies:    Motrin [ibuprofen] and Novocain [procaine]    Outpatient Medications:  Medications Prior to Admission   Medication Sig Dispense Refill Last Dose   • aspirin 325 MG tablet Take 1 tablet by mouth Daily.   Unknown   • carvedilol (COREG) 25 MG tablet Take 1 tablet by mouth 2 (Two) Times a Day With Meals. 60 tablet 0 Unknown   • cloNIDine (CATAPRES) 0.1 MG tablet Take 1 tablet by mouth 3 (Three) Times a Day As Needed for High Blood Pressure (SBP > 150).   Unknown   • Evolocumab (Repatha SureClick) solution auto-injector SureClick injection Inject 1 mL under the skin into the appropriate area as directed Every 14 (Fourteen) Days. 6 mL 1 Unknown   • HYDROcodone-acetaminophen (NORCO) 5-325 MG per tablet Take 1 tablet by mouth Daily As Needed.   Unknown   • levothyroxine (SYNTHROID, LEVOTHROID) 112 MCG tablet Take 1 tablet by mouth Every Morning.   Unknown   • montelukast (SINGULAIR) 10 MG tablet Take 1 tablet by mouth Every Night. 30 tablet 11 Unknown   • Movantik 25 MG tablet Take 1 tablet by mouth Daily As Needed for Constipation.   Unknown   • [] nitrofurantoin, macrocrystal-monohydrate, (MACROBID) 100 MG capsule Take 1 capsule by mouth 2 (Two) Times a Day With Meals. 3/20   Unknown   • ondansetron  (ZOFRAN) 4 MG tablet Take 1 tablet by mouth 3 (Three) Times a Day As Needed.   Unknown   • [] predniSONE (DELTASONE) 20 MG tablet Take 1 tablet by mouth Daily.   Unknown   • tiZANidine (ZANAFLEX) 2 MG half tablet Take 1 half tablet by mouth 2 (Two) Times a Day.   Unknown   • valsartan (DIOVAN) 320 MG tablet Take 1 tablet by mouth Daily.   Unknown       Inpatient Medications:  Scheduled Meds:  carvedilol, 25 mg, Oral, BID With Meals  castor oil-balsam peru, 1 application, Topical, Q12H  heparin (porcine), 5,000 Units, Subcutaneous, Q12H  hydrALAZINE, 50 mg, Oral, Q8H  levothyroxine, 112 mcg, Oral, Q AM  Lidocaine, 1 patch, Apply externally, Q24H  multivitamin, 10 mL, Oral, Daily  pantoprazole, 40 mg, Oral, BID AC  polyethylene glycol, 17 g, Oral, Daily  sodium chloride, 10 mL, Intravenous, Q12H  sodium chloride, 10 mL, Intravenous, Q12H  sodium chloride, 10 mL, Intravenous, Q12H  sodium chloride, 3 mL, Intravenous, Q12H  valsartan, 320 mg, Oral, Q24H        Continuous Infusions:  dextrose 5 % and sodium chloride 0.45 %, 50 mL/hr, Last Rate: 50 mL/hr (23 1354)        PRN Meds:  •  cloNIDine  •  hydrALAZINE  •  HYDROcodone-acetaminophen  •  labetalol  •  magnesium sulfate **OR** magnesium sulfate in D5W 1g/100mL (PREMIX) **OR** magnesium sulfate  •  ondansetron  •  potassium chloride **OR** [DISCONTINUED] potassium chloride **OR** potassium chloride  •  potassium chloride  •  potassium phosphate infusion greater than 15 mMoles **OR** potassium phosphate infusion greater than 15 mMoles **OR** potassium phosphate **OR** sodium phosphate IVPB **OR** sodium phosphate IVPB **OR** sodium phosphate IVPB  •  [COMPLETED] Insert Peripheral IV **AND** sodium chloride  •  sodium chloride  •  sodium chloride  •  sodium chloride  •  sodium chloride  •  sodium chloride  •  sodium chloride      Review of Systems:  Pertinent positives are listed as per history of present of illness, all other systems reviewed and are  negative.      Physical Exam:  Vital Signs: These were reviewed and listed as per patient’s electronic medical chart  Vitals:    04/06/23 0720   BP: 178/65   Pulse: 74   Resp:    Temp:    SpO2:      General: Awake, alert and oriented, in no distress, resting comfortably   HEENT: Head is atraumatic, normocephalic, extraocular movements full, oropharynx clear, no scleral icterus, pink moist mucous membranes, NC in place  Neck: supple, no jvd, lymphadenopathy or masses  Cardiovascular: regular rate and rhythm without murmurs, rubs or gallops  Pulmonary: non-labored, clear to auscultation bilaterally, no wheezing  Abdomen: soft, non-tender, non-distended, normal active bowel sounds present, no organomegaly  Extremities: No clubbing, cyanosis or edema  Lymph: No cervical, supraclavicular adenopathy  Neurologic: Mental status as above, alert, awake and oriented, grossly non-focal exam  Skin: warm, dry, intact, ecchymosis on extremities        Labs / Studies:  Lab Results (last 72 hours)     Procedure Component Value Units Date/Time    Magnesium [[id number]]  (Normal) Collected: 04/05/23 0048    Specimen: Blood Updated: 04/05/23 0129     Magnesium 1.8 mg/dL     Basic Metabolic Panel [[id number]]  (Abnormal) Collected: 04/05/23 0048    Specimen: Blood Updated: 04/05/23 0129     Glucose 97 mg/dL      BUN 9 mg/dL      Creatinine 0.51 mg/dL      Sodium 138 mmol/L      Potassium 4.6 mmol/L      Chloride 103 mmol/L      CO2 29.5 mmol/L      Calcium 9.1 mg/dL      BUN/Creatinine Ratio 17.6     Anion Gap 5.5 mmol/L      eGFR 89.4 mL/min/1.73     Narrative:      GFR Normal >60  Chronic Kidney Disease <60  Kidney Failure <15    The GFR formula is only valid for adults with stable renal function between ages 18 and 70.    Phosphorus [[id number]]  (Normal) Collected: 04/05/23 0048    Specimen: Blood Updated: 04/05/23 0129     Phosphorus 3.3 mg/dL     Potassium [[id number]]  (Normal) Collected: 04/04/23 1751    Specimen: Blood Updated:  04/04/23 1843     Potassium 4.4 mmol/L     POC Glucose Once [[id number]]  (Normal) Collected: 04/04/23 1712    Specimen: Blood Updated: 04/04/23 1719     Glucose 92 mg/dL      Comment: Meter: [id number] : [id number] ENIO SIERRAN       TISSUE EXAM, [facility] ([facility]) [[id number]] Collected: 04/04/23 1202    Specimen: Tissue Updated: 04/04/23 1251    Magnesium [[id number]]  (Normal) Collected: 04/04/23 0231    Specimen: Blood Updated: 04/04/23 0336     Magnesium 2.1 mg/dL     Comprehensive Metabolic Panel [[id number]]  (Abnormal) Collected: 04/04/23 0231    Specimen: Blood Updated: 04/04/23 0336     Glucose 91 mg/dL      BUN 10 mg/dL      Creatinine 0.48 mg/dL      Sodium 138 mmol/L      Potassium 3.4 mmol/L      Chloride 101 mmol/L      CO2 29.6 mmol/L      Calcium 8.6 mg/dL      Total Protein 4.8 g/dL      Albumin 2.5 g/dL      ALT (SGPT) 17 U/L      AST (SGOT) 13 U/L      Alkaline Phosphatase 82 U/L      Total Bilirubin 0.2 mg/dL      Globulin 2.3 gm/dL      A/G Ratio 1.1 g/dL      BUN/Creatinine Ratio 20.8     Anion Gap 7.4 mmol/L      eGFR 90.7 mL/min/1.73     Narrative:      GFR Normal >60  Chronic Kidney Disease <60  Kidney Failure <15    The GFR formula is only valid for adults with stable renal function between ages 18 and 70.    Phosphorus [[id number]]  (Normal) Collected: 04/04/23 0231    Specimen: Blood Updated: 04/04/23 0336     Phosphorus 3.6 mg/dL     aPTT [[id number]]  (Normal) Collected: 04/04/23 0231    Specimen: Blood Updated: 04/04/23 0321     PTT 30.4 seconds     Narrative:      PTT Heparin Therapeutic Range:  59 - 95 seconds      Protime-INR [[id number]]  (Abnormal) Collected: 04/04/23 0231    Specimen: Blood Updated: 04/04/23 0321     Protime 12.0 Seconds      INR 0.87    Narrative:      Suggested INR therapeutic range for stable oral anticoagulant therapy:    Low Intensity therapy:   1.5-2.0  Moderate Intensity therapy:   2.0-3.0  High Intensity therapy:   2.5-4.0    CBC & Differential  [[id number]]  (Abnormal) Collected: 04/04/23 0231    Specimen: Blood Updated: 04/04/23 0311    Narrative:      The following orders were created for panel order CBC & Differential.  Procedure                               Abnormality         Status                     ---------                               -----------         ------                     CBC Auto Differential[[id number]]        Abnormal            Final result                 Please view results for these tests on the individual orders.    CBC Auto Differential [[id number]]  (Abnormal) Collected: 04/04/23 0231    Specimen: Blood Updated: 04/04/23 0311     WBC 11.47 10*3/mm3      RBC 3.09 10*6/mm3      Hemoglobin 9.6 g/dL      Hematocrit 29.3 %      MCV 94.8 fL      MCH 31.1 pg      MCHC 32.8 g/dL      RDW 14.3 %      RDW-SD 49.3 fl      MPV 10.0 fL      Platelets 298 10*3/mm3      Neutrophil % 77.4 %      Lymphocyte % 11.4 %      Monocyte % 4.7 %      Eosinophil % 5.3 %      Basophil % 0.7 %      Immature Grans % 0.5 %      Neutrophils, Absolute 8.87 10*3/mm3      Lymphocytes, Absolute 1.31 10*3/mm3      Monocytes, Absolute 0.54 10*3/mm3      Eosinophils, Absolute 0.61 10*3/mm3      Basophils, Absolute 0.08 10*3/mm3      Immature Grans, Absolute 0.06 10*3/mm3      nRBC 0.0 /100 WBC     Magnesium [[id number]]  (Normal) Collected: 04/03/23 0247    Specimen: Blood Updated: 04/03/23 0353     Magnesium 1.9 mg/dL     Basic Metabolic Panel [[id number]]  (Abnormal) Collected: 04/02/23 1040    Specimen: Blood Updated: 04/02/23 1221     Glucose 82 mg/dL      BUN 9 mg/dL      Creatinine 0.49 mg/dL      Sodium 135 mmol/L      Potassium 4.0 mmol/L      Comment: Slight hemolysis detected by analyzer. Results may be affected.        Chloride 98 mmol/L      CO2 30.4 mmol/L      Calcium 9.0 mg/dL      BUN/Creatinine Ratio 18.4     Anion Gap 6.6 mmol/L      eGFR 90.2 mL/min/1.73     Narrative:      GFR Normal >60  Chronic Kidney Disease <60  Kidney Failure <15    The GFR  formula is only valid for adults with stable renal function between ages 18 and 70.            Imaging Results (Last 72 Hours)     Procedure Component Value Units Date/Time    CT Needle Biopsy Pleura [[id number]] Collected: 04/04/23 1328     Updated: 04/04/23 1333    Narrative:      EXAMINATION: CT NEEDLE BIOPSY PLEURA-      CLINICAL INDICATION:pleural based mass; K25.1-Acute gastric ulcer with  perforation; E87.4-Ulmt-fzdeatiruz and hyponatremia; E87.6-Hypokalemia;  A41.9-Sepsis, unspecified organism; K27.5-Chronic or unspecified peptic  ulcer, site unspecified, with perforation     COMPARISON: CT 03/29/2023      TECHNIQUE/FINDINGS:   Informed consent was obtained regarding risks associated with the  procedure including infection, bleeding, and injury to adjacent  structures. Preprocedure CT imaging redemonstrates pleural-based mass  that is essentially paraspinal in location involving the left lower lobe  at the T6 level that is targeted for CT-guided biopsy.  The posterior chest was prepped and draped in sterile fashion. 1%  lidocaine was used to achieve local anesthesia. Under CT fluoroscopy  guidance, 3 18-gauge core needle samples were obtained using a 10 cm  18-gauge core needle biopsy system. Specimens were cemented in formalin  for eventual histopathologic analysis. No complication encountered  during or immediately following the procedure. The biopsy site was  cleansed and bandaged.       Impression:      CT-guided biopsy of a left lower lobe region pleural-based mass.     This report was finalized on 4/4/2023 1:31 PM by Dr. Tobias Stoll MD.       FL Upper GI Single Contrast SBFT [[id number]] Collected: 04/03/23 1105     Updated: 04/03/23 1114    Narrative:      EXAM:    FL Upper Gastrointestinal Tract With Small Bowel Follow Through     EXAM DATE:    4/3/2023 8:17 AM     CLINICAL HISTORY:    nausea and vomiting after surgery for perforated ulcer; K25.1-Acute  gastric ulcer with perforation;  E87.1-Zwtc-hkrtncmxqu and hyponatremia;  E87.6-Hypokalemia; A41.9-Sepsis, unspecified organism; K27.5-Chronic or  unspecified peptic ulcer, site unspecified, with perforation     TECHNIQUE:    Fluoroscopy of the upper gastrointestinal tract with oral contrast and  with or without KUB followed by serial images of the small bowel.   Fluoroscopic guidance was provided by a physician. Fluoroscopy exposure  time of approximately 1 minute or less.     COMPARISON:    12/24/2022     FINDINGS:    Esophagus:  Fixed area of esophageal narrowing is noted involving the  mid thoracic esophagus just distal to the bala and has features that  would suggest extrinsic mass effect.  Esophageal tertiary contractions  noted with mild dysmotility.  No reflux.    Stomach:  Unremarkable.  No mass.  Normal mucosa.    Duodenum:  Unremarkable.  No mass.  Normal mucosa.    Small bowel:  Unremarkable.  No mass or abnormal filling defect.    Colon:  Small bowel is unremarkable with normal small bowel transit.  Contrast reaches the colon within 1.5 hours.    Tubes, lines and devices:  Markedly delayed gastric emptying is noted  with only small amounts of contrast passing across the pylorus. Limited  assessment of the gastroduodenal junction due to cardiac leads and  surgical staples but no definite contrast leak identified.    Other findings:  Small hiatal hernia.  No bowel obstruction.       Impression:      1.  Fixed area of esophageal narrowing is noted involving the mid  thoracic esophagus just distal to the bala and has features that would  suggest extrinsic mass effect. Correlate with cross-sectional imaging.  2.  Esophageal tertiary contractions noted with mild dysmotility.  3.  Small hiatal hernia.  4.  Markedly delayed gastric emptying is noted with only small amounts  of contrast passing across the pylorus. Limited assessment of the  gastroduodenal junction due to cardiac leads and surgical staples but no  definite contrast leak  identified.  5.  Small bowel is unremarkable with normal small bowel transit.  Contrast reaches the colon within 1.5 hours.  6.  No bowel obstruction.     This report was finalized on 4/3/2023 11:08 AM by Dr. Tobias Stoll MD.                   Assessment & Plan:  Christine Garg is a 89 y.o. year-old female who presented with a perforated duodenal ulcer and found to have a left pleural based mass impinging on the nerve root and eroding into the T6 vertebral body.     1. Left pleural lesion concerning for malignancy  - Patient underwent biopsy of pleural lesion on 4/5/22. Her case was discussed in tumor board with radiology, pathology, surgery, radiation oncology, and medical oncology. 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   - At present she will get a PEG tube on Friday with Dr. Kirkland for nutrition since her dysphagia is worsening, with plans for rehab thereafter to improve her functional status and weakness. She will follow up in medical and radiation oncology clinic after rehab to reassess her performance status and discuss possible treatment options should she want to proceed with treatment. Will need outpatient PET to further assess her disease.   - She requested that I discuss these findings and plan with her son Tobias and I was able to talk to him over the phone today.     2. Dysphagia  - Appears to be secondary to compression of the esophagus from lymph node.   - She will be getting PEG tube on Friday.     3. Neoplasm related pain  - Currently on Norco as needed.     I discussed her case with her son Tobias ([phone]) over the phone.      Katy Patrick MD  04/06/23  08:03 EDT

## 2023-04-06 NOTE — PROGRESS NOTES
"Palliative Care Daily Progress Note     S: Medical record reviewed, followed up with Dr. Kirkland/RN regarding patient's condition. Upon entering the room, pt lying in bed with no distress noted. Pt was sleeping, aroused easily. Pt does c/o left shoulder blade pain that is worse with movement and slightly nauseated this morning. Pt rates pain as 3-4 at current time. Pt denies any soa, chest pain, no diarrhea or constipation. Pt reports that she does feel weak but has been able to drink some fluids but doesn't feel like eating.       O:   Palliative Performance Scale Score:     /69   Pulse 71   Temp 97.6 °F (36.4 °C) (Oral)   Resp 20   Ht 165.1 cm (65\")   Wt 65.8 kg (145 lb 1.6 oz)   SpO2 97%   BMI 24.15 kg/m²     Intake/Output Summary (Last 24 hours) at 4/6/2023 1002  Last data filed at 4/6/2023 0805  Gross per 24 hour   Intake 400 ml   Output 1100 ml   Net -700 ml       PE:    General Appearance:     alert, cooperative, NAD   HEENT:    NC/AT, without obvious abnormality, EOMI, anicteric    Neck:   supple, trachea midline, no JVD   Lungs:     CTAB without w/r/r    Heart:    RRR, normal S1 and S2, no M/R/G   Abdomen:     Soft, NT, ND, NABS    Extremities:   Moves all extremities, no edema   Pulses:   Pulses palpable and equal bilaterally   Skin:   Warm, dry, post- op wounds dsg clean dry and intact    Neurologic:   A/Ox3, cooperative   Psych:   Calm, appropriate         Meds: Reviewed and changes    Labs:   Results from last 7 days   Lab Units 04/06/23  0139   WBC 10*3/mm3 10.38   HEMOGLOBIN g/dL 10.1*   HEMATOCRIT % 32.6*   PLATELETS 10*3/mm3 249     Results from last 7 days   Lab Units 04/06/23  0139   SODIUM mmol/L 135*   POTASSIUM mmol/L 4.1   CHLORIDE mmol/L 100   CO2 mmol/L 27.6   BUN mg/dL 7*   CREATININE mg/dL 0.52*   GLUCOSE mg/dL 127*   CALCIUM mg/dL 8.6     Results from last 7 days   Lab Units 04/06/23  0139   SODIUM mmol/L 135*   POTASSIUM mmol/L 4.1   CHLORIDE mmol/L 100   CO2 mmol/L 27.6 "   BUN mg/dL 7*   CREATININE mg/dL 0.52*   CALCIUM mg/dL 8.6   BILIRUBIN mg/dL <0.2   ALK PHOS U/L 88   ALT (SGPT) U/L 11   AST (SGOT) U/L 11   GLUCOSE mg/dL 127*     Imaging Results (Last 72 Hours)     Procedure Component Value Units Date/Time    CT Needle Biopsy Pleura [[id number]] Collected: 04/04/23 1328     Updated: 04/04/23 1333    Narrative:      EXAMINATION: CT NEEDLE BIOPSY PLEURA-      CLINICAL INDICATION:pleural based mass; K25.1-Acute gastric ulcer with  perforation; E87.2-Klan-yjkaqlupmk and hyponatremia; E87.6-Hypokalemia;  A41.9-Sepsis, unspecified organism; K27.5-Chronic or unspecified peptic  ulcer, site unspecified, with perforation     COMPARISON: CT 03/29/2023      TECHNIQUE/FINDINGS:   Informed consent was obtained regarding risks associated with the  procedure including infection, bleeding, and injury to adjacent  structures. Preprocedure CT imaging redemonstrates pleural-based mass  that is essentially paraspinal in location involving the left lower lobe  at the T6 level that is targeted for CT-guided biopsy.  The posterior chest was prepped and draped in sterile fashion. 1%  lidocaine was used to achieve local anesthesia. Under CT fluoroscopy  guidance, 3 18-gauge core needle samples were obtained using a 10 cm  18-gauge core needle biopsy system. Specimens were cemented in formalin  for eventual histopathologic analysis. No complication encountered  during or immediately following the procedure. The biopsy site was  cleansed and bandaged.       Impression:      CT-guided biopsy of a left lower lobe region pleural-based mass.     This report was finalized on 4/4/2023 1:31 PM by Dr. Tobias Stoll MD.       FL Upper GI Single Contrast SBFT [[id number]] Collected: 04/03/23 1105     Updated: 04/03/23 1114    Narrative:      EXAM:    FL Upper Gastrointestinal Tract With Small Bowel Follow Through     EXAM DATE:    4/3/2023 8:17 AM     CLINICAL HISTORY:    nausea and vomiting after surgery for  perforated ulcer; K25.1-Acute  gastric ulcer with perforation; E87.8-Onxe-bzmtbkzvwn and hyponatremia;  E87.6-Hypokalemia; A41.9-Sepsis, unspecified organism; K27.5-Chronic or  unspecified peptic ulcer, site unspecified, with perforation     TECHNIQUE:    Fluoroscopy of the upper gastrointestinal tract with oral contrast and  with or without KUB followed by serial images of the small bowel.   Fluoroscopic guidance was provided by a physician. Fluoroscopy exposure  time of approximately 1 minute or less.     COMPARISON:    12/24/2022     FINDINGS:    Esophagus:  Fixed area of esophageal narrowing is noted involving the  mid thoracic esophagus just distal to the bala and has features that  would suggest extrinsic mass effect.  Esophageal tertiary contractions  noted with mild dysmotility.  No reflux.    Stomach:  Unremarkable.  No mass.  Normal mucosa.    Duodenum:  Unremarkable.  No mass.  Normal mucosa.    Small bowel:  Unremarkable.  No mass or abnormal filling defect.    Colon:  Small bowel is unremarkable with normal small bowel transit.  Contrast reaches the colon within 1.5 hours.    Tubes, lines and devices:  Markedly delayed gastric emptying is noted  with only small amounts of contrast passing across the pylorus. Limited  assessment of the gastroduodenal junction due to cardiac leads and  surgical staples but no definite contrast leak identified.    Other findings:  Small hiatal hernia.  No bowel obstruction.       Impression:      1.  Fixed area of esophageal narrowing is noted involving the mid  thoracic esophagus just distal to the bala and has features that would  suggest extrinsic mass effect. Correlate with cross-sectional imaging.  2.  Esophageal tertiary contractions noted with mild dysmotility.  3.  Small hiatal hernia.  4.  Markedly delayed gastric emptying is noted with only small amounts  of contrast passing across the pylorus. Limited assessment of the  gastroduodenal junction due to cardiac  leads and surgical staples but no  definite contrast leak identified.  5.  Small bowel is unremarkable with normal small bowel transit.  Contrast reaches the colon within 1.5 hours.  6.  No bowel obstruction.     This report was finalized on 4/3/2023 11:08 AM by Dr. Tobias Stoll MD.               Diagnostics: Reviewed    A: vs 170/69 hr 71 rr 20 sat 97%, labs today are stable. Pt has no distress noted, resp even and nonlabored.       P: Ct guided biopsy results are still pending. Will continue to assist with symptomatic and supportive palliative care for pt as well as family. Suggest to continue with current regimen .       We will continue to follow along. Please do not hesitate to contact us regarding further sx mgmt or GOC needs, including after hours or on weekends via our on call provider at [phone].     Yajaira Presley, APRN    4/6/2023

## 2023-04-06 NOTE — PLAN OF CARE
Goal Outcome Evaluation:  Plan of Care Reviewed With: patient        Progress: no change  Outcome Evaluation: Patient has ambulated to [facility] this shift, Staples removed per ordered tolerated well, VVS,Nausea and  Pain controlled per MAR, patient still remains to have SOB with activity and difficulty swallowing sometimes. Pleasant mood. No complaints or requests, no acute distress noted will conitue POC.

## 2023-04-06 NOTE — CASE MANAGEMENT/SOCIAL WORK
Discharge Planning Assessment  [facility]     Patient Name: Christine Garg  MRN: [id number]  Today's Date: 4/6/2023    Admit Date: 3/22/2023    Plan: SS contacte [facility] per Odette on this date. Odette states she is working moving rooms around to see if they will be able to take pt. Odette states pt will require a pre-authorization with pt's insurance. Odette states she will let me know when a bed becomes available. Pt to have a PEG placed friday. SS to follow.     Discharge Plan     Row Name 04/06/23 1248       Plan    Plan SS contacte [facility] per Odette on this date. Odette states she is working moving rooms around to see if they will be able to take pt. Odette states pt will require a pre-authorization with pt's insurance. Odette states she will let me know when a bed becomes available. Pt to have a PEG placed friday. SS to follow.    1450-  was notified by [facility] per Odette that they have a bed and they have submitted pt's information for pre-authorization. [facility] per Odette states she will update when pre-auth is back.   notified physician.                 ZA Monteiro

## 2023-04-06 NOTE — THERAPY TREATMENT NOTE
Acute Care - Physical Therapy Treatment Note  [facility]     Patient Name: Christine Garg  : 1933  MRN: [id number]  Today's Date: 2023   Onset of Illness/Injury or Date of Surgery: 23  Visit Dx:     ICD-10-CM ICD-9-CM   1. Acute gastric ulcer with perforation  K25.1 531.10   2. Hyponatremia  E87.1 276.1   3. Hypokalemia  E87.6 276.8   4. Sepsis without acute organ dysfunction, due to unspecified organism  A41.9 038.9     995.91   5. Perforated ulcer  K27.5 533.50   6. Moderate malnutrition  E44.0 263.0     Patient Active Problem List   Diagnosis   • Essential hypertension   • Dyslipidemia   • ASCVD (arteriosclerotic cardiovascular disease)   • Palpitations   • Coronary artery disease    • Abnormal PFTs (pulmonary function tests)   • Chronic obstructive pulmonary disease   • Mild persistent asthma with allergic rhinitis   • Pulmonary nodule   • Former smoker   • Colitis   • Perforated ulcer   • Acute gastric ulcer with perforation   • Moderate malnutrition     Past Medical History:   Diagnosis Date   • Advanced age    • CAD (coronary artery disease)     s/p 3v CABG   • Chronic kidney disease (CKD), stage III (moderate)    • COPD (chronic obstructive pulmonary disease)    • History of tobacco use    • Hyperlipidemia    • Hypertension    • Hypothyroidism    • PONV (postoperative nausea and vomiting)    • Pulmonary nodule      Past Surgical History:   Procedure Laterality Date   • BREAST BIOPSY Left     benign   • CATARACT EXTRACTION     • CORONARY ANGIOPLASTY     • CORONARY ARTERY BYPASS GRAFT     • EXPLORATORY LAPAROTOMY N/A 3/22/2023    Procedure: LAPAROTOMY EXPLORATORY WITH OVER SEW OF DUODENAL ULCER WITH OMENTAL PATCH AND BIOPATCH AND CENTRAL LINE PLACEMENT;  Surgeon: Aurora Kirkland MD;  Location: [facility];  Service: General;  Laterality: N/A;     PT Assessment (last 12 hours)     PT Evaluation and Treatment     Row Name 23 1505          Physical Therapy Time and Intention     Subjective Information complains of;weakness;fatigue;pain  -     Document Type therapy note (daily note)  -     Mode of Treatment physical therapy  -     Patient Effort adequate  -     Comment Pt completed EOB exercises to tolerance and required CGA/min from supine to sit. Pt refused walking secondary to being to cold, check on Pt twice in PM to walk, but refused x 2  -     Row Name 04/06/23 1505          General Information    Patient Profile Reviewed yes  -     Row Name 04/06/23 1505          Living Environment    Primary Care Provided by self  -     Row Name 04/06/23 1505          Home Use of Assistive/Adaptive Equipment    Equipment Currently Used at Home none  -     Row Name 04/06/23 1505          Cognition    Orientation Status (Cognition) oriented x 3  -     Personal Safety Interventions fall prevention program maintained;supervised activity;nonskid shoes/slippers when out of bed  -[facility] Name 04/06/23 1505          Bed Mobility    Bed Mobility bed mobility (all) activities;scooting/bridging;supine-sit;sit-supine  -     Supine-Sit [place] (Bed Mobility) contact guard;minimum assist (75% patient effort)  -     Sit-Supine [place] (Bed Mobility) contact guard;minimum assist (75% patient effort)  -     Row Name 04/06/23 1505          Transfers    Transfers sit-stand transfer;stand-sit transfer  -     Row Name 04/06/23 1505          Sit-Stand Transfer    Comment, (Sit-Stand Transfer) refused  -[facility] Name 04/06/23 1505          Gait/Stairs (Locomotion)    Comment, (Gait/Stairs) refused  -[facility] Name 04/06/23 1505          Motor Skills    Therapeutic Exercise other (see comments)  Sitting: AP, LAQ, March, knees in/out  -     Row Name 04/06/23 1505          Respiratory WDL    Respiratory WDL X;breath sounds  -     Rhythm/Pattern, Respiratory unlabored;pattern regular;depth regular;shortness of breath  Per patient.  -     Expansion/Accessory Muscles/Retractions  expansion symmetric;no retractions;no use of accessory muscles  -HC     Nailbeds no discoloration  -HC     Mucous Membranes pink;intact;moist  -HC     Cough Frequency no cough  -HC     Cough Type congested  -HC     Row Name 04/06/23 1505          Breath Sounds    Breath Sounds All Fields  -HC     All Lung Fields Breath Sounds Anterior:;diminished  -HC     LUIS Breath Sounds diminished  -HC     LLL Breath Sounds diminished  -HC     RUL Breath Sounds diminished  -HC     RML Breath Sounds diminished  -HC     RLL Breath Sounds diminished  -HC     Row Name 04/06/23 1505          Skin WDL    Skin WDL X;characteristics  -HC     Skin Color/Characteristics maroon/purple;redness blanchable;pale  -HC     Skin Temperature warm  -HC     Skin Moisture flaky  -HC     Skin Elasticity slow return to original state  -HC     Skin Integrity bruised (ecchymotic);excoriation;incision;pressure injury  -HC     Row Name 04/06/23 1505          Wound 03/22/23 1640 abdomen Incision    Wound - Properties Group Placement Date: 03/22/23  -MC Placement Time: 1640 -MC Location: abdomen  -MC Primary Wound Type: Incision  -MC    Closure Adhesive bandage  -HC     Base dressing in place, unable to visualize  -HC     Periwound blanchable;intact;pink  -HC     Periwound Temperature warm  -HC     Periwound Skin Turgor soft  -HC     Drainage Characteristics/Odor serosanguineous  -HC     Drainage Amount scant  -HC     Retired Wound - Properties Group Placement Date: 03/22/23  -MC Placement Time: 1640 -MC Location: abdomen  -MC Primary Wound Type: Incision  -MC    Retired Wound - Properties Group Date first assessed: 03/22/23  - Time first assessed: 1640 -MC Location: abdomen  -MC Primary Wound Type: Incision  -MC    Row Name 04/06/23 1505          Wound 01/06/23 1300 sacral spine    Wound - Properties Group Placement Date: 01/06/23  -SS Placement Time: 1300  -SS Present on Hospital Admission: Y  -SS Location: sacral spine  -SS    Closure None  -HC      Base maroon/purple;non-blanchable  -HC     Periwound intact;pink  -HC     Periwound Temperature warm  -HC     Periwound Skin Turgor soft  -HC     Edges rolled/closed  -HC     Drainage Characteristics/Odor bleeding controlled  -HC     Drainage Amount none  -HC     Retired Wound - Properties Group Placement Date: 01/06/23  -SS Placement Time: 1300  -SS Present on Hospital Admission: Y  -SS Location: sacral spine  -SS    Retired Wound - Properties Group Date first assessed: 01/06/23 -SS Time first assessed: 1300  -SS Present on Hospital Admission: Y  -SS Location: sacral spine  -SS    Row Name 04/06/23 1507          Coping    Observed Emotional State calm;cooperative;pleasant  -HC     Verbalized Emotional State acceptance  -HC     Family/Support Persons family  -HC     Involvement in Care not present at bedside  -HC     Row Name 04/06/23 1503          Therapy Assessment/Plan (PT)    Rehab Potential (PT) good, to achieve stated therapy goals  -     Criteria for Skilled Interventions Met (PT) yes;meets criteria;skilled treatment is necessary  -     Therapy Frequency (PT) 2 times/wk  2-5x/week  -     Problem List (PT) problems related to;balance;mobility;strength  -     Activity Limitations Related to Problem List (PT) unable to ambulate safely;unable to transfer safely  -     Row Name 04/06/23 1501          Physical Therapy Goals    Bed Mobility Goal Selection (PT) bed mobility, PT goal 1  -HC     Transfer Goal Selection (PT) transfer, PT goal 1  -     Gait Training Goal Selection (PT) gait training, PT goal 1  -     Row Name 04/06/23 150          Bed Mobility Goal 1 (PT)    Activity/Assistive Device (Bed Mobility Goal 1, PT) bed mobility activities, all  -HC     [place] Level/Cues Needed (Bed Mobility Goal 1, PT) standby assist  -HC     Time Frame (Bed Mobility Goal 1, PT) long term goal (LTG);by discharge  -HC     Row Name 04/06/23 1501          Transfer Goal 1 (PT)    Activity/Assistive Device  (Transfer Goal 1, PT) transfers, all  -HC     [place] Level/Cues Needed (Transfer Goal 1, PT) standby assist  -HC     Time Frame (Transfer Goal 1, PT) long term goal (LTG);by discharge  -HC     Row Name 04/06/23 1505          Gait Training Goal 1 (PT)    Activity/Assistive Device (Gait Training Goal 1, PT) gait (walking locomotion);assistive device use  -HC     [place] Level (Gait Training Goal 1, PT) standby assist  -HC     Distance (Gait Training Goal 1, PT) 50'  -HC     Time Frame (Gait Training Goal 1, PT) long term goal (LTG);by discharge  -HC           User Key  (r) = Recorded By, (t) = Taken By, (c) = Cosigned By    Initials Name Provider Type     Penelope Roe, RN Registered Nurse    Teresa Rod RN Registered Nurse    Ethel Caldwell PTA Physical Therapist Assistant                Physical Therapy Education     Title: PT OT SLP Therapies (Done)     Topic: Physical Therapy (Done)     Point: Mobility training (Done)     Learning Progress Summary           Patient Acceptance, E, VU by AC at 4/6/2023 1240    Acceptance, E, VU by LB at 4/2/2023 1751    Acceptance, E, VU by CL at 4/1/2023 1532    Acceptance, E, NR by RD at 3/29/2023 1712    Acceptance, E, NR by RD at 3/28/2023 0953    Acceptance, E, NR by SB at 3/27/2023 1828    Acceptance, E,TB, VU by CS at 3/27/2023 0921                   Point: Home exercise program (Done)     Learning Progress Summary           Patient Acceptance, E, VU by AC at 4/6/2023 1240    Acceptance, E, VU by LB at 4/2/2023 1751    Acceptance, E, VU by CL at 4/1/2023 1532    Acceptance, E, NR by RD at 3/29/2023 1712    Acceptance, E, NR by RD at 3/28/2023 0953    Acceptance, E, NR by SB at 3/27/2023 1828    Acceptance, E,TB, VU by CS at 3/27/2023 0921                   Point: Body mechanics (Done)     Learning Progress Summary           Patient Acceptance, E, VU by AC at 4/6/2023 1240    Acceptance, E, VU by LB at 4/2/2023 1751    Acceptance, E, VU  by CL at 4/1/2023 1532    Acceptance, E, NR by RD at 3/29/2023 1712    Acceptance, E, NR by RD at 3/28/2023 0953    Acceptance, E, NR by SB at 3/27/2023 1828    Acceptance, E,TB, VU by CS at 3/27/2023 0921                   Point: Precautions (Done)     Learning Progress Summary           Patient Acceptance, E, VU by AC at 4/6/2023 1240    Acceptance, E, VU by LB at 4/2/2023 1751    Acceptance, E, VU by CL at 4/1/2023 1532    Acceptance, E, NR by RD at 3/29/2023 1712    Acceptance, E, NR by RD at 3/28/2023 0953    Acceptance, E, NR by SB at 3/27/2023 1828    Acceptance, E,TB, VU by CS at 3/27/2023 0921                               User Key     Initials Effective Dates Name Provider Type Discipline    CL 06/16/21 -  Elsi Quintanilla, RN Registered Nurse Nurse    LB 06/16/21 -  Vivi Guajardo, RN Registered Nurse Nurse    RD 06/16/21 -  China Harkins, RN Registered Nurse Nurse    SB 01/19/22 -  Leon Hagen, RN Registered Nurse Nurse    CS 05/24/22 -  Mina Zavala, PT Physical Therapist PT    AC 01/30/23 -  Ingrid Francis, RN Registered Nurse Nurse              PT Recommendation and Plan  Anticipated Discharge Disposition (PT):  (TBD)  Therapy Frequency (PT): 2 times/wk (2-5x/week)          Time Calculation:    PT Charges     Row Name 04/06/23 1510             Time Calculation    PT Received On 04/06/23  -HC         Time Calculation- PT    Total Timed Code Minutes- PT 15 minute(s)  -HC            User Key  (r) = Recorded By, (t) = Taken By, (c) = Cosigned By    Initials Name Provider Type     Ethel Obrien PTA Physical Therapist Assistant              Therapy Charges for Today     Code Description Service Date Service Provider Modifiers Qty    [id number] HC PT THER PROC EA 15 MIN 4/6/2023 Ethel Obrien PTA GP, CQ 1          PT G-Codes  AM-PAC 6 Clicks Score (PT): 18    Ethel Obrien PTA  4/6/2023

## 2023-04-06 NOTE — DISCHARGE PLACEMENT REQUEST
"Amanda Gomez (89 y.o. Female)     Date of Birth   06/08/1933    Social Security Number       Address   [address] [id number]    Home Phone   [phone]    MRN   [id number]       Worship   Church    Marital Status                               Admission Date   3/22/23    Admission Type   Emergency    Admitting Provider   Aurora Kirkland MD    Attending Provider   Aurora Kirkland MD    Department, Room/Bed   [address], 3334/       Discharge Date       Discharge Disposition       Discharge Destination                               Attending Provider: Aurora Kirkland MD    Allergies: Motrin [Ibuprofen], Novocain [Procaine]    Isolation: None   Infection: None   Code Status: No CPR    Ht: 165.1 cm (65\")   Wt: 65.8 kg (145 lb 1.6 oz)    Admission Cmt: None   Principal Problem: Perforated ulcer [K27.5]                 Active Insurance as of 3/22/2023     Primary Coverage     Payor Plan Insurance Group Employer/Plan Group    ANTHEM MEDICARE REPLACEMENT [employer] MEDICARE ADVANTAGE [id number]     Payor Plan Address Payor Plan Phone Number Payor Plan Fax Number Effective Dates    [address] [phone]  1/1/2019 - None Entered    [facility] [id number]       Subscriber Name Subscriber Birth Date Member ID       AMANDA GOMEZ 6/8/1933 [id number]                 Emergency Contacts      (Rel.) Home Phone Work Phone Mobile Phone    Kaleb Gomez (healthcare surrogate) (Son) -- -- [phone]    Ziyad Gomez (alternative healthcare surrogate) (Son) -- -- [phone]    Filiberto Gomez (second alternative HCS) (Son) -- -- [phone]            Insurance Information                ANTHEM MEDICARE REPLACEMENT/ANTHEM MEDICARE ADVANTAGE Phone: [phone]    Subscriber: Amanda Gomez Subscriber#: [id number]    Group#: [id number] Precert#: --             History & Physical      Aurora Kirkland MD at 03/22/23 1442              [facility] " Surgery  History & Physical    Patient Identification:  Name:  Christine Garg  Age:  89 y.o.  Sex:  female  :  1933  MRN:  [id number]   Visit Number:  [id number]  Admit Date: 3/22/2023   Primary Care Physician:  Dave Tobar MD    Subjective     Chief complaint abdominal pain    Subjective      Patient is a 89 y.o. female who presents to the emergency department today with complaints of severe abdominal pain.  Patient and signs report that she is having had abdominal pain times the past 4 to 5 days.  States that it has worsened progressively every day and was at its worst today.  They report that patient has been spitting up phlegm and had nausea.  Denies any abdominal surgeries in the past.  Does report that she takes a daily 324 mg aspirin.  Patient reports that her pain is worse on her right side and that she hurts everywhere.  States that it hurts to move.  Patient last reports that she ate at approximately 6 AM and last had a sip of water around 9-10.  However she has vomited since.        ---------------------------------------------------------------------------------------------------------------------   Review of Systems  Review of Systems - General ROS: Positive for 30 pound weight loss  Psychological ROS: negative for - behavioral disorder  Ophthalmic ROS: negative for - dry eyes  ENT ROS: negative for - vertigo or vocal changes  Hematological and Lymphatic ROS: negative for - jaundice or swollen lymph nodes  Respiratory ROS: negative for - sputum changes or stridor  Cardiovascular ROS: negative for - irregular heartbeat or murmur  Gastrointestinal ROS: Positive for abdominal pain, nausea and vomiting.  Genitourinary ROS: negative for - hematuria or incontinence  Musculoskeletal ROS: negative for - gait disturbance      ---------------------------------------------------------------------------------------------------------------------   History  Past Medical History:   Diagnosis Date   •  Advanced age    • CAD (coronary artery disease) 2011    s/p 3v CABG   • Chronic kidney disease (CKD), stage III (moderate) ([facility])    • COPD (chronic obstructive pulmonary disease) ([facility])    • History of tobacco use    • Hyperlipidemia    • Hypertension    • Hypothyroidism    • Pulmonary nodule      Past Surgical History:   Procedure Laterality Date   • BREAST BIOPSY Left     benign   • CATARACT EXTRACTION     • CORONARY ANGIOPLASTY     • CORONARY ARTERY BYPASS GRAFT       Family History   Problem Relation Age of Onset   • Heart disease Mother    • Heart disease Father    • Heart disease Brother    • Breast cancer Neg Hx      Social History     Tobacco Use   • Smoking status: Former     Types: Cigarettes   • Smokeless tobacco: Never   Substance Use Topics   • Alcohol use: No   • Drug use: No     (Not in a hospital admission)    Allergies:  Motrin [ibuprofen] and Novocain [procaine]    Objective      Objective     Vital Signs:  Temp:  [96.8 °F (36 °C)] 96.8 °F (36 °C)  Heart Rate:  [] 98  Resp:  [18-20] 18  BP: (138-183)/(54-91) 183/83      03/22/23  1129   Weight: 56.7 kg (125 lb)     Body mass index is 20.8 kg/m².  ---------------------------------------------------------------------------------------------------------------------       Physical Exam  Constitutional:       Appearance: Normal appearance.   HENT:      Head: Normocephalic and atraumatic.      Right Ear: External ear normal.      Left Ear: External ear normal.   Eyes:      Conjunctiva/sclera: Conjunctivae normal.      Pupils: Pupils are equal, round, and reactive to light.   Cardiovascular:      Rate and Rhythm: Normal rate and regular rhythm.      Pulses: Normal pulses.      Heart sounds: Normal heart sounds.   Pulmonary:      Effort: Pulmonary effort is normal.      Breath sounds: Normal breath sounds.   Abdominal:      General: Abdomen is flat. Bowel sounds are normal.      Palpations: Abdomen is soft.      Tenderness: There is abdominal  tenderness (Generalized abdominal tenderness, severe pain upon palpation of right lower quadrant).   Musculoskeletal:         General: Normal range of motion.      Cervical back: Normal range of motion and neck supple.   Skin:     General: Skin is warm and dry.      Capillary Refill: Capillary refill takes less than 2 seconds.   Neurological:      General: No focal deficit present.      Mental Status: She is alert and oriented to person, place, and time.   Psychiatric:         Mood and Affect: Mood normal.         Behavior: Behavior normal.       ---------------------------------------------------------------------------------------------------------------------   Results Review:   Results from last 7 days   Lab Units 03/22/23  1336 03/22/23  1139   HSTROP T ng/L 29* 25*     Results from last 7 days   Lab Units 03/22/23  1139   CRP mg/dL 3.44*   LACTATE mmol/L 3.5*   WBC 10*3/mm3 30.99*   HEMOGLOBIN g/dL 15.9   HEMATOCRIT % 46.3   PLATELETS 10*3/mm3 468*     Results from last 7 days   Lab Units 03/22/23  1145   PH, ARTERIAL pH units 7.424   PO2 ART mm Hg 80.1*   PCO2, ARTERIAL mm Hg 30.6*   HCO3 ART mmol/L 20.0     Results from last 7 days   Lab Units 03/22/23  1139   SODIUM mmol/L 128*   POTASSIUM mmol/L 3.3*   CHLORIDE mmol/L 87*   CO2 mmol/L 21.3*   BUN mg/dL 23   CREATININE mg/dL 0.85   CALCIUM mg/dL 10.0   GLUCOSE mg/dL 139*   ALBUMIN g/dL 3.9   BILIRUBIN mg/dL 0.6   ALK PHOS U/L 88   AST (SGOT) U/L 17   ALT (SGPT) U/L 29   Estimated Creatinine Clearance: 40.2 mL/min (by C-G formula based on SCr of 0.85 mg/dL).  No results found for: AMMONIA      No results found for: BLOODCX  No results found for: URINECX  No results found for: WOUNDCX  No results found for: STOOLCX  ---------------------------------------------------------------------------------------------------------------------  Imaging:  Imaging Results (Last 24 Hours)     Procedure Component Value Units Date/Time    CT Abdomen Pelvis With Contrast  [[id number]] Collected: 03/22/23 1323     Updated: 03/22/23 1328    Narrative:      EXAM:    CT Abdomen and Pelvis With Intravenous Contrast     EXAM DATE:    3/22/2023 12:52 PM     CLINICAL HISTORY:    Right flank and RLQ abdominal pain; leukocytosis; recent UTI     TECHNIQUE:    Axial computed tomography images of the abdomen and pelvis with  intravenous contrast.  Sagittal and coronal reformatted images were  created and reviewed.  This CT exam was performed using one or more of  the following dose reduction techniques:  automated exposure control,  adjustment of the mA and/or kV according to patient size, and/or use of  iterative reconstruction technique.     COMPARISON:    No relevant prior studies available.     FINDINGS:    LUNG BASES:  Unremarkable.  No mass.  No consolidation.    MEDIASTINUM:  Small hiatal hernia.      ABDOMEN:    LIVER:  Unremarkable.  No mass.    GALLBLADDER AND BILE DUCTS:  Mild gallbladder distention.  No  calcified stones.  No ductal dilation.    PANCREAS:  Unremarkable.  No mass.  No ductal dilation.    SPLEEN:  Unremarkable.  No splenomegaly.    ADRENALS:  Unremarkable.  No mass.    KIDNEYS AND URETERS:  Unremarkable.  No solid mass.  No  hydronephrosis.    STOMACH AND BOWEL:  Abundant colonic stool.  No obstruction.  No  mucosal thickening.      PELVIS:    APPENDIX:  The appendix is not well visualized.    BLADDER:  Unremarkable.  No mass.    REPRODUCTIVE:  Unremarkable as visualized.      ABDOMEN and PELVIS:    INTRAPERITONEAL SPACE:  Question gastric antral wall thickening with  some associated adjacent stranding and punctate foci of free air  concerning for potentially perforated ulcer.  Tiny ascites around the  liver.    BONES/JOINTS:  Degenerative disc disease throughout the lumbar spine.   Degenerative facet arthropathy throughout the lumbar spine, most  prominent in the lower lumbar spine.  No acute fracture.  No  dislocation.    SOFT TISSUES:  Unremarkable.    VASCULATURE:   Atherosclerotic disease.  No abdominal aortic aneurysm.    LYMPH NODES:  Unremarkable.  No enlarged lymph nodes.       Impression:      1.  Question gastric antral wall thickening with some associated  adjacent stranding and punctate foci of free air concerning for  potentially perforated ulcer.  2.  Tiny ascites around the liver.  3.  Mild gallbladder distention.  4.  The appendix is not well visualized.  5.  Abundant colonic stool.  6.  Degenerative changes lumbar spine as described.     This report was finalized on 3/22/2023 1:26 PM by Dr. Henry Lozano MD.       XR Chest 1 View [[id number]] Collected: 03/22/23 1243     Updated: 03/22/23 1254    Narrative:      EXAM:    XR Chest, 1 View     EXAM DATE:    3/22/2023 12:09 PM     CLINICAL HISTORY:    chest pain     TECHNIQUE:    Frontal view of the chest.     COMPARISON:    02/07/2023     FINDINGS:    LUNGS:  Coarsened interstitial markings noted throughout the lungs.    PLEURAL SPACE:  Unremarkable.  No pneumothorax.    HEART:  Coronary artery bypass graft (CABG).  Heart size is stable.    MEDIASTINUM:  Unremarkable.    BONES/JOINTS:  Median sternotomy.       Impression:        No acute cardiopulmonary findings identified.     This report was finalized on 3/22/2023 12:43 PM by Dr. Henry Lozano MD.             I have personally reviewed the above radiology images and read the final radiology report on 03/22/23  ---------------------------------------------------------------------------------------------------------------------  Assessment / Plan     Impression: 89-year-old female with possible perforated ulcer and free air in abdomen  Patient Active Problem List   Diagnosis Code   • Essential hypertension I10   • Dyslipidemia E78.5   • ASCVD (arteriosclerotic cardiovascular disease) I25.10   • Palpitations R00.2   • Coronary artery disease  I25.10   • Abnormal PFTs (pulmonary function tests) R94.2   • Chronic obstructive pulmonary disease (HCC) J44.9   • Mild  persistent asthma with allergic rhinitis J45.30   • Pulmonary nodule R91.1   • Former smoker Z87.891   • Colitis K52.9     Impression patient has an acute surgical abdomen.  The etiology is not clear from imaging and there is only a very small amount of free air.    Plan:  Patient will go to the operating room for an exploratory laparotomy       Discussion:  Recommendations discussed with patient and signs.  This could certainly be a perforated ulcer.  Could also be a perforated cancer, ischemic right colon with perforated sigmoid diverticulitis being less likely.  It is also possible that the perforation may have already sealed and may not be identifiable at the time of surgery.        Yonatan Alonzo, APRN  03/22/23  14:43 EDT  ---------------------------------------------------------------------------------------------------------------------     Please note that portions of this note were completed with a voice recognition program.    Electronically signed by Aurora Kirkland MD at 03/22/23 1544          Physician Progress Notes (most recent note)      Aurora Kirkland MD at 04/06/23 1356               LOS: 15 days   Patient Care Team:  Dave Tobar MD as PCP - General  Dave Tobar MD as PCP - Family Medicine    Post op day # 15, status post laparotomy with closure of perforated duodenal ulcer with omental patch and Biopatch    Subjective     Interval History:  Patient underwent CT-guided biopsy of pleural-based mass Tuesday.  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  Patient continues to have difficulty with swallowing secondary to extrinsic compression although she did better with her swallowing yesterday evening.  She is belching a fair amount.  She is passing gas and voiding.   Objective     Vital Signs  Temp:  [97.6 °F (36.4 °C)-97.8 °F (36.6 °C)] 97.6 °F (36.4 °C)  Heart Rate:  [65-78] 69  Resp:  [18-20] 20  BP: (122-190)/(53-69)  159/58    Physical Exam:  This is a well-developed well-nourished elderly female in no acute distress  HEENT examination: Sclera are anicteric  Lungs: Clear.  Right breath sounds slightly diminished compared to left  Abdomen: Bowel sounds are present and active  Skin/incisions: Surgical  Incision intact and dry.       Results Review:    Results from last 7 days   Lab Units 03/30/23  1410   HSTROP T ng/L 12*     Results from last 7 days   Lab Units 04/06/23  0139 04/04/23  0231 04/02/23  0254   WBC 10*3/mm3 10.38 11.47* 10.89*   HEMOGLOBIN g/dL 10.1* 9.6* 10.0*   HEMATOCRIT % 32.6* 29.3* 30.9*   PLATELETS 10*3/mm3 249 298 280   INR   --  0.87*  --          Results from last 7 days   Lab Units 04/06/23  0139 04/05/23  0048 04/04/23  1751 04/04/23  0231   SODIUM mmol/L 135* 138  --  138   POTASSIUM mmol/L 4.1 4.6 4.4 3.4*   MAGNESIUM mg/dL 2.1 1.8  --  2.1   CHLORIDE mmol/L 100 103  --  101   CO2 mmol/L 27.6 29.5*  --  29.6*   BUN mg/dL 7* 9  --  10   CREATININE mg/dL 0.52* 0.51*  --  0.48*   CALCIUM mg/dL 8.6 9.1  --  8.6   GLUCOSE mg/dL 127* 97  --  91   ALBUMIN g/dL 2.5*  --   --  2.5*   BILIRUBIN mg/dL <0.2  --   --  0.2   ALK PHOS U/L 88  --   --  82   AST (SGOT) U/L 11  --   --  13   ALT (SGPT) U/L 11  --   --  17   Estimated Creatinine Clearance: 76.2 mL/min (A) (by C-G formula based on SCr of 0.52 mg/dL (L)).  No results found for: AMMONIA      Blood Culture   Date Value Ref Range Status   03/22/2023 No growth at 24 hours  Preliminary   03/22/2023 No growth at 24 hours  Preliminary     No results found for: URINECX  Wound Culture   Date Value Ref Range Status   03/22/2023 No growth  Preliminary     No results found for: STOOLCX    Imaging:  Imaging Results (Last 24 Hours)     ** No results found for the last 24 hours. **           Impression:  Stable course, status post repair of perforated duodenal ulcer  Aggressive pleural based lesion with rib destruction and now extrinsic compression of the mid thoracic  esophagus.    Pathology demonstrated poorly differentiated adenocarcinoma  Plan:  PEG tube placement Friday  Nutrition consult  Restart IV fluids at 50 cc an hour as p.o. intake poor  Patient will be stable for discharge once PEG tube placed    Spoke to son at length regarding proposed plan of care.  He voiced understanding and agreement.    23  13:56 EDT      Please note that portions of this note were completed with a voice recognition program.      Electronically signed by Aurora Kirkland MD at 23 1400          Physical Therapy Notes (most recent note)      tEhel Obrien PTA at 23 1510  Version 1 of 1         Acute Care - Physical Therapy Treatment Note  [facility]     Patient Name: Christine Garg  : 1933  MRN: [id number]  Today's Date: 2023   Onset of Illness/Injury or Date of Surgery: 23  Visit Dx:     ICD-10-CM ICD-9-CM   1. Acute gastric ulcer with perforation  K25.1 531.10   2. Hyponatremia  E87.1 276.1   3. Hypokalemia  E87.6 276.8   4. Sepsis without acute organ dysfunction, due to unspecified organism  A41.9 038.9     995.91   5. Perforated ulcer  K27.5 533.50   6. Moderate malnutrition  E44.0 263.0     Patient Active Problem List   Diagnosis   • Essential hypertension   • Dyslipidemia   • ASCVD (arteriosclerotic cardiovascular disease)   • Palpitations   • Coronary artery disease    • Abnormal PFTs (pulmonary function tests)   • Chronic obstructive pulmonary disease   • Mild persistent asthma with allergic rhinitis   • Pulmonary nodule   • Former smoker   • Colitis   • Perforated ulcer   • Acute gastric ulcer with perforation   • Moderate malnutrition     Past Medical History:   Diagnosis Date   • Advanced age    • CAD (coronary artery disease)     s/p 3v CABG   • Chronic kidney disease (CKD), stage III (moderate)    • COPD (chronic obstructive pulmonary disease)    • History of tobacco use    • Hyperlipidemia    • Hypertension    • Hypothyroidism    •  PONV (postoperative nausea and vomiting)    • Pulmonary nodule      Past Surgical History:   Procedure Laterality Date   • BREAST BIOPSY Left     benign   • CATARACT EXTRACTION     • CORONARY ANGIOPLASTY     • CORONARY ARTERY BYPASS GRAFT     • EXPLORATORY LAPAROTOMY N/A 3/22/2023    Procedure: LAPAROTOMY EXPLORATORY WITH OVER SEW OF DUODENAL ULCER WITH OMENTAL PATCH AND BIOPATCH AND CENTRAL LINE PLACEMENT;  Surgeon: Aurora Kirkland MD;  Location: [facility];  Service: General;  Laterality: N/A;     PT Assessment (last 12 hours)     PT Evaluation and Treatment     Row Name 04/06/23 1505          Physical Therapy Time and Intention    Subjective Information complains of;weakness;fatigue;pain  -     Document Type therapy note (daily note)  -     Mode of Treatment physical therapy  -HC     Patient Effort adequate  -     Comment Pt completed EOB exercises to tolerance and required CGA/min from supine to sit. Pt refused walking secondary to being to cold, check on Pt twice in PM to walk, but refused x 2  -HC     Row Name 04/06/23 1505          General Information    Patient Profile Reviewed yes  -     Row Name 04/06/23 1505          Living Environment    Primary Care Provided by self  -     Row Name 04/06/23 1505          Home Use of Assistive/Adaptive Equipment    Equipment Currently Used at Home none  -     Row Name 04/06/23 1505          Cognition    Orientation Status (Cognition) oriented x 3  -     Personal Safety Interventions fall prevention program maintained;supervised activity;nonskid shoes/slippers when out of bed  -     Row Name 04/06/23 1505          Bed Mobility    Bed Mobility bed mobility (all) activities;scooting/bridging;supine-sit;sit-supine  -     Supine-Sit [place] (Bed Mobility) contact guard;minimum assist (75% patient effort)  -     Sit-Supine [place] (Bed Mobility) contact guard;minimum assist (75% patient effort)  -     Row Name 04/06/23 1505          Transfers     Transfers sit-stand transfer;stand-sit transfer  -HC     Row Name 04/06/23 1505          Sit-Stand Transfer    Comment, (Sit-Stand Transfer) refused  -HC     Row Name 04/06/23 1505          Gait/Stairs (Locomotion)    Comment, (Gait/Stairs) refused  -HC     Row Name 04/06/23 1505          Motor Skills    Therapeutic Exercise other (see comments)  Sitting: AP, LAQ, March, knees in/out  -     Row Name 04/06/23 1505          Respiratory WDL    Respiratory WDL X;breath sounds  -     Rhythm/Pattern, Respiratory unlabored;pattern regular;depth regular;shortness of breath  Per patient.  -HC     Expansion/Accessory Muscles/Retractions expansion symmetric;no retractions;no use of accessory muscles  -     Nailbeds no discoloration  -     Mucous Membranes pink;intact;moist  -     Cough Frequency no cough  -     Cough Type congested  -     Row Name 04/06/23 1505          Breath Sounds    Breath Sounds All Fields  -     All Lung Fields Breath Sounds Anterior:;diminished  -     LUIS Breath Sounds diminished  -     LLL Breath Sounds diminished  -     RUL Breath Sounds diminished  -     RML Breath Sounds diminished  -     RLL Breath Sounds diminished  -     Row Name 04/06/23 1505          Skin WDL    Skin WDL X;characteristics  -     Skin Color/Characteristics maroon/purple;redness blanchable;pale  -     Skin Temperature warm  -     Skin Moisture flaky  -     Skin Elasticity slow return to original state  -     Skin Integrity bruised (ecchymotic);excoriation;incision;pressure injury  -     Row Name 04/06/23 1505          Wound 03/22/23 1640 abdomen Incision    Wound - Properties Group Placement Date: 03/22/23  - Placement Time: 1640 - Location: abdomen  - Primary Wound Type: Incision  -    Closure Adhesive bandage  -     Base dressing in place, unable to visualize  -     Periwound blanchable;intact;pink  -     Periwound Temperature warm  -     Periwound Skin Turgor soft   -HC     Drainage Characteristics/Odor serosanguineous  -HC     Drainage Amount scant  -HC     Retired Wound - Properties Group Placement Date: 03/22/23  -MC Placement Time: 1640  -MC Location: abdomen  -MC Primary Wound Type: Incision  -MC    Retired Wound - Properties Group Date first assessed: 03/22/23  - Time first assessed: 1640  -MC Location: abdomen  -MC Primary Wound Type: Incision  -MC    Row Name 04/06/23 1505          Wound 01/06/23 1300 sacral spine    Wound - Properties Group Placement Date: 01/06/23  -SS Placement Time: 1300  -SS Present on Hospital Admission: Y  -SS Location: sacral spine  -SS    Closure None  -HC     Base maroon/purple;non-blanchable  -HC     Periwound intact;pink  -HC     Periwound Temperature warm  -HC     Periwound Skin Turgor soft  -HC     Edges rolled/closed  -HC     Drainage Characteristics/Odor bleeding controlled  -HC     Drainage Amount none  -HC     Retired Wound - Properties Group Placement Date: 01/06/23  -SS Placement Time: 1300  -SS Present on Hospital Admission: Y  -SS Location: sacral spine  -SS    Retired Wound - Properties Group Date first assessed: 01/06/23  - Time first assessed: 1300  -SS Present on Hospital Admission: Y  -SS Location: sacral spine  -SS    Row Name 04/06/23 1505          Coping    Observed Emotional State calm;cooperative;pleasant  -     Verbalized Emotional State acceptance  -     Family/Support Persons family  -HC     Involvement in Care not present at bedside  -     Row Name 04/06/23 1505          Therapy Assessment/Plan (PT)    Rehab Potential (PT) good, to achieve stated therapy goals  -     Criteria for Skilled Interventions Met (PT) yes;meets criteria;skilled treatment is necessary  -     Therapy Frequency (PT) 2 times/wk  2-5x/week  -     Problem List (PT) problems related to;balance;mobility;strength  -     Activity Limitations Related to Problem List (PT) unable to ambulate safely;unable to transfer safely  -     Row  Name 04/06/23 1505          Physical Therapy Goals    Bed Mobility Goal Selection (PT) bed mobility, PT goal 1  -HC     Transfer Goal Selection (PT) transfer, PT goal 1  -HC     Gait Training Goal Selection (PT) gait training, PT goal 1  -HC     Row Name 04/06/23 1505          Bed Mobility Goal 1 (PT)    Activity/Assistive Device (Bed Mobility Goal 1, PT) bed mobility activities, all  -HC     [place] Level/Cues Needed (Bed Mobility Goal 1, PT) standby assist  -HC     Time Frame (Bed Mobility Goal 1, PT) long term goal (LTG);by discharge  -HC     Row Name 04/06/23 1505          Transfer Goal 1 (PT)    Activity/Assistive Device (Transfer Goal 1, PT) transfers, all  -HC     [place] Level/Cues Needed (Transfer Goal 1, PT) standby assist  -HC     Time Frame (Transfer Goal 1, PT) long term goal (LTG);by discharge  -     Row Name 04/06/23 1505          Gait Training Goal 1 (PT)    Activity/Assistive Device (Gait Training Goal 1, PT) gait (walking locomotion);assistive device use  -HC     [place] Level (Gait Training Goal 1, PT) standby assist  -HC     Distance (Gait Training Goal 1, PT) 50'  -HC     Time Frame (Gait Training Goal 1, PT) long term goal (LTG);by discharge  -           User Key  (r) = Recorded By, (t) = Taken By, (c) = Cosigned By    Initials Name Provider Type     Penelope Roe, RN Registered Nurse    Teresa Rod RN Registered Nurse    Ethel Caldwell PTA Physical Therapist Assistant                Physical Therapy Education     Title: PT OT SLP Therapies (Done)     Topic: Physical Therapy (Done)     Point: Mobility training (Done)     Learning Progress Summary           Patient Acceptance, E, VU by AC at 4/6/2023 1240    Acceptance, E, VU by LB at 4/2/2023 1751    Acceptance, E, VU by CL at 4/1/2023 1532    Acceptance, E, NR by RD at 3/29/2023 1712    Acceptance, E, NR by RD at 3/28/2023 0953    Acceptance, E, NR by SB at 3/27/2023 1828    Acceptance, E,TB, VU  by CS at 3/27/2023 0921                   Point: Home exercise program (Done)     Learning Progress Summary           Patient Acceptance, E, VU by AC at 4/6/2023 1240    Acceptance, E, VU by LB at 4/2/2023 1751    Acceptance, E, VU by CL at 4/1/2023 1532    Acceptance, E, NR by RD at 3/29/2023 1712    Acceptance, E, NR by RD at 3/28/2023 0953    Acceptance, E, NR by SB at 3/27/2023 1828    Acceptance, E,TB, VU by CS at 3/27/2023 0921                   Point: Body mechanics (Done)     Learning Progress Summary           Patient Acceptance, E, VU by AC at 4/6/2023 1240    Acceptance, E, VU by LB at 4/2/2023 1751    Acceptance, E, VU by CL at 4/1/2023 1532    Acceptance, E, NR by RD at 3/29/2023 1712    Acceptance, E, NR by RD at 3/28/2023 0953    Acceptance, E, NR by SB at 3/27/2023 1828    Acceptance, E,TB, VU by CS at 3/27/2023 0921                   Point: Precautions (Done)     Learning Progress Summary           Patient Acceptance, E, VU by AC at 4/6/2023 1240    Acceptance, E, VU by LB at 4/2/2023 1751    Acceptance, E, VU by CL at 4/1/2023 1532    Acceptance, E, NR by RD at 3/29/2023 1712    Acceptance, E, NR by RD at 3/28/2023 0953    Acceptance, E, NR by SB at 3/27/2023 1828    Acceptance, E,TB, VU by CS at 3/27/2023 0921                               User Key     Initials Effective Dates Name Provider Type Discipline    CL 06/16/21 -  Elsi Quintanilla, RN Registered Nurse Nurse    LB 06/16/21 -  Vivi Guajardo, RN Registered Nurse Nurse    RIGO 06/16/21 -  China Harkins, RN Registered Nurse Nurse    SB 01/19/22 -  Leon Hagen, RN Registered Nurse Nurse    CS 05/24/22 -  Mina Zavala, PT Physical Therapist PT    AC 01/30/23 -  Ingrid Francis, RN Registered Nurse Nurse              PT Recommendation and Plan  Anticipated Discharge Disposition (PT):  (TBD)  Therapy Frequency (PT): 2 times/wk (2-5x/week)          Time Calculation:    PT Charges     Row Name 04/06/23 6070             Time Calculation     PT Received On 04/06/23  -HC         Time Calculation- PT    Total Timed Code Minutes- PT 15 minute(s)  -HC            User Key  (r) = Recorded By, (t) = Taken By, (c) = Cosigned By    Initials Name Provider Type     Ethel Obrien PTA Physical Therapist Assistant              Therapy Charges for Today     Code Description Service Date Service Provider Modifiers Qty    08884268847 HC PT THER PROC EA 15 MIN 4/6/2023 Ethel Obrien PTA GP, CQ 1          PT G-Codes  AM-PAC 6 Clicks Score (PT): 18    Ethel Obrien PTA  4/6/2023      Electronically signed by Ethel Obrien PTA at 04/06/23 1511          Occupational Therapy Notes (most recent note)      Reta Kapoor, OT at 04/04/23 1309        Patient off unit.     Electronically signed by Reta Kapoor OT at 04/04/23 1309       Speech Language Pathology Notes (most recent note)    No notes exist for this encounter.         ADL Documentation (most recent)    Flowsheet Row Most Recent Value   Transferring 2 - assistive person   Toileting 3 - assistive equipment and person   Bathing 2 - assistive person   Dressing 2 - assistive person   Eating 0 - independent   Communication 0 - understands/communicates without difficulty   Swallowing 0 - swallows foods/liquids without difficulty   Equipment Currently Used at Home none

## 2023-04-06 NOTE — PLAN OF CARE
Goal Outcome Evaluation:   Pt. resting in bed. No acute changes overnight. PRN pain meds given. VSS. Pt. has no questions or concerns at this time. Will continue plan of care.

## 2023-04-06 NOTE — PROGRESS NOTES
LOS: 15 days   Patient Care Team:  Dave Tobar MD as PCP - General  Dave Tobar MD as PCP - Family Medicine    Post op day # 15, status post laparotomy with closure of perforated duodenal ulcer with omental patch and Biopatch    Subjective     Interval History:  Patient underwent CT-guided biopsy of pleural-based mass Tuesday.  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  Patient continues to have difficulty with swallowing secondary to extrinsic compression although she did better with her swallowing yesterday evening.  She is belching a fair amount.  She is passing gas and voiding.   Objective     Vital Signs  Temp:  [97.6 °F (36.4 °C)-97.8 °F (36.6 °C)] 97.6 °F (36.4 °C)  Heart Rate:  [65-78] 69  Resp:  [18-20] 20  BP: (122-190)/(53-69) 159/58    Physical Exam:  This is a well-developed well-nourished elderly female in no acute distress  HEENT examination: Sclera are anicteric  Lungs: Clear.  Right breath sounds slightly diminished compared to left  Abdomen: Bowel sounds are present and active  Skin/incisions: Surgical  Incision intact and dry.       Results Review:    Results from last 7 days   Lab Units 03/30/23  1410   HSTROP T ng/L 12*     Results from last 7 days   Lab Units 04/06/23  0139 04/04/23  0231 04/02/23  0254   WBC 10*3/mm3 10.38 11.47* 10.89*   HEMOGLOBIN g/dL 10.1* 9.6* 10.0*   HEMATOCRIT % 32.6* 29.3* 30.9*   PLATELETS 10*3/mm3 249 298 280   INR   --  0.87*  --          Results from last 7 days   Lab Units 04/06/23  0139 04/05/23  0048 04/04/23  1751 04/04/23  0231   SODIUM mmol/L 135* 138  --  138   POTASSIUM mmol/L 4.1 4.6 4.4 3.4*   MAGNESIUM mg/dL 2.1 1.8  --  2.1   CHLORIDE mmol/L 100 103  --  101   CO2 mmol/L 27.6 29.5*  --  29.6*   BUN mg/dL 7* 9  --  10   CREATININE mg/dL 0.52* 0.51*  --  0.48*   CALCIUM mg/dL 8.6 9.1  --  8.6   GLUCOSE mg/dL 127* 97  --  91   ALBUMIN g/dL 2.5*  --   --  2.5*   BILIRUBIN mg/dL <0.2  --    --  0.2   ALK PHOS U/L 88  --   --  82   AST (SGOT) U/L 11  --   --  13   ALT (SGPT) U/L 11  --   --  17   Estimated Creatinine Clearance: 76.2 mL/min (A) (by C-G formula based on SCr of 0.52 mg/dL (L)).  No results found for: AMMONIA      Blood Culture   Date Value Ref Range Status   03/22/2023 No growth at 24 hours  Preliminary   03/22/2023 No growth at 24 hours  Preliminary     No results found for: URINECX  Wound Culture   Date Value Ref Range Status   03/22/2023 No growth  Preliminary     No results found for: STOOLCX    Imaging:  Imaging Results (Last 24 Hours)     ** No results found for the last 24 hours. **           Impression:  Stable course, status post repair of perforated duodenal ulcer  Aggressive pleural based lesion with rib destruction and now extrinsic compression of the mid thoracic esophagus.    Pathology demonstrated poorly differentiated adenocarcinoma  Plan:  PEG tube placement Friday  Nutrition consult  Restart IV fluids at 50 cc an hour as p.o. intake poor  Patient will be stable for discharge once PEG tube placed    Spoke to son at length regarding proposed plan of care.  He voiced understanding and agreement.    04/06/23  13:56 EDT      Please note that portions of this note were completed with a voice recognition program.

## 2023-04-06 NOTE — SIGNIFICANT NOTE
04/06/23 0925   OTHER   Discipline physical therapy assistant   Rehab Time/Intention   Session Not Performed patient/family declined, not feeling well

## 2023-04-07 ENCOUNTER — ANESTHESIA (OUTPATIENT)
Dept: PERIOP | Facility: HOSPITAL | Age: 88
DRG: 853 | End: 2023-04-07
Payer: MEDICARE

## 2023-04-07 ENCOUNTER — ANESTHESIA EVENT (OUTPATIENT)
Dept: PERIOP | Facility: HOSPITAL | Age: 88
DRG: 853 | End: 2023-04-07
Payer: MEDICARE

## 2023-04-07 PROCEDURE — 25010000002 PROPOFOL 200 MG/20ML EMULSION: Performed by: NURSE ANESTHETIST, CERTIFIED REGISTERED

## 2023-04-07 PROCEDURE — 25010000002 FENTANYL CITRATE (PF) 50 MCG/ML SOLUTION: Performed by: NURSE ANESTHETIST, CERTIFIED REGISTERED

## 2023-04-07 PROCEDURE — 0DH63UZ INSERTION OF FEEDING DEVICE INTO STOMACH, PERCUTANEOUS APPROACH: ICD-10-PCS | Performed by: SURGERY

## 2023-04-07 PROCEDURE — 3E0G76Z INTRODUCTION OF NUTRITIONAL SUBSTANCE INTO UPPER GI, VIA NATURAL OR ARTIFICIAL OPENING: ICD-10-PCS | Performed by: SURGERY

## 2023-04-07 PROCEDURE — 43246 EGD PLACE GASTROSTOMY TUBE: CPT | Performed by: SURGERY

## 2023-04-07 PROCEDURE — 25010000002 HEPARIN (PORCINE) PER 1000 UNITS: Performed by: SURGERY

## 2023-04-07 PROCEDURE — 25010000002 HYDRALAZINE PER 20 MG: Performed by: INTERNAL MEDICINE

## 2023-04-07 RX ORDER — OXYCODONE HYDROCHLORIDE AND ACETAMINOPHEN 5; 325 MG/1; MG/1
1 TABLET ORAL ONCE AS NEEDED
Status: DISCONTINUED | OUTPATIENT
Start: 2023-04-07 | End: 2023-04-07 | Stop reason: HOSPADM

## 2023-04-07 RX ORDER — SODIUM CHLORIDE 0.9 % (FLUSH) 0.9 %
10 SYRINGE (ML) INJECTION EVERY 12 HOURS SCHEDULED
Status: DISCONTINUED | OUTPATIENT
Start: 2023-04-07 | End: 2023-04-07 | Stop reason: HOSPADM

## 2023-04-07 RX ORDER — SODIUM CHLORIDE, SODIUM LACTATE, POTASSIUM CHLORIDE, CALCIUM CHLORIDE 600; 310; 30; 20 MG/100ML; MG/100ML; MG/100ML; MG/100ML
100 INJECTION, SOLUTION INTRAVENOUS ONCE AS NEEDED
Status: DISCONTINUED | OUTPATIENT
Start: 2023-04-07 | End: 2023-04-07 | Stop reason: HOSPADM

## 2023-04-07 RX ORDER — PROPOFOL 10 MG/ML
INJECTION, EMULSION INTRAVENOUS AS NEEDED
Status: DISCONTINUED | OUTPATIENT
Start: 2023-04-07 | End: 2023-04-07 | Stop reason: SURG

## 2023-04-07 RX ORDER — MIDAZOLAM HYDROCHLORIDE 1 MG/ML
0.5 INJECTION INTRAMUSCULAR; INTRAVENOUS
Status: DISCONTINUED | OUTPATIENT
Start: 2023-04-07 | End: 2023-04-07 | Stop reason: HOSPADM

## 2023-04-07 RX ORDER — EPHEDRINE SULFATE 5 MG/ML
INJECTION INTRAVENOUS AS NEEDED
Status: DISCONTINUED | OUTPATIENT
Start: 2023-04-07 | End: 2023-04-07 | Stop reason: SURG

## 2023-04-07 RX ORDER — IPRATROPIUM BROMIDE AND ALBUTEROL SULFATE 2.5; .5 MG/3ML; MG/3ML
3 SOLUTION RESPIRATORY (INHALATION) ONCE AS NEEDED
Status: DISCONTINUED | OUTPATIENT
Start: 2023-04-07 | End: 2023-04-07 | Stop reason: HOSPADM

## 2023-04-07 RX ORDER — FENTANYL CITRATE 50 UG/ML
50 INJECTION, SOLUTION INTRAMUSCULAR; INTRAVENOUS
Status: DISCONTINUED | OUTPATIENT
Start: 2023-04-07 | End: 2023-04-07 | Stop reason: HOSPADM

## 2023-04-07 RX ORDER — MEPERIDINE HYDROCHLORIDE 25 MG/ML
12.5 INJECTION INTRAMUSCULAR; INTRAVENOUS; SUBCUTANEOUS
Status: DISCONTINUED | OUTPATIENT
Start: 2023-04-07 | End: 2023-04-07 | Stop reason: HOSPADM

## 2023-04-07 RX ORDER — SODIUM CHLORIDE 0.9 % (FLUSH) 0.9 %
10 SYRINGE (ML) INJECTION AS NEEDED
Status: DISCONTINUED | OUTPATIENT
Start: 2023-04-07 | End: 2023-04-07 | Stop reason: HOSPADM

## 2023-04-07 RX ORDER — SODIUM CHLORIDE 9 MG/ML
40 INJECTION, SOLUTION INTRAVENOUS AS NEEDED
Status: DISCONTINUED | OUTPATIENT
Start: 2023-04-07 | End: 2023-04-07 | Stop reason: HOSPADM

## 2023-04-07 RX ORDER — ONDANSETRON 2 MG/ML
4 INJECTION INTRAMUSCULAR; INTRAVENOUS AS NEEDED
Status: DISCONTINUED | OUTPATIENT
Start: 2023-04-07 | End: 2023-04-07 | Stop reason: HOSPADM

## 2023-04-07 RX ORDER — SODIUM CHLORIDE, SODIUM LACTATE, POTASSIUM CHLORIDE, CALCIUM CHLORIDE 600; 310; 30; 20 MG/100ML; MG/100ML; MG/100ML; MG/100ML
125 INJECTION, SOLUTION INTRAVENOUS ONCE
Status: COMPLETED | OUTPATIENT
Start: 2023-04-07 | End: 2023-04-07

## 2023-04-07 RX ADMIN — Medication 10 ML: at 21:15

## 2023-04-07 RX ADMIN — HYDROCODONE BITARTRATE AND ACETAMINOPHEN 1 TABLET: 7.5; 325 TABLET ORAL at 20:07

## 2023-04-07 RX ADMIN — PROPOFOL 50 MG: 10 INJECTION, EMULSION INTRAVENOUS at 12:02

## 2023-04-07 RX ADMIN — HYDRALAZINE HYDROCHLORIDE 50 MG: 50 TABLET, FILM COATED ORAL at 14:27

## 2023-04-07 RX ADMIN — LEVOTHYROXINE SODIUM 112 MCG: 0.07 TABLET ORAL at 06:20

## 2023-04-07 RX ADMIN — EPHEDRINE SULFATE 10 MG: 5 INJECTION INTRAVENOUS at 12:10

## 2023-04-07 RX ADMIN — HYDRALAZINE HYDROCHLORIDE 50 MG: 50 TABLET, FILM COATED ORAL at 06:25

## 2023-04-07 RX ADMIN — HYDROCODONE BITARTRATE AND ACETAMINOPHEN 1 TABLET: 7.5; 325 TABLET ORAL at 06:20

## 2023-04-07 RX ADMIN — HYDROCODONE BITARTRATE AND ACETAMINOPHEN 1 TABLET: 7.5; 325 TABLET ORAL at 02:28

## 2023-04-07 RX ADMIN — HYDROCODONE BITARTRATE AND ACETAMINOPHEN 1 TABLET: 7.5; 325 TABLET ORAL at 14:44

## 2023-04-07 RX ADMIN — CARVEDILOL 25 MG: 25 TABLET, FILM COATED ORAL at 08:43

## 2023-04-07 RX ADMIN — HYDRALAZINE HYDROCHLORIDE 50 MG: 50 TABLET, FILM COATED ORAL at 21:15

## 2023-04-07 RX ADMIN — Medication 10 ML: at 10:00

## 2023-04-07 RX ADMIN — PROPOFOL 50 MG: 10 INJECTION, EMULSION INTRAVENOUS at 12:13

## 2023-04-07 RX ADMIN — HEPARIN SODIUM 5000 UNITS: 5000 INJECTION INTRAVENOUS; SUBCUTANEOUS at 20:07

## 2023-04-07 RX ADMIN — VALSARTAN 320 MG: 160 TABLET, FILM COATED ORAL at 08:43

## 2023-04-07 RX ADMIN — HYDRALAZINE HYDROCHLORIDE 10 MG: 20 INJECTION INTRAMUSCULAR; INTRAVENOUS at 04:25

## 2023-04-07 RX ADMIN — CASTOR OIL AND BALSAM, PERU 1 APPLICATION: 788; 87 OINTMENT TOPICAL at 20:08

## 2023-04-07 RX ADMIN — SODIUM CHLORIDE, POTASSIUM CHLORIDE, SODIUM LACTATE AND CALCIUM CHLORIDE: 600; 310; 30; 20 INJECTION, SOLUTION INTRAVENOUS at 11:58

## 2023-04-07 RX ADMIN — FENTANYL CITRATE 50 MCG: 50 INJECTION, SOLUTION INTRAMUSCULAR; INTRAVENOUS at 12:35

## 2023-04-07 RX ADMIN — PANTOPRAZOLE SODIUM 40 MG: 40 TABLET, DELAYED RELEASE ORAL at 18:05

## 2023-04-07 RX ADMIN — CASTOR OIL AND BALSAM, PERU 1 APPLICATION: 788; 87 OINTMENT TOPICAL at 08:44

## 2023-04-07 RX ADMIN — CARVEDILOL 25 MG: 25 TABLET, FILM COATED ORAL at 18:05

## 2023-04-07 RX ADMIN — LIDOCAINE 1 PATCH: 4 PATCH TOPICAL at 08:44

## 2023-04-07 RX ADMIN — PANTOPRAZOLE SODIUM 40 MG: 40 TABLET, DELAYED RELEASE ORAL at 08:43

## 2023-04-07 RX ADMIN — Medication 3 ML: at 21:15

## 2023-04-07 NOTE — ANESTHESIA PREPROCEDURE EVALUATION
Anesthesia Evaluation     Patient summary reviewed and Nursing notes reviewed   history of anesthetic complications: PONV  NPO Solid Status: > 8 hours  NPO Liquid Status: > 8 hours           Airway   Mallampati: II  TM distance: >3 FB  Neck ROM: full  Dental - normal exam   (+) edentulous    Pulmonary     breath sounds clear to auscultation  (+) a smoker Former, COPD, asthma,home oxygen (Not on Home oxygen, required NC supplementation since post Ex lap procedure),   Cardiovascular     ECG reviewed  Rhythm: regular  Rate: normal    (+) hypertension, CAD (angioplasty in past), CABG >6 Months, hyperlipidemia,       Neuro/Psych- negative ROS  GI/Hepatic/Renal/Endo    (+)  PUD,  renal disease CRI, thyroid problem hypothyroidism    Musculoskeletal (-) negative ROS    (-) arthralgias  Abdominal     Abdomen: tender.   Substance History - negative use     OB/GYN negative ob/gyn ROS         Other - negative ROS                         Anesthesia Plan    ASA 4     general     intravenous induction     Anesthetic plan, risks, benefits, and alternatives have been provided, discussed and informed consent has been obtained with: patient.  Pre-procedure education provided  Use of blood products discussed with patient  Consented to blood products.       CODE STATUS:      FULL    Lab Results   Component Value Date    WBC 10.38 04/06/2023    HGB 10.1 (L) 04/06/2023    HCT 32.6 (L) 04/06/2023    .2 (H) 04/06/2023     04/06/2023       Lab Results   Component Value Date    GLUCOSE 127 (H) 04/06/2023    BUN 7 (L) 04/06/2023    CREATININE 0.52 (L) 04/06/2023    EGFR 88.9 04/06/2023    BCR 13.5 04/06/2023    K 4.1 04/06/2023    CO2 27.6 04/06/2023    CALCIUM 8.6 04/06/2023    PROTENTOTREF 6.5 02/07/2023    ALBUMIN 2.5 (L) 04/06/2023    BILITOT <0.2 04/06/2023    AST 11 04/06/2023    ALT 11 04/06/2023

## 2023-04-07 NOTE — NURSING NOTE
Patient in room at this time, A/Ox4, no complaints, no distress noted, dressing clean dry and intact, family at bedside. Will continue POC

## 2023-04-07 NOTE — PLAN OF CARE
Goal Outcome Evaluation:  Plan of Care Reviewed With: patient        Progress: improving  Outcome Evaluation: Patient had done well this shift, dressing still in place from procedure no complaints or requests st this time. Pain controlled per MAR. Will continue POC.

## 2023-04-07 NOTE — CASE MANAGEMENT/SOCIAL WORK
Discharge Planning Assessment   [place]     Patient Name: Christine Garg  MRN: [id number]  Today's Date: 4/7/2023    Admit Date: 3/22/2023    Plan: SS was notified by [facility] per [facility] on this date that pt's pre-authorization has been approved through monday. Pt is scheduled for PEG on this date. [facility] states they would like pt to come to their facility on Monday. SS to follow.     Discharge Plan     Row Name 04/07/23 1211       Plan    Plan SS was notified by [facility] per [facility] on this date that pt's pre-authorization has been approved through monday. Pt is scheduled for PEG on this date. [facility] states they would like pt to come to their facility on Monday. SS to follow.                ZA Monteiro

## 2023-04-07 NOTE — PLAN OF CARE
Goal Outcome Evaluation:   Pt. resting in bed. No acute changes overnight. Ambulated to bedside commode with assistance. PRN pain meds given. NPO since midnight. VSS. Pt. has no questions or concerns at this time. Will continue plan of care.

## 2023-04-07 NOTE — SIGNIFICANT NOTE
04/07/23 1108   OTHER   Discipline physical therapy assistant   Rehab Time/Intention   Session Not Performed other (see comments)  (Pt OOR for Sx on this date, Will follow.)

## 2023-04-07 NOTE — PROGRESS NOTES
"Palliative Care Daily Progress Note     S: Medical record reviewed, followed up with Dr. Kirkland/RN  regarding patient's condition. Upon entering the room pt lying in bed asleep, arouses without difficulty. Pt verbalized no needs at this time. She denies any pain, dyspnea, soa or n/v/d.       O:   Palliative Performance Scale Score:     /64   Pulse 78   Temp 97 °F (36.1 °C) (Oral)   Resp 18   Ht 165.1 cm (65\")   Wt 65.8 kg (145 lb 1.6 oz)   SpO2 99%   BMI 24.15 kg/m²     Intake/Output Summary (Last 24 hours) at 4/7/2023 1022  Last data filed at 4/7/2023 0500  Gross per 24 hour   Intake --   Output 350 ml   Net -350 ml       PE:    General Appearance:    alert, cooperative, NAD   HEENT:    NC/AT, without obvious abnormality, EOMI, anicteric    Neck:   supple, trachea midline, no JVD   Lungs:     CTAB without w/r/r    Heart:    RRR, normal S1 and S2, no M/R/G   Abdomen:     Soft, NT, ND, NABS    Extremities:   Moves all extremities, no edema   Pulses:   Pulses palpable and equal bilaterally   Skin:   Warm, dry, post op dsg dry and intact   Neurologic:   A/Ox3, cooperative   Psych:   Calm, appropriate         Meds: Reviewed and no changes    Labs:   Results from last 7 days   Lab Units 04/06/23  0139   WBC 10*3/mm3 10.38   HEMOGLOBIN g/dL 10.1*   HEMATOCRIT % 32.6*   PLATELETS 10*3/mm3 249     Results from last 7 days   Lab Units 04/06/23  0139   SODIUM mmol/L 135*   POTASSIUM mmol/L 4.1   CHLORIDE mmol/L 100   CO2 mmol/L 27.6   BUN mg/dL 7*   CREATININE mg/dL 0.52*   GLUCOSE mg/dL 127*   CALCIUM mg/dL 8.6     Results from last 7 days   Lab Units 04/06/23  0139   SODIUM mmol/L 135*   POTASSIUM mmol/L 4.1   CHLORIDE mmol/L 100   CO2 mmol/L 27.6   BUN mg/dL 7*   CREATININE mg/dL 0.52*   CALCIUM mg/dL 8.6   BILIRUBIN mg/dL <0.2   ALK PHOS U/L 88   ALT (SGPT) U/L 11   AST (SGOT) U/L 11   GLUCOSE mg/dL 127*     Imaging Results (Last 72 Hours)     Procedure Component Value Units Date/Time    CT Needle Biopsy " Pleura [[id number]] Collected: 04/04/23 1328     Updated: 04/04/23 1333    Narrative:      EXAMINATION: CT NEEDLE BIOPSY PLEURA-      CLINICAL INDICATION:pleural based mass; K25.1-Acute gastric ulcer with  perforation; E87.2-Wngw-kdwfmlxqqh and hyponatremia; E87.6-Hypokalemia;  A41.9-Sepsis, unspecified organism; K27.5-Chronic or unspecified peptic  ulcer, site unspecified, with perforation     COMPARISON: CT 03/29/2023      TECHNIQUE/FINDINGS:   Informed consent was obtained regarding risks associated with the  procedure including infection, bleeding, and injury to adjacent  structures. Preprocedure CT imaging redemonstrates pleural-based mass  that is essentially paraspinal in location involving the left lower lobe  at the T6 level that is targeted for CT-guided biopsy.  The posterior chest was prepped and draped in sterile fashion. 1%  lidocaine was used to achieve local anesthesia. Under CT fluoroscopy  guidance, 3 18-gauge core needle samples were obtained using a 10 cm  18-gauge core needle biopsy system. Specimens were cemented in formalin  for eventual histopathologic analysis. No complication encountered  during or immediately following the procedure. The biopsy site was  cleansed and bandaged.       Impression:      CT-guided biopsy of a left lower lobe region pleural-based mass.     This report was finalized on 4/4/2023 1:31 PM by Dr. Tobias Stoll MD.               Diagnostics: Reviewed    A: Clinically, pt appears to be improving overall. She has been working with physical therapy to gain strength, labs are stable, vs 165/64 hr 78 rr 18 sat 99% 02@2lpm n/c. Pt has no facial grimacing or furrowed brow. 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      P: Will continue with symptomatic and supportive care for  the pt as well as family. Recommends to continue with current regimen. Pt is scheduled to have PEG tube today at noon. I spoke with son Kaleb, he was just concerned about when she can return back to nursing facility. The plan with Dr. Kirkland is for pt to gain strength back after giving tube feedings , follow up with oncology to discuss plans for the future.       We will continue to follow along. Please do not hesitate to contact us regarding further sx mgmt or GOC needs, including after hours or on weekends via our on call provider at [phone].     Yajaira Presley, APRN    4/7/2023

## 2023-04-07 NOTE — ANESTHESIA POSTPROCEDURE EVALUATION
Patient: Christine Garg    Procedure Summary     Date: 04/07/23 Room / Location: [facility] OR  /  [facility] OR    Anesthesia Start: 1158 Anesthesia Stop: 1219    Procedure: ESOPHAGOGASTRODUODENOSCOPY WITH PERCUTANEOUS ENDOSCOPIC GASTROSTOMY TUBE INSERTION (Esophagus) Diagnosis:       Moderate malnutrition      (Moderate malnutrition [E44.0])    Surgeons: Aurora Kirkland MD Provider: Wero Joseph MD    Anesthesia Type: general ASA Status: 4          Anesthesia Type: general    Vitals  Vitals Value Taken Time   /78 04/07/23 1250   Temp 97.1 °F (36.2 °C) 04/07/23 1220   Pulse 78 04/07/23 1250   Resp 18 04/07/23 1250   SpO2 94 % 04/07/23 1250           Post Anesthesia Care and Evaluation    Patient location during evaluation: PACU  Patient participation: complete - patient participated  Level of consciousness: awake  Pain score: 4  Pain management: adequate    Airway patency: patent  Anesthetic complications: No anesthetic complications  PONV Status: none  Cardiovascular status: acceptable  Respiratory status: acceptable  Hydration status: acceptable

## 2023-04-07 NOTE — PROGRESS NOTES
Nutrition Services    Patient Name:  Christine Garg  YOB: 1933  MRN: [id number]  Admit Date:  3/22/2023    Tube Feed Assessment    Nutren 1.5 with a goal rate of 50 ml/hr continuous.  Flush with 30 ml/hr free water.    Feeding provides 1800 calories and 81 g protein per day.  Meets greater than 100% of patient needs.      Electronically signed by:  Osvaldo Hatch RD  04/07/23 11:49 EDT

## 2023-04-08 PROCEDURE — 25010000002 HEPARIN (PORCINE) PER 1000 UNITS: Performed by: SURGERY

## 2023-04-08 PROCEDURE — 99232 SBSQ HOSP IP/OBS MODERATE 35: CPT | Performed by: INTERNAL MEDICINE

## 2023-04-08 PROCEDURE — 99024 POSTOP FOLLOW-UP VISIT: CPT | Performed by: SURGERY

## 2023-04-08 RX ORDER — HYDROCODONE BITARTRATE AND ACETAMINOPHEN 7.5; 325 MG/1; MG/1
1 TABLET ORAL EVERY 4 HOURS PRN
Status: DISCONTINUED | OUTPATIENT
Start: 2023-04-08 | End: 2023-04-10 | Stop reason: HOSPADM

## 2023-04-08 RX ADMIN — CASTOR OIL AND BALSAM, PERU 1 APPLICATION: 788; 87 OINTMENT TOPICAL at 20:02

## 2023-04-08 RX ADMIN — Medication 10 ML: at 08:31

## 2023-04-08 RX ADMIN — Medication 10 ML: at 20:03

## 2023-04-08 RX ADMIN — HEPARIN SODIUM 5000 UNITS: 5000 INJECTION INTRAVENOUS; SUBCUTANEOUS at 20:02

## 2023-04-08 RX ADMIN — VALSARTAN 320 MG: 160 TABLET, FILM COATED ORAL at 08:30

## 2023-04-08 RX ADMIN — HYDROCODONE BITARTRATE AND ACETAMINOPHEN 1 TABLET: 7.5; 325 TABLET ORAL at 03:51

## 2023-04-08 RX ADMIN — Medication 3 ML: at 08:31

## 2023-04-08 RX ADMIN — DEXTROSE AND SODIUM CHLORIDE 50 ML/HR: 5; 450 INJECTION, SOLUTION INTRAVENOUS at 13:28

## 2023-04-08 RX ADMIN — HYDRALAZINE HYDROCHLORIDE 50 MG: 50 TABLET, FILM COATED ORAL at 13:28

## 2023-04-08 RX ADMIN — HEPARIN SODIUM 5000 UNITS: 5000 INJECTION INTRAVENOUS; SUBCUTANEOUS at 08:30

## 2023-04-08 RX ADMIN — HYDROCODONE BITARTRATE AND ACETAMINOPHEN 1 TABLET: 7.5; 325 TABLET ORAL at 13:28

## 2023-04-08 RX ADMIN — CARVEDILOL 25 MG: 25 TABLET, FILM COATED ORAL at 17:24

## 2023-04-08 RX ADMIN — HYDROCODONE BITARTRATE AND ACETAMINOPHEN 1 TABLET: 7.5; 325 TABLET ORAL at 19:51

## 2023-04-08 RX ADMIN — HYDROCODONE BITARTRATE AND ACETAMINOPHEN 1 TABLET: 7.5; 325 TABLET ORAL at 08:33

## 2023-04-08 RX ADMIN — HYDRALAZINE HYDROCHLORIDE 50 MG: 50 TABLET, FILM COATED ORAL at 05:44

## 2023-04-08 RX ADMIN — Medication 3 ML: at 20:03

## 2023-04-08 RX ADMIN — PANTOPRAZOLE SODIUM 40 MG: 40 TABLET, DELAYED RELEASE ORAL at 08:30

## 2023-04-08 RX ADMIN — HYDRALAZINE HYDROCHLORIDE 50 MG: 50 TABLET, FILM COATED ORAL at 21:31

## 2023-04-08 RX ADMIN — LEVOTHYROXINE SODIUM 112 MCG: 0.07 TABLET ORAL at 05:43

## 2023-04-08 RX ADMIN — LIDOCAINE 1 PATCH: 4 PATCH TOPICAL at 08:31

## 2023-04-08 RX ADMIN — CASTOR OIL AND BALSAM, PERU 1 APPLICATION: 788; 87 OINTMENT TOPICAL at 08:30

## 2023-04-08 RX ADMIN — PANTOPRAZOLE SODIUM 40 MG: 40 TABLET, DELAYED RELEASE ORAL at 17:24

## 2023-04-08 RX ADMIN — CARVEDILOL 25 MG: 25 TABLET, FILM COATED ORAL at 08:30

## 2023-04-08 NOTE — PLAN OF CARE
Goal Outcome Evaluation:              Outcome Evaluation: Pt is resting in bed at this time. Pt has complained of pain, PRN medication given per MAR. Tube feeding running at starting rate at this time. No other changes noted at this time. Will continue plan of care.

## 2023-04-08 NOTE — PROGRESS NOTES
LOS: 17 days   Patient Care Team:  Dave Tobar MD as PCP - General  Dave Tobar MD as PCP - Family Medicine    Post op day # 17, status post laparotomy with closure of perforated duodenal ulcer with omental patch and Biopatch  Postop day #1, status post PEG    Subjective     Interval History:  Patient states she is doing well after PEG placement.  She states her breathing is a little bit better.  She is actually anxious to try drinking something.  She is voiding without difficulty   Objective     Vital Signs  Temp:  [97.7 °F (36.5 °C)-98.6 °F (37 °C)] 98.6 °F (37 °C)  Heart Rate:  [] 74  Resp:  [16-20] 20  BP: (151-180)/(60-85) 152/60    Physical Exam:  This is a well-developed well-nourished elderly female in no acute distress  HEENT examination: Sclera are anicteric  Lungs: Clear.  Right breath sounds slightly diminished compared to left  Abdomen: Bowel sounds are present and active  Skin/incisions: Surgical  Incision intact and dry.       Results Review:        Results from last 7 days   Lab Units 04/06/23  0139 04/04/23  0231 04/02/23  0254   WBC 10*3/mm3 10.38 11.47* 10.89*   HEMOGLOBIN g/dL 10.1* 9.6* 10.0*   HEMATOCRIT % 32.6* 29.3* 30.9*   PLATELETS 10*3/mm3 249 298 280   INR   --  0.87*  --          Results from last 7 days   Lab Units 04/06/23  0139 04/05/23  0048 04/04/23  1751 04/04/23  0231   SODIUM mmol/L 135* 138  --  138   POTASSIUM mmol/L 4.1 4.6 4.4 3.4*   MAGNESIUM mg/dL 2.1 1.8  --  2.1   CHLORIDE mmol/L 100 103  --  101   CO2 mmol/L 27.6 29.5*  --  29.6*   BUN mg/dL 7* 9  --  10   CREATININE mg/dL 0.52* 0.51*  --  0.48*   CALCIUM mg/dL 8.6 9.1  --  8.6   GLUCOSE mg/dL 127* 97  --  91   ALBUMIN g/dL 2.5*  --   --  2.5*   BILIRUBIN mg/dL <0.2  --   --  0.2   ALK PHOS U/L 88  --   --  82   AST (SGOT) U/L 11  --   --  13   ALT (SGPT) U/L 11  --   --  17   Estimated Creatinine Clearance: 76.2 mL/min (A) (by C-G formula based on SCr of 0.52 mg/dL (L)).  No results found for:  AMMONIA      Blood Culture   Date Value Ref Range Status   03/22/2023 No growth at 24 hours  Preliminary   03/22/2023 No growth at 24 hours  Preliminary     No results found for: URINECX  Wound Culture   Date Value Ref Range Status   03/22/2023 No growth  Preliminary     No results found for: STOOLCX    Imaging:  Imaging Results (Last 24 Hours)     ** No results found for the last 24 hours. **           Impression:  Stable course, status post repair of perforated duodenal ulcer  Aggressive pleural based lesion with rib destruction and now extrinsic compression of the mid thoracic esophagus.  Pathology demonstrated poorly differentiated adenocarcinoma    Plan:  Start tube feeds  Liquid diet as tolerated  Hope to discharge patient to [facility] on Monday    Spoke to son at length regarding proposed plan of care.  He voiced understanding and agreement.    04/08/23  15:35 EDT      Please note that portions of this note were completed with a voice recognition program.

## 2023-04-08 NOTE — PLAN OF CARE
Goal Outcome Evaluation:              Outcome Evaluation: patient has rested most of the shift. prn pain medication given per request. pegg dressing remains clamped, dry and intact. patient ambulated to bedside commode.

## 2023-04-08 NOTE — PROGRESS NOTES
[facility] HOSPITALIST PROGRESS NOTE     Patient Identification:  Name:  Christine Garg  Age:  89 y.o.  Sex:  female  :  1933  MRN:  [id number]  Visit Number:  [id number]  ROOM: [address] [place] [address]     Primary Care Provider:  Dave Tobar MD     Date of Admission: 3/22/2023    Length of stay in inpatient status:  17    Subjective     Chief Compliant:    Chief Complaint   Patient presents with   • Abdominal Pain     History of Presenting Illness:  Family was at bedside when I saw her.  The patient has not eaten much yet.  She is not choking on the liquids that she takes in, but she has not really taken anything PO since the PEG tube was placed.  Otherwise, the patient denies any chest pain and she thinks that her breathing is better.    Objective     Current Hospital Meds:  carvedilol, 25 mg, Oral, BID With Meals  castor oil-balsam peru, 1 application, Topical, Q12H  heparin (porcine), 5,000 Units, Subcutaneous, Q12H  hydrALAZINE, 50 mg, Oral, Q8H  levothyroxine, 112 mcg, Oral, Q AM  Lidocaine, 1 patch, Apply externally, Q24H  multivitamin, 10 mL, Oral, Daily  pantoprazole, 40 mg, Oral, BID AC  polyethylene glycol, 17 g, Oral, Daily  sodium chloride, 10 mL, Intravenous, Q12H  sodium chloride, 10 mL, Intravenous, Q12H  sodium chloride, 10 mL, Intravenous, Q12H  sodium chloride, 3 mL, Intravenous, Q12H  valsartan, 320 mg, Oral, Q24H    dextrose 5 % and sodium chloride 0.45 %, 50 mL/hr, Last Rate: 50 mL/hr (23 1328)      Current Antimicrobial Therapy:  Anti-Infectives (From admission, onward)    Ordered     Dose/Rate Route Frequency Start Stop    23 1320  ceFAZolin 2 gm IVPB in 100 mL NS (VTB)        Ordering Provider: Aurora Kirkland MD    2 g  over 30 Minutes Intravenous On Call to O.R. 23 0600 23 0618    23 0849  piperacillin-tazobactam (ZOSYN) IVPB 3.375 g in 100 mL NS VTB        Ordering Provider: Aurora Kirkland MD    3.375 g  over 4 Hours Intravenous Every 8  Hours 03/23/23 1600 03/30/23 1233    03/23/23 0849  piperacillin-tazobactam (ZOSYN) IVPB 3.375 g in 100 mL NS VTB        Ordering Provider: Aurora Kirkland MD    3.375 g  over 30 Minutes Intravenous Once 03/23/23 0945 03/23/23 1026    03/22/23 1207  piperacillin-tazobactam (ZOSYN) IVPB 3.375 g in 100 mL NS VTB        Ordering Provider: Alejandrina Gonzalez PA    3.375 g  over 30 Minutes Intravenous Once 03/22/23 1209 03/22/23 1329    03/22/23 1207  vancomycin 1250 mg/250 mL 0.9% NS IVPB ([facility])        Ordering Provider: Alejandrina Gonzalez PA    20 mg/kg × 56.7 kg Intravenous Once 03/22/23 1209 03/22/23 1505        Current Diuretic Therapy:  Diuretics (From admission, onward)    None        ----------------------------------------------------------------------------------------------------------------------  Vital Signs:  Temp:  [97.7 °F (36.5 °C)-98.6 °F (37 °C)] 98.6 °F (37 °C)  Heart Rate:  [] 74  Resp:  [16-20] 20  BP: (151-180)/(60-85) 152/60  SpO2:  [95 %-97 %] 95 %  on  Flow (L/min):  [2] 2;   Device (Oxygen Therapy): nasal cannula  Body mass index is 24.15 kg/m².    Wt Readings from Last 3 Encounters:   04/04/23 65.8 kg (145 lb 1.6 oz)   01/05/23 68 kg (150 lb)   12/25/22 73.9 kg (163 lb)     Intake & Output (last 3 days)       04/05 0701  04/06 0700 04/06 0701 04/07 0700 04/07 0701 04/08 0700 04/08 0701 04/09 0700    P.O. 520 120  160    I.V. (mL/kg)   250 (3.8)     Other    30    Total Intake(mL/kg) 520 (7.9) 120 (1.8) 250 (3.8) 190 (2.9)    Urine (mL/kg/hr) 900 (0.6) 550 (0.3) 450 (0.3)     Stool  0      Total Output 900 550 450     Net -380 -430 -200 +190            Urine Unmeasured Occurrence 1 x       Stool Unmeasured Occurrence  2 x          NPO Diet NPO Type: Ice Chips  ----------------------------------------------------------------------------------------------------------------------  Physical Exam; same as yesterday.   Constitutional:       General: She is in acute distress but less than a  few days ago..      Appearance: She is chronically and acutely ill-appearing.   Cardiovascular:      Rate and Rhythm: Normal rate and regular rhythm.      Pulses: Normal pulses.      Heart sounds: No murmur heard.  Pulmonary:      Effort: Accessory muscle usage is less and respiratory distress is less present but not as pronounced as the last 2 days. No prolonged expiration.      Breath sounds: Examination of the right-upper field reveals decreased breath sounds but improved compared to two days ago. No wheezing or rales.   Neurological:      Mental Status: She is alert and oriented to person, place, and time. Mental status is at baseline.      Cranial Nerves: No cranial nerve deficit.   Psychiatric:         Mood and Affect: Mood normal.         Behavior: Behavior normal.   ----------------------------------------------------------------------------------------------------------------------  Tele:  NS with heart rates in the 60's.  I have personally reviewed/looked at the telemetry strips.    Assessment & Plan      -Severe sepsis that was present on admission (temperature 36 degrees C, lactic acid 3.5, CRP 3.44, WBC 30,990, heart rates ) due to a suspected perforated antral gastric ulcer, status post oversewing of the ulcer with an omental patch and Biopatch  -Acute hypokalemia and acute hypomagnesemia and acute hypophosphatemia, improved with supplementation  -Extrinsic mass effect of the mid thoracic esophagus, suspect from the T5-T6 level lymph node, with the node compromising the exiting nerve root and erosion of the adjacent vertebral body, causing difficulty swallowing  -History of coronary artery disease s/p 3v CABG in 2011  -History of chronic kidney disease stage IIIa with baseline Cr 0.6-0.85   -History of essential hypertension  -History of hypothyroidism, currently controlled  -History of hyperlipidemia  -History of left upper lobe pleural based pulmonary nodule that appears to be invading the T6  vertebra  -Advanced age  -Prior tobacco smoking history    Tube feeds to run continuous at nighttime.  Will repeat the labs in the am and replace any low levels of potassium, magnesium, and phosphorus.  Blood pressures are acceptable and stable.  She is allowed to eat a full liquid or soft diet.  Will stop the current IVFs as she will not need these anymore due to the tube feeds.    VTE Prophylaxis:   Mechanical Order History:      Ordered        03/22/23 1802  Place sequential compression device  Once                    Pharmalogical Order History:      Ordered     Dose Route Frequency Stop    03/22/23 1802  heparin (porcine) 5000 UNIT/ML injection 5,000 Units         5,000 Units SC Every 12 Hours Scheduled --            Disposition:  Undetermined at this time.    Kiara Servin MD  [facility] Hospitalist  04/08/23  16:38 EDT

## 2023-04-08 NOTE — OP NOTE
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 Risks of pancreatitis, infection, perforation, hemorrhage, adverse drug reaction and aspiration were discussed. The patient was placed in the left lateral decubitus position.  Based on the pre-procedure assessment, including review of the patient's medical history, medications, allergies, and review of systems, she had been deemed to be an appropriate candidate for conscious sedation;  She was monitored continuously with ECG tracing, pulse oximetry, blood pressure monitoring, and direct observation.      The gastroscope was inserted into the mouth and advanced under direct vision to distal stomach>  A careful inspection was made as the gastroscope was withdrawn, including a retroflexed view of the proximal stomach.  The gastroscope was utilized to identify a good site for the PEG tube based upon good visualization of finger indentation and transillumination.  After marking the site the skin was prepped with alcohol and infiltrated with lidocaine.  A 1 cm incision was made in the skin.  The needle was passed through to the stomach and then the guidewire passed.  The guidewire was grasped through the scope and brought out through the oropharynx.  This was attached to the PEG tube which was then brought back through the oropharynx and through the abdominal wall using the pull technique.  The PEG tube was noted to be 3 cm from the skin.  PEG tube was secured.  Dressing was placed.  Patient tolerated the procedure well and was taken to the recovery room in stable condition        Findings:  Extrinsic stricture at mid thoracic level    Grafts and implants:  PEG tube      Estimated blood loss:  None    Blood administered:  none           Complications:  None; patient tolerated the procedure well.           Disposition: PACU - hemodynamically stable.           Condition: stable    Attending Attestation: I performed the procedure.

## 2023-04-09 LAB
ALBUMIN SERPL-MCNC: 2.3 G/DL (ref 3.5–5.2)
ALBUMIN/GLOB SERPL: 0.9 G/DL
ALP SERPL-CCNC: 98 U/L (ref 39–117)
ALT SERPL W P-5'-P-CCNC: 7 U/L (ref 1–33)
ANION GAP SERPL CALCULATED.3IONS-SCNC: 4.9 MMOL/L (ref 5–15)
AST SERPL-CCNC: 14 U/L (ref 1–32)
BILIRUB SERPL-MCNC: 0.2 MG/DL (ref 0–1.2)
BUN SERPL-MCNC: 10 MG/DL (ref 8–23)
BUN/CREAT SERPL: 23.8 (ref 7–25)
CALCIUM SPEC-SCNC: 8.6 MG/DL (ref 8.6–10.5)
CHLORIDE SERPL-SCNC: 99 MMOL/L (ref 98–107)
CO2 SERPL-SCNC: 28.1 MMOL/L (ref 22–29)
CREAT SERPL-MCNC: 0.42 MG/DL (ref 0.57–1)
DEPRECATED RDW RBC AUTO: 50.4 FL (ref 37–54)
EGFRCR SERPLBLD CKD-EPI 2021: 93.6 ML/MIN/1.73
ERYTHROCYTE [DISTWIDTH] IN BLOOD BY AUTOMATED COUNT: 14.2 % (ref 12.3–15.4)
GLOBULIN UR ELPH-MCNC: 2.5 GM/DL
GLUCOSE SERPL-MCNC: 119 MG/DL (ref 65–99)
HCT VFR BLD AUTO: 30.8 % (ref 34–46.6)
HGB BLD-MCNC: 10 G/DL (ref 12–15.9)
INR PPP: 0.91 (ref 0.9–1.1)
MAGNESIUM SERPL-MCNC: 2 MG/DL (ref 1.6–2.4)
MCH RBC QN AUTO: 31.3 PG (ref 26.6–33)
MCHC RBC AUTO-ENTMCNC: 32.5 G/DL (ref 31.5–35.7)
MCV RBC AUTO: 96.6 FL (ref 79–97)
PHOSPHATE SERPL-MCNC: 2.9 MG/DL (ref 2.5–4.5)
PLATELET # BLD AUTO: 285 10*3/MM3 (ref 140–450)
PMV BLD AUTO: 10.1 FL (ref 6–12)
POTASSIUM SERPL-SCNC: 4 MMOL/L (ref 3.5–5.2)
PROT SERPL-MCNC: 4.8 G/DL (ref 6–8.5)
PROTHROMBIN TIME: 12.4 SECONDS (ref 12.1–14.7)
RBC # BLD AUTO: 3.19 10*6/MM3 (ref 3.77–5.28)
SODIUM SERPL-SCNC: 132 MMOL/L (ref 136–145)
WBC NRBC COR # BLD: 8.62 10*3/MM3 (ref 3.4–10.8)

## 2023-04-09 PROCEDURE — 84100 ASSAY OF PHOSPHORUS: CPT | Performed by: INTERNAL MEDICINE

## 2023-04-09 PROCEDURE — 85610 PROTHROMBIN TIME: CPT | Performed by: INTERNAL MEDICINE

## 2023-04-09 PROCEDURE — 25010000002 HEPARIN (PORCINE) PER 1000 UNITS: Performed by: SURGERY

## 2023-04-09 PROCEDURE — 85027 COMPLETE CBC AUTOMATED: CPT | Performed by: INTERNAL MEDICINE

## 2023-04-09 PROCEDURE — 80053 COMPREHEN METABOLIC PANEL: CPT | Performed by: INTERNAL MEDICINE

## 2023-04-09 PROCEDURE — 83735 ASSAY OF MAGNESIUM: CPT | Performed by: INTERNAL MEDICINE

## 2023-04-09 PROCEDURE — 99232 SBSQ HOSP IP/OBS MODERATE 35: CPT | Performed by: INTERNAL MEDICINE

## 2023-04-09 PROCEDURE — 99024 POSTOP FOLLOW-UP VISIT: CPT | Performed by: SURGERY

## 2023-04-09 PROCEDURE — 25010000002 MORPHINE PER 10 MG: Performed by: STUDENT IN AN ORGANIZED HEALTH CARE EDUCATION/TRAINING PROGRAM

## 2023-04-09 RX ORDER — MORPHINE SULFATE 2 MG/ML
1 INJECTION, SOLUTION INTRAMUSCULAR; INTRAVENOUS ONCE
Status: COMPLETED | OUTPATIENT
Start: 2023-04-09 | End: 2023-04-09

## 2023-04-09 RX ADMIN — LIDOCAINE 1 PATCH: 4 PATCH TOPICAL at 08:28

## 2023-04-09 RX ADMIN — HYDROCODONE BITARTRATE AND ACETAMINOPHEN 1 TABLET: 7.5; 325 TABLET ORAL at 08:32

## 2023-04-09 RX ADMIN — CASTOR OIL AND BALSAM, PERU 1 APPLICATION: 788; 87 OINTMENT TOPICAL at 21:03

## 2023-04-09 RX ADMIN — PANTOPRAZOLE SODIUM 40 MG: 40 TABLET, DELAYED RELEASE ORAL at 06:31

## 2023-04-09 RX ADMIN — HYDROCODONE BITARTRATE AND ACETAMINOPHEN 1 TABLET: 7.5; 325 TABLET ORAL at 17:07

## 2023-04-09 RX ADMIN — HYDROCODONE BITARTRATE AND ACETAMINOPHEN 1 TABLET: 7.5; 325 TABLET ORAL at 21:01

## 2023-04-09 RX ADMIN — HYDRALAZINE HYDROCHLORIDE 50 MG: 50 TABLET, FILM COATED ORAL at 21:01

## 2023-04-09 RX ADMIN — HYDROCODONE BITARTRATE AND ACETAMINOPHEN 1 TABLET: 7.5; 325 TABLET ORAL at 12:35

## 2023-04-09 RX ADMIN — HYDROCODONE BITARTRATE AND ACETAMINOPHEN 1 TABLET: 7.5; 325 TABLET ORAL at 02:44

## 2023-04-09 RX ADMIN — CARVEDILOL 25 MG: 25 TABLET, FILM COATED ORAL at 08:28

## 2023-04-09 RX ADMIN — VALSARTAN 320 MG: 160 TABLET, FILM COATED ORAL at 08:28

## 2023-04-09 RX ADMIN — Medication 3 ML: at 21:03

## 2023-04-09 RX ADMIN — MORPHINE SULFATE 1 MG: 2 INJECTION, SOLUTION INTRAMUSCULAR; INTRAVENOUS at 22:11

## 2023-04-09 RX ADMIN — CASTOR OIL AND BALSAM, PERU 1 APPLICATION: 788; 87 OINTMENT TOPICAL at 08:28

## 2023-04-09 RX ADMIN — POLYETHYLENE GLYCOL 3350 17 G: 17 POWDER, FOR SOLUTION ORAL at 08:28

## 2023-04-09 RX ADMIN — Medication 10 ML: at 21:03

## 2023-04-09 RX ADMIN — CARVEDILOL 25 MG: 25 TABLET, FILM COATED ORAL at 17:07

## 2023-04-09 RX ADMIN — Medication 10 ML: at 08:29

## 2023-04-09 RX ADMIN — HYDRALAZINE HYDROCHLORIDE 50 MG: 50 TABLET, FILM COATED ORAL at 05:10

## 2023-04-09 RX ADMIN — Medication 10 ML: at 08:28

## 2023-04-09 RX ADMIN — Medication 3 ML: at 08:29

## 2023-04-09 RX ADMIN — PANTOPRAZOLE SODIUM 40 MG: 40 TABLET, DELAYED RELEASE ORAL at 17:07

## 2023-04-09 RX ADMIN — HEPARIN SODIUM 5000 UNITS: 5000 INJECTION INTRAVENOUS; SUBCUTANEOUS at 21:03

## 2023-04-09 RX ADMIN — HEPARIN SODIUM 5000 UNITS: 5000 INJECTION INTRAVENOUS; SUBCUTANEOUS at 08:28

## 2023-04-09 RX ADMIN — LEVOTHYROXINE SODIUM 112 MCG: 0.07 TABLET ORAL at 05:10

## 2023-04-09 RX ADMIN — HYDRALAZINE HYDROCHLORIDE 50 MG: 50 TABLET, FILM COATED ORAL at 14:48

## 2023-04-09 NOTE — PROGRESS NOTES
[facility] HOSPITALIST PROGRESS NOTE     Patient Identification:  Name:  Christine Garg  Age:  89 y.o.  Sex:  female  :  1933  MRN:  [id number]  Visit Number:  [id number]  ROOM: [address] [place] [address]     Primary Care Provider:  Dave Tobar MD     Date of Admission: 3/22/2023    Length of stay in inpatient status:  18    Subjective     Chief Compliant:    Chief Complaint   Patient presents with   • Abdominal Pain     History of Presenting Illness:  Patient is doing fairly well today and would like to try mashed potatoes today.  She is tolerating the tube feeds.  She denies chest pain and trouble breathing.  Please note that multiple family members are at bedside.    Objective     Current Hospital Meds:  carvedilol, 25 mg, Oral, BID With Meals  castor oil-balsam peru, 1 application, Topical, Q12H  heparin (porcine), 5,000 Units, Subcutaneous, Q12H  hydrALAZINE, 50 mg, Oral, Q8H  levothyroxine, 112 mcg, Oral, Q AM  Lidocaine, 1 patch, Apply externally, Q24H  multivitamin, 10 mL, Oral, Daily  pantoprazole, 40 mg, Oral, BID AC  polyethylene glycol, 17 g, Oral, Daily  sodium chloride, 10 mL, Intravenous, Q12H  sodium chloride, 10 mL, Intravenous, Q12H  sodium chloride, 10 mL, Intravenous, Q12H  sodium chloride, 3 mL, Intravenous, Q12H  valsartan, 320 mg, Oral, Q24H       Current Antimicrobial Therapy:  Anti-Infectives (From admission, onward)    Ordered     Dose/Rate Route Frequency Start Stop    23 1320  ceFAZolin 2 gm IVPB in 100 mL NS (VTB)        Ordering Provider: Aurora Kirkland MD    2 g  over 30 Minutes Intravenous On Call to O.R. 23 0600 23 0618    23 0849  piperacillin-tazobactam (ZOSYN) IVPB 3.375 g in 100 mL NS VTB        Ordering Provider: Aurora Kirkland MD    3.375 g  over 4 Hours Intravenous Every 8 Hours 23 1600 23 1233    23 0849  piperacillin-tazobactam (ZOSYN) IVPB 3.375 g in 100 mL NS VTB        Ordering Provider: Aurora Kirkland MD     3.375 g  over 30 Minutes Intravenous Once 03/23/23 0945 03/23/23 1026    03/22/23 1207  piperacillin-tazobactam (ZOSYN) IVPB 3.375 g in 100 mL NS VTB        Ordering Provider: Alejandrina Gonzalez PA    3.375 g  over 30 Minutes Intravenous Once 03/22/23 1209 03/22/23 1329    03/22/23 1207  vancomycin 1250 mg/250 mL 0.9% NS IVPB ([facility])        Ordering Provider: Alejandrina Gonzalez PA    20 mg/kg × 56.7 kg Intravenous Once 03/22/23 1209 03/22/23 1505        Current Diuretic Therapy:  Diuretics (From admission, onward)    None        ----------------------------------------------------------------------------------------------------------------------  Vital Signs:  Temp:  [97.6 °F (36.4 °C)-98.6 °F (37 °C)] 97.6 °F (36.4 °C)  Heart Rate:  [72-79] 79  Resp:  [16-20] 16  BP: (125-179)/(52-73) 148/58  SpO2:  [96 %-98 %] 98 %  on  Flow (L/min):  [2] 2;   Device (Oxygen Therapy): nasal cannula  Body mass index is 24.15 kg/m².    Wt Readings from Last 3 Encounters:   04/04/23 65.8 kg (145 lb 1.6 oz)   01/05/23 68 kg (150 lb)   12/25/22 73.9 kg (163 lb)     Intake & Output (last 3 days)       04/06 0701  04/07 0700 04/07 0701  04/08 0700 04/08 0701  04/09 0700 04/09 0701  04/10 0700    P.O. 120  160     I.V. (mL/kg)  250 (3.8) 3374.7 (51.3)     Other   30     NG/GT   97     Total Intake(mL/kg) 120 (1.8) 250 (3.8) 3661.7 (55.6)     Urine (mL/kg/hr) 550 (0.3) 450 (0.3) 250 (0.2)     Stool 0       Total Output 550 450 250     Net -430 -200 +3411.7             Urine Unmeasured Occurrence   2 x     Stool Unmeasured Occurrence 2 x           NPO Diet NPO Type: Ice Chips  ----------------------------------------------------------------------------------------------------------------------  Physical Exam  Vitals reviewed. Exam conducted with a chaperone present.   Constitutional:       General: She is not in acute distress.     Appearance: She is not toxic-appearing or diaphoretic.      Comments: Appears chronically ill and not as acutely  ill.   Pulmonary:      Breath sounds: No stridor.   Neurological:      Mental Status: She is alert and oriented to person, place, and time. Mental status is at baseline.      Cranial Nerves: No cranial nerve deficit.   Psychiatric:         Mood and Affect: Mood normal.         Behavior: Behavior normal.       ----------------------------------------------------------------------------------------------------------------------  Tele:  NS with heart rates 50-70's.  I have personally reviewed/looked at the telemetry strips.  ----------------------------------------------------------------------------------------------------------------------  LABS:    CBC and coagulation:  Results from last 7 days   Lab Units 04/09/23 0242 04/06/23 0139 04/04/23  0231   WBC 10*3/mm3 8.62 10.38 11.47*   HEMOGLOBIN g/dL 10.0* 10.1* 9.6*   HEMATOCRIT % 30.8* 32.6* 29.3*   MCV fL 96.6 104.2* 94.8   MCHC g/dL 32.5 31.0* 32.8   PLATELETS 10*3/mm3 285 249 298   INR  0.91  --  0.87*     Renal and electrolytes:  Results from last 7 days   Lab Units 04/09/23 0242 04/06/23  0139 04/05/23  0048 04/04/23  1751 04/04/23  0231 04/03/23  0247   SODIUM mmol/L 132* 135* 138  --  138  --    POTASSIUM mmol/L 4.0 4.1 4.6 4.4 3.4*  --    MAGNESIUM mg/dL 2.0 2.1 1.8  --  2.1 1.9   CHLORIDE mmol/L 99 100 103  --  101  --    CO2 mmol/L 28.1 27.6 29.5*  --  29.6*  --    BUN mg/dL 10 7* 9  --  10  --    CREATININE mg/dL 0.42* 0.52* 0.51*  --  0.48*  --    CALCIUM mg/dL 8.6 8.6 9.1  --  8.6  --    PHOSPHORUS mg/dL 2.9 3.5 3.3  --  3.6  --    GLUCOSE mg/dL 119* 127* 97  --  91  --      Estimated Creatinine Clearance: 94.3 mL/min (A) (by C-G formula based on SCr of 0.42 mg/dL (L)).    Liver and pancreatic function:  Results from last 7 days   Lab Units 04/09/23  0242 04/06/23  0139 04/04/23  0231   ALBUMIN g/dL 2.3* 2.5* 2.5*   BILIRUBIN mg/dL 0.2 <0.2 0.2   ALK PHOS U/L 98 88 82   AST (SGOT) U/L 14 11 13   ALT (SGPT) U/L 7 11 17       I have personally looked  at the labs and they are summarized above.    Assessment & Plan      -Severe sepsis that was present on admission (temperature 36 degrees C, lactic acid 3.5, CRP 3.44, WBC 30,990, heart rates ) due to a suspected perforated antral gastric ulcer, status post oversewing of the ulcer with an omental patch and Biopatch  -Acute hypokalemia and acute hypomagnesemia and acute hypophosphatemia, improved with supplementation  -Extrinsic mass effect of the mid thoracic esophagus, suspect from the T5-T6 level lymph node, with the node compromising the exiting nerve root and erosion of the adjacent vertebral body, causing difficulty swallowing  -History of coronary artery disease s/p 3v CABG in 2011  -History of chronic kidney disease stage IIIa with baseline Cr 0.6-0.85   -History of essential hypertension  -History of hypothyroidism, currently controlled  -History of hyperlipidemia  -History of left upper lobe pleural based pulmonary nodule that appears to be invading the T6 vertebra  -Advanced age  -Prior tobacco smoking history    No labs tomorrow as they are stable.  Continue tube feeds and oral food as tolerated.  Palliative care team will be back tomorrow and GOC discussions can continue in deepth.  Please note that vital signs are stable as well.    VTE Prophylaxis:   Mechanical Order History:      Ordered        03/22/23 1802  Place sequential compression device  Once                    Pharmalogical Order History:      Ordered     Dose Route Frequency Stop    03/22/23 1802  heparin (porcine) 5000 UNIT/ML injection 5,000 Units         5,000 Units SC Every 12 Hours Scheduled --            Disposition:  Undetermined at this time.    Kiara Servin MD  [facility] Hospitalist  04/09/23  12:48 EDT

## 2023-04-09 NOTE — PROGRESS NOTES
LOS: 18 days   Patient Care Team:  Dave Tobar MD as PCP - General  Dave Tobar MD as PCP - Family Medicine    Post op day # 18, status post laparotomy with closure of perforated duodenal ulcer with omental patch and Biopatch  Postop day #2, status post PEG    Subjective     Interval History:  Patient states she is doing well after PEG placement.  She states her breathing is a little bit better.  She is taking some clear liquids.  She is passing gas.  Tube feeds were started yesterday and more advanced today.  Patient appears to be tolerating Objective     Vital Signs  Temp:  [97.6 °F (36.4 °C)-98.6 °F (37 °C)] 97.6 °F (36.4 °C)  Heart Rate:  [72-79] 79  Resp:  [16-20] 16  BP: (125-179)/(52-73) 148/58    Physical Exam:  This is a well-developed well-nourished elderly female in no acute distress  HEENT examination: Sclera are anicteric  Lungs: Clear.  Right breath sounds slightly diminished compared to left  Abdomen: Bowel sounds are present and active  Skin/incisions: Surgical  Incision intact and dry.       Results Review:        Results from last 7 days   Lab Units 04/09/23  0242 04/06/23  0139 04/04/23  0231   WBC 10*3/mm3 8.62 10.38 11.47*   HEMOGLOBIN g/dL 10.0* 10.1* 9.6*   HEMATOCRIT % 30.8* 32.6* 29.3*   PLATELETS 10*3/mm3 285 249 298   INR  0.91  --  0.87*         Results from last 7 days   Lab Units 04/09/23  0242 04/06/23  0139 04/05/23  0048 04/04/23  1751 04/04/23  0231   SODIUM mmol/L 132* 135* 138  --  138   POTASSIUM mmol/L 4.0 4.1 4.6   < > 3.4*   MAGNESIUM mg/dL 2.0 2.1 1.8  --  2.1   CHLORIDE mmol/L 99 100 103  --  101   CO2 mmol/L 28.1 27.6 29.5*  --  29.6*   BUN mg/dL 10 7* 9  --  10   CREATININE mg/dL 0.42* 0.52* 0.51*  --  0.48*   CALCIUM mg/dL 8.6 8.6 9.1  --  8.6   GLUCOSE mg/dL 119* 127* 97  --  91   ALBUMIN g/dL 2.3* 2.5*  --   --  2.5*   BILIRUBIN mg/dL 0.2 <0.2  --   --  0.2   ALK PHOS U/L 98 88  --   --  82   AST (SGOT) U/L 14 11  --   --  13   ALT (SGPT) U/L 7 11  --    --  17    < > = values in this interval not displayed.   Estimated Creatinine Clearance: 94.3 mL/min (A) (by C-G formula based on SCr of 0.42 mg/dL (L)).  No results found for: AMMONIA      Blood Culture   Date Value Ref Range Status   03/22/2023 No growth at 24 hours  Preliminary   03/22/2023 No growth at 24 hours  Preliminary     No results found for: URINECX  Wound Culture   Date Value Ref Range Status   03/22/2023 No growth  Preliminary     No results found for: STOOLCX    Imaging:  Imaging Results (Last 24 Hours)     ** No results found for the last 24 hours. **           Impression:  Stable course, status post repair of perforated duodenal ulcer  Aggressive pleural based lesion with rib destruction and now extrinsic compression of the mid thoracic esophagus.  Pathology demonstrated poorly differentiated adenocarcinoma    Plan:  Advance tube feeds as tolerated  Liquid diet as tolerated  Hope to discharge patient to [facility] on Monday    Spoke to son at length regarding proposed plan of care.  He voiced understanding and agreement.    04/09/23  11:26 EDT      Please note that portions of this note were completed with a voice recognition program.

## 2023-04-09 NOTE — PLAN OF CARE
Goal Outcome Evaluation:              Outcome Evaluation: patient has rested well tonight with tube feeding infusing. prn pain medication given as requested. no problems noted.

## 2023-04-09 NOTE — PLAN OF CARE
Goal Outcome Evaluation:              Outcome Evaluation: Pt is resting in bed at this time. [place] [facility] [place] but refused to ambulate further or sit in chair. Pt has complained of pain, PRN medication given per MAR. Wound care completed per orders. Tube feeding increased per order to 30ml/hr. Pt voices no complaints at this time. Will continue plan of care.

## 2023-04-10 ENCOUNTER — APPOINTMENT (OUTPATIENT)
Dept: GENERAL RADIOLOGY | Facility: HOSPITAL | Age: 88
DRG: 853 | End: 2023-04-10
Payer: MEDICARE

## 2023-04-10 ENCOUNTER — APPOINTMENT (OUTPATIENT)
Dept: CT IMAGING | Facility: HOSPITAL | Age: 88
DRG: 853 | End: 2023-04-10
Payer: MEDICARE

## 2023-04-10 VITALS
TEMPERATURE: 98.1 F | SYSTOLIC BLOOD PRESSURE: 127 MMHG | WEIGHT: 145.1 LBS | RESPIRATION RATE: 16 BRPM | HEIGHT: 65 IN | DIASTOLIC BLOOD PRESSURE: 50 MMHG | OXYGEN SATURATION: 98 % | HEART RATE: 72 BPM | BODY MASS INDEX: 24.18 KG/M2

## 2023-04-10 PROBLEM — K22.2 ESOPHAGEAL STRICTURE: Status: ACTIVE | Noted: 2023-04-10

## 2023-04-10 PROBLEM — Z93.1 S/P PERCUTANEOUS ENDOSCOPIC GASTROSTOMY (PEG) TUBE PLACEMENT: Status: ACTIVE | Noted: 2023-01-01

## 2023-04-10 PROBLEM — E03.9 HYPOTHYROIDISM (ACQUIRED): Status: ACTIVE | Noted: 2023-01-01

## 2023-04-10 PROBLEM — C80.1 ADENOCARCINOMA: Status: ACTIVE | Noted: 2023-01-01

## 2023-04-10 PROCEDURE — 99232 SBSQ HOSP IP/OBS MODERATE 35: CPT | Performed by: INTERNAL MEDICINE

## 2023-04-10 PROCEDURE — 74176 CT ABD & PELVIS W/O CONTRAST: CPT

## 2023-04-10 PROCEDURE — 99024 POSTOP FOLLOW-UP VISIT: CPT | Performed by: SURGERY

## 2023-04-10 PROCEDURE — 25010000002 ONDANSETRON PER 1 MG: Performed by: SURGERY

## 2023-04-10 PROCEDURE — 74176 CT ABD & PELVIS W/O CONTRAST: CPT | Performed by: RADIOLOGY

## 2023-04-10 PROCEDURE — 25010000002 HEPARIN (PORCINE) PER 1000 UNITS: Performed by: SURGERY

## 2023-04-10 RX ORDER — POLYETHYLENE GLYCOL 3350 17 G/17G
17 POWDER, FOR SOLUTION ORAL DAILY
Status: ON HOLD
Start: 2023-04-11

## 2023-04-10 RX ORDER — POTASSIUM CHLORIDE 1.5 G/1.77G
40 POWDER, FOR SOLUTION ORAL AS NEEDED
Status: ON HOLD
Start: 2023-04-10

## 2023-04-10 RX ORDER — LEVOTHYROXINE SODIUM 112 UG/1
112 TABLET ORAL
Status: ON HOLD
Start: 2023-04-11

## 2023-04-10 RX ORDER — LABETALOL HYDROCHLORIDE 5 MG/ML
10 INJECTION, SOLUTION INTRAVENOUS EVERY 6 HOURS PRN
Status: ON HOLD
Start: 2023-04-10

## 2023-04-10 RX ORDER — CASTOR OIL AND BALSAM, PERU 788; 87 MG/G; MG/G
1 OINTMENT TOPICAL EVERY 12 HOURS SCHEDULED
Status: ON HOLD
Start: 2023-04-10

## 2023-04-10 RX ORDER — MAGNESIUM SULFATE 1 G/100ML
1 INJECTION INTRAVENOUS AS NEEDED
Status: ON HOLD
Start: 2023-04-10

## 2023-04-10 RX ORDER — HYDRALAZINE HYDROCHLORIDE 50 MG/1
50 TABLET, FILM COATED ORAL EVERY 8 HOURS SCHEDULED
Status: ON HOLD
Start: 2023-04-10

## 2023-04-10 RX ORDER — METOCLOPRAMIDE 5 MG/1
5 TABLET ORAL
Status: ON HOLD
Start: 2023-04-10

## 2023-04-10 RX ORDER — LIDOCAINE 4 G/G
1 PATCH TOPICAL
Status: ON HOLD
Start: 2023-04-11

## 2023-04-10 RX ORDER — HYDROCODONE BITARTRATE AND ACETAMINOPHEN 7.5; 325 MG/1; MG/1
1 TABLET ORAL EVERY 4 HOURS PRN
Refills: 0 | Status: ON HOLD
Start: 2023-04-10 | End: 2023-04-18

## 2023-04-10 RX ORDER — PANTOPRAZOLE SODIUM 40 MG/1
40 TABLET, DELAYED RELEASE ORAL
Status: ON HOLD
Start: 2023-04-10

## 2023-04-10 RX ORDER — HYDRALAZINE HYDROCHLORIDE 20 MG/ML
10 INJECTION INTRAMUSCULAR; INTRAVENOUS EVERY 6 HOURS PRN
Status: ON HOLD
Start: 2023-04-10

## 2023-04-10 RX ORDER — MAGNESIUM SULFATE HEPTAHYDRATE 40 MG/ML
2 INJECTION, SOLUTION INTRAVENOUS AS NEEDED
Status: ON HOLD
Start: 2023-04-10

## 2023-04-10 RX ORDER — VALSARTAN 320 MG/1
320 TABLET ORAL
Status: ON HOLD
Start: 2023-04-11

## 2023-04-10 RX ORDER — POTASSIUM CHLORIDE 20 MEQ/1
40 TABLET, EXTENDED RELEASE ORAL AS NEEDED
Status: ON HOLD
Start: 2023-04-10

## 2023-04-10 RX ORDER — HEPARIN SODIUM 5000 [USP'U]/ML
5000 INJECTION, SOLUTION INTRAVENOUS; SUBCUTANEOUS EVERY 12 HOURS SCHEDULED
Status: ON HOLD
Start: 2023-04-10

## 2023-04-10 RX ORDER — CLONIDINE HYDROCHLORIDE 0.1 MG/1
0.1 TABLET ORAL 3 TIMES DAILY PRN
Status: ON HOLD
Start: 2023-04-10

## 2023-04-10 RX ORDER — METOCLOPRAMIDE 10 MG/1
5 TABLET ORAL
Status: DISCONTINUED | OUTPATIENT
Start: 2023-04-10 | End: 2023-04-10 | Stop reason: HOSPADM

## 2023-04-10 RX ORDER — PEDIATRIC MULTIPLE VITAMIN LIQ
10 LIQUID ORAL DAILY
Status: ON HOLD
Start: 2023-04-11

## 2023-04-10 RX ADMIN — HYDROCODONE BITARTRATE AND ACETAMINOPHEN 1 TABLET: 7.5; 325 TABLET ORAL at 17:17

## 2023-04-10 RX ADMIN — LEVOTHYROXINE SODIUM 112 MCG: 0.07 TABLET ORAL at 05:02

## 2023-04-10 RX ADMIN — LIDOCAINE 1 PATCH: 4 PATCH TOPICAL at 09:29

## 2023-04-10 RX ADMIN — CARVEDILOL 25 MG: 25 TABLET, FILM COATED ORAL at 09:27

## 2023-04-10 RX ADMIN — Medication 10 ML: at 09:28

## 2023-04-10 RX ADMIN — CASTOR OIL AND BALSAM, PERU 1 APPLICATION: 788; 87 OINTMENT TOPICAL at 09:29

## 2023-04-10 RX ADMIN — HYDROCODONE BITARTRATE AND ACETAMINOPHEN 1 TABLET: 7.5; 325 TABLET ORAL at 13:59

## 2023-04-10 RX ADMIN — METOCLOPRAMIDE 5 MG: 10 TABLET ORAL at 12:27

## 2023-04-10 RX ADMIN — HYDRALAZINE HYDROCHLORIDE 50 MG: 50 TABLET, FILM COATED ORAL at 05:02

## 2023-04-10 RX ADMIN — HEPARIN SODIUM 5000 UNITS: 5000 INJECTION INTRAVENOUS; SUBCUTANEOUS at 09:27

## 2023-04-10 RX ADMIN — HYDRALAZINE HYDROCHLORIDE 50 MG: 50 TABLET, FILM COATED ORAL at 15:00

## 2023-04-10 RX ADMIN — HYDROCODONE BITARTRATE AND ACETAMINOPHEN 1 TABLET: 7.5; 325 TABLET ORAL at 09:35

## 2023-04-10 RX ADMIN — HYDROCODONE BITARTRATE AND ACETAMINOPHEN 1 TABLET: 7.5; 325 TABLET ORAL at 04:45

## 2023-04-10 RX ADMIN — PANTOPRAZOLE SODIUM 40 MG: 40 TABLET, DELAYED RELEASE ORAL at 05:02

## 2023-04-10 RX ADMIN — METOCLOPRAMIDE 5 MG: 10 TABLET ORAL at 16:42

## 2023-04-10 RX ADMIN — ONDANSETRON 4 MG: 2 INJECTION INTRAMUSCULAR; INTRAVENOUS at 10:29

## 2023-04-10 RX ADMIN — PANTOPRAZOLE SODIUM 40 MG: 40 TABLET, DELAYED RELEASE ORAL at 16:42

## 2023-04-10 RX ADMIN — Medication 10 ML: at 09:29

## 2023-04-10 RX ADMIN — VALSARTAN 320 MG: 160 TABLET, FILM COATED ORAL at 09:27

## 2023-04-10 NOTE — PLAN OF CARE
Goal Outcome Evaluation:           Progress: improving  Outcome Evaluation: Pt resting in bed a this time. Complaints of pain and feeling full. Gladys PEREZ notified and pain medication given per orders. Tube feeding being held at this time per orders. VSS. Will continue to monitor.

## 2023-04-10 NOTE — LETTER
EMS Transport Request  For use at [facility], and [place] only   Patient Name: Christine Garg : 1933   Weight:66.2 kg (146 lb) Pick-up Location: N6-1 BLS/ALS: BLS/ALS: BLS   Insurance: [employer] MEDICARE REPLACEMENT Auth End Date:23   Pre-Cert #: D/C Summary complete:    Destination: [facility] in [place]    Contact Precautions: None   Equipment (O2, Fluids, etc.): None   Arrive By Date/Time: 23  Stretcher/WC: Stretcher   CM Requesting: Vale Lechuga RN Ext: [phone]   Notes/Medical Necessity:      ______________________________________________________________________    *Only 2 patient bags OR 1 carry-on size bag are permitted.  Wheelchairs and walkers CANNOT transported with the patient. Acknowledge: Yes

## 2023-04-10 NOTE — DISCHARGE SUMMARY
[facility] GENERAL SURGERY DISCHARGE SUMMARY    Patient Identification:  Name:  Christine Garg  Age:  89 y.o.  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  Decreased gastric emptying, likely related to recent surgery    CONSULTS:   Hospitalist  Interventional radiology  Medical oncology    PROCEDURES PERFORMED:  Procedure(s):  Exploratory laparotomy with closure of perforated duodenal ulcer with omental patch and Biopatch  Central line insertion  ESOPHAGOGASTRODUODENOSCOPY WITH PERCUTANEOUS ENDOSCOPIC GASTROSTOMY TUBE INSERTION       HOSPITAL COURSE  Patient is a 89 y.o. female presented to [facility] with an acute surgical abdomen secondary to perforated duodenal ulcer.  Please see the admitting history and physical for further details.  Patient was taken to the operating room and underwent a surgical repair of the perforated duodenal ulcer and central line placement.  NG tube was placed at the time of surgery without any difficulty.  Postoperatively the patient did extremely well although she was somewhat slow to mobilize given her age of 89.  After several days she was transferred from the CCU.  She underwent physical therapy.  NG tube had bilious material in it.  Several days postop the NG tube was removed which the patient tolerated.  SARAH output was always serosanguineous.  The patient was started on clear liquids which she tolerated.  She was then advanced to a soft diet and at this point began complaining of difficulty swallowing.    Of note is that the patient had done well until approximately 3 months prior to  admission when she began complaining of back pain and poor appetite.  Patient was noted to have a pleural-based mass and a paraspinal mass on CT.  This has been getting worked up as an outpatient and patient had actually been scheduled for a PET scan prior to admission.    Upper GI was performed which demonstrated a marked esophageal stricture at the mid esophageal level which was felt to be from extrinsic compression from a large 3 cm paratracheal lymph node.  Medical oncology was consulted as the patient was anxious to have a diagnosis that is where the family.  Ms. Garg underwent a CT-guided biopsy of the pleural base mass which returned poorly differentiated adenocarcinoma consistent with lung primary.    At this point in time the patient has recovered from her surgery was otherwise stable for discharge except for that she could only tolerate sips of clear liquids secondary to her esophageal stricture.  Because of this her nutrition was poor.    Case was presented at tumor board.  Patient does not have a large volume of disease and therefore regardless of whether she undergoes treatment her life expectancy would be at least several months if not for the malnutrition.  It was felt that the PEG tube could be placed patient could get adequate nutrition and be seen back in the outpatient setting by medical oncology and determine whether she was a candidate for any treatment.    PEG tube was placed on 4/7/2023 without complication and she was started on tube feeds.  She has good control of her back pain with hydrocodone 7.5.  However the patient's inability to eat because of her esophageal stricture has had a tremendous negative impact on her quality of life and a stent would clearly improve this..  Her volume of disease does not justify hospice at this point in time.  Dr. Ralph's as graciously agreed to place the stent and suggested that rather than trying to coordinate outpatient visits that the patient be  transferred to [facility] for the procedure and from there should be stable for discharge.  The patient had requested temporary nursing home placement until she regains her strength and was approved for a bed at [facility].    Ms. Garg did complain of some continued left lower quadrant pain and tenderness this morning.  A stat noncontrasted CT did not demonstrate any acute issue.  She is currently passing gas and moving her bowels.  We will      PHYSICAL EXAM:  This is a well-developed well-nourished elderly female in no acute distress  HEENT examination: Sclera are anicteric  Lungs: Clear  Heart: Regular rate and rhythm  Abdomen: Bowel sounds present.  PEG tube in left upper quadrant.  Lower abdomen is tender and slightly distended  Skin/incisions: Incision sites were inspected and demonstrate no drainage or erythema  Lab Results (last 48 hours)     Procedure Component Value Units Date/Time    Magnesium [[id number]]  (Normal) Collected: 04/09/23 0242    Specimen: Blood Updated: 04/09/23 0350     Magnesium 2.0 mg/dL     Comprehensive Metabolic Panel [[id number]]  (Abnormal) Collected: 04/09/23 0242    Specimen: Blood Updated: 04/09/23 0350     Glucose 119 mg/dL      BUN 10 mg/dL      Creatinine 0.42 mg/dL      Sodium 132 mmol/L      Potassium 4.0 mmol/L      Chloride 99 mmol/L      CO2 28.1 mmol/L      Calcium 8.6 mg/dL      Total Protein 4.8 g/dL      Albumin 2.3 g/dL      ALT (SGPT) 7 U/L      AST (SGOT) 14 U/L      Alkaline Phosphatase 98 U/L      Total Bilirubin 0.2 mg/dL      Globulin 2.5 gm/dL      A/G Ratio 0.9 g/dL      BUN/Creatinine Ratio 23.8     Anion Gap 4.9 mmol/L      eGFR 93.6 mL/min/1.73     Narrative:      GFR Normal >60  Chronic Kidney Disease <60  Kidney Failure <15    The GFR formula is only valid for adults with stable renal function between ages 18 and 70.    Phosphorus [[id number]]  (Normal) Collected: 04/09/23 0242    Specimen: Blood Updated: 04/09/23 0350     Phosphorus 2.9  mg/dL     Protime-INR [[id number]]  (Normal) Collected: 04/09/23 0242    Specimen: Blood Updated: 04/09/23 0347     Protime 12.4 Seconds      INR 0.91    Narrative:      Suggested INR therapeutic range for stable oral anticoagulant therapy:    Low Intensity therapy:   1.5-2.0  Moderate Intensity therapy:   2.0-3.0  High Intensity therapy:   2.5-4.0    CBC (No Diff) [[id number]]  (Abnormal) Collected: 04/09/23 0242    Specimen: Blood Updated: 04/09/23 0332     WBC 8.62 10*3/mm3      RBC 3.19 10*6/mm3      Hemoglobin 10.0 g/dL      Hematocrit 30.8 %      MCV 96.6 fL      MCH 31.3 pg      MCHC 32.5 g/dL      RDW 14.2 %      RDW-SD 50.4 fl      MPV 10.1 fL      Platelets 285 10*3/mm3         CT Abdomen Pelvis Without Contrast    Result Date: 4/10/2023  1.  Small bilateral pleural effusions. 2.  Gastric tube noted in the stomach. This has been recently placed and small to moderate intraperitoneal free air persists.   This report was finalized on 4/10/2023 12:06 PM by Dr. Henry Lozano MD.      FL Upper GI Single Contrast SBFT    Result Date: 4/3/2023  1.  Fixed area of esophageal narrowing is noted involving the mid thoracic esophagus just distal to the bala and has features that would suggest extrinsic mass effect. Correlate with cross-sectional imaging. 2.  Esophageal tertiary contractions noted with mild dysmotility. 3.  Small hiatal hernia. 4.  Markedly delayed gastric emptying is noted with only small amounts of contrast passing across the pylorus. Limited assessment of the gastroduodenal junction due to cardiac leads and surgical staples but no definite contrast leak identified. 5.  Small bowel is unremarkable with normal small bowel transit. Contrast reaches the colon within 1.5 hours. 6.  No bowel obstruction.  This report was finalized on 4/3/2023 11:08 AM by Dr. Tobias Stoll MD.      CT Needle Biopsy Pleura    Result Date: 4/4/2023  CT-guided biopsy of a left lower lobe region pleural-based mass.  This report  was finalized on 4/4/2023 1:31 PM by Dr. Tobias Stoll MD.      No results found for: ACANTHNAEG, AFBCX, BPERTUSSISCX, BLOODCX  No results found for: BCIDPCR, CXREFLEX, CSFCX, CULTURETIS  No results found for: CULTURES, HSVCX, URCX  No results found for: EYECULTURE, GCCX, HSVCULTURE, LABHSV  No results found for: LEGIONELLA, MRSACX, MUMPSCX, MYCOPLASCX  No results found for: NOCARDIACX, STOOLCX  No results found for: THROATCX, UNSTIMCULT, URINECX, CULTURE, VZVCULTUR  No results found for: VIRALCULTU, WOUNDCX  CONDITION AT DISCHARGE:  Improved    DISCHARGE DISPOSITION   Home    DISCHARGE MEDICATIONS:     Discharge Medications      New Medications      Instructions Start Date   castor oil-balsam peru ointment   1 application, Topical, Every 12 Hours Scheduled      heparin (porcine) 5000 UNIT/ML injection   5,000 Units, Subcutaneous, Every 12 Hours Scheduled      hydrALAZINE 20 MG/ML injection  Commonly known as: APRESOLINE   10 mg, Intravenous, Every 6 Hours PRN      hydrALAZINE 50 MG tablet  Commonly known as: APRESOLINE   50 mg, Oral, Every 8 Hours Scheduled      labetalol 5 MG/ML injection  Commonly known as: NORMODYNE,TRANDATE   10 mg, Intravenous, Every 6 Hours PRN      Lidocaine 4 % patch  Commonly known as: HM Lidocaine Patch   1 patch, Apply externally, Every 24 Hours Scheduled   Start Date: April 11, 2023     magnesium sulfate 2 GM/50ML infusion   2 g, Intravenous, As Needed      magnesium sulfate in D5W 1g/100mL (PREMIX) 1-5 GM/100ML-% IVPB   1 g, Intravenous, As Needed      metoclopramide 5 MG tablet  Commonly known as: REGLAN   5 mg, Per G Tube, 3 Times Daily Before Meals      multivitamin liquid liquid   10 mL, Per PEG Tube, Daily   Start Date: April 11, 2023     pantoprazole 40 MG EC tablet  Commonly known as: PROTONIX   40 mg, Oral, 2 Times Daily Before Meals      polyethylene glycol 17 g packet  Commonly known as: MIRALAX   17 g, Oral, Daily   Start Date: April 11, 2023     potassium chloride 20 MEQ  CR tablet  Commonly known as: K-DUR,KLOR-CON   40 mEq, Oral, As Needed      potassium chloride 20 MEQ packet  Commonly known as: KLOR-CON   40 mEq, Oral, As Needed      potassium phosphate 45 mmol in sodium chloride 0.9 % 500 mL infusion   45 mmol, Intravenous, As Needed         Changes to Medications      Instructions Start Date   cloNIDine 0.1 MG tablet  Commonly known as: CATAPRES  What changed: Another medication with the same name was added. Make sure you understand how and when to take each.   0.1 mg, Oral, 3 Times Daily PRN      cloNIDine 0.1 MG tablet  Commonly known as: CATAPRES  What changed: You were already taking a medication with the same name, and this prescription was added. Make sure you understand how and when to take each.   0.1 mg, Oral, 3 Times Daily PRN      HYDROcodone-acetaminophen 5-325 MG per tablet  Commonly known as: NORCO  What changed: Another medication with the same name was added. Make sure you understand how and when to take each.   1 tablet, Oral, Daily PRN      HYDROcodone-acetaminophen 7.5-325 MG per tablet  Commonly known as: NORCO  What changed: You were already taking a medication with the same name, and this prescription was added. Make sure you understand how and when to take each.   1 tablet, Oral, Every 4 Hours PRN      levothyroxine 112 MCG tablet  Commonly known as: SYNTHROID, LEVOTHROID  What changed: Another medication with the same name was added. Make sure you understand how and when to take each.   112 mcg, Oral, Every Early Morning      levothyroxine 112 MCG tablet  Commonly known as: SYNTHROID, LEVOTHROID  What changed: You were already taking a medication with the same name, and this prescription was added. Make sure you understand how and when to take each.   112 mcg, Oral, Every Early Morning   Start Date: April 11, 2023     valsartan 320 MG tablet  Commonly known as: DIOVAN  What changed: Another medication with the same name was added. Make sure you  understand how and when to take each.   320 mg, Oral, Daily      valsartan 320 MG tablet  Commonly known as: DIOVAN  What changed: You were already taking a medication with the same name, and this prescription was added. Make sure you understand how and when to take each.   320 mg, Oral, Every 24 Hours Scheduled   Start Date: April 11, 2023        Continue These Medications      Instructions Start Date   aspirin 325 MG tablet   325 mg, Oral, Daily      carvedilol 25 MG tablet  Commonly known as: COREG   25 mg, Oral, 2 Times Daily With Meals      montelukast 10 MG tablet  Commonly known as: SINGULAIR   10 mg, Oral, Nightly      ondansetron 4 MG tablet  Commonly known as: ZOFRAN   4 mg, Oral, 3 Times Daily PRN      Repatha SureClick solution auto-injector SureClick injection  Generic drug: Evolocumab   140 mg, Subcutaneous, Every 14 Days         Stop These Medications    Movantik 25 MG tablet  Generic drug: Naloxegol Oxalate     nitrofurantoin (macrocrystal-monohydrate) 100 MG capsule  Commonly known as: MACROBID     predniSONE 20 MG tablet  Commonly known as: DELTASONE     tiZANidine 2 MG half tablet  Commonly known as: ZANAFLEX          Oh  FOLLOW-UP:  Dr. Kirkland in 3 weeks    Activity and diet instructions given to the patient      Aurora Kirkland MD  04/10/23  15:27 EDT    Please note that this discharge summary required more than 30 minutes to complete.

## 2023-04-10 NOTE — LETTER
EMS Transport Request  For use at [facility], and [place] only   Patient Name: Christine Garg : 1933   Weight:66.2 kg (146 lb) Pick-up Location: [facility] BLS/ALS: BLS/ALS: BLS   Insurance: [employer] REPLACEMENT Auth End Date:    Pre-Cert #: D/C Summary complete: no   Destination: Home FOR EB: 24-hour notice needed and given, How many stairs 2, Will the patient be on the main level yes, Is there a ramp available no, Can the patient stand and pivot yes with assist, Address [address] [place] [address], and Name/contact number for who will be present Ziyad, [phone]   Contact Precautions: None   Equipment (O2, Fluids, etc.): O2, settings 2 L /NC   Arrive By Date/Time:   1100 or later Stretcher/WC: Stretcher   CM Requesting: Rubi Lehman RN Ext: [phone]   Notes/Medical Necessity: Pt limited by weakness, poor activity tolerance, and decreased trunk control, unable to sit up in w/c independently for extended period of time. Using oxygen at 2 L/NC     ______________________________________________________________________    *Only 2 patient bags OR 1 carry-on size bag are permitted.  Wheelchairs and walkers CANNOT transported with the patient. Acknowledge: Yes

## 2023-04-10 NOTE — LETTER
[facility]  [address] [facility] [address]  [phone]        April 28, 2023      Patient: Christine Garg  YOB: 1933  Date of Visit: 4/10/2023      New referral for [place] home patient currently at [facility] with unknown discharge date.    Referring: Dr. Flora Franco    Thank you,        Maddie Akbar RN

## 2023-04-10 NOTE — PROGRESS NOTES
[facility] HOSPITALIST PROGRESS NOTE     Patient Identification:  Name:  Christine Garg  Age:  89 y.o.  Sex:  female  :  1933  MRN:  [id number]  Visit Number:  [id number]  ROOM: [address] [place] [address]     Primary Care Provider:  Dave Tobar MD    Length of stay in inpatient status:  19    Subjective     Chief Compliant:    Chief Complaint   Patient presents with   • Abdominal Pain     History of Presenting Illness:    Patient remains ill but stable today, no acute events overnight, denies any fevers or chills, still having poor oral intake, RUQ tenderness today, discussed with General Surgery who are primary, Dr. Kirkland discussed with Gastroenterology [facility] Dr. Waters, plan for transfer for possible esophageal stenting for quality of life and comfort purpose, patient to follow up with Med/Onc outpatient following discharge from likely rehab in the future to be considered for any treatments.  blood pressure has been labile but also likely pain related, overall has been improved, creatinine stable overall, though not checked today.  Objective     Current Hospital Meds:  carvedilol, 25 mg, Oral, BID With Meals  castor oil-balsam peru, 1 application, Topical, Q12H  heparin (porcine), 5,000 Units, Subcutaneous, Q12H  hydrALAZINE, 50 mg, Oral, Q8H  levothyroxine, 112 mcg, Oral, Q AM  Lidocaine, 1 patch, Apply externally, Q24H  metoclopramide, 5 mg, Per PEG Tube, TID AC  multivitamin, 10 mL, Oral, Daily  pantoprazole, 40 mg, Oral, BID AC  polyethylene glycol, 17 g, Oral, Daily  valsartan, 320 mg, Oral, Q24H  ----------------------------------------------------------------------------------------------------------------------  Vital Signs:  Temp:  [97.5 °F (36.4 °C)-97.8 °F (36.6 °C)] 97.5 °F (36.4 °C)  Heart Rate:  [66-78] 73  Resp:  [16-18] 18  BP: (111-172)/(50-68) 157/54     on  Flow (L/min):  [2] 2;   Device (Oxygen Therapy): nasal cannula  Body mass index is 24.15  kg/m².      Intake/Output Summary (Last 24 hours) at 4/10/2023 1406  Last data filed at 4/10/2023 1300  Gross per 24 hour   Intake 1200.22 ml   Output 300 ml   Net 900.22 ml      ----------------------------------------------------------------------------------------------------------------------  Physical exam:  Constitutional:  Elderly, No acute distress. No significant discomfort  HENT:  Head:  Normocephalic and atraumatic.  Mouth:  Moist mucous membranes.    Eyes:  Conjunctivae and EOM are normal. No scleral icterus.    Neck:  Neck supple.  No JVD present.    Cardiovascular:  Normal rate, regular rhythm and normal heart sounds with no murmur.  Pulmonary/Chest:  No respiratory distress, no wheezes, no crackles, on minimal NC  Abdominal:  Soft. No distension and mild tenderness to palpation   Musculoskeletal:  No tenderness, and no deformity.  No red or swollen joints anywhere.    Neurological:  Alert and oriented to person, place, and time.  No gross focal deficits     Skin:  Skin is warm and dry. No rash noted. No pallor.   Peripheral vascular:  No clubbing, no cyanosis, no edema  Psychiatric: Appropriate mood and affect  Edited by: Rodriguez Robin MD at 4/2/2023 1022  ----------------------------------------------------------------------------------------------------------------------  No results found for: URINECX  No results found for: BLOODCX    I have personally looked at the labs and they are summarized above.  ----------------------------------------------------------------------------------------------------------------------  Detailed radiology reports for the last 24 hours:  CT Abdomen Pelvis Without Contrast    Result Date: 4/10/2023  1.  Small bilateral pleural effusions. 2.  Gastric tube noted in the stomach. This has been recently placed and small to moderate intraperitoneal free air persists.   This report was finalized on 4/10/2023 12:06 PM by Dr. Henry Lozano MD.      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, suspect from the T5-T6 level lymph node, with the node compromising the exiting nerve root and erosion of the adjacent vertebral body, causing difficulty swallowing in setting of History of left upper lobe pleural based pulmonary nodule that appears to be invading the T6 vertebra, now with biopsy proven poorly differentiated pulmonary adenocarcinoma   #History of coronary artery disease status post 3v CABG in 2011  #History of chronic kidney disease stage IIIa with baseline Cr 0.6-0.85   #History of essential hypertension  #History of hypothyroidism, currently controlled  #History of hyperlipidemia  #Advanced age  #Former Smoker  #Debility   #Severe sepsis that was present on admission (temperature 36 degrees C, lactic acid 3.5, CRP 3.44, WBC 30,990, heart rates ) due to a suspected perforated antral gastric ulcer, though also appears do to concurrent Pneumonia, Bacterial, already completed sufficient course of antibiotics - Resolved   #Acute hypokalemia and acute hypomagnesemia and acute hypophosphatemia - Resolved   - General Surgery following as primary, status post surgical intervention, pathology showing poorly differentiated adenocarcinoma, due to difficulty feeding have placed PEG tube though still persisting, Dr. Kirkland discussed with Dr. Waters [facility] Gastroenterology and plan for transfer for esophageal stenting.   - Med Oncology consulted and followed, recommended \"follow up in medical and radiation oncology clinic after rehab to reassess her performance status and discuss possible treatment options should she want to proceed with treatment\"  - Status post Zosyn 7 days Zosyn, no signs and symptoms active infection   - Continue oral PPI, diet per primary  - Continue home beta blocker, home ARB, new Hydralazine, titrate as indicated  - Continue as needed antihypertensives for systolic blood pressure > 180mmHg  - Continue " Tylenol as needed for fevers, monitor for clinical improvement.   - Agree with transfer to [facility] for possible esophageal stenting for comfort and quality of life purposes    #Advanced Care Planning  - Conversations 3/31; Palliative Care consulted and following      F: Oral  E: Monitor & Replace as needed   N: Diet: Regular/House Diet; No Carbonated Beverages, No Straw; Texture: Soft to Chew (NDD 3); Soft to Chew: Whole Meat; Fluid Consistency: Thin (IDDSI 0)   Ppx: SQH  Code Status (Patient has no pulse and is not breathing): No CPR (Do Not Attempt to Resuscitate)  Medical Interventions (Patient has pulse or is breathing): Limited Support  Medical Intervention Limits: NO intubation (DNI)     Dispo: Transfer [facility] today      *This patient is considered high risk due to severe sepsis, bowel perforation, surgical intervention, volume overload, debility     Edited by: Rodriguez Robin MD at 4/10/2023 1406  [facility] Hospitalist

## 2023-04-10 NOTE — SIGNIFICANT NOTE
04/10/23 1447   OTHER   Discipline physical therapy assistant   Rehab Time/Intention   Session Not Performed other (see comments)  (Pt reports she is D/Cing to , will follow)

## 2023-04-10 NOTE — H&P
[facility] Medicine Services  HISTORY AND PHYSICAL    Patient Name: Christine Garg  : 1933  MRN: [id number]  Primary Care Physician: Dave Tobar MD  Date of admission: 4/10/2023    Subjective   Subjective     Chief Complaint:  Adenocarcinoma    HPI:  Christine Garg is a 89 y.o. female with PMH of HTN, HLD, CAD, COPD, former smoker, PAD and hypothyroid.     Patient presented to [facility] on 3/22 for a 5-day complain of abdominal pain.  She was taken to the OR for an ex lap.  She was found to have a perforated duodenal ulcer which was repaired with omental patch.  On 3/29, patient had CT of thoracic spine which noted left pleural consolidation and parenchymal nodules.  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  She was also found to have an esophageal stricture secondary to extrinsic compression of 3cm peritracheal lymph node.  On , patient had PEG tube placed.  She was sent to [facility] for placement of esophageal stent by Dr. Waters.    Patient had been approved to [facility] for subacute rehab in [place]. Patient would still like to go there after discharge. Patient is to follow up outpatient for treatment options regarding her cancer.     Review of Systems   Constitutional: Positive for appetite change. Negative for activity change, chills and fever.   HENT: Positive for trouble swallowing. Negative for congestion and sore throat.    Eyes: Negative.    Respiratory: Negative for cough, chest tightness and shortness of breath.    Cardiovascular: Negative for chest pain and leg swelling.   Gastrointestinal: Positive for abdominal pain. Negative for constipation, diarrhea, nausea and vomiting.   Genitourinary: Negative for difficulty urinating, dysuria and urgency.   Musculoskeletal: Negative.    Skin: Negative.    Neurological: Positive for weakness. Negative for dizziness and headaches.    Hematological: Negative.    Psychiatric/Behavioral: Negative.  Negative for agitation and confusion.            Personal History     Past Medical History:   Diagnosis Date   • Advanced age    • CAD (coronary artery disease) 2011    s/p 3v CABG   • Chronic kidney disease (CKD), stage III (moderate)    • COPD (chronic obstructive pulmonary disease)    • History of tobacco use    • Hyperlipidemia    • Hypertension    • Hypothyroidism    • PONV (postoperative nausea and vomiting)    • Pulmonary nodule              Past Surgical History:   Procedure Laterality Date   • BREAST BIOPSY Left     benign   • CATARACT EXTRACTION     • CORONARY ANGIOPLASTY     • CORONARY ARTERY BYPASS GRAFT     • EXPLORATORY LAPAROTOMY N/A 3/22/2023    Procedure: LAPAROTOMY EXPLORATORY WITH OVER SEW OF DUODENAL ULCER WITH OMENTAL PATCH AND BIOPATCH AND CENTRAL LINE PLACEMENT;  Surgeon: Aurora Kirkland MD;  Location: [facility];  Service: General;  Laterality: N/A;       Family History:  family history includes Heart disease in her brother, father, and mother.     Social History:  reports that she has quit smoking. Her smoking use included cigarettes. She has never used smokeless tobacco. She reports that she does not drink alcohol and does not use drugs.  Social History     Social History Narrative   • Not on file       Medications:  Evolocumab, HYDROcodone-acetaminophen, Lidocaine, aspirin, carvedilol, castor oil-balsam peru, cloNIDine, heparin (porcine), hydrALAZINE, labetalol, levothyroxine, magnesium sulfate, magnesium sulfate in D5W 1g/100mL (PREMIX), metoclopramide, montelukast, multivitamin, ondansetron, pantoprazole, polyethylene glycol, potassium chloride, potassium phosphate 45 mmol in sodium chloride 0.9 % 500 mL infusion, and valsartan    Allergies   Allergen Reactions   • Motrin [Ibuprofen] Anaphylaxis and Hives   • Novocain [Procaine] Other (See Comments)     Pt state she passes out.       Objective   Objective     Vital Signs:    Temp:  [97.5 °F (36.4 °C)-98.1 °F (36.7 °C)] 98.1 °F (36.7 °C)  Heart Rate:  [72-78] 72  Resp:  [16-18] 16  BP: (127-172)/(50-68) 127/50  Flow (L/min):  [2] 2    Physical Exam  Vitals reviewed.   Constitutional:       General: She is not in acute distress.     Appearance: She is normal weight.   HENT:      Head: Normocephalic.      Nose: Nose normal. No congestion.      Mouth/Throat:      Mouth: Mucous membranes are moist.   Eyes:      Extraocular Movements: Extraocular movements intact.      Pupils: Pupils are equal, round, and reactive to light.   Cardiovascular:      Rate and Rhythm: Normal rate and regular rhythm.      Pulses: Normal pulses.      Heart sounds: Normal heart sounds. No murmur heard.  Pulmonary:      Effort: Pulmonary effort is normal. No respiratory distress.      Breath sounds: Normal breath sounds. No wheezing or rhonchi.   Abdominal:      General: Bowel sounds are normal. There is no distension.      Palpations: Abdomen is soft.      Tenderness: There is abdominal tenderness.   Musculoskeletal:         General: No swelling. Normal range of motion.      Cervical back: Normal range of motion. No rigidity.   Skin:     General: Skin is warm.      Capillary Refill: Capillary refill takes less than 2 seconds.      Findings: Wound (midline abdominal incision pink and closed, C/D/I) present.      Comments: PEG in place C/D/I   Neurological:      General: No focal deficit present.      Mental Status: She is alert and oriented to person, place, and time. Mental status is at baseline.      Motor: Weakness present.   Psychiatric:         Mood and Affect: Mood normal.         Behavior: Behavior normal.         Thought Content: Thought content normal.         Judgment: Judgment normal.          Result Review:  I have personally reviewed the results from the time of this admission to 4/10/2023 19:44 EDT and agree with these findings:  [x]  Laboratory list / accordion  [x]  Microbiology  [x]  Radiology  []   EKG/Telemetry   []  Cardiology/Vascular   []  Pathology  [x]  Old records  []  Other:  Most notable findings include:     LAB RESULTS:      Lab 04/09/23  0242 04/06/23 0139 04/04/23 0231   WBC 8.62 10.38 11.47*   HEMOGLOBIN 10.0* 10.1* 9.6*   HEMATOCRIT 30.8* 32.6* 29.3*   PLATELETS 285 249 298   NEUTROS ABS  --  7.63* 8.87*   IMMATURE GRANS (ABS)  --  0.04 0.06*   LYMPHS ABS  --  1.18 1.31   MONOS ABS  --  0.61 0.54   EOS ABS  --  0.82* 0.61*   MCV 96.6 104.2* 94.8   PROTIME 12.4  --  12.0*   APTT  --   --  30.4         Lab 04/09/23  0242 04/06/23 0139 04/05/23  0048 04/04/23  1751 04/04/23 0231   SODIUM 132* 135* 138  --  138   POTASSIUM 4.0 4.1 4.6 4.4 3.4*   CHLORIDE 99 100 103  --  101   CO2 28.1 27.6 29.5*  --  29.6*   ANION GAP 4.9* 7.4 5.5  --  7.4   BUN 10 7* 9  --  10   CREATININE 0.42* 0.52* 0.51*  --  0.48*   EGFR 93.6 88.9 89.4  --  90.7   GLUCOSE 119* 127* 97  --  91   CALCIUM 8.6 8.6 9.1  --  8.6   MAGNESIUM 2.0 2.1 1.8  --  2.1   PHOSPHORUS 2.9 3.5 3.3  --  3.6         Lab 04/09/23  0242 04/06/23 0139 04/04/23 0231   TOTAL PROTEIN 4.8* 4.6* 4.8*   ALBUMIN 2.3* 2.5* 2.5*   GLOBULIN 2.5 2.1 2.3   ALT (SGPT) 7 11 17   AST (SGOT) 14 11 13   BILIRUBIN 0.2 <0.2 0.2   ALK PHOS 98 88 82         Lab 04/09/23 0242 04/04/23 0231   PROTIME 12.4 12.0*   INR 0.91 0.87*                 Brief Urine Lab Results  (Last result in the past 365 days)      Color   Clarity   Blood   Leuk Est   Nitrite   Protein   CREAT   Urine HCG        01/06/23 0149 Yellow   Clear   Negative   Negative   Negative   Negative               Microbiology Results (last 10 days)     ** No results found for the last 240 hours. **          CT Abdomen Pelvis Without Contrast    Result Date: 4/10/2023   EXAM DATE:   4/10/2023 11:29 AM  CLINICAL HISTORY:   abdominal pain; K25.1-Acute gastric ulcer with perforation; E87.1-Czjn-buxchrgtrn and hyponatremia; E87.6-Hypokalemia; A41.9-Sepsis, unspecified organism; K27.5-Chronic or unspecified  peptic ulcer, site unspecified, with perforation; E44.0-Moderate protein-calorie malnutrition  TECHNIQUE:   Axial computed tomography images of the abdomen and pelvis without intravenous contrast.  Sagittal and coronal reformatted images were created and reviewed.  This CT exam was performed using one or more of the following dose reduction techniques:  automated exposure control, adjustment of the mA and/or kV according to patient size, and/or use of iterative reconstruction technique.  COMPARISON: 04/04/2023  FINDINGS:   LUNG BASES:  Unremarkable.  No mass.  No consolidation.   PLEURAL SPACE:  Small bilateral pleural effusions.   ABDOMEN:   LIVER:  Unremarkable.   GALLBLADDER AND BILE DUCTS:  Unremarkable.  No calcified stones.  No ductal dilation.   PANCREAS:  Unremarkable.  No ductal dilation.   SPLEEN:  Unremarkable.  No splenomegaly.   ADRENALS:  Unremarkable.  No mass.   KIDNEYS AND URETERS:  Unremarkable.  No obstructing stones.  No hydronephrosis.   STOMACH AND BOWEL:  Contrast noted throughout the colon.  No obstruction.  No mucosal thickening.   PELVIS:   APPENDIX:  No findings to suggest acute appendicitis.   BLADDER:  Unremarkable.  No stones.   REPRODUCTIVE:  Unremarkable as visualized.   ABDOMEN and PELVIS:   INTRAPERITONEAL SPACE:  See below.    BONES/JOINTS:  No acute fracture.  No dislocation.   SOFT TISSUES:  Unremarkable.   VASCULATURE:  Atherosclerotic disease.  No abdominal aortic aneurysm.   LYMPH NODES:  Unremarkable.  No enlarged lymph nodes.   TUBES, LINES AND DEVICES:  Gastric tube noted in the stomach. This has been recently placed and small to moderate intraperitoneal free air persists.        Impression: 1.  Small bilateral pleural effusions. 2.  Gastric tube noted in the stomach. This has been recently placed and small to moderate intraperitoneal free air persists.   This report was finalized on 4/10/2023 12:06 PM by Dr. Henry Lozano MD.        Results for orders placed during the  hospital encounter of 03/22/23    Adult Transthoracic Echo Complete W/ Cont if Necessary Per Protocol    Interpretation Summary  •  Left ventricular systolic function is normal. Calculated left ventricular EF = 60% Left ventricular ejection fraction appears to be 56 - 60%.  •  Left ventricular diastolic function is consistent with (grade I) impaired relaxation and age.  •  Moderate to severe mitral valve regurgitation is present.  •  Moderate tricuspid valve regurgitation is present.  •  Estimated right ventricular systolic pressure from tricuspid regurgitation is moderately elevated (45-55 mmHg).  •  Moderate pulmonary hypertension is present.      Assessment & Plan   Assessment & Plan       Esophageal stricture    Essential hypertension    ASCVD (arteriosclerotic cardiovascular disease)    Coronary artery disease     Chronic obstructive pulmonary disease    Former smoker    Perforated ulcer    Hypothyroidism (acquired)    S/P percutaneous endoscopic gastrostomy (PEG) tube placement    Adenocarcinoma      Christine Garg is a 89 y.o. female with PMH of HTN, HLD, CAD, COPD, former smoker, PAD and hypothyroid. Direct admit to [facility] for esophageal stent placement.     Esophageal Stricture  S/p PEG  -2/2 extrinsic compression of paratracheal lymph node  -Pain control  -Maintenance fluids  -NPO  -Consult wound for PEG care  -Consult PT/OT, case management   -Consult GI      CAD  HLD  -lower home aspirin to 81mg. Will need to make this clear at discharge in order to prevent further ulceration    Hypoalbuminemia   -Albumin 2.3  -Consult nutrition    HTN  -Continue home coreg, valsartan, hydralazine   -PRN clonidine     Adenocarcinoma of Lung  COPD  Former Smoker  -PRN duonebs  -Follow up outpatient for treatment plan    Perforated Duodenal Ulcer  S/p Ex lap with patch  -Midline abdominal incision C/D/I  -Continue PPI twice daily  -lower aspirin to 81 mg instead of 325  -Consult wound  -patient only received perioperative  cefazolin at time of surgery? Should have received micafungin and gram negative coverage for at least 5 days as ppx.  Would recommend discussing case with infectious disease/general surgery in am to determine if would benefit from abx this far out from surgery.  Does not currently appear to have evidence of active infection.     ADDENDUM: CONTACTED BY GENERAL SURGERY FROM [facility] WHO CONFIRMS THAT PATIENT DID RECEIVE ABX DURING HER PRIOR ADMISSION.  received 21 doses of zosyn alone.  She also, received flagyl, vancomycin, and cefepime for 48 hours.     Hypothyroid  -Continue home synthroid         DVT prophylaxis:  SCDs in prep for surgery    CODE STATUS:  FULL       Expected Discharge  TBD        This note has been completed as part of a split-shared workflow.     Signature: Electronically signed by LUCÍA Sotelo, 04/10/23, 8:36 PM EDT.          Attending   Admission Attestation       I have performed an independent face-to-face diagnostic evaluation including performing an independent physical examination as documented here.  The documented plan of care above was reviewed and developed with the advanced practice clinician (APC).      Brief Summary Statement:   Christine Garg is a 89 y.o. female with past medical history of hypertension, hyperlipidemia, COPD, coronary artery disease, peripheral artery disease, hypothyroidism, who comes as a direct admit from [facility] for perforated duodenal ulcer status post emergency ex lap with patch who was later found to have esophageal stricture related to 3 cm paratracheal lymph node with extrinsic compression.  They discussed the case with Dr. Ralph's who recommended transfer for esophageal stenting.  Currently, patient reports expected abdominal pain.  Denies any fever or chills.  Reports decreased appetite.    Remainder of detailed HPI is as noted by APC and has been reviewed and/or edited by me for completeness.    Attending Physical Exam:  Temp:   [97.5 °F (36.4 °C)-98.1 °F (36.7 °C)] 98 °F (36.7 °C)  Heart Rate:  [72-78] 76  Resp:  [16-18] 17  BP: (123-172)/(50-68) 123/58  Flow (L/min):  [2] 2    Constitutional: Awake, alert, nontoxic  Eyes: PERRLA, sclerae anicteric, no conjunctival injection  HENT: NCAT, mucous membranes moist  Neck: Supple, no thyromegaly, no lymphadenopathy, trachea midline  Respiratory: Clear to auscultation bilaterally, nonlabored respirations   Cardiovascular: RRR, no murmurs, rubs, or gallops, palpable pedal pulses bilaterally  Gastrointestinal: Positive bowel sounds, soft, expected abd tenderness post ex lap, nondistended, PEG in place  Musculoskeletal: No bilateral ankle edema, no clubbing or cyanosis to extremities  Psychiatric: Appropriate affect, cooperative  Neurologic: Oriented x 3, strength symmetric in all extremities, Cranial Nerves grossly intact to confrontation, speech clear  Skin: midline incision without drainage. Clean, dry, intact, expected surrounded pink skin but no evidence of cellulitis      Brief Assessment/Plan :  See detailed assessment and plan developed with APC which I have reviewed and/or edited for completeness.            Hola Crowley DO  04/10/23     Total time spent: 40  Time spent includes time reviewing chart, face-to-face time, counseling patient/family/caregiver, ordering medications/tests/procedures, communicating with other health care professionals, documenting clinical information in the electronic health record, and coordination of care.

## 2023-04-10 NOTE — CASE MANAGEMENT/SOCIAL WORK
Discharge Planning Assessment  [facility]     Patient Name: Crhistine Garg  MRN: [id number]  Today's Date: 4/10/2023    Admit Date: 3/22/2023       Discharge Plan     Row Name 04/10/23 1554       Plan    Final Discharge Disposition Code 02 - short term hospital for  care    Final Note SS discussed pt with Dr. Kirkland and pt is being discharged to [facility] today. SS notified [facility] per Odette and she voices plans to follow up with [facility]. No other needs identified.              Continued Care and Services - Admitted Since 3/22/2023     Destination     Service Provider Request Status Selected Services Address Phone Fax Patient Preferred    [facility] Pending - Request Sent N/A [id number] [address] [phone] --            Selected Continued Care - Episodes Includes continued care and service providers with selected services from the active episodes listed below    Hyperlipidemia Episode start date: 10/11/2022   There are no active outsourced providers for this episode.               Expected Discharge Date and Time     Expected Discharge Date Expected Discharge Time    Apr 10, 2023         ELIANA Starr

## 2023-04-10 NOTE — CASE MANAGEMENT/SOCIAL WORK
Discharge Planning Assessment  [facility]     Patient Name: Christine Garg  MRN: [id number]  Today's Date: 4/10/2023    Admit Date: 3/22/2023       Discharge Plan     Row Name 04/10/23 1045       Plan    Plan SS was notified by [facility] per Odette that pre-authorization will  after today. SS spoke to son, Kaleb who states pt is concerned about being discharged today due to pain. SS notified Dr. Kirkland. SS to follow.    15:56: SS discussed pt with Dr. Kirkland and pt is being discharged to [facility] today. SS notified [facility] per Odette and she voices plans to follow up with [facility]. No other needs identified.              Continued Care and Services - Admitted Since 3/22/2023     Destination     Service Provider Request Status Selected Services Address Phone Fax Patient Preferred    [facility] Pending - Request Sent N/A [id number] [address] [phone] --            Selected Continued Care - Episodes Includes continued care and service providers with selected services from the active episodes listed below    Hyperlipidemia Episode start date: 10/11/2022   There are no active outsourced providers for this episode.               Expected Discharge Date and Time     Expected Discharge Date Expected Discharge Time    Apr 10, 2023         ELIANA Starr

## 2023-04-10 NOTE — NURSING NOTE
ACC REVIEW REPORT: [facility]        PATIENT NAME: Christine Garg    PATIENT ID: [id number]        COVID-19 ACC SCREENING       DOES THE PATIENT HAVE A FEVER GREATER THAN OR EQUAL .4: no    IS THE PATIENT EXPERIENCING SHORTNESS OF BREATH: no    DOES THE PATIENT HAVE A COUGH: no  DOES THE PATIENT HAVE ANY OF THE FOLLOWING RISK FACTORS:    EXPOSURE TO SUSPECTED OR KNOWN COVID-19: no    RECENT TRAVEL HISTORY TO ENDEMIC AREA (DOMESTIC/LOCAL): no    IS THE PATIENT A HEALTHCARE WORKER: no    HAS THE PATIENT EXPERIENCED A LOSS OF SENSE OF TASTE OR SMELL: no    HAS THE PATIENT BEEN TESTED FOR COVID-19: yes    DATE TESTED: 3/22/23 Negative    LAB TESTING SENT TO: [facility]          BED: N640    BED TYPE: Telemetry    BED GIVEN TO: Dariela River RN    TIME BED GIVEN: 1513    TODAY'S DATE: 4/10/2023    TRANSFER DATE: 4/10/23    ETA: Will call when EMS arrives    TRANSFERRING FACILITY: [facility]    TRANSFERRING FACILITY PHONE # : [phone]    TRANSFERRING MD: Dr Robin    DATE/TIME REQUEST RECEIVED: 4/10/23    [facility] RN:  RN    REPORT FROM: Dariela River RN    TIME REPORT TAKEN: 1513    DIAGNOSIS: Esophageal/ gastric stricture, new adenocarcinoma     REASON FOR TRANSFER TO [facility]: Higher level of care    TRANSPORTATION: EMS    CLINICAL REASON FOR TRANSFER TO [facility]: Patient presented to the ED @ [facility] on 3/22 with complaints of abd pain and was found to have a perforated ulcer after undergoing a explorat      CLINICAL INFORMATION    HEIGHT: 65''     WEIGHT: 65.8Kg    ALLERGIES:   Motrin [Ibuprofen] Motrin [Ibuprofen]  Anaphylaxis, Hives High  3/11/2017 Past Updates   Deletion Reason:    Novocain [Procaine] Novocain [Procaine]  Other (See Comments) High  3/11/2017 Past Updates   Pt state she passes out.           VITAL SIGNS:   TIME: 1400  TEMP: 98.1  PULSE: 72  B/P: 127/50  RESP: 16        LAB INFORMATION: see labs    MEDS/IV FLUIDS: see Mar      CARDIAC SYSTEM:    CHEST PAIN: denies    RATE: 0    SCALE: 0-10    RHYTHM:  NSR      RESPIRATORY SYSTEM:    LUNG SOUNDS: clear    OXYGEN: 2 Liters NC    O2 SAT: 98%      CNS/MUSCULOSKELETAL    ALERT AND ORIENTED: x3    GI//GY      ABDOMINAL PAIN: c/o LLQ pain near peg site    GI//GY NOTES: Tube feed Nutrin 1.5 @ 20 ml/hr with a goal of 50 ml/hr with 30ml of free water every 6 hours    PAST MEDICAL HISTORY: Hypothyroidism, HTN, Smoker, Hyperlipidemia, CAD, CKD, COPD    ADDITIONAL NOTES: Patient complains of generalized pain.  -Up with 1 assist  -Peg tube was placed to LLQ on 4/7 for malnutrition secondary to esophageal/gastric stricture. Per  patient has a stricture in the mid thoracic area  -Soft chopped diet  -Patient is a DNR/DNI           Ca Dejesus RN  4/10/2023  15:09 EDT

## 2023-04-11 NOTE — CONSULTS
"[facility] Consult    Referring Provider: Noelle Combs DO    PCP: Dave Tobar MD    Reason for Consultation: Esophageal dysphagia with esophageal stenosis    History of present illness:    Christine Garg is a 89 y.o. female, PMH includes HTN, CAD, COPD, is admitted via transfer from [facility] yesterday for evaluation of persistent dysphagia 2/2 esophageal stricture.     Pt presented to [facility] 3/22 for c/o abdominal pain. She underwent ex lap and was found to have perforated duodenal ulcer, repaired with omental patch. 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 She was found to have esophageal stricture secondary to extrinsic compression of 3cm peritracheal lymph node. PEG tube was placed 4/7. She was transferred to [facility] for placement of esophageal stent.     Pt c/o diffuse abdominal \"soreness\" following ex lap. She notes esophageal dysphagia to solids, not liquids, over the last few weeks. She denies overt choking or aspiration.     Patient denies associated fever, chills, indigestion, nausea, vomiting, diarrhea, constipation, hematemesis, hematochezia, melena, bloating, weight loss or gain, dysuria, jaundice or bruising.    Patient denies personal or FHx of PUD, H Pylori, gastritis, pancreatitis, colitis, Celiac disease, UC, Crohn's disease, IBS, colon or gastric cancers. Pt denies EtOH, tobacco, illicit substance or NSAID use. CSY in remote past.     Allergies:  Motrin [ibuprofen] and Novocain [procaine]    Scheduled Meds:  aspirin, 81 mg, Oral, Daily  carvedilol, 25 mg, Oral, BID With Meals  hydrALAZINE, 50 mg, Oral, Q8H  levothyroxine, 112 mcg, Oral, Q AM  pantoprazole, 40 mg, Oral, BID AC  senna-docusate sodium, 2 tablet, Oral, BID  sodium chloride, 10 mL, Intravenous, Q12H  valsartan, 320 mg, Oral, Q24H         Infusions:       PRN Meds:  •  acetaminophen **OR** acetaminophen **OR** acetaminophen  •  senna-docusate sodium **AND** polyethylene glycol " **AND** bisacodyl **AND** bisacodyl  •  cloNIDine  •  HYDROcodone-acetaminophen  •  HYDROmorphone  •  ondansetron **OR** ondansetron  •  sodium chloride  •  sodium chloride    Home Meds:  Medications Prior to Admission   Medication Sig Dispense Refill Last Dose   • aspirin 325 MG tablet Take 81 mg by mouth Daily.   Patient Taking Differently   • carvedilol (COREG) 25 MG tablet Take 1 tablet by mouth 2 (Two) Times a Day With Meals. 60 tablet 0 4/11/2023   • cloNIDine (CATAPRES) 0.1 MG tablet Take 1 tablet by mouth 3 (Three) Times a Day As Needed for High Blood Pressure (SBP > 150).   4/11/2023   • hydrALAZINE (APRESOLINE) 20 MG/ML injection Infuse 0.5 mL into a venous catheter Every 6 (Six) Hours As Needed for High Blood Pressure.      • hydrALAZINE (APRESOLINE) 50 MG tablet Take 1 tablet by mouth Every 8 (Eight) Hours.      • HYDROcodone-acetaminophen (NORCO) 7.5-325 MG per tablet Take 1 tablet by mouth Every 4 (Four) Hours As Needed for Moderate Pain for up to 8 days.  0    • levothyroxine (SYNTHROID, LEVOTHROID) 112 MCG tablet Take 1 tablet by mouth Every Morning.      • levothyroxine (SYNTHROID, LEVOTHROID) 112 MCG tablet Take 1 tablet by mouth Every Morning.      • ondansetron (ZOFRAN) 4 MG tablet Take 1 tablet by mouth 3 (Three) Times a Day As Needed.      • valsartan (DIOVAN) 320 MG tablet Take 1 tablet by mouth Daily.      • valsartan (DIOVAN) 320 MG tablet Take 1 tablet by mouth Daily.      • castor oil-balsam peru (VENELEX) ointment Apply 1 application topically to the appropriate area as directed Every 12 (Twelve) Hours. (Patient not taking: Reported on 4/11/2023)   Not Taking   • cloNIDine (CATAPRES) 0.1 MG tablet Take 1 tablet by mouth 3 (Three) Times a Day As Needed for High Blood Pressure (SBP > 150).      • Evolocumab (Repatha SureClick) solution auto-injector SureClick injection Inject 1 mL under the skin into the appropriate area as directed Every 14 (Fourteen) Days. (Patient not taking: Reported on  4/11/2023) 6 mL 1 Not Taking   • Heparin Sodium, Porcine, (heparin, porcine,) 5000 UNIT/ML injection Inject 1 mL under the skin into the appropriate area as directed Every 12 (Twelve) Hours. Indications: Prevention of Unwanted Clot in Veins (Patient not taking: Reported on 4/11/2023)   Not Taking   • HYDROcodone-acetaminophen (NORCO) 5-325 MG per tablet Take 1 tablet by mouth Daily As Needed.      • labetalol (NORMODYNE,TRANDATE) 5 MG/ML injection Infuse 2 mL into a venous catheter Every 6 (Six) Hours As Needed for High Blood Pressure. (Patient not taking: Reported on 4/11/2023)   Not Taking   • Lidocaine (HM Lidocaine Patch) 4 % patch Apply 1 patch topically Daily. (Patient not taking: Reported on 4/11/2023)   Not Taking   • magnesium sulfate 2 GM/50ML infusion Infuse 50 mL into a venous catheter As Needed (Mg 1.6 - 1.9 mg/dL). (Patient not taking: Reported on 4/11/2023)   Not Taking   • magnesium sulfate in D5W 1g/100mL, PREMIX, 1-5 GM/100ML-% IVPB Infuse 100 mL into a venous catheter As Needed (Mg 1.1 - 1.5 mg/dL). (Patient not taking: Reported on 4/11/2023)   Not Taking   • metoclopramide (REGLAN) 5 MG tablet Administer 1 tablet per G tube 3 (Three) Times a Day Before Meals. (Patient not taking: Reported on 4/11/2023)   Not Taking   • montelukast (SINGULAIR) 10 MG tablet Take 1 tablet by mouth Every Night. 30 tablet 11    • multivitamin (MULTI-DELYN) liquid liquid 10 mL by Per PEG Tube route Daily. (Patient not taking: Reported on 4/11/2023)   Not Taking   • pantoprazole (PROTONIX) 40 MG EC tablet Take 1 tablet by mouth 2 (Two) Times a Day Before Meals. (Patient not taking: Reported on 4/11/2023)   Not Taking   • polyethylene glycol (MIRALAX) 17 g packet Take 17 g by mouth Daily. (Patient not taking: Reported on 4/11/2023)   Not Taking   • potassium chloride (K-DUR,KLOR-CON) 20 MEQ CR tablet Take 2 tablets by mouth As Needed (Potassium Replacement.  See Admin Instructions). (Patient not taking: Reported on  4/11/2023)   Not Taking   • potassium chloride (KLOR-CON) 20 MEQ packet Take 40 mEq by mouth As Needed (potassium replacement, see admin instructions). (Patient not taking: Reported on 4/11/2023)   Not Taking   • potassium phosphate 45 mmol in sodium chloride 0.9 % 500 mL infusion Infuse 45 mmol into a venous catheter As Needed (Peripheral IV - Phosphorus Less Than 1.3 mg/dL). (Patient not taking: Reported on 4/11/2023)   Not Taking       ROS: Review of Systems   Constitutional: Negative for chills, diaphoresis, fatigue and unexpected weight change.   HENT: Positive for trouble swallowing. Negative for drooling, facial swelling, mouth sores, nosebleeds, rhinorrhea, sore throat, tinnitus and voice change.    Respiratory: Negative for cough, chest tightness and shortness of breath.    Cardiovascular: Negative for chest pain, palpitations and leg swelling.   Gastrointestinal: Positive for abdominal pain. Negative for abdominal distention, blood in stool, constipation, diarrhea, nausea and vomiting.   Genitourinary: Negative for dysuria, flank pain and hematuria.   Musculoskeletal: Negative for arthralgias, joint swelling and myalgias.   Skin: Negative for color change, pallor and rash.   Neurological: Positive for weakness. Negative for dizziness, tremors, syncope and light-headedness.   Psychiatric/Behavioral: Negative for confusion and hallucinations.   All other systems reviewed and are negative.      PAST MED HX:  Past Medical History:   Diagnosis Date   • Advanced age    • CAD (coronary artery disease) 2011    s/p 3v CABG   • Chronic kidney disease (CKD), stage III (moderate)    • COPD (chronic obstructive pulmonary disease)    • History of tobacco use    • Hyperlipidemia    • Hypertension    • Hypothyroidism    • PONV (postoperative nausea and vomiting)    • Pulmonary nodule        PAST SURG HX:  Past Surgical History:   Procedure Laterality Date   • BREAST BIOPSY Left     benign   • CATARACT EXTRACTION     •  "CORONARY ANGIOPLASTY     • CORONARY ARTERY BYPASS GRAFT     • ENDOSCOPY W/ PEG TUBE PLACEMENT N/A 4/7/2023    Procedure: ESOPHAGOGASTRODUODENOSCOPY WITH PERCUTANEOUS ENDOSCOPIC GASTROSTOMY TUBE INSERTION;  Surgeon: Aurora Kirkland MD;  Location:  [facility];  Service: General;  Laterality: N/A;   • EXPLORATORY LAPAROTOMY N/A 3/22/2023    Procedure: LAPAROTOMY EXPLORATORY WITH OVER SEW OF DUODENAL ULCER WITH OMENTAL PATCH AND BIOPATCH AND CENTRAL LINE PLACEMENT;  Surgeon: Aurora Kirkland MD;  Location:  [facility];  Service: General;  Laterality: N/A;       FAM HX:  Family History   Problem Relation Age of Onset   • Heart disease Mother    • Heart disease Father    • Heart disease Brother    • Breast cancer Neg Hx        SOC HX:  Social History     Socioeconomic History   • Marital status:    Tobacco Use   • Smoking status: Former     Types: Cigarettes   • Smokeless tobacco: Never   Vaping Use   • Vaping Use: Never used   Substance and Sexual Activity   • Alcohol use: No   • Drug use: No   • Sexual activity: Defer       PHYSICAL EXAM  /81 (BP Location: Left arm, Patient Position: Lying)   Pulse 85   Temp 97 °F (36.1 °C) (Axillary)   Resp 17   Ht 165.1 cm (65\")   Wt 66.2 kg (146 lb)   SpO2 97%   BMI 24.30 kg/m²   Wt Readings from Last 3 Encounters:   04/11/23 66.2 kg (146 lb)   04/04/23 65.8 kg (145 lb 1.6 oz)   01/05/23 68 kg (150 lb)   ,body mass index is 24.3 kg/m².  Physical Exam  Vitals and nursing note reviewed.   Constitutional:       Appearance: Normal appearance. She is normal weight. She is not ill-appearing or diaphoretic.      Comments: Pleasantly conversant   HENT:      Head: Normocephalic and atraumatic.      Right Ear: External ear normal.      Left Ear: External ear normal.      Nose: Nose normal.      Mouth/Throat:      Mouth: Mucous membranes are moist.      Pharynx: Oropharynx is clear.   Eyes:      Conjunctiva/sclera: Conjunctivae normal.      Pupils: Pupils are equal, round, " and reactive to light.   Neck:      Thyroid: No thyromegaly.   Cardiovascular:      Rate and Rhythm: Normal rate and regular rhythm.      Pulses: Normal pulses.      Heart sounds: Normal heart sounds.   Pulmonary:      Effort: Pulmonary effort is normal.      Breath sounds: Normal breath sounds.   Abdominal:      General: Abdomen is flat. Bowel sounds are normal. There is no distension.      Tenderness: There is abdominal tenderness (diffuse to light palpation, nonacute). There is no guarding or rebound.      Comments: Previous midline surgical incision well healed. PEG in place, easily mobile and taught to skin at 5cm.    Musculoskeletal:         General: Normal range of motion.      Cervical back: Normal range of motion.   Skin:     General: Skin is warm and dry.   Neurological:      General: No focal deficit present.      Mental Status: She is oriented to person, place, and time.   Psychiatric:         Mood and Affect: Mood normal.         Results Review:   I reviewed the patient's new clinical results.  I reviewed the patient's new imaging results and agree with the interpretation.  I reviewed the patient's other test results and agree with the interpretation    Lab Results   Component Value Date    WBC 9.11 04/11/2023    HGB 9.9 (L) 04/11/2023    HGB 10.0 (L) 04/09/2023    HGB 10.1 (L) 04/06/2023    HCT 31.1 (L) 04/11/2023    MCV 96.9 04/11/2023     04/11/2023       Lab Results   Component Value Date    INR 0.91 04/09/2023    INR 0.87 (L) 04/04/2023    INR 0.99 05/07/2018       Lab Results   Component Value Date    GLUCOSE 85 04/11/2023    BUN 10 04/11/2023    CREATININE 0.41 (L) 04/11/2023    EGFRIFNONA 47 (L) 05/08/2018    BCR 24.4 04/11/2023     (L) 04/11/2023    K 4.6 04/11/2023    CO2 30.0 (H) 04/11/2023    CALCIUM 8.5 (L) 04/11/2023    PROTENTOTREF 6.5 02/07/2023    ALBUMIN 2.3 (L) 04/09/2023    ALKPHOS 98 04/09/2023    BILITOT 0.2 04/09/2023    BILIDIR <0.2 12/25/2022    ALT 7 04/09/2023     AST 14 04/09/2023       ASSESSMENTS/PLANS    Esophageal stricture, 2/2 extrinsic compression due to enlarged paratracheal lymph node  Esophageal dysphagia, 2/2 above  Adenocarcinoma of lung  Recent perforated duodenal ulcer s/p ex lap with patch  PEG tube in place   - NPO    - continue pantoprazole 40mg BID   - plan for EGD with esophageal stent tomorrow with Dr. Brunner    I discussed the patient's findings and my recommendations with patient. Thank you very kindly for this consultation. Will continue to follow during this hospitalization.      Diandra Hatch PA-C  04/11/23  11:52 EDT

## 2023-04-11 NOTE — PLAN OF CARE
Goal Outcome Evaluation:  Plan of Care Reviewed With: patient        Progress: no change  Outcome Evaluation: VSS. 2L NC. NSR. PRN Norco given X2 for pain. NPO. No reports of soa. no acute overnight events. continue plan of care

## 2023-04-11 NOTE — PLAN OF CARE
Goal Outcome Evaluation:  Plan of Care Reviewed With: (P) patient        Progress: (P) no change  Outcome Evaluation: (P) Pt ambulated 5' with RW and CGA refusing further mobility d/t pain. Rec skilled PT to increase ind with mobility and d/c to IRF.

## 2023-04-11 NOTE — THERAPY EVALUATION
Patient Name: Christine Garg  : 1933    MRN: [id number]                              Today's Date: 2023       Admit Date: 4/10/2023    Visit Dx: No diagnosis found.  Patient Active Problem List   Diagnosis   • Essential hypertension   • Dyslipidemia   • ASCVD (arteriosclerotic cardiovascular disease)   • Palpitations   • Coronary artery disease    • Abnormal PFTs (pulmonary function tests)   • Chronic obstructive pulmonary disease   • Mild persistent asthma with allergic rhinitis   • Pulmonary nodule   • Former smoker   • Colitis   • Perforated ulcer   • Acute gastric ulcer with perforation   • Moderate malnutrition   • Esophageal stricture   • Hypothyroidism (acquired)   • S/P percutaneous endoscopic gastrostomy (PEG) tube placement   • Adenocarcinoma     Past Medical History:   Diagnosis Date   • Advanced age    • CAD (coronary artery disease) 2011    s/p 3v CABG   • Chronic kidney disease (CKD), stage III (moderate)    • COPD (chronic obstructive pulmonary disease)    • History of tobacco use    • Hyperlipidemia    • Hypertension    • Hypothyroidism    • PONV (postoperative nausea and vomiting)    • Pulmonary nodule      Past Surgical History:   Procedure Laterality Date   • BREAST BIOPSY Left     benign   • CATARACT EXTRACTION     • CORONARY ANGIOPLASTY     • CORONARY ARTERY BYPASS GRAFT     • ENDOSCOPY W/ PEG TUBE PLACEMENT N/A 2023    Procedure: ESOPHAGOGASTRODUODENOSCOPY WITH PERCUTANEOUS ENDOSCOPIC GASTROSTOMY TUBE INSERTION;  Surgeon: Aurora Kirkland MD;  Location: [facility] OR;  Service: General;  Laterality: N/A;   • EXPLORATORY LAPAROTOMY N/A 3/22/2023    Procedure: LAPAROTOMY EXPLORATORY WITH OVER SEW OF DUODENAL ULCER WITH OMENTAL PATCH AND BIOPATCH AND CENTRAL LINE PLACEMENT;  Surgeon: Aurora Kirkland MD;  Location: [facility] OR;  Service: General;  Laterality: N/A;      General Information     Row Name 23 1322          OT Time and Intention    Document Type evaluation  -      Mode of Treatment occupational therapy  -     Row Name 04/11/23 1322          General Information    Patient Profile Reviewed yes  -     Prior Level of Function independent:;bed mobility;transfer;all household mobility;ADL's  Pt denies use of DME at baseline. Has a shower chair  -     Existing Precautions/Restrictions fall;oxygen therapy device and L/min;other (see comments)  abd incision, PEG  -     Barriers to Rehab medically complex;previous functional deficit  -     Row Name 04/11/23 1322          Living Environment    People in Home child(shimon), adult  Pt w/ hospitalization in 2022, her son moved in with her after to assist PRN.  -     Row Name 04/11/23 1322          Home Main Entrance    Number of Stairs, Main Entrance four  -     Stair Railings, Main Entrance railings on both sides of stairs  -     Row Name 04/11/23 1322          Stairs Within Home, Primary    Number of Stairs, Within Home, Primary none  -     Row Name 04/11/23 1322          Cognition    Orientation Status (Cognition) oriented x 3  -     Row Name 04/11/23 1322          Safety Issues, Functional Mobility    Safety Issues Affecting Function (Mobility) awareness of need for assistance;insight into deficits/self-awareness;safety precaution awareness;safety precautions follow-through/compliance;sequencing abilities  -     Impairments Affecting Function (Mobility) endurance/activity tolerance;strength;balance;pain  -           User Key  (r) = Recorded By, (t) = Taken By, (c) = Cosigned By    Initials Name Provider Type     Ca Luther OT Occupational Therapist                 Mobility/ADL's     Row Name 04/11/23 1324          Bed Mobility    Bed Mobility supine-sit  -     Supine-Sit [place] (Bed Mobility) maximum assist (25% patient effort);1 person assist;verbal cues  -     Bed Mobility, Safety Issues decreased use of arms for pushing/pulling;decreased use of legs for bridging/pushing;impaired trunk  control for bed mobility  -     Assistive Device (Bed Mobility) bed rails;head of bed elevated;draw sheet  -     Row Name 04/11/23 1324          Transfers    Transfers sit-stand transfer;stand-sit transfer;bed-chair transfer  -     Row Name 04/11/23 1324          Bed-Chair Transfer    Bed-Chair [place] (Transfers) moderate assist (50% patient effort);1 person assist;verbal cues  -     Assistive Device (Bed-Chair Transfers) other (see comments)  BUE support  -     Row Name 04/11/23 1324          Sit-Stand Transfer    Sit-Stand [place] (Transfers) moderate assist (50% patient effort);1 person assist;verbal cues  -     Assistive Device (Sit-Stand Transfers) other (see comments)  -     Row Name 04/11/23 1324          Stand-Sit Transfer    Stand-Sit [place] (Transfers) moderate assist (50% patient effort);1 person assist;verbal cues  -     Assistive Device (Stand-Sit Transfers) other (see comments)  -     Row Name 04/11/23 1324          Functional Mobility    Functional Mobility- Comment Deferred to PT  -[facility] Name 04/11/23 1324          Activities of Daily Living    BADL Assessment/Intervention lower body dressing  -[facility] Name 04/11/23 1324          Lower Body Dressing Assessment/Training    [place] Level (Lower Body Dressing) don;socks;dependent (less than 25% patient effort)  -     Position (Lower Body Dressing) supine  -     Comment, (Lower Body Dressing) discussed AE for increased independence and safety w/ LB ADLs, rec trial of AE next session.  -           User Key  (r) = Recorded By, (t) = Taken By, (c) = Cosigned By    Initials Name Provider Type    Ca Bronwe OT Occupational Therapist               Obj/Interventions     Row Name 04/11/23 1326          Sensory Assessment (Somatosensory)    Sensory Assessment (Somatosensory) UE sensation intact  -[facility] Name 04/11/23 1326          Vision Assessment/Intervention    Visual Impairment/Limitations  WFL  -[facility] Name 04/11/23 1326          Range of Motion Comprehensive    General Range of Motion bilateral upper extremity ROM WFL  -[facility] Name 04/11/23 1326          Strength Comprehensive (MMT)    General Manual Muscle Testing (MMT) Assessment upper extremity strength deficits identified  -     Comment, General Manual Muscle Testing (MMT) Assessment BUE grossly 4/5  -     Row Name 04/11/23 1326          Balance    Balance Assessment sitting static balance;sit to stand dynamic balance;standing static balance;standing dynamic balance  -     Static Sitting Balance contact guard  -     Position, Sitting Balance sitting edge of bed;unsupported;sitting in chair  -     Sit to Stand Dynamic Balance moderate assist;1-person assist;verbal cues  -     Static Standing Balance moderate assist;1-person assist;verbal cues  -     Dynamic Standing Balance moderate assist;1-person assist;verbal cues  -     Position/Device Used, Standing Balance supported  -     Balance Interventions sitting;sit to stand;occupation based/functional task  -           User Key  (r) = Recorded By, (t) = Taken By, (c) = Cosigned By    Initials Name Provider Type     Ca Luther OT Occupational Therapist               Goals/Plan     Row Name 04/11/23 1328          Bed Mobility Goal 1 (OT)    Activity/Assistive Device (Bed Mobility Goal 1, OT) sit to supine;supine to sit  -     [place] Level/Cues Needed (Bed Mobility Goal 1, OT) moderate assist (50-74% patient effort);verbal cues required  -     Time Frame (Bed Mobility Goal 1, OT) long term goal (LTG);10 days  -     Progress/Outcomes (Bed Mobility Goal 1, OT) goal ongoing  -[facility] Name 04/11/23 1328          Transfer Goal 1 (OT)    Activity/Assistive Device (Transfer Goal 1, OT) sit-to-stand/stand-to-sit;bed-to-chair/chair-to-bed;toilet;walker, rolling  -     [place] Level/Cues Needed (Transfer Goal 1, OT) minimum assist (75% or more patient  effort);verbal cues required  -     Time Frame (Transfer Goal 1, OT) long term goal (LTG);10 days  -     Progress/Outcome (Transfer Goal 1, OT) goal ongoing  -[facility] Name 04/11/23 1328          Dressing Goal 1 (OT)    Activity/Device (Dressing Goal 1, OT) lower body dressing  don/doff socks w/ AAD  -     [place]/Cues Needed (Dressing Goal 1, OT) moderate assist (50-74% patient effort);verbal cues required;nonverbal cues (demo/gesture) required  -     Time Frame (Dressing Goal 1, OT) long term goal (LTG);10 days  -     Progress/Outcome (Dressing Goal 1, OT) goal ongoing  -[facility] Name 04/11/23 1328          Therapy Assessment/Plan (OT)    Planned Therapy Interventions (OT) activity tolerance training;adaptive equipment training;BADL retraining;functional balance retraining;IADL retraining;occupation/activity based interventions;ROM/therapeutic exercise;strengthening exercise;transfer/mobility retraining;patient/caregiver education/training  -           User Key  (r) = Recorded By, (t) = Taken By, (c) = Cosigned By    Initials Name Provider Type     Ca Luther, OT Occupational Therapist               Clinical Impression     Row Name 04/11/23 1326          Pain Assessment    Pain Intervention(s) Repositioned;Ambulation/increased activity  -     Additional Documentation Pain Scale: FACES Pre/Post-Treatment (Group)  -MC     Row Name 04/11/23 1326          Pain Scale: FACES Pre/Post-Treatment    Pain: FACES Scale, Pretreatment 6-->hurts even more  -     Posttreatment Pain Rating 6-->hurts even more  -     Pain Location generalized  -     Pain Location - abdomen  -MC     Row Name 04/11/23 1326          Plan of Care Review    Plan of Care Reviewed With patient  -     Outcome Evaluation Pt presents w/ increased pain, generalized weakness, decreased functional endurance, and balance deficits limiting her ADL indepedence. Pt would benefit from continued skilled IPOT services to  address current functional deficits and facilitate return to PLOF. Rec IRF at d/c, but will monitor pt progress closely.  -     Row Name 04/11/23 1326          Therapy Assessment/Plan (OT)    Rehab Potential (OT) good, to achieve stated therapy goals  -     Criteria for Skilled Therapeutic Interventions Met (OT) yes;meets criteria;skilled treatment is necessary  -     Therapy Frequency (OT) daily  -     Row Name 04/11/23 1326          Therapy Plan Review/Discharge Plan (OT)    Anticipated Discharge Disposition (OT) inpatient rehabilitation facility  -     Row Name 04/11/23 1326          Vital Signs    Pre Systolic BP Rehab --  VSS - RN cleared OT  -           User Key  (r) = Recorded By, (t) = Taken By, (c) = Cosigned By    Initials Name Provider Type    Ca Browne OT Occupational Therapist               Outcome Measures     Row Name 04/11/23 1329          How much help from another is currently needed...    Putting on and taking off regular lower body clothing? 1  -MC     Bathing (including washing, rinsing, and drying) 2  -MC     Toileting (which includes using toilet bed pan or urinal) 1  -MC     Putting on and taking off regular upper body clothing 2  -MC     Taking care of personal grooming (such as brushing teeth) 3  -MC     Eating meals 1  -     AM-PAC 6 Clicks Score (OT) 10  -     Row Name 04/11/23 1329          Functional Assessment    Outcome Measure Options AM-PAC 6 Clicks Daily Activity (OT)  -           User Key  (r) = Recorded By, (t) = Taken By, (c) = Cosigned By    Initials Name Provider Type    Ca Browne OT Occupational Therapist                Occupational Therapy Education     Title: PT OT SLP Therapies (In Progress)     Topic: Occupational Therapy (In Progress)     Point: ADL training (In Progress)     Description:   Instruct learner(s) on proper safety adaptation and remediation techniques during self care or transfers.   Instruct in proper use of  assistive devices.              Learning Progress Summary           Patient Acceptance, E, NR by  at 4/11/2023 1330                   Point: Home exercise program (Not Started)     Description:   Instruct learner(s) on appropriate technique for monitoring, assisting and/or progressing therapeutic exercises/activities.              Learner Progress:  Not documented in this visit.          Point: Precautions (In Progress)     Description:   Instruct learner(s) on prescribed precautions during self-care and functional transfers.              Learning Progress Summary           Patient Acceptance, E, NR by  at 4/11/2023 1330                   Point: Body mechanics (In Progress)     Description:   Instruct learner(s) on proper positioning and spine alignment during self-care, functional mobility activities and/or exercises.              Learning Progress Summary           Patient Acceptance, E, NR by  at 4/11/2023 1330                               User Key     Initials Effective Dates Name Provider Type Discipline     10/14/22 -  Ca Luther OT Occupational Therapist OT              OT Recommendation and Plan  Planned Therapy Interventions (OT): activity tolerance training, adaptive equipment training, BADL retraining, functional balance retraining, IADL retraining, occupation/activity based interventions, ROM/therapeutic exercise, strengthening exercise, transfer/mobility retraining, patient/caregiver education/training  Therapy Frequency (OT): daily  Plan of Care Review  Plan of Care Reviewed With: patient  Outcome Evaluation: Pt presents w/ increased pain, generalized weakness, decreased functional endurance, and balance deficits limiting her ADL indepedence. Pt would benefit from continued skilled IPOT services to address current functional deficits and facilitate return to PLOF. Rec IRF at d/c, but will monitor pt progress closely.     Time Calculation:    Time Calculation- OT     Row Name 04/11/23  1330             Time Calculation- OT    OT Start Time 1040  -      OT Received On 04/11/23  -      OT Goal Re-Cert Due Date 04/21/23  -         Timed Charges    20989 - OT Therapeutic Activity Minutes 13  -      78531 - OT Self Care/Mgmt Minutes 4  -MC         Untimed Charges    OT Eval/Re-eval Minutes 31  -         Total Minutes    Timed Charges Total Minutes 17  -MC      Untimed Charges Total Minutes 31  -MC       Total Minutes 48  -MC            User Key  (r) = Recorded By, (t) = Taken By, (c) = Cosigned By    Initials Name Provider Type    Ca Browne OT Occupational Therapist              Therapy Charges for Today     Code Description Service Date Service Provider Modifiers Qty    08032885986  OT THERAPEUTIC ACT EA 15 MIN 4/11/2023 Ca Luther OT GO 1    72227908215 HC OT EVAL MOD COMPLEXITY 3 4/11/2023 Ca Luther OT GO 1               Ca Luther OT  4/11/2023

## 2023-04-11 NOTE — NURSING NOTE
WOC team consulted for skin, PEG    Upon review of chart patient was discharged yesterday from [facility] and had PEG site created on March 22 and transferred to [facility] last night.  Noted that patient is NPO for possible surgery/procedure today.  Skin assessment charted as blanchable.  Called and spoke with bedside RN who says that the PEG site skin is intact and stated she was not sure why WOC team was consulted.      07:54 Given BS is 14 and patient has been hospitalized for approximately 3 weeks will order DAMASO mattress and called to inform dayshift RN of this and she confirms PEG site skin is intact.    Sensory Perception: 3-->slightly limited  Moisture: 3-->occasionally moist  Activity: 2-->chairfast  Mobility: 2-->very limited  Nutrition: 2-->probably inadequate  Friction and Shear: 2-->potential problem  Sathish Score: 14 (04/10/23 1955)    DAMASO ordered.      Please implement pressure injury prevention interventions for patients with a Sathish score of 18 or less per protocol.    WOC team will sign off at this time.  If alteration to skin integrity or change in wound bed presentation please consult WOC team.

## 2023-04-11 NOTE — PAYOR COMM NOTE
"CONTACT:   Gracie Monk RN  Phone: [phone]  Fax: [phone]    DISCHARGE NOTIFICATION    DISCHARGE TO: Another Facility    REF # [id number]  DC DATE: 4/10/2023    IF YOU NEED ANYTHING FURTHER PLEASE LET ME KNOW.   THANKS     Amanda Gomez (89 y.o. Female)     Date of Birth   06/08/1933    Social Security Number       Address   [address] [place] [address]    Home Phone   [phone]    MRN   [id number]       [facility]    Marital Status                               Admission Date   3/22/23    Admission Type   Emergency    Admitting Provider   Aurora Kirkland MD    Attending Provider       Department, Room/Bed   [address], 3[address]/       Discharge Date   4/10/2023    Discharge Disposition   [facility] (DC - External)    Discharge Destination                               Attending Provider: (none)   Allergies: Motrin [Ibuprofen], Novocain [Procaine]    Isolation: None   Infection: None   Code Status: CPR    Ht: 165.1 cm (65\")   Wt: 65.8 kg (145 lb 1.6 oz)    Admission Cmt: None   Principal Problem: Perforated ulcer [K27.5]                 Active Insurance as of 3/22/2023     Primary Coverage     Payor Plan Insurance Group Employer/Plan Group    ANTHEM MEDICARE REPLACEMENT ANTHEM MEDICARE ADVANTAGE [id number]     Payor Plan Address Payor Plan Phone Number Payor Plan Fax Number Effective Dates    [address] [phone]  1/1/2019 - None Entered    [facility] [address]       Subscriber Name Subscriber Birth Date Member ID       AMANDA GOMEZ 6/8/1933 [id number]                 Emergency Contacts      (Rel.) Home Phone Work Phone Mobile Phone    Kaleb Gomez (healthcare surrogate) (Son) -- -- [phone]    Ziyad Gomez (alternative healthcare surrogate) (Son) -- -- [phone]    Filiberto Gomez (second alternative HCS) (Son) -- -- [phone]               Discharge Summary      Aurora Kirkland MD at 04/10/23 1527      "     [facility] GENERAL SURGERY DISCHARGE SUMMARY    Patient Identification:  Name:  Christine Garg  Age:  89 y.o.  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  Decreased gastric emptying, likely related to recent surgery    CONSULTS:   Hospitalist  Interventional radiology  Medical oncology    PROCEDURES PERFORMED:  Procedure(s):  Exploratory laparotomy with closure of perforated duodenal ulcer with omental patch and Biopatch  Central line insertion  ESOPHAGOGASTRODUODENOSCOPY WITH PERCUTANEOUS ENDOSCOPIC GASTROSTOMY TUBE INSERTION       HOSPITAL COURSE  Patient is a 89 y.o. female presented to [facility] with an acute surgical abdomen secondary to perforated duodenal ulcer.  Please see the admitting history and physical for further details.  Patient was taken to the operating room and underwent a surgical repair of the perforated duodenal ulcer and central line placement.  NG tube was placed at the time of surgery without any difficulty.  Postoperatively the patient did extremely well although she was somewhat slow to mobilize given her age of 89.  After several days she was transferred from the CCU.  She underwent physical therapy.  NG tube had bilious material in it.  Several days postop the NG tube was removed which the patient tolerated.  SARAH output was always serosanguineous.  The patient was started on clear liquids which she tolerated.  She was then advanced to a soft diet and at this point began complaining of difficulty swallowing.    Of note is that the patient had done well until approximately 3 months prior to  admission when she began complaining of back pain and poor appetite.  Patient was noted to have a pleural-based mass and a paraspinal mass on CT.  This has been getting worked up as an outpatient and patient had actually been scheduled for a PET scan prior to admission.    Upper GI was performed which demonstrated a marked esophageal stricture at the mid esophageal level which was felt to be from extrinsic compression from a large 3 cm paratracheal lymph node.  Medical oncology was consulted as the patient was anxious to have a diagnosis that is where the family.  Ms. Garg underwent a CT-guided biopsy of the pleural base mass which returned poorly differentiated adenocarcinoma consistent with lung primary.    At this point in time the patient has recovered from her surgery was otherwise stable for discharge except for that she could only tolerate sips of clear liquids secondary to her esophageal stricture.  Because of this her nutrition was poor.    Case was presented at tumor board.  Patient does not have a large volume of disease and therefore regardless of whether she undergoes treatment her life expectancy would be at least several months if not for the malnutrition.  It was felt that the PEG tube could be placed patient could get adequate nutrition and be seen back in the outpatient setting by medical oncology and determine whether she was a candidate for any treatment.    PEG tube was placed on 4/7/2023 without complication and she was started on tube feeds.  She has good control of her back pain with hydrocodone 7.5.  However the patient's inability to eat because of her esophageal stricture has had a tremendous negative impact on her quality of life and a stent would clearly improve this..  Her volume of disease does not justify hospice at this point in time.  Dr. Ralph's as graciously agreed to place the stent and suggested that rather than trying to coordinate outpatient visits that the patient be  transferred to [facility] for the procedure and from there should be stable for discharge.  The patient had requested temporary nursing home placement until she regains her strength and was approved for a bed at [facility].    Ms. Garg did complain of some continued left lower quadrant pain and tenderness this morning.  A stat noncontrasted CT did not demonstrate any acute issue.  She is currently passing gas and moving her bowels.  We will      PHYSICAL EXAM:  This is a well-developed well-nourished elderly female in no acute distress  HEENT examination: Sclera are anicteric  Lungs: Clear  Heart: Regular rate and rhythm  Abdomen: Bowel sounds present.  PEG tube in left upper quadrant.  Lower abdomen is tender and slightly distended  Skin/incisions: Incision sites were inspected and demonstrate no drainage or erythema  Lab Results (last 48 hours)     Procedure Component Value Units Date/Time    Magnesium [[id number]]  (Normal) Collected: 04/09/23 0242    Specimen: Blood Updated: 04/09/23 0350     Magnesium 2.0 mg/dL     Comprehensive Metabolic Panel [[id number]]  (Abnormal) Collected: 04/09/23 0242    Specimen: Blood Updated: 04/09/23 0350     Glucose 119 mg/dL      BUN 10 mg/dL      Creatinine 0.42 mg/dL      Sodium 132 mmol/L      Potassium 4.0 mmol/L      Chloride 99 mmol/L      CO2 28.1 mmol/L      Calcium 8.6 mg/dL      Total Protein 4.8 g/dL      Albumin 2.3 g/dL      ALT (SGPT) 7 U/L      AST (SGOT) 14 U/L      Alkaline Phosphatase 98 U/L      Total Bilirubin 0.2 mg/dL      Globulin 2.5 gm/dL      A/G Ratio 0.9 g/dL      BUN/Creatinine Ratio 23.8     Anion Gap 4.9 mmol/L      eGFR 93.6 mL/min/1.73     Narrative:      GFR Normal >60  Chronic Kidney Disease <60  Kidney Failure <15    The GFR formula is only valid for adults with stable renal function between ages 18 and 70.    Phosphorus [[id number]]  (Normal) Collected: 04/09/23 0242    Specimen: Blood Updated: 04/09/23 0350     Phosphorus 2.9  mg/dL     Protime-INR [[id number]]  (Normal) Collected: 04/09/23 0242    Specimen: Blood Updated: 04/09/23 0347     Protime 12.4 Seconds      INR 0.91    Narrative:      Suggested INR therapeutic range for stable oral anticoagulant therapy:    Low Intensity therapy:   1.5-2.0  Moderate Intensity therapy:   2.0-3.0  High Intensity therapy:   2.5-4.0    CBC (No Diff) [[id number]]  (Abnormal) Collected: 04/09/23 0242    Specimen: Blood Updated: 04/09/23 0332     WBC 8.62 10*3/mm3      RBC 3.19 10*6/mm3      Hemoglobin 10.0 g/dL      Hematocrit 30.8 %      MCV 96.6 fL      MCH 31.3 pg      MCHC 32.5 g/dL      RDW 14.2 %      RDW-SD 50.4 fl      MPV 10.1 fL      Platelets 285 10*3/mm3         CT Abdomen Pelvis Without Contrast    Result Date: 4/10/2023  1.  Small bilateral pleural effusions. 2.  Gastric tube noted in the stomach. This has been recently placed and small to moderate intraperitoneal free air persists.   This report was finalized on 4/10/2023 12:06 PM by Dr. Henry Lozano MD.      FL Upper GI Single Contrast SBFT    Result Date: 4/3/2023  1.  Fixed area of esophageal narrowing is noted involving the mid thoracic esophagus just distal to the bala and has features that would suggest extrinsic mass effect. Correlate with cross-sectional imaging. 2.  Esophageal tertiary contractions noted with mild dysmotility. 3.  Small hiatal hernia. 4.  Markedly delayed gastric emptying is noted with only small amounts of contrast passing across the pylorus. Limited assessment of the gastroduodenal junction due to cardiac leads and surgical staples but no definite contrast leak identified. 5.  Small bowel is unremarkable with normal small bowel transit. Contrast reaches the colon within 1.5 hours. 6.  No bowel obstruction.  This report was finalized on 4/3/2023 11:08 AM by Dr. Tobias Stoll MD.      CT Needle Biopsy Pleura    Result Date: 4/4/2023  CT-guided biopsy of a left lower lobe region pleural-based mass.  This report  was finalized on 4/4/2023 1:31 PM by Dr. Tobias Stoll MD.      No results found for: ACANTHNAEG, AFBCX, BPERTUSSISCX, BLOODCX  No results found for: BCIDPCR, CXREFLEX, CSFCX, CULTURETIS  No results found for: CULTURES, HSVCX, URCX  No results found for: EYECULTURE, GCCX, HSVCULTURE, LABHSV  No results found for: LEGIONELLA, MRSACX, MUMPSCX, MYCOPLASCX  No results found for: NOCARDIACX, STOOLCX  No results found for: THROATCX, UNSTIMCULT, URINECX, CULTURE, VZVCULTUR  No results found for: VIRALCULTU, WOUNDCX  CONDITION AT DISCHARGE:  Improved    DISCHARGE DISPOSITION   Home    DISCHARGE MEDICATIONS:     Discharge Medications      New Medications      Instructions Start Date   castor oil-balsam peru ointment   1 application, Topical, Every 12 Hours Scheduled      heparin (porcine) 5000 UNIT/ML injection   5,000 Units, Subcutaneous, Every 12 Hours Scheduled      hydrALAZINE 20 MG/ML injection  Commonly known as: APRESOLINE   10 mg, Intravenous, Every 6 Hours PRN      hydrALAZINE 50 MG tablet  Commonly known as: APRESOLINE   50 mg, Oral, Every 8 Hours Scheduled      labetalol 5 MG/ML injection  Commonly known as: NORMODYNE,TRANDATE   10 mg, Intravenous, Every 6 Hours PRN      Lidocaine 4 % patch  Commonly known as: HM Lidocaine Patch   1 patch, Apply externally, Every 24 Hours Scheduled   Start Date: April 11, 2023     magnesium sulfate 2 GM/50ML infusion   2 g, Intravenous, As Needed      magnesium sulfate in D5W 1g/100mL (PREMIX) 1-5 GM/100ML-% IVPB   1 g, Intravenous, As Needed      metoclopramide 5 MG tablet  Commonly known as: REGLAN   5 mg, Per G Tube, 3 Times Daily Before Meals      multivitamin liquid liquid   10 mL, Per PEG Tube, Daily   Start Date: April 11, 2023     pantoprazole 40 MG EC tablet  Commonly known as: PROTONIX   40 mg, Oral, 2 Times Daily Before Meals      polyethylene glycol 17 g packet  Commonly known as: MIRALAX   17 g, Oral, Daily   Start Date: April 11, 2023     potassium chloride 20 MEQ  CR tablet  Commonly known as: K-DUR,KLOR-CON   40 mEq, Oral, As Needed      potassium chloride 20 MEQ packet  Commonly known as: KLOR-CON   40 mEq, Oral, As Needed      potassium phosphate 45 mmol in sodium chloride 0.9 % 500 mL infusion   45 mmol, Intravenous, As Needed         Changes to Medications      Instructions Start Date   cloNIDine 0.1 MG tablet  Commonly known as: CATAPRES  What changed: Another medication with the same name was added. Make sure you understand how and when to take each.   0.1 mg, Oral, 3 Times Daily PRN      cloNIDine 0.1 MG tablet  Commonly known as: CATAPRES  What changed: You were already taking a medication with the same name, and this prescription was added. Make sure you understand how and when to take each.   0.1 mg, Oral, 3 Times Daily PRN      HYDROcodone-acetaminophen 5-325 MG per tablet  Commonly known as: NORCO  What changed: Another medication with the same name was added. Make sure you understand how and when to take each.   1 tablet, Oral, Daily PRN      HYDROcodone-acetaminophen 7.5-325 MG per tablet  Commonly known as: NORCO  What changed: You were already taking a medication with the same name, and this prescription was added. Make sure you understand how and when to take each.   1 tablet, Oral, Every 4 Hours PRN      levothyroxine 112 MCG tablet  Commonly known as: SYNTHROID, LEVOTHROID  What changed: Another medication with the same name was added. Make sure you understand how and when to take each.   112 mcg, Oral, Every Early Morning      levothyroxine 112 MCG tablet  Commonly known as: SYNTHROID, LEVOTHROID  What changed: You were already taking a medication with the same name, and this prescription was added. Make sure you understand how and when to take each.   112 mcg, Oral, Every Early Morning   Start Date: April 11, 2023     valsartan 320 MG tablet  Commonly known as: DIOVAN  What changed: Another medication with the same name was added. Make sure you  understand how and when to take each.   320 mg, Oral, Daily      valsartan 320 MG tablet  Commonly known as: DIOVAN  What changed: You were already taking a medication with the same name, and this prescription was added. Make sure you understand how and when to take each.   320 mg, Oral, Every 24 Hours Scheduled   Start Date: April 11, 2023        Continue These Medications      Instructions Start Date   aspirin 325 MG tablet   325 mg, Oral, Daily      carvedilol 25 MG tablet  Commonly known as: COREG   25 mg, Oral, 2 Times Daily With Meals      montelukast 10 MG tablet  Commonly known as: SINGULAIR   10 mg, Oral, Nightly      ondansetron 4 MG tablet  Commonly known as: ZOFRAN   4 mg, Oral, 3 Times Daily PRN      Repatha SureClick solution auto-injector SureClick injection  Generic drug: Evolocumab   140 mg, Subcutaneous, Every 14 Days         Stop These Medications    Movantik 25 MG tablet  Generic drug: Naloxegol Oxalate     nitrofurantoin (macrocrystal-monohydrate) 100 MG capsule  Commonly known as: MACROBID     predniSONE 20 MG tablet  Commonly known as: DELTASONE     tiZANidine 2 MG half tablet  Commonly known as: ZANAFLEX          Oh  FOLLOW-UP:  Dr. Kirkland in 3 weeks    Activity and diet instructions given to the patient      Aurora Kirkland MD  04/10/23  15:27 EDT    Please note that this discharge summary required more than 30 minutes to complete.          Electronically signed by Aurora Kirkland MD at 04/10/23 7250

## 2023-04-11 NOTE — CONSULTS
Malnutrition Severity Assessment    Patient Name:  Christine Garg  YOB: 1933  MRN: [id number]  Admit Date:  4/10/2023    Patient meets criteria for : Severe Malnutrition    Comments:  Pt meets criteria for acute severe malnutrition with the indicators of significant weight loss of 7.6% x1 month & <50% of EEN for >5d.     Malnutrition Severity Assessment  Malnutrition Type: Acute Disease or Injury - Related Malnutrition  Malnutrition Type (last 8 hours)     Malnutrition Severity Assessment     Row Name 04/11/23 1327       Malnutrition Severity Assessment    Malnutrition Type Acute Disease or Injury - Related Malnutrition    Row Name 04/11/23 1327       Insufficient Energy Intake     Insufficient Energy Intake Findings Severe    Insufficient Energy Intake  < or equal to 50% of est. energy requirement for > or equal to 5d)  ~1.5months    Row Name 04/11/23 1327       Unintentional Weight Loss     Unintentional Weight Loss Findings Severe    Unintentional Weight Loss  Weight loss greater than 5% in one month  7.6% x1 month    Row Name 04/11/23 1327       Muscle Loss    Loss of Muscle Mass Findings Mild    Congregation Region --  mild    Clavicle Bone Region --  mild    Acromion Bone Region Moderate - acromion may slightly protrude    Scapular Bone Region --  mild    Dorsal Hand Region None    Patellar Region None    Anterior Thigh Region None    Posterior Calf Region None    Row Name 04/11/23 1327       Fat Loss    Subcutaneous Fat Loss Findings None    Orbital Region  --  mild    Upper Arm Region None    Thoracic & Lumbar Region None    Row Name 04/11/23 1327       Criteria Met (Must meet criteria for severity in at least 2 of these categories: M Wasting, Fat Loss, Fluid, Secondary Signs, Wt. Status, Intake)    Patient meets criteria for  Severe Malnutrition                Electronically signed by:  Suad Emmanuel, MS,RD,LD  04/11/23 13:29 EDT

## 2023-04-11 NOTE — PLAN OF CARE
Goal Outcome Evaluation:  Plan of Care Reviewed With: patient           Outcome Evaluation: Pt presents w/ increased pain, generalized weakness, decreased functional endurance, and balance deficits limiting her ADL indepedence. Pt would benefit from continued skilled IPOT services to address current functional deficits and facilitate return to PLOF. Rec IRF at d/c, but will monitor pt progress closely.

## 2023-04-11 NOTE — PAYOR COMM NOTE
"Amanda Gomez (89 y.o. Female)     [id number]    Jane Puentes, RN  Utilization Review  [phone]  [phone]      Date of Birth   1933    Social Security Number       Address   [address] [id number]    Home Phone   [phone]    MRN   [id number]       Hoahaoism   [facility]    Marital Status                               Admission Date   4/10/23    Admission Type   Urgent    Admitting Provider   Jocelyne Oropeza MD    Attending Provider   Jocelyne Oropeza MD    Department, Room/Bed   [facility] 6B, N640/1       Discharge Date       Discharge Disposition       Discharge Destination                               Attending Provider: Jocelyne Oropeza MD    Allergies: Motrin [Ibuprofen], Novocain [Procaine]    Isolation: None   Infection: None   Code Status: CPR    Ht: 165.1 cm (65\")   Wt: 66.2 kg (146 lb)    Admission Cmt: None   Principal Problem: Esophageal stricture [K22.2]                 Active Insurance as of 4/10/2023     Primary Coverage     Payor Plan Insurance Group Employer/Plan Group    ANTHEM MEDICARE REPLACEMENT ANTHEM MEDICARE ADVANTAGE [id number]     Payor Plan Address Payor Plan Phone Number Payor Plan Fax Number Effective Dates    [address] [phone]  2019 - None Entered    [facility] [address]       Subscriber Name Subscriber Birth Date Member ID       AMANDA GOMEZ 1933 [id number]                 Emergency Contacts      (Rel.) Home Phone Work Phone Mobile Phone    Kaleb Gomez (healthcare surrogate) (Son) -- -- [phone]    Ziyad Gomez (alternative healthcare surrogate) (Son) -- -- [phone]    Filiberto Gomez (second alternative HCS) (Son) -- -- [phone]               History & Physical      Hola Crowley DO at 04/10/23 1944              [facility]  HISTORY AND PHYSICAL    Patient Name: Amanda Gomez  : 1933  MRN: [id number]  Primary Care Physician: " Dave Tobar MD  Date of admission: 4/10/2023    Subjective    Subjective     Chief Complaint:  Adenocarcinoma    HPI:  Christine Garg is a 89 y.o. female with PMH of HTN, HLD, CAD, COPD, former smoker, PAD and hypothyroid.     Patient presented to [facility] on 3/22 for a 5-day complain of abdominal pain.  She was taken to the OR for an ex lap.  She was found to have a perforated duodenal ulcer which was repaired with omental patch.  On 3/29, patient had CT of thoracic spine which noted left pleural consolidation and parenchymal nodules.  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  She was also found to have an esophageal stricture secondary to extrinsic compression of 3cm peritracheal lymph node.  On 4/7, patient had PEG tube placed.  She was sent to [facility] for placement of esophageal stent by Dr. Waters.    Patient had been approved to [facility] for subacute rehab in [place]. Patient would still like to go there after discharge. Patient is to follow up outpatient for treatment options regarding her cancer.     Review of Systems   Constitutional: Positive for appetite change. Negative for activity change, chills and fever.   HENT: Positive for trouble swallowing. Negative for congestion and sore throat.    Eyes: Negative.    Respiratory: Negative for cough, chest tightness and shortness of breath.    Cardiovascular: Negative for chest pain and leg swelling.   Gastrointestinal: Positive for abdominal pain. Negative for constipation, diarrhea, nausea and vomiting.   Genitourinary: Negative for difficulty urinating, dysuria and urgency.   Musculoskeletal: Negative.    Skin: Negative.    Neurological: Positive for weakness. Negative for dizziness and headaches.   Hematological: Negative.    Psychiatric/Behavioral: Negative.  Negative for agitation and confusion.            Personal History     Past Medical History:   Diagnosis Date    • Advanced age    • CAD (coronary artery disease) 2011    s/p 3v CABG   • Chronic kidney disease (CKD), stage III (moderate)    • COPD (chronic obstructive pulmonary disease)    • History of tobacco use    • Hyperlipidemia    • Hypertension    • Hypothyroidism    • PONV (postoperative nausea and vomiting)    • Pulmonary nodule              Past Surgical History:   Procedure Laterality Date   • BREAST BIOPSY Left     benign   • CATARACT EXTRACTION     • CORONARY ANGIOPLASTY     • CORONARY ARTERY BYPASS GRAFT     • EXPLORATORY LAPAROTOMY N/A 3/22/2023    Procedure: LAPAROTOMY EXPLORATORY WITH OVER SEW OF DUODENAL ULCER WITH OMENTAL PATCH AND BIOPATCH AND CENTRAL LINE PLACEMENT;  Surgeon: Aurora Kirkland MD;  Location: [facility];  Service: General;  Laterality: N/A;       Family History:  family history includes Heart disease in her brother, father, and mother.     Social History:  reports that she has quit smoking. Her smoking use included cigarettes. She has never used smokeless tobacco. She reports that she does not drink alcohol and does not use drugs.  Social History     Social History Narrative   • Not on file       Medications:  Evolocumab, HYDROcodone-acetaminophen, Lidocaine, aspirin, carvedilol, castor oil-balsam peru, cloNIDine, heparin (porcine), hydrALAZINE, labetalol, levothyroxine, magnesium sulfate, magnesium sulfate in D5W 1g/100mL (PREMIX), metoclopramide, montelukast, multivitamin, ondansetron, pantoprazole, polyethylene glycol, potassium chloride, potassium phosphate 45 mmol in sodium chloride 0.9 % 500 mL infusion, and valsartan    Allergies   Allergen Reactions   • Motrin [Ibuprofen] Anaphylaxis and Hives   • Novocain [Procaine] Other (See Comments)     Pt state she passes out.       Objective    Objective     Vital Signs:   Temp:  [97.5 °F (36.4 °C)-98.1 °F (36.7 °C)] 98.1 °F (36.7 °C)  Heart Rate:  [72-78] 72  Resp:  [16-18] 16  BP: (127-172)/(50-68) 127/50  Flow (L/min):  [2] 2    Physical  Exam  Vitals reviewed.   Constitutional:       General: She is not in acute distress.     Appearance: She is normal weight.   HENT:      Head: Normocephalic.      Nose: Nose normal. No congestion.      Mouth/Throat:      Mouth: Mucous membranes are moist.   Eyes:      Extraocular Movements: Extraocular movements intact.      Pupils: Pupils are equal, round, and reactive to light.   Cardiovascular:      Rate and Rhythm: Normal rate and regular rhythm.      Pulses: Normal pulses.      Heart sounds: Normal heart sounds. No murmur heard.  Pulmonary:      Effort: Pulmonary effort is normal. No respiratory distress.      Breath sounds: Normal breath sounds. No wheezing or rhonchi.   Abdominal:      General: Bowel sounds are normal. There is no distension.      Palpations: Abdomen is soft.      Tenderness: There is abdominal tenderness.   Musculoskeletal:         General: No swelling. Normal range of motion.      Cervical back: Normal range of motion. No rigidity.   Skin:     General: Skin is warm.      Capillary Refill: Capillary refill takes less than 2 seconds.      Findings: Wound (midline abdominal incision pink and closed, C/D/I) present.      Comments: PEG in place C/D/I   Neurological:      General: No focal deficit present.      Mental Status: She is alert and oriented to person, place, and time. Mental status is at baseline.      Motor: Weakness present.   Psychiatric:         Mood and Affect: Mood normal.         Behavior: Behavior normal.         Thought Content: Thought content normal.         Judgment: Judgment normal.          Result Review:  I have personally reviewed the results from the time of this admission to 4/10/2023 19:44 EDT and agree with these findings:  [x]  Laboratory list / accordion  [x]  Microbiology  [x]  Radiology  []  EKG/Telemetry   []  Cardiology/Vascular   []  Pathology  [x]  Old records  []  Other:  Most notable findings include:     LAB RESULTS:      Lab 04/09/23  0242 04/06/23  0139  04/04/23 0231   WBC 8.62 10.38 11.47*   HEMOGLOBIN 10.0* 10.1* 9.6*   HEMATOCRIT 30.8* 32.6* 29.3*   PLATELETS 285 249 298   NEUTROS ABS  --  7.63* 8.87*   IMMATURE GRANS (ABS)  --  0.04 0.06*   LYMPHS ABS  --  1.18 1.31   MONOS ABS  --  0.61 0.54   EOS ABS  --  0.82* 0.61*   MCV 96.6 104.2* 94.8   PROTIME 12.4  --  12.0*   APTT  --   --  30.4         Lab 04/09/23  0242 04/06/23  0139 04/05/23  0048 04/04/23  1751 04/04/23 0231   SODIUM 132* 135* 138  --  138   POTASSIUM 4.0 4.1 4.6 4.4 3.4*   CHLORIDE 99 100 103  --  101   CO2 28.1 27.6 29.5*  --  29.6*   ANION GAP 4.9* 7.4 5.5  --  7.4   BUN 10 7* 9  --  10   CREATININE 0.42* 0.52* 0.51*  --  0.48*   EGFR 93.6 88.9 89.4  --  90.7   GLUCOSE 119* 127* 97  --  91   CALCIUM 8.6 8.6 9.1  --  8.6   MAGNESIUM 2.0 2.1 1.8  --  2.1   PHOSPHORUS 2.9 3.5 3.3  --  3.6         Lab 04/09/23 0242 04/06/23 0139 04/04/23 0231   TOTAL PROTEIN 4.8* 4.6* 4.8*   ALBUMIN 2.3* 2.5* 2.5*   GLOBULIN 2.5 2.1 2.3   ALT (SGPT) 7 11 17   AST (SGOT) 14 11 13   BILIRUBIN 0.2 <0.2 0.2   ALK PHOS 98 88 82         Lab 04/09/23 0242 04/04/23 0231   PROTIME 12.4 12.0*   INR 0.91 0.87*                 Brief Urine Lab Results  (Last result in the past 365 days)      Color   Clarity   Blood   Leuk Est   Nitrite   Protein   CREAT   Urine HCG        01/06/23 0149 Yellow   Clear   Negative   Negative   Negative   Negative               Microbiology Results (last 10 days)     ** No results found for the last 240 hours. **          CT Abdomen Pelvis Without Contrast    Result Date: 4/10/2023   EXAM DATE:   4/10/2023 11:29 AM  CLINICAL HISTORY:   abdominal pain; K25.1-Acute gastric ulcer with perforation; E87.8-Xxpv-sqmzyimdcw and hyponatremia; E87.6-Hypokalemia; A41.9-Sepsis, unspecified organism; K27.5-Chronic or unspecified peptic ulcer, site unspecified, with perforation; E44.0-Moderate protein-calorie malnutrition  TECHNIQUE:   Axial computed tomography images of the abdomen and pelvis without  intravenous contrast.  Sagittal and coronal reformatted images were created and reviewed.  This CT exam was performed using one or more of the following dose reduction techniques:  automated exposure control, adjustment of the mA and/or kV according to patient size, and/or use of iterative reconstruction technique.  COMPARISON: 04/04/2023  FINDINGS:   LUNG BASES:  Unremarkable.  No mass.  No consolidation.   PLEURAL SPACE:  Small bilateral pleural effusions.   ABDOMEN:   LIVER:  Unremarkable.   GALLBLADDER AND BILE DUCTS:  Unremarkable.  No calcified stones.  No ductal dilation.   PANCREAS:  Unremarkable.  No ductal dilation.   SPLEEN:  Unremarkable.  No splenomegaly.   ADRENALS:  Unremarkable.  No mass.   KIDNEYS AND URETERS:  Unremarkable.  No obstructing stones.  No hydronephrosis.   STOMACH AND BOWEL:  Contrast noted throughout the colon.  No obstruction.  No mucosal thickening.   PELVIS:   APPENDIX:  No findings to suggest acute appendicitis.   BLADDER:  Unremarkable.  No stones.   REPRODUCTIVE:  Unremarkable as visualized.   ABDOMEN and PELVIS:   INTRAPERITONEAL SPACE:  See below.    BONES/JOINTS:  No acute fracture.  No dislocation.   SOFT TISSUES:  Unremarkable.   VASCULATURE:  Atherosclerotic disease.  No abdominal aortic aneurysm.   LYMPH NODES:  Unremarkable.  No enlarged lymph nodes.   TUBES, LINES AND DEVICES:  Gastric tube noted in the stomach. This has been recently placed and small to moderate intraperitoneal free air persists.        Impression: 1.  Small bilateral pleural effusions. 2.  Gastric tube noted in the stomach. This has been recently placed and small to moderate intraperitoneal free air persists.   This report was finalized on 4/10/2023 12:06 PM by Dr. Henry Lozano MD.        Results for orders placed during the hospital encounter of 03/22/23    Adult Transthoracic Echo Complete W/ Cont if Necessary Per Protocol    Interpretation Summary  •  Left ventricular systolic function is normal.  Calculated left ventricular EF = 60% Left ventricular ejection fraction appears to be 56 - 60%.  •  Left ventricular diastolic function is consistent with (grade I) impaired relaxation and age.  •  Moderate to severe mitral valve regurgitation is present.  •  Moderate tricuspid valve regurgitation is present.  •  Estimated right ventricular systolic pressure from tricuspid regurgitation is moderately elevated (45-55 mmHg).  •  Moderate pulmonary hypertension is present.      Assessment & Plan   Assessment & Plan       Esophageal stricture    Essential hypertension    ASCVD (arteriosclerotic cardiovascular disease)    Coronary artery disease     Chronic obstructive pulmonary disease    Former smoker    Perforated ulcer    Hypothyroidism (acquired)    S/P percutaneous endoscopic gastrostomy (PEG) tube placement    Adenocarcinoma      Christine Garg is a 89 y.o. female with PMH of HTN, HLD, CAD, COPD, former smoker, PAD and hypothyroid. Direct admit to [facility] for esophageal stent placement.     Esophageal Stricture  S/p PEG  -2/2 extrinsic compression of paratracheal lymph node  -Pain control  -Maintenance fluids  -NPO  -Consult wound for PEG care  -Consult PT/OT, case management   -Consult GI      CAD  HLD  -lower home aspirin to 81mg. Will need to make this clear at discharge in order to prevent further ulceration    Hypoalbuminemia   -Albumin 2.3  -Consult nutrition    HTN  -Continue home coreg, valsartan, hydralazine   -PRN clonidine     Adenocarcinoma of Lung  COPD  Former Smoker  -PRN duonebs  -Follow up outpatient for treatment plan    Perforated Duodenal Ulcer  S/p Ex lap with patch  -Midline abdominal incision C/D/I  -Continue PPI twice daily  -lower aspirin to 81 mg instead of 325  -Consult wound  -patient only received perioperative cefazolin at time of surgery? Should have received micafungin and gram negative coverage for at least 5 days as ppx.  Would recommend discussing case with infectious disease/general  surgery in am to determine if would benefit from abx this far out from surgery.  Does not currently appear to have evidence of active infection.     Hypothyroid  -Continue home synthroid         DVT prophylaxis:  SCDs in prep for surgery    CODE STATUS:  FULL       Expected Discharge  TBD        This note has been completed as part of a split-shared workflow.     Signature: Electronically signed by LUCÍA Sotelo, 04/10/23, 8:36 PM EDT.          Attending   Admission Attestation       I have performed an independent face-to-face diagnostic evaluation including performing an independent physical examination as documented here.  The documented plan of care above was reviewed and developed with the advanced practice clinician (APC).      Brief Summary Statement:   Christine Garg is a 89 y.o. female with past medical history of hypertension, hyperlipidemia, COPD, coronary artery disease, peripheral artery disease, hypothyroidism, who comes as a direct admit from [facility] for perforated duodenal ulcer status post emergency ex lap with patch who was later found to have esophageal stricture related to 3 cm paratracheal lymph node with extrinsic compression.  They discussed the case with Dr. Ralph's who recommended transfer for esophageal stenting.  Currently, patient reports expected abdominal pain.  Denies any fever or chills.  Reports decreased appetite.    Remainder of detailed HPI is as noted by APC and has been reviewed and/or edited by me for completeness.    Attending Physical Exam:  Temp:  [97.5 °F (36.4 °C)-98.1 °F (36.7 °C)] 98 °F (36.7 °C)  Heart Rate:  [72-78] 76  Resp:  [16-18] 17  BP: (123-172)/(50-68) 123/58  Flow (L/min):  [2] 2    Constitutional: Awake, alert, nontoxic  Eyes: PERRLA, sclerae anicteric, no conjunctival injection  HENT: NCAT, mucous membranes moist  Neck: Supple, no thyromegaly, no lymphadenopathy, trachea midline  Respiratory: Clear to auscultation bilaterally,  nonlabored respirations   Cardiovascular: RRR, no murmurs, rubs, or gallops, palpable pedal pulses bilaterally  Gastrointestinal: Positive bowel sounds, soft, expected abd tenderness post ex lap, nondistended, PEG in place  Musculoskeletal: No bilateral ankle edema, no clubbing or cyanosis to extremities  Psychiatric: Appropriate affect, cooperative  Neurologic: Oriented x 3, strength symmetric in all extremities, Cranial Nerves grossly intact to confrontation, speech clear  Skin: midline incision without drainage. Clean, dry, intact, expected surrounded pink skin but no evidence of cellulitis      Brief Assessment/Plan :  See detailed assessment and plan developed with APC which I have reviewed and/or edited for completeness.            Hola Crowley DO  04/10/23     Total time spent: 40  Time spent includes time reviewing chart, face-to-face time, counseling patient/family/caregiver, ordering medications/tests/procedures, communicating with other health care professionals, documenting clinical information in the electronic health record, and coordination of care.                     Electronically signed by Hola Crowley DO at 04/10/23 2242       Vital Signs (last day)     Date/Time Temp Temp src Pulse Resp BP Patient Position SpO2    04/11/23 0721 97 (36.1) Axillary 85 18 148/81 Lying 97    04/11/23 0508 -- -- -- -- 152/59 Lying --    04/11/23 0501 97.7 (36.5) Oral 80 16 161/63 Lying 95    04/11/23 0000 97.3 (36.3) Oral 80 16 108/74 Lying 97    04/10/23 1926 98 (36.7) Oral 76 17 123/58 Lying 95          Oxygen Therapy (last day)     Date/Time SpO2 Device (Oxygen Therapy) Flow (L/min) Oxygen Concentration (%) ETCO2 (mmHg)    04/11/23 0721 97 -- -- -- --    04/11/23 0501 95 -- -- -- --    04/11/23 0440 -- nasal cannula 2 -- --    04/11/23 0240 -- nasal cannula 2 -- --    04/11/23 0040 -- nasal cannula 2 -- --    04/11/23 0000 97 -- -- -- --    04/10/23 2240 -- nasal cannula 2 -- --    04/10/23 1926 95 nasal  cannula 2 -- --          Lines, Drains & Airways     Active LDAs     Name Placement date Placement time Site Days    Peripheral IV 04/08/23 1952 Posterior;Left Wrist 04/08/23 1952  Wrist  2    Gastrostomy/Enterostomy Percutaneous endoscopic gastrostomy (PEG) 1 20 Fr. LUQ 04/07/23  1211  LUQ  3    External Urinary Catheter 04/10/23  2030  --  less than 1                  Current Facility-Administered Medications   Medication Dose Route Frequency Provider Last Rate Last Admin   • acetaminophen (TYLENOL) tablet 650 mg  650 mg Oral Q4H PRN Dena Lynne, APRN        Or   • acetaminophen (TYLENOL) 160 MG/5ML solution 650 mg  650 mg Oral Q4H PRN Loren Lynnea, APRN        Or   • acetaminophen (TYLENOL) suppository 650 mg  650 mg Rectal Q4H PRN Dena Lynne, APRN       • aspirin chewable tablet 81 mg  81 mg Oral Daily Hola Crowley DO   81 mg at 04/11/23 0901   • sennosides-docusate (PERICOLACE) 8.6-50 MG per tablet 2 tablet  2 tablet Oral BID Dena Lynne, APRN   2 tablet at 04/11/23 0902    And   • polyethylene glycol (MIRALAX) packet 17 g  17 g Oral Daily PRN Dena Lynne APRN        And   • bisacodyl (DULCOLAX) EC tablet 5 mg  5 mg Oral Daily PRN Loren Lynnea APRN        And   • bisacodyl (DULCOLAX) suppository 10 mg  10 mg Rectal Daily PRN Loren Lynnea, APRN       • carvedilol (COREG) tablet 25 mg  25 mg Oral BID With Meals Dena Lynne, APRN   25 mg at 04/11/23 0901   • cloNIDine (CATAPRES) tablet 0.1 mg  0.1 mg Oral TID PRN Dena Lynne, APRN       • hydrALAZINE (APRESOLINE) tablet 50 mg  50 mg Oral Q8H Loren Lynnea, APRN   50 mg at 04/11/23 0514   • HYDROcodone-acetaminophen (NORCO) 7.5-325 MG per tablet 1 tablet  1 tablet Oral Q6H PRN Noelle Combs DO   1 tablet at 04/11/23 0514   • HYDROmorphone (DILAUDID) injection 0.25 mg  0.25 mg Intravenous Q4H PRN Noelle Combs, DO       • levothyroxine (SYNTHROID, LEVOTHROID) tablet 112 mcg  112 mcg Oral Q AM Dena Lynne APRN   112 mcg at 04/11/23 0514   •  ondansetron (ZOFRAN) tablet 4 mg  4 mg Oral Q6H PRN [employer], Dena, APRN        Or   • ondansetron (ZOFRAN) injection 4 mg  4 mg Intravenous Q6H PRN [employer], Dena, APRN       • pantoprazole (PROTONIX) EC tablet 40 mg  40 mg Oral BID AC [employer], Dena, APRN       • sodium chloride 0.9 % flush 10 mL  10 mL Intravenous Q12H [employer], Dena, APRN   10 mL at 04/11/23 0903   • sodium chloride 0.9 % flush 10 mL  10 mL Intravenous PRN [employer], Dena, APRN       • sodium chloride 0.9 % infusion 40 mL  40 mL Intravenous PRN [employer], Dena, APRN       • sodium chloride 0.9 % infusion  75 mL/hr Intravenous Continuous [employer], Dena, APRN 75 mL/hr at 04/11/23 0924 75 mL/hr at 04/11/23 0924   • valsartan (DIOVAN) tablet 320 mg  320 mg Oral Q24H [employer], Dena, APRN   320 mg at 04/11/23 0901       Lab Results (last 24 hours)     Procedure Component Value Units Date/Time    Magnesium [[id number]]  (Normal) Collected: 04/11/23 0614    Specimen: Blood Updated: 04/11/23 0755     Magnesium 1.7 mg/dL     Basic Metabolic Panel [[id number]]  (Abnormal) Collected: 04/11/23 0614    Specimen: Blood Updated: 04/11/23 0755     Glucose 85 mg/dL      BUN 10 mg/dL      Creatinine 0.41 mg/dL      Sodium 135 mmol/L      Potassium 4.6 mmol/L      Chloride 98 mmol/L      CO2 30.0 mmol/L      Calcium 8.5 mg/dL      BUN/Creatinine Ratio 24.4     Anion Gap 7.0 mmol/L      eGFR 94.2 mL/min/1.73     Narrative:      GFR Normal >60  Chronic Kidney Disease <60  Kidney Failure <15    The GFR formula is only valid for adults with stable renal function between ages 18 and 70.    CBC Auto Differential [[id number]]  (Abnormal) Collected: 04/11/23 0614    Specimen: Blood Updated: 04/11/23 0647     WBC 9.11 10*3/mm3      RBC 3.21 10*6/mm3      Hemoglobin 9.9 g/dL      Hematocrit 31.1 %      MCV 96.9 fL      MCH 30.8 pg      MCHC 31.8 g/dL      RDW 14.0 %      RDW-SD 50.1 fl      MPV 10.0 fL      Platelets 280 10*3/mm3      Neutrophil % 69.9 %      Lymphocyte % 12.3 %      Monocyte %  7.6 %      Eosinophil % 8.8 %      Basophil % 0.7 %      Immature Grans % 0.7 %      Neutrophils, Absolute 6.38 10*3/mm3      Lymphocytes, Absolute 1.12 10*3/mm3      Monocytes, Absolute 0.69 10*3/mm3      Eosinophils, Absolute 0.80 10*3/mm3      Basophils, Absolute 0.06 10*3/mm3      Immature Grans, Absolute 0.06 10*3/mm3      nRBC 0.0 /100 WBC     POC Glucose Once [[id number]]  (Normal) Collected: 04/11/23 0601    Specimen: Blood Updated: 04/11/23 0608     Glucose 87 mg/dL      Comment: Meter: [id number] : [id number] Claritza Fatou       POC Glucose Once [[id number]]  (Normal) Collected: 04/11/23 0001    Specimen: Blood Updated: 04/11/23 0004     Glucose 85 mg/dL      Comment: Meter: [id number] : [id number] Claritza Fatou           Imaging Results (Last 24 Hours)     ** No results found for the last 24 hours. **

## 2023-04-11 NOTE — CASE MANAGEMENT/SOCIAL WORK
Discharge Planning Assessment  [facility]     Patient Name: Christine Garg  MRN: [id number]  Today's Date: 4/11/2023    Admit Date: 4/10/2023        Discharge Needs Assessment     Row Name 04/11/23 1411       Living Environment    People in Home child(shimon), adult  Son has been staying with her.    Current Living Arrangements home    Primary Care Provided by self       Transition Planning    Patient/Family Anticipates Transition to inpatient rehabilitation facility    Patient/Family Anticipated Services at Transition        Discharge Needs Assessment    Equipment Currently Used at Home none    Concerns to be Addressed discharge planning    Equipment Needed After Discharge none               Discharge Plan     Row Name 04/11/23 1412       Plan    Plan Comments Spoke with the patient at bedside. Patient lives with son in [facility]. Son has been helping with ADL's. Patient recently come from an outside facility and plan is to go to [facility]. Patient will need a new pre-authorization for [facility]. PCP is Dave Tobar. Insurance is [employer] Replacement. She currently doesn't use any DME. Requested a rollator. She is not current with home health services. Discharge plan is [facility] and will need transport. CM will follow for discharge needs.    Final Discharge Disposition Code 03 - skilled nursing facility (SNF)    Row Name 04/11/23 1407       Plan    Plan Comments Spoke    Final Discharge Disposition Code 03 - skilled nursing facility (SNF)              Continued Care and Services - Admitted Since 4/10/2023    Coordination has not been started for this encounter.     Selected Continued Care - Episodes Includes continued care and service providers with selected services from the active episodes listed below    Hyperlipidemia Episode start date: 10/11/2022   There are no active outsourced providers for this episode.               Expected Discharge Date  and Time     Expected Discharge Date Expected Discharge Time    Apr 12, 2023          Demographic Summary     Row Name 04/11/23 1411       General Information    Admission Type inpatient    Preferred Language English               Functional Status     Row Name 04/11/23 1411       Functional Status    Usual Activity Tolerance fair    Current Activity Tolerance fair       Functional Status, IADL    Medications independent    Meal Preparation independent    Housekeeping independent    Laundry independent    Shopping independent               Psychosocial    No documentation.                Abuse/Neglect    No documentation.                Legal    No documentation.                Substance Abuse    No documentation.                Patient Forms    No documentation.                   Vale Lechuga RN  Continued Stay Note  [facility]     Patient Name: Christine Garg  MRN: [id number]  Today's Date: 4/11/2023    Admit Date: 4/10/2023        Discharge Plan     Row Name 04/11/23 1412       Plan    Plan Comments Spoke with the patient at bedside. Patient lives with son in [facility]. Son has been helping with ADL's. Patient recently come from an outside facility and plan is to go to [facility]. Patient will need a new pre-authorization for [facility]. PCP is Dave Tobar. Insurance is [employer] Medicare Replacement. She currently doesn't use any DME. Requested a rollator. She is not current with home health services. Discharge plan is [facility] and will need transport. CM will follow for discharge needs.    Final Discharge Disposition Code 03 - skilled nursing facility (SNF)    Row Name 04/11/23 1407       Plan    Plan Comments Spoke    Final Discharge Disposition Code 03 - skilled nursing facility (SNF)               Discharge Codes    No documentation.               Expected Discharge Date and Time     Expected Discharge Date Expected Discharge Time    Apr 12, 2023             Vale VASQUEZ  Ankush, RN

## 2023-04-11 NOTE — THERAPY EVALUATION
Patient Name: Christine Garg  : 1933    MRN: [id number]                              Today's Date: 2023       Admit Date: 4/10/2023    Visit Dx:     ICD-10-CM ICD-9-CM   1. Esophageal stricture  K22.2 530.3     Patient Active Problem List   Diagnosis   • Essential hypertension   • Dyslipidemia   • ASCVD (arteriosclerotic cardiovascular disease)   • Palpitations   • Coronary artery disease    • Abnormal PFTs (pulmonary function tests)   • Chronic obstructive pulmonary disease   • Mild persistent asthma with allergic rhinitis   • Pulmonary nodule   • Former smoker   • Colitis   • Perforated ulcer   • Acute gastric ulcer with perforation   • Moderate malnutrition   • Esophageal stricture   • Hypothyroidism (acquired)   • S/P percutaneous endoscopic gastrostomy (PEG) tube placement   • Adenocarcinoma     Past Medical History:   Diagnosis Date   • Advanced age    • CAD (coronary artery disease) 2011    s/p 3v CABG   • Chronic kidney disease (CKD), stage III (moderate)    • COPD (chronic obstructive pulmonary disease)    • History of tobacco use    • Hyperlipidemia    • Hypertension    • Hypothyroidism    • PONV (postoperative nausea and vomiting)    • Pulmonary nodule      Past Surgical History:   Procedure Laterality Date   • BREAST BIOPSY Left     benign   • CATARACT EXTRACTION     • CORONARY ANGIOPLASTY     • CORONARY ARTERY BYPASS GRAFT     • ENDOSCOPY W/ PEG TUBE PLACEMENT N/A 2023    Procedure: ESOPHAGOGASTRODUODENOSCOPY WITH PERCUTANEOUS ENDOSCOPIC GASTROSTOMY TUBE INSERTION;  Surgeon: Aurora Kirkland MD;  Location: [facility];  Service: General;  Laterality: N/A;   • EXPLORATORY LAPAROTOMY N/A 3/22/2023    Procedure: LAPAROTOMY EXPLORATORY WITH OVER SEW OF DUODENAL ULCER WITH OMENTAL PATCH AND BIOPATCH AND CENTRAL LINE PLACEMENT;  Surgeon: Aurora Kirkland MD;  Location: [facility] OR;  Service: General;  Laterality: N/A;      General Information     Row Name 23 1437          Physical  Therapy Time and Intention    Document Type evaluation (P)   -HT     Mode of Treatment physical therapy (P)   -HT     Row Name 04/11/23 1437          General Information    Patient Profile Reviewed yes (P)   -HT     Prior Level of Function independent:;all household mobility;community mobility;gait;transfer;bed mobility (P)   -HT     Existing Precautions/Restrictions fall;oxygen therapy device and L/min;other (see comments) (P)   abd incision, PEG  -HT     Barriers to Rehab medically complex;previous functional deficit (P)   -HT     Row Name 04/11/23 1437          Living Environment    People in Home child(shimon), adult (P)   -HT     Row Name 04/11/23 1437          Home Main Entrance    Number of Stairs, Main Entrance four (P)   -HT     Stair Railings, Main Entrance railings on both sides of stairs (P)   -HT     Row Name 04/11/23 1437          Stairs Within Home, Primary    Number of Stairs, Within Home, Primary none (P)   -HT     Row Name 04/11/23 1437          Cognition    Orientation Status (Cognition) oriented x 3 (P)   -HT     Row Name 04/11/23 1437          Safety Issues, Functional Mobility    Safety Issues Affecting Function (Mobility) awareness of need for assistance;insight into deficits/self-awareness;safety precaution awareness;safety precautions follow-through/compliance (P)   -HT     Impairments Affecting Function (Mobility) endurance/activity tolerance;strength;balance;pain (P)   -HT           User Key  (r) = Recorded By, (t) = Taken By, (c) = Cosigned By    Initials Name Provider Type    HT Clare Fulton, PT Student PT Student               Mobility     Row Name 04/11/23 1439          Bed Mobility    Bed Mobility sit-supine (P)   -HT     Sit-Supine [place] (Bed Mobility) moderate assist (50% patient effort);1 person assist;verbal cues (P)   -HT     Assistive Device (Bed Mobility) bed rails;draw sheet;head of bed elevated (P)   -HT     Comment, (Bed Mobility) pt required cues for sequencing and  assist with LEs. Required increased time for repositioning d/t pain. (P)   -HT     Row Name 04/11/23 1439          Sit-Stand Transfer    Sit-Stand [place] (Transfers) minimum assist (75% patient effort);1 person assist;verbal cues (P)   -HT     Assistive Device (Sit-Stand Transfers) walker, front-wheeled (P)   -HT     Row Name 04/11/23 1439          Gait/Stairs (Locomotion)    [place] Level (Gait) contact guard (P)   -HT     Assistive Device (Gait) walker, front-wheeled (P)   -HT     Distance in Feet (Gait) 5 (P)   -HT     Deviations/Abnormal Patterns (Gait) bilateral deviations;base of support, narrow;denny decreased;festinating/shuffling (P)   -HT     Bilateral Gait Deviations heel strike decreased;forward flexed posture (P)   -HT     Comment, (Gait/Stairs) Pt ambulated from chair to EOB with step through gait pattern showing decreased denny, decreased stride length B, and forward trunk requiring cues for upright posture and walker management. Pt refused further mobility d/t pain. (P)   -HT           User Key  (r) = Recorded By, (t) = Taken By, (c) = Cosigned By    Initials Name Provider Type    HT Clare Fulton, PT Student PT Student               Obj/Interventions     Row Name 04/11/23 1449          Range of Motion Comprehensive    General Range of Motion bilateral lower extremity ROM WFL (P)   -HT     Row Name 04/11/23 1449          Strength Comprehensive (MMT)    General Manual Muscle Testing (MMT) Assessment lower extremity strength deficits identified (P)   -HT     Comment, General Manual Muscle Testing (MMT) Assessment 3+/5 B hip flex, B knee ext and B DF 4-/5 (P)   -HT     Row Name 04/11/23 1449          Balance    Balance Assessment sitting static balance;sitting dynamic balance;standing static balance;standing dynamic balance (P)   -HT     Static Sitting Balance standby assist (P)   -HT     Dynamic Sitting Balance contact guard (P)   -HT     Position, Sitting Balance sitting in chair (P)    -HT     Static Standing Balance contact guard (P)   -HT     Dynamic Standing Balance contact guard (P)   -HT     Position/Device Used, Standing Balance walker, rolling (P)   -HT     Row Name 04/11/23 1449          Sensory Assessment (Somatosensory)    Sensory Assessment (Somatosensory) LE sensation intact (P)   -HT           User Key  (r) = Recorded By, (t) = Taken By, (c) = Cosigned By    Initials Name Provider Type    HT Clare Fulton, PT Student PT Student               Goals/Plan     Row Name 04/11/23 1457          Bed Mobility Goal 1 (PT)    Activity/Assistive Device (Bed Mobility Goal 1, PT) sit to supine/supine to sit (P)   -HT     [place] Level/Cues Needed (Bed Mobility Goal 1, PT) contact guard required (P)   -HT     Time Frame (Bed Mobility Goal 1, PT) long term goal (LTG);10 days (P)   -HT     Row Name 04/11/23 1457          Transfer Goal 1 (PT)    Activity/Assistive Device (Transfer Goal 1, PT) sit-to-stand/stand-to-sit;bed-to-chair/chair-to-bed;walker, rolling (P)   -HT     [place] Level/Cues Needed (Transfer Goal 1, PT) modified independence (P)   -HT     Time Frame (Transfer Goal 1, PT) long term goal (LTG);10 days (P)   -HT     Row Name 04/11/23 1457          Gait Training Goal 1 (PT)    Activity/Assistive Device (Gait Training Goal 1, PT) gait (walking locomotion);assistive device use;walker, rolling (P)   -HT     [place] Level (Gait Training Goal 1, PT) standby assist (P)   -HT     Distance (Gait Training Goal 1, PT) 50 (P)   -HT     Time Frame (Gait Training Goal 1, PT) long term goal (LTG);10 days (P)   -HT     Row Name 04/11/23 1457          Stairs Goal 1 (PT)    Activity/Assistive Device (Stairs Goal 1, PT) ascending stairs;descending stairs;using handrail, left;using handrail, right (P)   -HT     [place] Level/Cues Needed (Stairs Goal 1, PT) minimum assist (75% or more patient effort) (P)   -HT     Number of Stairs (Stairs Goal 1, PT) 4 (P)   -HT     Time Frame (Stairs  Goal 1, PT) long term goal (LTG);10 days (P)   -HT     Row Name 04/11/23 1455          Therapy Assessment/Plan (PT)    Planned Therapy Interventions (PT) balance training;bed mobility training;gait training;home exercise program;postural re-education;patient/family education;neuromuscular re-education;stair training;strengthening;transfer training (P)   -HT           User Key  (r) = Recorded By, (t) = Taken By, (c) = Cosigned By    Initials Name Provider Type    HT Clare Fulton, PT Student PT Student               Clinical Impression     Row Name 04/11/23 1455          Pain    Pretreatment Pain Rating 7/10 (P)   -HT     Posttreatment Pain Rating 7/10 (P)   -HT     Pain Location - Side/Orientation Left (P)   -HT     Pain Location - abdomen (P)   -HT     Pain Intervention(s) Ambulation/increased activity;Repositioned (P)   -HT     Additional Documentation Pain Scale: Numbers Pre/Post-Treatment (Group) (P)   -HT     Row Name 04/11/23 1454          Plan of Care Review    Plan of Care Reviewed With patient (P)   -HT     Progress no change (P)   -HT     Outcome Evaluation Pt ambulated 5' with RW and CGA refusing further mobility d/t pain. Rec skilled PT to increase ind with mobility and d/c to IRF. (P)   -HT     Row Name 04/11/23 3949          Therapy Assessment/Plan (PT)    Rehab Potential (PT) good, to achieve stated therapy goals (P)   -HT     Criteria for Skilled Interventions Met (PT) yes;meets criteria;skilled treatment is necessary (P)   -HT     Therapy Frequency (PT) daily (P)   -HT     Row Name 04/11/23 1456          Vital Signs    Pre Systolic BP Rehab 151 (P)   -HT     Pre Treatment Diastolic BP 69 (P)   -HT     Post Systolic BP Rehab 169 (P)   -HT     Post Treatment Diastolic BP 72 (P)   -HT     Pretreatment Heart Rate (beats/min) 79 (P)   -HT     Posttreatment Heart Rate (beats/min) 78 (P)   -HT     Pre SpO2 (%) 97 (P)   -HT     O2 Delivery Pre Treatment nasal cannula (P)   -HT     O2 Delivery Intra  Treatment nasal cannula (P)   -HT     Post SpO2 (%) 97 (P)   -HT     O2 Delivery Post Treatment nasal cannula (P)   -HT     Pre Patient Position Sitting (P)   -HT     Intra Patient Position Standing (P)   -HT     Post Patient Position Supine (P)   -HT     Row Name 04/11/23 1453          Positioning and Restraints    Pre-Treatment Position sitting in chair/recliner (P)   -HT     Post Treatment Position bed (P)   -HT     In Bed notified nsg;fowlers;exit alarm on;encouraged to call for assist;call light within reach;with family/caregiver (P)   -HT           User Key  (r) = Recorded By, (t) = Taken By, (c) = Cosigned By    Initials Name Provider Type     Clare Fulton, PT Student PT Student               Outcome Measures     Row Name 04/11/23 1459          How much help from another person do you currently need...    Turning from your back to your side while in flat bed without using bedrails? 2 (P)   -HT     Moving from lying on back to sitting on the side of a flat bed without bedrails? 2 (P)   -HT     Moving to and from a bed to a chair (including a wheelchair)? 3 (P)   -HT     Standing up from a chair using your arms (e.g., wheelchair, bedside chair)? 3 (P)   -HT     Climbing 3-5 steps with a railing? 2 (P)   -HT     To walk in hospital room? 3 (P)   -HT     AM-PAC 6 Clicks Score (PT) 15 (P)   -HT     Highest level of mobility 4 --> Transferred to chair/commode (P)   -HT     Row Name 04/11/23 1459 04/11/23 1329       Functional Assessment    Outcome Measure Options AM-PAC 6 Clicks Basic Mobility (PT) (P)   -HT AM-PAC 6 Clicks Daily Activity (OT)  -          User Key  (r) = Recorded By, (t) = Taken By, (c) = Cosigned By    Initials Name Provider Type    Ca Browne OT Occupational Therapist     Clare Fulton, PT Student PT Student                             Physical Therapy Education     Title: PT OT SLP Therapies (In Progress)     Topic: Physical Therapy (In Progress)     Point: Mobility  training (Done)     Learning Progress Summary           Patient Acceptance, E, VU,NR by HT at 4/11/2023 1500                   Point: Home exercise program (Not Started)     Learner Progress:  Not documented in this visit.          Point: Body mechanics (Done)     Learning Progress Summary           Patient Acceptance, E, VU,NR by HT at 4/11/2023 1500                   Point: Precautions (Done)     Learning Progress Summary           Patient Acceptance, E, VU,NR by HT at 4/11/2023 1500                               User Key     Initials Effective Dates Name Provider Type [facility] 01/12/23 -  Clare Fluton, PT Student PT Student PT              PT Recommendation and Plan  Planned Therapy Interventions (PT): (P) balance training, bed mobility training, gait training, home exercise program, postural re-education, patient/family education, neuromuscular re-education, stair training, strengthening, transfer training  Plan of Care Reviewed With: (P) patient  Progress: (P) no change  Outcome Evaluation: (P) Pt ambulated 5' with RW and CGA refusing further mobility d/t pain. Rec skilled PT to increase ind with mobility and d/c to IRF.     Time Calculation:    PT Charges     Row Name 04/11/23 1501             Time Calculation    Start Time 1351 (P)   -HT      PT Received On 04/11/23 (P)   -HT      PT Goal Re-Cert Due Date 04/21/23 (P)   -HT         Timed Charges    04323 - PT Therapeutic Activity Minutes 8 (P)   -HT         Untimed Charges    PT Eval/Re-eval Minutes 47 (P)   -HT         Total Minutes    Timed Charges Total Minutes 8 (P)   -HT      Untimed Charges Total Minutes 47 (P)   -HT       Total Minutes 55 (P)   -HT            User Key  (r) = Recorded By, (t) = Taken By, (c) = Cosigned By    Initials Name Provider Type    HT Clare Fulton, PT Student PT Student              Therapy Charges for Today     Code Description Service Date Service Provider Modifiers Qty    [id number]  PT THERAPEUTIC ACT EA 15  MIN 4/11/2023 Clare Fulton, PT Student GP 1    [id number] HC PT EVAL MOD COMPLEXITY 4 4/11/2023 Clare Fulton, PT Student GP 1          PT G-Codes  Outcome Measure Options: (P) AM-PAC 6 Clicks Basic Mobility (PT)  AM-PAC 6 Clicks Score (PT): (P) 15  AM-PAC 6 Clicks Score (OT): 10  PT Discharge Summary  Anticipated Discharge Disposition (PT): (P) inpatient rehabilitation facility    Clare Fulton, PT Student  4/11/2023

## 2023-04-11 NOTE — PROGRESS NOTES
"    [facility] Medicine Services  PROGRESS NOTE    Patient Name: Christine Garg  : 1933  MRN: [id number]    Date of Admission: 4/10/2023  Primary Care Physician: Dave Tobar MD    Subjective   Subjective     CC:  Cannot swallow - food gets stuck    HPI:  Feels okay.  Still has abd soreness from PEG placed Friday at OSH.  Notes that she cannot swallow solids at all; with liquids \"it is hard.\"    ROS:  At home she could walk bed to chair to kitchen.    Not able to walk now.  Was helped to the chair this morning.  Plans to go to [facility] [place]   Breathing 'better than I was'  Very little nutrition since 3/22 admission - tube feeds maxed at 20ml due to 'made my abd feel tight'    Objective   Objective     Vital Signs:   Temp:  [97 °F (36.1 °C)-98.1 °F (36.7 °C)] 97 °F (36.1 °C)  Heart Rate:  [72-85] 85  Resp:  [16-18] 18  BP: (108-161)/(50-81) 148/81  Flow (L/min):  [2] 2     Physical Exam:  Gen:  Thin woman looking younger than her years, very pleasant, family present.  NAD in chair   Neuro: alert and oriented, clear speech, follows commands, grossly nonfocal  HEENT:  NC/AT   Neck:  Supple, no LAD  Heart RRR  Lungs clear anteriorly  Abd:  Soft, PEG LUQ, signif tender on L side.  Healing midline incision  Extrem:  No c/c/e      Results Reviewed:  LAB RESULTS:      Lab 23  0623   WBC 9.11 8.62 10.38   HEMOGLOBIN 9.9* 10.0* 10.1*   HEMATOCRIT 31.1* 30.8* 32.6*   PLATELETS 280 285 249   NEUTROS ABS 6.38  --  7.63*   IMMATURE GRANS (ABS) 0.06*  --  0.04   LYMPHS ABS 1.12  --  1.18   MONOS ABS 0.69  --  0.61   EOS ABS 0.80*  --  0.82*   MCV 96.9 96.6 104.2*   PROTIME  --  12.4  --          Lab 23  02423  0139 23  0048 23  1751   SODIUM 132* 135* 138  --    POTASSIUM 4.0 4.1 4.6 4.4   CHLORIDE 99 100 103  --    CO2 28.1 27.6 29.5*  --    ANION GAP 4.9* 7.4 5.5  --    BUN 10 7* 9  --    CREATININE 0.42* 0.52* 0.51*  --    EGFR " 93.6 88.9 89.4  --    GLUCOSE 119* 127* 97  --    CALCIUM 8.6 8.6 9.1  --    MAGNESIUM 2.0 2.1 1.8  --    PHOSPHORUS 2.9 3.5 3.3  --          Lab 04/09/23  0242 04/06/23  0139   TOTAL PROTEIN 4.8* 4.6*   ALBUMIN 2.3* 2.5*   GLOBULIN 2.5 2.1   ALT (SGPT) 7 11   AST (SGOT) 14 11   BILIRUBIN 0.2 <0.2   ALK PHOS 98 88         Lab 04/09/23  0242   PROTIME 12.4   INR 0.91             Lab 04/11/23  0614   ABO TYPING A   RH TYPING Positive   ANTIBODY SCREEN Negative         Brief Urine Lab Results  (Last result in the past 365 days)      Color   Clarity   Blood   Leuk Est   Nitrite   Protein   CREAT   Urine HCG        01/06/23 0149 Yellow   Clear   Negative   Negative   Negative   Negative                 Microbiology Results Abnormal     None          CT Abdomen Pelvis Without Contrast    Result Date: 4/10/2023   EXAM DATE:   4/10/2023 11:29 AM  CLINICAL HISTORY:   abdominal pain; K25.1-Acute gastric ulcer with perforation; E87.7-Broq-yqdfkwlbpl and hyponatremia; E87.6-Hypokalemia; A41.9-Sepsis, unspecified organism; K27.5-Chronic or unspecified peptic ulcer, site unspecified, with perforation; E44.0-Moderate protein-calorie malnutrition  TECHNIQUE:   Axial computed tomography images of the abdomen and pelvis without intravenous contrast.  Sagittal and coronal reformatted images were created and reviewed.  This CT exam was performed using one or more of the following dose reduction techniques:  automated exposure control, adjustment of the mA and/or kV according to patient size, and/or use of iterative reconstruction technique.  COMPARISON: 04/04/2023  FINDINGS:   LUNG BASES:  Unremarkable.  No mass.  No consolidation.   PLEURAL SPACE:  Small bilateral pleural effusions.   ABDOMEN:   LIVER:  Unremarkable.   GALLBLADDER AND BILE DUCTS:  Unremarkable.  No calcified stones.  No ductal dilation.   PANCREAS:  Unremarkable.  No ductal dilation.   SPLEEN:  Unremarkable.  No splenomegaly.   ADRENALS:  Unremarkable.  No mass.    KIDNEYS AND URETERS:  Unremarkable.  No obstructing stones.  No hydronephrosis.   STOMACH AND BOWEL:  Contrast noted throughout the colon.  No obstruction.  No mucosal thickening.   PELVIS:   APPENDIX:  No findings to suggest acute appendicitis.   BLADDER:  Unremarkable.  No stones.   REPRODUCTIVE:  Unremarkable as visualized.   ABDOMEN and PELVIS:   INTRAPERITONEAL SPACE:  See below.    BONES/JOINTS:  No acute fracture.  No dislocation.   SOFT TISSUES:  Unremarkable.   VASCULATURE:  Atherosclerotic disease.  No abdominal aortic aneurysm.   LYMPH NODES:  Unremarkable.  No enlarged lymph nodes.   TUBES, LINES AND DEVICES:  Gastric tube noted in the stomach. This has been recently placed and small to moderate intraperitoneal free air persists.        Impression: 1.  Small bilateral pleural effusions. 2.  Gastric tube noted in the stomach. This has been recently placed and small to moderate intraperitoneal free air persists.   This report was finalized on 4/10/2023 12:06 PM by Dr. Henry Lozano MD.        Results for orders placed during the hospital encounter of 03/22/23    Adult Transthoracic Echo Complete W/ Cont if Necessary Per Protocol    Interpretation Summary  •  Left ventricular systolic function is normal. Calculated left ventricular EF = 60% Left ventricular ejection fraction appears to be 56 - 60%.  •  Left ventricular diastolic function is consistent with (grade I) impaired relaxation and age.  •  Moderate to severe mitral valve regurgitation is present.  •  Moderate tricuspid valve regurgitation is present.  •  Estimated right ventricular systolic pressure from tricuspid regurgitation is moderately elevated (45-55 mmHg).  •  Moderate pulmonary hypertension is present.      Current medications:  Scheduled Meds:aspirin, 81 mg, Oral, Daily  carvedilol, 25 mg, Oral, BID With Meals  hydrALAZINE, 50 mg, Oral, Q8H  levothyroxine, 112 mcg, Oral, Q AM  pantoprazole, 40 mg, Oral, BID AC  senna-docusate sodium, 2  tablet, Oral, BID  sodium chloride, 10 mL, Intravenous, Q12H  valsartan, 320 mg, Oral, Q24H      Continuous Infusions:sodium chloride, 75 mL/hr, Last Rate: 75 mL/hr (04/11/23 0133)      PRN Meds:.•  acetaminophen **OR** acetaminophen **OR** acetaminophen  •  senna-docusate sodium **AND** polyethylene glycol **AND** bisacodyl **AND** bisacodyl  •  cloNIDine  •  HYDROcodone-acetaminophen  •  HYDROmorphone  •  ondansetron **OR** ondansetron  •  sodium chloride  •  sodium chloride    Assessment & Plan   Assessment & Plan     Active Hospital Problems    Diagnosis  POA   • **Esophageal stricture [K22.2]  Unknown   • Hypothyroidism (acquired) [E03.9]  Unknown   • S/P percutaneous endoscopic gastrostomy (PEG) tube placement [Z93.1]  Not Applicable   • Adenocarcinoma [C80.1]  Unknown   • Perforated ulcer [K27.5]  Yes   • Former smoker [Z87.891]  Not Applicable   • Coronary artery disease  [I25.10]  Yes   • Chronic obstructive pulmonary disease [J44.9]  Yes   • Essential hypertension [I10]  Yes   • ASCVD (arteriosclerotic cardiovascular disease) [I25.10]  Yes      Resolved Hospital Problems   No resolved problems to display.        Brief Hospital Course to date:  Christine Garg is a 89 y.o. female admitted to [facility] on 3/22 for abd pain; went to OR, had perforated duod ulcer repaired w omental patch.  Then on 3/29, patient had CT of thoracic spine which noted left pleural consolidation and parenchymal nodules.  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  She was also found to have an esophageal stricture secondary to extrinsic compression of 3cm peritracheal lymph node.  On 4/7, patient had PEG tube placed.  She was sent to [facility] for placement of esophageal stent by Dr. Waters.   PMH of hypertension, hyperlipidemia, COPD, coronary artery disease, peripheral artery disease, hypothyroidism     Esophagus w external compression by LAD  S/p PEG but intolerant  of tube feeds  - 2/2 extrinsic compression by paratracheal lymph node  - mario n for esoph stent by Dr Waters - spoke w him and Dr Brunner, plans in progress, timing unknown   -Pain control, IV fluids     Perforated Duodenal Ulcer  S/p Ex lap with patch 3/22  -Midline abdominal incision C/D/I  -Continue PPI twice daily  -lower aspirin to 81 mg instead of 325  -Consult wound  -Periop treatment was cefazolin only.  No active infection noted.  Discussed w ID:  No indication for late prophylactic abx.       New dx of lung AdenoCA  COPD  Former smoker   On oxygen 2L here   -PRN duonebs  -Follow up outpatient for treatment plan    CAD HLD  -lower home aspirin to 81mg. Will need to make this clear at discharge in order to prevent further ulceration     Hypoalbuminemia   -Albumin 2.3; consult nutrition     HTN  -Continue home coreg, valsartan, hydralazine   -PRN clonidine       Hypothyroid -Continue home synthroid      Debility:  Formerly walked a bit in the home  - PT  - patient had been approved to [facility] for subacute rehab in [place]. Patient would still like to go there after discharge. Patient is to follow up outpatient for treatment options regarding her cancer.      Expected Discharge Location and Transportation:  To [facility]  Expected Discharge        DVT prophylaxis:  Mechanical DVT prophylaxis orders are present.     AM-PAC 6 Clicks Score (PT): 11 (04/10/23 1928)    CODE STATUS:   Code Status and Medical Interventions:   Ordered at: 04/10/23 8430     Code Status (Patient has no pulse and is not breathing):    CPR (Attempt to Resuscitate)     Medical Interventions (Patient has pulse or is breathing):    Full Support       Jocelyne Oropeza MD  04/11/23

## 2023-04-11 NOTE — CONSULTS
Clinical Nutrition     Nutrition Support Assessment  Reason for Visit: Nurse practioner/physician assistant consult, Difficulty chewing/swallowing, chronic poor intake      Patient Name: Christine Garg  YOB: 1933  MRN: [id number]  Date of Encounter: 04/11/23 12:59 EDT  Admission date: 4/10/2023    Comments:    Pt meets criteria for acute severe malnutrition with the indicators of significant weight loss of 7.6% x1 month & <50% of EEN for >5d.     Pt with known PEG placement on 4/7 with limited tolerance and inability to advance past 20mL/hr. Prior to initiation of EN, pt with severely limited PO intake for ~3wks. Pt is at high risk for refeeding syndrome and will require close monitoring of electrolytes.     When appropriate to begin TF via PEG:  Initiate Peptamen AF @ 15mL/hr and advance by 15mL q8hrs as tolerated to goal rate of 55mL/hr.   Flush with 10mL/hr.   Infused at goal over 22hrs, this regimen will provide: 1210mL TGV, 1452kcal, 92g protein, 7g fiber,  981mL TF FW (+220mL flush = 1201mL total)    · Ordered Prealbumin and CRP  · Once EN is initiated will order routine nutrition panel for electrolyte monitoring.     Nutrition Assessment   Admission Diagnosis:  Esophageal stricture [K22.2]      Problem List:    Esophageal stricture    Essential hypertension    ASCVD (arteriosclerotic cardiovascular disease)    Coronary artery disease     Chronic obstructive pulmonary disease    Former smoker    Perforated ulcer    Hypothyroidism (acquired)    S/P percutaneous endoscopic gastrostomy (PEG) tube placement    Adenocarcinoma  Severe Malnutrition      PMH:   She  has a past medical history of Advanced age, CAD (coronary artery disease) (2011), Chronic kidney disease (CKD), stage III (moderate), COPD (chronic obstructive pulmonary disease), History of tobacco use, Hyperlipidemia, Hypertension, Hypothyroidism, PONV (postoperative nausea and vomiting), and Pulmonary nodule.    PSH:  She   has a past surgical history that includes Breast biopsy (Left); Cataract extraction; Coronary angioplasty; Coronary artery bypass graft; Exploratory Laparotomy (N/A, 3/22/2023); and Esophagogastroduodenoscopy w/ PEG (N/A, 4/7/2023).      Applicable Nutrition Concerns:   Skin: surgical incision abdoment  Oral:  GI: PEG      Applicable Interval History:     3/22: duodenal ulcer repair  4/7: PEG placed at  [facility]    Reported/Observed/Food/Nutrition Related History:     Pt laying in bed with family at bedside - able to provide some weight/nutrition hx, additional information provided by spouse and son at bedside. Endorsed recent hx of inability to tolerate PO intake of any kind without emesis for ~3wks prior to hospitalization in March. PEG placed 2/2 esophageal stricture and inability for diet advance past clear liquids. Unable to tolerate EN order of Nutren 1.5 >20mL/hr (goal was 50mL/hr) 2/2 abdominal tightness. Mild hyponatremia noted. Denies N/V/D - reports soft BM. Plan for esophageal stent placement - ? Timeline.   Educated on EN formula most likely to choose, verbalized understanding.     Labs    Labs Reviewed: Yes     Results from last 7 days   Lab Units 04/11/23  0614 04/09/23  0242 04/06/23  0139 04/05/23  0048   GLUCOSE mg/dL 85 119* 127* 97   BUN mg/dL 10 10 7* 9   CREATININE mg/dL 0.41* 0.42* 0.52* 0.51*   SODIUM mmol/L 135* 132* 135* 138   CHLORIDE mmol/L 98 99 100 103   POTASSIUM mmol/L 4.6 4.0 4.1 4.6   PHOSPHORUS mg/dL  --  2.9 3.5 3.3   MAGNESIUM mg/dL 1.7 2.0 2.1 1.8   ALT (SGPT) U/L  --  7 11  --        Results from last 7 days   Lab Units 04/09/23  0242 04/06/23  0139   ALBUMIN g/dL 2.3* 2.5*       Results from last 7 days   Lab Units 04/11/23  0601 04/11/23  0001 04/04/23  1712   GLUCOSE mg/dL 87 85 92     Lab Results   Lab Value Date/Time    HGBA1C 6.10 (H) 03/22/2023 1139    HGBA1C 6.00 (H) 12/19/2022 1638                 Medications    Medications Reviewed: Yes  Pertinent: protonix,  "percolace, synthroid  Infusion: NaCl @ 75mL/hr  PRN: Norco      Intake/Ouptut 24 hrs (0701 - 0700)   I&O's Reviewed: Yes       Anthropometrics     Flowsheet Rows    Flowsheet Row First Filed Value   Admission Height 165.1 cm (65\") Documented at 04/11/2023 0532   Admission Weight 66.2 kg (146 lb) Documented at 04/11/2023 0532          Height: Height: 165.1 cm (65\")  Last Filed Weight: Weight: 66.2 kg (146 lb) (04/11/23 0532)  Method: Weight Method: Bed scale  BMI: BMI (Calculated): 24.3  BMI classification: Normal: 18.5-24.9kg/m2  IBW:  125lbs    UBW:      Weight       Weight (kg) Weight (lbs) Weight Method Visit Report   12/19/2022 70.081 kg  154 lb 8 oz  Bed scale      68.04 kg  150 lb  Stated      68.04 kg  150 lb      12/20/2022 --  --      12/21/2022 70.943 kg  156 lb 6.4 oz  Bed scale     12/22/2022 71.578 kg  157 lb 12.8 oz  Bed scale     12/23/2022 71.487 kg  157 lb 9.6 oz  Bed scale     12/24/2022 74.118 kg  163 lb 6.4 oz  Bed scale     12/25/2022 73.936 kg  163 lb  Bed scale     1/5/2023 68.04 kg  150 lb  Stated     3/22/2023 71.895 kg  158 lb 8 oz  Bed scale      56.7 kg  125 lb  Standing scale      56.7 kg  125 lb  Stated     3/25/2023 70.625 kg  155 lb 11.2 oz  Bed scale     3/26/2023 67.722 kg  149 lb 4.8 oz  Bed scale     3/28/2023 67.042 kg  147 lb 12.8 oz  Bed scale     3/29/2023 65.227 kg  143 lb 12.8 oz  Bed scale     3/30/2023 65.454 kg  144 lb 4.8 oz  Bed scale     3/31/2023 66.271 kg  146 lb 1.6 oz  Bed scale     4/1/2023 66.18 kg  145 lb 14.4 oz  Bed scale     4/2/2023 66.906 kg  147 lb 8 oz  Bed scale     4/3/2023 65.363 kg  144 lb 1.6 oz  Bed scale     4/4/2023 65.817 kg  145 lb 1.6 oz  Bed scale     4/11/2023 66.225 kg  146 lb  Bed scale       Weight change:   Had a significant weight loss of 12lbs (7.6%) in ~1 month     Nutrition Focused Physical Exam     Date: 4/11     NFPE completed, patient meets criteria for MSA at this time.     Needs Assessment   Date: 4/11    Height used:Height: " "165.1 cm (65\")  Weights used: 66kg CBW; 57kg IBW      Estimated Calorie needs: ~ 1525 Kcal/day  Method: 23-28 Kcals/KG = 1632-6836  Method:  MSJ (1085) x1.4 = 1519    Estimated Protein needs: ~ 90 g PRO/day  Method: 1.3-1.5g/Kg = 86-99    Estimated Fluid needs: ~ ml/day   Per clinical status    Current Nutrition Prescription     PO: NPO Diet NPO Type: Strict NPO  Oral Nutrition Supplement:   Intake: N/A      Nutrition Diagnosis   Date: 4/11 Updated:   Problem Malnutrition acute severe   Etiology Dysphagia & recent abdominal surgery   Signs/Symptoms significant weight loss of 7.6% x1 month & <50% of EEN for >5d.    Status:     Date: 4/11  Updated:  Problem Inadequate enteral nutrition infusion   Etiology Dysphagia   Signs/Symptoms EN not yet restarted, intolerance to previous regimen and not at goal   Status:     Goal:   General: Nutrition support treatment, Improve nutrition related labs  PO: Advace diet as medically feasible/appropriate  EN/PN: Initiate EN, Establish EN tolerance, Deliver estimated needs    Nutrition Intervention      Follow treatment progress, Care plan reviewed    When appropriate to begin TF via PEG:  Initiate Peptamen AF @ 15mL/hr and advance by 15mL q8hrs as tolerated to goal rate of 55mL/hr.   Flush with 10mL/hr.   Infused at goal over 22hrs, this regimen will provide: 1210mL TGV, 1452kcal, 92g protein, 7g fiber,  981mL TF FW (+220mL flush = 1201mL total)    · Ordered Prealbumin and CRP  · Once EN is initiated will order routine nutrition panel for electrolyte monitoring.       Monitoring/Evaluation:   Per protocol, I&O, Pertinent labs, EN delivery/tolerance, Weight, GI status, Symptoms, Swallow function      Suad Emmanuel, MS,RD,LD  Time Spent: 40min    "

## 2023-04-12 ENCOUNTER — ANESTHESIA EVENT (OUTPATIENT)
Dept: GASTROENTEROLOGY | Facility: HOSPITAL | Age: 88
End: 2023-04-12
Payer: MEDICARE

## 2023-04-12 ENCOUNTER — ANESTHESIA (OUTPATIENT)
Dept: GASTROENTEROLOGY | Facility: HOSPITAL | Age: 88
End: 2023-04-12
Payer: MEDICARE

## 2023-04-12 PROCEDURE — 25010000002 PROPOFOL 10 MG/ML EMULSION: Performed by: NURSE ANESTHETIST, CERTIFIED REGISTERED

## 2023-04-12 RX ORDER — LIDOCAINE HYDROCHLORIDE 10 MG/ML
INJECTION, SOLUTION EPIDURAL; INFILTRATION; INTRACAUDAL; PERINEURAL AS NEEDED
Status: DISCONTINUED | OUTPATIENT
Start: 2023-04-12 | End: 2023-04-12 | Stop reason: SURG

## 2023-04-12 RX ORDER — PROPOFOL 10 MG/ML
VIAL (ML) INTRAVENOUS AS NEEDED
Status: DISCONTINUED | OUTPATIENT
Start: 2023-04-12 | End: 2023-04-12 | Stop reason: SURG

## 2023-04-12 RX ADMIN — PROPOFOL 50 MG: 10 INJECTION, EMULSION INTRAVENOUS at 09:19

## 2023-04-12 RX ADMIN — LIDOCAINE HYDROCHLORIDE 50 MG: 10 INJECTION, SOLUTION EPIDURAL; INFILTRATION; INTRACAUDAL; PERINEURAL at 09:19

## 2023-04-12 RX ADMIN — PROPOFOL 50 MCG/KG/MIN: 10 INJECTION, EMULSION INTRAVENOUS at 09:19

## 2023-04-12 NOTE — BRIEF OP NOTE
ESOPHAGOGASTRODUODENOSCOPY WITH STENT PLACEMENT  Progress Note    Christine Garg  4/12/2023    A 2 cm extrinsic stricture is seen in the esophagus at 20 cm from incisors, compatible with known metastatic lung cancer.  A PEG is seen in the stomach, without ulceration under the bumper.  Duodenum is normal.  For palliation, an 18 mm x 103 mm wall flex covered esophageal stent was deployed across the esophageal stricture over a guidewire with fluoroscopic guidance.  The proximal edge of the esophageal stent was attached to the esophageal mucosa with an [employer] StentFix clip.    >> Liquid diet for 3 days, then puréed consistency.    Mark I. Brunner, MD     Date: 4/12/2023  Time: 10:41 EDT

## 2023-04-12 NOTE — CONSULTS
Clinical Nutrition     Nutrition Support Assessment  Reason for Visit: Follow-up protocol, Consult, EN      Patient Name: Christine Garg  YOB: 1933  MRN: [id number]  Date of Encounter: 04/12/23 14:10 EDT  Admission date: 4/10/2023    Comments:    Pt did not tolerate EN at 15mL/hr. EN off following stent placement. Plan to flush PEG with 75mL q8hrs for tube patency.    Full liquid diet initiated - pt does not like original ONS but agreeable to Breeze.     Nutrition Assessment   Admission Diagnosis:  Esophageal stricture [K22.2]      Problem List:    Esophageal stricture    Essential hypertension    ASCVD (arteriosclerotic cardiovascular disease)    Coronary artery disease     Chronic obstructive pulmonary disease    Former smoker    Perforated ulcer    Hypothyroidism (acquired)    S/P percutaneous endoscopic gastrostomy (PEG) tube placement    Adenocarcinoma  Severe Malnutrition      PMH:   She  has a past medical history of Advanced age, CAD (coronary artery disease) (2011), Chronic kidney disease (CKD), stage III (moderate), COPD (chronic obstructive pulmonary disease), History of tobacco use, Hyperlipidemia, Hypertension, Hypothyroidism, PONV (postoperative nausea and vomiting), and Pulmonary nodule.    PSH:  She  has a past surgical history that includes Breast biopsy (Left); Cataract extraction; Coronary angioplasty; Coronary artery bypass graft; Exploratory Laparotomy (N/A, 3/22/2023); and Esophagogastroduodenoscopy w/ PEG (N/A, 4/7/2023).      Applicable Nutrition Concerns:   Skin: surgical incision abdoment  Oral:  GI: PEG      Applicable Interval History:     3/22: duodenal ulcer repair  4/7: PEG placed at [facility]  4/11: initiated EN  4/12: esophageal stent placement    Reported/Observed/Food/Nutrition Related History:     4/12  Pt unable to tolerate EN at 15mL/hr - c/o abdominal tightness. Esophageal stent placed - diet upgraded to full liquids. Reports tolerating 1 cup of  "OJ.  Pt does not like original ONS but agreeable to Igor.     4/11  Pt laying in bed with family at bedside - able to provide some weight/nutrition hx, additional information provided by spouse and son at bedside. Endorsed recent hx of inability to tolerate PO intake of any kind without emesis for ~3wks prior to hospitalization in March. PEG placed 2/2 esophageal stricture and inability for diet advance past clear liquids. Unable to tolerate EN order of Nutren 1.5 >20mL/hr (goal was 50mL/hr) 2/2 abdominal tightness. Mild hyponatremia noted. Denies N/V/D - reports soft BM. Plan for esophageal stent placement - ? Timeline.   Educated on EN formula most likely to choose, verbalized understanding.     Labs    Labs Reviewed: Yes     Results from last 7 days   Lab Units 04/12/23  0518 04/11/23  0614 04/09/23  0242 04/06/23  0139   GLUCOSE mg/dL 68 85 119* 127*   BUN mg/dL 9 10 10 7*   CREATININE mg/dL 0.40* 0.41* 0.42* 0.52*   SODIUM mmol/L 134* 135* 132* 135*   CHLORIDE mmol/L 99 98 99 100   POTASSIUM mmol/L 4.4 4.6 4.0 4.1   PHOSPHORUS mg/dL  --   --  2.9 3.5   MAGNESIUM mg/dL 1.6 1.7 2.0 2.1   ALT (SGPT) U/L  --   --  7 11       Results from last 7 days   Lab Units 04/11/23  0614 04/09/23  0242 04/06/23  0139   ALBUMIN g/dL  --  2.3* 2.5*   PREALBUMIN mg/dL 9.3*  --   --    CRP mg/dL 18.47*  --   --        Results from last 7 days   Lab Units 04/12/23  1220 04/12/23  0548 04/11/23  2314 04/11/23  1812 04/11/23  1504 04/11/23  0601   GLUCOSE mg/dL 81 76 78 79 83 87     Lab Results   Lab Value Date/Time    HGBA1C 6.10 (H) 03/22/2023 1139    HGBA1C 6.00 (H) 12/19/2022 1638                 Medications    Medications Reviewed: Yes  Pertinent: protonix, percolace, synthroid  Infusion:   PRN: Dilaudid      Intake/Ouptut 24 hrs (0701 - 0700)   I&O's Reviewed: Yes       Anthropometrics     Flowsheet Rows    Flowsheet Row First Filed Value   Admission Height 165.1 cm (65\") Documented at 04/11/2023 0532   Admission Weight 66.2 " "kg (146 lb) Documented at 04/11/2023 0532          Height: Height: 165.1 cm (65\")  Last Filed Weight: Weight: 66.2 kg (146 lb) (04/11/23 0532)  Method: Weight Method: Bed scale  BMI: BMI (Calculated): 24.3  BMI classification: Normal: 18.5-24.9kg/m2  IBW:  125lbs    UBW:      Weight       Weight (kg) Weight (lbs) Weight Method Visit Report   12/19/2022 70.081 kg  154 lb 8 oz  Bed scale      68.04 kg  150 lb  Stated      68.04 kg  150 lb      12/20/2022 --  --      12/21/2022 70.943 kg  156 lb 6.4 oz  Bed scale     12/22/2022 71.578 kg  157 lb 12.8 oz  Bed scale     12/23/2022 71.487 kg  157 lb 9.6 oz  Bed scale     12/24/2022 74.118 kg  163 lb 6.4 oz  Bed scale     12/25/2022 73.936 kg  163 lb  Bed scale     1/5/2023 68.04 kg  150 lb  Stated     3/22/2023 71.895 kg  158 lb 8 oz  Bed scale      56.7 kg  125 lb  Standing scale      56.7 kg  125 lb  Stated     3/25/2023 70.625 kg  155 lb 11.2 oz  Bed scale     3/26/2023 67.722 kg  149 lb 4.8 oz  Bed scale     3/28/2023 67.042 kg  147 lb 12.8 oz  Bed scale     3/29/2023 65.227 kg  143 lb 12.8 oz  Bed scale     3/30/2023 65.454 kg  144 lb 4.8 oz  Bed scale     3/31/2023 66.271 kg  146 lb 1.6 oz  Bed scale     4/1/2023 66.18 kg  145 lb 14.4 oz  Bed scale     4/2/2023 66.906 kg  147 lb 8 oz  Bed scale     4/3/2023 65.363 kg  144 lb 1.6 oz  Bed scale     4/4/2023 65.817 kg  145 lb 1.6 oz  Bed scale     4/11/2023 66.225 kg  146 lb  Bed scale       Weight change:   Had a significant weight loss of 12lbs (7.6%) in ~1 month     Nutrition Focused Physical Exam     Date: 4/11     NFPE completed, patient meets criteria for MSA at this time.     Needs Assessment   Date: 4/11    Height used:Height: 165.1 cm (65\")  Weights used: 66kg CBW; 57kg IBW      Estimated Calorie needs: ~ 1525 Kcal/day  Method: 23-28 Kcals/KG = 6903-9776  Method:  MSJ (1085) x1.4 = 1519    Estimated Protein needs: ~ 90 g PRO/day  Method: 1.3-1.5g/Kg = 86-99    Estimated Fluid needs: ~ ml/day   Per clinical " status    Current Nutrition Prescription     PO: NPO Diet NPO Type: Strict NPO  Oral Nutrition Supplement:   Intake: N/A      Nutrition Diagnosis   Date: 4/11 Updated:   Problem Malnutrition acute severe   Etiology Dysphagia & recent abdominal surgery   Signs/Symptoms significant weight loss of 7.6% x1 month & <50% of EEN for >5d.    Status:     Date: 4/11  Updated: 4/12  Problem Inadequate enteral nutrition infusion   Etiology Dysphagia   Signs/Symptoms EN not yet restarted, intolerance to previous regimen and not at goal   Status: EN off, PO diet initiated    Goal:   General: Nutrition to support treatment, Improve nutrition related labs  PO: Increase intake  EN/PN: N/A    Nutrition Intervention      Follow treatment progress, Care plan reviewed, Encourage intake, Supplement provided      Monitoring/Evaluation:   Per protocol, I&O, Pertinent labs, EN delivery/tolerance, Weight, GI status, Symptoms, Swallow function      Suad Emmanuel, MS,RD,LD  Time Spent: 20min

## 2023-04-12 NOTE — PLAN OF CARE
"Goal Outcome Evaluation:  Plan of Care Reviewed With: patient        Progress: declining  Outcome Evaluation: Patient called out approx 0400 this AM c/o abdominal \"fullness\" and pain. Tube feed had been off since 0000 for EGD today. This RN checked gastric residual at this time; it was 175mL. During initial assessment, gastric residual was 90mL. Pt felt immediate relief from decompression. Gastric contents not replaced. Discussed with APRN and gastric contents were wasted at this time, due to patient feeling relief from decompression. VSS this shift. C/o abdominal as well as back pain, relieved with PRN medications. IV fluids continue. Plan for EGD with stent today. 0600 meds held r/t patient being strict NPO for this procedure.  " No

## 2023-04-12 NOTE — PLAN OF CARE
Goal Outcome Evaluation:  Plan of Care Reviewed With: patient           Outcome Evaluation: Pt hypertensive today, given scheduled BP med and prn clonodine, remains on 2LNC, NSR on tele-monitor, pt received stent in ENDO today, tolerating PO clear liquids after. PRN pain medications given. Will cont. POC

## 2023-04-12 NOTE — PROGRESS NOTES
[facility] Medicine Services  PROGRESS NOTE    Patient Name: Christine Garg  : 1933  MRN: [id number]    Date of Admission: 4/10/2023  Primary Care Physician: Dave Tobar MD    Subjective   Subjective     CC:  F/U Dysphagia    HPI:  She is having some discomfort in left shoulder blade radiating through to chest.  Feeling short of breath but states this was going on prior to EGD as well.    ROS:  Gen- No fevers  Resp- + dyspnea      Objective   Objective     Vital Signs:   Temp:  [96.4 °F (35.8 °C)-97.9 °F (36.6 °C)] 97 °F (36.1 °C)  Heart Rate:  [64-89] 89  Resp:  [16-18] 18  BP: (145-199)/(66-98) 199/96  Flow (L/min):  [2] 2     Physical Exam:  Constitutional: Awake, alert, sitting up in bed  HENT: NCAT, mucous membranes moist  Respiratory: Clear to auscultation bilaterally, respiratory effort normal on nasal cannula  Cardiovascular: RRR, no murmurs, rubs, or gallops  Musculoskeletal: No bilateral ankle edema  Psychiatric: Appropriate affect, cooperative  Neurologic: Speech clear and fluent  Skin: No rashes on exposed surfaces      Results Reviewed:  LAB RESULTS:      Lab 23   WBC 9.87 9.11 8.62 10.38   HEMOGLOBIN 10.3* 9.9* 10.0* 10.1*   HEMATOCRIT 32.9* 31.1* 30.8* 32.6*   PLATELETS 251 280 285 249   NEUTROS ABS  --  6.38  --  7.63*   IMMATURE GRANS (ABS)  --  0.06*  --  0.04   LYMPHS ABS  --  1.12  --  1.18   MONOS ABS  --  0.69  --  0.61   EOS ABS  --  0.80*  --  0.82*   .6* 96.9 96.6 104.2*   CRP  --  18.47*  --   --    PROTIME  --   --  12.4  --          Lab 04/12/23  0518 04/11/23  0614 04/09/23  0242 04/06/23  0139   SODIUM 134* 135* 132* 135*   POTASSIUM 4.4 4.6 4.0 4.1   CHLORIDE 99 98 99 100   CO2 25.0 30.0* 28.1 27.6   ANION GAP 10.0 7.0 4.9* 7.4   BUN 9 10 10 7*   CREATININE 0.40* 0.41* 0.42* 0.52*   EGFR 94.7 94.2 93.6 88.9   GLUCOSE 68 85 119* 127*   CALCIUM 8.4* 8.5* 8.6 8.6   MAGNESIUM 1.6 1.7 2.0  2.1   PHOSPHORUS  --   --  2.9 3.5         Lab 04/09/23  0242 04/06/23  0139   TOTAL PROTEIN 4.8* 4.6*   ALBUMIN 2.3* 2.5*   GLOBULIN 2.5 2.1   ALT (SGPT) 7 11   AST (SGOT) 14 11   BILIRUBIN 0.2 <0.2   ALK PHOS 98 88         Lab 04/09/23  0242   PROTIME 12.4   INR 0.91             Lab 04/11/23  0614   ABO TYPING A   RH TYPING Positive   ANTIBODY SCREEN Negative         Brief Urine Lab Results  (Last result in the past 365 days)      Color   Clarity   Blood   Leuk Est   Nitrite   Protein   CREAT   Urine HCG        01/06/23 0149 Yellow   Clear   Negative   Negative   Negative   Negative                 Microbiology Results Abnormal     None          CT Abdomen Pelvis Without Contrast    Result Date: 4/10/2023   EXAM DATE:   4/10/2023 11:29 AM  CLINICAL HISTORY:   abdominal pain; K25.1-Acute gastric ulcer with perforation; E87.3-Oudz-zlgrcxekwr and hyponatremia; E87.6-Hypokalemia; A41.9-Sepsis, unspecified organism; K27.5-Chronic or unspecified peptic ulcer, site unspecified, with perforation; E44.0-Moderate protein-calorie malnutrition  TECHNIQUE:   Axial computed tomography images of the abdomen and pelvis without intravenous contrast.  Sagittal and coronal reformatted images were created and reviewed.  This CT exam was performed using one or more of the following dose reduction techniques:  automated exposure control, adjustment of the mA and/or kV according to patient size, and/or use of iterative reconstruction technique.  COMPARISON: 04/04/2023  FINDINGS:   LUNG BASES:  Unremarkable.  No mass.  No consolidation.   PLEURAL SPACE:  Small bilateral pleural effusions.   ABDOMEN:   LIVER:  Unremarkable.   GALLBLADDER AND BILE DUCTS:  Unremarkable.  No calcified stones.  No ductal dilation.   PANCREAS:  Unremarkable.  No ductal dilation.   SPLEEN:  Unremarkable.  No splenomegaly.   ADRENALS:  Unremarkable.  No mass.   KIDNEYS AND URETERS:  Unremarkable.  No obstructing stones.  No hydronephrosis.   STOMACH AND BOWEL:   Contrast noted throughout the colon.  No obstruction.  No mucosal thickening.   PELVIS:   APPENDIX:  No findings to suggest acute appendicitis.   BLADDER:  Unremarkable.  No stones.   REPRODUCTIVE:  Unremarkable as visualized.   ABDOMEN and PELVIS:   INTRAPERITONEAL SPACE:  See below.    BONES/JOINTS:  No acute fracture.  No dislocation.   SOFT TISSUES:  Unremarkable.   VASCULATURE:  Atherosclerotic disease.  No abdominal aortic aneurysm.   LYMPH NODES:  Unremarkable.  No enlarged lymph nodes.   TUBES, LINES AND DEVICES:  Gastric tube noted in the stomach. This has been recently placed and small to moderate intraperitoneal free air persists.        Impression: 1.  Small bilateral pleural effusions. 2.  Gastric tube noted in the stomach. This has been recently placed and small to moderate intraperitoneal free air persists.   This report was finalized on 4/10/2023 12:06 PM by Dr. Henry Lozano MD.        Results for orders placed during the hospital encounter of 03/22/23    Adult Transthoracic Echo Complete W/ Cont if Necessary Per Protocol    Interpretation Summary  •  Left ventricular systolic function is normal. Calculated left ventricular EF = 60% Left ventricular ejection fraction appears to be 56 - 60%.  •  Left ventricular diastolic function is consistent with (grade I) impaired relaxation and age.  •  Moderate to severe mitral valve regurgitation is present.  •  Moderate tricuspid valve regurgitation is present.  •  Estimated right ventricular systolic pressure from tricuspid regurgitation is moderately elevated (45-55 mmHg).  •  Moderate pulmonary hypertension is present.      Current medications:  Scheduled Meds:aspirin, 81 mg, Per PEG Tube, Daily  carvedilol, 25 mg, Per PEG Tube, BID With Meals  sennosides, 10 mL, Per PEG Tube, BID   And  docusate sodium, 100 mg, Per PEG Tube, BID  hydrALAZINE, 50 mg, Per PEG Tube, Q8H  levothyroxine, 112 mcg, Per PEG Tube, Q AM  pantoprazole, 40 mg, Intravenous, BID  AC  sodium chloride, 10 mL, Intravenous, Q12H  valsartan, 320 mg, Per PEG Tube, Q24H      Continuous Infusions:   PRN Meds:.•  acetaminophen **OR** acetaminophen **OR** acetaminophen  •  atropine  •  [DISCONTINUED] senna-docusate sodium **AND** polyethylene glycol **AND** [DISCONTINUED] bisacodyl **AND** bisacodyl  •  cloNIDine  •  ePHEDrine  •  HYDROcodone-acetaminophen  •  HYDROmorphone  •  lactated ringers  •  [DISCONTINUED] ondansetron **OR** ondansetron  •  ondansetron  •  sodium chloride  •  sodium chloride    Assessment & Plan   Assessment & Plan     Active Hospital Problems    Diagnosis  POA   • **Esophageal stricture [K22.2]  Unknown   • Hypothyroidism (acquired) [E03.9]  Unknown   • S/P percutaneous endoscopic gastrostomy (PEG) tube placement [Z93.1]  Not Applicable   • Adenocarcinoma [C80.1]  Unknown   • Perforated ulcer [K27.5]  Yes   • Former smoker [Z87.891]  Not Applicable   • Coronary artery disease  [I25.10]  Yes   • Chronic obstructive pulmonary disease [J44.9]  Yes   • Essential hypertension [I10]  Yes   • ASCVD (arteriosclerotic cardiovascular disease) [I25.10]  Yes      Resolved Hospital Problems   No resolved problems to display.        Brief Hospital Course to date:  Christine Garg is a 89 y.o. female admitted to [facility] on 3/22 for abdominal pain; went to OR, had perforated duodenal ulcer repaired with omental patch.  Then on 3/29, patient had CT of thoracic spine which noted left pleural consolidation and parenchymal nodules.  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  She was also found to have an esophageal stricture secondary to extrinsic compression of 3cm peritracheal lymph node.  On 4/7, patient had PEG tube placed.  She was sent to [facility] for placement of esophageal stent by Dr. Waters.   PMH of hypertension, hyperlipidemia, COPD, coronary artery disease, peripheral artery disease, hypothyroidism.    This  patient's problems and plans were partially entered by my partner and updated as appropriate by me 04/12/23.     Dysphagia due to external esophageal compression by LAD  S/p PEG but intolerant of tube feeds  -EGD today with esophageal stent placement  -Liquid diet x 3 days then advance to puree 4/15 if tolerating    Perforated Duodenal Ulcer s/p exploratory laparotomy with patch 3/22  -Continue PPI twice daily  -Continue lower aspirin to 81 mg daily instead of 325    New diagnosis of lung adenocarcinoma  COPD  Former smoker   On oxygen 2L here   -PRN duonebs  -Follow up outpatient for treatment plan  -CXR and EKG today  -Lidoderm patch for left shoulder blade pain which is likely related to nerve compression from known T-spine lesion.  If it does not help, will start low dose gabapentin.    CAD   HLD  -Lowered home aspirin to 81mg. Will need to make this clear at discharge in order to prevent further ulceration     Hypoalbuminemia   Severe malnutrition  -Nutrition consulted     HTN  -Continue home coreg, valsartan, hydralazine   -PRN clonidine       Hypothyroid   -Continue home synthroid      Debility  -PT  -Patient had been approved to [facility] for subacute rehab in [place]. Patient would still like to go there after discharge. Patient is to follow up outpatient for treatment options regarding her cancer.     Total time spent: Time Spent: Time Spent: 50 minutes  Time spent includes time reviewing chart, face-to-face time, counseling patient/family/caregiver, ordering medications/tests/procedures, communicating with other health care professionals, documenting clinical information in the electronic health record, and coordination of care.      Expected Discharge Location and Transportation:  Rehab  Expected Discharge   Expected Discharge Date and Time     Expected Discharge Date Expected Discharge Time    Apr 14, 2023            DVT prophylaxis:  Mechanical DVT prophylaxis orders are present.      AM-PAC 6 Clicks Score (PT): 15 (04/11/23 1275)    CODE STATUS:   Code Status and Medical Interventions:   Ordered at: 04/10/23 8592     Code Status (Patient has no pulse and is not breathing):    CPR (Attempt to Resuscitate)     Medical Interventions (Patient has pulse or is breathing):    Full Support       Clare De Anda MD  04/12/23

## 2023-04-12 NOTE — DISCHARGE PLACEMENT REQUEST
"Amanda Gomez (89 y.o. Female)       DODIE Watters, RN- [phone]         Date of Birth   1933    Social Security Number       Address   [address] [id number]    Home Phone   [phone]    MRN   [id number]       [facility]    Marital Status                               Admission Date   4/10/23    Admission Type   Urgent    Admitting Provider   Clare De Anda MD    Attending Provider   Clare De Anda MD    Department, Room/Bed   [facility] 6B, N640/1       Discharge Date       Discharge Disposition       Discharge Destination                               Attending Provider: Clare De Anda MD    Allergies: Motrin [Ibuprofen], Novocain [Procaine]    Isolation: None   Infection: None   Code Status: CPR    Ht: 165.1 cm (65\")   Wt: 66.2 kg (146 lb)    Admission Cmt: None   Principal Problem: Esophageal stricture [K22.2]                 Active Insurance as of 4/10/2023     Primary Coverage     Payor Plan Insurance Group Employer/Plan Group    ANTH MEDICARE REPLACEMENT ANTH MEDICARE ADVANTAGE [id number]     Payor Plan Address Payor Plan Phone Number Payor Plan Fax Number Effective Dates    [address] [phone]  2019 - None Entered    [facility] [id number]       Subscriber Name Subscriber Birth Date Member ID       AMANDA GOMEZ 1933 [id number]                 Emergency Contacts      (Rel.) Home Phone Work Phone Mobile Phone    Kaleb Gomez (healthcare surrogate) (Son) -- -- [phone]    Ziyad Gomez (alternative healthcare surrogate) (Son) -- -- [phone]    Filiberto Gomez (second alternative HCS) (Son) -- -- [phone]               History & Physical      Hola Crowley DO at 04/10/23 1944              [facility]  HISTORY AND PHYSICAL    Patient Name: Amanda Gomez  : 1933  MRN: [id number]  Primary Care Physician: Dave Tobar MD  Date of admission: " 4/10/2023    Subjective    Subjective     Chief Complaint:  Adenocarcinoma    HPI:  Christine Garg is a 89 y.o. female with PMH of HTN, HLD, CAD, COPD, former smoker, PAD and hypothyroid.     Patient presented to [facility] on 3/22 for a 5-day complain of abdominal pain.  She was taken to the OR for an ex lap.  She was found to have a perforated duodenal ulcer which was repaired with omental patch.  On 3/29, patient had CT of thoracic spine which noted left pleural consolidation and parenchymal nodules.  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  She was also found to have an esophageal stricture secondary to extrinsic compression of 3cm peritracheal lymph node.  On 4/7, patient had PEG tube placed.  She was sent to [facility] for placement of esophageal stent by Dr. Waters.    Patient had been approved to [facility] for subacute rehab in [place]. Patient would still like to go there after discharge. Patient is to follow up outpatient for treatment options regarding her cancer.     Review of Systems   Constitutional: Positive for appetite change. Negative for activity change, chills and fever.   HENT: Positive for trouble swallowing. Negative for congestion and sore throat.    Eyes: Negative.    Respiratory: Negative for cough, chest tightness and shortness of breath.    Cardiovascular: Negative for chest pain and leg swelling.   Gastrointestinal: Positive for abdominal pain. Negative for constipation, diarrhea, nausea and vomiting.   Genitourinary: Negative for difficulty urinating, dysuria and urgency.   Musculoskeletal: Negative.    Skin: Negative.    Neurological: Positive for weakness. Negative for dizziness and headaches.   Hematological: Negative.    Psychiatric/Behavioral: Negative.  Negative for agitation and confusion.            Personal History     Past Medical History:   Diagnosis Date   • Advanced age    • CAD (coronary artery  disease) 2011    s/p 3v CABG   • Chronic kidney disease (CKD), stage III (moderate)    • COPD (chronic obstructive pulmonary disease)    • History of tobacco use    • Hyperlipidemia    • Hypertension    • Hypothyroidism    • PONV (postoperative nausea and vomiting)    • Pulmonary nodule              Past Surgical History:   Procedure Laterality Date   • BREAST BIOPSY Left     benign   • CATARACT EXTRACTION     • CORONARY ANGIOPLASTY     • CORONARY ARTERY BYPASS GRAFT     • EXPLORATORY LAPAROTOMY N/A 3/22/2023    Procedure: LAPAROTOMY EXPLORATORY WITH OVER SEW OF DUODENAL ULCER WITH OMENTAL PATCH AND BIOPATCH AND CENTRAL LINE PLACEMENT;  Surgeon: Aurora Kirkland MD;  Location: [facility];  Service: General;  Laterality: N/A;       Family History:  family history includes Heart disease in her brother, father, and mother.     Social History:  reports that she has quit smoking. Her smoking use included cigarettes. She has never used smokeless tobacco. She reports that she does not drink alcohol and does not use drugs.  Social History     Social History Narrative   • Not on file       Medications:  Evolocumab, HYDROcodone-acetaminophen, Lidocaine, aspirin, carvedilol, castor oil-balsam peru, cloNIDine, heparin (porcine), hydrALAZINE, labetalol, levothyroxine, magnesium sulfate, magnesium sulfate in D5W 1g/100mL (PREMIX), metoclopramide, montelukast, multivitamin, ondansetron, pantoprazole, polyethylene glycol, potassium chloride, potassium phosphate 45 mmol in sodium chloride 0.9 % 500 mL infusion, and valsartan    Allergies   Allergen Reactions   • Motrin [Ibuprofen] Anaphylaxis and Hives   • Novocain [Procaine] Other (See Comments)     Pt state she passes out.       Objective    Objective     Vital Signs:   Temp:  [97.5 °F (36.4 °C)-98.1 °F (36.7 °C)] 98.1 °F (36.7 °C)  Heart Rate:  [72-78] 72  Resp:  [16-18] 16  BP: (127-172)/(50-68) 127/50  Flow (L/min):  [2] 2    Physical Exam  Vitals reviewed.   Constitutional:        General: She is not in acute distress.     Appearance: She is normal weight.   HENT:      Head: Normocephalic.      Nose: Nose normal. No congestion.      Mouth/Throat:      Mouth: Mucous membranes are moist.   Eyes:      Extraocular Movements: Extraocular movements intact.      Pupils: Pupils are equal, round, and reactive to light.   Cardiovascular:      Rate and Rhythm: Normal rate and regular rhythm.      Pulses: Normal pulses.      Heart sounds: Normal heart sounds. No murmur heard.  Pulmonary:      Effort: Pulmonary effort is normal. No respiratory distress.      Breath sounds: Normal breath sounds. No wheezing or rhonchi.   Abdominal:      General: Bowel sounds are normal. There is no distension.      Palpations: Abdomen is soft.      Tenderness: There is abdominal tenderness.   Musculoskeletal:         General: No swelling. Normal range of motion.      Cervical back: Normal range of motion. No rigidity.   Skin:     General: Skin is warm.      Capillary Refill: Capillary refill takes less than 2 seconds.      Findings: Wound (midline abdominal incision pink and closed, C/D/I) present.      Comments: PEG in place C/D/I   Neurological:      General: No focal deficit present.      Mental Status: She is alert and oriented to person, place, and time. Mental status is at baseline.      Motor: Weakness present.   Psychiatric:         Mood and Affect: Mood normal.         Behavior: Behavior normal.         Thought Content: Thought content normal.         Judgment: Judgment normal.          Result Review:  I have personally reviewed the results from the time of this admission to 4/10/2023 19:44 EDT and agree with these findings:  [x]  Laboratory list / accordion  [x]  Microbiology  [x]  Radiology  []  EKG/Telemetry   []  Cardiology/Vascular   []  Pathology  [x]  Old records  []  Other:  Most notable findings include:     LAB RESULTS:      Lab 04/09/23  0242 04/06/23  0139 04/04/23  0231   WBC 8.62 10.38 11.47*    HEMOGLOBIN 10.0* 10.1* 9.6*   HEMATOCRIT 30.8* 32.6* 29.3*   PLATELETS 285 249 298   NEUTROS ABS  --  7.63* 8.87*   IMMATURE GRANS (ABS)  --  0.04 0.06*   LYMPHS ABS  --  1.18 1.31   MONOS ABS  --  0.61 0.54   EOS ABS  --  0.82* 0.61*   MCV 96.6 104.2* 94.8   PROTIME 12.4  --  12.0*   APTT  --   --  30.4         Lab 04/09/23  0242 04/06/23  0139 04/05/23  0048 04/04/23  1751 04/04/23  0231   SODIUM 132* 135* 138  --  138   POTASSIUM 4.0 4.1 4.6 4.4 3.4*   CHLORIDE 99 100 103  --  101   CO2 28.1 27.6 29.5*  --  29.6*   ANION GAP 4.9* 7.4 5.5  --  7.4   BUN 10 7* 9  --  10   CREATININE 0.42* 0.52* 0.51*  --  0.48*   EGFR 93.6 88.9 89.4  --  90.7   GLUCOSE 119* 127* 97  --  91   CALCIUM 8.6 8.6 9.1  --  8.6   MAGNESIUM 2.0 2.1 1.8  --  2.1   PHOSPHORUS 2.9 3.5 3.3  --  3.6         Lab 04/09/23  0242 04/06/23  0139 04/04/23  0231   TOTAL PROTEIN 4.8* 4.6* 4.8*   ALBUMIN 2.3* 2.5* 2.5*   GLOBULIN 2.5 2.1 2.3   ALT (SGPT) 7 11 17   AST (SGOT) 14 11 13   BILIRUBIN 0.2 <0.2 0.2   ALK PHOS 98 88 82         Lab 04/09/23  0242 04/04/23  0231   PROTIME 12.4 12.0*   INR 0.91 0.87*                 Brief Urine Lab Results  (Last result in the past 365 days)      Color   Clarity   Blood   Leuk Est   Nitrite   Protein   CREAT   Urine HCG        01/06/23 0149 Yellow   Clear   Negative   Negative   Negative   Negative               Microbiology Results (last 10 days)     ** No results found for the last 240 hours. **          CT Abdomen Pelvis Without Contrast    Result Date: 4/10/2023   EXAM DATE:   4/10/2023 11:29 AM  CLINICAL HISTORY:   abdominal pain; K25.1-Acute gastric ulcer with perforation; E87.8-Ukab-ouyfqjvddj and hyponatremia; E87.6-Hypokalemia; A41.9-Sepsis, unspecified organism; K27.5-Chronic or unspecified peptic ulcer, site unspecified, with perforation; E44.0-Moderate protein-calorie malnutrition  TECHNIQUE:   Axial computed tomography images of the abdomen and pelvis without intravenous contrast.  Sagittal and  coronal reformatted images were created and reviewed.  This CT exam was performed using one or more of the following dose reduction techniques:  automated exposure control, adjustment of the mA and/or kV according to patient size, and/or use of iterative reconstruction technique.  COMPARISON: 04/04/2023  FINDINGS:   LUNG BASES:  Unremarkable.  No mass.  No consolidation.   PLEURAL SPACE:  Small bilateral pleural effusions.   ABDOMEN:   LIVER:  Unremarkable.   GALLBLADDER AND BILE DUCTS:  Unremarkable.  No calcified stones.  No ductal dilation.   PANCREAS:  Unremarkable.  No ductal dilation.   SPLEEN:  Unremarkable.  No splenomegaly.   ADRENALS:  Unremarkable.  No mass.   KIDNEYS AND URETERS:  Unremarkable.  No obstructing stones.  No hydronephrosis.   STOMACH AND BOWEL:  Contrast noted throughout the colon.  No obstruction.  No mucosal thickening.   PELVIS:   APPENDIX:  No findings to suggest acute appendicitis.   BLADDER:  Unremarkable.  No stones.   REPRODUCTIVE:  Unremarkable as visualized.   ABDOMEN and PELVIS:   INTRAPERITONEAL SPACE:  See below.    BONES/JOINTS:  No acute fracture.  No dislocation.   SOFT TISSUES:  Unremarkable.   VASCULATURE:  Atherosclerotic disease.  No abdominal aortic aneurysm.   LYMPH NODES:  Unremarkable.  No enlarged lymph nodes.   TUBES, LINES AND DEVICES:  Gastric tube noted in the stomach. This has been recently placed and small to moderate intraperitoneal free air persists.        Impression: 1.  Small bilateral pleural effusions. 2.  Gastric tube noted in the stomach. This has been recently placed and small to moderate intraperitoneal free air persists.   This report was finalized on 4/10/2023 12:06 PM by Dr. Henry Lozano MD.        Results for orders placed during the hospital encounter of 03/22/23    Adult Transthoracic Echo Complete W/ Cont if Necessary Per Protocol    Interpretation Summary  •  Left ventricular systolic function is normal. Calculated left ventricular EF = 60%  Left ventricular ejection fraction appears to be 56 - 60%.  •  Left ventricular diastolic function is consistent with (grade I) impaired relaxation and age.  •  Moderate to severe mitral valve regurgitation is present.  •  Moderate tricuspid valve regurgitation is present.  •  Estimated right ventricular systolic pressure from tricuspid regurgitation is moderately elevated (45-55 mmHg).  •  Moderate pulmonary hypertension is present.      Assessment & Plan   Assessment & Plan       Esophageal stricture    Essential hypertension    ASCVD (arteriosclerotic cardiovascular disease)    Coronary artery disease     Chronic obstructive pulmonary disease    Former smoker    Perforated ulcer    Hypothyroidism (acquired)    S/P percutaneous endoscopic gastrostomy (PEG) tube placement    Adenocarcinoma      Christine Garg is a 89 y.o. female with PMH of HTN, HLD, CAD, COPD, former smoker, PAD and hypothyroid. Direct admit to [facility] for esophageal stent placement.     Esophageal Stricture  S/p PEG  -2/2 extrinsic compression of paratracheal lymph node  -Pain control  -Maintenance fluids  -NPO  -Consult wound for PEG care  -Consult PT/OT, case management   -Consult GI      CAD  HLD  -lower home aspirin to 81mg. Will need to make this clear at discharge in order to prevent further ulceration    Hypoalbuminemia   -Albumin 2.3  -Consult nutrition    HTN  -Continue home coreg, valsartan, hydralazine   -PRN clonidine     Adenocarcinoma of Lung  COPD  Former Smoker  -PRN duonebs  -Follow up outpatient for treatment plan    Perforated Duodenal Ulcer  S/p Ex lap with patch  -Midline abdominal incision C/D/I  -Continue PPI twice daily  -lower aspirin to 81 mg instead of 325  -Consult wound  -patient only received perioperative cefazolin at time of surgery? Should have received micafungin and gram negative coverage for at least 5 days as ppx.  Would recommend discussing case with infectious disease/general surgery in am to determine if would  benefit from abx this far out from surgery.  Does not currently appear to have evidence of active infection.     Hypothyroid  -Continue home synthroid         DVT prophylaxis:  SCDs in prep for surgery    CODE STATUS:  FULL       Expected Discharge  TBD        This note has been completed as part of a split-shared workflow.     Signature: Electronically signed by LUCÍA Sotelo, 04/10/23, 8:36 PM EDT.          Attending   Admission Attestation       I have performed an independent face-to-face diagnostic evaluation including performing an independent physical examination as documented here.  The documented plan of care above was reviewed and developed with the advanced practice clinician (APC).      Brief Summary Statement:   Christine Garg is a 89 y.o. female with past medical history of hypertension, hyperlipidemia, COPD, coronary artery disease, peripheral artery disease, hypothyroidism, who [facility] perforated duodenal ulcer status post emergency ex lap with patch who was later found to have esophageal stricture related to 3 cm paratracheal lymph node with extrinsic compression.  They discussed the case with Dr. Ralph's who recommended transfer for esophageal stenting.  Currently, patient reports expected abdominal pain.  Denies any fever or chills.  Reports decreased appetite.    Remainder of detailed HPI is as noted by APC and has been reviewed and/or edited by me for completeness.    Attending Physical Exam:  Temp:  [97.5 °F (36.4 °C)-98.1 °F (36.7 °C)] 98 °F (36.7 °C)  Heart Rate:  [72-78] 76  Resp:  [16-18] 17  BP: (123-172)/(50-68) 123/58  Flow (L/min):  [2] 2    Constitutional: Awake, alert, nontoxic  Eyes: PERRLA, sclerae anicteric, no conjunctival injection  HENT: NCAT, mucous membranes moist  Neck: Supple, no thyromegaly, no lymphadenopathy, trachea midline  Respiratory: Clear to auscultation bilaterally, nonlabored respirations   Cardiovascular:  RRR, no murmurs, rubs, or gallops, palpable pedal pulses bilaterally  Gastrointestinal: Positive bowel sounds, soft, expected abd tenderness post ex lap, nondistended, PEG in place  Musculoskeletal: No bilateral ankle edema, no clubbing or cyanosis to extremities  Psychiatric: Appropriate affect, cooperative  Neurologic: Oriented x 3, strength symmetric in all extremities, Cranial Nerves grossly intact to confrontation, speech clear  Skin: midline incision without drainage. Clean, dry, intact, expected surrounded pink skin but no evidence of cellulitis      Brief Assessment/Plan :  See detailed assessment and plan developed with APC which I have reviewed and/or edited for completeness.            Hola Crowley DO  04/10/23     Total time spent: 40  Time spent includes time reviewing chart, face-to-face time, counseling patient/family/caregiver, ordering medications/tests/procedures, communicating with other health care professionals, documenting clinical information in the electronic health record, and coordination of care.                     Electronically signed by Hola Crowley DO at 04/10/23 1736         Current Facility-Administered Medications   Medication Dose Route Frequency Provider Last Rate Last Admin   • acetaminophen (TYLENOL) tablet 650 mg  650 mg Per PEG Tube Q4H PRN Jocelyne Oropeza MD        Or   • acetaminophen (TYLENOL) 160 MG/5ML solution 650 mg  650 mg Per PEG Tube Q4H PRN Jocelyne Oropeza MD        Or   • acetaminophen (TYLENOL) suppository 650 mg  650 mg Rectal Q4H PRN Jocelyne Oropeza MD       • aspirin chewable tablet 81 mg  81 mg Per PEG Tube Daily Jocelyne Oropeza MD   81 mg at 04/12/23 1042   • polyethylene glycol (MIRALAX) packet 17 g  17 g Per PEG Tube Daily PRN Jocelyne Oropeza MD        And   • bisacodyl (DULCOLAX) suppository 10 mg  10 mg Rectal Daily PRN Jocelyne Oropeza MD       • carvedilol (COREG) tablet 25 mg  25 mg Per PEG Tube BID With Meals Jocelyne Oropeza MD   25 mg at 04/12/23 1042    • cloNIDine (CATAPRES) tablet 0.1 mg  0.1 mg Per PEG Tube TID PRN Jocelyne Oropeza MD   0.1 mg at 23 1232   • sennosides (SENOKOT) 8.8 MG/5ML syrup 10 mL  10 mL Per PEG Tube BID Jocelyne Oropeza MD   10 mL at 23 2129    And   • docusate sodium (COLACE) liquid 100 mg  100 mg Per PEG Tube BID Jocelyne Oropeza MD   100 mg at 23 1042   • hydrALAZINE (APRESOLINE) tablet 50 mg  50 mg Per PEG Tube Q8H Jocelyne Oropeza MD   50 mg at 239   • HYDROcodone-acetaminophen (NORCO) 7.5-325 MG per tablet 1 tablet  1 tablet Per PEG Tube Q6H PRN Jocelyne Oropeza MD   1 tablet at 23 1036   • HYDROmorphone (DILAUDID) injection 0.25 mg  0.25 mg Intravenous Q4H PRN Noelle Combs DO   0.25 mg at 23 0818   • ipratropium-albuterol (DUO-NEB) nebulizer solution 3 mL  3 mL Nebulization Q4H PRN Clare De Anda MD       • levothyroxine (SYNTHROID, LEVOTHROID) tablet 112 mcg  112 mcg Per PEG Tube Q AM Jocelyne Oropeza MD   112 mcg at 23 1232   • lidocaine (LIDODERM) 5 % 1 patch  1 patch Transdermal Q24H Clare De Anda MD   1 patch at 23 1226   • ondansetron (ZOFRAN) injection 4 mg  4 mg Intravenous Q6H PRN Loren Lynnea, APRN       • pantoprazole (PROTONIX) injection 40 mg  40 mg Intravenous BID AC Jocelyne Oropeza MD   40 mg at 23 1041   • sodium chloride 0.9 % flush 10 mL  10 mL Intravenous Q12H Krogesamantha Dena, APRN   10 mL at 23 1043   • sodium chloride 0.9 % flush 10 mL  10 mL Intravenous PRN Dena Lynne APRN   10 mL at 23 0902   • sodium chloride 0.9 % infusion 40 mL  40 mL Intravenous PRN Kroger, Dena, APRN   40 mL at 23 0902   • valsartan (DIOVAN) tablet 320 mg  320 mg Per PEG Tube Q24H Jocelyne Oropeza MD   320 mg at 23 1041        Physician Progress Notes (most recent note)      Clare De Anda MD at 23 0904              [facility]  PROGRESS NOTE    Patient Name: Christine Garg  : 1933  MRN: [id number]    Date of  Admission: 4/10/2023  Primary Care Physician: Dave Tobar MD    Subjective   Subjective     CC:  F/U Dysphagia    HPI:  She is having some discomfort in left shoulder blade radiating through to chest.  Feeling short of breath but states this was going on prior to EGD as well.    ROS:  Gen- No fevers  Resp- + dyspnea      Objective   Objective     Vital Signs:   Temp:  [96.4 °F (35.8 °C)-97.9 °F (36.6 °C)] 97 °F (36.1 °C)  Heart Rate:  [64-89] 89  Resp:  [16-18] 18  BP: (145-199)/(66-98) 199/96  Flow (L/min):  [2] 2     Physical Exam:  Constitutional: Awake, alert, sitting up in bed  HENT: NCAT, mucous membranes moist  Respiratory: Clear to auscultation bilaterally, respiratory effort normal on nasal cannula  Cardiovascular: RRR, no murmurs, rubs, or gallops  Musculoskeletal: No bilateral ankle edema  Psychiatric: Appropriate affect, cooperative  Neurologic: Speech clear and fluent  Skin: No rashes on exposed surfaces      Results Reviewed:  LAB RESULTS:      Lab 04/12/23 0518 04/11/23 0614 04/09/23 0242 04/06/23 0139   WBC 9.87 9.11 8.62 10.38   HEMOGLOBIN 10.3* 9.9* 10.0* 10.1*   HEMATOCRIT 32.9* 31.1* 30.8* 32.6*   PLATELETS 251 280 285 249   NEUTROS ABS  --  6.38  --  7.63*   IMMATURE GRANS (ABS)  --  0.06*  --  0.04   LYMPHS ABS  --  1.12  --  1.18   MONOS ABS  --  0.69  --  0.61   EOS ABS  --  0.80*  --  0.82*   .6* 96.9 96.6 104.2*   CRP  --  18.47*  --   --    PROTIME  --   --  12.4  --          Lab 04/12/23 0518 04/11/23 0614 04/09/23 0242 04/06/23 0139   SODIUM 134* 135* 132* 135*   POTASSIUM 4.4 4.6 4.0 4.1   CHLORIDE 99 98 99 100   CO2 25.0 30.0* 28.1 27.6   ANION GAP 10.0 7.0 4.9* 7.4   BUN 9 10 10 7*   CREATININE 0.40* 0.41* 0.42* 0.52*   EGFR 94.7 94.2 93.6 88.9   GLUCOSE 68 85 119* 127*   CALCIUM 8.4* 8.5* 8.6 8.6   MAGNESIUM 1.6 1.7 2.0 2.1   PHOSPHORUS  --   --  2.9 3.5         Lab 04/09/23  0242 04/06/23  0139   TOTAL PROTEIN 4.8* 4.6*   ALBUMIN 2.3* 2.5*   GLOBULIN 2.5 2.1    ALT (SGPT) 7 11   AST (SGOT) 14 11   BILIRUBIN 0.2 <0.2   ALK PHOS 98 88         Lab 04/09/23  0242   PROTIME 12.4   INR 0.91             Lab 04/11/23  0614   ABO TYPING A   RH TYPING Positive   ANTIBODY SCREEN Negative         Brief Urine Lab Results  (Last result in the past 365 days)      Color   Clarity   Blood   Leuk Est   Nitrite   Protein   CREAT   Urine HCG        01/06/23 0149 Yellow   Clear   Negative   Negative   Negative   Negative                 Microbiology Results Abnormal     None          CT Abdomen Pelvis Without Contrast    Result Date: 4/10/2023   EXAM DATE:   4/10/2023 11:29 AM  CLINICAL HISTORY:   abdominal pain; K25.1-Acute gastric ulcer with perforation; E87.3-Hmwe-ervehkrqdr and hyponatremia; E87.6-Hypokalemia; A41.9-Sepsis, unspecified organism; K27.5-Chronic or unspecified peptic ulcer, site unspecified, with perforation; E44.0-Moderate protein-calorie malnutrition  TECHNIQUE:   Axial computed tomography images of the abdomen and pelvis without intravenous contrast.  Sagittal and coronal reformatted images were created and reviewed.  This CT exam was performed using one or more of the following dose reduction techniques:  automated exposure control, adjustment of the mA and/or kV according to patient size, and/or use of iterative reconstruction technique.  COMPARISON: 04/04/2023  FINDINGS:   LUNG BASES:  Unremarkable.  No mass.  No consolidation.   PLEURAL SPACE:  Small bilateral pleural effusions.   ABDOMEN:   LIVER:  Unremarkable.   GALLBLADDER AND BILE DUCTS:  Unremarkable.  No calcified stones.  No ductal dilation.   PANCREAS:  Unremarkable.  No ductal dilation.   SPLEEN:  Unremarkable.  No splenomegaly.   ADRENALS:  Unremarkable.  No mass.   KIDNEYS AND URETERS:  Unremarkable.  No obstructing stones.  No hydronephrosis.   STOMACH AND BOWEL:  Contrast noted throughout the colon.  No obstruction.  No mucosal thickening.   PELVIS:   APPENDIX:  No findings to suggest acute  appendicitis.   BLADDER:  Unremarkable.  No stones.   REPRODUCTIVE:  Unremarkable as visualized.   ABDOMEN and PELVIS:   INTRAPERITONEAL SPACE:  See below.    BONES/JOINTS:  No acute fracture.  No dislocation.   SOFT TISSUES:  Unremarkable.   VASCULATURE:  Atherosclerotic disease.  No abdominal aortic aneurysm.   LYMPH NODES:  Unremarkable.  No enlarged lymph nodes.   TUBES, LINES AND DEVICES:  Gastric tube noted in the stomach. This has been recently placed and small to moderate intraperitoneal free air persists.        Impression: 1.  Small bilateral pleural effusions. 2.  Gastric tube noted in the stomach. This has been recently placed and small to moderate intraperitoneal free air persists.   This report was finalized on 4/10/2023 12:06 PM by Dr. Henry Lozano MD.        Results for orders placed during the hospital encounter of 03/22/23    Adult Transthoracic Echo Complete W/ Cont if Necessary Per Protocol    Interpretation Summary  •  Left ventricular systolic function is normal. Calculated left ventricular EF = 60% Left ventricular ejection fraction appears to be 56 - 60%.  •  Left ventricular diastolic function is consistent with (grade I) impaired relaxation and age.  •  Moderate to severe mitral valve regurgitation is present.  •  Moderate tricuspid valve regurgitation is present.  •  Estimated right ventricular systolic pressure from tricuspid regurgitation is moderately elevated (45-55 mmHg).  •  Moderate pulmonary hypertension is present.      Current medications:  Scheduled Meds:aspirin, 81 mg, Per PEG Tube, Daily  carvedilol, 25 mg, Per PEG Tube, BID With Meals  sennosides, 10 mL, Per PEG Tube, BID   And  docusate sodium, 100 mg, Per PEG Tube, BID  hydrALAZINE, 50 mg, Per PEG Tube, Q8H  levothyroxine, 112 mcg, Per PEG Tube, Q AM  pantoprazole, 40 mg, Intravenous, BID AC  sodium chloride, 10 mL, Intravenous, Q12H  valsartan, 320 mg, Per PEG Tube, Q24H      Continuous Infusions:   PRN Meds:.•   acetaminophen **OR** acetaminophen **OR** acetaminophen  •  atropine  •  [DISCONTINUED] senna-docusate sodium **AND** polyethylene glycol **AND** [DISCONTINUED] bisacodyl **AND** bisacodyl  •  cloNIDine  •  ePHEDrine  •  HYDROcodone-acetaminophen  •  HYDROmorphone  •  lactated ringers  •  [DISCONTINUED] ondansetron **OR** ondansetron  •  ondansetron  •  sodium chloride  •  sodium chloride    Assessment & Plan   Assessment & Plan     Active Hospital Problems    Diagnosis  POA   • **Esophageal stricture [K22.2]  Unknown   • Hypothyroidism (acquired) [E03.9]  Unknown   • S/P percutaneous endoscopic gastrostomy (PEG) tube placement [Z93.1]  Not Applicable   • Adenocarcinoma [C80.1]  Unknown   • Perforated ulcer [K27.5]  Yes   • Former smoker [Z87.891]  Not Applicable   • Coronary artery disease  [I25.10]  Yes   • Chronic obstructive pulmonary disease [J44.9]  Yes   • Essential hypertension [I10]  Yes   • ASCVD (arteriosclerotic cardiovascular disease) [I25.10]  Yes      Resolved Hospital Problems   No resolved problems to display.        Brief Hospital Course to date:  Christine Garg is a 89 y.o. female admitted to [facility] on 3/22 for abdominal pain; went to OR, had perforated duodenal ulcer repaired with omental patch.  Then on 3/29, patient had CT of thoracic spine which noted left pleural consolidation and parenchymal nodules.  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  She was also found to have an esophageal stricture secondary to extrinsic compression of 3cm peritracheal lymph node.  On 4/7, patient had PEG tube placed.  She was sent to [facility] for placement of esophageal stent by Dr. Waters.   PMH of hypertension, hyperlipidemia, COPD, coronary artery disease, peripheral artery disease, hypothyroidism.    This patient's problems and plans were partially entered by my partner and updated as appropriate by me 04/12/23.     Dysphagia due to  external esophageal compression by LAD  S/p PEG but intolerant of tube feeds  -EGD today with esophageal stent placement  -Liquid diet x 3 days then advance to puree 4/15 if tolerating    Perforated Duodenal Ulcer s/p exploratory laparotomy with patch 3/22  -Continue PPI twice daily  -Continue lower aspirin to 81 mg daily instead of 325    New diagnosis of lung adenocarcinoma  COPD  Former smoker   On oxygen 2L here   -PRN duonebs  -Follow up outpatient for treatment plan  -CXR and EKG today  -Lidoderm patch for left shoulder blade pain which is likely related to nerve compression from known T-spine lesion.  If it does not help, will start low dose gabapentin.    CAD   HLD  -Lowered home aspirin to 81mg. Will need to make this clear at discharge in order to prevent further ulceration     Hypoalbuminemia   Severe malnutrition  -Nutrition consulted     HTN  -Continue home coreg, valsartan, hydralazine   -PRN clonidine       Hypothyroid   -Continue home synthroid      Debility  -PT  -Patient had been approved to [facility] for subacute rehab in [place]. Patient would still like to go there after discharge. Patient is to follow up outpatient for treatment options regarding her cancer.     Total time spent: Time Spent: Time Spent: 50 minutes  Time spent includes time reviewing chart, face-to-face time, counseling patient/family/caregiver, ordering medications/tests/procedures, communicating with other health care professionals, documenting clinical information in the electronic health record, and coordination of care.      Expected Discharge Location and Transportation:  Rehab  Expected Discharge   Expected Discharge Date and Time     Expected Discharge Date Expected Discharge Time    Apr 14, 2023            DVT prophylaxis:  Mechanical DVT prophylaxis orders are present.     AM-PAC 6 Clicks Score (PT): 15 (04/11/23 6310)    CODE STATUS:   Code Status and Medical Interventions:   Ordered at: 04/10/23 1103      Code Status (Patient has no pulse and is not breathing):    CPR (Attempt to Resuscitate)     Medical Interventions (Patient has pulse or is breathing):    Full Support       Clare De Anda MD  23        Electronically signed by Clare De Anda MD at 23 1230          Physical Therapy Notes (most recent note)      Clare Fulton, PT Student at 23 1351  Version 1 of 1    Attestation signed by Yajaira Alvarado, PT at 23 8217    Yajaira Alvarado, PT 2023 15:17 EDT                  Patient Name: Christine Garg  : 1933    MRN: [id number]                              Today's Date: 2023       Admit Date: 4/10/2023    Visit Dx:     ICD-10-CM ICD-9-CM   1. Esophageal stricture  K22.2 530.3     Patient Active Problem List   Diagnosis   • Essential hypertension   • Dyslipidemia   • ASCVD (arteriosclerotic cardiovascular disease)   • Palpitations   • Coronary artery disease    • Abnormal PFTs (pulmonary function tests)   • Chronic obstructive pulmonary disease   • Mild persistent asthma with allergic rhinitis   • Pulmonary nodule   • Former smoker   • Colitis   • Perforated ulcer   • Acute gastric ulcer with perforation   • Moderate malnutrition   • Esophageal stricture   • Hypothyroidism (acquired)   • S/P percutaneous endoscopic gastrostomy (PEG) tube placement   • Adenocarcinoma     Past Medical History:   Diagnosis Date   • Advanced age    • CAD (coronary artery disease) 2011    s/p 3v CABG   • Chronic kidney disease (CKD), stage III (moderate)    • COPD (chronic obstructive pulmonary disease)    • History of tobacco use    • Hyperlipidemia    • Hypertension    • Hypothyroidism    • PONV (postoperative nausea and vomiting)    • Pulmonary nodule      Past Surgical History:   Procedure Laterality Date   • BREAST BIOPSY Left     benign   • CATARACT EXTRACTION     • CORONARY ANGIOPLASTY     • CORONARY ARTERY BYPASS GRAFT     • ENDOSCOPY W/ PEG TUBE PLACEMENT N/A 2023    Procedure:  ESOPHAGOGASTRODUODENOSCOPY WITH PERCUTANEOUS ENDOSCOPIC GASTROSTOMY TUBE INSERTION;  Surgeon: Aurora Kirkland MD;  Location:  [facility];  Service: General;  Laterality: N/A;   • EXPLORATORY LAPAROTOMY N/A 3/22/2023    Procedure: LAPAROTOMY EXPLORATORY WITH OVER SEW OF DUODENAL ULCER WITH OMENTAL PATCH AND BIOPATCH AND CENTRAL LINE PLACEMENT;  Surgeon: Aurora Kirkland MD;  Location:  [facility] OR;  Service: General;  Laterality: N/A;      General Information     Row Name 04/11/23 [facility]7          Physical Therapy Time and Intention    Document Type evaluation (P)   -HT     Mode of Treatment physical therapy (P)   -HT     Row Name 04/11/23 [facility]          General Information    Patient Profile Reviewed yes (P)   -HT     Prior Level of Function independent:;all household mobility;community mobility;gait;transfer;bed mobility (P)   -HT     Existing Precautions/Restrictions fall;oxygen therapy device and L/min;other (see comments) (P)   abd incision, PEG  -HT     Barriers to Rehab medically complex;previous functional deficit (P)   -HT     Row Name 04/11/23 1437          Living Environment    People in Home child(shimon), adult (P)   -HT     Row Name 04/11/23 1437          Home Main Entrance    Number of Stairs, Main Entrance four (P)   -HT     Stair Railings, Main Entrance railings on both sides of stairs (P)   -HT     Row Name 04/11/23 1437          Stairs Within Home, Primary    Number of Stairs, Within Home, Primary none (P)   -HT     Row Name 04/11/23 1437          Cognition    Orientation Status (Cognition) oriented x 3 (P)   -HT     Row Name 04/11/23 1437          Safety Issues, Functional Mobility    Safety Issues Affecting Function (Mobility) awareness of need for assistance;insight into deficits/self-awareness;safety precaution awareness;safety precautions follow-through/compliance (P)   -HT     Impairments Affecting Function (Mobility) endurance/activity tolerance;strength;balance;pain (P)   -HT           User Key   (r) = Recorded By, (t) = Taken By, (c) = Cosigned By    Initials Name Provider Type    HT Clare Fulton, PT Student PT Student               Mobility     Row Name 04/11/23 1439          Bed Mobility    Bed Mobility sit-supine (P)   -HT     Sit-Supine Lane (Bed Mobility) moderate assist (50% patient effort);1 person assist;verbal cues (P)   -HT     Assistive Device (Bed Mobility) bed rails;draw sheet;head of bed elevated (P)   -HT     Comment, (Bed Mobility) pt required cues for sequencing and assist with LEs. Required increased time for repositioning d/t pain. (P)   -HT     Row Name 04/11/23 1439          Sit-Stand Transfer    Sit-Stand Lane (Transfers) minimum assist (75% patient effort);1 person assist;verbal cues (P)   -HT     Assistive Device (Sit-Stand Transfers) walker, front-wheeled (P)   -HT     Row Name 04/11/23 1439          Gait/Stairs (Locomotion)    Lane Level (Gait) contact guard (P)   -HT     Assistive Device (Gait) walker, front-wheeled (P)   -HT     Distance in Feet (Gait) 5 (P)   -HT     Deviations/Abnormal Patterns (Gait) bilateral deviations;base of support, narrow;denny decreased;festinating/shuffling (P)   -HT     Bilateral Gait Deviations heel strike decreased;forward flexed posture (P)   -HT     Comment, (Gait/Stairs) Pt ambulated from chair to EOB with step through gait pattern showing decreased denny, decreased stride length B, and forward trunk requiring cues for upright posture and walker management. Pt refused further mobility d/t pain. (P)   -HT           User Key  (r) = Recorded By, (t) = Taken By, (c) = Cosigned By    Initials Name Provider Type    HT Clare Fulton, PT Student PT Student               Obj/Interventions     Row Name 04/11/23 1449          Range of Motion Comprehensive    General Range of Motion bilateral lower extremity ROM WFL (P)   -HT     Row Name 04/11/23 1449          Strength Comprehensive (MMT)    General Manual Muscle Testing  (MMT) Assessment lower extremity strength deficits identified (P)   -HT     Comment, General Manual Muscle Testing (MMT) Assessment 3+/5 B hip flex, B knee ext and B DF 4-/5 (P)   -HT     Row Name 04/11/23 1449          Balance    Balance Assessment sitting static balance;sitting dynamic balance;standing static balance;standing dynamic balance (P)   -HT     Static Sitting Balance standby assist (P)   -HT     Dynamic Sitting Balance contact guard (P)   -HT     Position, Sitting Balance sitting in chair (P)   -HT     Static Standing Balance contact guard (P)   -HT     Dynamic Standing Balance contact guard (P)   -HT     Position/Device Used, Standing Balance walker, rolling (P)   -HT     Row Name 04/11/23 1449          Sensory Assessment (Somatosensory)    Sensory Assessment (Somatosensory) LE sensation intact (P)   -HT           User Key  (r) = Recorded By, (t) = Taken By, (c) = Cosigned By    Initials Name Provider Type    HT lCare Fulton, PT Student PT Student               Goals/Plan     Row Name 04/11/23 1457          Bed Mobility Goal 1 (PT)    Activity/Assistive Device (Bed Mobility Goal 1, PT) sit to supine/supine to sit (P)   -HT     [place] Level/Cues Needed (Bed Mobility Goal 1, PT) contact guard required (P)   -HT     Time Frame (Bed Mobility Goal 1, PT) long term goal (LTG);10 days (P)   -HT     Row Name 04/11/23 1457          Transfer Goal 1 (PT)    Activity/Assistive Device (Transfer Goal 1, PT) sit-to-stand/stand-to-sit;bed-to-chair/chair-to-bed;walker, rolling (P)   -HT     [place] Level/Cues Needed (Transfer Goal 1, PT) modified independence (P)   -HT     Time Frame (Transfer Goal 1, PT) long term goal (LTG);10 days (P)   -HT     Row Name 04/11/23 0997          Gait Training Goal 1 (PT)    Activity/Assistive Device (Gait Training Goal 1, PT) gait (walking locomotion);assistive device use;walker, rolling (P)   -HT     [place] Level (Gait Training Goal 1, PT) standby assist (P)   -HT      Distance (Gait Training Goal 1, PT) 50 (P)   -HT     Time Frame (Gait Training Goal 1, PT) long term goal (LTG);10 days (P)   -HT     Row Name 04/11/23 1457          Stairs Goal 1 (PT)    Activity/Assistive Device (Stairs Goal 1, PT) ascending stairs;descending stairs;using handrail, left;using handrail, right (P)   -HT     [place] Level/Cues Needed (Stairs Goal 1, PT) minimum assist (75% or more patient effort) (P)   -HT     Number of Stairs (Stairs Goal 1, PT) 4 (P)   -HT     Time Frame (Stairs Goal 1, PT) long term goal (LTG);10 days (P)   -HT     Row Name 04/11/23 9337          Therapy Assessment/Plan (PT)    Planned Therapy Interventions (PT) balance training;bed mobility training;gait training;home exercise program;postural re-education;patient/family education;neuromuscular re-education;stair training;strengthening;transfer training (P)   -HT           User Key  (r) = Recorded By, (t) = Taken By, (c) = Cosigned By    Initials Name Provider Type    HT Clare Fulton, PT Student PT Student               Clinical Impression     Row Name 04/11/23 1453          Pain    Pretreatment Pain Rating 7/10 (P)   -HT     Posttreatment Pain Rating 7/10 (P)   -HT     Pain Location - Side/Orientation Left (P)   -HT     Pain Location - abdomen (P)   -HT     Pain Intervention(s) Ambulation/increased activity;Repositioned (P)   -HT     Additional Documentation Pain Scale: Numbers Pre/Post-Treatment (Group) (P)   -HT     Row Name 04/11/23 9946          Plan of Care Review    Plan of Care Reviewed With patient (P)   -HT     Progress no change (P)   -HT     Outcome Evaluation Pt ambulated 5' with RW and CGA refusing further mobility d/t pain. Rec skilled PT to increase ind with mobility and d/c to IRF. (P)   -HT     Row Name 04/11/23 8257          Therapy Assessment/Plan (PT)    Rehab Potential (PT) good, to achieve stated therapy goals (P)   -HT     Criteria for Skilled Interventions Met (PT) yes;meets criteria;skilled  treatment is necessary (P)   -HT     Therapy Frequency (PT) daily (P)   -HT     Row Name 04/11/23 1453          Vital Signs    Pre Systolic BP Rehab 151 (P)   -HT     Pre Treatment Diastolic BP 69 (P)   -HT     Post Systolic BP Rehab 169 (P)   -HT     Post Treatment Diastolic BP 72 (P)   -HT     Pretreatment Heart Rate (beats/min) 79 (P)   -HT     Posttreatment Heart Rate (beats/min) 78 (P)   -HT     Pre SpO2 (%) 97 (P)   -HT     O2 Delivery Pre Treatment nasal cannula (P)   -HT     O2 Delivery Intra Treatment nasal cannula (P)   -HT     Post SpO2 (%) 97 (P)   -HT     O2 Delivery Post Treatment nasal cannula (P)   -HT     Pre Patient Position Sitting (P)   -HT     Intra Patient Position Standing (P)   -HT     Post Patient Position Supine (P)   -HT     Row Name 04/11/23 1453          Positioning and Restraints    Pre-Treatment Position sitting in chair/recliner (P)   -HT     Post Treatment Position bed (P)   -HT     In Bed notified nsg;fowlers;exit alarm on;encouraged to call for assist;call light within reach;with family/caregiver (P)   -HT           User Key  (r) = Recorded By, (t) = Taken By, (c) = Cosigned By    Initials Name Provider Type    HT Clare Fulton, PT Student PT Student               Outcome Measures     Row Name 04/11/23 1459          How much help from another person do you currently need...    Turning from your back to your side while in flat bed without using bedrails? 2 (P)   -HT     Moving from lying on back to sitting on the side of a flat bed without bedrails? 2 (P)   -HT     Moving to and from a bed to a chair (including a wheelchair)? 3 (P)   -HT     Standing up from a chair using your arms (e.g., wheelchair, bedside chair)? 3 (P)   -HT     Climbing 3-5 steps with a railing? 2 (P)   -HT     To walk in hospital room? 3 (P)   -HT     AM-PAC 6 Clicks Score (PT) 15 (P)   -HT     Highest level of mobility 4 --> Transferred to chair/commode (P)   -HT     Row Name 04/11/23 1459 04/11/23 6282        Functional Assessment    Outcome Measure Options AM-PAC 6 Clicks Basic Mobility (PT) (P)   -HT AM-PAC 6 Clicks Daily Activity (OT)  -          User Key  (r) = Recorded By, (t) = Taken By, (c) = Cosigned By    Initials Name Provider Type     Ca Luther, OT Occupational Therapist     Clare Fulton, PT Student PT Student                             Physical Therapy Education     Title: PT OT SLP Therapies (In Progress)     Topic: Physical Therapy (In Progress)     Point: Mobility training (Done)     Learning Progress Summary           Patient Acceptance, E, VU,NR by  at 4/11/2023 1500                   Point: Home exercise program (Not Started)     Learner Progress:  Not documented in this visit.          Point: Body mechanics (Done)     Learning Progress Summary           Patient Acceptance, E, VU,NR by  at 4/11/2023 1500                   Point: Precautions (Done)     Learning Progress Summary           Patient Acceptance, E, VU,NR by  at 4/11/2023 1500                               User Key     Initials Effective Dates Name Provider Type Discipline     01/12/23 -  Clare Fulton, PT Student PT Student PT              PT Recommendation and Plan  Planned Therapy Interventions (PT): (P) balance training, bed mobility training, gait training, home exercise program, postural re-education, patient/family education, neuromuscular re-education, stair training, strengthening, transfer training  Plan of Care Reviewed With: (P) patient  Progress: (P) no change  Outcome Evaluation: (P) Pt ambulated 5' with RW and CGA refusing further mobility d/t pain. Rec skilled PT to increase ind with mobility and d/c to IRF.     Time Calculation:    PT Charges     Row Name 04/11/23 1501             Time Calculation    Start Time 1351 (P)   -HT      PT Received On 04/11/23 (P)   -      PT Goal Re-Cert Due Date 04/21/23 (P)   -         Timed Charges    03001 - PT Therapeutic Activity Minutes 8 (P)   -          Untimed Charges    PT Eval/Re-eval Minutes 47 (P)   -HT         Total Minutes    Timed Charges Total Minutes 8 (P)   -HT      Untimed Charges Total Minutes 47 (P)   -HT       Total Minutes 55 (P)   -HT            User Key  (r) = Recorded By, (t) = Taken By, (c) = Cosigned By    Initials Name Provider Type     Clare Fulton, PT Student PT Student              Therapy Charges for Today     Code Description Service Date Service Provider Modifiers Qty    47438367173 HC PT THERAPEUTIC ACT EA 15 MIN 2023 Clare Fulton, PT Student GP 1    [id number] HC PT EVAL MOD COMPLEXITY 4 2023 Clare Fulton, PT Student GP 1          PT G-Codes  Outcome Measure Options: (P) AM-PAC 6 Clicks Basic Mobility (PT)  AM-PAC 6 Clicks Score (PT): (P) 15  AM-PAC 6 Clicks Score (OT): 10  PT Discharge Summary  Anticipated Discharge Disposition (PT): (P) inpatient rehabilitation facility    Clare Fulton PT Student  2023      Electronically signed by Yajaira Alvarado, PT at 23 1517          Occupational Therapy Notes (most recent note)      Ca Luther, OT at 23 1040          Patient Name: Christine Garg  : 1933    MRN: [id number]                              Today's Date: 2023       Admit Date: 4/10/2023    Visit Dx: No diagnosis found.  Patient Active Problem List   Diagnosis   • Essential hypertension   • Dyslipidemia   • ASCVD (arteriosclerotic cardiovascular disease)   • Palpitations   • Coronary artery disease    • Abnormal PFTs (pulmonary function tests)   • Chronic obstructive pulmonary disease   • Mild persistent asthma with allergic rhinitis   • Pulmonary nodule   • Former smoker   • Colitis   • Perforated ulcer   • Acute gastric ulcer with perforation   • Moderate malnutrition   • Esophageal stricture   • Hypothyroidism (acquired)   • S/P percutaneous endoscopic gastrostomy (PEG) tube placement   • Adenocarcinoma     Past Medical History:   Diagnosis Date   • Advanced age    •  CAD (coronary artery disease) 2011    s/p 3v CABG   • Chronic kidney disease (CKD), stage III (moderate)    • COPD (chronic obstructive pulmonary disease)    • History of tobacco use    • Hyperlipidemia    • Hypertension    • Hypothyroidism    • PONV (postoperative nausea and vomiting)    • Pulmonary nodule      Past Surgical History:   Procedure Laterality Date   • BREAST BIOPSY Left     benign   • CATARACT EXTRACTION     • CORONARY ANGIOPLASTY     • CORONARY ARTERY BYPASS GRAFT     • ENDOSCOPY W/ PEG TUBE PLACEMENT N/A 4/7/2023    Procedure: ESOPHAGOGASTRODUODENOSCOPY WITH PERCUTANEOUS ENDOSCOPIC GASTROSTOMY TUBE INSERTION;  Surgeon: Aurora Kirkland MD;  Location: [facility];  Service: General;  Laterality: N/A;   • EXPLORATORY LAPAROTOMY N/A 3/22/2023    Procedure: LAPAROTOMY EXPLORATORY WITH OVER SEW OF DUODENAL ULCER WITH OMENTAL PATCH AND BIOPATCH AND CENTRAL LINE PLACEMENT;  Surgeon: Aurora Kirkland MD;  Location: [facility];  Service: General;  Laterality: N/A;      General Information     Row Name 04/11/23 1322          OT Time and Intention    Document Type evaluation  -     Mode of Treatment occupational therapy  -     Row Name 04/11/23 1322          General Information    Patient Profile Reviewed yes  -     Prior Level of Function independent:;bed mobility;transfer;all household mobility;ADL's  Pt denies use of DME at baseline. Has a shower chair  -     Existing Precautions/Restrictions fall;oxygen therapy device and L/min;other (see comments)  abd incision, PEG  -     Barriers to Rehab medically complex;previous functional deficit  -     Row Name 04/11/23 1322          Living Environment    People in Home child(shimon), adult  Pt w/ hospitalization in 2022, her son moved in with her after to assist PRN.  -     Row Name 04/11/23 1322          Home Main Entrance    Number of Stairs, Main Entrance four  -     Stair Railings, Main Entrance railings on both sides of stairs  -     Row Name  04/11/23 1322          Stairs Within Home, Primary    Number of Stairs, Within Home, Primary none  -     Row Name 04/11/23 1322          Cognition    Orientation Status (Cognition) oriented x 3  -     Row Name 04/11/23 1322          Safety Issues, Functional Mobility    Safety Issues Affecting Function (Mobility) awareness of need for assistance;insight into deficits/self-awareness;safety precaution awareness;safety precautions follow-through/compliance;sequencing abilities  -     Impairments Affecting Function (Mobility) endurance/activity tolerance;strength;balance;pain  -           User Key  (r) = Recorded By, (t) = Taken By, (c) = Cosigned By    Initials Name Provider Type     Ca Luther OT Occupational Therapist                 Mobility/ADL's     Row Name 04/11/23 1324          Bed Mobility    Bed Mobility supine-sit  -     Supine-Sit [place] (Bed Mobility) maximum assist (25% patient effort);1 person assist;verbal cues  -     Bed Mobility, Safety Issues decreased use of arms for pushing/pulling;decreased use of legs for bridging/pushing;impaired trunk control for bed mobility  -     Assistive Device (Bed Mobility) bed rails;head of bed elevated;draw sheet  -     Row Name 04/11/23 1324          Transfers    Transfers sit-stand transfer;stand-sit transfer;bed-chair transfer  -     Row Name 04/11/23 1324          Bed-Chair Transfer    Bed-Chair [place] (Transfers) moderate assist (50% patient effort);1 person assist;verbal cues  -     Assistive Device (Bed-Chair Transfers) other (see comments)  BUE support  -     Row Name 04/11/23 1324          Sit-Stand Transfer    Sit-Stand [place] (Transfers) moderate assist (50% patient effort);1 person assist;verbal cues  -     Assistive Device (Sit-Stand Transfers) other (see comments)  -     Row Name 04/11/23 1324          Stand-Sit Transfer    Stand-Sit [place] (Transfers) moderate assist (50% patient effort);1  person assist;verbal cues  -     Assistive Device (Stand-Sit Transfers) other (see comments)  -     Row Name 04/11/23 1324          Functional Mobility    Functional Mobility- Comment Deferred to PT  -[facility] Name 04/11/23 1324          Activities of Daily Living    BADL Assessment/Intervention lower body dressing  -[facility] 04/11/23 1324          Lower Body Dressing Assessment/Training    [place] Level (Lower Body Dressing) don;socks;dependent (less than 25% patient effort)  -     Position (Lower Body Dressing) supine  -     Comment, (Lower Body Dressing) discussed AE for increased independence and safety w/ LB ADLs, rec trial of AE next session.  -           User Key  (r) = Recorded By, (t) = Taken By, (c) = Cosigned By    Initials Name Provider Type     Ca Luther OT Occupational Therapist               Obj/Interventions     Row Name 04/11/23 1326          Sensory Assessment (Somatosensory)    Sensory Assessment (Somatosensory) UE sensation intact  -[facility] 04/11/23 1326          Vision Assessment/Intervention    Visual Impairment/Limitations WFL  -[facility] 04/11/23 1326          Range of Motion Comprehensive    General Range of Motion bilateral upper extremity ROM WFL  -[facility] 04/11/23 1326          Strength Comprehensive (MMT)    General Manual Muscle Testing (MMT) Assessment upper extremity strength deficits identified  -     Comment, General Manual Muscle Testing (MMT) Assessment BUE grossly 4/5  -MC     Row Name 04/11/23 1326          Balance    Balance Assessment sitting static balance;sit to stand dynamic balance;standing static balance;standing dynamic balance  -     Static Sitting Balance contact guard  -     Position, Sitting Balance sitting edge of bed;unsupported;sitting in chair  -     Sit to Stand Dynamic Balance moderate assist;1-person assist;verbal cues  -     Static Standing Balance moderate assist;1-person assist;verbal cues   -     Dynamic Standing Balance moderate assist;1-person assist;verbal cues  -     Position/Device Used, Standing Balance supported  -     Balance Interventions sitting;sit to stand;occupation based/functional task  -           User Key  (r) = Recorded By, (t) = Taken By, (c) = Cosigned By    Initials Name Provider Type     Ca Luther OT Occupational Therapist               Goals/Plan     Row Name 04/11/23 1328          Bed Mobility Goal 1 (OT)    Activity/Assistive Device (Bed Mobility Goal 1, OT) sit to supine;supine to sit  -     [place] Level/Cues Needed (Bed Mobility Goal 1, OT) moderate assist (50-74% patient effort);verbal cues required  -     Time Frame (Bed Mobility Goal 1, OT) long term goal (LTG);10 days  -     Progress/Outcomes (Bed Mobility Goal 1, OT) goal ongoing  -     Row Name 04/11/23 1328          Transfer Goal 1 (OT)    Activity/Assistive Device (Transfer Goal 1, OT) sit-to-stand/stand-to-sit;bed-to-chair/chair-to-bed;toilet;walker, rolling  -     [place] Level/Cues Needed (Transfer Goal 1, OT) minimum assist (75% or more patient effort);verbal cues required  -     Time Frame (Transfer Goal 1, OT) long term goal (LTG);10 days  -     Progress/Outcome (Transfer Goal 1, OT) goal ongoing  -     Row Name 04/11/23 1328          Dressing Goal 1 (OT)    Activity/Device (Dressing Goal 1, OT) lower body dressing  don/doff socks w/ AAD  -     [place]/Cues Needed (Dressing Goal 1, OT) moderate assist (50-74% patient effort);verbal cues required;nonverbal cues (demo/gesture) required  -     Time Frame (Dressing Goal 1, OT) long term goal (LTG);10 days  -     Progress/Outcome (Dressing Goal 1, OT) goal ongoing  -     Row Name 04/11/23 1328          Therapy Assessment/Plan (OT)    Planned Therapy Interventions (OT) activity tolerance training;adaptive equipment training;BADL retraining;functional balance retraining;IADL retraining;occupation/activity  based interventions;ROM/therapeutic exercise;strengthening exercise;transfer/mobility retraining;patient/caregiver education/training  -           User Key  (r) = Recorded By, (t) = Taken By, (c) = Cosigned By    Initials Name Provider Type    Ca Browne OT Occupational Therapist               Clinical Impression     [facility] Name 04/11/23 1326          Pain Assessment    Pain Intervention(s) Repositioned;Ambulation/increased activity  -     Additional Documentation Pain Scale: FACES Pre/Post-Treatment (Group)  -MC     Row Name 04/11/23 1326          Pain Scale: FACES Pre/Post-Treatment    Pain: FACES Scale, Pretreatment 6-->hurts even more  -     Posttreatment Pain Rating 6-->hurts even more  -     Pain Location generalized  -     Pain Location - abdomen  -MC     Row Name 04/11/23 1326          Plan of Care Review    Plan of Care Reviewed With patient  -     Outcome Evaluation Pt presents w/ increased pain, generalized weakness, decreased functional endurance, and balance deficits limiting her ADL indepedence. Pt would benefit from continued skilled IPOT services to address current functional deficits and facilitate return to PLOF. Rec IRF at d/c, but will monitor pt progress closely.  -[facility] Name 04/11/23 1326          Therapy Assessment/Plan (OT)    Rehab Potential (OT) good, to achieve stated therapy goals  -     Criteria for Skilled Therapeutic Interventions Met (OT) yes;meets criteria;skilled treatment is necessary  -     Therapy Frequency (OT) daily  -[facility] 04/11/23 1326          Therapy Plan Review/Discharge Plan (OT)    Anticipated Discharge Disposition (OT) inpatient rehabilitation facility  -[facility] Name 04/11/23 1326          Vital Signs    Pre Systolic BP Rehab --  VSS - RN cleared OT  -           User Key  (r) = Recorded By, (t) = Taken By, (c) = Cosigned By    Initials Name Provider Type    Ca Browne, AMAIRANI Occupational Therapist                Outcome Measures     Row Name 04/11/23 1329          How much help from another is currently needed...    Putting on and taking off regular lower body clothing? 1  -     Bathing (including washing, rinsing, and drying) 2  -     Toileting (which includes using toilet bed pan or urinal) 1  -     Putting on and taking off regular upper body clothing 2  -     Taking care of personal grooming (such as brushing teeth) 3  -MC     Eating meals 1  -     AM-PAC 6 Clicks Score (OT) 10  -     Row Name 04/11/23 1329          Functional Assessment    Outcome Measure Options AM-PAC 6 Clicks Daily Activity (OT)  -           User Key  (r) = Recorded By, (t) = Taken By, (c) = Cosigned By    Initials Name Provider Type    Ca Browne OT Occupational Therapist                Occupational Therapy Education     Title: PT OT SLP Therapies (In Progress)     Topic: Occupational Therapy (In Progress)     Point: ADL training (In Progress)     Description:   Instruct learner(s) on proper safety adaptation and remediation techniques during self care or transfers.   Instruct in proper use of assistive devices.              Learning Progress Summary           Patient Acceptance, E, NR by  at 4/11/2023 1330                   Point: Home exercise program (Not Started)     Description:   Instruct learner(s) on appropriate technique for monitoring, assisting and/or progressing therapeutic exercises/activities.              Learner Progress:  Not documented in this visit.          Point: Precautions (In Progress)     Description:   Instruct learner(s) on prescribed precautions during self-care and functional transfers.              Learning Progress Summary           Patient Acceptance, E, NR by  at 4/11/2023 1330                   Point: Body mechanics (In Progress)     Description:   Instruct learner(s) on proper positioning and spine alignment during self-care, functional mobility activities and/or exercises.               Learning Progress Summary           Patient Acceptance, E, NR by  at 4/11/2023 1330                               User Key     Initials Effective Dates Name Provider Type Discipline     10/14/22 -  Ca Luther OT Occupational Therapist OT              OT Recommendation and Plan  Planned Therapy Interventions (OT): activity tolerance training, adaptive equipment training, BADL retraining, functional balance retraining, IADL retraining, occupation/activity based interventions, ROM/therapeutic exercise, strengthening exercise, transfer/mobility retraining, patient/caregiver education/training  Therapy Frequency (OT): daily  Plan of Care Review  Plan of Care Reviewed With: patient  Outcome Evaluation: Pt presents w/ increased pain, generalized weakness, decreased functional endurance, and balance deficits limiting her ADL indepedence. Pt would benefit from continued skilled IPOT services to address current functional deficits and facilitate return to OF. Rec IRF at d/c, but will monitor pt progress closely.     Time Calculation:    Time Calculation- OT     Row Name 04/11/23 1330             Time Calculation- OT    OT Start Time 1040  -      OT Received On 04/11/23  -      OT Goal Re-Cert Due Date 04/21/23  -         Timed Charges    11434 - OT Therapeutic Activity Minutes 13  -      51963 - OT Self Care/Mgmt Minutes 4  -         Untimed Charges    OT Eval/Re-eval Minutes 31  -         Total Minutes    Timed Charges Total Minutes 17  -      Untimed Charges Total Minutes 31  -       Total Minutes 48  -            User Key  (r) = Recorded By, (t) = Taken By, (c) = Cosigned By    Initials Name Provider Type     Ca Luther OT Occupational Therapist              Therapy Charges for Today     Code Description Service Date Service Provider Modifiers Qty    [id number]  OT THERAPEUTIC ACT EA 15 MIN 4/11/2023 Ca Luther OT GO 1    [id number] HC OT EVAL MOD COMPLEXITY 3  4/11/2023 Ca Luther, OT GO 1               Ca Luther OT  4/11/2023    Electronically signed by Ca Luther, AMAIRANI at 04/11/23 7856

## 2023-04-12 NOTE — ANESTHESIA POSTPROCEDURE EVALUATION
Patient: Christine Garg    Procedure Summary     Date: 04/12/23 Room / Location:  [facility] 2 /  [facility]    Anesthesia Start: 0909 Anesthesia Stop: 0958    Procedure: ESOPHAGOGASTRODUODENOSCOPY WITH STENT PLACEMENT WITH FLUOROSCOPY Diagnosis:       Esophageal stricture      (Esophageal stricture [K22.2])    Surgeons: Brunner, Mark I, MD Provider: Darrian Dejesus MD    Anesthesia Type: general, MAC ASA Status: 3          Anesthesia Type: general, MAC    Vitals  No vitals data found for the desired time range.          Post Anesthesia Care and Evaluation    Patient location during evaluation: PACU  Patient participation: complete - patient participated  Level of consciousness: awake and alert  Pain management: adequate    Airway patency: patent  Anesthetic complications: No anesthetic complications  PONV Status: none  Cardiovascular status: hemodynamically stable and acceptable  Respiratory status: nonlabored ventilation, acceptable and nasal cannula  Hydration status: acceptable

## 2023-04-12 NOTE — CASE MANAGEMENT/SOCIAL WORK
Continued Stay Note  [facility]     Patient Name: Christine Garg  MRN: [id number]  Today's Date: 4/12/2023    Admit Date: 4/10/2023    Plan: SNF   Discharge Plan     Row Name 04/12/23 1525       Plan    Plan SNF    Patient/Family in Agreement with Plan yes    Plan Comments Patient discharge plan is [facility]. Patient will need a new Pre-authorization. Patient will likely be ready by Friday. CM called and left a message to [facility] at [phone]. CM fax over referral to [facility] at [phone]. CM awaiting a call back.    Final Discharge Disposition Code 03 - skilled nursing facility (SNF)               Discharge Codes    No documentation.               Expected Discharge Date and Time     Expected Discharge Date Expected Discharge Time    Apr 14, 2023             Vale Lechuga RN

## 2023-04-12 NOTE — PROGRESS NOTES
"Enter Query Response Below      Query Response: Hospitalist was managing medical issues and this question should be deferred to them.  Electronically signed by Aurora Kirkland MD, 23, 8:46 AM EDT.              If applicable, please update the problem list.       Patient: Christine Garg        : 1933  Account: [id number]           Admit Date: 3/22/2023        How to Respond to this query:       a. Click New Note     b. Answer query within the yellow box.                c. Update the Problem List, if applicable.      If you have any questions about this query contact me at: [email or web address]     Dr. Kirkland,    ED documentation includes CHF in patient's past medical history.  Discharge summary includes plan for patient to begin hydralazine and labetalol and to continue clonidine, valsartan, aspirin, and carvedilol.  Patient underwent TTE on 3/26 which showed, \"Calculated left ventricular EF = 60% Left ventricular ejection fraction appears to be 56 - 60%.\"    Can this be further specified as:    - heart failure with preserved ejection fraction  - other _______________________  - clinically indeterminable    By submitting this query, we are merely seeking further clarification of documentation to accurately reflect all conditions that you are monitoring, evaluating, treating or that extend the hospitalization or utilize additional resources of care. Please utilize your independent clinical judgment when addressing the question(s) above.     This query and your response, once completed, will be entered into the legal medical record.    Sincerely,  Marcy Simpson RN CCDS [facility]  Clinical Documentation Integrity Program   "

## 2023-04-12 NOTE — ANESTHESIA PREPROCEDURE EVALUATION
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  LVDD  (grade I) impaired relaxation and age.  MR Moderate  TR Moderate ,  RVSPmoderately elevated (45-55 mmHg).  Moderate P HTN .    GXT 2018 normal ECG stress test   normal MPS no evidence of ischemia. low risk study.         Neuro/Psych  (-) seizures, CVA  GI/Hepatic/Renal/Endo    (+)  PUD,  thyroid problem hypothyroidism  (-) no renal disease (normal creat <1 ), diabetes    ROS Comment: Perforated Duodenal Ulcer  S/p Ex lap with patch  Has PEG    Musculoskeletal     Abdominal    Substance History      OB/GYN          Other      history of cancer ([place])    ROS/Med Hx Other: HCT 32  DFysphagia c hx esophageasl strx  for stent/esoph dilatation   Has PEG                 Anesthesia Plan    ASA 3     general and MAC     (PFL)  intravenous induction     Anesthetic plan, risks, benefits, and alternatives have been provided, discussed and informed consent has been obtained with: patient.    Plan discussed with CRNA.        CODE STATUS:    Code Status (Patient has no pulse and is not breathing): CPR (Attempt to Resuscitate)  Medical Interventions (Patient has pulse or is breathing): Full Support

## 2023-04-13 NOTE — PLAN OF CARE
Goal Outcome Evaluation:  Plan of Care Reviewed With: patient           Outcome Evaluation: BP elevated 181/68 at 0430. 0600 hydralazine 50 mg dose given per APRN/cross cover. BP down 139/61 at 0545. Pain controlled with PRN orders. Dilaudid given x1 and Norco given x1 this shift. Pt tolerating clear liquids. Continue POC.

## 2023-04-13 NOTE — THERAPY TREATMENT NOTE
Patient Name: Christine Garg  : 1933    MRN: [id number]                              Today's Date: 2023       Admit Date: 4/10/2023    Visit Dx:     ICD-10-CM ICD-9-CM   1. Esophageal stricture  K22.2 530.3     Patient Active Problem List   Diagnosis   • Essential hypertension   • Dyslipidemia   • ASCVD (arteriosclerotic cardiovascular disease)   • Palpitations   • Coronary artery disease    • Abnormal PFTs (pulmonary function tests)   • Chronic obstructive pulmonary disease   • Mild persistent asthma with allergic rhinitis   • Pulmonary nodule   • Former smoker   • Colitis   • Perforated ulcer   • Acute gastric ulcer with perforation   • Moderate malnutrition   • Esophageal stricture   • Hypothyroidism (acquired)   • S/P percutaneous endoscopic gastrostomy (PEG) tube placement   • Adenocarcinoma     Past Medical History:   Diagnosis Date   • Advanced age    • CAD (coronary artery disease) 2011    s/p 3v CABG   • Chronic kidney disease (CKD), stage III (moderate)    • COPD (chronic obstructive pulmonary disease)    • History of tobacco use    • Hyperlipidemia    • Hypertension    • Hypothyroidism    • PONV (postoperative nausea and vomiting)    • Pulmonary nodule      Past Surgical History:   Procedure Laterality Date   • BREAST BIOPSY Left     benign   • CATARACT EXTRACTION     • CORONARY ANGIOPLASTY     • CORONARY ARTERY BYPASS GRAFT     • ENDOSCOPY W/ PEG TUBE PLACEMENT N/A 2023    Procedure: ESOPHAGOGASTRODUODENOSCOPY WITH PERCUTANEOUS ENDOSCOPIC GASTROSTOMY TUBE INSERTION;  Surgeon: Aurora Kirkland MD;  Location: [facility] OR;  Service: General;  Laterality: N/A;   • ENDOSCOPY W/ STENT PLACEMENT/REMOVAL N/A 2023    Procedure: ESOPHAGOGASTRODUODENOSCOPY WITH STENT PLACEMENT WITH FLUOROSCOPY;  Surgeon: Brunner, Mark I, MD;  Location: [facility] ENDOSCOPY;  Service: Gastroenterology;  Laterality: N/A;   • EXPLORATORY LAPAROTOMY N/A 3/22/2023    Procedure: LAPAROTOMY EXPLORATORY WITH OVER SEW OF  DUODENAL ULCER WITH OMENTAL PATCH AND BIOPATCH AND CENTRAL LINE PLACEMENT;  Surgeon: Aurora Kirkland MD;  Location: [facility] OR;  Service: General;  Laterality: N/A;      General Information     Row Name 04/13/23 1529          OT Time and Intention    Document Type therapy note (daily note)  -     Mode of Treatment occupational therapy;individual therapy  -     Row Name 04/13/23 1529          General Information    Patient Profile Reviewed yes  -     Existing Precautions/Restrictions fall;oxygen therapy device and L/min;other (see comments)  abd incision, PEG  -     Barriers to Rehab medically complex;previous functional deficit  -     Row Name 04/13/23 1529          Cognition    Orientation Status (Cognition) oriented x 3  -     Row Name 04/13/23 1529          Safety Issues, Functional Mobility    Safety Issues Affecting Function (Mobility) awareness of need for assistance;insight into deficits/self-awareness;safety precaution awareness;safety precautions follow-through/compliance;sequencing abilities  -     Impairments Affecting Function (Mobility) endurance/activity tolerance;strength;balance;pain  -           User Key  (r) = Recorded By, (t) = Taken By, (c) = Cosigned By    Initials Name Provider Type     Ca Luther, OT Occupational Therapist                 Mobility/ADL's     Row Name 04/13/23 1530          Bed Mobility    Bed Mobility supine-sit  -     Supine-Sit [place] (Bed Mobility) moderate assist (50% patient effort);1 person assist;verbal cues  -     Bed Mobility, Safety Issues decreased use of arms for pushing/pulling;decreased use of legs for bridging/pushing;impaired trunk control for bed mobility  -     Assistive Device (Bed Mobility) bed rails;draw sheet;head of bed elevated  -     Comment, (Bed Mobility) Initially upon sitting EOB pt c/o dizziness, quickly resolved w/ sitting rest break.  -     Row Name 04/13/23 1530          Transfers    Transfers  sit-stand transfer;stand-sit transfer;bed-chair transfer  -     Comment, (Transfers) Pt performed STS & SPT from bed-to-chair w/ min Ax1 & BUE support. Pt performed 2nd STS w/ RW & min Ax1. VC's for sequencing and safety.  -     Row Name 04/13/23 1530          Bed-Chair Transfer    Bed-Chair [place] (Transfers) minimum assist (75% patient effort);1 person assist;verbal cues  -     Assistive Device (Bed-Chair Transfers) other (see comments)  -     Row Name 04/13/23 1530          Sit-Stand Transfer    Sit-Stand [place] (Transfers) minimum assist (75% patient effort);1 person assist;verbal cues  -     Assistive Device (Sit-Stand Transfers) other (see comments)  -     Row Name 04/13/23 1530          Stand-Sit Transfer    Stand-Sit [place] (Transfers) minimum assist (75% patient effort);1 person assist;verbal cues  -     Assistive Device (Stand-Sit Transfers) other (see comments)  -     Row Name 04/13/23 1530          Functional Mobility    Functional Mobility- Ind. Level minimum assist (75% patient effort);1 person;verbal cues required  -     Functional Mobility- Device walker, front-wheeled  -     Functional Mobility-Distance (Feet) 8  -     Functional Mobility- Safety Issues balance decreased during turns;sequencing ability decreased;step length decreased;supplemental O2  -     Functional Mobility- Comment Pt performed 8ft functional mobility in room w/ RW, min Ax1 & close chair follow.  -     Row Name 04/13/23 1530          Activities of Daily Living    BADL Assessment/Intervention lower body dressing  -     Row Name 04/13/23 1530          Lower Body Dressing Assessment/Training    [place] Level (Lower Body Dressing) don;doff;socks;dependent (less than 25% patient effort)  -     Assistive Devices (Lower Body Dressing) long-handled shoe horn;reacher;sock-aid  -     Position (Lower Body Dressing) supported sitting  -     Comment, (Lower Body Dressing) Pt provided w/  and educated on AE for LB ADLs, pt verbalized understanding. Pt declined trial this date, rec trial next session.  -     Row Name 04/13/23 1530          Bathing Assessment/Intervention    Assistive Devices (Bathing) long-handled sponge  -     Comment, (Bathing) Pt provided w/ long-handled sponge to increase independence and safety w/ LB bathing  -           User Key  (r) = Recorded By, (t) = Taken By, (c) = Cosigned By    Initials Name Provider Type     Ca Luther OT Occupational Therapist               Obj/Interventions     Row Name 04/13/23 1533          Balance    Balance Assessment sitting static balance;sitting dynamic balance;sit to stand dynamic balance;standing static balance;standing dynamic balance  -     Static Sitting Balance contact guard  -     Dynamic Sitting Balance contact guard  -     Position, Sitting Balance unsupported;sitting in chair;sitting edge of bed  -     Sit to Stand Dynamic Balance minimal assist;1-person assist;verbal cues  -     Static Standing Balance minimal assist;1-person assist;verbal cues  -     Dynamic Standing Balance minimal assist;1-person assist;verbal cues  -     Position/Device Used, Standing Balance supported;walker, front-wheeled;other (see comments)  -     Balance Interventions sitting;sit to stand;occupation based/functional task  -           User Key  (r) = Recorded By, (t) = Taken By, (c) = Cosigned By    Initials Name Provider Type     Ca Luther, AMAIRANI Occupational Therapist               Goals/Plan    No documentation.                Clinical Impression     Row Name 04/13/23 1533          Pain Assessment    Pretreatment Pain Rating 6/10  -     Posttreatment Pain Rating 6/10  -     Pain Location incisional  -     Pain Location - abdomen  -     Pain Intervention(s) Repositioned;Ambulation/increased activity;Nursing Notified;Rest  -     Row Name 04/13/23 1539          Plan of Care Review    Plan of Care Reviewed  With patient  -     Progress improving  -     Outcome Evaluation Pt w/ noted increased independence w/ bed mobility & transfers this date. Pt's ADL independence continues to be limited d/t increased pain, generalized weakness, decreased functional endurance, and balance deficits. Pt provided w/ and educated on AE for LB ADLs this date, rec AE trial next session. Cont to rec IRF at d/c.  -     Row Name 04/13/23 1534          Therapy Assessment/Plan (OT)    Rehab Potential (OT) good, to achieve stated therapy goals  -     Criteria for Skilled Therapeutic Interventions Met (OT) yes;meets criteria;skilled treatment is necessary  -     Therapy Frequency (OT) daily  -     Row Name 04/13/23 1534          Therapy Plan Review/Discharge Plan (OT)    Anticipated Discharge Disposition (OT) inpatient rehabilitation facility  -     Row Name 04/13/23 1534          Vital Signs    Pre Systolic BP Rehab --  VSS - RN cleared OT  -     O2 Delivery Pre Treatment nasal cannula  -     O2 Delivery Intra Treatment nasal cannula  -     O2 Delivery Post Treatment nasal cannula  -     Pre Patient Position Supine  -     Intra Patient Position Standing  -     Post Patient Position Sitting  -[facility] Name 04/13/23 1534          Positioning and Restraints    Pre-Treatment Position in bed  -     Post Treatment Position chair  -     In Chair notified nsg;reclined;call light within reach;encouraged to call for assist;exit alarm on;waffle cushion;on mechanical lift sling;legs elevated  -           User Key  (r) = Recorded By, (t) = Taken By, (c) = Cosigned By    Initials Name Provider Type     Ca Luther, AMAIRANI Occupational Therapist               Outcome Measures     Row Name 04/13/23 1531          How much help from another is currently needed...    Putting on and taking off regular lower body clothing? 1  -     Bathing (including washing, rinsing, and drying) 2  -     Toileting (which includes using  toilet bed pan or urinal) 1  -     Putting on and taking off regular upper body clothing 2  -     Taking care of personal grooming (such as brushing teeth) 3  -     Eating meals 2  -     AM-PAC 6 Clicks Score (OT) 11  -     Row Name 04/13/23 1536          Functional Assessment    Outcome Measure Options AM-PAC 6 Clicks Daily Activity (OT)  -           User Key  (r) = Recorded By, (t) = Taken By, (c) = Cosigned By    Initials Name Provider Type    Ca Browne OT Occupational Therapist                Occupational Therapy Education     Title: PT OT SLP Therapies (In Progress)     Topic: Occupational Therapy (In Progress)     Point: ADL training (In Progress)     Description:   Instruct learner(s) on proper safety adaptation and remediation techniques during self care or transfers.   Instruct in proper use of assistive devices.              Learning Progress Summary           Patient Acceptance, E, NR by  at 4/13/2023 1536    Acceptance, E, NR by  at 4/11/2023 1330                   Point: Home exercise program (Not Started)     Description:   Instruct learner(s) on appropriate technique for monitoring, assisting and/or progressing therapeutic exercises/activities.              Learner Progress:  Not documented in this visit.          Point: Precautions (In Progress)     Description:   Instruct learner(s) on prescribed precautions during self-care and functional transfers.              Learning Progress Summary           Patient Acceptance, E, NR by  at 4/13/2023 1536    Acceptance, E, NR by  at 4/11/2023 1330                   Point: Body mechanics (In Progress)     Description:   Instruct learner(s) on proper positioning and spine alignment during self-care, functional mobility activities and/or exercises.              Learning Progress Summary           Patient Acceptance, E, NR by  at 4/13/2023 1536    Acceptance, E, NR by  at 4/11/2023 1330                               User Key      Initials Effective Dates Name Provider Type Discipline     10/14/22 -  Ca Luther OT Occupational Therapist OT              OT Recommendation and Plan  Planned Therapy Interventions (OT): activity tolerance training, adaptive equipment training, BADL retraining, functional balance retraining, IADL retraining, occupation/activity based interventions, ROM/therapeutic exercise, strengthening exercise, transfer/mobility retraining, patient/caregiver education/training  Therapy Frequency (OT): daily  Plan of Care Review  Plan of Care Reviewed With: patient  Progress: improving  Outcome Evaluation: Pt w/ noted increased independence w/ bed mobility & transfers this date. Pt's ADL independence continues to be limited d/t increased pain, generalized weakness, decreased functional endurance, and balance deficits. Pt provided w/ and educated on AE for LB ADLs this date, rec AE trial next session. Cont to rec IRF at d/c.     Time Calculation:    Time Calculation- OT     Row Name 04/13/23 1537             Time Calculation- OT    OT Start Time 1422  -MC      OT Received On 04/13/23  -      OT Goal Re-Cert Due Date 04/21/23  -         Timed Charges    74230 - OT Therapeutic Activity Minutes 15  -MC      95128 - OT Self Care/Mgmt Minutes 10  -MC         Total Minutes    Timed Charges Total Minutes 25  -MC       Total Minutes 25  -MC            User Key  (r) = Recorded By, (t) = Taken By, (c) = Cosigned By    Initials Name Provider Type     Ca Luther OT Occupational Therapist              Therapy Charges for Today     Code Description Service Date Service Provider Modifiers Qty    57811964455  OT THERAPEUTIC ACT EA 15 MIN 4/13/2023 Ca Luther OT GO 1    08130629420 HC OT SELF CARE/MGMT/TRAIN EA 15 MIN 4/13/2023 Ca Luther OT GO 1               Ca Luther OT  4/13/2023

## 2023-04-13 NOTE — PROGRESS NOTES
[facility] Medicine Services  PROGRESS NOTE    Patient Name: Christine Garg  : 1933  MRN: [id number]    Date of Admission: 4/10/2023  Primary Care Physician: Dave Tobar MD    Subjective   Subjective     CC:  F/U Dysphagia    HPI:  Tolerating the liquid diet.  Trying to increase her intake.  Still having some pain on the left side.    ROS:  Gen- No fevers  MSK- As above      Objective   Objective     Vital Signs:   Temp:  [95.9 °F (35.5 °C)-97.9 °F (36.6 °C)] 96.2 °F (35.7 °C)  Heart Rate:  [56-89] 79  Resp:  [16-18] 18  BP: (117-199)/() 152/71  Flow (L/min):  [2-3] 2     Physical Exam:  Constitutional: Awake, alert, sitting up in bed eating lunch  HENT: NCAT, mucous membranes moist  Respiratory: Respiratory effort normal on nasal cannula  Musculoskeletal: No bilateral ankle edema  Psychiatric: Appropriate affect, cooperative  Neurologic: Speech clear and fluent  Skin: No rashes on exposed surfaces      Results Reviewed:  LAB RESULTS:      Lab 23   WBC 9.87 9.11 8.62 10.38   HEMOGLOBIN 10.3* 9.9* 10.0* 10.1*   HEMATOCRIT 32.9* 31.1* 30.8* 32.6*   PLATELETS 251 280 285 249   NEUTROS ABS  --  6.38  --  7.63*   IMMATURE GRANS (ABS)  --  0.06*  --  0.04   LYMPHS ABS  --  1.12  --  1.18   MONOS ABS  --  0.69  --  0.61   EOS ABS  --  0.80*  --  0.82*   .6* 96.9 96.6 104.2*   CRP  --  18.47*  --   --    PROTIME  --   --  12.4  --          Lab 23   SODIUM 134* 135* 132* 135*   POTASSIUM 4.4 4.6 4.0 4.1   CHLORIDE 99 98 99 100   CO2 25.0 30.0* 28.1 27.6   ANION GAP 10.0 7.0 4.9* 7.4   BUN 9 10 10 7*   CREATININE 0.40* 0.41* 0.42* 0.52*   EGFR 94.7 94.2 93.6 88.9   GLUCOSE 68 85 119* 127*   CALCIUM 8.4* 8.5* 8.6 8.6   MAGNESIUM 1.6 1.7 2.0 2.1   PHOSPHORUS  --   --  2.9 3.5         Lab 23  0242 23  0139   TOTAL PROTEIN 4.8* 4.6*   ALBUMIN 2.3* 2.5*    GLOBULIN 2.5 2.1   ALT (SGPT) 7 11   AST (SGOT) 14 11   BILIRUBIN 0.2 <0.2   ALK PHOS 98 88         Lab 04/09/23  0242   PROTIME 12.4   INR 0.91             Lab 04/11/23  0614   ABO TYPING A   RH TYPING Positive   ANTIBODY SCREEN Negative         Brief Urine Lab Results  (Last result in the past 365 days)      Color   Clarity   Blood   Leuk Est   Nitrite   Protein   CREAT   Urine HCG        01/06/23 0149 Yellow   Clear   Negative   Negative   Negative   Negative                 Microbiology Results Abnormal     None          XR Chest 1 View    Result Date: 4/12/2023  XR CHEST 1 VW Date of Exam: 4/12/2023 11:51 AM EDT Indication: dyspnea. Comparison: 3/26/2023 Findings: The heart size is normal. There are postoperative changes of prior CABG. There is pulmonary vascular congestion and there is interstitial pulmonary edema. There are bilateral dependent pleural effusions left greater than right and there is atelectasis or  airspace disease in the left lung base. There is a stent present in the midportion of the thoracic esophagus.     Impression: Impression: 1. Findings consistent with congestive heart failure and mild pulmonary edema. 2. Bilateral dependent pleural effusions and left basilar airspace disease. 3. Postoperative changes of prior CABG. 4. Mid esophageal stent. Electronically Signed: Bimal Munroe  4/12/2023 12:28 PM EDT  Workstation ID: [id number]    FL C Arm During Surgery    Result Date: 4/12/2023  FL C ARM DURING SURGERY Date of Exam: 4/12/2023 8:52 AM EDT Indication: ESOPHAGOGASTRODUODENOSCOPY WITH STENT PLACEMENT WITH FLUOROSCOPY. Comparison: None available. Technique:  Intraprocedural fluoroscopic imaging Fluoroscopic Time:  83.6 seconds Findings: A single image is submitted demonstrating a mid esophageal stent.     Impression: Impression: Mid esophageal stent is in place. Electronically Signed: Tobias Yarbrough  4/12/2023 10:13 AM EDT  Workstation ID: [id number]      Results for orders placed during the  hospital encounter of 03/22/23    Adult Transthoracic Echo Complete W/ Cont if Necessary Per Protocol    Interpretation Summary  •  Left ventricular systolic function is normal. Calculated left ventricular EF = 60% Left ventricular ejection fraction appears to be 56 - 60%.  •  Left ventricular diastolic function is consistent with (grade I) impaired relaxation and age.  •  Moderate to severe mitral valve regurgitation is present.  •  Moderate tricuspid valve regurgitation is present.  •  Estimated right ventricular systolic pressure from tricuspid regurgitation is moderately elevated (45-55 mmHg).  •  Moderate pulmonary hypertension is present.      Current medications:  Scheduled Meds:aspirin, 81 mg, Per PEG Tube, Daily  carvedilol, 25 mg, Per PEG Tube, BID With Meals  sennosides, 10 mL, Per PEG Tube, BID   And  docusate sodium, 100 mg, Per PEG Tube, BID  hydrALAZINE, 50 mg, Per PEG Tube, Q8H  levothyroxine, 112 mcg, Per PEG Tube, Q AM  lidocaine, 1 patch, Transdermal, Q24H  pantoprazole, 40 mg, Intravenous, BID AC  sodium chloride, 10 mL, Intravenous, Q12H  valsartan, 320 mg, Per PEG Tube, Q24H      Continuous Infusions:   PRN Meds:.•  acetaminophen **OR** acetaminophen **OR** acetaminophen  •  [DISCONTINUED] senna-docusate sodium **AND** polyethylene glycol **AND** [DISCONTINUED] bisacodyl **AND** bisacodyl  •  cloNIDine  •  HYDROcodone-acetaminophen  •  HYDROmorphone  •  ipratropium-albuterol  •  [DISCONTINUED] ondansetron **OR** ondansetron  •  sodium chloride  •  sodium chloride    Assessment & Plan   Assessment & Plan     Active Hospital Problems    Diagnosis  POA   • **Esophageal stricture [K22.2]  Unknown   • Hypothyroidism (acquired) [E03.9]  Unknown   • S/P percutaneous endoscopic gastrostomy (PEG) tube placement [Z93.1]  Not Applicable   • Adenocarcinoma [C80.1]  Unknown   • Perforated ulcer [K27.5]  Yes   • Former smoker [Z87.891]  Not Applicable   • Coronary artery disease  [I25.10]  Yes   • Chronic  obstructive pulmonary disease [J44.9]  Yes   • Essential hypertension [I10]  Yes   • ASCVD (arteriosclerotic cardiovascular disease) [I25.10]  Yes      Resolved Hospital Problems   No resolved problems to display.        Brief Hospital Course to date:  Christine Garg is a 89 y.o. female admitted to [facility] on 3/22 for abdominal pain; went to OR, had perforated duodenal ulcer repaired with omental patch.  Then on 3/29, patient had CT of thoracic spine which noted left pleural consolidation and parenchymal nodules.  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  She was also found to have an esophageal stricture secondary to extrinsic compression of 3cm peritracheal lymph node.  On 4/7, patient had PEG tube placed.  She was sent to [facility] for placement of esophageal stent by Dr. Waters.   PMH of hypertension, hyperlipidemia, COPD, coronary artery disease, peripheral artery disease, hypothyroidism.    This patient's problems and plans were partially entered by my partner and updated as appropriate by me 04/13/23.     Dysphagia due to external esophageal compression by LAD  S/p PEG but intolerant of tube feeds  -EGD 4/12 with esophageal stent placement  -Full liquid diet x 1 week then advance to puree 4/19 if tolerating    Perforated Duodenal Ulcer s/p exploratory laparotomy with patch 3/22  -Continue PPI twice daily  -Continue lower aspirin to 81 mg daily instead of 325    New diagnosis of lung adenocarcinoma  COPD  Former smoker   On oxygen 2L here   -PRN duonebs  -Follow up outpatient for treatment plan  -Lidoderm patch for left shoulder blade pain which is likely related to nerve compression from known T-spine lesion.  Gabapentin added today.  Titrate up as needed.    CAD   HLD  -Lowered home aspirin to 81mg. Will need to make this clear at discharge in order to prevent further ulceration     Hypoalbuminemia   Severe malnutrition  -Nutrition  consulted     HTN  -Continue home coreg, valsartan, hydralazine   -PRN clonidine       Hypothyroid   -Continue home synthroid      Debility  -PT  -Patient had been approved to [facility] for subacute rehab in [place]. Patient would still like to go there after discharge. Patient is to follow up outpatient for treatment options regarding her cancer.      Expected Discharge Location and Transportation:  Rehab  Expected Discharge   Expected Discharge Date and Time     Expected Discharge Date Expected Discharge Time    Apr 14, 2023            DVT prophylaxis:  Mechanical DVT prophylaxis orders are present.     AM-PAC 6 Clicks Score (PT): 15 (04/11/23 7628)    CODE STATUS:   Code Status and Medical Interventions:   Ordered at: 04/10/23 9181     Code Status (Patient has no pulse and is not breathing):    CPR (Attempt to Resuscitate)     Medical Interventions (Patient has pulse or is breathing):    Full Support       Clare De Anda MD  04/13/23

## 2023-04-13 NOTE — CASE MANAGEMENT/SOCIAL WORK
Continued Stay Note  [facility]     Patient Name: Christine Garg  MRN: [id number]  Today's Date: 4/13/2023    Admit Date: 4/10/2023    Plan: [facility]   Discharge Plan     Row Name 04/13/23 0811       Plan    Plan [facility]    Plan Comments I left voice mail with admissions/[place] this am to see if she has received clinical for isurance authorization. Admissions from facility called to let me know they have referral and are working it up.     Final Discharge Disposition Code 03 - skilled nursing facility (SNF)               Discharge Codes    No documentation.               Expected Discharge Date and Time     Expected Discharge Date Expected Discharge Time    Apr 14, 2023             Tegan Quintero RN

## 2023-04-13 NOTE — PROGRESS NOTES
"Enter Query Response Below      Query Response: Chronic HFpEF             If applicable, please update the problem list.       Patient: Christine Garg        : 1933  Account: [id number]           Admit Date: 3/22/2023        How to Respond to this query:       a. Click New Note     b. Answer query within the yellow box.                c. Update the Problem List, if applicable.      If you have any questions about this query contact me at: [email or web address]     Dr. Robin,    This query was sent to Dr. Kirkland who deferred to \"hospitalist.\"    ED documentation includes CHF in patient's past medical history.  Discharge summary includes plan for patient to begin hydralazine and labetalol and to continue clonidine, valsartan, aspirin, and carvedilol.  Patient underwent TTE on 3/26 which showed, \"Calculated left ventricular EF = 60% Left ventricular ejection fraction appears to be 56 - 60%.\"     Can this be further specified as:     - heart failure with preserved ejection fraction  - other _______________________  - clinically indeterminable    By submitting this query, we are merely seeking further clarification of documentation to accurately reflect all conditions that you are monitoring, evaluating, treating or that extend the hospitalization or utilize additional resources of care. Please utilize your independent clinical judgment when addressing the question(s) above.     This query and your response, once completed, will be entered into the legal medical record.    Sincerely,  Marcy Simpson RN CCDS [facility]  Clinical Documentation Integrity Program   "

## 2023-04-13 NOTE — PLAN OF CARE
Goal Outcome Evaluation:  Plan of Care Reviewed With: patient        Progress: improving  Outcome Evaluation: Pt w/ noted increased independence w/ bed mobility & transfers this date. Pt's ADL independence continues to be limited d/t increased pain, generalized weakness, decreased functional endurance, and balance deficits. Pt provided w/ and educated on AE for LB ADLs this date, rec AE trial next session. Cont to rec IRF at d/c.

## 2023-04-13 NOTE — PLAN OF CARE
Problem: Adult Inpatient Plan of Care  Goal: Plan of Care Review  Outcome: Ongoing, Progressing  Flowsheets (Taken 4/13/2023 7525)  Progress: no change  Plan of Care Reviewed With: patient  Outcome Evaluation: VSS. On 2 liters O2 NC. Tried room air and sats in the high 80s. Complaints of pain relieved with prns meds. Worked with therapy and sat in chair.   Goal Outcome Evaluation:  Plan of Care Reviewed With: patient        Progress: no change  Outcome Evaluation: VSS. On 2 liters O2 NC. Tried room air and sats in the high 80s. Complaints of pain relieved with prns meds. Worked with therapy and sat in chair.

## 2023-04-13 NOTE — PROGRESS NOTES
"GI Daily Progress Note  Subjective:    Pt sitting up in bed, sons at bedside. Tolerating clear liquids without dysphagia. CXR this morning reveals unchanged appearance and position of esophageal stent spanning the mid and distal esophagus.     Objective:    /74 (BP Location: Right arm, Patient Position: Lying)   Pulse 75   Temp 95.8 °F (35.4 °C) (Oral)   Resp 16   Ht 165.1 cm (65\")   Wt 66.2 kg (146 lb)   SpO2 97%   BMI 24.30 kg/m²     Physical Exam  Vitals and nursing note reviewed.   Constitutional:       Appearance: Normal appearance. She is normal weight. She is not ill-appearing or diaphoretic.   HENT:      Head: Normocephalic and atraumatic.      Right Ear: External ear normal.      Left Ear: External ear normal.      Nose: Nose normal.      Mouth/Throat:      Mouth: Mucous membranes are moist.      Pharynx: Oropharynx is clear.   Eyes:      Conjunctiva/sclera: Conjunctivae normal.      Pupils: Pupils are equal, round, and reactive to light.   Neck:      Thyroid: No thyromegaly.   Cardiovascular:      Rate and Rhythm: Normal rate and regular rhythm.      Pulses: Normal pulses.      Heart sounds: Normal heart sounds.   Pulmonary:      Effort: Pulmonary effort is normal.      Breath sounds: Normal breath sounds.   Abdominal:      General: Abdomen is flat. Bowel sounds are normal. There is no distension.      Tenderness: There is abdominal tenderness (diffuse, worsened around PEG insertion site).      Comments: PEG in place, insertion site is CDI. Previous midline incision is well healed.    Musculoskeletal:         General: Normal range of motion.      Cervical back: Normal range of motion.   Skin:     General: Skin is warm and dry.   Neurological:      Mental Status: She is oriented to person, place, and time.   Psychiatric:         Mood and Affect: Mood normal.         Lab  Lab Results   Component Value Date    WBC 9.87 04/12/2023    HGB 10.3 (L) 04/12/2023    HGB 9.9 (L) 04/11/2023    HGB 10.0 " (L) 04/09/2023    .6 (H) 04/12/2023     04/12/2023    INR 0.91 04/09/2023    INR 0.87 (L) 04/04/2023    INR 0.99 05/07/2018    INR 0.94 10/12/2015       Lab Results   Component Value Date    GLUCOSE 68 04/12/2023    BUN 9 04/12/2023    CREATININE 0.40 (L) 04/12/2023    EGFRIFNONA 47 (L) 05/08/2018    BCR 22.5 04/12/2023     (L) 04/12/2023    K 4.4 04/12/2023    CO2 25.0 04/12/2023    CALCIUM 8.4 (L) 04/12/2023    PROTENTOTREF 6.5 02/07/2023    ALBUMIN 2.3 (L) 04/09/2023    ALKPHOS 98 04/09/2023    BILITOT 0.2 04/09/2023    BILIDIR <0.2 12/25/2022    ALT 7 04/09/2023    AST 14 04/09/2023       Assessment and Plan:    Esophageal stricture, 2/2 extrinsic compression due to enlarged paratracheal lymph node  S/p Esophageal stent placement via EGD 4/12  Esophageal dysphagia, improved  Adenocarcinoma of lung  Recent perforated duodenal ulcer s/p ex lap with patch  PEG tube in place   - CXR 4/13 reviewed, esophageal stent in place   - continue full liquid diet x 1 week, then may advanced to puree consistencies   - dysphagia measures given (chewing foods thoroughly, eating slowly, sips of liquids between bites)    Pt is stable from a GI standpoint. Continue oral diet. Recommend continuing meds as above.    Diandra Hatch PA-C  04/13/23  12:46 EDT

## 2023-04-14 PROBLEM — E43 SEVERE MALNUTRITION: Status: ACTIVE | Noted: 2023-01-01

## 2023-04-14 NOTE — PLAN OF CARE
Goal Outcome Evaluation:  Plan of Care Reviewed With: patient           Outcome Evaluation: BP elevated 181/82 at 0400. Recheck by /73. 0600 hydralazine 50 mg dose given per APRN/cross cover. /70 at 0550 L arm and 152/71 R arm. PRN dose 0.1 mg clonidine given. Pain controlled with PRN orders. Dilaudid given x1 and Norco given x2 this shift. Pt tolerating clear liquids. Continue POC.

## 2023-04-14 NOTE — PLAN OF CARE
Goal Outcome Evaluation:  Plan of Care Reviewed With: patient        Progress: improving  Outcome Evaluation: Amb 36 ft w/ R wx & min A to guide, w/ 4 stand.rests, + performed LE ther exer w/ extensive cueing (diffic focusing on task), but limited by incr abdom incis.pain 7/10, desat 87% on o2 (mod SOB & fatigue), & signif elev /81 (nsg gave BP med just p/t gt).

## 2023-04-14 NOTE — PLAN OF CARE
Problem: Adult Inpatient Plan of Care  Goal: Plan of Care Review  Outcome: Ongoing, Progressing  Flowsheets (Taken 4/14/2023 1951)  Progress: improving  Plan of Care Reviewed With: patient  Outcome Evaluation: VSS. On 1 liter O2 NC. Pain better today. Started nocturnal tube feeds at 6pm.   Goal Outcome Evaluation:  Plan of Care Reviewed With: patient        Progress: improving  Outcome Evaluation: VSS. On 1 liter O2 NC. Pain better today. Started nocturnal tube feeds at 6pm.

## 2023-04-14 NOTE — CONSULTS
Clinical Nutrition     Nutrition Support Assessment  Reason for Visit: Follow-up protocol, Consult, EN      Patient Name: Christine Garg  YOB: 1933  MRN: [id number]  Date of Encounter: 04/14/23 15:55 EDT  Admission date: 4/10/2023    Comments:    Concerns raised for limited PO acceptance of full liquid diet. Consult received to initiate EN for nocturnal feeds. Pt with hx of poor tolerance and inability to tolerate rate >15mL/hr since admission at [facility].    Initiate nocturnal feeds from 6p to 6a of Petamen AF @ 15mL/hr and advance by 15mL q6hrs as tolerated to goal rate of 55mL/hr. Flush with 20mL/hr.   Infused at goal over 12 hrs, this regimen provides: 660mL TGV, 792kcal (52% est needs), 50g protein (56% est needs), 535mL TF FW (+ 240mL flush = 775mL total)    Nutrition Assessment   Admission Diagnosis:  Esophageal stricture [K22.2]      Problem List:    Esophageal stricture    Essential hypertension    ASCVD (arteriosclerotic cardiovascular disease)    Coronary artery disease     Chronic obstructive pulmonary disease    Former smoker    Perforated ulcer    Hypothyroidism (acquired)    S/P percutaneous endoscopic gastrostomy (PEG) tube placement    Adenocarcinoma    Severe malnutrition  Severe Malnutrition      PMH:   She  has a past medical history of Advanced age, CAD (coronary artery disease) (2011), Chronic kidney disease (CKD), stage III (moderate), COPD (chronic obstructive pulmonary disease), History of tobacco use, Hyperlipidemia, Hypertension, Hypothyroidism, PONV (postoperative nausea and vomiting), and Pulmonary nodule.    PSH:  She  has a past surgical history that includes Breast biopsy (Left); Cataract extraction; Coronary angioplasty; Coronary artery bypass graft; Exploratory Laparotomy (N/A, 3/22/2023); Esophagogastroduodenoscopy w/ PEG (N/A, 4/7/2023); and ENDOSCOPY W/ STENT PLACEMENT/REMOVAL (N/A, 4/12/2023).      Applicable Nutrition Concerns:   Skin: surgical  incision abdoment  Oral:  GI: PEG      Applicable Interval History:     3/22: duodenal ulcer repair  4/7: PEG placed at  [facility]  4/11: initiated EN  4/12: esophageal stent placement    Reported/Observed/Food/Nutrition Related History:     4/14  Pt son concerned for poor PO acceptance. Plan to initiate nocturnal feeds with hope of improved tolerance - ? Ability to deliver >15mL/hr. Per RN, pt tolerating meds via PEG with additional flush without difficulty.     4/12  Pt unable to tolerate EN at 15mL/hr - c/o abdominal tightness. Esophageal stent placed - diet upgraded to full liquids. Reports tolerating 1 cup of OJ.  Pt does not like original ONS but agreeable to Breeze.     4/11  Pt laying in bed with family at bedside - able to provide some weight/nutrition hx, additional information provided by spouse and son at bedside. Endorsed recent hx of inability to tolerate PO intake of any kind without emesis for ~3wks prior to hospitalization in March. PEG placed 2/2 esophageal stricture and inability for diet advance past clear liquids. Unable to tolerate EN order of Nutren 1.5 >20mL/hr (goal was 50mL/hr) 2/2 abdominal tightness. Mild hyponatremia noted. Denies N/V/D - reports soft BM. Plan for esophageal stent placement - ? Timeline.   Educated on EN formula most likely to choose, verbalized understanding.     Labs    Labs Reviewed: Yes     Results from last 7 days   Lab Units 04/12/23  0518 04/11/23  0614 04/09/23  0242   GLUCOSE mg/dL 68 85 119*   BUN mg/dL 9 10 10   CREATININE mg/dL 0.40* 0.41* 0.42*   SODIUM mmol/L 134* 135* 132*   CHLORIDE mmol/L 99 98 99   POTASSIUM mmol/L 4.4 4.6 4.0   PHOSPHORUS mg/dL  --   --  2.9   MAGNESIUM mg/dL 1.6 1.7 2.0   ALT (SGPT) U/L  --   --  7       Results from last 7 days   Lab Units 04/11/23  0614 04/09/23  0242   ALBUMIN g/dL  --  2.3*   PREALBUMIN mg/dL 9.3*  --    CRP mg/dL 18.47*  --        Results from last 7 days   Lab Units 04/14/23  1205 04/14/23  0537 04/14/23  0152  "04/13/23  1740 04/13/23  1156 04/13/23  0540   GLUCOSE mg/dL 99 98 111 112 103 88     Lab Results   Lab Value Date/Time    HGBA1C 6.10 (H) 03/22/2023 1139    HGBA1C 6.00 (H) 12/19/2022 1638                 Medications    Medications Reviewed: Yes  Pertinent: levothyroxine, protonix, percolace  Infusion:   PRN: Dilaudid      Intake/Ouptut 24 hrs (0701 - 0700)   I&O's Reviewed: Yes       Anthropometrics     Flowsheet Rows    Flowsheet Row First Filed Value   Admission Height 165.1 cm (65\") Documented at 04/11/2023 0532   Admission Weight 66.2 kg (146 lb) Documented at 04/11/2023 0532          Height: Height: 165.1 cm (65\")  Last Filed Weight: Weight: 66.2 kg (146 lb) (04/11/23 0532)  Method: Weight Method: Bed scale  BMI: BMI (Calculated): 24.3  BMI classification: Normal: 18.5-24.9kg/m2  IBW:  125lbs    UBW:      Weight       Weight (kg) Weight (lbs) Weight Method Visit Report   12/19/2022 70.081 kg  154 lb 8 oz  Bed scale      68.04 kg  150 lb  Stated      68.04 kg  150 lb      12/20/2022 --  --      12/21/2022 70.943 kg  156 lb 6.4 oz  Bed scale     12/22/2022 71.578 kg  157 lb 12.8 oz  Bed scale     12/23/2022 71.487 kg  157 lb 9.6 oz  Bed scale     12/24/2022 74.118 kg  163 lb 6.4 oz  Bed scale     12/25/2022 73.936 kg  163 lb  Bed scale     1/5/2023 68.04 kg  150 lb  Stated     3/22/2023 71.895 kg  158 lb 8 oz  Bed scale      56.7 kg  125 lb  Standing scale      56.7 kg  125 lb  Stated     3/25/2023 70.625 kg  155 lb 11.2 oz  Bed scale     3/26/2023 67.722 kg  149 lb 4.8 oz  Bed scale     3/28/2023 67.042 kg  147 lb 12.8 oz  Bed scale     3/29/2023 65.227 kg  143 lb 12.8 oz  Bed scale     3/30/2023 65.454 kg  144 lb 4.8 oz  Bed scale     3/31/2023 66.271 kg  146 lb 1.6 oz  Bed scale     4/1/2023 66.18 kg  145 lb 14.4 oz  Bed scale     4/2/2023 66.906 kg  147 lb 8 oz  Bed scale     4/3/2023 65.363 kg  144 lb 1.6 oz  Bed scale     4/4/2023 65.817 kg  145 lb 1.6 oz  Bed scale     4/11/2023 66.225 kg  146 lb  Bed " "scale       Weight change:   Had a significant weight loss of 12lbs (7.6%) in ~1 month     Nutrition Focused Physical Exam     Date: 4/11     NFPE completed, patient meets criteria for MSA at this time.     Needs Assessment   Date: 4/11    Height used:Height: 165.1 cm (65\")  Weights used: 66kg CBW; 57kg IBW      Estimated Calorie needs: ~ 1525 Kcal/day  Method: 23-28 Kcals/KG = 7844-0112  Method:  MSJ (1085) x1.4 = 1519    Estimated Protein needs: ~ 90 g PRO/day  Method: 1.3-1.5g/Kg = 86-99    Estimated Fluid needs: ~ ml/day   Per clinical status    Current Nutrition Prescription     PO: Diet: Liquid Diets; Full Liquid; Texture: Regular Texture (IDDSI 7); Fluid Consistency: Thin (IDDSI 0)  Oral Nutrition Supplement:   Intake: 2 Days: 50% x5 meals documented (full liquids)      Nutrition Diagnosis   Date: 4/11 Updated:   Problem Malnutrition acute severe   Etiology Dysphagia & recent abdominal surgery   Signs/Symptoms significant weight loss of 7.6% x1 month & <50% of EEN for >5d.    Status:     Date: 4/11  Updated: 4/12, 4/14  Problem Inadequate enteral nutrition infusion   Etiology Dysphagia   Signs/Symptoms EN not yet restarted, intolerance to previous regimen and not at goal   Status: EN off, PO diet initiated, nocturnal feeds ordered    Goal:   General: Nutrition support treatment, Nutrition to support treatment, Improve nutrition related labs  PO: Increase intake  EN/PN: Initiate EN, Establish EN tolerance, Tolerate EN at goal    Nutrition Intervention      Follow treatment progress, Care plan reviewed, Encourage intake, Supplement provided, Nutrition support order placed    Initiate nocturnal feeds from 6p to 6a of Petamen AF @ 15mL/hr and advance by 15mL q6hrs as tolerated to goal rate of 55mL/hr. Flush with 20mL/hr.   Infused at goal over 12 hrs, this regimen provides: 660mL TGV, 792kcal (52% est needs), 50g protein (56% est needs), 535mL TF FW (+ 240mL flush = 775mL total)      Monitoring/Evaluation:   Per " protocol, I&O, Pertinent labs, EN delivery/tolerance, Weight, GI status, Symptoms, Swallow function      Suad Emmanuel MS,RD,LD  Time Spent: 30min

## 2023-04-14 NOTE — THERAPY TREATMENT NOTE
Patient Name: Christine Garg  : 1933    MRN: [id number]                              Today's Date: 2023       Admit Date: 4/10/2023    Visit Dx:     ICD-10-CM ICD-9-CM   1. Esophageal stricture  K22.2 530.3     Patient Active Problem List   Diagnosis   • Essential hypertension   • Dyslipidemia   • ASCVD (arteriosclerotic cardiovascular disease)   • Palpitations   • Coronary artery disease    • Abnormal PFTs (pulmonary function tests)   • Chronic obstructive pulmonary disease   • Mild persistent asthma with allergic rhinitis   • Pulmonary nodule   • Former smoker   • Colitis   • Perforated ulcer   • Acute gastric ulcer with perforation   • Moderate malnutrition   • Esophageal stricture   • Hypothyroidism (acquired)   • S/P percutaneous endoscopic gastrostomy (PEG) tube placement   • Adenocarcinoma   • Severe malnutrition     Past Medical History:   Diagnosis Date   • Advanced age    • CAD (coronary artery disease) 2011    s/p 3v CABG   • Chronic kidney disease (CKD), stage III (moderate)    • COPD (chronic obstructive pulmonary disease)    • History of tobacco use    • Hyperlipidemia    • Hypertension    • Hypothyroidism    • PONV (postoperative nausea and vomiting)    • Pulmonary nodule      Past Surgical History:   Procedure Laterality Date   • BREAST BIOPSY Left     benign   • CATARACT EXTRACTION     • CORONARY ANGIOPLASTY     • CORONARY ARTERY BYPASS GRAFT     • ENDOSCOPY W/ PEG TUBE PLACEMENT N/A 2023    Procedure: ESOPHAGOGASTRODUODENOSCOPY WITH PERCUTANEOUS ENDOSCOPIC GASTROSTOMY TUBE INSERTION;  Surgeon: Aurora Kirkland MD;  Location: [facility] OR;  Service: General;  Laterality: N/A;   • ENDOSCOPY W/ STENT PLACEMENT/REMOVAL N/A 2023    Procedure: ESOPHAGOGASTRODUODENOSCOPY WITH STENT PLACEMENT WITH FLUOROSCOPY;  Surgeon: Brunner, Mark I, MD;  Location: [facility] ENDOSCOPY;  Service: Gastroenterology;  Laterality: N/A;   • EXPLORATORY LAPAROTOMY N/A 3/22/2023    Procedure: LAPAROTOMY  EXPLORATORY WITH OVER SEW OF DUODENAL ULCER WITH OMENTAL PATCH AND BIOPATCH AND CENTRAL LINE PLACEMENT;  Surgeon: Aurora Kirkland MD;  Location: [facility] OR;  Service: General;  Laterality: N/A;      General Information     Row Name 04/14/23 1701          Physical Therapy Time and Intention    Document Type therapy note (daily note)  -DM     Mode of Treatment physical therapy  -DM     Row Name 04/14/23 1701          General Information    Existing Precautions/Restrictions fall;oxygen therapy device and L/min;other (see comments)  Abdom incis; PEG 4-10;perf.ulcer;incont(pure wick)  -DM           User Key  (r) = Recorded By, (t) = Taken By, (c) = Cosigned By    Initials Name Provider Type    DM Rubi Nick, PT Physical Therapist               Mobility     Row Name 04/14/23 1701          Bed Mobility    Bed Mobility rolling right;scooting/bridging;supine-sit  -DM     Rolling Right [place] (Bed Mobility) verbal cues;moderate assist (50% patient effort)  -DM     Scooting/Bridging [place] (Bed Mobility) verbal cues;nonverbal cues (demo/gesture);maximum assist (25% patient effort)  -DM     Supine-Sit [place] (Bed Mobility) verbal cues;nonverbal cues (demo/gesture);maximum assist (25% patient effort)  -DM     Assistive Device (Bed Mobility) bed rails;draw sheet;head of bed elevated  -DM     Comment, (Bed Mobility) issued kim LAWSON wx;pt moaning w/ abdom pain & req pain med;nsg brought meds & assessed;PEG disconn;respirex 500 cc;nsg req leave Pure wick connected till pt standing, then disconn(decl. PT placing mesh panty/pad on pt);once EOB, did LAQ, AP,AC  -DM     Row Name 04/14/23 1701          Transfers    Comment, (Transfers) Cues for HP, seq  -DM     Row Name 04/14/23 1701          Sit-Stand Transfer    Sit-Stand Cros[place] (Transfers) verbal cues;minimum assist (75% patient effort);1 person to manage equipment  -DM     Assistive Device (Sit-Stand Transfers) walker, front-wheeled  -DM     Row Name  04/14/23 1701          Gait/Stairs (Locomotion)    Robeson Level (Gait) verbal cues;minimum assist (75% patient effort);1 person to manage equipment  4 stand.rests; incr flexed posture (self corrects w/ mult cues)  -DM     Assistive Device (Gait) walker, front-wheeled  -DM     Distance in Feet (Gait) 36  -DM     Deviations/Abnormal Patterns (Gait) bilateral deviations;base of support, wide;denny decreased;stride length decreased;weight shifting decreased  Decr step length;c/o incr abdom pain  -DM     Bilateral Gait Deviations forward flexed posture;heel strike decreased  -DM     Comment, (Gait/Stairs) Cues for incr step length, trunk ext/focusing ahead, PLB; Desat 87% on o2; recovered to 92% upon sitting s/p 2 min.rest;HR incr 107;occ moanin w/ abdom pain; socks replaced (incont urine)  -DM           User Key  (r) = Recorded By, (t) = Taken By, (c) = Cosigned By    Initials Name Provider Type    DM Rubi Nick, PT Physical Therapist               Obj/Interventions     Row Name 04/14/23 1701          Motor Skills    Therapeutic Exercise shoulder;hip;knee;ankle  -DM     Row Name 04/14/23 1701          Hip (Therapeutic Exercise)    Hip (Therapeutic Exercise) AROM (active range of motion);AAROM (active assistive range of motion);isometric exercises  -DM     Hip AROM (Therapeutic Exercise) bilateral;external rotation;internal rotation;sitting;10 repetitions  -DM     Hip AAROM (Therapeutic Exercise) bilateral;aBduction;aDduction;sitting;10 repetitions  -DM     Hip Isometrics (Therapeutic Exercise) bilateral;gluteal sets;sitting;10 repetitions  -DM     Row Name 04/14/23 1701          Knee (Therapeutic Exercise)    Knee (Therapeutic Exercise) AROM (active range of motion);AAROM (active assistive range of motion);isometric exercises  -DM     Knee AROM (Therapeutic Exercise) bilateral;flexion;extension;SAQ (short arc quad);LAQ (long arc quad);heel slides;sitting;10 repetitions  -DM     Knee AAROM (Therapeutic  Exercise) bilateral;flexion;extension;sitting;10 repetitions  min A for ext w/ LAQ & flex w/ h.slides  -DM     Knee Isometrics (Therapeutic Exercise) bilateral;hamstring sets;gluteal sets;sitting;10 repetitions  -DM     Row Name 04/14/23 1701          Ankle (Therapeutic Exercise)    Ankle (Therapeutic Exercise) AROM (active range of motion)  -DM     Ankle AROM (Therapeutic Exercise) bilateral;dorsiflexion;plantarflexion;sitting;10 repetitions;other (see comments)  AC  -DM     Row Name 04/14/23 1701          Balance    Balance Assessment sitting static balance;sitting dynamic balance;standing static balance;standing dynamic balance  -DM     Static Sitting Balance standby assist;verbal cues  -DM     Dynamic Sitting Balance contact guard;verbal cues  recip scooting  -DM     Position, Sitting Balance unsupported;sitting edge of bed;sitting in chair  -DM     Static Standing Balance standby assist;verbal cues  -DM     Dynamic Standing Balance verbal cues;contact guard  -DM     Position/Device Used, Standing Balance supported;walker, front-wheeled  -DM     Balance Interventions sitting;standing;static;dynamic;weight shifting activity  -DM           User Key  (r) = Recorded By, (t) = Taken By, (c) = Cosigned By    Initials Name Provider Type    DM Rubi Nick, PT Physical Therapist               Goals/Plan    No documentation.                Clinical Impression     Row Name 04/14/23 1701          Pain    Pretreatment Pain Rating 5/10  -DM     Posttreatment Pain Rating 7/10  -DM     Pain Location - Side/Orientation Bilateral  -DM     Pain Location - abdomen  -DM     Pain Intervention(s) Repositioned;Medication (See MAR);Rest;Elevated  -DM     Additional Documentation Pain Scale: Numbers Pre/Post-Treatment (Group)  -DM     Row Name 04/14/23 1701          Plan of Care Review    Plan of Care Reviewed With patient  -DM     Progress improving  -DM     Outcome Evaluation Amb 36 ft w/ R wx & min A to guide, w/ 4 stand.rests, +  performed LE ther exer w/ extensive cueing (diffic focusing on task), but limited by incr abdom incis.pain 7/10, desat 87% on o2 (mod SOB & fatigue), & signif elev /81 (nsg gave BP med just p/t gt).  -DM     Row Name 04/14/23 1701          Vital Signs    Pre Systolic BP Rehab 107  -DM     Pre Treatment Diastolic BP 84  -DM     Intra Systolic BP Rehab 162  -DM     Intra Treatment Diastolic BP 80  -DM     Post Systolic BP Rehab 199  -DM     Post Treatment Diastolic BP 81  -DM     Pretreatment Heart Rate (beats/min) 82  -DM     Intratreatment Heart Rate (beats/min) 107  -DM     Posttreatment Heart Rate (beats/min) 84  -DM     Pre SpO2 (%) 97  -DM     O2 Delivery Pre Treatment nasal cannula  -DM     Intra SpO2 (%) 87  -DM     O2 Delivery Intra Treatment nasal cannula  -DM     Post SpO2 (%) 92  -DM     O2 Delivery Post Treatment nasal cannula  -DM     Pre Patient Position Supine  -DM     Intra Patient Position Standing  -DM     Post Patient Position Sitting  -DM     Rest Breaks  4  -DM     Row Name 04/14/23 1701          Positioning and Restraints    Pre-Treatment Position in bed  -DM     Post Treatment Position chair  -DM     In Chair notified nsg;reclined;call light within reach;encouraged to call for assist;exit alarm on;with other staff;with nsg;waffle cushion;legs elevated  -DM           User Key  (r) = Recorded By, (t) = Taken By, (c) = Cosigned By    Initials Name Provider Type    DM Rubi Nick, PT Physical Therapist               Outcome Measures     Row Name 04/14/23 1701          How much help from another person do you currently need...    Turning from your back to your side while in flat bed without using bedrails? 2  -DM     Moving from lying on back to sitting on the side of a flat bed without bedrails? 2  -DM     Moving to and from a bed to a chair (including a wheelchair)? 3  -DM     Standing up from a chair using your arms (e.g., wheelchair, bedside chair)? 3  -DM     Climbing 3-5 steps with  a railing? 2  -DM     To walk in hospital room? 3  -DM     AM-PAC 6 Clicks Score (PT) 15  -DM     Highest level of mobility 4 --> Transferred to chair/commode  -DM     Row Name 04/14/23 1701          Functional Assessment    Outcome Measure Options AM-PAC 6 Clicks Basic Mobility (PT)  -DM           User Key  (r) = Recorded By, (t) = Taken By, (c) = Cosigned By    Initials Name Provider Type    DM Rubi Nick, PT Physical Therapist                             Physical Therapy Education     Title: PT OT SLP Therapies (In Progress)     Topic: Physical Therapy (In Progress)     Point: Mobility training (In Progress)     Learning Progress Summary           Patient Acceptance, E,D,H, NR by DM at 4/14/2023 1737    Acceptance, E, VU,NR by HT at 4/11/2023 1500                   Point: Home exercise program (In Progress)     Learning Progress Summary           Patient Acceptance, E,D,H, NR by DM at 4/14/2023 1737                   Point: Body mechanics (In Progress)     Learning Progress Summary           Patient Acceptance, E,D,H, NR by DM at 4/14/2023 1737    Acceptance, E, VU,NR by HT at 4/11/2023 1500                   Point: Precautions (In Progress)     Learning Progress Summary           Patient Acceptance, E,D,H, NR by DM at 4/14/2023 1737    Acceptance, E, VU,NR by HT at 4/11/2023 1500                               User Key     Initials Effective Dates Name Provider Type Discipline    DM 02/03/23 -  Rubi Nick, PT Physical Therapist PT     01/12/23 -  Clare Fulton, PT Student PT Student PT              PT Recommendation and Plan     Plan of Care Reviewed With: patient  Progress: improving  Outcome Evaluation: Amb 36 ft w/ R wx & min A to guide, w/ 4 stand.rests, + performed LE ther exer w/ extensive cueing (diffic focusing on task), but limited by incr abdom incis.pain 7/10, desat 87% on o2 (mod SOB & fatigue), & signif elev /81 (nsg gave BP med just p/t gt).     Time Calculation:    PT Charges      Row Name 04/14/23 1737             Time Calculation    Start Time 1701  -DM      PT Received On 04/14/23  -DM      PT Goal Re-Cert Due Date 04/21/23  -DM         Time Calculation- PT    Total Timed Code Minutes- PT 24 minute(s)  -DM         Timed Charges    92800 - PT Therapeutic Exercise Minutes 14  -DM      56784 - Gait Training Minutes  10  -DM         Total Minutes    Timed Charges Total Minutes 24  -DM       Total Minutes 24  -DM            User Key  (r) = Recorded By, (t) = Taken By, (c) = Cosigned By    Initials Name Provider Type    Rubi Arguello, PT Physical Therapist              Therapy Charges for Today     Code Description Service Date Service Provider Modifiers Qty    82426922327 HC PT THER PROC EA 15 MIN 4/14/2023 Rubi Nick, PT GP 1    37105884091 HC GAIT TRAINING EA 15 MIN 4/14/2023 Rubi Nick, PT GP 1          PT G-Codes  Outcome Measure Options: AM-PAC 6 Clicks Basic Mobility (PT)  AM-PAC 6 Clicks Score (PT): 15  AM-PAC 6 Clicks Score (OT): 11       uRbi Nick, PT  4/14/2023

## 2023-04-14 NOTE — CASE MANAGEMENT/SOCIAL WORK
Continued Stay Note  [facility]     Patient Name: Christine Garg  MRN: [id number]  Today's Date: 4/14/2023    Admit Date: 4/10/2023    Plan: [facility]/ [place]   Discharge Plan     Row Name 04/14/23 1603       Plan    Plan [facility]/ [place]    Patient/Family in Agreement with Plan yes    Plan Comments Spoke with Odette, with [facility], still awaiting on insurance approval. CM ask Odette, if it gets approved over the weekend and patient will have to go over on Monday. [facility] doesn't accept patient over the weekend. CM will attentively setup transport for Monday. CM went update patient; patient was asleep. CM called son and updated him.    Final Discharge Disposition Code 03 - skilled nursing facility (SNF)               Discharge Codes    No documentation.               Expected Discharge Date and Time     Expected Discharge Date Expected Discharge Time    Apr 14, 2023             Vale Lechuga RN

## 2023-04-15 NOTE — PLAN OF CARE
Goal Outcome Evaluation:  Plan of Care Reviewed With: patient           Outcome Evaluation: Pt AOx4 on 1L NC. C/o pain constantly that is temporarily relieved by PRN meds. PEG site cleaned and dressing replaced. Will continue plan of care.

## 2023-04-15 NOTE — PLAN OF CARE
Goal Outcome Evaluation:  Plan of Care Reviewed With: patient           Outcome Evaluation: Patient able to tolerate nocturnal tube feed at 15mL overnight. Remains on 1LNC. BP elevated overnight. Reported to APRN. PRN and schedulded BP meds given. Continue POC.

## 2023-04-15 NOTE — PROGRESS NOTES
[facility] Medicine Services  PROGRESS NOTE    Patient Name: Christine Garg  : 1933  MRN: [id number]    Date of Admission: 4/10/2023  Primary Care Physician: Dave Tobar MD    Subjective   Subjective     CC:  F/U Dysphagia    HPI:  She reports doing OK today.  Still with some shortness of breath but it is improved.  Willing to try tube feeds at night.    ROS:  Gen- No fevers  MSK- As above      Objective   Objective     Vital Signs:   Temp:  [96.1 °F (35.6 °C)-97.6 °F (36.4 °C)] 96.1 °F (35.6 °C)  Heart Rate:  [57-83] 79  Resp:  [16-18] 16  BP: (138-182)/(68-83) 182/80  Flow (L/min):  [1-2] 1     Physical Exam:  Constitutional: Awake, alert, sitting up in bed  HENT: NCAT, mucous membranes moist  Respiratory: Respiratory effort normal on nasal cannula  GI: PEG tube in place, midline incision well-healed  Musculoskeletal: No bilateral ankle edema  Psychiatric: Appropriate affect, cooperative  Neurologic: Speech clear and fluent  Skin: No rashes on exposed surfaces      Results Reviewed:  LAB RESULTS:      Lab 2318 2314 23  0242   WBC 9.87 9.11 8.62   HEMOGLOBIN 10.3* 9.9* 10.0*   HEMATOCRIT 32.9* 31.1* 30.8*   PLATELETS 251 280 285   NEUTROS ABS  --  6.38  --    IMMATURE GRANS (ABS)  --  0.06*  --    LYMPHS ABS  --  1.12  --    MONOS ABS  --  0.69  --    EOS ABS  --  0.80*  --    .6* 96.9 96.6   CRP  --  18.47*  --    PROTIME  --   --  12.4         Lab 23  0614 23  0242   SODIUM 134* 135* 132*   POTASSIUM 4.4 4.6 4.0   CHLORIDE 99 98 99   CO2 25.0 30.0* 28.1   ANION GAP 10.0 7.0 4.9*   BUN 9 10 10   CREATININE 0.40* 0.41* 0.42*   EGFR 94.7 94.2 93.6   GLUCOSE 68 85 119*   CALCIUM 8.4* 8.5* 8.6   MAGNESIUM 1.6 1.7 2.0   PHOSPHORUS  --   --  2.9         Lab 23  0242   TOTAL PROTEIN 4.8*   ALBUMIN 2.3*   GLOBULIN 2.5   ALT (SGPT) 7   AST (SGOT) 14   BILIRUBIN 0.2   ALK PHOS 98         Lab 23  0242   PROTIME  12.4   INR 0.91             Lab 04/11/23  0614   ABO TYPING A   RH TYPING Positive   ANTIBODY SCREEN Negative         Brief Urine Lab Results  (Last result in the past 365 days)      Color   Clarity   Blood   Leuk Est   Nitrite   Protein   CREAT   Urine HCG        01/06/23 0149 Yellow   Clear   Negative   Negative   Negative   Negative                 Microbiology Results Abnormal     None          XR Chest 1 View    Result Date: 4/13/2023  XR CHEST 1 VW Date of Exam: 4/13/2023 4:59 AM EDT Indication: F/u esophageal stent. Comparison: Chest x-ray 4/12/2023 Findings: Redemonstration of prior CABG. And unchanged appearance and position of esophageal stent spanning the mid and distal esophagus. Stable cardiomediastinal silhouette within normal limits. Redemonstration of small layering bilateral pleural effusions and bibasilar opacities, likely associated atelectasis. Stable diffuse interstitial prominence elsewhere. No pneumothorax.     Impression: Impression: No significant interval change. Electronically Signed: Anibal Briseno  4/13/2023 8:59 AM EDT  Workstation ID: [id number]      Results for orders placed during the hospital encounter of 03/22/23    Adult Transthoracic Echo Complete W/ Cont if Necessary Per Protocol    Interpretation Summary  •  Left ventricular systolic function is normal. Calculated left ventricular EF = 60% Left ventricular ejection fraction appears to be 56 - 60%.  •  Left ventricular diastolic function is consistent with (grade I) impaired relaxation and age.  •  Moderate to severe mitral valve regurgitation is present.  •  Moderate tricuspid valve regurgitation is present.  •  Estimated right ventricular systolic pressure from tricuspid regurgitation is moderately elevated (45-55 mmHg).  •  Moderate pulmonary hypertension is present.      Current medications:  Scheduled Meds:aspirin, 81 mg, Per PEG Tube, Daily  carvedilol, 25 mg, Per PEG Tube, BID With Meals  sennosides, 10 mL, Per PEG Tube,  BID   And  docusate sodium, 100 mg, Per PEG Tube, BID  gabapentin, 100 mg, Per PEG Tube, Q12H  hydrALAZINE, 50 mg, Per PEG Tube, Q8H  levothyroxine, 112 mcg, Per PEG Tube, Q AM  lidocaine, 1 patch, Transdermal, Q24H  pantoprazole, 40 mg, Intravenous, BID AC  sodium chloride, 10 mL, Intravenous, Q12H  valsartan, 320 mg, Per PEG Tube, Q24H      Continuous Infusions:   PRN Meds:.•  acetaminophen **OR** acetaminophen **OR** acetaminophen  •  [DISCONTINUED] senna-docusate sodium **AND** polyethylene glycol **AND** [DISCONTINUED] bisacodyl **AND** bisacodyl  •  cloNIDine  •  HYDROcodone-acetaminophen  •  HYDROmorphone  •  ipratropium-albuterol  •  [DISCONTINUED] ondansetron **OR** ondansetron  •  sodium chloride  •  sodium chloride    Assessment & Plan   Assessment & Plan     Active Hospital Problems    Diagnosis  POA   • **Esophageal stricture [K22.2]  Unknown   • Severe malnutrition [E43]  Unknown   • Hypothyroidism (acquired) [E03.9]  Unknown   • S/P percutaneous endoscopic gastrostomy (PEG) tube placement [Z93.1]  Not Applicable   • Adenocarcinoma [C80.1]  Unknown   • Perforated ulcer [K27.5]  Yes   • Former smoker [Z87.891]  Not Applicable   • Coronary artery disease  [I25.10]  Yes   • Chronic obstructive pulmonary disease [J44.9]  Yes   • Essential hypertension [I10]  Yes   • ASCVD (arteriosclerotic cardiovascular disease) [I25.10]  Yes      Resolved Hospital Problems   No resolved problems to display.        Brief Hospital Course to date:  Christine Garg is a 89 y.o. female admitted to [facility] on 3/22 for abdominal pain; went to OR, had perforated duodenal ulcer repaired with omental patch.  Then on 3/29, patient had CT of thoracic spine which noted left pleural consolidation and parenchymal nodules.  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  She was also found to have an esophageal stricture secondary to extrinsic compression of 3cm peritracheal lymph node.  On  4/7, patient had PEG tube placed.  She was sent to [facility] for placement of esophageal stent by Dr. Waters.   PMH of hypertension, hyperlipidemia, COPD, coronary artery disease, peripheral artery disease, hypothyroidism.    This patient's problems and plans were partially entered by my partner and updated as appropriate by me 04/14/23.     Dysphagia due to external esophageal compression by LAD  S/p PEG  -EGD 4/12 with esophageal stent placement  -Full liquid diet x 1 week then advance to puree 4/19 if tolerating  -Start nocturnal tube feeds which can be weaned whenever PO intake is adequate    Perforated Duodenal Ulcer s/p exploratory laparotomy with patch 3/22  -Continue PPI twice daily  -Continue lower aspirin to 81 mg daily instead of 325    New diagnosis of lung adenocarcinoma  COPD  Former smoker   On oxygen 2L here   -PRN duonebs  -Follow up outpatient for treatment plan  -Lidoderm patch for left shoulder blade pain which is likely related to nerve compression from known T-spine lesion.  Gabapentin added, increase to BID.    CAD   HLD  -Lowered home aspirin to 81mg. Will need to make this clear at discharge in order to prevent further ulceration     Hypoalbuminemia   Severe malnutrition  -Nutrition consulted  -Start nocturnal tube feeds     HTN  -Continue home coreg, valsartan, hydralazine   -PRN clonidine       Hypothyroid   -Continue home synthroid      Debility  -PT  -Patient had been approved to [facility] for subacute rehab in [place]. Patient would still like to go there after discharge. Patient is to follow up outpatient for treatment options regarding her cancer.      Expected Discharge Location and Transportation:  Rehab  Expected Discharge   Expected Discharge Date and Time     Expected Discharge Date Expected Discharge Time    Apr 17, 2023            DVT prophylaxis:  Mechanical DVT prophylaxis orders are present.     AM-PAC 6 Clicks Score (PT): 15 (04/14/23  1701)    CODE STATUS:   Code Status and Medical Interventions:   Ordered at: 04/10/23 230     Code Status (Patient has no pulse and is not breathing):    CPR (Attempt to Resuscitate)     Medical Interventions (Patient has pulse or is breathing):    Full Support       Clare De Anda MD  04/14/23

## 2023-04-15 NOTE — PROGRESS NOTES
[facility] Medicine Services  PROGRESS NOTE    Patient Name: Christine Garg  : 1933  MRN: [id number]    Date of Admission: 4/10/2023  Primary Care Physician: Dave Tobar MD    Subjective   Subjective     CC:  Follow-up dysphagia    HPI:  Patient seen resting in bed no apparent distress.  No acute events overnight per nursing.  Still has she continues to have difficulty swallowing. She is aware of plan for rehab at [facility] in [place]. No new complaints at this time.     ROS:  Gen- No fevers, chills  CV- No chest pain, palpitations  Resp- No cough, dyspnea  GI- No N/V/D, abd pain    Objective   Objective     Vital Signs:   Temp:  [96.1 °F (35.6 °C)-97.5 °F (36.4 °C)] 97 °F (36.1 °C)  Heart Rate:  [56-83] 70  Resp:  [16-17] 17  BP: (138-188)/(74-87) 168/78  Flow (L/min):  [1] 1     Physical Exam:  Constitutional: No acute distress, awake, alert  HENT: NCAT, mucous membranes moist  Respiratory: Clear to auscultation bilaterally, respiratory effort normal   Cardiovascular: RRR, no murmurs, palpable pedal pulses bilaterally, cap refill brisk   Gastrointestinal: Positive bowel sounds, soft, PEG in place   Musculoskeletal: No BLE edema   Psychiatric: Appropriate affect, cooperative   Neurologic: alert, moves all extremities, speech clear  Skin: warm, dry, no visible rash     Results Reviewed:  LAB RESULTS:      Lab 23  0242   WBC 9.87 9.11 8.62   HEMOGLOBIN 10.3* 9.9* 10.0*   HEMATOCRIT 32.9* 31.1* 30.8*   PLATELETS 251 280 285   NEUTROS ABS  --  6.38  --    IMMATURE GRANS (ABS)  --  0.06*  --    LYMPHS ABS  --  1.12  --    MONOS ABS  --  0.69  --    EOS ABS  --  0.80*  --    .6* 96.9 96.6   CRP  --  18.47*  --    PROTIME  --   --  12.4         Lab 2318 2314 23  0242   SODIUM 134* 135* 132*   POTASSIUM 4.4 4.6 4.0   CHLORIDE 99 98 99   CO2 25.0 30.0* 28.1   ANION GAP 10.0 7.0 4.9*   BUN 9 10 10    CREATININE 0.40* 0.41* 0.42*   EGFR 94.7 94.2 93.6   GLUCOSE 68 85 119*   CALCIUM 8.4* 8.5* 8.6   MAGNESIUM 1.6 1.7 2.0   PHOSPHORUS  --   --  2.9         Lab 04/09/23  0242   TOTAL PROTEIN 4.8*   ALBUMIN 2.3*   GLOBULIN 2.5   ALT (SGPT) 7   AST (SGOT) 14   BILIRUBIN 0.2   ALK PHOS 98         Lab 04/09/23  0242   PROTIME 12.4   INR 0.91             Lab 04/11/23  0614   ABO TYPING A   RH TYPING Positive   ANTIBODY SCREEN Negative         Brief Urine Lab Results  (Last result in the past 365 days)      Color   Clarity   Blood   Leuk Est   Nitrite   Protein   CREAT   Urine HCG        01/06/23 0149 Yellow   Clear   Negative   Negative   Negative   Negative                 Microbiology Results Abnormal     None          No radiology results from the last 24 hrs    Results for orders placed during the hospital encounter of 03/22/23    Adult Transthoracic Echo Complete W/ Cont if Necessary Per Protocol    Interpretation Summary  •  Left ventricular systolic function is normal. Calculated left ventricular EF = 60% Left ventricular ejection fraction appears to be 56 - 60%.  •  Left ventricular diastolic function is consistent with (grade I) impaired relaxation and age.  •  Moderate to severe mitral valve regurgitation is present.  •  Moderate tricuspid valve regurgitation is present.  •  Estimated right ventricular systolic pressure from tricuspid regurgitation is moderately elevated (45-55 mmHg).  •  Moderate pulmonary hypertension is present.      Current medications:  Scheduled Meds:aspirin, 81 mg, Per PEG Tube, Daily  carvedilol, 25 mg, Per PEG Tube, BID With Meals  sennosides, 10 mL, Per PEG Tube, BID   And  docusate sodium, 100 mg, Per PEG Tube, BID  gabapentin, 100 mg, Per PEG Tube, Q12H  hydrALAZINE, 50 mg, Per PEG Tube, Q8H  levothyroxine, 112 mcg, Per PEG Tube, Q AM  lidocaine, 1 patch, Transdermal, Q24H  pantoprazole, 40 mg, Intravenous, BID AC  sodium chloride, 10 mL, Intravenous, Q12H  valsartan, 320 mg, Per  PEG Tube, Q24H      Continuous Infusions:   PRN Meds:.•  acetaminophen **OR** acetaminophen **OR** acetaminophen  •  [DISCONTINUED] senna-docusate sodium **AND** polyethylene glycol **AND** [DISCONTINUED] bisacodyl **AND** bisacodyl  •  cloNIDine  •  HYDROcodone-acetaminophen  •  HYDROmorphone  •  ipratropium-albuterol  •  [DISCONTINUED] ondansetron **OR** ondansetron  •  sodium chloride  •  sodium chloride    Assessment & Plan   Assessment & Plan     Active Hospital Problems    Diagnosis  POA   • **Esophageal stricture [K22.2]  Unknown   • Severe malnutrition [E43]  Unknown   • Hypothyroidism (acquired) [E03.9]  Unknown   • S/P percutaneous endoscopic gastrostomy (PEG) tube placement [Z93.1]  Not Applicable   • Adenocarcinoma [C80.1]  Unknown   • Perforated ulcer [K27.5]  Yes   • Former smoker [Z87.891]  Not Applicable   • Coronary artery disease  [I25.10]  Yes   • Chronic obstructive pulmonary disease [J44.9]  Yes   • Essential hypertension [I10]  Yes   • ASCVD (arteriosclerotic cardiovascular disease) [I25.10]  Yes      Resolved Hospital Problems   No resolved problems to display.        Brief Hospital Course to date:  Christine Garg is a 89 y.o. female admitted to [facility] on 3/22 for abdominal pain; went to OR, had perforated duodenal ulcer repaired with omental patch.  Then on 3/29, patient had CT of thoracic spine which noted left pleural consolidation and parenchymal nodules.  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  She was also found to have an esophageal stricture secondary to extrinsic compression of 3cm peritracheal lymph node.  On 4/7, patient had PEG tube placed.  She was sent to [facility] for placement of esophageal stent by Dr. Waters.   PMH of hypertension, hyperlipidemia, COPD, coronary artery disease, peripheral artery disease, hypothyroidism.     This patient's problems and plans were partially entered by my partner and  updated as appropriate by me 04/15/23.     Dysphagia due to external esophageal compression by LAD  S/p PEG  -EGD 4/12 with esophageal stent placement  -Plan for full liquid diet x 1 week then advance to puree 4/19 if tolerating  -Start nocturnal tube feeds which can be weaned whenever PO intake is adequate     Perforated Duodenal Ulcer s/p exploratory laparotomy with patch 3/22  -Continue PPI twice daily  -Continue lower aspirin to 81 mg daily instead of 325     New diagnosis of lung adenocarcinoma  COPD  Former smoker   On oxygen 2L here   -PRN duonebs  -Follow up outpatient for treatment plan  -Lidoderm patch for left shoulder blade pain which is likely related to nerve compression from known T-spine lesion.  Gabapentin added, increased to BID, continue     CAD   HLD  -Lowered home aspirin to 81mg. Will need to make this clear at discharge in order to prevent further ulceration     Hypoalbuminemia   Severe malnutrition  -Nutrition consulted  -Start nocturnal tube feeds     HTN  -Continue home coreg, valsartan, hydralazine   -PRN clonidine       Hypothyroid   -Continue home synthroid      Debility  -PT  -Patient had been approved to [facility] for subacute rehab in [place]. Patient would still like to go there after discharge. Patient is to follow up outpatient for treatment options regarding her cancer.     Expected Discharge Location and Transportation: Rehab  Expected Discharge   Expected Discharge Date and Time     Expected Discharge Date Expected Discharge Time    Apr 17, 2023            DVT prophylaxis:  Mechanical DVT prophylaxis orders are present.     AM-PAC 6 Clicks Score (PT): 15 (04/14/23 1701)    CODE STATUS:   Code Status and Medical Interventions:   Ordered at: 04/10/23 1230     Code Status (Patient has no pulse and is not breathing):    CPR (Attempt to Resuscitate)     Medical Interventions (Patient has pulse or is breathing):    Full Support       Steven Yanes, APRN  04/15/23

## 2023-04-16 NOTE — PLAN OF CARE
Goal Outcome Evaluation:  Plan of Care Reviewed With: patient        Progress: no change  Outcome Evaluation: VSS. Pt remains on 1L NC with no signs of SOA. Pt remains NSR on the monitor. Pt complained of pain twice this shift and prn meds given with relief. Pt complained of nausea once while in pain. Pt stated she gets nauseous when in pain. Residual checked anyway and had 245mL. TF rate increase was held. Pain and nasuea meds given. Residual checked 2 hours later and had 250mLs. No nausea noted so TF increased to 30mL.

## 2023-04-16 NOTE — PROGRESS NOTES
[facility] Medicine Services  PROGRESS NOTE    Patient Name: Christine Garg  : 1933  MRN: [id number]    Date of Admission: 4/10/2023  Primary Care Physician: Dave Tobar MD    Subjective   Subjective     CC:  Follow-up dysphagia    HPI:  Patient seen resting in bed no apparent distress.  No acute events overnight per nursing states she is feeling about the same today.  Does not have much of an appetite this morning for breakfast.  Tolerating full liquid diet.  Awaiting SNF placement in [place].  No new complaints at this time.    ROS:  Gen- No fevers, chills, +dysphagia   CV- No chest pain, palpitations  Resp- No cough, dyspnea  GI- No N/V/D, abd pain    Objective   Objective     Vital Signs:   Temp:  [96.3 °F (35.7 °C)-97.2 °F (36.2 °C)] 97 °F (36.1 °C)  Heart Rate:  [70-88] 88  Resp:  [17-18] 18  BP: (157-185)/() 174/100  Flow (L/min):  [1] 1     Physical Exam:  Constitutional: No acute distress, awake, alert  HENT: NCAT, mucous membranes moist  Respiratory: Clear to auscultation bilaterally, respiratory effort normal   Cardiovascular: RRR, no murmurs, palpable pedal pulses bilaterally, cap refill brisk   Gastrointestinal: Positive bowel sounds, soft, nontender, PEG w/ saturated guaze, appears to be oozing/leaking  Musculoskeletal: No BLE edema   Psychiatric: Appropriate affect, cooperative  Neurologic: Oriented x 3, moves all extremities, speech clear  Skin: warm, dry, no visible rash     Results Reviewed:  LAB RESULTS:      Lab 2318 23   WBC 9.87 9.11   HEMOGLOBIN 10.3* 9.9*   HEMATOCRIT 32.9* 31.1*   PLATELETS 251 280   NEUTROS ABS  --  6.38   IMMATURE GRANS (ABS)  --  0.06*   LYMPHS ABS  --  1.12   MONOS ABS  --  0.69   EOS ABS  --  0.80*   .6* 96.9   CRP  --  18.47*         Lab 23  0518 23   SODIUM 134* 135*   POTASSIUM 4.4 4.6   CHLORIDE 99 98   CO2 25.0 30.0*   ANION GAP 10.0 7.0   BUN 9 10   CREATININE 0.40* 0.41*    EGFR 94.7 94.2   GLUCOSE 68 85   CALCIUM 8.4* 8.5*   MAGNESIUM 1.6 1.7                     Lab 04/11/23  0614   ABO TYPING A   RH TYPING Positive   ANTIBODY SCREEN Negative         Brief Urine Lab Results  (Last result in the past 365 days)      Color   Clarity   Blood   Leuk Est   Nitrite   Protein   CREAT   Urine HCG        01/06/23 0149 Yellow   Clear   Negative   Negative   Negative   Negative                 Microbiology Results Abnormal     None          No radiology results from the last 24 hrs    Results for orders placed during the hospital encounter of 03/22/23    Adult Transthoracic Echo Complete W/ Cont if Necessary Per Protocol    Interpretation Summary  •  Left ventricular systolic function is normal. Calculated left ventricular EF = 60% Left ventricular ejection fraction appears to be 56 - 60%.  •  Left ventricular diastolic function is consistent with (grade I) impaired relaxation and age.  •  Moderate to severe mitral valve regurgitation is present.  •  Moderate tricuspid valve regurgitation is present.  •  Estimated right ventricular systolic pressure from tricuspid regurgitation is moderately elevated (45-55 mmHg).  •  Moderate pulmonary hypertension is present.      Current medications:  Scheduled Meds:aspirin, 81 mg, Per PEG Tube, Daily  carvedilol, 25 mg, Per PEG Tube, BID With Meals  sennosides, 10 mL, Per PEG Tube, BID   And  docusate sodium, 100 mg, Per PEG Tube, BID  gabapentin, 100 mg, Per PEG Tube, Q12H  hydrALAZINE, 50 mg, Per PEG Tube, Q8H  levothyroxine, 112 mcg, Per PEG Tube, Q AM  lidocaine, 1 patch, Transdermal, Q24H  pantoprazole, 40 mg, Intravenous, BID AC  sodium chloride, 10 mL, Intravenous, Q12H  valsartan, 320 mg, Per PEG Tube, Q24H      Continuous Infusions:   PRN Meds:.•  acetaminophen **OR** acetaminophen **OR** acetaminophen  •  [DISCONTINUED] senna-docusate sodium **AND** polyethylene glycol **AND** [DISCONTINUED] bisacodyl **AND** bisacodyl  •  cloNIDine  •   HYDROcodone-acetaminophen  •  HYDROmorphone  •  ipratropium-albuterol  •  [DISCONTINUED] ondansetron **OR** ondansetron  •  sodium chloride  •  sodium chloride    Assessment & Plan   Assessment & Plan     Active Hospital Problems    Diagnosis  POA   • **Esophageal stricture [K22.2]  Unknown   • Severe malnutrition [E43]  Unknown   • Hypothyroidism (acquired) [E03.9]  Unknown   • S/P percutaneous endoscopic gastrostomy (PEG) tube placement [Z93.1]  Not Applicable   • Adenocarcinoma [C80.1]  Unknown   • Perforated ulcer [K27.5]  Yes   • Former smoker [Z87.891]  Not Applicable   • Coronary artery disease  [I25.10]  Yes   • Chronic obstructive pulmonary disease [J44.9]  Yes   • Essential hypertension [I10]  Yes   • ASCVD (arteriosclerotic cardiovascular disease) [I25.10]  Yes      Resolved Hospital Problems   No resolved problems to display.        Brief Hospital Course to date:  Christine Garg is a 89 y.o. female admitted to [facility] on 3/22 for abdominal pain; went to OR, had perforated duodenal ulcer repaired with omental patch.  Then on 3/29, patient had CT of thoracic spine which noted left pleural consolidation and parenchymal nodules.  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  She was also found to have an esophageal stricture secondary to extrinsic compression of 3cm peritracheal lymph node.  On 4/7, patient had PEG tube placed.  She was sent to [facility] for placement of esophageal stent by Dr. Waters.   PMH of hypertension, hyperlipidemia, COPD, coronary artery disease, peripheral artery disease, hypothyroidism.     This patient's problems and plans were partially entered by my partner and updated as appropriate by me 04/16/23.     Dysphagia due to external esophageal compression by LAD  S/p PEG  -EGD 4/12 with esophageal stent placement  -Plan for full liquid diet x 1 week then advance to puree 4/19 if tolerating  -Start nocturnal tube feeds  which can be weaned whenever PO intake is adequate     Perforated Duodenal Ulcer s/p exploratory laparotomy with patch 3/22  -Continue PPI twice daily  -Continue lower aspirin to 81 mg daily instead of 325 mg      New diagnosis of lung adenocarcinoma  COPD  Former smoker   On oxygen 2L here   -PRN duonebs  -Follow up outpatient for treatment plan  -Lidoderm patch for left shoulder blade pain which is likely related to nerve compression from known T-spine lesion.  Gabapentin added, increased to BID, continue     CAD   HLD  -Lowered home aspirin to 81mg. Will need to make this clear at discharge in order to prevent further ulceration     Hypoalbuminemia   Severe malnutrition  -Nutrition consulted  -Start nocturnal tube feeds     HTN  -BP remains uncontrolled  -continue coreg, valsartan  -Increase hydralazine to 75 mg TID   -PRN clonidine       Hypothyroid   -Continue home synthroid      Debility  -PT  -Patient had been approved to [facility] for subacute rehab in [place]. Patient would still like to go there after discharge. Patient is to follow up outpatient for treatment options regarding her cancer.      Expected Discharge Location and Transportation: Rehab  Expected Discharge        Expected Discharge Date and Time      Expected Discharge Date Expected Discharge Time     Apr 17, 2023           DVT prophylaxis:  Mechanical DVT prophylaxis orders are present.     AM-PAC 6 Clicks Score (PT): 15 (04/14/23 1701)    CODE STATUS:   Code Status and Medical Interventions:   Ordered at: 04/10/23 2304     Code Status (Patient has no pulse and is not breathing):    CPR (Attempt to Resuscitate)     Medical Interventions (Patient has pulse or is breathing):    Full Support       Steven Yanes, APRN  04/16/23

## 2023-04-16 NOTE — PLAN OF CARE
"Goal Outcome Evaluation:  Plan of Care Reviewed With: patient        Progress: no change  Outcome Evaluation: Pt on 1L NC. BP continues to trend high; PRN meds given. She still c/o severe pain relieved by PRN meds. Pt specifically asks for dilaudid stating it \"makes her breathe easier\" when she is in pain. Refused getting up to chair. Plans to d/c tomorrow, will continue plan of care.  "

## 2023-04-17 NOTE — CASE MANAGEMENT/SOCIAL WORK
Continued Stay Note  [facility]     Patient Name: Christine Garg  MRN: [id number]  Today's Date: 4/17/2023    Admit Date: 4/10/2023    Plan: [facility]   Discharge Plan     Row Name 04/17/23 1233       Plan    Plan [facility]    Plan Comments Facility has insurance approval for patient, when medically ready.  will reach out to ambulance and arrange transport when medically ready for transfer.     Final Discharge Disposition Code 03 - skilled nursing facility (SNF)               Discharge Codes    No documentation.               Expected Discharge Date and Time     Expected Discharge Date Expected Discharge Time    Apr 17, 2023             Tegan Quintero RN

## 2023-04-17 NOTE — PROGRESS NOTES
[facility] Medicine Services  PROGRESS NOTE    Patient Name: Christine Garg  : 1933  MRN: [id number]    Date of Admission: 4/10/2023  Primary Care Physician: Dave Tobar MD    Subjective   Subjective     CC:  Follow-up dysphagia    HPI:  Patient seen resting in bed no apparent distress.  Patient has been consistently using as needed IV pain medicine.  Per nursing, overnight they have not gotten the goal tube feeds due to confusion about the order.  Has some nausea, no emesis.  Last bowel movement was yesterday.  No cough.  Reports shortness of breath.  Tolerating liquid diet.    ROS:  Gen- No fevers, chills, +dysphagia   CV- No chest pain, palpitations  Resp- No cough, + dyspnea  GI- No N/V/D, abd pain    Objective   Objective     Vital Signs:   Temp:  [97.4 °F (36.3 °C)-98.4 °F (36.9 °C)] 98.4 °F (36.9 °C)  Heart Rate:  [71-92] 79  Resp:  [16-18] 18  BP: (141-190)/() 141/59  Flow (L/min):  [1] 1     Physical Exam:  Constitutional: No acute distress, awake, alert, chronically ill-appearing, laying in bed  HENT: NCAT, mucous membranes moist, dentures in place  Respiratory: Clear to auscultation bilaterally, respiratory effort normal   Cardiovascular: RRR, no murmurs, palpable pedal pulses bilaterally, cap refill brisk   Gastrointestinal: Positive bowel sounds, soft, nontender, PEG w/ saturated guaze, appears to be oozing/leaking  Musculoskeletal: No BLE edema   Psychiatric: Appropriate affect, cooperative  Neurologic: PERRL, symmetric facies, symmetric palate rise, tongue midline, moves all extremities, speech clear  Skin: warm, dry, no visible rash     Results Reviewed:  LAB RESULTS:      Lab 23  0518 23  0614   WBC 9.87 9.11   HEMOGLOBIN 10.3* 9.9*   HEMATOCRIT 32.9* 31.1*   PLATELETS 251 280   NEUTROS ABS  --  6.38   IMMATURE GRANS (ABS)  --  0.06*   LYMPHS ABS  --  1.12   MONOS ABS  --  0.69   EOS ABS  --  0.80*   .6* 96.9   CRP  --  18.47*          Lab 04/12/23  0518 04/11/23  0614   SODIUM 134* 135*   POTASSIUM 4.4 4.6   CHLORIDE 99 98   CO2 25.0 30.0*   ANION GAP 10.0 7.0   BUN 9 10   CREATININE 0.40* 0.41*   EGFR 94.7 94.2   GLUCOSE 68 85   CALCIUM 8.4* 8.5*   MAGNESIUM 1.6 1.7                     Lab 04/11/23  0614   ABO TYPING A   RH TYPING Positive   ANTIBODY SCREEN Negative         Brief Urine Lab Results  (Last result in the past 365 days)      Color   Clarity   Blood   Leuk Est   Nitrite   Protein   CREAT   Urine HCG        01/06/23 0149 Yellow   Clear   Negative   Negative   Negative   Negative                 Microbiology Results Abnormal     None          No radiology results from the last 24 hrs    Results for orders placed during the hospital encounter of 03/22/23    Adult Transthoracic Echo Complete W/ Cont if Necessary Per Protocol    Interpretation Summary  •  Left ventricular systolic function is normal. Calculated left ventricular EF = 60% Left ventricular ejection fraction appears to be 56 - 60%.  •  Left ventricular diastolic function is consistent with (grade I) impaired relaxation and age.  •  Moderate to severe mitral valve regurgitation is present.  •  Moderate tricuspid valve regurgitation is present.  •  Estimated right ventricular systolic pressure from tricuspid regurgitation is moderately elevated (45-55 mmHg).  •  Moderate pulmonary hypertension is present.      Current medications:  Scheduled Meds:aspirin, 81 mg, Per PEG Tube, Daily  carvedilol, 25 mg, Per PEG Tube, BID With Meals  sennosides, 10 mL, Per PEG Tube, BID   And  docusate sodium, 100 mg, Per PEG Tube, BID  enoxaparin, 40 mg, Subcutaneous, Nightly  gabapentin, 100 mg, Per PEG Tube, Q12H  hydrALAZINE, 75 mg, Per PEG Tube, Q8H  levothyroxine, 112 mcg, Per PEG Tube, Q AM  lidocaine, 1 patch, Transdermal, Q24H  pantoprazole, 40 mg, Intravenous, BID AC  sodium chloride, 10 mL, Intravenous, Q12H  valsartan, 320 mg, Per PEG Tube, Q24H      Continuous Infusions:   PRN  Meds:.•  acetaminophen **OR** acetaminophen **OR** acetaminophen  •  [DISCONTINUED] senna-docusate sodium **AND** polyethylene glycol **AND** [DISCONTINUED] bisacodyl **AND** bisacodyl  •  cloNIDine  •  HYDROcodone-acetaminophen  •  HYDROmorphone  •  ipratropium-albuterol  •  [DISCONTINUED] ondansetron **OR** ondansetron  •  sodium chloride  •  sodium chloride    Assessment & Plan   Assessment & Plan     Active Hospital Problems    Diagnosis  POA   • **Esophageal stricture [K22.2]  Unknown   • Severe malnutrition [E43]  Unknown   • Hypothyroidism (acquired) [E03.9]  Unknown   • S/P percutaneous endoscopic gastrostomy (PEG) tube placement [Z93.1]  Not Applicable   • Adenocarcinoma [C80.1]  Unknown   • Perforated ulcer [K27.5]  Yes   • Former smoker [Z87.891]  Not Applicable   • Coronary artery disease  [I25.10]  Yes   • Chronic obstructive pulmonary disease [J44.9]  Yes   • Essential hypertension [I10]  Yes   • ASCVD (arteriosclerotic cardiovascular disease) [I25.10]  Yes      Resolved Hospital Problems   No resolved problems to display.        Brief Hospital Course to date:  Christine Garg is a 89 y.o. female admitted to [facility] on 3/22 for abdominal pain; went to OR, had perforated duodenal ulcer repaired with omental patch.  Then on 3/29, patient had CT of thoracic spine which noted left pleural consolidation and parenchymal nodules.  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  She was also found to have an esophageal stricture secondary to extrinsic compression of 3cm peritracheal lymph node.  On 4/7, patient had PEG tube placed.  She was sent to [facility] for placement of esophageal stent by Dr. Waters.   PMH of hypertension, hyperlipidemia, COPD, coronary artery disease, peripheral artery disease, hypothyroidism.     This patient's problems and plans were partially entered by my partner and updated as appropriate by me 04/17/23.     Dysphagia  due to external esophageal compression by LAD  S/p PEG  -EGD 4/12 with esophageal stent placement  -Plan for full liquid diet x 1 week then advance to puree 4/19 if tolerating  -Started nocturnal tube feeds which can be weaned whenever PO intake is adequate  -Instructed nursing to pass along that patient should start her tube feeds tonight at 30 mils per hour and increase to goal overnight    Cancer related pain  -Wean Dilaudid to every 8 hours IV PRN, increase frequency of as needed Norco to q4h      Perforated Duodenal Ulcer s/p exploratory laparotomy with patch 3/22  -Continue PPI twice daily  -Continue lower aspirin to 81 mg daily instead of 325 mg      New diagnosis of lung adenocarcinoma  COPD  Former smoker   On oxygen 2L here   -PRN duonebs  -Follow up outpatient for treatment plan with Dr. Balbuena  -Lidoderm patch for left shoulder blade pain which is likely related to nerve compression from known T-spine lesion.  Gabapentin added, increased to BID, continue     CAD   HLD  -Lowered home aspirin to 81mg. Will need to make this clear at discharge in order to prevent further ulceration     Hypoalbuminemia   Severe malnutrition  -Nutrition consulted  -Start nocturnal tube feeds     HTN  -BP remains uncontrolled  -continue coreg, valsartan  -Increased hydralazine to 75 mg TID   -PRN clonidine       Hypothyroid   -Continue home synthroid      Debility  -PT  -Patient had been approved to [facility] for subacute rehab in [place]. Patient would still like to go there after discharge. Patient is to follow up outpatient for treatment options regarding her cancer.      Expected Discharge Location and Transportation: Rehab  Expected Discharge  4/18 versus 4/19                         DVT prophylaxis:  Medical and mechanical DVT prophylaxis orders are present.     AM-PAC 6 Clicks Score (PT): 15 (04/14/23 6630)    CODE STATUS:   Code Status and Medical Interventions:   Ordered at: 04/10/23 1064     Code Status  (Patient has no pulse and is not breathing):    CPR (Attempt to Resuscitate)     Medical Interventions (Patient has pulse or is breathing):    Full Support       Catalina Leonardo MD  04/17/23

## 2023-04-17 NOTE — PLAN OF CARE
Goal Outcome Evaluation:  Plan of Care Reviewed With: patient        Progress: (P) no change  Outcome Evaluation: (P) Pt ambulated 10' with min A/RW refusing further mobility d/t dizziness/nausea with VSS showing limitations of poor endurance, balance deficits, and generalized weakness. Rec skilled PT to increase ind with mobility and d/c to IRF.

## 2023-04-17 NOTE — THERAPY TREATMENT NOTE
Patient Name: Christine Garg  : 1933    MRN: [id number]                              Today's Date: 2023       Admit Date: 4/10/2023    Visit Dx:     ICD-10-CM ICD-9-CM   1. Esophageal stricture  K22.2 530.3     Patient Active Problem List   Diagnosis   • Essential hypertension   • Dyslipidemia   • ASCVD (arteriosclerotic cardiovascular disease)   • Palpitations   • Coronary artery disease    • Abnormal PFTs (pulmonary function tests)   • Chronic obstructive pulmonary disease   • Mild persistent asthma with allergic rhinitis   • Pulmonary nodule   • Former smoker   • Colitis   • Perforated ulcer   • Acute gastric ulcer with perforation   • Moderate malnutrition   • Esophageal stricture   • Hypothyroidism (acquired)   • S/P percutaneous endoscopic gastrostomy (PEG) tube placement   • Adenocarcinoma   • Severe malnutrition     Past Medical History:   Diagnosis Date   • Advanced age    • CAD (coronary artery disease) 2011    s/p 3v CABG   • Chronic kidney disease (CKD), stage III (moderate)    • COPD (chronic obstructive pulmonary disease)    • History of tobacco use    • Hyperlipidemia    • Hypertension    • Hypothyroidism    • PONV (postoperative nausea and vomiting)    • Pulmonary nodule      Past Surgical History:   Procedure Laterality Date   • BREAST BIOPSY Left     benign   • CATARACT EXTRACTION     • CORONARY ANGIOPLASTY     • CORONARY ARTERY BYPASS GRAFT     • ENDOSCOPY W/ PEG TUBE PLACEMENT N/A 2023    Procedure: ESOPHAGOGASTRODUODENOSCOPY WITH PERCUTANEOUS ENDOSCOPIC GASTROSTOMY TUBE INSERTION;  Surgeon: Aurora Kirkland MD;  Location: [facility] OR;  Service: General;  Laterality: N/A;   • ENDOSCOPY W/ STENT PLACEMENT/REMOVAL N/A 2023    Procedure: ESOPHAGOGASTRODUODENOSCOPY WITH STENT PLACEMENT WITH FLUOROSCOPY;  Surgeon: Brunner, Mark I, MD;  Location: [facility] ENDOSCOPY;  Service: Gastroenterology;  Laterality: N/A;   • EXPLORATORY LAPAROTOMY N/A 3/22/2023    Procedure: LAPAROTOMY  EXPLORATORY WITH OVER SEW OF DUODENAL ULCER WITH OMENTAL PATCH AND BIOPATCH AND CENTRAL LINE PLACEMENT;  Surgeon: Aurora Kirkland MD;  Location: [facility];  Service: General;  Laterality: N/A;      General Information     Row Name 04/17/23 1415          OT Time and Intention    Document Type therapy note (daily note)  -MATHEW     Mode of Treatment individual therapy;occupational therapy  -MATHEW     Row Name 04/17/23 1415          General Information    Patient Profile Reviewed yes  -MATHEW     Existing Precautions/Restrictions fall;oxygen therapy device and L/min  abdominal incision, PEG, incontinence  -MATHEW     Barriers to Rehab medically complex;previous functional deficit  -MATHEW     Row Name 04/17/23 1415          Cognition    Orientation Status (Cognition) oriented x 3  -MATHEW     Row Name 04/17/23 1415          Safety Issues, Functional Mobility    Impairments Affecting Function (Mobility) endurance/activity tolerance;strength;balance  -MATHEW           User Key  (r) = Recorded By, (t) = Taken By, (c) = Cosigned By    Initials Name Provider Type    MATHEW Laura Osman, AMAIRANI Occupational Therapist                 Mobility/ADL's     Row Name 04/17/23 1416          Bed Mobility    Bed Mobility scooting/bridging;rolling left  -MATHEW     Rolling Left [place] (Bed Mobility) modified independence  -MATHEW     Scooting/Bridging [place] (Bed Mobility) dependent (less than 25% patient effort);2 person assist  to HOB  -MATHEW     Sit-Supine [place] (Bed Mobility) minimum assist (75% patient effort);verbal cues  -MATHEW     Bed Mobility, Safety Issues decreased use of legs for bridging/pushing;decreased use of arms for pushing/pulling;impaired trunk control for bed mobility  -MATHEW     Assistive Device (Bed Mobility) bed rails;draw sheet  -MATHEW     Comment, (Bed Mobility) pt. rolled to bed cleaned and new purewick placed due to stool on purewick noted on standing  -MATHEW     Row Name 04/17/23 1416          Transfers    Transfers sit-stand  transfer;stand-sit transfer  -MATHEW     Row Name 04/17/23 1416          Sit-Stand Transfer    Sit-Stand [place] (Transfers) minimum assist (75% patient effort);verbal cues  -     Assistive Device (Sit-Stand Transfers) walker, front-wheeled  -MATHEW     Row Name 04/17/23 1416          Stand-Sit Transfer    Stand-Sit [place] (Transfers) contact guard;verbal cues  -     Assistive Device (Stand-Sit Transfers) walker, front-wheeled  -MATHEW     Row Name 04/17/23 1416          Functional Mobility    Functional Mobility- Ind. Level contact guard assist  -MATHEW     Functional Mobility- Device walker, front-wheeled  -MATHEW     Functional Mobility-Distance (Feet) 4  -MATHEW     Functional Mobility- Safety Issues step length decreased;supplemental O2  -     Row Name 04/17/23 1416          Activities of Daily Living    BADL Assessment/Intervention lower body dressing;toileting  -     Row Name 04/17/23 1416          Lower Body Dressing Assessment/Training    [place] Level (Lower Body Dressing) don;socks;minimum assist (75% patient effort)  -MATHEW     Position (Lower Body Dressing) supported sitting  -MATHEW     Comment, (Lower Body Dressing) Pt. had taken all of her AE home with her, practice sockaid used.  Pt. education pt. donned socks on with Min A and cueing for correct technique.  -     Row Name 04/17/23 1416          Toileting Assessment/Training    [place] Level (Toileting) perform perineal hygiene;dependent (less than 25% patient effort)  -MATHEW     Position (Toileting) supine  -MATHEW     Comment, (Toileting) stool noted on Purewick once pt. standing, cleaned once supine  -MATHEW           User Key  (r) = Recorded By, (t) = Taken By, (c) = Cosigned By    Initials Name Provider Type    Laura Carrillo, OT Occupational Therapist               Obj/Interventions     Row Name 04/17/23 1420          Shoulder (Therapeutic Exercise)    Shoulder (Therapeutic Exercise) AROM (active range of motion)  -     Shoulder AROM (Therapeutic  Exercise) bilateral;flexion;extension;aBduction;aDduction;horizontal aBduction/aDduction;10 repetitions;15 repititions;sitting  -MATHEW     Row Name 04/17/23 1420          Elbow/Forearm (Therapeutic Exercise)    Elbow/Forearm (Therapeutic Exercise) strengthening exercise  -MATHEW     Elbow/Forearm Strengthening (Therapeutic Exercise) bilateral;extension;10 repetitions;15 repititions;sitting  mild resistance given  -MATHEW     Row Name 04/17/23 1420          Motor Skills    Therapeutic Exercise shoulder;elbow/forearm  rest periods needed between each UE TE  -MATHEW     Row Name 04/17/23 1420          Balance    Static Sitting Balance independent  -MATHEW     Dynamic Sitting Balance standby assist  -MATHEW     Position, Sitting Balance unsupported;supported;sitting in chair  -MATHEW     Static Standing Balance standby assist  -MATHEW     Dynamic Standing Balance contact guard  -MATHEW     Position/Device Used, Standing Balance walker, rolling  -MATHEW     Balance Interventions sit to stand;occupation based/functional task  -MATHEW     Comment, Balance LBD, UE TE  -MATHEW           User Key  (r) = Recorded By, (t) = Taken By, (c) = Cosigned By    Initials Name Provider Type    Laura Carrillo OT Occupational Therapist               Goals/Plan     Row Name 04/17/23 1425          Bed Mobility Goal 1 (OT)    Progress/Outcomes (Bed Mobility Goal 1, OT) goal partially met  -MATHEW     Row Name 04/17/23 1425          Transfer Goal 1 (OT)    Progress/Outcome (Transfer Goal 1, OT) goal partially met  -MATHEW     Row Name 04/17/23 1425          Dressing Goal 1 (OT)    Progress/Outcome (Dressing Goal 1, OT) goal partially met  -MATHEW     Row Name 04/17/23 1425          Problem Specific Goal 1 (OT)    Progress/Outcome (Problem Specific Goal 1, OT) continuing progress toward goal  -MATHEW           User Key  (r) = Recorded By, (t) = Taken By, (c) = Cosigned By    Initials Name Provider Type    Laura Carrillo OT Occupational Therapist               Clinical Impression     Row Name  04/17/23 1421          Pain Assessment    Pretreatment Pain Rating 7/10  -MATHEW     Posttreatment Pain Rating 7/10  -MATHEW     Pain Location - Side/Orientation Bilateral  -MATHEW     Pain Location incisional  -MATHEW     Pain Location - abdomen  -MATHEW     Pre/Posttreatment Pain Comment nurse alerted and to bring pain meds and nausea meds  -MATHEW     Pain Intervention(s) Repositioned  -MATHEW     Row Name 04/17/23 1421          Plan of Care Review    Plan of Care Reviewed With patient  -MATHEW     Progress improving  -MATHEW     Outcome Evaluation Pt. with increased LBD independence with AE use, but will need further education for independence.  Pt.'s family took AE home so practice AE will have to be used.  Pt. continues with limited endurance, strength, ROM and pain impacting prior ADL independence level. Skilled OT services appropriate to continue.  IRF still recommended for best discharge outcome.  -     Row Name 04/17/23 1421          Therapy Assessment/Plan (OT)    Therapy Frequency (OT) daily  -     Row Name 04/17/23 1421          Therapy Plan Review/Discharge Plan (OT)    Anticipated Discharge Disposition (OT) inpatient rehabilitation facility  -     Row Name 04/17/23 1421          Vital Signs    Pre Systolic BP Rehab 155  -MATHEW     Pre Treatment Diastolic BP 82  -MATHEW     Post Systolic BP Rehab 144  -MATHEW     Post Treatment Diastolic BP 65  -MATHEW     Pretreatment Heart Rate (beats/min) 77  -MATHEW     Posttreatment Heart Rate (beats/min) 93  -MATHEW     Pre SpO2 (%) 97  -MATHEW     O2 Delivery Pre Treatment nasal cannula  -MATHEW     O2 Delivery Intra Treatment nasal cannula  -MATHEW     Post SpO2 (%) 98  -MATHEW     O2 Delivery Post Treatment nasal cannula  -MATHEW     Pre Patient Position Sitting  -MATHEW     Intra Patient Position Standing  -MATHEW     Post Patient Position Supine  -     Row Name 04/17/23 1421          Positioning and Restraints    Pre-Treatment Position sitting in chair/recliner  -MATHEW     Post Treatment Position bed  -MATHEW     In Bed notified  nsg;sitting;call light within reach;encouraged to call for assist;exit alarm on;side rails up x2  -MATHEW           User Key  (r) = Recorded By, (t) = Taken By, (c) = Cosigned By    Initials Name Provider Type    Laura Carrillo OT Occupational Therapist               Outcome Measures     Row Name 04/17/23 1425          How much help from another is currently needed...    Putting on and taking off regular lower body clothing? 2  -MATHEW     Bathing (including washing, rinsing, and drying) 2  -MATHEW     Toileting (which includes using toilet bed pan or urinal) 1  -MATHEW     Putting on and taking off regular upper body clothing 2  -MATHEW     Taking care of personal grooming (such as brushing teeth) 3  -MATHEW     Eating meals 2  -MATHEW     AM-PAC 6 Clicks Score (OT) 12  -MATHEW     Row Name 04/17/23 1425          Functional Assessment    Outcome Measure Options AM-PAC 6 Clicks Daily Activity (OT)  -MATHEW           User Key  (r) = Recorded By, (t) = Taken By, (c) = Cosigned By    Initials Name Provider Type    Laura Carrillo OT Occupational Therapist                Occupational Therapy Education     Title: PT OT SLP Therapies (In Progress)     Topic: Occupational Therapy (In Progress)     Point: ADL training (Done)     Description:   Instruct learner(s) on proper safety adaptation and remediation techniques during self care or transfers.   Instruct in proper use of assistive devices.              Learning Progress Summary           Patient Acceptance, E,D, VU,NR by  at 4/17/2023 1426    Comment: UE TE, pacing self, AE for LBD, trasnfer technique    Acceptance, E, NR by  at 4/13/2023 1536    Acceptance, E, NR by  at 4/11/2023 1330                   Point: Home exercise program (Done)     Description:   Instruct learner(s) on appropriate technique for monitoring, assisting and/or progressing therapeutic exercises/activities.              Learning Progress Summary           Patient Acceptance, E,D, VU,NR by  at 4/17/2023 1426     Comment: UE TE, pacing self, AE for LBD, trasnfer technique                   Point: Precautions (In Progress)     Description:   Instruct learner(s) on prescribed precautions during self-care and functional transfers.              Learning Progress Summary           Patient Acceptance, E, NR by  at 4/13/2023 1536    Acceptance, E, NR by  at 4/11/2023 1330                   Point: Body mechanics (Done)     Description:   Instruct learner(s) on proper positioning and spine alignment during self-care, functional mobility activities and/or exercises.              Learning Progress Summary           Patient Acceptance, E,D, VU,NR by  at 4/17/2023 1426    Comment: UE TE, pacing self, AE for LBD, trasnfer technique    Acceptance, E, NR by  at 4/13/2023 1536    Acceptance, E, NR by  at 4/11/2023 1330                               User Key     Initials Effective Dates Name Provider Type Discipline     02/03/23 -  Laura Osman, OT Occupational Therapist OT     10/14/22 -  Ca Luther OT Occupational Therapist OT              OT Recommendation and Plan  Therapy Frequency (OT): daily  Plan of Care Review  Plan of Care Reviewed With: patient  Progress: improving  Outcome Evaluation: Pt. with increased LBD independence with AE use, but will need further education for independence.  Pt.'s family took AE home so practice AE will have to be used.  Pt. continues with limited endurance, strength, ROM and pain impacting prior ADL independence level. Skilled OT services appropriate to continue.  IRF still recommended for best discharge outcome.     Time Calculation:    Time Calculation- OT     Row Name 04/17/23 1426             Time Calculation- OT    OT Start Time 1337  -MATHEW      OT Received On 04/17/23  -      OT Goal Re-Cert Due Date 04/21/23  -         Timed Charges    67364 - OT Therapeutic Exercise Minutes 12  -MATHEW      26886 - OT Therapeutic Activity Minutes 10  -MATHEW      10320 - OT Self Care/Mgmt  Minutes 12  -MATHEW         Total Minutes    Timed Charges Total Minutes 34  -MATHEW       Total Minutes 34  -MATHEW            User Key  (r) = Recorded By, (t) = Taken By, (c) = Cosigned By    Initials Name Provider Type    Laura Carrillo OT Occupational Therapist              Therapy Charges for Today     Code Description Service Date Service Provider Modifiers Qty    [id number]  OT THER PROC EA 15 MIN 4/17/2023 Laura Osman, OT GO 1    [id number]  OT SELF CARE/MGMT/TRAIN EA 15 MIN 4/17/2023 Laura Osman OT GO 1               Laura Osman OT  4/17/2023

## 2023-04-17 NOTE — PLAN OF CARE
Goal Outcome Evaluation:  Plan of Care Reviewed With: patient        Progress: no change  Outcome Evaluation: VSS on 1L NC. Difficulty managing pain with PRNs. Complains of pain in abdomen and lower back. Complaints of SOA that accompany pain. O2 sats maintained in high 90's. A&O x4, NSR on tele. Will continue POC

## 2023-04-17 NOTE — THERAPY TREATMENT NOTE
Patient Name: Christine Garg  : 1933    MRN: [id number]                              Today's Date: 2023       Admit Date: 4/10/2023    Visit Dx:     ICD-10-CM ICD-9-CM   1. Esophageal stricture  K22.2 530.3     Patient Active Problem List   Diagnosis   • Essential hypertension   • Dyslipidemia   • ASCVD (arteriosclerotic cardiovascular disease)   • Palpitations   • Coronary artery disease    • Abnormal PFTs (pulmonary function tests)   • Chronic obstructive pulmonary disease   • Mild persistent asthma with allergic rhinitis   • Pulmonary nodule   • Former smoker   • Colitis   • Perforated ulcer   • Acute gastric ulcer with perforation   • Moderate malnutrition   • Esophageal stricture   • Hypothyroidism (acquired)   • S/P percutaneous endoscopic gastrostomy (PEG) tube placement   • Adenocarcinoma   • Severe malnutrition     Past Medical History:   Diagnosis Date   • Advanced age    • CAD (coronary artery disease) 2011    s/p 3v CABG   • Chronic kidney disease (CKD), stage III (moderate)    • COPD (chronic obstructive pulmonary disease)    • History of tobacco use    • Hyperlipidemia    • Hypertension    • Hypothyroidism    • PONV (postoperative nausea and vomiting)    • Pulmonary nodule      Past Surgical History:   Procedure Laterality Date   • BREAST BIOPSY Left     benign   • CATARACT EXTRACTION     • CORONARY ANGIOPLASTY     • CORONARY ARTERY BYPASS GRAFT     • ENDOSCOPY W/ PEG TUBE PLACEMENT N/A 2023    Procedure: ESOPHAGOGASTRODUODENOSCOPY WITH PERCUTANEOUS ENDOSCOPIC GASTROSTOMY TUBE INSERTION;  Surgeon: Aurora Kirkland MD;  Location: [facility] OR;  Service: General;  Laterality: N/A;   • ENDOSCOPY W/ STENT PLACEMENT/REMOVAL N/A 2023    Procedure: ESOPHAGOGASTRODUODENOSCOPY WITH STENT PLACEMENT WITH FLUOROSCOPY;  Surgeon: Brunner, Mark I, MD;  Location: [facility] ENDOSCOPY;  Service: Gastroenterology;  Laterality: N/A;   • EXPLORATORY LAPAROTOMY N/A 3/22/2023    Procedure: LAPAROTOMY  EXPLORATORY WITH OVER SEW OF DUODENAL ULCER WITH OMENTAL PATCH AND BIOPATCH AND CENTRAL LINE PLACEMENT;  Surgeon: Aurora Kirkland MD;  Location: [facility] OR;  Service: General;  Laterality: N/A;      General Information     Row Name 04/17/23 1416          Physical Therapy Time and Intention    Document Type therapy note (daily note) (P)   -HT     Mode of Treatment physical therapy (P)   -HT     Row Name 04/17/23 1416          General Information    Patient Profile Reviewed yes (P)   -HT     Existing Precautions/Restrictions other (see comments);oxygen therapy device and L/min;fall (P)   abdominal incision, PEG, incontinence  -HT     Barriers to Rehab medically complex;previous functional deficit (P)   -HT     Row Name 04/17/23 1416          Cognition    Orientation Status (Cognition) oriented x 3 (P)   -HT     Row Name 04/17/23 1416          Safety Issues, Functional Mobility    Safety Issues Affecting Function (Mobility) awareness of need for assistance;insight into deficits/self-awareness;safety precaution awareness;safety precautions follow-through/compliance;sequencing abilities (P)   -HT     Impairments Affecting Function (Mobility) endurance/activity tolerance;strength;balance (P)   -HT           User Key  (r) = Recorded By, (t) = Taken By, (c) = Cosigned By    Initials Name Provider Type    HT Clare Fulton, PT Student PT Student               Mobility     Row Name 04/17/23 1417          Bed Mobility    Supine-Sit [place] (Bed Mobility) moderate assist (50% patient effort);1 person assist;verbal cues (P)   -HT     Assistive Device (Bed Mobility) bed rails;head of bed elevated (P)   -HT     Comment, (Bed Mobility) cues for sequencing to avoid abdominal incision pain. Required assistance bringing LEs off bed. (P)   -HT     Row Name 04/17/23 1417          Sit-Stand Transfer    Sit-Stand [place] (Transfers) moderate assist (50% patient effort);1 person assist (P)   -HT     Assistive Device  (Sit-Stand Transfers) walker, front-wheeled (P)   -HT     Comment, (Sit-Stand Transfer) 4x STS, 3x EOB and 1x chair for brief placement d/t incontinence. Pt required seated rest breaks between STS d/t dizziness/nausea VSS. (P)   -HT     Row Name 04/17/23 1417          Gait/Stairs (Locomotion)    [place] Level (Gait) minimum assist (75% patient effort);1 person assist (P)   -HT     Assistive Device (Gait) walker, front-wheeled (P)   -HT     Distance in Feet (Gait) 10 (P)   -HT     Deviations/Abnormal Patterns (Gait) bilateral deviations;base of support, wide;denny decreased;stride length decreased;weight shifting decreased (P)   -HT     Bilateral Gait Deviations forward flexed posture;heel strike decreased (P)   -HT     Comment, (Gait/Stairs) Pt demonstrated step through gait pattern with decreased stride length resembling shuffling B, fwd trunk, decreased heel strike B, and required cues for upright posture, PLB, and increased stride length. Gait limited by poor endurance, balance deficits, and generalized weakness. (P)   -HT           User Key  (r) = Recorded By, (t) = Taken By, (c) = Cosigned By    Initials Name Provider Type    HT Clare Fulton, PT Student PT Student               Obj/Interventions     Row Name 04/17/23 1424          Balance    Balance Assessment sitting static balance;sitting dynamic balance;standing static balance;standing dynamic balance (P)   -HT     Static Sitting Balance independent (P)   -HT     Dynamic Sitting Balance contact guard (P)   -HT     Position, Sitting Balance supported (P)   -HT     Static Standing Balance contact guard (P)   -HT     Dynamic Standing Balance minimal assist (P)   -HT     Position/Device Used, Standing Balance walker, rolling (P)   -HT           User Key  (r) = Recorded By, (t) = Taken By, (c) = Cosigned By    Initials Name Provider Type    HT Clare Fulton, PT Student PT Student               Goals/Plan    No documentation.                Clinical  Impression     Row Name 04/17/23 1426          Pain    Pretreatment Pain Rating 0/10 - no pain (P)   -HT     Posttreatment Pain Rating 0/10 - no pain (P)   -HT     Additional Documentation Pain Scale: Numbers Pre/Post-Treatment (Group) (P)   -HT     Row Name 04/17/23 1426          Plan of Care Review    Progress no change (P)   -HT     Outcome Evaluation Pt ambulated 10' with min A/RW refusing further mobility d/t dizziness/nausea with VSS showing limitations of poor endurance, balance deficits, and generalized weakness. Rec skilled PT to increase ind with mobility and d/c to IRF. (P)   -HT     [facility] Name 04/17/23 1426          Therapy Assessment/Plan (PT)    Criteria for Skilled Interventions Met (PT) yes;meets criteria;skilled treatment is necessary (P)   -HT     Row Name 04/17/23 1426          Vital Signs    Pre Systolic BP Rehab 141 (P)   -HT     Pre Treatment Diastolic BP 59 (P)   -HT     Post Systolic BP Rehab 155 (P)   -HT     Post Treatment Diastolic BP 82 (P)   -HT     Pretreatment Heart Rate (beats/min) 77 (P)   -HT     Posttreatment Heart Rate (beats/min) 77 (P)   -HT     Pre SpO2 (%) 99 (P)   -HT     O2 Delivery Pre Treatment nasal cannula (P)   -HT     Intra SpO2 (%) 90 (P)   -HT     O2 Delivery Intra Treatment nasal cannula (P)   -HT     Post SpO2 (%) 99 (P)   -HT     O2 Delivery Post Treatment nasal cannula (P)   -HT     Pre Patient Position Supine (P)   -HT     Intra Patient Position Standing (P)   -HT     Post Patient Position Sitting (P)   -HT     Row Name 04/17/23 1426          Positioning and Restraints    Pre-Treatment Position in bed (P)   -HT     Post Treatment Position chair (P)   -HT     In Chair notified nsg;reclined;sitting;call light within reach;encouraged to call for assist;exit alarm on;LUE elevated;RUE elevated;legs elevated;waffle cushion;on mechanical lift sling (P)   -HT           User Key  (r) = Recorded By, (t) = Taken By, (c) = Cosigned By    Initials Name Provider Type    HT  Clare Fulton, PT Student PT Student               Outcome Measures     Row Name 04/17/23 1429          How much help from another person do you currently need...    Turning from your back to your side while in flat bed without using bedrails? 2 (P)   -HT     Moving from lying on back to sitting on the side of a flat bed without bedrails? 2 (P)   -HT     Moving to and from a bed to a chair (including a wheelchair)? 2 (P)   -HT     Standing up from a chair using your arms (e.g., wheelchair, bedside chair)? 2 (P)   -HT     Climbing 3-5 steps with a railing? 1 (P)   -HT     To walk in hospital room? 3 (P)   -HT     AM-PAC 6 Clicks Score (PT) 12 (P)   -HT     Highest level of mobility 4 --> Transferred to chair/commode (P)   -HT     Row Name 04/17/23 1429 04/17/23 1425       Functional Assessment    Outcome Measure Options AM-PAC 6 Clicks Basic Mobility (PT) (P)   -HT AM-PAC 6 Clicks Daily Activity (OT)  -MATHEW          User Key  (r) = Recorded By, (t) = Taken By, (c) = Cosigned By    Initials Name Provider Type    Laura Carrillo, OT Occupational Therapist     Clare Fulton, PT Student PT Student                             Physical Therapy Education     Title: PT OT SLP Therapies (In Progress)     Topic: Physical Therapy (In Progress)     Point: Mobility training (Done)     Learning Progress Summary           Patient Acceptance, E, VU,NR by HT at 4/17/2023 1431    Acceptance, E,D,H, NR by DM at 4/14/2023 1737    Acceptance, E, VU,NR by HT at 4/11/2023 1500                   Point: Home exercise program (In Progress)     Learning Progress Summary           Patient Acceptance, E,D,H, NR by DM at 4/14/2023 1737                   Point: Body mechanics (Done)     Learning Progress Summary           Patient Acceptance, E, VU,NR by HT at 4/17/2023 1431    Acceptance, E,D,H, NR by DM at 4/14/2023 1737    Acceptance, E, VU,NR by HT at 4/11/2023 1500                   Point: Precautions (Done)     Learning Progress  Summary           Patient Acceptance, E, VU,NR by HT at 4/17/2023 1431    Acceptance, E,D,H, NR by DM at 4/14/2023 1737    Acceptance, E, VU,NR by  at 4/11/2023 1500                               User Key     Initials Effective Dates Name Provider Type Discipline    DM 02/03/23 -  Rubi Nick, PT Physical Therapist PT    HT 01/12/23 -  Clare Fulton, PT Student PT Student PT              PT Recommendation and Plan  Planned Therapy Interventions (PT): balance training, bed mobility training, gait training, home exercise program, postural re-education, patient/family education, neuromuscular re-education, stair training, strengthening, transfer training  Plan of Care Reviewed With: patient  Progress: (P) no change  Outcome Evaluation: (P) Pt ambulated 10' with min A/RW refusing further mobility d/t dizziness/nausea with VSS showing limitations of poor endurance, balance deficits, and generalized weakness. Rec skilled PT to increase ind with mobility and d/c to IRF.     Time Calculation:    PT Charges     Row Name 04/17/23 1431             Time Calculation    Start Time 1131 (P)   -HT      PT Received On 04/17/23 (P)   -HT         Timed Charges    82770 - PT Therapeutic Activity Minutes 38 (P)   -HT         Total Minutes    Timed Charges Total Minutes 38 (P)   -HT       Total Minutes 38 (P)   -HT            User Key  (r) = Recorded By, (t) = Taken By, (c) = Cosigned By    Initials Name Provider Type    HT Clare Fulton, PT Student PT Student              Therapy Charges for Today     Code Description Service Date Service Provider Modifiers Qty    [id number]  PT THERAPEUTIC ACT EA 15 MIN 4/17/2023 Clare Fulton, PT Student GP 3          PT G-Codes  Outcome Measure Options: (P) AM-PAC 6 Clicks Basic Mobility (PT)  AM-PAC 6 Clicks Score (PT): (P) 12  AM-PAC 6 Clicks Score (OT): 12  PT Discharge Summary  Anticipated Discharge Disposition (PT): inpatient rehabilitation facility    Clare Fulton, MARIA DE JESUS  Student  4/17/2023

## 2023-04-17 NOTE — CONSULTS
Clinical Nutrition     Nutrition Support Assessment  Reason for Visit: Follow-up protocol, EN/PO      Patient Name: Christine Garg  YOB: 1933  MRN: [id number]  Date of Encounter: 04/17/23 14:55 EDT  Admission date: 4/10/2023    Comments:    Pt has not yet reach goal with EN however reports tolerating at 30mL/hr. Per MD note, plan to advance to goal overnight.     Encouraged pt to continue with PO intake with goal of improving to limit need for supplemental EN    Nutrition Assessment   Admission Diagnosis:  Esophageal stricture [K22.2]      Problem List:    Esophageal stricture    Essential hypertension    ASCVD (arteriosclerotic cardiovascular disease)    Coronary artery disease     Chronic obstructive pulmonary disease    Former smoker    Perforated ulcer    Hypothyroidism (acquired)    S/P percutaneous endoscopic gastrostomy (PEG) tube placement    Adenocarcinoma    Severe malnutrition        PMH:   She  has a past medical history of Advanced age, CAD (coronary artery disease) (2011), Chronic kidney disease (CKD), stage III (moderate), COPD (chronic obstructive pulmonary disease), History of tobacco use, Hyperlipidemia, Hypertension, Hypothyroidism, PONV (postoperative nausea and vomiting), and Pulmonary nodule.    PSH:  She  has a past surgical history that includes Breast biopsy (Left); Cataract extraction; Coronary angioplasty; Coronary artery bypass graft; Exploratory Laparotomy (N/A, 3/22/2023); Esophagogastroduodenoscopy w/ PEG (N/A, 4/7/2023); and ENDOSCOPY W/ STENT PLACEMENT/REMOVAL (N/A, 4/12/2023).      Applicable Nutrition Concerns:   Skin: surgical incision abdoment  Oral:  GI: PEG      Applicable Interval History:     3/22: duodenal ulcer repair  4/7: PEG [facility] at [facility]  4/11: initiated EN  4/12: esophageal stent placement  4/14: Nocturnal feeds initiated    Reported/Observed/Food/Nutrition Related History:     4/17  Tolerating nocturnal feeds at 30mL/hr - denies  nausea or abdominal discomfort at this rate. Per MD note, confusion about EN order from RN - plan to start at 30mL/hr and advance to goal overnight tonight. Continues to drink Boost Breeze without difficulty. Documented BM x1 in past 24hrs.     4/14  Pt son concerned for poor PO acceptance. Plan to initiate nocturnal feeds with hope of improved tolerance - ? Ability to deliver >15mL/hr. Per RN, pt tolerating meds via PEG with additional flush without difficulty.     4/12  Pt unable to tolerate EN at 15mL/hr - c/o abdominal tightness. Esophageal stent placed - diet upgraded to full liquids. Reports tolerating 1 cup of OJ.  Pt does not like original ONS but agreeable to Breeze.     4/11  Pt laying in bed with family at bedside - able to provide some weight/nutrition hx, additional information provided by spouse and son at bedside. Endorsed recent hx of inability to tolerate PO intake of any kind without emesis for ~3wks prior to hospitalization in March. PEG placed 2/2 esophageal stricture and inability for diet advance past clear liquids. Unable to tolerate EN order of Nutren 1.5 >20mL/hr (goal was 50mL/hr) 2/2 abdominal tightness. Mild hyponatremia noted. Denies N/V/D - reports soft BM. Plan for esophageal stent placement - ? Timeline.   Educated on EN formula most likely to choose, verbalized understanding.     Labs    Labs Reviewed: Yes     Results from last 7 days   Lab Units 04/12/23  0518 04/11/23  0614   GLUCOSE mg/dL 68 85   BUN mg/dL 9 10   CREATININE mg/dL 0.40* 0.41*   SODIUM mmol/L 134* 135*   CHLORIDE mmol/L 99 98   POTASSIUM mmol/L 4.4 4.6   MAGNESIUM mg/dL 1.6 1.7       Results from last 7 days   Lab Units 04/11/23  0614   PREALBUMIN mg/dL 9.3*   CRP mg/dL 18.47*       Results from last 7 days   Lab Units 04/17/23  1113 04/17/23  0638 04/17/23  0553 04/17/23  0022 04/16/23  1800 04/16/23  1205   GLUCOSE mg/dL 108 106 98 99 97 101     Lab Results   Lab Value Date/Time    HGBA1C 6.10 (H) 03/22/2023 1139  "   HGBA1C 6.00 (H) 12/19/2022 1638                 Medications    Medications Reviewed: Yes  Pertinent: docusate, levothyroxine, protonix, senokot  Infusion:   PRN: Dilaudid      Intake/Ouptut 24 hrs (0701 - 0700)   I&O's Reviewed: Yes       Anthropometrics     Flowsheet Rows    Flowsheet Row First Filed Value   Admission Height 165.1 cm (65\") Documented at 04/11/2023 0532   Admission Weight 66.2 kg (146 lb) Documented at 04/11/2023 0532          Height: Height: 165.1 cm (65\")  Last Filed Weight: Weight: 66.2 kg (146 lb) (04/11/23 0532)  Method: Weight Method: Bed scale  BMI: BMI (Calculated): 24.3  BMI classification: Normal: 18.5-24.9kg/m2  IBW:  125lbs    UBW:      Weight       Weight (kg) Weight (lbs) Weight Method Visit Report   12/19/2022 70.081 kg  154 lb 8 oz  Bed scale      68.04 kg  150 lb  Stated      68.04 kg  150 lb      12/20/2022 --  --      12/21/2022 70.943 kg  156 lb 6.4 oz  Bed scale     12/22/2022 71.578 kg  157 lb 12.8 oz  Bed scale     12/23/2022 71.487 kg  157 lb 9.6 oz  Bed scale     12/24/2022 74.118 kg  163 lb 6.4 oz  Bed scale     12/25/2022 73.936 kg  163 lb  Bed scale     1/5/2023 68.04 kg  150 lb  Stated     3/22/2023 71.895 kg  158 lb 8 oz  Bed scale      56.7 kg  125 lb  Standing scale      56.7 kg  125 lb  Stated     3/25/2023 70.625 kg  155 lb 11.2 oz  Bed scale     3/26/2023 67.722 kg  149 lb 4.8 oz  Bed scale     3/28/2023 67.042 kg  147 lb 12.8 oz  Bed scale     3/29/2023 65.227 kg  143 lb 12.8 oz  Bed scale     3/30/2023 65.454 kg  144 lb 4.8 oz  Bed scale     3/31/2023 66.271 kg  146 lb 1.6 oz  Bed scale     4/1/2023 66.18 kg  145 lb 14.4 oz  Bed scale     4/2/2023 66.906 kg  147 lb 8 oz  Bed scale     4/3/2023 65.363 kg  144 lb 1.6 oz  Bed scale     4/4/2023 65.817 kg  145 lb 1.6 oz  Bed scale     4/11/2023 66.225 kg  146 lb  Bed scale       Weight change:   Had a significant weight loss of 12lbs (7.6%) in ~1 month     Nutrition Focused Physical Exam     Date: 4/11     NFPE " "completed, patient meets criteria for MSA at this time.     Needs Assessment   Date: 4/11    Height used:Height: 165.1 cm (65\")  Weights used: 66kg CBW; 57kg IBW      Estimated Calorie needs: ~ 1525 Kcal/day  Method: 23-28 Kcals/KG = 5123-9984  Method:  MSJ (1085) x1.4 = 1519    Estimated Protein needs: ~ 90 g PRO/day  Method: 1.3-1.5g/Kg = 86-99    Estimated Fluid needs: ~ ml/day   Per clinical status    Current Nutrition Prescription     PO: Diet: Liquid Diets; Full Liquid; Texture: Regular Texture (IDDSI 7); Fluid Consistency: Thin (IDDSI 0)  Oral Nutrition Supplement:   Intake: 2 Days: 50% x5 meals documented (full liquids)      EN: Peptamen AF  Goal Rate:  55mL/hr Water Flushes: 20mL/hr  Modular: None  Route: PEG  Tube: 20 PEG    At goal over: 12Hrs/day    Rx will supply:   Goal Volume 660 mL/day     Flush Volume 240 mL/day     Energy 792 Kcal/day 52 % Est Need   Protein 50 g/day 56 % Est Need   Fiber 0 g/day     Water in   mL     Total Water 775 mL     Meet DRI No        --------------------------------------------------------------------------  Product/Rate verified at bedside: Yes  Infusing Rate at time of visit: nocturnal feeds - pump set to 30mL/hr    Average Delivery from Charting: Insufficient data      Nutrition Diagnosis   Date: 4/11 Updated:   Problem Malnutrition acute severe   Etiology Dysphagia & recent abdominal surgery   Signs/Symptoms significant weight loss of 7.6% x1 month & <50% of EEN for >5d.    Status:     Date: 4/11  Updated: 4/12, 4/14  Problem Inadequate enteral nutrition infusion   Etiology Dysphagia   Signs/Symptoms EN not yet restarted, intolerance to previous regimen and not at goal   Status: EN off, PO diet initiated, nocturnal feeds ordered    Goal:   General: Nutrition support treatment, Nutrition to support treatment, Improve nutrition related labs  PO: Increase intake  EN/PN: Establish EN tolerance, Tolerate EN at goal    Nutrition Intervention      Follow treatment " progress, Care plan reviewed    Continue POC    Monitoring/Evaluation:   Per protocol, I&O, PO intake, Supplement intake, Pertinent labs, EN delivery/tolerance, Weight, GI status, Symptoms      Suad Emmanuel MS,RD,LD  Time Spent: 30min

## 2023-04-17 NOTE — PLAN OF CARE
Goal Outcome Evaluation:  Plan of Care Reviewed With: patient        Progress: improving  Outcome Evaluation: Pt. with increased LBD independence with AE use, but will need further education for independence.  Pt.'s family took AE home so practice AE will have to be used.  Pt. continues with limited endurance, strength, ROM and pain impacting prior ADL independence level. Skilled OT services appropriate to continue.  IRF still recommended for best discharge outcome.

## 2023-04-18 NOTE — PROGRESS NOTES
[facility] Medicine Services  PROGRESS NOTE    Patient Name: Christine Garg  : 1933  MRN: [id number]    Date of Admission: 4/10/2023  Primary Care Physician: Dave Tobar MD    Subjective   Subjective     CC:  Follow-up dysphagia    HPI:  Tolerating liquid diet during the day.  Overnight, her tube feeds were held for abdominal discomfort and a residual of 150 mL.  2 of her sons are at bedside.  Endorses generalized abdominal discomfort and back pain.  Reports her back pain is chronic.  No nausea or vomiting.    ROS:  Gen- No fevers, chills, +dysphagia   CV- No chest pain, palpitations  Resp- No cough, dyspnea  GI- No N/V/D, + abd pain    Objective   Objective     Vital Signs:   Temp:  [96.3 °F (35.7 °C)-98.4 °F (36.9 °C)] 96.3 °F (35.7 °C)  Heart Rate:  [] 91  Resp:  [16-18] 18  BP: (121-156)/(59-93) 124/73  Flow (L/min):  [1] 1     Physical Exam:  Constitutional: No acute distress, awake, alert, chronically ill-appearing, laying in bed  HENT: NCAT, mucous membranes moist, dentures in place  Respiratory: Clear to auscultation bilaterally, respiratory effort normal   Cardiovascular: RRR, no murmurs, palpable pedal pulses bilaterally, cap refill brisk   Gastrointestinal: Normoactive bowel sounds, soft, nontender, PEG w/ dry guaze  Musculoskeletal: No BLE edema   Psychiatric: Appropriate affect, cooperative  Neurologic: PERRL, symmetric facies, symmetric palate rise, tongue midline, moves all extremities, speech clear  Skin: warm, dry, no visible rash     Results Reviewed:  LAB RESULTS:      Lab 23  1506 23  0518   WBC 13.29* 9.87   HEMOGLOBIN 9.9* 10.3*   HEMATOCRIT 30.5* 32.9*   PLATELETS 337 251   NEUTROS ABS 10.67*  --    IMMATURE GRANS (ABS) 0.25*  --    LYMPHS ABS 1.06  --    MONOS ABS 0.73  --    EOS ABS 0.53*  --    MCV 94.4 100.6*         Lab 23  1506 23  0518   SODIUM 129* 134*   POTASSIUM 4.4 4.4   CHLORIDE 91* 99   CO2 30.0* 25.0   ANION  GAP 8.0 10.0   BUN 7* 9   CREATININE 0.37* 0.40*   EGFR 96.5 94.7   GLUCOSE 113* 68   CALCIUM 8.2* 8.4*   MAGNESIUM 1.4* 1.6                         Brief Urine Lab Results  (Last result in the past 365 days)      Color   Clarity   Blood   Leuk Est   Nitrite   Protein   CREAT   Urine HCG        01/06/23 0149 Yellow   Clear   Negative   Negative   Negative   Negative                 Microbiology Results Abnormal     None          No radiology results from the last 24 hrs    Results for orders placed during the hospital encounter of 03/22/23    Adult Transthoracic Echo Complete W/ Cont if Necessary Per Protocol    Interpretation Summary  •  Left ventricular systolic function is normal. Calculated left ventricular EF = 60% Left ventricular ejection fraction appears to be 56 - 60%.  •  Left ventricular diastolic function is consistent with (grade I) impaired relaxation and age.  •  Moderate to severe mitral valve regurgitation is present.  •  Moderate tricuspid valve regurgitation is present.  •  Estimated right ventricular systolic pressure from tricuspid regurgitation is moderately elevated (45-55 mmHg).  •  Moderate pulmonary hypertension is present.      Current medications:  Scheduled Meds:aspirin, 81 mg, Per PEG Tube, Daily  carvedilol, 25 mg, Per PEG Tube, BID With Meals  sennosides, 10 mL, Per PEG Tube, BID   And  docusate sodium, 100 mg, Per PEG Tube, BID  enoxaparin, 40 mg, Subcutaneous, Nightly  gabapentin, 100 mg, Per PEG Tube, Q12H  hydrALAZINE, 75 mg, Per PEG Tube, Q8H  levothyroxine, 112 mcg, Per PEG Tube, Q AM  lidocaine, 1 patch, Transdermal, Q24H  pantoprazole, 40 mg, Intravenous, BID AC  sodium chloride, 10 mL, Intravenous, Q12H  valsartan, 320 mg, Per PEG Tube, Q24H      Continuous Infusions:   PRN Meds:.•  acetaminophen **OR** acetaminophen **OR** acetaminophen  •  [DISCONTINUED] senna-docusate sodium **AND** polyethylene glycol **AND** [DISCONTINUED] bisacodyl **AND** bisacodyl  •  cloNIDine  •   HYDROcodone-acetaminophen  •  HYDROmorphone  •  ipratropium-albuterol  •  [DISCONTINUED] ondansetron **OR** ondansetron  •  sodium chloride  •  sodium chloride    Assessment & Plan   Assessment & Plan     Active Hospital Problems    Diagnosis  POA   • **Esophageal stricture [K22.2]  Unknown   • Severe malnutrition [E43]  Unknown   • Hypothyroidism (acquired) [E03.9]  Unknown   • S/P percutaneous endoscopic gastrostomy (PEG) tube placement [Z93.1]  Not Applicable   • Adenocarcinoma [C80.1]  Unknown   • Perforated ulcer [K27.5]  Yes   • Former smoker [Z87.891]  Not Applicable   • Coronary artery disease  [I25.10]  Yes   • Chronic obstructive pulmonary disease [J44.9]  Yes   • Essential hypertension [I10]  Yes   • ASCVD (arteriosclerotic cardiovascular disease) [I25.10]  Yes      Resolved Hospital Problems   No resolved problems to display.        Brief Hospital Course to date:  Christine Garg is a 89 y.o. female admitted to [facility] on 3/22 for abdominal pain; went to OR, had perforated duodenal ulcer repaired with omental patch.  Then on 3/29, patient had CT of thoracic spine which noted left pleural consolidation and parenchymal nodules.  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  She was also found to have an esophageal stricture secondary to extrinsic compression of 3cm peritracheal lymph node.  On 4/7, patient had PEG tube placed.  She was sent to [facility] for placement of esophageal stent by Dr. Waters.   PMH of hypertension, hyperlipidemia, COPD, coronary artery disease, peripheral artery disease, hypothyroidism.     This patient's problems and plans were partially entered by my partner and updated as appropriate by me 04/18/23.     Dysphagia due to external esophageal compression by LAD  S/p PEG  -EGD 4/12 with esophageal stent placement  -Plan for full liquid diet x 1 week then advance to puree 4/19 if tolerating  -Started nocturnal tube  feeds which can be weaned whenever PO intake is adequate  -Instructed nursing to pass along that patient should start her tube feeds tonight at 30 mils per hour and increase to goal overnight  -Having high residuals last night, Reglan ordered and KUB pending  -If residuals persist and/or has recurrent abdominal pain, obtain CT abdomen pelvis    Cancer related pain  -as needed Norco to q4h   -Stopped IV Dilaudid on 4/18     Perforated Duodenal Ulcer s/p exploratory laparotomy with patch 3/22  -Continue PPI twice daily  -Continue lower aspirin to 81 mg daily instead of 325 mg      New diagnosis of lung adenocarcinoma  COPD  Former smoker   On oxygen 2L here   -PRN duonebs  -Follow up outpatient for treatment plan with Dr. Balbuena  -Lidoderm patch for left shoulder blade pain which is likely related to nerve compression from known T-spine lesion.  Gabapentin added, increased to BID, continue     CAD   HLD  -Lowered home aspirin to 81mg. Will need to make this clear at discharge in order to prevent further ulceration     Hypoalbuminemia   Severe malnutrition  -Nutrition consulted  -Start nocturnal tube feeds     HTN  -BP remains uncontrolled  -continue coreg, valsartan  -Increased hydralazine to 75 mg TID   -PRN clonidine       Hypothyroid   -Continue home synthroid      Debility  -PT  -Patient had been approved to [facility] for subacute rehab in [place]. Patient would still like to go there after discharge. Patient is to follow up outpatient for treatment options regarding her cancer.      Expected Discharge Location and Transportation: Rehab  Expected Discharge 4/20                         DVT prophylaxis:  Medical and mechanical DVT prophylaxis orders are present.     AM-PAC 6 Clicks Score (PT): 12 (04/17/23 7081)    CODE STATUS:   Code Status and Medical Interventions:   Ordered at: 04/10/23 8688     Code Status (Patient has no pulse and is not breathing):    CPR (Attempt to Resuscitate)     Medical  Interventions (Patient has pulse or is breathing):    Full Support       Catalina Leonardo MD  04/18/23

## 2023-04-18 NOTE — CASE MANAGEMENT/SOCIAL WORK
Continued Stay Note  [facility]     Patient Name: Christine Garg  MRN: [id number]  Today's Date: 4/18/2023    Admit Date: 4/10/2023    Plan: [facility]/ [facility]   Discharge Plan     Row Name 04/18/23 0937       Plan    Plan [facility]/ [place]    Patient/Family in Agreement with Plan yes    Plan Comments Patient has approval for [facility]. Patient is not medically ready today due to tube feeding not being at goal rate. Ambulance reschedule for tomorrow Wednesday at 9:00 am through [facility]. PCS is on chart. CM will follow.    Final Discharge Disposition Code 03 - skilled nursing facility (SNF)               Discharge Codes    No documentation.               Expected Discharge Date and Time     Expected Discharge Date Expected Discharge Time    Apr 20, 2023             Vale Lechuga RN

## 2023-04-18 NOTE — PLAN OF CARE
Goal Outcome Evaluation:  Plan of Care Reviewed With: patient        Progress: improving       Pt remains free of falls, injuries, and VTE. States understanding of plan of care.

## 2023-04-18 NOTE — PAYOR COMM NOTE
"Amanda Gomez (89 y.o. Female)     [id number]    Jane Puentes, RN  Utilization Review  Ckllg-[phone]  [phone]      Date of Birth   06/08/1933    Social Security Number       Address   [address] [address]    Home Phone   [phone]    MRN   [id number]       Faith   Nondenominational    Marital Status                               Admission Date   4/10/23    Admission Type   Urgent    Admitting Provider   Catalina Leonardo MD    Attending Provider   Catalina Leonardo MD    Department, Room/Bed   [facility] 6B, N640/1       Discharge Date       Discharge Disposition       Discharge Destination                               Attending Provider: Catalina Leonardo MD    Allergies: Motrin [Ibuprofen], Novocain [Procaine]    Isolation: None   Infection: None   Code Status: CPR    Ht: 165.1 cm (65\")   Wt: 66.2 kg (146 lb)    Admission Cmt: None   Principal Problem: Esophageal stricture [K22.2]                 Active Insurance as of 4/10/2023     Primary Coverage     Payor Plan Insurance Group Employer/Plan Group    ANTHEM MEDICARE REPLACEMENT ANTHEM MEDICARE ADVANTAGE [id number]     Payor Plan Address Payor Plan Phone Number Payor Plan Fax Number Effective Dates    [id number] [phone]  1/1/2019 - None Entered    [facility] [id number]       Subscriber Name Subscriber Birth Date Member ID       AMANDA GOMEZ 6/8/1933 [id number]                 Emergency Contacts      (Rel.) Home Phone Work Phone Mobile Phone    Kaleb Gomez (healthcare surrogate) (Son) -- -- [phone]    Ziyad Gomez (alternative healthcare surrogate) (Son) -- -- [phone]    Filiberto Gomez (second alternative HCS) (Son) -- -- [phone]            Vital Signs (last day)     Date/Time Temp Temp src Pulse Resp BP Patient Position SpO2    04/18/23 0725 96.3 (35.7) Oral 91 18 124/73 Lying 98    04/18/23 0332 97.6 (36.4) Oral 113 18 156/75 -- 97    04/18/23 0003 97.5 (36.4) Oral 108 16 133/93 " Lying 98    04/17/23 1956 97.6 (36.4) Oral 100 18 121/75 Lying 98    04/17/23 1513 98.3 (36.8) Oral 82 18 127/60 Lying --    04/17/23 1112 98.4 (36.9) Oral 79 18 141/59 Lying --    04/17/23 1036 -- -- 84 16 -- -- 96    04/17/23 0635 98 (36.7) Oral 91 16 147/137 Lying --    04/17/23 0356 98 (36.7) Oral 92 16 185/87 Lying 94          Oxygen Therapy (last day)     Date/Time SpO2 Device (Oxygen Therapy) Flow (L/min) Oxygen Concentration (%) ETCO2 (mmHg)    04/18/23 0725 98 -- -- -- --    04/18/23 0332 97 -- -- -- --    04/18/23 0003 98 -- -- -- --    04/17/23 1956 98 -- -- -- --    04/17/23 1800 -- nasal cannula 1 -- --    04/17/23 1600 -- nasal cannula 1 -- --    04/17/23 1400 -- nasal cannula 1 -- --    04/17/23 1200 -- nasal cannula 1 -- --    04/17/23 1036 96 nasal cannula 1 -- --    04/17/23 1000 -- nasal cannula 1 -- --    04/17/23 0800 -- nasal cannula 1 -- --    04/17/23 0637 -- nasal cannula 1 -- --    04/17/23 0450 -- nasal cannula 1 -- --    04/17/23 0356 94 -- -- -- --    04/17/23 0248 -- nasal cannula 1 -- --    04/17/23 0050 -- nasal cannula 1 -- --          Lines, Drains & Airways     Active LDAs     Name Placement date Placement time Site Days    Peripheral IV Left;Posterior;Proximal Forearm --  --  Forearm  --    Gastrostomy/Enterostomy Percutaneous endoscopic gastrostomy (PEG) 1 20 Fr. LUQ 04/07/23  1211  LUQ  10    External Urinary Catheter 04/17/23  1950  --  less than 1                Current Facility-Administered Medications   Medication Dose Route Frequency Provider Last Rate Last Admin   • acetaminophen (TYLENOL) tablet 650 mg  650 mg Per PEG Tube Q4H PRN Brunner, Mark I, MD        Or   • acetaminophen (TYLENOL) 160 MG/5ML solution 650 mg  650 mg Per PEG Tube Q4H PRN Brunner, Mark I, MD   650 mg at 04/14/23 0848    Or   • acetaminophen (TYLENOL) suppository 650 mg  650 mg Rectal Q4H PRN Brunner, Mark I, MD       • aspirin chewable tablet 81 mg  81 mg Per PEG Tube Daily Brunner, Mark I, MD   81 mg  at 04/18/23 0810   • polyethylene glycol (MIRALAX) packet 17 g  17 g Per PEG Tube Daily PRN Brunner, Mark I, MD        And   • bisacodyl (DULCOLAX) suppository 10 mg  10 mg Rectal Daily PRN Brunner, Mark I, MD       • carvedilol (COREG) tablet 25 mg  25 mg Per PEG Tube BID With Meals Brunner, Mark I, MD   25 mg at 04/18/23 0810   • cloNIDine (CATAPRES) tablet 0.1 mg  0.1 mg Per PEG Tube TID PRN Brunner, Mark I, MD   0.1 mg at 04/17/23 0413   • sennosides (SENOKOT) 8.8 MG/5ML syrup 10 mL  10 mL Per PEG Tube BID Brunner, Mark I, MD   10 mL at 04/18/23 0817    And   • docusate sodium (COLACE) liquid 100 mg  100 mg Per PEG Tube BID Brunner, Mark I, MD   100 mg at 04/18/23 0809   • Enoxaparin Sodium (LOVENOX) syringe 40 mg  40 mg Subcutaneous Nightly Catalina Leonardo MD   40 mg at 04/17/23 2119   • gabapentin (NEURONTIN) capsule 100 mg  100 mg Per PEG Tube Q12H Clare De Anda MD   100 mg at 04/18/23 0811   • hydrALAZINE (APRESOLINE) tablet 75 mg  75 mg Per PEG Tube Q8H Steven Yanes APRN   75 mg at 04/18/23 0555   • HYDROcodone-acetaminophen (NORCO) 7.5-325 MG per tablet 1 tablet  1 tablet Per PEG Tube Q4H PRN Catalina Leonardo MD   1 tablet at 04/18/23 0810   • HYDROmorphone (DILAUDID) injection 0.25 mg  0.25 mg Intravenous Q8H PRN Catalina Leonardo MD   0.25 mg at 04/17/23 1805   • ipratropium-albuterol (DUO-NEB) nebulizer solution 3 mL  3 mL Nebulization Q4H PRN Clare De Anda MD   3 mL at 04/17/23 1036   • levothyroxine (SYNTHROID, LEVOTHROID) tablet 112 mcg  112 mcg Per PEG Tube Q AM Brunner, Mark I, MD   112 mcg at 04/18/23 0555   • lidocaine (LIDODERM) 5 % 1 patch  1 patch Transdermal Q24H Clare De Anda MD   1 patch at 04/18/23 0812   • ondansetron (ZOFRAN) injection 4 mg  4 mg Intravenous Q6H PRN Brunner, Mark I, MD   4 mg at 04/17/23 0519   • pantoprazole (PROTONIX) injection 40 mg  40 mg Intravenous BID AC Brunner, Mark I, MD   40 mg at 04/18/23 0809   • sodium chloride 0.9 % flush 10 mL  10 mL Intravenous Q12H  Brunner, Mark I, MD   10 mL at 04/18/23 0811   • sodium chloride 0.9 % flush 10 mL  10 mL Intravenous PRN Brunner, Mark I, MD   10 mL at 04/12/23 0902   • sodium chloride 0.9 % infusion 40 mL  40 mL Intravenous PRN Brunner, Mark I, MD   40 mL at 04/12/23 0902   • valsartan (DIOVAN) tablet 320 mg  320 mg Per PEG Tube Q24H Brunner, Mark I, MD   320 mg at 04/18/23 0810     Lab Results (last 24 hours)     Procedure Component Value Units Date/Time    POC Glucose Once [[id number]]  (Normal) Collected: 04/18/23 0553    Specimen: Blood Updated: 04/18/23 0555     Glucose 122 mg/dL      Comment: Meter: [id number] : [id number] Benito Medrano       POC Glucose Once [[id number]]  (Abnormal) Collected: 04/18/23 0006    Specimen: Blood Updated: 04/18/23 0008     Glucose 136 mg/dL      Comment: Meter: [id number] : [id number] Benito Medrano       POC Glucose Once [[id number]]  (Normal) Collected: 04/17/23 1656    Specimen: Blood Updated: 04/17/23 1658     Glucose 100 mg/dL      Comment: Meter: [id number] : [id number] Stewart Celis       Magnesium [[id number]]  (Abnormal) Collected: 04/17/23 1506    Specimen: Blood Updated: 04/17/23 1622     Magnesium 1.4 mg/dL     Basic Metabolic Panel [[id number]]  (Abnormal) Collected: 04/17/23 1506    Specimen: Blood Updated: 04/17/23 1622     Glucose 113 mg/dL      BUN 7 mg/dL      Creatinine 0.37 mg/dL      Sodium 129 mmol/L      Potassium 4.4 mmol/L      Chloride 91 mmol/L      CO2 30.0 mmol/L      Calcium 8.2 mg/dL      BUN/Creatinine Ratio 18.9     Anion Gap 8.0 mmol/L      eGFR 96.5 mL/min/1.73     Narrative:      GFR Normal >60  Chronic Kidney Disease <60  Kidney Failure <15    The GFR formula is only valid for adults with stable renal function between ages 18 and 70.    CBC & Differential [[id number]]  (Abnormal) Collected: 04/17/23 1506    Specimen: Blood Updated: 04/17/23 1600    Narrative:      The following orders were created for panel order CBC & Differential.  Procedure                                Abnormality         Status                     ---------                               -----------         ------                     CBC Auto Differential[[id number]]        Abnormal            Final result                 Please view results for these tests on the individual orders.    CBC Auto Differential [[id number]]  (Abnormal) Collected: 23 1506    Specimen: Blood Updated: 23 1600     WBC 13.29 10*3/mm3      RBC 3.23 10*6/mm3      Hemoglobin 9.9 g/dL      Hematocrit 30.5 %      MCV 94.4 fL      MCH 30.7 pg      MCHC 32.5 g/dL      RDW 13.4 %      RDW-SD 45.7 fl      MPV 9.5 fL      Platelets 337 10*3/mm3      Neutrophil % 80.2 %      Lymphocyte % 8.0 %      Monocyte % 5.5 %      Eosinophil % 4.0 %      Basophil % 0.4 %      Immature Grans % 1.9 %      Neutrophils, Absolute 10.67 10*3/mm3      Lymphocytes, Absolute 1.06 10*3/mm3      Monocytes, Absolute 0.73 10*3/mm3      Eosinophils, Absolute 0.53 10*3/mm3      Basophils, Absolute 0.05 10*3/mm3      Immature Grans, Absolute 0.25 10*3/mm3      nRBC 0.0 /100 WBC     POC Glucose Once [[id number]]  (Normal) Collected: 23 1113    Specimen: Blood Updated: 23 1115     Glucose 108 mg/dL      Comment: Meter: [id number] : [id number] Stewart Lalita           Imaging Results (Last 24 Hours)     ** No results found for the last 24 hours. **           Physician Progress Notes (last 24 hours)      Catalina Leonardo MD at 23 1246              [facility] Medicine Services  PROGRESS NOTE    Patient Name: Christine Garg  : 1933  MRN: [id number]    Date of Admission: 4/10/2023  Primary Care Physician: Dave Tobar MD    Subjective   Subjective     CC:  Follow-up dysphagia    HPI:  Patient seen resting in bed no apparent distress.  Patient has been consistently using as needed IV pain medicine.  Per nursing, overnight they have not gotten the goal tube feeds due to confusion about the order.  Has  some nausea, no emesis.  Last bowel movement was yesterday.  No cough.  Reports shortness of breath.  Tolerating liquid diet.    ROS:  Gen- No fevers, chills, +dysphagia   CV- No chest pain, palpitations  Resp- No cough, + dyspnea  GI- No N/V/D, abd pain    Objective   Objective     Vital Signs:   Temp:  [97.4 °F (36.3 °C)-98.4 °F (36.9 °C)] 98.4 °F (36.9 °C)  Heart Rate:  [71-92] 79  Resp:  [16-18] 18  BP: (141-190)/() 141/59  Flow (L/min):  [1] 1     Physical Exam:  Constitutional: No acute distress, awake, alert, chronically ill-appearing, laying in bed  HENT: NCAT, mucous membranes moist, dentures in place  Respiratory: Clear to auscultation bilaterally, respiratory effort normal   Cardiovascular: RRR, no murmurs, palpable pedal pulses bilaterally, cap refill brisk   Gastrointestinal: Positive bowel sounds, soft, nontender, PEG w/ saturated guaze, appears to be oozing/leaking  Musculoskeletal: No BLE edema   Psychiatric: Appropriate affect, cooperative  Neurologic: PERRL, symmetric facies, symmetric palate rise, tongue midline, moves all extremities, speech clear  Skin: warm, dry, no visible rash     Results Reviewed:  LAB RESULTS:      Lab 04/12/23 0518 04/11/23 0614   WBC 9.87 9.11   HEMOGLOBIN 10.3* 9.9*   HEMATOCRIT 32.9* 31.1*   PLATELETS 251 280   NEUTROS ABS  --  6.38   IMMATURE GRANS (ABS)  --  0.06*   LYMPHS ABS  --  1.12   MONOS ABS  --  0.69   EOS ABS  --  0.80*   .6* 96.9   CRP  --  18.47*         Lab 04/12/23  0518 04/11/23 0614   SODIUM 134* 135*   POTASSIUM 4.4 4.6   CHLORIDE 99 98   CO2 25.0 30.0*   ANION GAP 10.0 7.0   BUN 9 10   CREATININE 0.40* 0.41*   EGFR 94.7 94.2   GLUCOSE 68 85   CALCIUM 8.4* 8.5*   MAGNESIUM 1.6 1.7                     Lab 04/11/23 0614   ABO TYPING A   RH TYPING Positive   ANTIBODY SCREEN Negative         Brief Urine Lab Results  (Last result in the past 365 days)      Color   Clarity   Blood   Leuk Est   Nitrite   Protein   CREAT   Urine HCG         01/06/23 0149 Yellow   Clear   Negative   Negative   Negative   Negative                 Microbiology Results Abnormal     None          No radiology results from the last 24 hrs    Results for orders placed during the hospital encounter of 03/22/23    Adult Transthoracic Echo Complete W/ Cont if Necessary Per Protocol    Interpretation Summary  •  Left ventricular systolic function is normal. Calculated left ventricular EF = 60% Left ventricular ejection fraction appears to be 56 - 60%.  •  Left ventricular diastolic function is consistent with (grade I) impaired relaxation and age.  •  Moderate to severe mitral valve regurgitation is present.  •  Moderate tricuspid valve regurgitation is present.  •  Estimated right ventricular systolic pressure from tricuspid regurgitation is moderately elevated (45-55 mmHg).  •  Moderate pulmonary hypertension is present.      Current medications:  Scheduled Meds:aspirin, 81 mg, Per PEG Tube, Daily  carvedilol, 25 mg, Per PEG Tube, BID With Meals  sennosides, 10 mL, Per PEG Tube, BID   And  docusate sodium, 100 mg, Per PEG Tube, BID  enoxaparin, 40 mg, Subcutaneous, Nightly  gabapentin, 100 mg, Per PEG Tube, Q12H  hydrALAZINE, 75 mg, Per PEG Tube, Q8H  levothyroxine, 112 mcg, Per PEG Tube, Q AM  lidocaine, 1 patch, Transdermal, Q24H  pantoprazole, 40 mg, Intravenous, BID AC  sodium chloride, 10 mL, Intravenous, Q12H  valsartan, 320 mg, Per PEG Tube, Q24H      Continuous Infusions:   PRN Meds:.•  acetaminophen **OR** acetaminophen **OR** acetaminophen  •  [DISCONTINUED] senna-docusate sodium **AND** polyethylene glycol **AND** [DISCONTINUED] bisacodyl **AND** bisacodyl  •  cloNIDine  •  HYDROcodone-acetaminophen  •  HYDROmorphone  •  ipratropium-albuterol  •  [DISCONTINUED] ondansetron **OR** ondansetron  •  sodium chloride  •  sodium chloride    Assessment & Plan   Assessment & Plan     Active Hospital Problems    Diagnosis  POA   • **Esophageal stricture [K22.2]  Unknown   •  Severe malnutrition [E43]  Unknown   • Hypothyroidism (acquired) [E03.9]  Unknown   • S/P percutaneous endoscopic gastrostomy (PEG) tube placement [Z93.1]  Not Applicable   • Adenocarcinoma [C80.1]  Unknown   • Perforated ulcer [K27.5]  Yes   • Former smoker [Z87.891]  Not Applicable   • Coronary artery disease  [I25.10]  Yes   • Chronic obstructive pulmonary disease [J44.9]  Yes   • Essential hypertension [I10]  Yes   • ASCVD (arteriosclerotic cardiovascular disease) [I25.10]  Yes      Resolved Hospital Problems   No resolved problems to display.        Brief Hospital Course to date:  Christine Garg is a 89 y.o. female admitted to [facility] on 3/22 for abdominal pain; went to OR, had perforated duodenal ulcer repaired with omental patch.  Then on 3/29, patient had CT of thoracic spine which noted left pleural consolidation and parenchymal nodules.  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  She was also found to have an esophageal stricture secondary to extrinsic compression of 3cm peritracheal lymph node.  On 4/7, patient had PEG tube placed.  She was sent to [facility] for placement of esophageal stent by Dr. Waters.   PMH of hypertension, hyperlipidemia, COPD, coronary artery disease, peripheral artery disease, hypothyroidism.     This patient's problems and plans were partially entered by my partner and updated as appropriate by me 04/17/23.     Dysphagia due to external esophageal compression by LAD  S/p PEG  -EGD 4/12 with esophageal stent placement  -Plan for full liquid diet x 1 week then advance to puree 4/19 if tolerating  -Started nocturnal tube feeds which can be weaned whenever PO intake is adequate  -Instructed nursing to pass along that patient should start her tube feeds tonight at 30 mils per hour and increase to goal overnight    Cancer related pain  -Wean Dilaudid to every 8 hours IV PRN, increase frequency of as needed Norco to  q4h      Perforated Duodenal Ulcer s/p exploratory laparotomy with patch 3/22  -Continue PPI twice daily  -Continue lower aspirin to 81 mg daily instead of 325 mg      New diagnosis of lung adenocarcinoma  COPD  Former smoker   On oxygen 2L here   -PRN duonebs  -Follow up outpatient for treatment plan with Dr. Balbuena  -Lidoderm patch for left shoulder blade pain which is likely related to nerve compression from known T-spine lesion.  Gabapentin added, increased to BID, continue     CAD   HLD  -Lowered home aspirin to 81mg. Will need to make this clear at discharge in order to prevent further ulceration     Hypoalbuminemia   Severe malnutrition  -Nutrition consulted  -Start nocturnal tube feeds     HTN  -BP remains uncontrolled  -continue coreg, valsartan  -Increased hydralazine to 75 mg TID   -PRN clonidine       Hypothyroid   -Continue home synthroid      Debility  -PT  -Patient had been approved to [facility] for subacute rehab in [place]. Patient would still like to go there after discharge. Patient is to follow up outpatient for treatment options regarding her cancer.      Expected Discharge Location and Transportation: Rehab  Expected Discharge  4/18 versus 4/19                         DVT prophylaxis:  Medical and mechanical DVT prophylaxis orders are present.     AM-PAC 6 Clicks Score (PT): 15 (04/14/23 1701)    CODE STATUS:   Code Status and Medical Interventions:   Ordered at: 04/10/23 4249     Code Status (Patient has no pulse and is not breathing):    CPR (Attempt to Resuscitate)     Medical Interventions (Patient has pulse or is breathing):    Full Support       Catalina Leonardo MD  04/17/23      Electronically signed by Catalina Leonardo MD at 04/17/23 9034          Elvis Watters, RN     Case Management  Case Management/Social Work      Signed  Date of Service:  04/18/23 0943  Creation Time:  04/18/23 0943     Signed          Continued Stay Note   Declan     Patient Name: Christine WHITEHEAD  Forest                   MRN: [id number]  Today's Date: 4/18/2023                     Admit Date: 4/10/2023     Plan: [facility]/ [facility]         Discharge Plan      Row Name 04/18/23 0937           Plan     Plan [facility]/ [facility]     Patient/Family in Agreement with Plan yes     Plan Comments Patient has approval for [facility]. Patient is not medically ready today due to tube feeding not being at goal rate. Ambulance reschedule for tomorrow Wednesday at 9:00 am through [facility]. PCS is on chart. CM will follow.     Final Discharge Disposition Code 03 - skilled nursing facility (SNF)                   Discharge Codes    No documentation.                        Expected Discharge Date and Time      Expected Discharge Date Expected Discharge Time     Apr 20, 2023                  Vale Lechuga RN

## 2023-04-18 NOTE — CASE MANAGEMENT/SOCIAL WORK
Continued Stay Note  [facility]     Patient Name: Christine Garg  MRN: [id number]  Today's Date: 4/18/2023    Admit Date: 4/10/2023    Plan: [facility]/ [facility]   Discharge Plan     Row Name 04/18/23 1134       Plan    Plan [facility]/ [place]    Plan Comments Spoke with Odette, with [facility], updated her on Mrs. Rizvi disposition. Odette, has to call to see if they can get her pre-cert pushed back. May have to reschedule transport. CM will follow.    Final Discharge Disposition Code 03 - skilled nursing facility (SNF)    Row Name 04/18/23 0937       Plan    Plan [facility]/ [place]    Patient/Family in Agreement with Plan yes    Plan Comments Patient has approval for [facility]. Patient is not medically ready today due to tube feeding not being at goal rate. Ambulance reschedule for tomorrow Wednesday at 9:00 am through [facility]. PCS is on chart. CM will follow.    Final Discharge Disposition Code 03 - skilled nursing facility (SNF)               Discharge Codes    No documentation.               Expected Discharge Date and Time     Expected Discharge Date Expected Discharge Time    Apr 20, 2023             Vale Lechuga RN

## 2023-04-18 NOTE — PLAN OF CARE
Goal Outcome Evaluation:   VSS stable throughout shift. Remains 1L NC with no resp distress. C/O abdominal pain and Norco x 2 administered. Intervention effective. KUB ordered. WNL. Plan is to go to [facility] tomorrow if goal met with tube feeding and she tolerates them. Will continue POC.

## 2023-04-19 NOTE — CASE MANAGEMENT/SOCIAL WORK
Continued Stay Note  [facility]     Patient Name: Christine Garg  MRN: [id number]  Today's Date: 4/19/2023    Admit Date: 4/10/2023    Plan: [facility]/ [facility]   Discharge Plan     Row Name 04/19/23 0842       Plan    Plan Comments Patient discharge plan is [facility]. Transport is rescheduled for Thursday at 10 am via EMS with [facility]. CM will follow.               Discharge Codes    No documentation.               Expected Discharge Date and Time     Expected Discharge Date Expected Discharge Time    Apr 20, 2023             Vale Lechuga RN

## 2023-04-19 NOTE — CASE MANAGEMENT/SOCIAL WORK
Continued Stay Note  [facility]     Patient Name: Christine Garg  MRN: [id number]  Today's Date: 2023    Admit Date: 4/10/2023    Plan: [facility]/ [facility]   Discharge Plan     Row Name 23 1619       Plan    Plan [facility]/ [place]    Patient/Family in Agreement with Plan yes    Plan Comments Spoke Odette, with [facility]. Insurance approval . Patient now will need new insurance approval.  will cancel transport for tomorrow. Will follow with Odette tomorrow.    Final Discharge Disposition Code 03 - skilled nursing facility (SNF)    Row Name 23 1616       Plan    Plan [facility]/ [place]    Final Discharge Disposition Code 03 - skilled nursing facility (SNF)               Discharge Codes    No documentation.               Expected Discharge Date and Time     Expected Discharge Date Expected Discharge Time    2023             Vale Lechuga RN

## 2023-04-19 NOTE — PLAN OF CARE
Goal Outcome Evaluation:  Plan of Care Reviewed With: patient        Progress: improving  Outcome Evaluation: Pt. progressing towards baseline with ADLs and functional mobility. Limited by decreased activity tolerance, strength, and balance. Will continue to progress as able per OT POC to promote return to PLOF. Recommend IPR at discharge.

## 2023-04-19 NOTE — PLAN OF CARE
VSS.  Peg in place, flushed without difficulty.  Pain treated twice on this shift with Norco.  Anticipated DC by EMS at 10:00am to [facility] [place].  MD aware.

## 2023-04-19 NOTE — PROGRESS NOTES
[facility] Medicine Services  PROGRESS NOTE    Patient Name: Christine Garg  : 1933  MRN: [id number]    Date of Admission: 4/10/2023  Primary Care Physician: Dave Tobar MD    Subjective   Subjective     CC:  Follow-up dysphagia    HPI:  I have seen and evaluated the patient this afternoon.  Comfortable in the recliner at bedside.  Complaining of mild discomfort left upper quadrant at the site of the PEG tube.  Per nursing report, she is tolerating her tube feed overnight well, currently at a rate of 55.  Residual volume in the morning was 200 mL only.  Patient is afraid to eat solid food but able to drink Ensure and keep it down.  No nausea vomiting    ROS:  Gen- No fevers, chills, +dysphagia   CV- No chest pain, palpitations  Resp- No cough, dyspnea  GI- No N/V/D, + abd pain    Objective   Objective     Vital Signs:   Temp:  [96 °F (35.6 °C)-97.9 °F (36.6 °C)] 97.6 °F (36.4 °C)  Heart Rate:  [] 116  Resp:  [18] 18  BP: (120-144)/(67-92) 124/89  Flow (L/min):  [1] 1     Physical Exam:  General: Comfortable, chronically ill looking, conversant and cooperative  Head: Atraumatic and normocephalic  Eyes: No Icterus. No pallor  Ears:  Ears appear intact with no abnormalities noted  Throat: No oral lesions, no thrush  Neck: Supple, trachea midline  Lungs: Clear to auscultation bilaterally, equal air entry, no wheezing or crackles  Heart:  Normal S1 and S2, no murmur, no gallop, No JVD, no lower extremity swelling  Abdomen:  Soft, no tenderness, no organomegaly, normal bowel sounds, no organomegaly, PEG tube site is clean with no erythema or discharge  Extremities: pulses equal bilaterally  Skin: No bleeding, bruising or rash, normal skin turgor and elasticity  Neurologic: Cranial nerves appear intact with no evidence of facial asymmetry, normal motor and sensory functions in all 4 extremities  Psych: Alert and oriented x 3, normal mood    Results Reviewed:  LAB RESULTS:       Lab 04/19/23  0352 04/17/23  1506   WBC 13.57* 13.29*   HEMOGLOBIN 10.3* 9.9*   HEMATOCRIT 32.3* 30.5*   PLATELETS 359 337   NEUTROS ABS  --  10.67*   IMMATURE GRANS (ABS)  --  0.25*   LYMPHS ABS  --  1.06   MONOS ABS  --  0.73   EOS ABS  --  0.53*   MCV 97.9* 94.4         Lab 04/19/23  0352 04/18/23  2115 04/17/23  1506   SODIUM 130* 130* 129*   POTASSIUM 4.4 4.2 4.4   CHLORIDE 91* 93* 91*   CO2 30.0* 30.0* 30.0*   ANION GAP 9.0 7.0 8.0   BUN 8 8 7*   CREATININE 0.35* 0.34* 0.37*   EGFR 97.8 98.5 96.5   GLUCOSE 105* 128* 113*   CALCIUM 7.8* 8.2* 8.2*   MAGNESIUM 1.8  --  1.4*                         Brief Urine Lab Results  (Last result in the past 365 days)      Color   Clarity   Blood   Leuk Est   Nitrite   Protein   CREAT   Urine HCG        01/06/23 0149 Yellow   Clear   Negative   Negative   Negative   Negative                 Microbiology Results Abnormal     None          XR Abdomen KUB    Result Date: 4/18/2023  XR ABDOMEN KUB Date of Exam: 4/18/2023 3:39 PM EDT Indication: abdominal pain. Comparison: None available. Findings: G-tube overlying the stomach. No evidence of bowel obstruction. There is oral contrast within the colon. No evidence of bowel obstruction. There are small bilateral pleural effusions. Left greater than right. Regional skeleton is unremarkable.     Impression: Impression: 1. G-tube overlying the stomach. 2. No evidence of bowel obstruction. Electronically Signed: Alejandro Roberto  4/18/2023 4:05 PM EDT  Workstation ID: [id number]      Results for orders placed during the hospital encounter of 03/22/23    Adult Transthoracic Echo Complete W/ Cont if Necessary Per Protocol    Interpretation Summary  •  Left ventricular systolic function is normal. Calculated left ventricular EF = 60% Left ventricular ejection fraction appears to be 56 - 60%.  •  Left ventricular diastolic function is consistent with (grade I) impaired relaxation and age.  •  Moderate to severe mitral valve regurgitation is  present.  •  Moderate tricuspid valve regurgitation is present.  •  Estimated right ventricular systolic pressure from tricuspid regurgitation is moderately elevated (45-55 mmHg).  •  Moderate pulmonary hypertension is present.      Current medications:  Scheduled Meds:aspirin, 81 mg, Per PEG Tube, Daily  carvedilol, 25 mg, Per PEG Tube, BID With Meals  sennosides, 10 mL, Per PEG Tube, BID   And  docusate sodium, 100 mg, Per PEG Tube, BID  enoxaparin, 40 mg, Subcutaneous, Nightly  gabapentin, 100 mg, Per PEG Tube, Q12H  hydrALAZINE, 75 mg, Per PEG Tube, Q8H  levothyroxine, 112 mcg, Per PEG Tube, Q AM  lidocaine, 1 patch, Transdermal, Q24H  metoclopramide, 5 mg, Oral, 4x Daily AC & at Bedtime  pantoprazole, 40 mg, Intravenous, BID AC  sodium chloride, 10 mL, Intravenous, Q12H  valsartan, 320 mg, Per PEG Tube, Q24H      Continuous Infusions:   PRN Meds:.•  acetaminophen **OR** acetaminophen **OR** acetaminophen  •  [DISCONTINUED] senna-docusate sodium **AND** polyethylene glycol **AND** [DISCONTINUED] bisacodyl **AND** bisacodyl  •  cloNIDine  •  HYDROcodone-acetaminophen  •  ipratropium-albuterol  •  [DISCONTINUED] ondansetron **OR** ondansetron  •  sodium chloride  •  sodium chloride    Assessment & Plan   Assessment & Plan     Active Hospital Problems    Diagnosis  POA   • **Esophageal stricture [K22.2]  Unknown   • Severe malnutrition [E43]  Unknown   • Hypothyroidism (acquired) [E03.9]  Unknown   • S/P percutaneous endoscopic gastrostomy (PEG) tube placement [Z93.1]  Not Applicable   • Adenocarcinoma [C80.1]  Unknown   • Perforated ulcer [K27.5]  Yes   • Former smoker [Z87.891]  Not Applicable   • Coronary artery disease  [I25.10]  Yes   • Chronic obstructive pulmonary disease [J44.9]  Yes   • Essential hypertension [I10]  Yes   • ASCVD (arteriosclerotic cardiovascular disease) [I25.10]  Yes      Resolved Hospital Problems   No resolved problems to display.        Brief Hospital Course to date:  Christine Garg is  a 89 y.o. female with past medical history of essential hypertension, dyslipidemia, coronary artery disease, peripheral artery disease, hypothyroidism, COPD who was admitted to [facility] on 3/22 for abdominal pain; went to OR, had perforated duodenal ulcer repaired with omental patch.  Then on 3/29, patient had CT of thoracic spine which noted left pleural consolidation and parenchymal nodules.  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  She was also found to have an esophageal stricture secondary to extrinsic compression of 3cm peritracheal lymph node.  On 4/7, patient had PEG tube placed.  She was sent to [facility] for placement of esophageal stent by Dr. Waters.        This patient's problems and plans were partially entered by my partner and updated as appropriate by me 04/19/23.     Assessment & Plan:  Dysphagia due to external esophageal compression by LAD  S/p PEG  · EGD 4/12 with esophageal stent placement  · Plan for full liquid diet x 1 week then [facility] to [facility] if tolerating  · Currently on nocturnal tube feed and tolerating it well  · Has residual volume of 200 mL at the end of the night shift.    · Increase Reglan to 10 mg every 6 hourly as IV  · Encourage p.o. intake.  Drinking clears and tolerating it well with no nausea vomiting    Cancer related pain  · Continue as needed Era to q4h     Perforated Duodenal Ulcer s/p exploratory laparotomy with patch 3/22  · Continue PPI twice daily  · Continue lower aspirin to 81 mg daily instead of 325 mg      New diagnosis of lung adenocarcinoma  COPD  Former smoker   On oxygen 2L here   · Continue PRN duonebs  · Follow up outpatient for treatment plan with Dr. Balbuena  · Lidoderm patch for left shoulder blade pain which is likely related to nerve compression from known T-spine lesion.    · Continue gabapentin      Coronary artery disease  Dyslipidemia  Essential hypertension  · Lowered home  aspirin from 325 mg to 81mg. Will need to make this clear at discharge in order to prevent further ulceration  · Currently blood pressure controlled with valsartan, Coreg and hydralazine  · Continue as needed clonidine    Hypoalbuminemia   Severe malnutrition  · Nutrition consulted  · Start nocturnal tube feeds     Hypothyroidism  · Continue home synthroid      Debility  · PT  · Patient had been approved to [facility] for subacute rehab in [place]. Patient would still like to go there after discharge. Patient is to follow up outpatient for treatment options regarding her cancer.      Expected Discharge Location and Transportation: Rehab  Expected Discharge 4/20                         DVT prophylaxis:  Medical and mechanical DVT prophylaxis orders are present.     AM-PAC 6 Clicks Score (PT): 12 (04/17/23 8541)    CODE STATUS:   Code Status and Medical Interventions:   Ordered at: 04/10/23 6611     Code Status (Patient has no pulse and is not breathing):    CPR (Attempt to Resuscitate)     Medical Interventions (Patient has pulse or is breathing):    Full Support     Copied text in this note has been reviewed and is accurate as of 04/19/23.     Anaya Jones MD  04/19/23

## 2023-04-19 NOTE — PLAN OF CARE
Goal Outcome Evaluation:  Plan of Care Reviewed With: patient           Outcome Evaluation: VSS, 1LNC. Tolerated tube feed overnight at 30 mL was not able to increase at midnight. Patient complained of some discomfort. Zofran given x1 in addition to scheduled Reglan. Norco given x2 this shift. Continue POC.

## 2023-04-20 NOTE — PLAN OF CARE
Goal Outcome Evaluation:  Plan of Care Reviewed With: patient           Outcome Evaluation: VSS, 1LNC. Tolerated tube feed overnight at 30 mL was not able to increase at midnight. Patient complained of some discomfort. Zofran given x1 in addition to scheduled Reglan. Norco given x3 this shift. Continue POC Addendum Patient on 0.5LNC

## 2023-04-20 NOTE — CASE MANAGEMENT/SOCIAL WORK
Continued Stay Note  [facility]     Patient Name: Christine Garg  MRN: [id number]  Today's Date: 4/20/2023    Admit Date: 4/10/2023    Plan:    Discharge Plan     Row Name 04/20/23 1051       Plan    Plan SW    Plan Comments SW’er [facility]ed out ref[facility] to [facility],  [facility] (called [facility] rep about facility in this area; left a message), [facility] and [facility] Awaiting bed offer               Discharge Codes    No documentation.               Expected Discharge Date and Time     Expected Discharge Date Expected Discharge Time    Apr 20, 2023             ELIANA Figueroa (Kay)

## 2023-04-20 NOTE — DISCHARGE PLACEMENT REQUEST
"Amanda Gomez (89 y.o. Female)       STERLING Watters RN, [phone]    Date of Birth   1933    Social Security Number       Address   [address] [id number]    Home Phone   [phone]    MRN   [id number]       [facility]    Marital Status                               Admission Date   4/10/23    Admission Type   Urgent    Admitting Provider   Anaya Jones MD    Attending Provider   Anaya Jones MD    Department, Room/Bed   [facility] 6B, N640/1       Discharge Date       Discharge Disposition       Discharge Destination                               Attending Provider: Anaya Jones MD    Allergies: Motrin [Ibuprofen], Novocain [Procaine]    Isolation: None   Infection: None   Code Status: CPR    Ht: 165.1 cm (65\")   Wt: 66.2 kg (146 lb)    Admission Cmt: None   Principal Problem: Esophageal stricture [K22.2]                 Active Insurance as of 4/10/2023     Primary Coverage     Payor Plan Insurance Group Employer/Plan Group    ANTH MEDICARE REPLACEMENT ANTHEM MEDICARE ADVANTAGE [id number]     Payor Plan Address Payor Plan Phone Number Payor Plan Fax Number Effective Dates    [address] [phone]  2019 - None Entered    [facility] [address]       Subscriber Name Subscriber Birth Date Member ID       AMANDA GOMEZ 1933 [id number]                 Emergency Contacts      (Rel.) Home Phone Work Phone Mobile Phone    Kaleb Gomez (healthcare surrogate) (Son) -- -- [phone]    Ziyad Gomez (alternative healthcare surrogate) (Son) -- -- [phone]    Filiberto Gomez (second alternative HCS) (Son) -- -- [phone]               History & Physical      Hola Crowley DO at 04/10/23 194              [facility] Medicine Services  HISTORY AND PHYSICAL    Patient Name: Amanda Gomez  : 1933  MRN: [id number]  Primary Care Physician: Dave Tobar MD  Date of admission: " 4/10/2023    Subjective    Subjective     Chief Complaint:  Adenocarcinoma    HPI:  Christine Garg is a 89 y.o. female with PMH of HTN, HLD, CAD, COPD, former smoker, PAD and hypothyroid.     Patient presented to [facility] on 3/22 for a 5-day complain of abdominal pain.  She was taken to the OR for an ex lap.  She was found to have a perforated duodenal ulcer which was repaired with omental patch.  On 3/29, patient had CT of thoracic spine which noted left pleural consolidation and parenchymal nodules.  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  She was also found to have an esophageal stricture secondary to extrinsic compression of 3cm peritracheal lymph node.  On 4/7, patient had PEG tube placed.  She was sent to [facility] for placement of esophageal stent by Dr. Waters.    Patient had been approved to [facility] for subacute rehab in [place]. Patient would still like to go there after discharge. Patient is to follow up outpatient for treatment options regarding her cancer.     Review of Systems   Constitutional: Positive for appetite change. Negative for activity change, chills and fever.   HENT: Positive for trouble swallowing. Negative for congestion and sore throat.    Eyes: Negative.    Respiratory: Negative for cough, chest tightness and shortness of breath.    Cardiovascular: Negative for chest pain and leg swelling.   Gastrointestinal: Positive for abdominal pain. Negative for constipation, diarrhea, nausea and vomiting.   Genitourinary: Negative for difficulty urinating, dysuria and urgency.   Musculoskeletal: Negative.    Skin: Negative.    Neurological: Positive for weakness. Negative for dizziness and headaches.   Hematological: Negative.    Psychiatric/Behavioral: Negative.  Negative for agitation and confusion.            Personal History     Past Medical History:   Diagnosis Date   • Advanced age    • CAD (coronary artery  disease) 2011    s/p 3v CABG   • Chronic kidney disease (CKD), stage III (moderate)    • COPD (chronic obstructive pulmonary disease)    • History of tobacco use    • Hyperlipidemia    • Hypertension    • Hypothyroidism    • PONV (postoperative nausea and vomiting)    • Pulmonary nodule              Past Surgical History:   Procedure Laterality Date   • BREAST BIOPSY Left     benign   • CATARACT EXTRACTION     • CORONARY ANGIOPLASTY     • CORONARY ARTERY BYPASS GRAFT     • EXPLORATORY LAPAROTOMY N/A 3/22/2023    Procedure: LAPAROTOMY EXPLORATORY WITH OVER SEW OF DUODENAL ULCER WITH OMENTAL PATCH AND BIOPATCH AND CENTRAL LINE PLACEMENT;  Surgeon: Aurora Kirkland MD;  Location: [facility];  Service: General;  Laterality: N/A;       Family History:  family history includes Heart disease in her brother, father, and mother.     Social History:  reports that she has quit smoking. Her smoking use included cigarettes. She has never used smokeless tobacco. She reports that she does not drink alcohol and does not use drugs.  Social History     Social History Narrative   • Not on file       Medications:  Evolocumab, HYDROcodone-acetaminophen, Lidocaine, aspirin, carvedilol, castor oil-balsam peru, cloNIDine, heparin (porcine), hydrALAZINE, labetalol, levothyroxine, magnesium sulfate, magnesium sulfate in D5W 1g/100mL (PREMIX), metoclopramide, montelukast, multivitamin, ondansetron, pantoprazole, polyethylene glycol, potassium chloride, potassium phosphate 45 mmol in sodium chloride 0.9 % 500 mL infusion, and valsartan    Allergies   Allergen Reactions   • Motrin [Ibuprofen] Anaphylaxis and Hives   • Novocain [Procaine] Other (See Comments)     Pt state she passes out.       Objective    Objective     Vital Signs:   Temp:  [97.5 °F (36.4 °C)-98.1 °F (36.7 °C)] 98.1 °F (36.7 °C)  Heart Rate:  [72-78] 72  Resp:  [16-18] 16  BP: (127-172)/(50-68) 127/50  Flow (L/min):  [2] 2    Physical Exam  Vitals reviewed.   Constitutional:        General: She is not in acute distress.     Appearance: She is normal weight.   HENT:      Head: Normocephalic.      Nose: Nose normal. No congestion.      Mouth/Throat:      Mouth: Mucous membranes are moist.   Eyes:      Extraocular Movements: Extraocular movements intact.      Pupils: Pupils are equal, round, and reactive to light.   Cardiovascular:      Rate and Rhythm: Normal rate and regular rhythm.      Pulses: Normal pulses.      Heart sounds: Normal heart sounds. No murmur heard.  Pulmonary:      Effort: Pulmonary effort is normal. No respiratory distress.      Breath sounds: Normal breath sounds. No wheezing or rhonchi.   Abdominal:      General: Bowel sounds are normal. There is no distension.      Palpations: Abdomen is soft.      Tenderness: There is abdominal tenderness.   Musculoskeletal:         General: No swelling. Normal range of motion.      Cervical back: Normal range of motion. No rigidity.   Skin:     General: Skin is warm.      Capillary Refill: Capillary refill takes less than 2 seconds.      Findings: Wound (midline abdominal incision pink and closed, C/D/I) present.      Comments: PEG in place C/D/I   Neurological:      General: No focal deficit present.      Mental Status: She is alert and oriented to person, place, and time. Mental status is at baseline.      Motor: Weakness present.   Psychiatric:         Mood and Affect: Mood normal.         Behavior: Behavior normal.         Thought Content: Thought content normal.         Judgment: Judgment normal.          Result Review:  I have personally reviewed the results from the time of this admission to 4/10/2023 19:44 EDT and agree with these findings:  [x]  Laboratory list / accordion  [x]  Microbiology  [x]  Radiology  []  EKG/Telemetry   []  Cardiology/Vascular   []  Pathology  [x]  Old records  []  Other:  Most notable findings include:     LAB RESULTS:      Lab 04/09/23  0242 04/06/23  0139 04/04/23  0231   WBC 8.62 10.38 11.47*    HEMOGLOBIN 10.0* 10.1* 9.6*   HEMATOCRIT 30.8* 32.6* 29.3*   PLATELETS 285 249 298   NEUTROS ABS  --  7.63* 8.87*   IMMATURE GRANS (ABS)  --  0.04 0.06*   LYMPHS ABS  --  1.18 1.31   MONOS ABS  --  0.61 0.54   EOS ABS  --  0.82* 0.61*   MCV 96.6 104.2* 94.8   PROTIME 12.4  --  12.0*   APTT  --   --  30.4         Lab 04/09/23  0242 04/06/23  0139 04/05/23  0048 04/04/23  1751 04/04/23  0231   SODIUM 132* 135* 138  --  138   POTASSIUM 4.0 4.1 4.6 4.4 3.4*   CHLORIDE 99 100 103  --  101   CO2 28.1 27.6 29.5*  --  29.6*   ANION GAP 4.9* 7.4 5.5  --  7.4   BUN 10 7* 9  --  10   CREATININE 0.42* 0.52* 0.51*  --  0.48*   EGFR 93.6 88.9 89.4  --  90.7   GLUCOSE 119* 127* 97  --  91   CALCIUM 8.6 8.6 9.1  --  8.6   MAGNESIUM 2.0 2.1 1.8  --  2.1   PHOSPHORUS 2.9 3.5 3.3  --  3.6         Lab 04/09/23  0242 04/06/23  0139 04/04/23  0231   TOTAL PROTEIN 4.8* 4.6* 4.8*   ALBUMIN 2.3* 2.5* 2.5*   GLOBULIN 2.5 2.1 2.3   ALT (SGPT) 7 11 17   AST (SGOT) 14 11 13   BILIRUBIN 0.2 <0.2 0.2   ALK PHOS 98 88 82         Lab 04/09/23  0242 04/04/23  0231   PROTIME 12.4 12.0*   INR 0.91 0.87*                 Brief Urine Lab Results  (Last result in the past 365 days)      Color   Clarity   Blood   Leuk Est   Nitrite   Protein   CREAT   Urine HCG        01/06/23 0149 Yellow   Clear   Negative   Negative   Negative   Negative               Microbiology Results (last 10 days)     ** No results found for the last 240 hours. **          CT Abdomen Pelvis Without Contrast    Result Date: 4/10/2023   EXAM DATE:   4/10/2023 11:29 AM  CLINICAL HISTORY:   abdominal pain; K25.1-Acute gastric ulcer with perforation; E87.6-Qxuh-auviadxwmb and hyponatremia; E87.6-Hypokalemia; A41.9-Sepsis, unspecified organism; K27.5-Chronic or unspecified peptic ulcer, site unspecified, with perforation; E44.0-Moderate protein-calorie malnutrition  TECHNIQUE:   Axial computed tomography images of the abdomen and pelvis without intravenous contrast.  Sagittal and  coronal reformatted images were created and reviewed.  This CT exam was performed using one or more of the following dose reduction techniques:  automated exposure control, adjustment of the mA and/or kV according to patient size, and/or use of iterative reconstruction technique.  COMPARISON: 04/04/2023  FINDINGS:   LUNG BASES:  Unremarkable.  No mass.  No consolidation.   PLEURAL SPACE:  Small bilateral pleural effusions.   ABDOMEN:   LIVER:  Unremarkable.   GALLBLADDER AND BILE DUCTS:  Unremarkable.  No calcified stones.  No ductal dilation.   PANCREAS:  Unremarkable.  No ductal dilation.   SPLEEN:  Unremarkable.  No splenomegaly.   ADRENALS:  Unremarkable.  No mass.   KIDNEYS AND URETERS:  Unremarkable.  No obstructing stones.  No hydronephrosis.   STOMACH AND BOWEL:  Contrast noted throughout the colon.  No obstruction.  No mucosal thickening.   PELVIS:   APPENDIX:  No findings to suggest acute appendicitis.   BLADDER:  Unremarkable.  No stones.   REPRODUCTIVE:  Unremarkable as visualized.   ABDOMEN and PELVIS:   INTRAPERITONEAL SPACE:  See below.    BONES/JOINTS:  No acute fracture.  No dislocation.   SOFT TISSUES:  Unremarkable.   VASCULATURE:  Atherosclerotic disease.  No abdominal aortic aneurysm.   LYMPH NODES:  Unremarkable.  No enlarged lymph nodes.   TUBES, LINES AND DEVICES:  Gastric tube noted in the stomach. This has been recently placed and small to moderate intraperitoneal free air persists.        Impression: 1.  Small bilateral pleural effusions. 2.  Gastric tube noted in the stomach. This has been recently placed and small to moderate intraperitoneal free air persists.   This report was finalized on 4/10/2023 12:06 PM by Dr. Henry Lozano MD.        Results for orders placed during the hospital encounter of 03/22/23    Adult Transthoracic Echo Complete W/ Cont if Necessary Per Protocol    Interpretation Summary  •  Left ventricular systolic function is normal. Calculated left ventricular EF = 60%  Left ventricular ejection fraction appears to be 56 - 60%.  •  Left ventricular diastolic function is consistent with (grade I) impaired relaxation and age.  •  Moderate to severe mitral valve regurgitation is present.  •  Moderate tricuspid valve regurgitation is present.  •  Estimated right ventricular systolic pressure from tricuspid regurgitation is moderately elevated (45-55 mmHg).  •  Moderate pulmonary hypertension is present.      Assessment & Plan   Assessment & Plan       Esophageal stricture    Essential hypertension    ASCVD (arteriosclerotic cardiovascular disease)    Coronary artery disease     Chronic obstructive pulmonary disease    Former smoker    Perforated ulcer    Hypothyroidism (acquired)    S/P percutaneous endoscopic gastrostomy (PEG) tube placement    Adenocarcinoma      Christine Garg is a 89 y.o. female with PMH of HTN, HLD, CAD, COPD, former smoker, PAD and hypothyroid. Direct admit to [facility] for esophageal stent placement.     Esophageal Stricture  S/p PEG  -2/2 extrinsic compression of paratracheal lymph node  -Pain control  -Maintenance fluids  -NPO  -Consult wound for PEG care  -Consult PT/OT, case management   -Consult GI      CAD  HLD  -lower home aspirin to 81mg. Will need to make this clear at discharge in order to prevent further ulceration    Hypoalbuminemia   -Albumin 2.3  -Consult nutrition    HTN  -Continue home coreg, valsartan, hydralazine   -PRN clonidine     Adenocarcinoma of Lung  COPD  Former Smoker  -PRN duonebs  -Follow up outpatient for treatment plan    Perforated Duodenal Ulcer  S/p Ex lap with patch  -Midline abdominal incision C/D/I  -Continue PPI twice daily  -lower aspirin to 81 mg instead of 325  -Consult wound  -patient only received perioperative cefazolin at time of surgery? Should have received micafungin and gram negative coverage for at least 5 days as ppx.  Would recommend discussing case with infectious disease/general surgery in am to determine if would  benefit from abx this far out from surgery.  Does not currently appear to have evidence of active infection.     Hypothyroid  -Continue home synthroid         DVT prophylaxis:  SCDs in prep for surgery    CODE STATUS:  FULL       Expected Discharge  TBD        This note has been completed as part of a split-shared workflow.     Signature: Electronically signed by LUCÍA Sotelo, 04/10/23, 8:36 PM EDT.          Attending   Admission Attestation       I have performed an independent face-to-face diagnostic evaluation including performing an independent physical examination as documented here.  The documented plan of care above was reviewed and developed with the advanced practice clinician (APC).      Brief Summary Statement:   Christine Garg is a 89 y.o. female with past medical history of hypertension, hyperlipidemia, COPD, coronary artery disease, peripheral artery disease, hypothyroidism, who comes as a direct admit from [facility] for perforated duodenal ulcer status post emergency ex lap with patch who was later found to have esophageal stricture related to 3 cm paratracheal lymph node with extrinsic compression.  They discussed the case with Dr. Ralph's who recommended transfer for esophageal stenting.  Currently, patient reports expected abdominal pain.  Denies any fever or chills.  Reports decreased appetite.    Remainder of detailed HPI is as noted by APC and has been reviewed and/or edited by me for completeness.    Attending Physical Exam:  Temp:  [97.5 °F (36.4 °C)-98.1 °F (36.7 °C)] 98 °F (36.7 °C)  Heart Rate:  [72-78] 76  Resp:  [16-18] 17  BP: (123-172)/(50-68) 123/58  Flow (L/min):  [2] 2    Constitutional: Awake, alert, nontoxic  Eyes: PERRLA, sclerae anicteric, no conjunctival injection  HENT: NCAT, mucous membranes moist  Neck: Supple, no thyromegaly, no lymphadenopathy, trachea midline  Respiratory: Clear to auscultation bilaterally, nonlabored respirations   Cardiovascular:  RRR, no murmurs, rubs, or gallops, palpable pedal pulses bilaterally  Gastrointestinal: Positive bowel sounds, soft, expected abd tenderness post ex lap, nondistended, PEG in place  Musculoskeletal: No bilateral ankle edema, no clubbing or cyanosis to extremities  Psychiatric: Appropriate affect, cooperative  Neurologic: Oriented x 3, strength symmetric in all extremities, Cranial Nerves grossly intact to confrontation, speech clear  Skin: midline incision without drainage. Clean, dry, intact, expected surrounded pink skin but no evidence of cellulitis      Brief Assessment/Plan :  See detailed assessment and plan developed with APC which I have reviewed and/or edited for completeness.            Hola Crowley DO  04/10/23     Total time spent: 40  Time spent includes time reviewing chart, face-to-face time, counseling patient/family/caregiver, ordering medications/tests/procedures, communicating with other health care professionals, documenting clinical information in the electronic health record, and coordination of care.                     Electronically signed by Hola Crowley DO at 04/10/23 7280         Current Facility-Administered Medications   Medication Dose Route Frequency Provider Last Rate Last Admin   • acetaminophen (TYLENOL) tablet 650 mg  650 mg Per PEG Tube Q4H PRN Brunner, Mark I, MD        Or   • acetaminophen (TYLENOL) 160 MG/5ML solution 650 mg  650 mg Per PEG Tube Q4H PRN Brunner, Mark I, MD   650 mg at 04/14/23 0848    Or   • acetaminophen (TYLENOL) suppository 650 mg  650 mg Rectal Q4H PRN Brunner, Mark I, MD       • aspirin chewable tablet 81 mg  81 mg Per PEG Tube Daily Brunner, Mark I, MD   81 mg at 04/20/23 0912   • polyethylene glycol (MIRALAX) packet 17 g  17 g Per PEG Tube Daily PRN Brunner, Mark I, MD        And   • bisacodyl (DULCOLAX) suppository 10 mg  10 mg Rectal Daily PRN Brunner, Mark I, MD       • carvedilol (COREG) tablet 25 mg  25 mg Per PEG Tube BID With Meals Brunner,  Ruy CUELLAR MD   25 mg at 04/20/23 0911   • cloNIDine (CATAPRES) tablet 0.1 mg  0.1 mg Per PEG Tube TID PRN Brunner, Mark I, MD   0.1 mg at 04/17/23 0413   • sennosides (SENOKOT) 8.8 MG/5ML syrup 10 mL  10 mL Per PEG Tube BID Brunner, Mark I, MD   10 mL at 04/19/23 2108    And   • docusate sodium (COLACE) liquid 100 mg  100 mg Per PEG Tube BID Brunner, Mark I, MD   100 mg at 04/19/23 2108   • Enoxaparin Sodium (LOVENOX) syringe 40 mg  40 mg Subcutaneous Nightly Catalina Leonardo MD   40 mg at 04/19/23 2107   • gabapentin (NEURONTIN) capsule 100 mg  100 mg Per PEG Tube Q12H Clare De Anda MD   100 mg at 04/20/23 0912   • hydrALAZINE (APRESOLINE) tablet 75 mg  75 mg Per PEG Tube Q8H Steven Yanes APRN   75 mg at 04/20/23 1458   • HYDROcodone-acetaminophen (NORCO) 7.5-325 MG per tablet 1 tablet  1 tablet Per PEG Tube Q4H PRN Catalina Leonardo MD   1 tablet at 04/20/23 0644   • ipratropium-albuterol (DUO-NEB) nebulizer solution 3 mL  3 mL Nebulization Q4H PRN Clare De Anda MD   3 mL at 04/20/23 0127   • levothyroxine (SYNTHROID, LEVOTHROID) tablet 112 mcg  112 mcg Per PEG Tube Q AM Brunner, Mark I, MD   112 mcg at 04/20/23 0644   • lidocaine (LIDODERM) 5 % 1 patch  1 patch Transdermal Q24H Clare De Anda MD   1 patch at 04/20/23 0912   • metoclopramide (REGLAN) solution 10 mg  10 mg Per G Tube 4x Daily AC & at Bedtime Anaya Jones MD   10 mg at 04/20/23 1305   • ondansetron (ZOFRAN) injection 4 mg  4 mg Intravenous Q6H PRN Brunner, Mark I, MD   4 mg at 04/19/23 2148   • ondansetron (ZOFRAN) tablet 8 mg  8 mg Per PEG Tube Q6H Anaya Jones MD   8 mg at 04/20/23 1458   • pantoprazole (PROTONIX) injection 40 mg  40 mg Intravenous BID AC Brunner, Mark I, MD   40 mg at 04/20/23 0644   • promethazine (PHENERGAN) suppository 12.5 mg  12.5 mg Rectal Q4H PRN Anaya Jones MD       • sodium chloride 0.9 % flush 10 mL  10 mL Intravenous Q12H Brunner, Mark I, MD   10 mL at 04/20/23 0913   • sodium chloride 0.9 %  flush 10 mL  10 mL Intravenous PRN Brunner, Mark I, MD   10 mL at 23 1704   • sodium chloride 0.9 % infusion 40 mL  40 mL Intravenous PRN Brunner, Mark I, MD   40 mL at 23 0902   • valsartan (DIOVAN) tablet 320 mg  320 mg Per PEG Tube Q24H Brunner, Mark I, MD   320 mg at 23 0912        Physician Progress Notes (most recent note)      Anaya Jones MD at 23 1352              [facility] Medicine Services  PROGRESS NOTE    Patient Name: Christine Garg  : 1933  MRN: [id number]    Date of Admission: 4/10/2023  Primary Care Physician: Dave Tobar MD    Subjective   Subjective     CC:  Follow-up dysphagia    HPI:  I have seen and evaluated the patient this afternoon.  Just ate regular food per mouth and feeling nauseous.  Having some abdominal discomfort as well.  Per nursing report, could not tolerate more than 30 mill per hour to feed overnight.  Gastric residual volume is around 180 this morning    ROS:  Gen- No fevers, chills, +dysphagia   CV- No chest pain, palpitations  Resp- No cough, dyspnea  GI- No N/V/D, + abd pain    Objective   Objective     Vital Signs:   Temp:  [96.1 °F (35.6 °C)-97.8 °F (36.6 °C)] 96.9 °F (36.1 °C)  Heart Rate:  [] 109  Resp:  [16-20] 16  BP: (114-143)/(70-99) 132/73  Flow (L/min):  [0.5-1] 0.5     Physical Exam:  General: Comfortable, chronically ill looking, conversant and cooperative  Head: Atraumatic and normocephalic  Eyes: No Icterus. No pallor  Ears:  Ears appear intact with no abnormalities noted  Throat: No oral lesions, no thrush  Neck: Supple, trachea midline  Lungs: Clear to auscultation bilaterally, equal air entry, no wheezing or crackles  Heart:  Normal S1 and S2, no murmur, no gallop, No JVD, no lower extremity swelling  Abdomen:  Soft, no tenderness, no organomegaly, normal bowel sounds, no organomegaly, PEG tube site is clean with no erythema or discharge  Extremities: pulses equal bilaterally  Skin:  No bleeding, bruising or rash, normal skin turgor and elasticity  Neurologic: Cranial nerves appear intact with no evidence of facial asymmetry, normal motor and sensory functions in all 4 extremities  Psych: Alert and oriented x 3, normal mood    Results Reviewed:  LAB RESULTS:      Lab 04/19/23  0352 04/17/23  1506   WBC 13.57* 13.29*   HEMOGLOBIN 10.3* 9.9*   HEMATOCRIT 32.3* 30.5*   PLATELETS 359 337   NEUTROS ABS  --  10.67*   IMMATURE GRANS (ABS)  --  0.25*   LYMPHS ABS  --  1.06   MONOS ABS  --  0.73   EOS ABS  --  0.53*   MCV 97.9* 94.4         Lab 04/19/23  0352 04/18/23  2115 04/17/23  1506   SODIUM 130* 130* 129*   POTASSIUM 4.4 4.2 4.4   CHLORIDE 91* 93* 91*   CO2 30.0* 30.0* 30.0*   ANION GAP 9.0 7.0 8.0   BUN 8 8 7*   CREATININE 0.35* 0.34* 0.37*   EGFR 97.8 98.5 96.5   GLUCOSE 105* 128* 113*   CALCIUM 7.8* 8.2* 8.2*   MAGNESIUM 1.8  --  1.4*                         Brief Urine Lab Results  (Last result in the past 365 days)      Color   Clarity   Blood   Leuk Est   Nitrite   Protein   CREAT   Urine HCG        01/06/23 0149 Yellow   Clear   Negative   Negative   Negative   Negative                 Microbiology Results Abnormal     None          XR Abdomen KUB    Result Date: 4/18/2023  XR ABDOMEN KUB Date of Exam: 4/18/2023 3:39 PM EDT Indication: abdominal pain. Comparison: None available. Findings: G-tube overlying the stomach. No evidence of bowel obstruction. There is oral contrast within the colon. No evidence of bowel obstruction. There are small bilateral pleural effusions. Left greater than right. Regional skeleton is unremarkable.     Impression: Impression: 1. G-tube overlying the stomach. 2. No evidence of bowel obstruction. Electronically Signed: Alejandro Roberto  4/18/2023 4:05 PM EDT  Workstation ID: [id number]      Results for orders placed during the hospital encounter of 03/22/23    Adult Transthoracic Echo Complete W/ Cont if Necessary Per Protocol    Interpretation Summary  •  Left  ventricular systolic function is normal. Calculated left ventricular EF = 60% Left ventricular ejection fraction appears to be 56 - 60%.  •  Left ventricular diastolic function is consistent with (grade I) impaired relaxation and age.  •  Moderate to severe mitral valve regurgitation is present.  •  Moderate tricuspid valve regurgitation is present.  •  Estimated right ventricular systolic pressure from tricuspid regurgitation is moderately elevated (45-55 mmHg).  •  Moderate pulmonary hypertension is present.      Current medications:  Scheduled Meds:aspirin, 81 mg, Per PEG Tube, Daily  carvedilol, 25 mg, Per PEG Tube, BID With Meals  sennosides, 10 mL, Per PEG Tube, BID   And  docusate sodium, 100 mg, Per PEG Tube, BID  enoxaparin, 40 mg, Subcutaneous, Nightly  gabapentin, 100 mg, Per PEG Tube, Q12H  hydrALAZINE, 75 mg, Per PEG Tube, Q8H  levothyroxine, 112 mcg, Per PEG Tube, Q AM  lidocaine, 1 patch, Transdermal, Q24H  metoclopramide, 10 mg, Per G Tube, 4x Daily AC & at Bedtime  pantoprazole, 40 mg, Intravenous, BID AC  sodium chloride, 10 mL, Intravenous, Q12H  valsartan, 320 mg, Per PEG Tube, Q24H      Continuous Infusions:   PRN Meds:.•  acetaminophen **OR** acetaminophen **OR** acetaminophen  •  [DISCONTINUED] senna-docusate sodium **AND** polyethylene glycol **AND** [DISCONTINUED] bisacodyl **AND** bisacodyl  •  cloNIDine  •  HYDROcodone-acetaminophen  •  ipratropium-albuterol  •  [DISCONTINUED] ondansetron **OR** ondansetron  •  sodium chloride  •  sodium chloride    Assessment & Plan   Assessment & Plan     Active Hospital Problems    Diagnosis  POA   • **Esophageal stricture [K22.2]  Unknown   • Severe malnutrition [E43]  Unknown   • Hypothyroidism (acquired) [E03.9]  Unknown   • S/P percutaneous endoscopic gastrostomy (PEG) tube placement [Z93.1]  Not Applicable   • Adenocarcinoma [C80.1]  Unknown   • Perforated ulcer [K27.5]  Yes   • Former smoker [Z87.891]  Not Applicable   • Coronary artery disease   [I25.10]  Yes   • Chronic obstructive pulmonary disease [J44.9]  Yes   • Essential hypertension [I10]  Yes   • ASCVD (arteriosclerotic cardiovascular disease) [I25.10]  Yes      Resolved Hospital Problems   No resolved problems to display.        Brief Hospital Course to date:  Christine Garg is a 89 y.o. female with past medical history of essential hypertension, dyslipidemia, coronary artery disease, peripheral artery disease, hypothyroidism, COPD who was admitted to [facility] on 3/22 for abdominal pain; went to OR, had perforated duodenal ulcer repaired with omental patch.  Then on 3/29, patient had CT of thoracic spine which noted left pleural consolidation and parenchymal nodules.  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  She was also found to have an esophageal stricture secondary to extrinsic compression of 3cm peritracheal lymph node.  On 4/7, patient had PEG tube placed.  She was sent to [facility] for placement of esophageal stent by Dr. Waters.        This patient's problems and plans were partially entered by my partner and updated as appropriate by me 04/20/23.     Assessment & Plan:  Dysphagia due to external esophageal compression by LAD  S/p PEG  · EGD 4/12 with esophageal stent placement  · Plan for full liquid diet x 1 week then advance to puree 4/19 if tolerating  · Currently on nocturnal tube feed and tolerating it well  · Has residual volume of 200 mL at the end of the night shift.    · Increase Reglan to 10 mg every 6 hourly   · Encourage p.o. intake.  Drinking clears and tolerating it well with no nausea vomiting  · Because of nausea, will have her on scheduled dose Zofran 8 mg every 6 today with as needed rectal Phenergan (no IV  line to administer IV antiemetics)  · Obtain CT abdomen given her abdominal discomfort and persistent nausea    Cancer related pain  · Continue as needed [place] to q4h     Perforated Duodenal  Ulcer s/p exploratory laparotomy with patch 3/22  · Continue PPI twice daily  · Continue lower aspirin to 81 mg daily instead of 325 mg      New diagnosis of lung adenocarcinoma  COPD  Former smoker   On oxygen 2L here   · Continue PRN duonebs  · Follow up outpatient for treatment plan with Dr. Balbuena  · Lidoderm patch for left shoulder blade pain which is likely related to nerve compression from known T-spine lesion.    · Continue gabapentin      Coronary artery disease  Dyslipidemia  Essential hypertension  · Lowered home aspirin from 325 mg to 81mg. Will need to make this clear at discharge in order to prevent further ulceration  · Currently blood pressure controlled with valsartan, Coreg and hydralazine  · Continue as needed clonidine    Hypoalbuminemia   Severe malnutrition  · Nutrition consulted  · Start nocturnal tube feeds     Hypothyroidism  · Continue home synthroid      Debility  · PT  · Patient had been approved to [facility] for subacute rehab in [place]. Patient would still like to go there after discharge. Patient is to follow up outpatient for treatment options regarding her cancer.      Expected Discharge Location and Transportation: Rehab  Expected Discharge 4/21                         DVT prophylaxis:  Medical and mechanical DVT prophylaxis orders are present.     AM-PAC 6 Clicks Score (PT): 12 (04/17/23 8194)    CODE STATUS:   Code Status and Medical Interventions:   Ordered at: 04/10/23 2302     Code Status (Patient has no pulse and is not breathing):    CPR (Attempt to Resuscitate)     Medical Interventions (Patient has pulse or is breathing):    Full Support     Copied text in this note has been reviewed and is accurate as of 04/20/23.     Anaya Jones MD  04/20/23      Electronically signed by Anaya Jones MD at 04/20/23 1355          Consult Notes (most recent note)      Diandra Hatch PA-C at 04/11/23 1152      Consult Orders    1. Inpatient  "Gastroenterology Consult [[id number]] ordered by Dena yLnne APRN at 04/10/23 2042          Attestation signed by Brunner, Mark I, MD at 04/11/23 1402    I have reviewed this documentation and agree.                  [facility] [place] [facility]    Referring Provider: Noelle Combs DO    PCP: Dave Tobar MD    Reason for Consultation: Esophageal dysphagia with esophageal stenosis    History of present illness:    Christine Garg is a 89 y.o. female, PMH includes HTN, CAD, COPD, is admitted via transfer from [facility] yesterday for evaluation of persistent dysphagia 2/2 esophageal stricture.     Pt presented to [facility] 3/22 for c/o abdominal pain. She underwent ex lap and was found to have perforated duodenal ulcer, repaired with omental patch. 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 She was found to have esophageal stricture secondary to extrinsic compression of 3cm peritracheal lymph node. PEG tube was placed 4/7. She was transferred to [facility] for placement of esophageal stent.     Pt c/o diffuse abdominal \"soreness\" following ex lap. She notes esophageal dysphagia to solids, not liquids, over the last few weeks. She denies overt choking or aspiration.     Patient denies associated fever, chills, indigestion, nausea, vomiting, diarrhea, constipation, hematemesis, hematochezia, melena, bloating, weight loss or gain, dysuria, jaundice or bruising.    Patient denies personal or FHx of PUD, H Pylori, gastritis, pancreatitis, colitis, Celiac disease, UC, Crohn's disease, IBS, colon or gastric cancers. Pt denies EtOH, tobacco, illicit substance or NSAID use. CSY in remote past.     Allergies:  Motrin [ibuprofen] and Novocain [procaine]    Scheduled Meds:  aspirin, 81 mg, Oral, Daily  carvedilol, 25 mg, Oral, BID With Meals  hydrALAZINE, 50 mg, Oral, Q8H  levothyroxine, 112 mcg, Oral, Q AM  pantoprazole, 40 mg, Oral, BID AC  senna-docusate sodium, 2 tablet, Oral, " BID  sodium chloride, 10 mL, Intravenous, Q12H  valsartan, 320 mg, Oral, Q24H         Infusions:       PRN Meds:  •  acetaminophen **OR** acetaminophen **OR** acetaminophen  •  senna-docusate sodium **AND** polyethylene glycol **AND** bisacodyl **AND** bisacodyl  •  cloNIDine  •  HYDROcodone-acetaminophen  •  HYDROmorphone  •  ondansetron **OR** ondansetron  •  sodium chloride  •  sodium chloride    Home Meds:  Medications Prior to Admission   Medication Sig Dispense Refill Last Dose   • aspirin 325 MG tablet Take 81 mg by mouth Daily.   Patient Taking Differently   • carvedilol (COREG) 25 MG tablet Take 1 tablet by mouth 2 (Two) Times a Day With Meals. 60 tablet 0 4/11/2023   • cloNIDine (CATAPRES) 0.1 MG tablet Take 1 tablet by mouth 3 (Three) Times a Day As Needed for High Blood Pressure (SBP > 150).   4/11/2023   • hydrALAZINE (APRESOLINE) 20 MG/ML injection Infuse 0.5 mL into a venous catheter Every 6 (Six) Hours As Needed for High Blood Pressure.      • hydrALAZINE (APRESOLINE) 50 MG tablet Take 1 tablet by mouth Every 8 (Eight) Hours.      • HYDROcodone-acetaminophen (NORCO) 7.5-325 MG per tablet Take 1 tablet by mouth Every 4 (Four) Hours As Needed for Moderate Pain for up to 8 days.  0    • levothyroxine (SYNTHROID, LEVOTHROID) 112 MCG tablet Take 1 tablet by mouth Every Morning.      • levothyroxine (SYNTHROID, LEVOTHROID) 112 MCG tablet Take 1 tablet by mouth Every Morning.      • ondansetron (ZOFRAN) 4 MG tablet Take 1 tablet by mouth 3 (Three) Times a Day As Needed.      • valsartan (DIOVAN) 320 MG tablet Take 1 tablet by mouth Daily.      • valsartan (DIOVAN) 320 MG tablet Take 1 tablet by mouth Daily.      • castor oil-balsam peru (VENELEX) ointment Apply 1 application topically to the appropriate area as directed Every 12 (Twelve) Hours. (Patient not taking: Reported on 4/11/2023)   Not Taking   • cloNIDine (CATAPRES) 0.1 MG tablet Take 1 tablet by mouth 3 (Three) Times a Day As Needed for High Blood  Pressure (SBP > 150).      • Evolocumab (Repatha SureClick) solution auto-injector SureClick injection Inject 1 mL under the skin into the appropriate area as directed Every 14 (Fourteen) Days. (Patient not taking: Reported on 4/11/2023) 6 mL 1 Not Taking   • Heparin Sodium, Porcine, (heparin, porcine,) 5000 UNIT/ML injection Inject 1 mL under the skin into the appropriate area as directed Every 12 (Twelve) Hours. Indications: Prevention of Unwanted Clot in Veins (Patient not taking: Reported on 4/11/2023)   Not Taking   • HYDROcodone-acetaminophen (NORCO) 5-325 MG per tablet Take 1 tablet by mouth Daily As Needed.      • labetalol (NORMODYNE,TRANDATE) 5 MG/ML injection Infuse 2 mL into a venous catheter Every 6 (Six) Hours As Needed for High Blood Pressure. (Patient not taking: Reported on 4/11/2023)   Not Taking   • Lidocaine (HM Lidocaine Patch) 4 % patch Apply 1 patch topically Daily. (Patient not taking: Reported on 4/11/2023)   Not Taking   • magnesium sulfate 2 GM/50ML infusion Infuse 50 mL into a venous catheter As Needed (Mg 1.6 - 1.9 mg/dL). (Patient not taking: Reported on 4/11/2023)   Not Taking   • magnesium sulfate in D5W 1g/100mL, PREMIX, 1-5 GM/100ML-% IVPB Infuse 100 mL into a venous catheter As Needed (Mg 1.1 - 1.5 mg/dL). (Patient not taking: Reported on 4/11/2023)   Not Taking   • metoclopramide (REGLAN) 5 MG tablet Administer 1 tablet per G tube 3 (Three) Times a Day Before Meals. (Patient not taking: Reported on 4/11/2023)   Not Taking   • montelukast (SINGULAIR) 10 MG tablet Take 1 tablet by mouth Every Night. 30 tablet 11    • multivitamin (MULTI-DELYN) liquid liquid 10 mL by Per PEG Tube route Daily. (Patient not taking: Reported on 4/11/2023)   Not Taking   • pantoprazole (PROTONIX) 40 MG EC tablet Take 1 tablet by mouth 2 (Two) Times a Day Before Meals. (Patient not taking: Reported on 4/11/2023)   Not Taking   • polyethylene glycol (MIRALAX) 17 g packet Take 17 g by mouth Daily. (Patient  not taking: Reported on 4/11/2023)   Not Taking   • potassium chloride (K-DUR,KLOR-CON) 20 MEQ CR tablet Take 2 tablets by mouth As Needed (Potassium Replacement.  See Admin Instructions). (Patient not taking: Reported on 4/11/2023)   Not Taking   • potassium chloride (KLOR-CON) 20 MEQ packet Take 40 mEq by mouth As Needed (potassium replacement, see admin instructions). (Patient not taking: Reported on 4/11/2023)   Not Taking   • potassium phosphate 45 mmol in sodium chloride 0.9 % 500 mL infusion Infuse 45 mmol into a venous catheter As Needed (Peripheral IV - Phosphorus Less Than 1.3 mg/dL). (Patient not taking: Reported on 4/11/2023)   Not Taking       ROS: Review of Systems   Constitutional: Negative for chills, diaphoresis, fatigue and unexpected weight change.   HENT: Positive for trouble swallowing. Negative for drooling, facial swelling, mouth sores, nosebleeds, rhinorrhea, sore throat, tinnitus and voice change.    Respiratory: Negative for cough, chest tightness and shortness of breath.    Cardiovascular: Negative for chest pain, palpitations and leg swelling.   Gastrointestinal: Positive for abdominal pain. Negative for abdominal distention, blood in stool, constipation, diarrhea, nausea and vomiting.   Genitourinary: Negative for dysuria, flank pain and hematuria.   Musculoskeletal: Negative for arthralgias, joint swelling and myalgias.   Skin: Negative for color change, pallor and rash.   Neurological: Positive for weakness. Negative for dizziness, tremors, syncope and light-headedness.   Psychiatric/Behavioral: Negative for confusion and hallucinations.   All other systems reviewed and are negative.      PAST MED HX:  Past Medical History:   Diagnosis Date   • Advanced age    • CAD (coronary artery disease) 2011    s/p 3v CABG   • Chronic kidney disease (CKD), stage III (moderate)    • COPD (chronic obstructive pulmonary disease)    • History of tobacco use    • Hyperlipidemia    • Hypertension    •  "Hypothyroidism    • PONV (postoperative nausea and vomiting)    • Pulmonary nodule        PAST SURG HX:  Past Surgical History:   Procedure Laterality Date   • BREAST BIOPSY Left     benign   • CATARACT EXTRACTION     • CORONARY ANGIOPLASTY     • CORONARY ARTERY BYPASS GRAFT     • ENDOSCOPY W/ PEG TUBE PLACEMENT N/A 4/7/2023    Procedure: ESOPHAGOGASTRODUODENOSCOPY WITH PERCUTANEOUS ENDOSCOPIC GASTROSTOMY TUBE INSERTION;  Surgeon: Aurora Kirkland MD;  Location:  [facility] OR;  Service: General;  Laterality: N/A;   • EXPLORATORY LAPAROTOMY N/A 3/22/2023    Procedure: LAPAROTOMY EXPLORATORY WITH OVER SEW OF DUODENAL ULCER WITH OMENTAL PATCH AND BIOPATCH AND CENTRAL LINE PLACEMENT;  Surgeon: Aurora Kirkland MD;  Location:  [facility] OR;  Service: General;  Laterality: N/A;       FAM HX:  Family History   Problem Relation Age of Onset   • Heart disease Mother    • Heart disease Father    • Heart disease Brother    • Breast cancer Neg Hx        SOC HX:  Social History     Socioeconomic History   • Marital status:    Tobacco Use   • Smoking status: Former     Types: Cigarettes   • Smokeless tobacco: Never   Vaping Use   • Vaping Use: Never used   Substance and Sexual Activity   • Alcohol use: No   • Drug use: No   • Sexual activity: Defer       PHYSICAL EXAM  /81 (BP Location: Left arm, Patient Position: Lying)   Pulse 85   Temp 97 °F (36.1 °C) (Axillary)   Resp 17   Ht 165.1 cm (65\")   Wt 66.2 kg (146 lb)   SpO2 97%   BMI 24.30 kg/m²   Wt Readings from Last 3 Encounters:   04/11/23 66.2 kg (146 lb)   04/04/23 65.8 kg (145 lb 1.6 oz)   01/05/23 68 kg (150 lb)   ,body mass index is 24.3 kg/m².  Physical Exam  Vitals and nursing note reviewed.   Constitutional:       Appearance: Normal appearance. She is normal weight. She is not ill-appearing or diaphoretic.      Comments: Pleasantly conversant   HENT:      Head: Normocephalic and atraumatic.      Right Ear: External ear normal.      Left Ear: External " ear normal.      Nose: Nose normal.      Mouth/Throat:      Mouth: Mucous membranes are moist.      Pharynx: Oropharynx is clear.   Eyes:      Conjunctiva/sclera: Conjunctivae normal.      Pupils: Pupils are equal, round, and reactive to light.   Neck:      Thyroid: No thyromegaly.   Cardiovascular:      Rate and Rhythm: Normal rate and regular rhythm.      Pulses: Normal pulses.      Heart sounds: Normal heart sounds.   Pulmonary:      Effort: Pulmonary effort is normal.      Breath sounds: Normal breath sounds.   Abdominal:      General: Abdomen is flat. Bowel sounds are normal. There is no distension.      Tenderness: There is abdominal tenderness (diffuse to light palpation, nonacute). There is no guarding or rebound.      Comments: Previous midline surgical incision well healed. PEG in place, easily mobile and taught to skin at 5cm.    Musculoskeletal:         General: Normal range of motion.      Cervical back: Normal range of motion.   Skin:     General: Skin is warm and dry.   Neurological:      General: No focal deficit present.      Mental Status: She is oriented to person, place, and time.   Psychiatric:         Mood and Affect: Mood normal.         Results Review:   I reviewed the patient's new clinical results.  I reviewed the patient's new imaging results and agree with the interpretation.  I reviewed the patient's other test results and agree with the interpretation    Lab Results   Component Value Date    WBC 9.11 04/11/2023    HGB 9.9 (L) 04/11/2023    HGB 10.0 (L) 04/09/2023    HGB 10.1 (L) 04/06/2023    HCT 31.1 (L) 04/11/2023    MCV 96.9 04/11/2023     04/11/2023       Lab Results   Component Value Date    INR 0.91 04/09/2023    INR 0.87 (L) 04/04/2023    INR 0.99 05/07/2018       Lab Results   Component Value Date    GLUCOSE 85 04/11/2023    BUN 10 04/11/2023    CREATININE 0.41 (L) 04/11/2023    EGFRIFNONA 47 (L) 05/08/2018    BCR 24.4 04/11/2023     (L) 04/11/2023    K 4.6  2023    CO2 30.0 (H) 2023    CALCIUM 8.5 (L) 2023    PROTENTOTREF 6.5 2023    ALBUMIN 2.3 (L) 2023    ALKPHOS 98 2023    BILITOT 0.2 2023    BILIDIR <0.2 2022    ALT 7 2023    AST 14 2023       ASSESSMENTS/PLANS    Esophageal stricture, 2/2 extrinsic compression due to enlarged paratracheal lymph node  Esophageal dysphagia, 2/2 above  Adenocarcinoma of lung  Recent perforated duodenal ulcer s/p ex lap with patch  PEG tube in place   - NPO    - continue pantoprazole 40mg BID   - plan for EGD with esophageal stent tomorrow with Dr. Brunner    I discussed the patient's findings and my recommendations with patient. Thank you very kindly for this consultation. Will continue to follow during this hospitalization.      Diandra Hatch PA-C  23  11:52 EDT        Electronically signed by Brunner, Mark I, MD at 23 1402          Physical Therapy Notes (most recent note)      Clare Fulton, PT Student at 23 1131  Version 1 of 1    Attestation signed by Yajaira Alvarado, PT at 23 1515    Yajaira Alvarado, PT 2023 15:15 EDT                  Patient Name: Christine Garg  : 1933    MRN: [id number]                              Today's Date: 2023       Admit Date: 4/10/2023    Visit Dx:     ICD-10-CM ICD-9-CM   1. Esophageal stricture  K22.2 530.3     Patient Active Problem List   Diagnosis   • Essential hypertension   • Dyslipidemia   • ASCVD (arteriosclerotic cardiovascular disease)   • Palpitations   • Coronary artery disease    • Abnormal PFTs (pulmonary function tests)   • Chronic obstructive pulmonary disease   • Mild persistent asthma with allergic rhinitis   • Pulmonary nodule   • Former smoker   • Colitis   • Perforated ulcer   • Acute gastric ulcer with perforation   • Moderate malnutrition   • Esophageal stricture   • Hypothyroidism (acquired)   • S/P percutaneous endoscopic gastrostomy (PEG) tube placement   • Adenocarcinoma    • Severe malnutrition     Past Medical History:   Diagnosis Date   • Advanced age    • CAD (coronary artery disease) 2011    s/p 3v CABG   • Chronic kidney disease (CKD), stage III (moderate)    • COPD (chronic obstructive pulmonary disease)    • History of tobacco use    • Hyperlipidemia    • Hypertension    • Hypothyroidism    • PONV (postoperative nausea and vomiting)    • Pulmonary nodule      Past Surgical History:   Procedure Laterality Date   • BREAST BIOPSY Left     benign   • CATARACT EXTRACTION     • CORONARY ANGIOPLASTY     • CORONARY ARTERY BYPASS GRAFT     • ENDOSCOPY W/ PEG TUBE PLACEMENT N/A 4/7/2023    Procedure: ESOPHAGOGASTRODUODENOSCOPY WITH PERCUTANEOUS ENDOSCOPIC GASTROSTOMY TUBE INSERTION;  Surgeon: Aurora Kirkland MD;  Location: [facility] OR;  Service: General;  Laterality: N/A;   • ENDOSCOPY W/ STENT PLACEMENT/REMOVAL N/A 4/12/2023    Procedure: ESOPHAGOGASTRODUODENOSCOPY WITH STENT PLACEMENT WITH FLUOROSCOPY;  Surgeon: Brunner, Mark I, MD;  [facility];  Service: Gastroenterology;  Laterality: N/A;   • EXPLORATORY LAPAROTOMY N/A 3/22/2023    Procedure: LAPAROTOMY EXPLORATORY WITH OVER SEW OF DUODENAL ULCER WITH OMENTAL PATCH AND BIOPATCH AND CENTRAL LINE PLACEMENT;  Surgeon: Aurora Kirkland MD;  Location: [facility];  Service: General;  Laterality: N/A;      General Information     Row Name 04/17/23 1416          Physical Therapy Time and Intention    Document Type therapy note (daily note) (P)   -HT     Mode of Treatment physical therapy (P)   -HT     Row Name 04/17/23 1416          General Information    Patient Profile Reviewed yes (P)   -HT     Existing Precautions/Restrictions other (see comments);oxygen therapy device and L/min;fall (P)   abdominal incision, PEG, incontinence  -HT     Barriers to Rehab medically complex;previous functional deficit (P)   -HT     Row Name 04/17/23 1416          Cognition    Orientation Status (Cognition) oriented x 3 (P)   -HT     Row  Name 04/17/23 1416          Safety Issues, Functional Mobility    Safety Issues Affecting Function (Mobility) awareness of need for assistance;insight into deficits/self-awareness;safety precaution awareness;safety precautions follow-through/compliance;sequencing abilities (P)   -HT     Impairments Affecting Function (Mobility) endurance/activity tolerance;strength;balance (P)   -HT           User Key  (r) = Recorded By, (t) = Taken By, (c) = Cosigned By    Initials Name Provider Type    HT Clare Fulton, PT Student PT Student               Mobility     Row Name 04/17/23 1417          Bed Mobility    Supine-Sit [place] (Bed Mobility) moderate assist (50% patient effort);1 person assist;verbal cues (P)   -HT     Assistive Device (Bed Mobility) bed rails;head of bed elevated (P)   -HT     Comment, (Bed Mobility) cues for sequencing to avoid abdominal incision pain. Required assistance bringing LEs off bed. (P)   -HT     Row Name 04/17/23 1417          Sit-Stand Transfer    Sit-Stand [place] (Transfers) moderate assist (50% patient effort);1 person assist (P)   -HT     Assistive Device (Sit-Stand Transfers) walker, front-wheeled (P)   -HT     Comment, (Sit-Stand Transfer) 4x STS, 3x EOB and 1x chair for brief placement d/t incontinence. Pt required seated rest breaks between STS d/t dizziness/nausea VSS. (P)   -HT     Row Name 04/17/23 1417          Gait/Stairs (Locomotion)    [place] Level (Gait) minimum assist (75% patient effort);1 person assist (P)   -HT     Assistive Device (Gait) walker, front-wheeled (P)   -HT     Distance in Feet (Gait) 10 (P)   -HT     Deviations/Abnormal Patterns (Gait) bilateral deviations;base of support, wide;denny decreased;stride length decreased;weight shifting decreased (P)   -HT     Bilateral Gait Deviations forward flexed posture;heel strike decreased (P)   -HT     Comment, (Gait/Stairs) Pt demonstrated step through gait pattern with decreased stride length  resembling shuffling B, fwd trunk, decreased heel strike B, and required cues for upright posture, PLB, and increased stride length. Gait limited by poor endurance, balance deficits, and generalized weakness. (P)   -HT           User Key  (r) = Recorded By, (t) = Taken By, (c) = Cosigned By    Initials Name Provider Type     Clare Fulton, PT Student PT Student               Obj/Interventions     Row Name 04/17/23 1424          Balance    Balance Assessment sitting static balance;sitting dynamic balance;standing static balance;standing dynamic balance (P)   -HT     Static Sitting Balance independent (P)   -HT     Dynamic Sitting Balance contact guard (P)   -HT     Position, Sitting Balance supported (P)   -HT     Static Standing Balance contact guard (P)   -HT     Dynamic Standing Balance minimal assist (P)   -HT     Position/Device Used, Standing Balance walker, rolling (P)   -HT           User Key  (r) = Recorded By, (t) = Taken By, (c) = Cosigned By    Initials Name Provider Type     Clare Fulton, PT Student PT Student               Goals/Plan    No documentation.                Clinical Impression     Row Name 04/17/23 1426          Pain    Pretreatment Pain Rating 0/10 - no pain (P)   -HT     Posttreatment Pain Rating 0/10 - no pain (P)   -HT     Additional Documentation Pain Scale: Numbers Pre/Post-Treatment (Group) (P)   -HT     Row Name 04/17/23 1426          Plan of Care Review    Progress no change (P)   -HT     Outcome Evaluation Pt ambulated 10' with min A/RW refusing further mobility d/t dizziness/nausea with VSS showing limitations of poor endurance, balance deficits, and generalized weakness. Rec skilled PT to increase ind with mobility and d/c to IRF. (P)   -HT     Row Name 04/17/23 1426          Therapy Assessment/Plan (PT)    Criteria for Skilled Interventions Met (PT) yes;meets criteria;skilled treatment is necessary (P)   -HT     Row Name 04/17/23 1426          Vital Signs    Pre  Systolic BP Rehab 141 (P)   -HT     Pre Treatment Diastolic BP 59 (P)   -HT     Post Systolic BP Rehab 155 (P)   -HT     Post Treatment Diastolic BP 82 (P)   -HT     Pretreatment Heart Rate (beats/min) 77 (P)   -HT     Posttreatment Heart Rate (beats/min) 77 (P)   -HT     Pre SpO2 (%) 99 (P)   -HT     O2 Delivery Pre Treatment nasal cannula (P)   -HT     Intra SpO2 (%) 90 (P)   -HT     O2 Delivery Intra Treatment nasal cannula (P)   -HT     Post SpO2 (%) 99 (P)   -HT     O2 Delivery Post Treatment nasal cannula (P)   -HT     Pre Patient Position Supine (P)   -HT     Intra Patient Position Standing (P)   -HT     Post Patient Position Sitting (P)   -HT     Row Name 04/17/23 1426          Positioning and Restraints    Pre-Treatment Position in bed (P)   -HT     Post Treatment Position chair (P)   -HT     In Chair notified nsg;reclined;sitting;call light within reach;encouraged to call for assist;exit alarm on;LUE elevated;RUE elevated;legs elevated;waffle cushion;on mechanical lift sling (P)   -HT           User Key  (r) = Recorded By, (t) = Taken By, (c) = Cosigned By    Initials Name Provider Type    HT Clare Fulton, PT Student PT Student               Outcome Measures     Row Name 04/17/23 1429          How much help from another person do you currently need...    Turning from your back to your side while in flat bed without using bedrails? 2 (P)   -HT     Moving from lying on back to sitting on the side of a flat bed without bedrails? 2 (P)   -HT     Moving to and from a bed to a chair (including a wheelchair)? 2 (P)   -HT     Standing up from a chair using your arms (e.g., wheelchair, bedside chair)? 2 (P)   -HT     Climbing 3-5 steps with a railing? 1 (P)   -HT     To walk in hospital room? 3 (P)   -HT     AM-PAC 6 Clicks Score (PT) 12 (P)   -HT     Highest level of mobility 4 --> Transferred to chair/commode (P)   -HT     Row Name 04/17/23 1429 04/17/23 1422       Functional Assessment    Outcome Measure  Options AM-PAC 6 Clicks Basic Mobility (PT) (P)   -HT AM-PAC 6 Clicks Daily Activity (OT)  -MATHEW          User Key  (r) = Recorded By, (t) = Taken By, (c) = Cosigned By    Initials Name Provider Type    Laura Carrillo, OT Occupational Therapist    HT Clare Fulton, PT Student PT Student                             Physical Therapy Education     Title: PT OT SLP Therapies (In Progress)     Topic: Physical Therapy (In Progress)     Point: Mobility training (Done)     Learning Progress Summary           Patient Acceptance, E, VU,NR by HT at 4/17/2023 1431    Acceptance, E,D,H, NR by DM at 4/14/2023 1737    Acceptance, E, VU,NR by HT at 4/11/2023 1500                   Point: Home exercise program (In Progress)     Learning Progress Summary           Patient Acceptance, E,D,H, NR by DM at 4/14/2023 1737                   Point: Body mechanics (Done)     Learning Progress Summary           Patient Acceptance, E, VU,NR by  at 4/17/2023 1431    Acceptance, E,D,H, NR by DM at 4/14/2023 1737    Acceptance, E, VU,NR by HT at 4/11/2023 1500                   Point: Precautions (Done)     Learning Progress Summary           Patient Acceptance, E, VU,NR by  at 4/17/2023 1431    Acceptance, E,D,H, NR by DM at 4/14/2023 1737    Acceptance, E, VU,NR by HT at 4/11/2023 1500                               User Key     Initials Effective Dates Name Provider Type Discipline    DM 02/03/23 -  Rubi Nick, PT Physical Therapist PT     01/12/23 -  Clare Fulton, PT Student PT Student PT              PT Recommendation and Plan  Planned Therapy Interventions (PT): balance training, bed mobility training, gait training, home exercise program, postural re-education, patient/family education, neuromuscular re-education, stair training, strengthening, transfer training  Plan of Care Reviewed With: patient  Progress: (P) no change  Outcome Evaluation: (P) Pt ambulated 10' with min A/RW refusing further mobility d/t  dizziness/nausea with VSS showing limitations of poor endurance, balance deficits, and generalized weakness. Rec skilled PT to increase ind with mobility and d/c to IRF.     Time Calculation:    PT Charges     Row Name 23 1431             Time Calculation    Start Time 1131 (P)   -HT      PT Received On 23 (P)   -HT         Timed Charges    17327 - PT Therapeutic Activity Minutes 38 (P)   -HT         Total Minutes    Timed Charges Total Minutes 38 (P)   -HT       Total Minutes 38 (P)   -HT            User Key  (r) = Recorded By, (t) = Taken By, (c) = Cosigned By    Initials Name Provider Type    HT Clare Fulton, PT Student PT Student              Therapy Charges for Today     Code Description Service Date Service Provider Modifiers Qty    30428200472 HC PT THERAPEUTIC ACT EA 15 MIN 2023 Clare Fulton, PT Student GP 3          PT G-Codes  Outcome Measure Options: (P) AM-PAC 6 Clicks Basic Mobility (PT)  AM-PAC 6 Clicks Score (PT): (P) 12  AM-PAC 6 Clicks Score (OT): 12  PT Discharge Summary  Anticipated Discharge Disposition (PT): inpatient rehabilitation facility    Clare Fulton PT Student  2023      Electronically signed by Yajaira Alvarado, PT at 23 1515          Occupational Therapy Notes (most recent note)      Ana M Presley, OT at 23 1510          Patient Name: Christine Garg  : 1933    MRN: [id number]                              Today's Date: 2023       Admit Date: 4/10/2023    Visit Dx:     ICD-10-CM ICD-9-CM   1. Esophageal stricture  K22.2 530.3     Patient Active Problem List   Diagnosis   • Essential hypertension   • Dyslipidemia   • ASCVD (arteriosclerotic cardiovascular disease)   • Palpitations   • Coronary artery disease    • Abnormal PFTs (pulmonary function tests)   • Chronic obstructive pulmonary disease   • Mild persistent asthma with allergic rhinitis   • Pulmonary nodule   • Former smoker   • Colitis   • Perforated ulcer   • Acute gastric  ulcer with perforation   • Moderate malnutrition   • Esophageal stricture   • Hypothyroidism (acquired)   • S/P percutaneous endoscopic gastrostomy (PEG) tube placement   • Adenocarcinoma   • Severe malnutrition     Past Medical History:   Diagnosis Date   • Advanced age    • CAD (coronary artery disease) 2011    s/p 3v CABG   • Chronic kidney disease (CKD), stage III (moderate)    • COPD (chronic obstructive pulmonary disease)    • History of tobacco use    • Hyperlipidemia    • Hypertension    • Hypothyroidism    • PONV (postoperative nausea and vomiting)    • Pulmonary nodule      Past Surgical History:   Procedure Laterality Date   • BREAST BIOPSY Left     benign   • CATARACT EXTRACTION     • CORONARY ANGIOPLASTY     • CORONARY ARTERY BYPASS GRAFT     • ENDOSCOPY W/ PEG TUBE PLACEMENT N/A 4/7/2023    Procedure: ESOPHAGOGASTRODUODENOSCOPY WITH PERCUTANEOUS ENDOSCOPIC GASTROSTOMY TUBE INSERTION;  Surgeon: Aurora Kirkland MD;  Location: [facility];  Service: General;  Laterality: N/A;   • ENDOSCOPY W/ STENT PLACEMENT/REMOVAL N/A 4/12/2023    Procedure: ESOPHAGOGASTRODUODENOSCOPY WITH STENT PLACEMENT WITH FLUOROSCOPY;  Surgeon: Brunner, Mark I, MD;  Location: [facility] ENDOSCOPY;  Service: Gastroenterology;  Laterality: N/A;   • EXPLORATORY LAPAROTOMY N/A 3/22/2023    Procedure: LAPAROTOMY EXPLORATORY WITH OVER SEW OF DUODENAL ULCER WITH OMENTAL PATCH AND BIOPATCH AND CENTRAL LINE PLACEMENT;  Surgeon: Aurora Kirkland MD;  Location: [facility];  Service: General;  Laterality: N/A;      General Information     Row Name 04/19/23 1542          OT Time and Intention    Document Type therapy note (daily note)  -LC     Mode of Treatment occupational therapy  -     Row Name 04/19/23 1542          General Information    Patient Profile Reviewed yes  -LC     Prior Level of Function independent:;all household mobility;transfer;ADL's  -LC     Existing Precautions/Restrictions other (see comments);oxygen therapy device and  L/min;fall  abdominal incision, PEG, incontinence  -     Barriers to Rehab medically complex;previous functional deficit  -     Row Name 04/19/23 1542          Cognition    Orientation Status (Cognition) oriented x 3  -     Row Name 04/19/23 1542          Safety Issues, Functional Mobility    Safety Issues Affecting Function (Mobility) awareness of need for assistance;insight into deficits/self-awareness;safety precaution awareness;safety precautions follow-through/compliance;sequencing abilities  -     Impairments Affecting Function (Mobility) balance;endurance/activity tolerance;strength;pain  -           User Key  (r) = Recorded By, (t) = Taken By, (c) = Cosigned By    Initials Name Provider Type     Ana M Presley OT Occupational Therapist                 Mobility/ADL's     Row Name 04/19/23 1543          Bed Mobility    Bed Mobility scooting/bridging;supine-sit  -     Supine-Sit [place] (Bed Mobility) minimum assist (75% patient effort);verbal cues  -     Sit-Supine [place] (Bed Mobility) minimum assist (75% patient effort);verbal cues  -     Comment, (Bed Mobility) VC's to sequence transitioning from supine to sit EOB.  -     Row Name 04/19/23 1543          Transfers    Transfers sit-stand transfer;bed-chair transfer  -     Comment, (Transfers) VC's for hand placement and sequencing  -     Row Name 04/19/23 1543          Bed-Chair Transfer    Bed-Chair [place] (Transfers) verbal cues;minimum assist (75% patient effort)  -     Assistive Device (Bed-Chair Transfers) walker, front-wheeled  -     Row Name 04/19/23 1543          Sit-Stand Transfer    Sit-Stand [place] (Transfers) minimum assist (75% patient effort);verbal cues  -     Assistive Device (Sit-Stand Transfers) walker, front-wheeled  -     Row Name 04/19/23 1543          Activities of Daily Living    BADL Assessment/Intervention lower body dressing;grooming;bathing  -     Row Name 04/19/23 1543           Lower Body Dressing Assessment/Training    [place] Level (Lower Body Dressing) don;socks;dependent (less than 25% patient effort)  -     Comment, (Lower Body Dressing) Has AE at home  -     Row Name 04/19/23 1543          Bathing Assessment/Intervention    [place] Level (Bathing) lower body;dependent (less than 25% patient effort);proximal lower extremities  -     Comment, (Bathing) Increased time to perform secondary to incontinence episode.  -     Row Name 04/19/23 1543          Grooming Assessment/Training    [place] Level (Grooming) wash face, hands;set up  -     Position (Grooming) supported sitting  -           User Key  (r) = Recorded By, (t) = Taken By, (c) = Cosigned By    Initials Name Provider Type     Ana M Presley OT Occupational Therapist               Obj/Interventions     Row Name 04/19/23 1548          Balance    Balance Assessment sitting static balance;sitting dynamic balance;standing static balance;standing dynamic balance  -     Static Sitting Balance supervision  -     Dynamic Sitting Balance contact guard  -     Position, Sitting Balance supported  -     Static Standing Balance contact guard;verbal cues  -     Dynamic Standing Balance verbal cues;minimal assist  -     Position/Device Used, Standing Balance walker, front-wheeled  -     Balance Interventions sitting;standing;sit to stand;supported;occupation based/functional task;weight shifting activity  -     Comment, Balance Min A - CGA to steady. Cues for upright posture in standing.  -           User Key  (r) = Recorded By, (t) = Taken By, (c) = Cosigned By    Initials Name Provider Type    Ana M Nazario OT Occupational Therapist               Goals/Plan    No documentation.                Clinical Impression     Row Name 04/19/23 1549          Pain Assessment    Pretreatment Pain Rating 4/10  -     Posttreatment Pain Rating 4/10  -     Pain Location - abdomen  -     Pain  Intervention(s) Repositioned;Ambulation/increased activity;Medication (See MAR)  -     Additional Documentation Pain Scale: Word Pre/Post-Treatment (Group)  -     Row Name 04/19/23 1549          Plan of Care Review    Plan of Care Reviewed With patient  -     Progress improving  -     Outcome Evaluation Pt. progressing towards baseline with ADLs and functional mobility. Limited by decreased activity tolerance, strength, and balance. Will continue to progress as able per OT POC to promote return to PLOF. Recommend IPR at discharge.  -     Row Name 04/19/23 1549          Vital Signs    Pre Systolic BP Rehab --  VSS  -     Pre Patient Position Supine  -     Intra Patient Position Standing  -     Post Patient Position Sitting  -     Row Name 04/19/23 1549          Positioning and Restraints    Pre-Treatment Position in bed  -     Post Treatment Position chair  -     In Chair notified nsg;reclined;call light within reach;encouraged to call for assist;exit alarm on;waffle cushion;on mechanical lift sling;legs elevated  -           User Key  (r) = Recorded By, (t) = Taken By, (c) = Cosigned By    Initials Name Provider Type    Ana M Nazario, AMAIRANI Occupational Therapist               Outcome Measures     Row Name 04/19/23 8428          How much help from another is currently needed...    Putting on and taking off regular lower body clothing? 2  -LC     Bathing (including washing, rinsing, and drying) 2  -LC     Toileting (which includes using toilet bed pan or urinal) 1  -LC     Putting on and taking off regular upper body clothing 2  -LC     Taking care of personal grooming (such as brushing teeth) 3  -LC     Eating meals 2  -     AM-PAC 6 Clicks Score (OT) 12  -     Row Name 04/19/23 1551          Functional Assessment    Outcome Measure Options AM-PAC 6 Clicks Daily Activity (OT)  -           User Key  (r) = Recorded By, (t) = Taken By, (c) = Cosigned By    Initials Name Provider Type     Ana M Nazario OT Occupational Therapist                Occupational Therapy Education     Title: PT OT SLP Therapies (In Progress)     Topic: Occupational Therapy (In Progress)     Point: ADL training (In Progress)     Description:   Instruct learner(s) on proper safety adaptation and remediation techniques during self care or transfers.   Instruct in proper use of assistive devices.              Learning Progress Summary           Patient Acceptance, E, NR by  at 4/19/2023 1510    Acceptance, E,D, VU,NR by MATHEW at 4/17/2023 1426    Comment: UE TE, pacing self, AE for LBD, trasnfer technique    Acceptance, E, NR by  at 4/13/2023 1536    Acceptance, E, NR by  at 4/11/2023 1330                   Point: Home exercise program (Done)     Description:   Instruct learner(s) on appropriate technique for monitoring, assisting and/or progressing therapeutic exercises/activities.              Learning Progress Summary           Patient Acceptance, E,D, VU,NR by  at 4/17/2023 1426    Comment: UE TE, pacing self, AE for LBD, trasnfer technique                   Point: Precautions (In Progress)     Description:   Instruct learner(s) on prescribed precautions during self-care and functional transfers.              Learning Progress Summary           Patient Acceptance, E, NR by  at 4/19/2023 1510    Acceptance, E, NR by  at 4/13/2023 1536    Acceptance, E, NR by  at 4/11/2023 1330                   Point: Body mechanics (In Progress)     Description:   Instruct learner(s) on proper positioning and spine alignment during self-care, functional mobility activities and/or exercises.              Learning Progress Summary           Patient Acceptance, E, NR by  at 4/19/2023 1510    Acceptance, E,D, VU,NR by  at 4/17/2023 1426    Comment: UE TE, pacing self, AE for LBD, trasnfer technique    Acceptance, E, NR by  at 4/13/2023 1536    Acceptance, E, NR by  at 4/11/2023 1330                               User Key      Initials Effective Dates Name Provider Type Discipline    MATHEW 02/03/23 -  Laura Osman OT Occupational Therapist OT     06/16/21 -  Ana M Presley OT Occupational Therapist OT     10/14/22 -  Ca Luther OT Occupational Therapist OT              OT Recommendation and Plan     Plan of Care Review  Plan of Care Reviewed With: patient  Progress: improving  Outcome Evaluation: Pt. progressing towards baseline with ADLs and functional mobility. Limited by decreased activity tolerance, strength, and balance. Will continue to progress as able per OT POC to promote return to PLOF. Recommend IPR at discharge.     Time Calculation:    Time Calculation- OT     Row Name 04/19/23 1552             Time Calculation- OT    OT Start Time 1510  -LC      OT Received On 04/19/23  -      OT Goal Re-Cert Due Date 04/21/23  -         Timed Charges    29188 - OT Self Care/Mgmt Minutes 23  -LC         Total Minutes    Timed Charges Total Minutes 23  -LC       Total Minutes 23  -LC            User Key  (r) = Recorded By, (t) = Taken By, (c) = Cosigned By    Initials Name Provider Type     Ana M Presley OT Occupational Therapist              Therapy Charges for Today     Code Description Service Date Service Provider Modifiers Qty    54810825455 HC OT SELF CARE/MGMT/TRAIN EA 15 MIN 4/19/2023 Ana M Presley OT GO 2    09695834267 HC OT THER SUPP EA 15 MIN 4/19/2023 Ana M Prseley OT GO 1               Ana M Presley OT  4/19/2023    Electronically signed by Ana M Presley OT at 04/19/23 3239

## 2023-04-20 NOTE — CONSULTS
Clinical Nutrition     Nutrition Support Assessment  Reason for Visit: Follow-up protocol, EN/PO      Patient Name: Christine Garg  YOB: 1933  MRN: [id number]  Date of Encounter: 04/20/23 15:50 EDT  Admission date: 4/10/2023    Comments:    Pt has not tolerated EN >30mL/hr; c/o nausea and residual of 50% delivered TF volume on AM check. Per MD note, will obtain CT abdomen.      No documented BM x4 days, suspect may be contributing factor for PO intolerance and nausea with EN - would recommend PRN suppository to promote BM.     Pt reports inability to drink ONS 2/2 fullness from soup broth - encouraged to drink ONS or hold off tray to have between meals as able.     Nutrition Assessment   Admission Diagnosis:  Esophageal stricture [K22.2]      Problem List:    Esophageal stricture    Essential hypertension    ASCVD (arteriosclerotic cardiovascular disease)    Coronary artery disease     Chronic obstructive pulmonary disease    Former smoker    Perforated ulcer    Hypothyroidism (acquired)    S/P percutaneous endoscopic gastrostomy (PEG) tube placement    Adenocarcinoma    Severe malnutrition        PMH:   She  has a past medical history of Advanced age, CAD (coronary artery disease) (2011), Chronic kidney disease (CKD), stage III (moderate), COPD (chronic obstructive pulmonary disease), History of tobacco use, Hyperlipidemia, Hypertension, Hypothyroidism, PONV (postoperative nausea and vomiting), and Pulmonary nodule.    PSH:  She  has a past surgical history that includes Breast biopsy (Left); Cataract extraction; Coronary angioplasty; Coronary artery bypass graft; Exploratory Laparotomy (N/A, 3/22/2023); Esophagogastroduodenoscopy w/ PEG (N/A, 4/7/2023); and ENDOSCOPY W/ STENT PLACEMENT/REMOVAL (N/A, 4/12/2023).      Applicable Nutrition Concerns:   Skin: surgical incision abdoment  Oral:  GI: PEG      Applicable Interval History:     3/22: duodenal ulcer repair  4/7: PEG placed at  [facility]  4/11: initiated EN  4/12: esophageal stent placement  4/14: Nocturnal feeds initiated    Reported/Observed/Food/Nutrition Related History:     4/20  Pt has not tolerated EN >30mL/hr; c/o nausea and residual of 50% delivered TF volume on AM check. Per MD note, will obtain CT abdomen. No documented BM x4 days, suspect may be contributing factor for PO intolerance and nausea with EN. Receiving prokinetic agent and Zofran. Pt reports inability to drink ONS 2/2 fullness from soup broth - encouraged to drink ONS or hold off tray to have between meals as able. Noted pt lost IV access, all meds PO.       4/17  Tolerating nocturnal feeds at 30mL/hr - denies nausea or abdominal discomfort at this rate. Per MD note, confusion about EN order from RN - plan to start at 30mL/hr and advance to goal overnight tonight. Continues to drink Boost Breeze without difficulty. Documented BM x1 in past 24hrs.     4/14  Pt son concerned for poor PO acceptance. Plan to initiate nocturnal feeds with hope of improved tolerance - ? Ability to deliver >15mL/hr. Per RN, pt tolerating meds via PEG with additional flush without difficulty.     4/12  Pt unable to tolerate EN at 15mL/hr - c/o abdominal tightness. Esophageal stent placed - diet upgraded to full liquids. Reports tolerating 1 cup of OJ.  Pt does not like original ONS but agreeable to Breeze.     4/11  Pt laying in bed with family at bedside - able to provide some weight/nutrition hx, additional information provided by spouse and son at bedside. Endorsed recent hx of inability to tolerate PO intake of any kind without emesis for ~3wks prior to hospitalization in March. PEG placed 2/2 esophageal stricture and inability for diet advance past clear liquids. Unable to tolerate EN order of Nutren 1.5 >20mL/hr (goal was 50mL/hr) 2/2 abdominal tightness. Mild hyponatremia noted. Denies N/V/D - reports soft BM. Plan for esophageal stent placement - ? Timeline.   Educated on EN formula  "most likely to choose, verbalized understanding.     Labs    Labs Reviewed: Yes     Results from last 7 days   Lab Units 04/19/23  0352 04/18/23  2115 04/17/23  1506   GLUCOSE mg/dL 105* 128* 113*   BUN mg/dL 8 8 7*   CREATININE mg/dL 0.35* 0.34* 0.37*   SODIUM mmol/L 130* 130* 129*   CHLORIDE mmol/L 91* 93* 91*   POTASSIUM mmol/L 4.4 4.2 4.4   MAGNESIUM mg/dL 1.8  --  1.4*             Invalid input(s): PLAT    Results from last 7 days   Lab Units 04/20/23  1154 04/20/23  0645 04/19/23  2354 04/19/23  1942 04/19/23  1719 04/19/23  1128   GLUCOSE mg/dL 107 112 128 121 148* 113     Lab Results   Lab Value Date/Time    HGBA1C 6.10 (H) 03/22/2023 1139    HGBA1C 6.00 (H) 12/19/2022 1638                 Medications    Medications Reviewed: Yes  Pertinent: docusate, levothyroxine, reglan, zofran, protonix, senokot  Infusion:   PRN: Norco      Intake/Ouptut 24 hrs (0701 - 0700)   I&O's Reviewed: Yes       Anthropometrics     Flowsheet Rows    Flowsheet Row First Filed Value   Admission Height 165.1 cm (65\") Documented at 04/11/2023 0532   Admission Weight 66.2 kg (146 lb) Documented at 04/11/2023 0532          Height: Height: 165.1 cm (65\")  Last Filed Weight: Weight: 66.2 kg (146 lb) (04/11/23 0532)  Method: Weight Method: Bed scale  BMI: BMI (Calculated): 24.3  BMI classification: Normal: 18.5-24.9kg/m2  IBW:  125lbs    UBW:      Weight       Weight (kg) Weight (lbs) Weight Method Visit Report   12/19/2022 70.081 kg  154 lb 8 oz  Bed scale      68.04 kg  150 lb  Stated      68.04 kg  150 lb      12/20/2022 --  --      12/21/2022 70.943 kg  156 lb 6.4 oz  Bed scale     12/22/2022 71.578 kg  157 lb 12.8 oz  Bed scale     12/23/2022 71.487 kg  157 lb 9.6 oz  Bed scale     12/24/2022 74.118 kg  163 lb 6.4 oz  Bed scale     12/25/2022 73.936 kg  163 lb  Bed scale     1/5/2023 68.04 kg  150 lb  Stated     3/22/2023 71.895 kg  158 lb 8 oz  Bed scale      56.7 kg  125 lb  Standing scale      56.7 kg  125 lb  Stated     3/25/2023 " "70.625 kg  155 lb 11.2 oz  Bed scale     3/26/2023 67.722 kg  149 lb 4.8 oz  Bed scale     3/28/2023 67.042 kg  147 lb 12.8 oz  Bed scale     3/29/2023 65.227 kg  143 lb 12.8 oz  Bed scale     3/30/2023 65.454 kg  144 lb 4.8 oz  Bed scale     3/31/2023 66.271 kg  146 lb 1.6 oz  Bed scale     2023 66.18 kg  145 lb 14.4 oz  Bed scale     2023 66.906 kg  147 lb 8 oz  Bed scale     4/3/2023 65.363 kg  144 lb 1.6 oz  Bed scale     2023 65.817 kg  145 lb 1.6 oz  Bed scale     2023 66.225 kg  146 lb  Bed scale       Weight change:   Had a significant weight loss of 12lbs (7.6%) in ~1 month     Nutrition Focused Physical Exam     Date:      NFPE completed, patient meets criteria for MSA at this time.     Needs Assessment   Date:     Height used:Height: 165.1 cm (65\")  Weights used: 66kg CBW; 57kg IBW      Estimated Calorie needs: ~ 1525 Kcal/day  Method: 23-28 Kcals/KG = 9360-4175  Method:  MSJ (1085) x1.4 = 1519    Estimated Protein needs: ~ 90 g PRO/day  Method: 1.3-1.5g/Kg = 86-99    Estimated Fluid needs: ~ ml/day   Per clinical status    Current Nutrition Prescription     PO: Diet: Liquid Diets; Full Liquid; Texture: Regular Texture (IDDSI 7); Fluid Consistency: Thin (IDDSI 0)  Oral Nutrition Supplement:         EN: Peptamen AF  Goal Rate:  55mL/hr Water Flushes: 20mL/hr  Modular: None  Route: PEG  Tube: 20 PEG    At goal over: 12Hrs/day    Rx will supply:   Goal Volume 660 mL/day     Flush Volume 240 mL/day     Energy 792 Kcal/day 52 % Est Need   Protein 50 g/day 56 % Est Need   Fiber 0 g/day     Water in   mL     Total Water 775 mL     Meet DRI No        --------------------------------------------------------------------------  Product/Rate verified at bedside: Yes  Infusing Rate at time of visit: nocturnal feeds - pump set to 30mL/hr    Average Delivery from [facility]:  Volume 428 mL/day 65  % Goal Vol.   Flush Volume 402 mL/day     Energy 514 Kcal/day 34 % Est Need   Protein 33 " g/day 37 % Est Need   Fiber 3 g/day     Water in   mL     Total Water 749 mL     Meet DRI No                Nutrition Diagnosis   Date: 4/11 Updated:   Problem Malnutrition acute severe   Etiology Dysphagia & recent abdominal surgery   Signs/Symptoms significant weight loss of 7.6% x1 month & <50% of EEN for >5d.    Status:     Date: 4/11  Updated: 4/12, 4/14  Problem Inadequate enteral nutrition infusion   Etiology Dysphagia   Signs/Symptoms EN not yet restarted, intolerance to previous regimen and not at goal   Status: EN off, PO diet initiated, nocturnal feeds ordered    Goal:   General: Nutrition support treatment, Nutrition to support treatment, Improve nutrition related labs  PO: Increase intake  EN/PN: Establish EN tolerance, Tolerate EN at goal    Nutrition Intervention      Follow treatment progress, Care plan reviewed    No documented BM x4 days, suspect may be contributing factor for PO intolerance and nausea with EN - would recommend PRN suppository to promote BM.     Pt reports inability to drink ONS 2/2 fullness from soup broth - encouraged to drink ONS or hold off tray to have between meals as able.     Monitoring/Evaluation:   Per protocol, I&O, PO intake, Supplement intake, Pertinent labs, EN delivery/tolerance, Weight, GI status, Symptoms      Suad Emmanuel, MS,RD,LD  Time Spent: 30min

## 2023-04-20 NOTE — PLAN OF CARE
Goal Outcome Evaluation:  Pt up in bed, states nausea after meals, zofran given, room air, no tele ordered, encouraged activity with pt.

## 2023-04-20 NOTE — DISCHARGE PLACEMENT REQUEST
"   Contact [facility]  [phone]    Amanda Gomez (89 y.o. Female)     Date of Birth   1933    Social Security Number       Address   [address] [id number]    Home Phone   [phone]    MRN   [id number]       [facility]    Marital Status                               Admission Date   4/10/23    Admission Type   Urgent    Admitting Provider   Anaya Jones MD    Attending Provider   Anaya Jones MD    Department, Room/Bed   [facility] 6B, N640/1       Discharge Date       Discharge Disposition       Discharge Destination                               Attending Provider: Anaya Jones MD    Allergies: Motrin [Ibuprofen], Novocain [Procaine]    Isolation: None   Infection: None   Code Status: CPR    Ht: 165.1 cm (65\")   Wt: 66.2 kg (146 lb)    Admission Cmt: None   Principal Problem: Esophageal stricture [K22.2]                 Active Insurance as of 4/10/2023     Primary Coverage     Payor Plan Insurance Group Employer/Plan Group    ANTH MEDICARE REPLACEMENT ANTH MEDICARE ADVANTAGE [id number]     Payor Plan Address Payor Plan Phone Number Payor Plan Fax Number Effective Dates    [address] [phone]  2019 - None Entered    [facility] [address]       Subscriber Name Subscriber Birth Date Member ID       AMANDA GOMEZ 1933 [id number]                 Emergency Contacts      (Rel.) Home Phone Work Phone Mobile Phone    Kaleb Gomez (healthcare surrogate) (Son) -- -- [phone]    Ziyad Gomez (alternative healthcare surrogate) (Son) -- -- [phone]    Filiberto Gomez (second alternative HCS) (Son) -- -- [phone]               Physician Progress Notes (most recent note)      Anaya Jones MD at 23 0703              [facility] Medicine Services  PROGRESS NOTE    Patient Name: Amanda Gomez  : 1933  MRN: [id number]    Date of Admission: 4/10/2023  Primary " Care Physician: Dave Tobar MD    Subjective   Subjective     CC:  Follow-up dysphagia    HPI:  I have seen and evaluated the patient this afternoon.  Comfortable in the recliner at bedside.  Complaining of mild discomfort left upper quadrant at the site of the PEG tube.  Per nursing report, she is tolerating her tube feed overnight well, currently at a rate of 55.  Residual volume in the morning was 200 mL only.  Patient is afraid to eat solid food but able to drink Ensure and keep it down.  No nausea vomiting    ROS:  Gen- No fevers, chills, +dysphagia   CV- No chest pain, palpitations  Resp- No cough, dyspnea  GI- No N/V/D, + abd pain    Objective   Objective     Vital Signs:   Temp:  [96 °F (35.6 °C)-97.9 °F (36.6 °C)] 97.6 °F (36.4 °C)  Heart Rate:  [] 116  Resp:  [18] 18  BP: (120-144)/(67-92) 124/89  Flow (L/min):  [1] 1     Physical Exam:  General: Comfortable, chronically ill looking, conversant and cooperative  Head: Atraumatic and normocephalic  Eyes: No Icterus. No pallor  Ears:  Ears appear intact with no abnormalities noted  Throat: No oral lesions, no thrush  Neck: Supple, trachea midline  Lungs: Clear to auscultation bilaterally, equal air entry, no wheezing or crackles  Heart:  Normal S1 and S2, no murmur, no gallop, No JVD, no lower extremity swelling  Abdomen:  Soft, no tenderness, no organomegaly, normal bowel sounds, no organomegaly, PEG tube site is clean with no erythema or discharge  Extremities: pulses equal bilaterally  Skin: No bleeding, bruising or rash, normal skin turgor and elasticity  Neurologic: Cranial nerves appear intact with no evidence of facial asymmetry, normal motor and sensory functions in all 4 extremities  Psych: Alert and oriented x 3, normal mood    Results Reviewed:  LAB RESULTS:      Lab 04/19/23  0352 04/17/23  1506   WBC 13.57* 13.29*   HEMOGLOBIN 10.3* 9.9*   HEMATOCRIT 32.3* 30.5*   PLATELETS 359 337   NEUTROS ABS  --  10.67*   IMMATURE GRANS (ABS)  --   0.25*   LYMPHS ABS  --  1.06   MONOS ABS  --  0.73   EOS ABS  --  0.53*   MCV 97.9* 94.4         Lab 04/19/23  0352 04/18/23  2115 04/17/23  1506   SODIUM 130* 130* 129*   POTASSIUM 4.4 4.2 4.4   CHLORIDE 91* 93* 91*   CO2 30.0* 30.0* 30.0*   ANION GAP 9.0 7.0 8.0   BUN 8 8 7*   CREATININE 0.35* 0.34* 0.37*   EGFR 97.8 98.5 96.5   GLUCOSE 105* 128* 113*   CALCIUM 7.8* 8.2* 8.2*   MAGNESIUM 1.8  --  1.4*                         Brief Urine Lab Results  (Last result in the past 365 days)      Color   Clarity   Blood   Leuk Est   Nitrite   Protein   CREAT   Urine HCG        01/06/23 0149 Yellow   Clear   Negative   Negative   Negative   Negative                 Microbiology Results Abnormal     None          XR Abdomen KUB    Result Date: 4/18/2023  XR ABDOMEN KUB Date of Exam: 4/18/2023 3:39 PM EDT Indication: abdominal pain. Comparison: None available. Findings: G-tube overlying the stomach. No evidence of bowel obstruction. There is oral contrast within the colon. No evidence of bowel obstruction. There are small bilateral pleural effusions. Left greater than right. Regional skeleton is unremarkable.     Impression: Impression: 1. G-tube overlying the stomach. 2. No evidence of bowel obstruction. Electronically Signed: Alejandro Roberto  4/18/2023 4:05 PM EDT  Workstation ID: [id number]      Results for orders placed during the hospital encounter of 03/22/23    Adult Transthoracic Echo Complete W/ Cont if Necessary Per Protocol    Interpretation Summary  •  Left ventricular systolic function is normal. Calculated left ventricular EF = 60% Left ventricular ejection fraction appears to be 56 - 60%.  •  Left ventricular diastolic function is consistent with (grade I) impaired relaxation and age.  •  Moderate to severe mitral valve regurgitation is present.  •  Moderate tricuspid valve regurgitation is present.  •  Estimated right ventricular systolic pressure from tricuspid regurgitation is moderately elevated (45-55  mmHg).  •  Moderate pulmonary hypertension is present.      Current medications:  Scheduled Meds:aspirin, 81 mg, Per PEG Tube, Daily  carvedilol, 25 mg, Per PEG Tube, BID With Meals  sennosides, 10 mL, Per PEG Tube, BID   And  docusate sodium, 100 mg, Per PEG Tube, BID  enoxaparin, 40 mg, Subcutaneous, Nightly  gabapentin, 100 mg, Per PEG Tube, Q12H  hydrALAZINE, 75 mg, Per PEG Tube, Q8H  levothyroxine, 112 mcg, Per PEG Tube, Q AM  lidocaine, 1 patch, Transdermal, Q24H  metoclopramide, 5 mg, Oral, 4x Daily AC & at Bedtime  pantoprazole, 40 mg, Intravenous, BID AC  sodium chloride, 10 mL, Intravenous, Q12H  valsartan, 320 mg, Per PEG Tube, Q24H      Continuous Infusions:   PRN Meds:.•  acetaminophen **OR** acetaminophen **OR** acetaminophen  •  [DISCONTINUED] senna-docusate sodium **AND** polyethylene glycol **AND** [DISCONTINUED] bisacodyl **AND** bisacodyl  •  cloNIDine  •  HYDROcodone-acetaminophen  •  ipratropium-albuterol  •  [DISCONTINUED] ondansetron **OR** ondansetron  •  sodium chloride  •  sodium chloride    Assessment & Plan   Assessment & Plan     Active Hospital Problems    Diagnosis  POA   • **Esophageal stricture [K22.2]  Unknown   • Severe malnutrition [E43]  Unknown   • Hypothyroidism (acquired) [E03.9]  Unknown   • S/P percutaneous endoscopic gastrostomy (PEG) tube placement [Z93.1]  Not Applicable   • Adenocarcinoma [C80.1]  Unknown   • Perforated ulcer [K27.5]  Yes   • Former smoker [Z87.891]  Not Applicable   • Coronary artery disease  [I25.10]  Yes   • Chronic obstructive pulmonary disease [J44.9]  Yes   • Essential hypertension [I10]  Yes   • ASCVD (arteriosclerotic cardiovascular disease) [I25.10]  Yes      Resolved Hospital Problems   No resolved problems to display.        Brief Hospital Course to date:  Christine Garg is a 89 y.o. female with past medical history of essential hypertension, dyslipidemia, coronary artery disease, peripheral artery disease, hypothyroidism, COPD who was  admitted to [facility] on 3/22 for abdominal pain; went to OR, had perforated duodenal ulcer repaired with omental patch.  Then on 3/29, patient had CT of thoracic spine which noted left pleural consolidation and parenchymal nodules.  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  She was also found to have an esophageal stricture secondary to extrinsic compression of 3cm peritracheal lymph node.  On 4/7, patient had PEG tube placed.  She was sent to [facility] for placement of esophageal stent by Dr. Waters.        This patient's problems and plans were partially entered by my partner and updated as appropriate by me 04/19/23.     Assessment & Plan:  Dysphagia due to external esophageal compression by LAD  S/p PEG  · EGD 4/12 with esophageal stent placement  · Plan for full liquid diet x 1 week then advance to puree 4/19 if tolerating  · Currently on nocturnal tube feed and tolerating it well  · Has residual volume of 200 mL at the end of the night shift.    · Increase Reglan to 10 mg every 6 hourly as IV  · Encourage p.o. intake.  Drinking clears and tolerating it well with no nausea vomiting    Cancer related pain  · Continue as needed [place] to q4h     Perforated Duodenal Ulcer s/p exploratory laparotomy with patch 3/22  · Continue PPI twice daily  · Continue lower aspirin to 81 mg daily instead of 325 mg      New diagnosis of lung adenocarcinoma  COPD  Former smoker   On oxygen 2L here   · Continue PRN duonebs  · Follow up outpatient for treatment plan with Dr. Balbuena  · Lidoderm patch for left shoulder blade pain which is likely related to nerve compression from known T-spine lesion.    · Continue gabapentin      Coronary artery disease  Dyslipidemia  Essential hypertension  · Lowered home aspirin from 325 mg to 81mg. Will need to make this clear at discharge in order to prevent further ulceration  · Currently blood pressure controlled with valsartan,  Coreg and hydralazine  · Continue as needed clonidine    Hypoalbuminemia   Severe malnutrition  · Nutrition consulted  · Start nocturnal tube feeds     Hypothyroidism  · Continue home synthroid      Debility  · PT  · Patient had been approved to [facility] for subacute rehab in [place]. Patient would still like to go there after discharge. Patient is to follow up outpatient for treatment options regarding her cancer.      Expected Discharge Location and Transportation: Rehab  Expected Discharge                          DVT prophylaxis:  Medical and mechanical DVT prophylaxis orders are present.     AM-PAC 6 Clicks Score (PT): 12 (23 0805)    CODE STATUS:   Code Status and Medical Interventions:   Ordered at: 04/10/23 3630     Code Status (Patient has no pulse and is not breathing):    CPR (Attempt to Resuscitate)     Medical Interventions (Patient has pulse or is breathing):    Full Support     Copied text in this note has been reviewed and is accurate as of 23.     Anaya Jones MD  23      Electronically signed by Anaya Jones MD at 23 1545          Physical Therapy Notes (most recent note)      Clare Fulton, PT Student at 23 1131  Version 1 of 1    Attestation signed by Yajaira Alvarado, PT at 23 1515    Yajaira Alvarado PT 2023 15:15 EDT                  Patient Name: Christine Garg  : 1933    MRN: [id number]                              Today's Date: 2023       Admit Date: 4/10/2023    Visit Dx:     ICD-10-CM ICD-9-CM   1. Esophageal stricture  K22.2 530.3     Patient Active Problem List   Diagnosis   • Essential hypertension   • Dyslipidemia   • ASCVD (arteriosclerotic cardiovascular disease)   • Palpitations   • Coronary artery disease    • Abnormal PFTs (pulmonary function tests)   • Chronic obstructive pulmonary disease   • Mild persistent asthma with allergic rhinitis   • Pulmonary nodule   • Former smoker   • Colitis   •  Perforated ulcer   • Acute gastric ulcer with perforation   • Moderate malnutrition   • Esophageal stricture   • Hypothyroidism (acquired)   • S/P percutaneous endoscopic gastrostomy (PEG) tube placement   • Adenocarcinoma   • Severe malnutrition     Past Medical History:   Diagnosis Date   • Advanced age    • CAD (coronary artery disease) 2011    s/p 3v CABG   • Chronic kidney disease (CKD), stage III (moderate)    • COPD (chronic obstructive pulmonary disease)    • History of tobacco use    • Hyperlipidemia    • Hypertension    • Hypothyroidism    • PONV (postoperative nausea and vomiting)    • Pulmonary nodule      Past Surgical History:   Procedure Laterality Date   • BREAST BIOPSY Left     benign   • CATARACT EXTRACTION     • CORONARY ANGIOPLASTY     • CORONARY ARTERY BYPASS GRAFT     • ENDOSCOPY W/ PEG TUBE PLACEMENT N/A 4/7/2023    Procedure: ESOPHAGOGASTRODUODENOSCOPY WITH PERCUTANEOUS ENDOSCOPIC GASTROSTOMY TUBE INSERTION;  Surgeon: Aurora Kirkland MD;  Location: [facility];  Service: General;  Laterality: N/A;   • ENDOSCOPY W/ STENT PLACEMENT/REMOVAL N/A 4/12/2023    Procedure: ESOPHAGOGASTRODUODENOSCOPY WITH STENT PLACEMENT WITH FLUOROSCOPY;  Surgeon: Brunner, Mark I, MD;  Location: [facility] ENDOSCOPY;  Service: Gastroenterology;  Laterality: N/A;   • EXPLORATORY LAPAROTOMY N/A 3/22/2023    Procedure: LAPAROTOMY EXPLORATORY WITH OVER SEW OF DUODENAL ULCER WITH OMENTAL PATCH AND BIOPATCH AND CENTRAL LINE PLACEMENT;  Surgeon: Aurora Kirkland MD;  Location: [facility];  Service: General;  Laterality: N/A;      General Information     Row Name 04/17/23 1416          Physical Therapy Time and Intention    Document Type therapy note (daily note) (P)   -HT     Mode of Treatment physical therapy (P)   -HT     Row Name 04/17/23 1416          General Information    Patient Profile Reviewed yes (P)   -HT     Existing Precautions/Restrictions other (see comments);oxygen therapy device and L/min;fall (P)   abdominal  incision, PEG, incontinence  -HT     Barriers to Rehab medically complex;previous functional deficit (P)   -HT     Row Name 04/17/23 1416          Cognition    Orientation Status (Cognition) oriented x 3 (P)   -HT     Row Name 04/17/23 1416          Safety Issues, Functional Mobility    Safety Issues Affecting Function (Mobility) awareness of need for assistance;insight into deficits/self-awareness;safety precaution awareness;safety precautions follow-through/compliance;sequencing abilities (P)   -HT     Impairments Affecting Function (Mobility) endurance/activity tolerance;strength;balance (P)   -HT           User Key  (r) = Recorded By, (t) = Taken By, (c) = Cosigned By    Initials Name Provider Type    HT Clare Fulton, PT Student PT Student               Mobility     Row Name 04/17/23 1417          Bed Mobility    Supine-Sit [place] (Bed Mobility) moderate assist (50% patient effort);1 person assist;verbal cues (P)   -HT     Assistive Device (Bed Mobility) bed rails;head of bed elevated (P)   -HT     Comment, (Bed Mobility) cues for sequencing to avoid abdominal incision pain. Required assistance bringing LEs off bed. (P)   -HT     Row Name 04/17/23 1417          Sit-Stand Transfer    Sit-Stand [place] (Transfers) moderate assist (50% patient effort);1 person assist (P)   -HT     Assistive Device (Sit-Stand Transfers) walker, front-wheeled (P)   -HT     Comment, (Sit-Stand Transfer) 4x STS, 3x EOB and 1x chair for brief placement d/t incontinence. Pt required seated rest breaks between STS d/t dizziness/nausea VSS. (P)   -HT     Row Name 04/17/23 1417          Gait/Stairs (Locomotion)    [place] Level (Gait) minimum assist (75% patient effort);1 person assist (P)   -HT     Assistive Device (Gait) walker, front-wheeled (P)   -HT     Distance in Feet (Gait) 10 (P)   -HT     Deviations/Abnormal Patterns (Gait) bilateral deviations;base of support, wide;denny decreased;stride length  decreased;weight shifting decreased (P)   -HT     Bilateral Gait Deviations forward flexed posture;heel strike decreased (P)   -HT     Comment, (Gait/Stairs) Pt demonstrated step through gait pattern with decreased stride length resembling shuffling B, fwd trunk, decreased heel strike B, and required cues for upright posture, PLB, and increased stride length. Gait limited by poor endurance, balance deficits, and generalized weakness. (P)   -HT           User Key  (r) = Recorded By, (t) = Taken By, (c) = Cosigned By    Initials Name Provider Type     Clare Fulton, PT Student PT Student               Obj/Interventions     Row Name 04/17/23 1424          Balance    Balance Assessment sitting static balance;sitting dynamic balance;standing static balance;standing dynamic balance (P)   -HT     Static Sitting Balance independent (P)   -HT     Dynamic Sitting Balance contact guard (P)   -HT     Position, Sitting Balance supported (P)   -HT     Static Standing Balance contact guard (P)   -HT     Dynamic Standing Balance minimal assist (P)   -HT     Position/Device Used, Standing Balance walker, rolling (P)   -HT           User Key  (r) = Recorded By, (t) = Taken By, (c) = Cosigned By    Initials Name Provider Type     Clare Fulton, PT Student PT Student               Goals/Plan    No documentation.                Clinical Impression     Row Name 04/17/23 1426          Pain    Pretreatment Pain Rating 0/10 - no pain (P)   -HT     Posttreatment Pain Rating 0/10 - no pain (P)   -HT     Additional Documentation Pain Scale: Numbers Pre/Post-Treatment (Group) (P)   -HT     Row Name 04/17/23 1426          Plan of Care Review    Progress no change (P)   -HT     Outcome Evaluation Pt ambulated 10' with min A/RW refusing further mobility d/t dizziness/nausea with VSS showing limitations of poor endurance, balance deficits, and generalized weakness. Rec skilled PT to increase ind with mobility and d/c to IRF. (P)   -HT      Row Name 04/17/23 1426          Therapy Assessment/Plan (PT)    Criteria for Skilled Interventions Met (PT) yes;meets criteria;skilled treatment is necessary (P)   -HT     Row Name 04/17/23 1426          Vital Signs    Pre Systolic BP Rehab 141 (P)   -HT     Pre Treatment Diastolic BP 59 (P)   -HT     Post Systolic BP Rehab 155 (P)   -HT     Post Treatment Diastolic BP 82 (P)   -HT     Pretreatment Heart Rate (beats/min) 77 (P)   -HT     Posttreatment Heart Rate (beats/min) 77 (P)   -HT     Pre SpO2 (%) 99 (P)   -HT     O2 Delivery Pre Treatment nasal cannula (P)   -HT     Intra SpO2 (%) 90 (P)   -HT     O2 Delivery Intra Treatment nasal cannula (P)   -HT     Post SpO2 (%) 99 (P)   -HT     O2 Delivery Post Treatment nasal cannula (P)   -HT     Pre Patient Position Supine (P)   -HT     Intra Patient Position Standing (P)   -HT     Post Patient Position Sitting (P)   -HT     Row Name 04/17/23 1426          Positioning and Restraints    Pre-Treatment Position in bed (P)   -HT     Post Treatment Position chair (P)   -HT     In Chair notified nsg;reclined;sitting;call light within reach;encouraged to call for assist;exit alarm on;LUE elevated;RUE elevated;legs elevated;waffle cushion;on mechanical lift sling (P)   -HT           User Key  (r) = Recorded By, (t) = Taken By, (c) = Cosigned By    Initials Name Provider Type    HT Clare Fulton, PT Student PT Student               Outcome Measures     Row Name 04/17/23 1429          How much help from another person do you currently need...    Turning from your back to your side while in flat bed without using bedrails? 2 (P)   -HT     Moving from lying on back to sitting on the side of a flat bed without bedrails? 2 (P)   -HT     Moving to and from a bed to a chair (including a wheelchair)? 2 (P)   -HT     Standing up from a chair using your arms (e.g., wheelchair, bedside chair)? 2 (P)   -HT     Climbing 3-5 steps with a railing? 1 (P)   -HT     To walk in hospital room?  3 (P)   -HT     AM-PAC 6 Clicks Score (PT) 12 (P)   -HT     Highest level of mobility 4 --> Transferred to chair/commode (P)   -HT     Row Name 04/17/23 1429 04/17/23 1425       Functional Assessment    Outcome Measure Options AM-PAC 6 Clicks Basic Mobility (PT) (P)   -HT AM-PAC 6 Clicks Daily Activity (OT)  -MATHEW          User Key  (r) = Recorded By, (t) = Taken By, (c) = Cosigned By    Initials Name Provider Type    MATHEW Laura Osman, OT Occupational Therapist    HT Clare Fulton, PT Student PT Student                             Physical Therapy Education     Title: PT OT SLP Therapies (In Progress)     Topic: Physical Therapy (In Progress)     Point: Mobility training (Done)     Learning Progress Summary           Patient Acceptance, E, VU,NR by HT at 4/17/2023 1431    Acceptance, E,D,H, NR by DM at 4/14/2023 1737    Acceptance, E, VU,NR by HT at 4/11/2023 1500                   Point: Home exercise program (In Progress)     Learning Progress Summary           Patient Acceptance, E,D,H, NR by DM at 4/14/2023 1737                   Point: Body mechanics (Done)     Learning Progress Summary           Patient Acceptance, E, VU,NR by HT at 4/17/2023 1431    Acceptance, E,D,H, NR by DM at 4/14/2023 1737    Acceptance, E, VU,NR by HT at 4/11/2023 1500                   Point: Precautions (Done)     Learning Progress Summary           Patient Acceptance, E, VU,NR by HT at 4/17/2023 1431    Acceptance, E,D,H, NR by DM at 4/14/2023 1737    Acceptance, E, VU,NR by HT at 4/11/2023 1500                               User Key     Initials Effective Dates Name Provider Type Discipline    DM 02/03/23 -  Rubi Nick, PT Physical Therapist PT     01/12/23 -  Clare Fulton, PT Student PT Student PT              PT Recommendation and Plan  Planned Therapy Interventions (PT): balance training, bed mobility training, gait training, home exercise program, postural re-education, patient/family education, neuromuscular  re-education, stair training, strengthening, transfer training  Plan of Care Reviewed With: patient  Progress: (P) no change  Outcome Evaluation: (P) Pt ambulated 10' with min A/RW refusing further mobility d/t dizziness/nausea with VSS showing limitations of poor endurance, balance deficits, and generalized weakness. Rec skilled PT to increase ind with mobility and d/c to IRF.     Time Calculation:    PT Charges     Row Name 23 1431             Time Calculation    Start Time 1131 (P)   -HT      PT Received On 23 (P)   -HT         Timed Charges    72637 - PT Therapeutic Activity Minutes 38 (P)   -HT         Total Minutes    Timed Charges Total Minutes 38 (P)   -HT       Total Minutes 38 (P)   -HT            User Key  (r) = Recorded By, (t) = Taken By, (c) = Cosigned By    Initials Name Provider Type    HT Clare Fulton, PT Student PT Student              Therapy Charges for Today     Code Description Service Date Service Provider Modifiers Qty    [id number] HC PT THERAPEUTIC ACT EA 15 MIN 2023 Clare Fulton, PT Student GP 3          PT G-Codes  Outcome Measure Options: (P) AM-PAC 6 Clicks Basic Mobility (PT)  AM-PAC 6 Clicks Score (PT): (P) 12  AM-PAC 6 Clicks Score (OT): 12  PT Discharge Summary  Anticipated Discharge Disposition (PT): inpatient rehabilitation facility    Clare Fulton PT Student  2023      Electronically signed by Yajaira Alvarado, PT at 23 1515          Occupational Therapy Notes (most recent note)      Ana M Presley, OT at 23 1510          Patient Name: Christine Garg  : 1933    MRN: [id number]                              Today's Date: 2023       Admit Date: 4/10/2023    Visit Dx:     ICD-10-CM ICD-9-CM   1. Esophageal stricture  K22.2 530.3     Patient Active Problem List   Diagnosis   • Essential hypertension   • Dyslipidemia   • ASCVD (arteriosclerotic cardiovascular disease)   • Palpitations   • Coronary artery disease    • Abnormal PFTs  (pulmonary function tests)   • Chronic obstructive pulmonary disease   • Mild persistent asthma with allergic rhinitis   • Pulmonary nodule   • Former smoker   • Colitis   • Perforated ulcer   • Acute gastric ulcer with perforation   • Moderate malnutrition   • Esophageal stricture   • Hypothyroidism (acquired)   • S/P percutaneous endoscopic gastrostomy (PEG) tube placement   • Adenocarcinoma   • Severe malnutrition     Past Medical History:   Diagnosis Date   • Advanced age    • CAD (coronary artery disease) 2011    s/p 3v CABG   • Chronic kidney disease (CKD), stage III (moderate)    • COPD (chronic obstructive pulmonary disease)    • History of tobacco use    • Hyperlipidemia    • Hypertension    • Hypothyroidism    • PONV (postoperative nausea and vomiting)    • Pulmonary nodule      Past Surgical History:   Procedure Laterality Date   • BREAST BIOPSY Left     benign   • CATARACT EXTRACTION     • CORONARY ANGIOPLASTY     • CORONARY ARTERY BYPASS GRAFT     • ENDOSCOPY W/ PEG TUBE PLACEMENT N/A 4/7/2023    Procedure: ESOPHAGOGASTRODUODENOSCOPY WITH PERCUTANEOUS ENDOSCOPIC GASTROSTOMY TUBE INSERTION;  Surgeon: Aurora Kirkland MD;  Location: [facility] OR;  Service: General;  Laterality: N/A;   • ENDOSCOPY W/ STENT PLACEMENT/REMOVAL N/A 4/12/2023    Procedure: ESOPHAGOGASTRODUODENOSCOPY WITH STENT PLACEMENT WITH FLUOROSCOPY;  Surgeon: Brunner, Mark I, MD;  Location: [facility] ENDOSCOPY;  Service: Gastroenterology;  Laterality: N/A;   • EXPLORATORY LAPAROTOMY N/A 3/22/2023    Procedure: LAPAROTOMY EXPLORATORY WITH OVER SEW OF DUODENAL ULCER WITH OMENTAL PATCH AND BIOPATCH AND CENTRAL LINE PLACEMENT;  Surgeon: Aurora Kirkland MD;  Location: [facility] OR;  Service: General;  Laterality: N/A;      General Information     Row Name 04/19/23 1542          OT Time and Intention    Document Type therapy note (daily note)  -LC     Mode of Treatment occupational therapy  -LC     Row Name 04/19/23 1542          General Information     Patient Profile Reviewed yes  -     Prior Level of Function independent:;all household mobility;transfer;ADL's  -     Existing Precautions/Restrictions other (see comments);oxygen therapy device and L/min;fall  abdominal incision, PEG, incontinence  -     Barriers to Rehab medically complex;previous functional deficit  -     Row Name 04/19/23 1542          Cognition    Orientation Status (Cognition) oriented x 3  -     Row Name 04/19/23 1542          Safety Issues, Functional Mobility    Safety Issues Affecting Function (Mobility) awareness of need for assistance;insight into deficits/self-awareness;safety precaution awareness;safety precautions follow-through/compliance;sequencing abilities  -     Impairments Affecting Function (Mobility) balance;endurance/activity tolerance;strength;pain  -           User Key  (r) = Recorded By, (t) = Taken By, (c) = Cosigned By    Initials Name Provider Type     Ana M Presley OT Occupational Therapist                 Mobility/ADL's     Row Name 04/19/23 1543          Bed Mobility    Bed Mobility scooting/bridging;supine-sit  -     Supine-Sit [place] (Bed Mobility) minimum assist (75% patient effort);verbal cues  -     Sit-Supine [place] (Bed Mobility) minimum assist (75% patient effort);verbal cues  -     Comment, (Bed Mobility) VC's to sequence transitioning from supine to sit EOB.  -     Row Name 04/19/23 1543          Transfers    Transfers sit-stand transfer;bed-chair transfer  -     Comment, (Transfers) VC's for hand placement and sequencing  -     Row Name 04/19/23 1543          Bed-Chair Transfer    Bed-Chair [place] (Transfers) verbal cues;minimum assist (75% patient effort)  -     Assistive Device (Bed-Chair Transfers) walker, front-wheeled  -     Row Name 04/19/23 1543          Sit-Stand Transfer    Sit-Stand [place] (Transfers) minimum assist (75% patient effort);verbal cues  -     Assistive Device (Sit-Stand  Transfers) walker, front-wheeled  -     Row Name 04/19/23 1543          Activities of Daily Living    BADL Assessment/Intervention lower body dressing;grooming;bathing  -     Row Name 04/19/23 1543          Lower Body Dressing Assessment/Training    [place] Level (Lower Body Dressing) don;socks;dependent (less than 25% patient effort)  -     Comment, (Lower Body Dressing) Has AE at home  -     Row Name 04/19/23 1543          Bathing Assessment/Intervention    [place] Level (Bathing) lower body;dependent (less than 25% patient effort);proximal lower extremities  -     Comment, (Bathing) Increased time to perform secondary to incontinence episode.  -     Row Name 04/19/23 1543          Grooming Assessment/Training    [place] Level (Grooming) wash face, hands;set up  -     Position (Grooming) supported sitting  -           User Key  (r) = Recorded By, (t) = Taken By, (c) = Cosigned By    Initials Name Provider Type     Ana M Presley OT Occupational Therapist               Obj/Interventions     Row Name 04/19/23 1548          Balance    Balance Assessment sitting static balance;sitting dynamic balance;standing static balance;standing dynamic balance  -     Static Sitting Balance supervision  -     Dynamic Sitting Balance contact guard  -     Position, Sitting Balance supported  -     Static Standing Balance contact guard;verbal cues  -     Dynamic Standing Balance verbal cues;minimal assist  -     Position/Device Used, Standing Balance walker, front-wheeled  -     Balance Interventions sitting;standing;sit to stand;supported;occupation based/functional task;weight shifting activity  -     Comment, Balance Min A - CGA to steady. Cues for upright posture in standing.  -           User Key  (r) = Recorded By, (t) = Taken By, (c) = Cosigned By    Initials Name Provider Type    Ana M Nazario OT Occupational Therapist               Goals/Plan    No documentation.                 Clinical Impression     Row Name 04/19/23 1549          Pain Assessment    Pretreatment Pain Rating 4/10  -     Posttreatment Pain Rating 4/10  -     Pain Location - abdomen  -     Pain Intervention(s) Repositioned;Ambulation/increased activity;Medication (See MAR)  -     Additional Documentation Pain Scale: Word Pre/Post-Treatment (Group)  -     Row Name 04/19/23 1549          Plan of Care Review    Plan of Care Reviewed With patient  -     Progress improving  -     Outcome Evaluation Pt. progressing towards baseline with ADLs and functional mobility. Limited by decreased activity tolerance, strength, and balance. Will continue to progress as able per OT POC to promote return to PLOF. Recommend IPR at discharge.  -     Row Name 04/19/23 1549          Vital Signs    Pre Systolic BP Rehab --  VSS  -     Pre Patient Position Supine  -     Intra Patient Position Standing  -     Post Patient Position Sitting  -     Row Name 04/19/23 1549          Positioning and Restraints    Pre-Treatment Position in bed  -     Post Treatment Position chair  -     In Chair notified nsg;reclined;call light within reach;encouraged to call for assist;exit alarm on;waffle cushion;on mechanical lift sling;legs elevated  -           User Key  (r) = Recorded By, (t) = Taken By, (c) = Cosigned By    Initials Name Provider Type     Ana M Presley, OT Occupational Therapist               Outcome Measures     Row Name 04/19/23 0476          How much help from another is currently needed...    Putting on and taking off regular lower body clothing? 2  -LC     Bathing (including washing, rinsing, and drying) 2  -LC     Toileting (which includes using toilet bed pan or urinal) 1  -LC     Putting on and taking off regular upper body clothing 2  -LC     Taking care of personal grooming (such as brushing teeth) 3  -LC     Eating meals 2  -LC     AM-PAC 6 Clicks Score (OT) 12  -     Row Name 04/19/23 5992           Functional Assessment    Outcome Measure Options AM-PAC 6 Clicks Daily Activity (OT)  -           User Key  (r) = Recorded By, (t) = Taken By, (c) = Cosigned By    Initials Name Provider Type    Ana M Nazario OT Occupational Therapist                Occupational Therapy Education     Title: PT OT SLP Therapies (In Progress)     Topic: Occupational Therapy (In Progress)     Point: ADL training (In Progress)     Description:   Instruct learner(s) on proper safety adaptation and remediation techniques during self care or transfers.   Instruct in proper use of assistive devices.              Learning Progress Summary           Patient Acceptance, E, NR by  at 4/19/2023 1510    Acceptance, E,D, VU,NR by  at 4/17/2023 1426    Comment: UE TE, pacing self, AE for LBD, trasnfer technique    Acceptance, E, NR by  at 4/13/2023 1536    Acceptance, E, NR by  at 4/11/2023 1330                   Point: Home exercise program (Done)     Description:   Instruct learner(s) on appropriate technique for monitoring, assisting and/or progressing therapeutic exercises/activities.              Learning Progress Summary           Patient Acceptance, E,D, VU,NR by  at 4/17/2023 1426    Comment: UE TE, pacing self, AE for LBD, trasnfer technique                   Point: Precautions (In Progress)     Description:   Instruct learner(s) on prescribed precautions during self-care and functional transfers.              Learning Progress Summary           Patient Acceptance, E, NR by  at 4/19/2023 1510    Acceptance, E, NR by  at 4/13/2023 1536    Acceptance, E, NR by  at 4/11/2023 1330                   Point: Body mechanics (In Progress)     Description:   Instruct learner(s) on proper positioning and spine alignment during self-care, functional mobility activities and/or exercises.              Learning Progress Summary           Patient Acceptance, E, NR by  at 4/19/2023 1510    Acceptance, E,D, VU,NR by  at  4/17/2023 1426    Comment: UE TE, pacing self, AE for LBD, trasnfer technique    Acceptance, E, NR by  at 4/13/2023 1536    Acceptance, E, NR by  at 4/11/2023 1330                               User Key     Initials Effective Dates Name Provider Type Discipline     02/03/23 -  Laura Osman, OT Occupational Therapist OT     06/16/21 -  Ana M Presley, OT Occupational Therapist OT     10/14/22 -  Ca Luther OT Occupational Therapist OT              OT Recommendation and Plan     Plan of Care Review  Plan of Care Reviewed With: patient  Progress: improving  Outcome Evaluation: Pt. progressing towards baseline with ADLs and functional mobility. Limited by decreased activity tolerance, strength, and balance. Will continue to progress as able per OT POC to promote return to PLOF. Recommend IPR at discharge.     Time Calculation:    Time Calculation- OT     Row Name 04/19/23 1552             Time Calculation- OT    OT Start Time 1510  -LC      OT Received On 04/19/23  -      OT Goal Re-Cert Due Date 04/21/23  -         Timed Charges    66852 - OT Self Care/Mgmt Minutes 23  -LC         Total Minutes    Timed Charges Total Minutes 23  -LC       Total Minutes 23  -LC            User Key  (r) = Recorded By, (t) = Taken By, (c) = Cosigned By    Initials Name Provider Type     Ana M Presley OT Occupational Therapist              Therapy Charges for Today     Code Description Service Date Service Provider Modifiers Qty    31832363012 HC OT SELF CARE/MGMT/TRAIN EA 15 MIN 4/19/2023 Ana M Presley OT GO 2    02369177911 HC OT THER SUPP EA 15 MIN 4/19/2023 Ana M Presley OT GO 1               Ana M Presley OT  4/19/2023    Electronically signed by Ana M Presley OT at 04/19/23 3502

## 2023-04-20 NOTE — PROGRESS NOTES
[facility]  PROGRESS NOTE    Patient Name: Christine Garg  : 1933  MRN: [id number]    Date of Admission: 4/10/2023  Primary Care Physician: Dave Tobar MD    Subjective   Subjective     CC:  Follow-up dysphagia    HPI:  I have seen and evaluated the patient this afternoon.  Just ate regular food per mouth and feeling nauseous.  Having some abdominal discomfort as well.  Per nursing report, could not tolerate more than 30 mill per hour to feed overnight.  Gastric residual volume is around 180 this morning    ROS:  Gen- No fevers, chills, +dysphagia   CV- No chest pain, palpitations  Resp- No cough, dyspnea  GI- No N/V/D, + abd pain    Objective   Objective     Vital Signs:   Temp:  [96.1 °F (35.6 °C)-97.8 °F (36.6 °C)] 96.9 °F (36.1 °C)  Heart Rate:  [] 109  Resp:  [16-20] 16  BP: (114-143)/(70-99) 132/73  Flow (L/min):  [0.5-1] 0.5     Physical Exam:  General: Comfortable, chronically ill looking, conversant and cooperative  Head: Atraumatic and normocephalic  Eyes: No Icterus. No pallor  Ears:  Ears appear intact with no abnormalities noted  Throat: No oral lesions, no thrush  Neck: Supple, trachea midline  Lungs: Clear to auscultation bilaterally, equal air entry, no wheezing or crackles  Heart:  Normal S1 and S2, no murmur, no gallop, No JVD, no lower extremity swelling  Abdomen:  Soft, no tenderness, no organomegaly, normal bowel sounds, no organomegaly, PEG tube site is clean with no erythema or discharge  Extremities: pulses equal bilaterally  Skin: No bleeding, bruising or rash, normal skin turgor and elasticity  Neurologic: Cranial nerves appear intact with no evidence of facial asymmetry, normal motor and sensory functions in all 4 extremities  Psych: Alert and oriented x 3, normal mood    Results Reviewed:  LAB RESULTS:      Lab 23  0352 23  1506   WBC 13.57* 13.29*   HEMOGLOBIN 10.3* 9.9*   HEMATOCRIT 32.3* 30.5*   PLATELETS 359  337   NEUTROS ABS  --  10.67*   IMMATURE GRANS (ABS)  --  0.25*   LYMPHS ABS  --  1.06   MONOS ABS  --  0.73   EOS ABS  --  0.53*   MCV 97.9* 94.4         Lab 04/19/23  0352 04/18/23  2115 04/17/23  1506   SODIUM 130* 130* 129*   POTASSIUM 4.4 4.2 4.4   CHLORIDE 91* 93* 91*   CO2 30.0* 30.0* 30.0*   ANION GAP 9.0 7.0 8.0   BUN 8 8 7*   CREATININE 0.35* 0.34* 0.37*   EGFR 97.8 98.5 96.5   GLUCOSE 105* 128* 113*   CALCIUM 7.8* 8.2* 8.2*   MAGNESIUM 1.8  --  1.4*                         Brief Urine Lab Results  (Last result in the past 365 days)      Color   Clarity   Blood   Leuk Est   Nitrite   Protein   CREAT   Urine HCG        01/06/23 0149 Yellow   Clear   Negative   Negative   Negative   Negative                 Microbiology Results Abnormal     None          XR Abdomen KUB    Result Date: 4/18/2023  XR ABDOMEN KUB Date of Exam: 4/18/2023 3:39 PM EDT Indication: abdominal pain. Comparison: None available. Findings: G-tube overlying the stomach. No evidence of bowel obstruction. There is oral contrast within the colon. No evidence of bowel obstruction. There are small bilateral pleural effusions. Left greater than right. Regional skeleton is unremarkable.     Impression: Impression: 1. G-tube overlying the stomach. 2. No evidence of bowel obstruction. Electronically Signed: Alejandro Roberto  4/18/2023 4:05 PM EDT  Workstation ID: [id number]      Results for orders placed during the hospital encounter of 03/22/23    Adult Transthoracic Echo Complete W/ Cont if Necessary Per Protocol    Interpretation Summary  •  Left ventricular systolic function is normal. Calculated left ventricular EF = 60% Left ventricular ejection fraction appears to be 56 - 60%.  •  Left ventricular diastolic function is consistent with (grade I) impaired relaxation and age.  •  Moderate to severe mitral valve regurgitation is present.  •  Moderate tricuspid valve regurgitation is present.  •  Estimated right ventricular systolic pressure from  tricuspid regurgitation is moderately elevated (45-55 mmHg).  •  Moderate pulmonary hypertension is present.      Current medications:  Scheduled Meds:aspirin, 81 mg, Per PEG Tube, Daily  carvedilol, 25 mg, Per PEG Tube, BID With Meals  sennosides, 10 mL, Per PEG Tube, BID   And  docusate sodium, 100 mg, Per PEG Tube, BID  enoxaparin, 40 mg, Subcutaneous, Nightly  gabapentin, 100 mg, Per PEG Tube, Q12H  hydrALAZINE, 75 mg, Per PEG Tube, Q8H  levothyroxine, 112 mcg, Per PEG Tube, Q AM  lidocaine, 1 patch, Transdermal, Q24H  metoclopramide, 10 mg, Per G Tube, 4x Daily AC & at Bedtime  pantoprazole, 40 mg, Intravenous, BID AC  sodium chloride, 10 mL, Intravenous, Q12H  valsartan, 320 mg, Per PEG Tube, Q24H      Continuous Infusions:   PRN Meds:.•  acetaminophen **OR** acetaminophen **OR** acetaminophen  •  [DISCONTINUED] senna-docusate sodium **AND** polyethylene glycol **AND** [DISCONTINUED] bisacodyl **AND** bisacodyl  •  cloNIDine  •  HYDROcodone-acetaminophen  •  ipratropium-albuterol  •  [DISCONTINUED] ondansetron **OR** ondansetron  •  sodium chloride  •  sodium chloride    Assessment & Plan   Assessment & Plan     Active Hospital Problems    Diagnosis  POA   • **Esophageal stricture [K22.2]  Unknown   • Severe malnutrition [E43]  Unknown   • Hypothyroidism (acquired) [E03.9]  Unknown   • S/P percutaneous endoscopic gastrostomy (PEG) tube placement [Z93.1]  Not Applicable   • Adenocarcinoma [C80.1]  Unknown   • Perforated ulcer [K27.5]  Yes   • Former smoker [Z87.891]  Not Applicable   • Coronary artery disease  [I25.10]  Yes   • Chronic obstructive pulmonary disease [J44.9]  Yes   • Essential hypertension [I10]  Yes   • ASCVD (arteriosclerotic cardiovascular disease) [I25.10]  Yes      Resolved Hospital Problems   No resolved problems to display.        Brief Hospital Course to date:  Christine Garg is a 89 y.o. female with past medical history of essential hypertension, dyslipidemia, coronary artery disease,  peripheral artery disease, hypothyroidism, COPD who was admitted to [facility] on 3/22 for abdominal pain; went to OR, had perforated duodenal ulcer repaired with omental patch.  Then on 3/29, patient had CT of thoracic spine which noted left pleural consolidation and parenchymal nodules.  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  She was also found to have an esophageal stricture secondary to extrinsic compression of 3cm peritracheal lymph node.  On 4/7, patient had PEG tube placed.  She was sent to [facility] for placement of esophageal stent by Dr. Waters.        This patient's problems and plans were partially entered by my partner and updated as appropriate by me 04/20/23.     Assessment & Plan:  Dysphagia due to external esophageal compression by LAD  S/p PEG  · EGD 4/12 with esophageal stent placement  · Plan for full liquid diet x 1 week then advance to puree 4/19 if tolerating  · Currently on nocturnal tube feed and tolerating it well  · Has residual volume of 200 mL at the end of the night shift.    · Increase Reglan to 10 mg every 6 hourly   · Encourage p.o. intake.  Drinking clears and tolerating it well with no nausea vomiting  · Because of nausea, will have her on scheduled dose Zofran 8 mg every 6 today with as needed rectal Phenergan (no IV  line to administer IV antiemetics)  · Obtain CT abdomen given her abdominal discomfort and persistent nausea    Cancer related pain  · Continue as needed Tichnor to q4h     Perforated Duodenal Ulcer s/p exploratory laparotomy with patch 3/22  · Continue PPI twice daily  · Continue lower aspirin to 81 mg daily instead of 325 mg      New diagnosis of lung adenocarcinoma  COPD  Former smoker   On oxygen 2L here   · Continue PRN duonebs  · Follow up outpatient for treatment plan with Dr. Balbuena  · Lidoderm patch for left shoulder blade pain which is likely related to nerve compression from known T-spine  lesion.    · Continue gabapentin      Coronary artery disease  Dyslipidemia  Essential hypertension  · Lowered home aspirin from 325 mg to 81mg. Will need to make this clear at discharge in order to prevent further ulceration  · Currently blood pressure controlled with valsartan, Coreg and hydralazine  · Continue as needed clonidine    Hypoalbuminemia   Severe malnutrition  · Nutrition consulted  · Start nocturnal tube feeds     Hypothyroidism  · Continue home synthroid      Debility  · PT  · Patient had been approved to [facility] for subacute rehab in [place]. Patient would still like to go there after discharge. Patient is to follow up outpatient for treatment options regarding her cancer.      Expected Discharge Location and Transportation: Rehab  Expected Discharge 4/21                         DVT prophylaxis:  Medical and mechanical DVT prophylaxis orders are present.     AM-PAC 6 Clicks Score (PT): 12 (04/17/23 3604)    CODE STATUS:   Code Status and Medical Interventions:   Ordered at: 04/10/23 5378     Code Status (Patient has no pulse and is not breathing):    CPR (Attempt to Resuscitate)     Medical Interventions (Patient has pulse or is breathing):    Full Support     Copied text in this note has been reviewed and is accurate as of 04/20/23.     Anaya Jones MD  04/20/23

## 2023-04-20 NOTE — CASE MANAGEMENT/SOCIAL WORK
Continued Stay Note  [facility]     Patient Name: Christine Garg  MRN: [id number]  Today's Date: 2023    Admit Date: 4/10/2023    Plan: Rehab   Discharge Plan     Row Name 23 1233       Plan    Plan Rehab    Patient/Family in Agreement with Plan yes    Plan Comments Spoke with Odette, with [facility] today. She informed me that patient insurance approval  and they had to give the bed away. CM updated MD, patient and son, Kaleb. In light of [facility] not have a bed and may not have one until next week, new referrals have been made to the [place] area. CM made a referral to [facility] bed in [place], as well. Patient wants to go to [facility]. Explained that they may not have a bed. Will await bed offers and follow up with patient and family. CM will follow.    Final Discharge Disposition Code 03 - skilled nursing facility (SNF)    Row Name 23 1051       Plan    Plan SW    Plan Comments SW’er faxed out referrals to [facility], [facility] (called [facility] rep about facility in this area; left a message), [facility]. Awaiting bed offer               Discharge Codes    No documentation.               Expected Discharge Date and Time     Expected Discharge Date Expected Discharge Time    2023             Vale Lechuga RN

## 2023-04-21 NOTE — PROGRESS NOTES
[facility] Medicine Services  PROGRESS NOTE    Patient Name: Christine Garg  : 1933  MRN: [id number]    Date of Admission: 4/10/2023  Primary Care Physician: Dave Tobar MD    Subjective   Subjective     CC:  Follow-up dysphagia    HPI:  Resting in bed in no acute distress and does not have any specific complaint.  Tells me that overall she is comfortable.  No fever or chills.    ROS:  Gen- No fevers, chills,  CV- No chest pain, palpitations  Resp- No cough, dyspnea  GI- No N/V/D, n    Objective   Objective     Vital Signs:   Temp:  [94.8 °F (34.9 °C)-96.8 °F (36 °C)] 94.8 °F (34.9 °C)  Heart Rate:  [] 106  Resp:  [16-18] 18  BP: (104-146)/(70-92) 137/89  Flow (L/min):  [0.5] 0.5     Physical Exam:  Constitutional: No acute distress  HENT: NCAT, mucous membranes moist  Respiratory: Clear to auscultation bilaterally, respiratory effort normal   Cardiovascular: RRR, no murmurs, rubs, or gallops  Gastrointestinal: Positive bowel sounds, soft, nontender, nondistended, PEG tube in place and site is clean.  Musculoskeletal:  bilateral ankle edema  Psychiatric: Appropriate affect, cooperative  Neurologic: Awake, alert, follows commands, speech clear  Skin: No rashes    Results Reviewed:  LAB RESULTS:      Lab 23  0704 23  0352 23  1506   WBC 17.81* 13.57* 13.29*   HEMOGLOBIN 10.9* 10.3* 9.9*   HEMATOCRIT 32.2* 32.3* 30.5*   PLATELETS 395 359 337   NEUTROS ABS 14.66*  --  10.67*   IMMATURE GRANS (ABS) 0.17*  --  0.25*   LYMPHS ABS 1.51  --  1.06   MONOS ABS 0.96*  --  0.73   EOS ABS 0.44*  --  0.53*   MCV 94.7 97.9* 94.4         Lab 23  0704 23  0352 23  2115 23  1506   SODIUM 127* 130* 130* 129*   POTASSIUM 4.7 4.4 4.2 4.4   CHLORIDE 87* 91* 93* 91*   CO2 32.0* 30.0* 30.0* 30.0*   ANION GAP 8.0 9.0 7.0 8.0   BUN 12 8 8 7*   CREATININE 0.51* 0.35* 0.34* 0.37*   EGFR 89.4 97.8 98.5 96.5   GLUCOSE 118* 105* 128* 113*   CALCIUM 8.6 7.8*  8.2* 8.2*   MAGNESIUM  --  1.8  --  1.4*         Lab 04/21/23  0704   TOTAL PROTEIN 4.7*   ALBUMIN 2.6*   GLOBULIN 2.1   ALT (SGPT) 23   AST (SGOT) 20   BILIRUBIN <0.2   ALK PHOS 107                     Brief Urine Lab Results  (Last result in the past 365 days)      Color   Clarity   Blood   Leuk Est   Nitrite   Protein   CREAT   Urine HCG        01/06/23 0149 Yellow   Clear   Negative   Negative   Negative   Negative                 Microbiology Results Abnormal     None          CT Abdomen Pelvis Without Contrast    Result Date: 4/20/2023  CT ABDOMEN PELVIS WO CONTRAST Date of Exam: 4/20/2023 6:35 PM EDT Indication: Abdominal discomfort. Comparison: 4/18/2023, 4/10/2023 Technique: Axial CT images were obtained of the abdomen and pelvis without the administration of contrast. Reconstructed coronal and sagittal images were also obtained. Automated exposure control and iterative construction methods were used. Findings: Lung Bases:   At least moderate-sized bilateral pleural effusions are present. Bibasilar atelectatic changes are present. The heart appears enlarged. Limited evaluation of the solid organs due to lack of intravenous contrast. There is diffuse anasarca of the soft tissues. Liver: Liver is normal in size and CT density. No focal lesions. Biliary/Gallbladder:  The gallbladder is normal without evidence of radiopaque stones. The biliary tree is nondilated. Spleen: Spleen is normal in size and CT density. Pancreas:  Pancreas is normal. There is no evidence of pancreatic mass or peripancreatic fluid. Kidneys:  Kidneys are normal in size. There are no stones or hydronephrosis. Adrenals:  Adrenal glands are unremarkable. Retroperitoneal/Lymph Nodes/Vasculature:  No retroperitoneal adenopathy is identified. Atherosclerotic calcifications are present. IVC filter present. Gastrointestinal/Mesentery:  The bowel loops are non-dilated without wall thickening or mass. Mild stranding is seen along the pylorus of the  stomach within the right upper quadrant with questionable mild wall thickening (series 2 image 56). Trace amount of free fluid present within  the abdomen and pelvis. Gastrostomy tube present within the stomach. Large amount of stool present within the rectum. Moderate stool burden present proximally. The appendix is not well-visualized. No secondary findings of appendicitis.. There is diverticulosis of the sigmoid colon. No significant inflammatory changes identified. No evidence of obstruction. No free air. No mesenteric fluid collections identified. Bladder:  The bladder is normal. Genital:   Unremarkable       Bony Structures:   Visualized bony structures are consistent with the patient's age.     Impression: Impression: 1. Questional mild wall thickening of the pylorus with stranding within the adjacent mesenteric fat which may be related to gastritis. Peptic ulcer disease cannot be excluded. A component of these findings may be related to trace amount of free fluid present throughout the abdomen and pelvis likely related to volume overload given presence of anasarca of the soft tissues and at least moderate bilateral size pleural effusions. Recommend clinical correlation. 2. Large amount of stool present within the rectum with moderate stool burden present proximally likely related to constipation/obstipation. 3. Ancillary findings as described above. Electronically Signed: Maddison Pelletier  4/20/2023 6:56 PM EDT  Workstation ID: [id number]      Results for orders placed during the hospital encounter of 03/22/23    Adult Transthoracic Echo Complete W/ Cont if Necessary Per Protocol    Interpretation Summary  •  Left ventricular systolic function is normal. Calculated left ventricular EF = 60% Left ventricular ejection fraction appears to be 56 - 60%.  •  Left ventricular diastolic function is consistent with (grade I) impaired relaxation and age.  •  Moderate to severe mitral valve regurgitation is present.  •   Moderate tricuspid valve regurgitation is present.  •  Estimated right ventricular systolic pressure from tricuspid regurgitation is moderately elevated (45-55 mmHg).  •  Moderate pulmonary hypertension is present.      Current medications:  Scheduled Meds:aspirin, 81 mg, Per PEG Tube, Daily  carvedilol, 25 mg, Per PEG Tube, BID With Meals  sennosides, 10 mL, Per PEG Tube, BID   And  docusate sodium, 100 mg, Per PEG Tube, BID  enoxaparin, 40 mg, Subcutaneous, Nightly  gabapentin, 100 mg, Per PEG Tube, Q12H  hydrALAZINE, 75 mg, Per PEG Tube, Q8H  lansoprazole, 30 mg, Per G Tube, BID AC  levothyroxine, 112 mcg, Per PEG Tube, Q AM  lidocaine, 1 patch, Transdermal, Q24H  metoclopramide, 10 mg, Per G Tube, 4x Daily AC & at Bedtime  sodium chloride, 10 mL, Intravenous, Q12H  valsartan, 320 mg, Per PEG Tube, Q24H      Continuous Infusions:   PRN Meds:.•  acetaminophen **OR** acetaminophen **OR** acetaminophen  •  [DISCONTINUED] senna-docusate sodium **AND** polyethylene glycol **AND** [DISCONTINUED] bisacodyl **AND** bisacodyl  •  cloNIDine  •  HYDROcodone-acetaminophen  •  ipratropium-albuterol  •  [DISCONTINUED] ondansetron **OR** ondansetron  •  promethazine  •  sodium chloride  •  sodium chloride    Assessment & Plan   Assessment & Plan     Active Hospital Problems    Diagnosis  POA   • **Esophageal stricture [K22.2]  Unknown   • Severe malnutrition [E43]  Unknown   • Hypothyroidism (acquired) [E03.9]  Unknown   • S/P percutaneous endoscopic gastrostomy (PEG) tube placement [Z93.1]  Not Applicable   • Adenocarcinoma [C80.1]  Unknown   • Perforated ulcer [K27.5]  Yes   • Former smoker [Z87.891]  Not Applicable   • Coronary artery disease  [I25.10]  Yes   • Chronic obstructive pulmonary disease [J44.9]  Yes   • Essential hypertension [I10]  Yes   • ASCVD (arteriosclerotic cardiovascular disease) [I25.10]  Yes      Resolved Hospital Problems   No resolved problems to display.        Brief Hospital Course to date:  Christine WHITEHEAD  Forest is a 89 y.o. female with past medical history of essential hypertension, dyslipidemia, coronary artery disease, peripheral artery disease, hypothyroidism, COPD who was [facility] to [facility] on 3/[facility] for abdominal pain; went to OR, had perforated duodenal ulcer repaired with omental patch.  Then on 3/29, patient had CT of thoracic spine which noted left pleural consolidation and parenchymal nodules.  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  She was also found to have an esophageal stricture secondary to extrinsic compression of 3cm peritracheal lymph node.  On 4/7, patient had PEG tube placed.  She was sent to [facility] for placement of esophageal stent by Dr. Waters.        This patient's problems and plans were partially entered by my partner and updated as appropriate by me 04/21/23.     Assessment & Plan:  Dysphagia due to external esophageal compression by LAD  S/p PEG  · EGD 4/12 with esophageal stent placement  · Plan for full liquid diet x 1 week then [facility] if toler[facility]  · Currently on nocturnal tube feed and tolerating it well  · CT of abdomen pelvis which was done yesterday does not show any acute process.    Cancer related pain  · Continue as needed [place] to q4h     Perforated Duodenal Ulcer s/p exploratory laparotomy with patch 3/22  · Continue PPI twice daily  · Continue lower aspirin to 81 mg daily instead of 325 mg      New diagnosis of lung adenocarcinoma  COPD  Former smoker   On oxygen 2L here   · Continue PRN duonebs  · Follow up outpatient for treatment plan with Dr. Balbuena        Coronary artery disease  Dyslipidemia  Essential hypertension  · Lowered home aspirin from 325 mg to 81mg. Will need to make this clear at discharge in order to prevent further ulceration  · Currently blood pressure controlled with valsartan, Coreg and hydralazine  · Continue as needed clonidine    Hypoalbuminemia   Severe  malnutrition  · Nutrition consulted  · Start nocturnal tube feeds     Hypothyroidism  · Continue home synthroid      Debility  · PT  · Patient had been approved to [facility] for subacute rehab in [place]. Patient would still like to go there after discharge. Patient is to follow up outpatient for treatment options regarding her cancer.      Expected Discharge Location and Transportation: Rehab  Expected Discharge when bwd available.                         DVT prophylaxis:  Medical and mechanical DVT prophylaxis orders are present.     AM-PAC 6 Clicks Score (PT): 11 (04/21/23 1319)    CODE STATUS:   Code Status and Medical Interventions:   Ordered at: 04/10/23 9820     Code Status (Patient has no pulse and is not breathing):    CPR (Attempt to Resuscitate)     Medical Interventions (Patient has pulse or is breathing):    Full Support     Copied text in this note has been reviewed and is accurate as of 04/21/23.     Fransico Feng MD  04/21/23

## 2023-04-21 NOTE — PLAN OF CARE
"Goal Outcome Evaluation:  Pt rested this shift, complained of pain and received PRN X1. Increased TF to 35, unable to increase further due to pts pain. Requested to have TF turned off early due to feeling \"tight\". Requested breathing treatment one time through the night, remains on room air. Possible transfer to [place] today. Continue plan of care.                 "

## 2023-04-21 NOTE — THERAPY PROGRESS REPORT/RE-CERT
Patient Name: Christine Garg  : 1933    MRN: [id number]                              Today's Date: 2023       Admit Date: 4/10/2023    Visit Dx:     ICD-10-CM ICD-9-CM   1. Esophageal stricture  K22.2 530.3     Patient Active Problem List   Diagnosis   • Essential hypertension   • Dyslipidemia   • ASCVD (arteriosclerotic cardiovascular disease)   • Palpitations   • Coronary artery disease    • Abnormal PFTs (pulmonary function tests)   • Chronic obstructive pulmonary disease   • Mild persistent asthma with allergic rhinitis   • Pulmonary nodule   • Former smoker   • Colitis   • Perforated ulcer   • Acute gastric ulcer with perforation   • Moderate malnutrition   • Esophageal stricture   • Hypothyroidism (acquired)   • S/P percutaneous endoscopic gastrostomy (PEG) tube placement   • Adenocarcinoma   • Severe malnutrition     Past Medical History:   Diagnosis Date   • Advanced age    • CAD (coronary artery disease) 2011    s/p 3v CABG   • Chronic kidney disease (CKD), stage III (moderate)    • COPD (chronic obstructive pulmonary disease)    • History of tobacco use    • Hyperlipidemia    • Hypertension    • Hypothyroidism    • PONV (postoperative nausea and vomiting)    • Pulmonary nodule      Past Surgical History:   Procedure Laterality Date   • BREAST BIOPSY Left     benign   • CATARACT EXTRACTION     • CORONARY ANGIOPLASTY     • CORONARY ARTERY BYPASS GRAFT     • ENDOSCOPY W/ PEG TUBE PLACEMENT N/A 2023    Procedure: ESOPHAGOGASTRODUODENOSCOPY WITH PERCUTANEOUS ENDOSCOPIC GASTROSTOMY TUBE INSERTION;  Surgeon: Aurora Kirkland MD;  Location: [facility] OR;  Service: General;  Laterality: N/A;   • ENDOSCOPY W/ STENT PLACEMENT/REMOVAL N/A 2023    Procedure: ESOPHAGOGASTRODUODENOSCOPY WITH STENT PLACEMENT WITH FLUOROSCOPY;  Surgeon: Brunner, Mark I, MD;  Location: [facility] ENDOSCOPY;  Service: Gastroenterology;  Laterality: N/A;   • EXPLORATORY LAPAROTOMY N/A 3/22/2023    Procedure: LAPAROTOMY  EXPLORATORY WITH OVER SEW OF DUODENAL ULCER WITH OMENTAL PATCH AND BIOPATCH AND CENTRAL LINE PLACEMENT;  Surgeon: Aurora Kirkland MD;  Location: [facility];  Service: General;  Laterality: N/A;      General Information     Row Name 04/21/23 1319          Physical Therapy Time and Intention    Document Type progress note/recertification  -NS     Mode of Treatment individual therapy;physical therapy  -NS     Row Name 04/21/23 1319          General Information    Patient Profile Reviewed yes  -NS     Existing Precautions/Restrictions fall;oxygen therapy device and L/min;other (see comments)  abdominal incision; PEG  -NS     Barriers to Rehab medically complex  -NS     Row Name 04/21/23 1319          Cognition    Orientation Status (Cognition) oriented x 3  -NS     Row Name 04/21/23 1319          Safety Issues, Functional Mobility    Safety Issues Affecting Function (Mobility) safety precaution awareness;safety precautions follow-through/compliance;sequencing abilities;insight into deficits/self-awareness  -NS     Impairments Affecting Function (Mobility) balance;coordination;endurance/activity tolerance;motor planning;strength;shortness of breath  -NS           User Key  (r) = Recorded By, (t) = Taken By, (c) = Cosigned By    Initials Name Provider Type    NS Marilee Cox PT Physical Therapist               Mobility     Row Name 04/21/23 1319          Bed Mobility    Bed Mobility supine-sit;sit-supine;scooting/bridging  -NS     Scooting/Bridging [place] (Bed Mobility) dependent (less than 25% patient effort);2 person assist  -NS     Supine-Sit [place] (Bed Mobility) moderate assist (50% patient effort);verbal cues  -NS     Sit-Supine [place] (Bed Mobility) moderate assist (50% patient effort);verbal cues  -NS     Assistive Device (Bed Mobility) bed rails;head of bed elevated  -NS     Comment, (Bed Mobility) VCs for sequencing, assist required at BLEs and trunk.  -NS     Row Name 04/21/23 1319           Transfers    Comment, (Transfers) pt declined transfers despite encouragement/education due to feeling SOA.  -NS           User Key  (r) = Recorded By, (t) = Taken By, (c) = Cosigned By    Initials Name Provider Type    Marilee Gibbs PT Physical Therapist               Obj/Interventions     Row Name 04/21/23 1319          Range of Motion Comprehensive    General Range of Motion bilateral lower extremity ROM WFL  -NS     [facility] Name 04/21/23 1319          Strength Comprehensive (MMT)    General Manual Muscle Testing (MMT) Assessment lower extremity strength deficits identified  -NS     Comment, General Manual Muscle Testing (MMT) Assessment BLEs: grossly 4-/5  -NS     Row Name 04/21/23 1319          Motor Skills    Motor Skills functional endurance  -NS     Functional Endurance scap retractions and shoulder flexion with cues for PLB x8 reps each. Pt also educated about using incentive spirometer and practiced using towards end of session.  -NS     Therapeutic Exercise hip;knee;ankle  -NS     Row Name 04/21/23 1319          Hip (Therapeutic Exercise)    Hip (Therapeutic Exercise) strengthening exercise  -NS     Hip Strengthening (Therapeutic Exercise) bilateral;aBduction;aDduction;external rotation;internal rotation;heel slides;marching while seated;10 repetitions  -NS     [facility] Name 04/21/23 1319          Knee (Therapeutic Exercise)    Knee (Therapeutic Exercise) strengthening exercise  -NS     Knee Strengthening (Therapeutic Exercise) bilateral;SLR (straight leg raise);LAQ (long arc quad);10 repetitions  -NS     [facility] Name 04/21/23 1319          Ankle (Therapeutic Exercise)    Ankle AROM (Therapeutic Exercise) bilateral;dorsiflexion;plantarflexion;10 repetitions  -[facility] Name 04/21/23 1319          Balance    Balance Assessment sitting static balance;sitting dynamic balance  -NS     Static Sitting Balance standby assist  -NS     Dynamic Sitting Balance contact guard  -NS     Position, Sitting Balance  unsupported;sitting edge of bed  -NS           User Key  (r) = Recorded By, (t) = Taken By, (c) = Cosigned By    Initials Name Provider Type    NS Marilee Cox, PT Physical Therapist               Goals/Plan     Row Name 04/21/23 1319          Bed Mobility Goal 1 (PT)    Activity/Assistive Device (Bed Mobility Goal 1, PT) sit to supine/supine to sit  -NS     [place] Level/Cues Needed (Bed Mobility Goal 1, PT) contact guard required  -NS     Time Frame (Bed Mobility Goal 1, PT) long term goal (LTG);10 days  -NS     Progress/Outcomes (Bed Mobility Goal 1, PT) goal ongoing  -NS     Row Name 04/21/23 1319          Transfer Goal 1 (PT)    Activity/Assistive Device (Transfer Goal 1, PT) sit-to-stand/stand-to-sit;bed-to-chair/chair-to-bed;walker, rolling  -NS     [place] Level/Cues Needed (Transfer Goal 1, PT) standby assist  -NS     Time Frame (Transfer Goal 1, PT) long term goal (LTG);10 days  -NS     Progress/Outcome (Transfer Goal 1, PT) goal ongoing;goal revised this date  -NS     Row Name 04/21/23 1319          Gait Training Goal 1 (PT)    Activity/Assistive Device (Gait Training Goal 1, PT) gait (walking locomotion);assistive device use;walker, rolling  -NS     [place] Level (Gait Training Goal 1, PT) contact guard required  -NS     Distance (Gait Training Goal 1, PT) 30  -NS     Time Frame (Gait Training Goal 1, PT) long term goal (LTG);10 days  -NS     Progress/Outcome (Gait Training Goal 1, PT) goal ongoing;goal revised this date  -NS     Row Name 04/21/23 1319          Stairs Goal 1 (PT)    Activity/Assistive Device (Stairs Goal 1, PT) ascending stairs;descending stairs;using handrail, left;using handrail, right  -NS     [place] Level/Cues Needed (Stairs Goal 1, PT) minimum assist (75% or more patient effort)  -NS     Number of Stairs (Stairs Goal 1, PT) 4  -NS     Time Frame (Stairs Goal 1, PT) long term goal (LTG);10 days  -NS     Progress/Outcome (Stairs Goal 1, PT) progress slower than  expected  -NS     Row Name 04/21/23 1319          Therapy Assessment/Plan (PT)    Planned Therapy Interventions (PT) balance training;bed mobility training;gait training;home exercise program;neuromuscular re-education;patient/family education;strengthening;stair training;transfer training  -NS           User Key  (r) = Recorded By, (t) = Taken By, (c) = Cosigned By    Initials Name Provider Type    Marilee Gibbs, PT Physical Therapist               Clinical Impression     Row Name 04/21/23 1319          Pain    Pretreatment Pain Rating 0/10 - no pain  -NS     Posttreatment Pain Rating 0/10 - no pain  -NS     Row Name 04/21/23 1319          Plan of Care Review    Plan of Care Reviewed With patient  -NS     Progress declining  -NS     Outcome Evaluation Patient continues to be limited by weakness, poor activity tolerance, and decreased trunk control. She declined OOB activity due to feeling SOA. VSS on 1L O2. Goals updated to reflect current level of function. Continue skilled IP PT services to support return to baseline. Recommend SNF at discharge.  -NS     Row Name 04/21/23 1319          Therapy Assessment/Plan (PT)    Rehab Potential (PT) fair, will monitor progress closely  -NS     Criteria for Skilled Interventions Met (PT) yes;meets criteria;skilled treatment is necessary  -NS     Therapy Frequency (PT) daily  -NS     Row Name 04/21/23 1319          Vital Signs    Pre Systolic BP Rehab 130  -NS     Pre Treatment Diastolic BP 92  -NS     Post Systolic BP Rehab 127  -NS     Post Treatment Diastolic BP 74  -NS     Pretreatment Heart Rate (beats/min) 101  -NS     Posttreatment Heart Rate (beats/min) 116  -NS     Pre SpO2 (%) 91  -NS     O2 Delivery Pre Treatment room air  -NS     Intra SpO2 (%) 89  -NS     O2 Delivery Intra Treatment room air  -NS     Post SpO2 (%) 92  -NS     O2 Delivery Post Treatment nasal cannula  1L  -NS     Pre Patient Position Supine  -NS     Intra Patient Position Sitting  -NS     Post  Patient Position Supine  -NS     Row Name 04/21/23 1319          Positioning and Restraints    Pre-Treatment Position in bed  -NS     Post Treatment Position bed  -NS     In Bed notified nsg;supine;call light within reach;encouraged to call for assist;exit alarm on;side rails up x3;heels elevated  -NS           User Key  (r) = Recorded By, (t) = Taken By, (c) = Cosigned By    Initials Name Provider Type    Marilee Gibbs PT Physical Therapist               Outcome Measures     Row Name 04/21/23 1319          How much help from another person do you currently need...    Turning from your back to your side while in flat bed without using bedrails? 2  -NS     Moving from lying on back to sitting on the side of a flat bed without bedrails? 2  -NS     Moving to and from a bed to a chair (including a wheelchair)? 2  -NS     Standing up from a chair using your arms (e.g., wheelchair, bedside chair)? 2  -NS     Climbing 3-5 steps with a railing? 1  -NS     To walk in hospital room? 2  -NS     AM-PAC 6 Clicks Score (PT) 11  -NS     Highest level of mobility 4 --> Transferred to chair/commode  -NS     Row Name 04/21/23 1319          Functional Assessment    Outcome Measure Options AM-PAC 6 Clicks Basic Mobility (PT)  -NS           User Key  (r) = Recorded By, (t) = Taken By, (c) = Cosigned By    Initials Name Provider Type    Marilee Gibbs PT Physical Therapist                             Physical Therapy Education     Title: PT OT SLP Therapies (In Progress)     Topic: Physical Therapy (In Progress)     Point: Mobility training (In Progress)     Learning Progress Summary           Patient Acceptance, E, NR by NS at 4/21/2023 1426    Comment: importance of OOB activity    Acceptance, E, VU,NR by HT at 4/17/2023 1431    Acceptance, E,D,H, NR by DM at 4/14/2023 1737    Acceptance, E, VU,NR by  at 4/11/2023 1500                   Point: Home exercise program (In Progress)     Learning Progress Summary           Patient  Acceptance, E, NR by NS at 4/21/2023 1426    Comment: importance of OOB activity    Acceptance, E,D,H, NR by DM at 4/14/2023 1737                   Point: Body mechanics (In Progress)     Learning Progress Summary           Patient Acceptance, E, NR by NS at 4/21/2023 1426    Comment: importance of OOB activity    Acceptance, E, VU,NR by HT at 4/17/2023 1431    Acceptance, E,D,H, NR by DM at 4/14/2023 1737    Acceptance, E, VU,NR by HT at 4/11/2023 1500                   Point: Precautions (In Progress)     Learning Progress Summary           Patient Acceptance, E, NR by NS at 4/21/2023 1426    Comment: importance of OOB activity    Acceptance, E, VU,NR by HT at 4/17/2023 1431    Acceptance, E,D,H, NR by DM at 4/14/2023 1737    Acceptance, E, VU,NR by HT at 4/11/2023 1500                               User Key     Initials Effective Dates Name Provider Type Discipline    DM 02/03/23 -  Rubi Nick, PT Physical Therapist PT    NS 06/16/21 -  Marilee Cox, PT Physical Therapist PT    HT 01/12/23 -  Clare Fulton, PT Student PT Student PT              PT Recommendation and Plan  Planned Therapy Interventions (PT): balance training, bed mobility training, gait training, home exercise program, neuromuscular re-education, patient/family education, strengthening, stair training, transfer training  Plan of Care Reviewed With: patient  Progress: declining  Outcome Evaluation: Patient continues to be limited by weakness, poor activity tolerance, and decreased trunk control. She declined OOB activity due to feeling SOA. VSS on 1L O2. Goals updated to reflect current level of function. Continue skilled IP PT services to support return to baseline. Recommend SNF at discharge.     Time Calculation:    PT Charges     Row Name 04/21/23 1317             Time Calculation    Start Time 1319  -NS      PT Received On 04/21/23  -NS      PT Goal Re-Cert Due Date 05/01/23  -NS         Timed Charges    45383 - PT Therapeutic  Exercise Minutes 16  -NS      14573 - PT Therapeutic Activity Minutes 8  -NS         Total Minutes    Timed Charges Total Minutes 24  -NS       Total Minutes 24  -NS            User Key  (r) = Recorded By, (t) = Taken By, (c) = Cosigned By    Initials Name Provider Type    Marilee Gibbs, MARIA DE JESUS Physical Therapist              Therapy Charges for Today     Code Description Service Date Service Provider Modifiers Qty    98624622107 HC PT THERAPEUTIC ACT EA 15 MIN 4/21/2023 Marilee Cox, PT GP 1    81007515015 HC PT THER PROC EA 15 MIN 4/21/2023 Marilee Cox, PT GP 1          PT G-Codes  Outcome Measure Options: AM-PAC 6 Clicks Basic Mobility (PT)  AM-PAC 6 Clicks Score (PT): 11  AM-PAC 6 Clicks Score (OT): 12  PT Discharge Summary  Anticipated Discharge Disposition (PT): skilled nursing facility    Marilee Cox PT  4/21/2023

## 2023-04-21 NOTE — CASE MANAGEMENT/SOCIAL WORK
Continued Stay Note  [facility]     Patient Name: Christine Garg  MRN: [id number]  Today's Date: 4/21/2023    Admit Date: 4/10/2023    Plan: Rehab   Discharge Plan     Row Name 04/21/23 1600       Plan    Plan Rehab    Patient/Family in Agreement with Plan yes    Plan Comments Spoke with patient at bedside. Patient only wants to go to places in [place]. CM made referrals to [facility] bed in [place] and they can't not accept her. CM called [facility] today and they still don't have any openings. CM called [facility] and they are not accepting patient right now. CM is awaiting a call back from [facility] includes the [facility]. [facility] can offer bed but patient doesn't want to go to [facility]. CM will follow.    Final Discharge Disposition Code 03 - skilled nursing facility (SNF)               Discharge Codes    No documentation.               Expected Discharge Date and Time     Expected Discharge Date Expected Discharge Time    Apr 21, 2023             Vale Lechuga RN

## 2023-04-21 NOTE — PLAN OF CARE
Goal Outcome Evaluation:  Pt up in bed, no s/s of distress.  Pt tolerated meals this shift, however, states that she feels full after dinner and would like to wait a few hours before starting tube feeds.

## 2023-04-21 NOTE — PROGRESS NOTES
Brief EN Review Note    Patient Name: Christine Garg  Date of Encounter: 04/21/23 14:50 EDT  MRN: [id number]  Admission date: 4/10/2023    Reason for visit: EN review . RD to continue to follow per protocol.     EMR reviewed   Medication reviewed  Labs reviewed     Additional Information Obtained:   Abdominal CT obtained 4/21 - large amount of stool noted in rectum. Suspect this is leading cause if PO/EN intolerance. Received PRN suppository - pt reports passing stool today, however documented BM x1 in past 24hrs.     Current diet: Diet: Liquid Diets; Full Liquid; Texture: Regular Texture (IDDSI 7); Fluid Consistency: Thin (IDDSI 0)   Intake: 2 Days: 50% x2 meals    EN: Peptamen AF  Goal Rate:  55mL/hr Water Flushes:  20mL/hr  Modular: None  Route: PEG  Tube: 20 PEG    At goal over: 12Hrs/day    Rx will supply:   Goal Volume 660 mL/day     Flush Volume 240 mL/day     Energy 792 Kcal/day 52 % Est Need   Protein 50 g/day 56 % Est Need   Fiber 0 g/day     Water in   mL     Total Water 775 mL     Meet DRI No        --------------------------------------------------------------------------  Product/Rate verified at bedside: No  Infusing Rate at time of visit:  Nocturnal feeds    Average Delivery from Charting: Insufficient data unable to tolerate >35mL/hr      Intervention:  Follow treatment plan  Care plan reviewed    Continue current POC    Follow up:   Per protocol      Suad Emmanuel, MS,RD,LD  14:50 EDT  Time: 15min

## 2023-04-21 NOTE — PLAN OF CARE
Goal Outcome Evaluation:  Plan of Care Reviewed With: patient        Progress: declining  Outcome Evaluation: Patient continues to be limited by weakness, poor activity tolerance, and decreased trunk control. She declined OOB activity due to feeling SOA. VSS on 1L O2. Goals updated to reflect current level of function. Continue skilled IP PT services to support return to baseline. Recommend SNF at discharge.

## 2023-04-22 NOTE — PLAN OF CARE
Goal Outcome Evaluation:  Pt declined to have TF started for beginning of shift, stating she felt too full and in pain. TF started around 0100, pt tolerated for rest of shift. Pain medicine X1 dose given. VSS, weaned to RA and pt sats 88-89%, 1L NC tunred on, sats <93%. Continue plan of care.

## 2023-04-22 NOTE — PROGRESS NOTES
[facility] Medicine Services  PROGRESS NOTE    Patient Name: Christine Garg  : 1933  MRN: [id number]    Date of Admission: 4/10/2023  Primary Care Physician: Dave Tobar MD    Subjective   Subjective     CC:  Follow-up dysphagia    HPI:  Last night patient had some abdominal discomfort/pain and tube feeding had to be stopped.  This morning she feels better.  She had good p.o. intake yesterday.  Denies any fever or chills.  No chest pain or palpitation.  Abdominal discomfort has improved.  She also complains of mild shortness of breath although she is saturating well.    ROS:  Gen- No fevers, chills,  CV- No chest pain, palpitations  Resp- No cough, dyspnea  GI- No N/V/D, n    Objective   Objective     Vital Signs:   Temp:  [94.8 °F (34.9 °C)-96.8 °F (36 °C)] 95.6 °F (35.3 °C)  Heart Rate:  [] 104  Resp:  [16-18] 16  BP: ()/(60-91) 150/91  Flow (L/min):  [1] 1     Physical Exam:  Constitutional: No acute distress  HENT: NCAT, mucous membranes moist  Respiratory: Clear to auscultation bilaterally, respiratory effort normal   Cardiovascular: RRR, no murmurs, rubs, or gallops  Gastrointestinal: Positive bowel sounds, soft, very mild diffuse tenderness, nondistended, PEG tube in place and site is clean.  Musculoskeletal:  bilateral ankle edema  Psychiatric: Appropriate affect, cooperative  Neurologic: Awake, alert, follows commands, speech clear  Skin: No rashes    Results Reviewed:  LAB RESULTS:      Lab 23  0744 23  0704 23  0352 23  1506   WBC 14.86* 17.81* 13.57* 13.29*   HEMOGLOBIN 9.7* 10.9* 10.3* 9.9*   HEMATOCRIT 29.9* 32.2* 32.3* 30.5*   PLATELETS 357 395 359 337   NEUTROS ABS 12.50* 14.66*  --  10.67*   IMMATURE GRANS (ABS) 0.14* 0.17*  --  0.25*   LYMPHS ABS 1.10 1.51  --  1.06   MONOS ABS 0.72 0.96*  --  0.73   EOS ABS 0.33 0.44*  --  0.53*   MCV 93.4 94.7 97.9* 94.4         Lab 23  0744 23  0704 23  0352  04/18/23  2115 04/17/23  1506   SODIUM 126* 127* 130* 130* 129*   POTASSIUM 4.2 4.7 4.4 4.2 4.4   CHLORIDE 88* 87* 91* 93* 91*   CO2 32.0* 32.0* 30.0* 30.0* 30.0*   ANION GAP 6.0 8.0 9.0 7.0 8.0   BUN 14 12 8 8 7*   CREATININE 0.40* 0.51* 0.35* 0.34* 0.37*   EGFR 94.7 89.4 97.8 98.5 96.5   GLUCOSE 116* 118* 105* 128* 113*   CALCIUM 8.2* 8.6 7.8* 8.2* 8.2*   MAGNESIUM 1.6  --  1.8  --  1.4*   PHOSPHORUS 3.2  --   --   --   --          Lab 04/21/23  0704   TOTAL PROTEIN 4.7*   ALBUMIN 2.6*   GLOBULIN 2.1   ALT (SGPT) 23   AST (SGOT) 20   BILIRUBIN <0.2   ALK PHOS 107                     Brief Urine Lab Results  (Last result in the past 365 days)      Color   Clarity   Blood   Leuk Est   Nitrite   Protein   CREAT   Urine HCG        01/06/23 0149 Yellow   Clear   Negative   Negative   Negative   Negative                 Microbiology Results Abnormal     None          CT Abdomen Pelvis Without Contrast    Result Date: 4/20/2023  CT ABDOMEN PELVIS WO CONTRAST Date of Exam: 4/20/2023 6:35 PM EDT Indication: Abdominal discomfort. Comparison: 4/18/2023, 4/10/2023 Technique: Axial CT images were obtained of the abdomen and pelvis without the administration of contrast. Reconstructed coronal and sagittal images were also obtained. Automated exposure control and iterative construction methods were used. Findings: Lung Bases:   At least moderate-sized bilateral pleural effusions are present. Bibasilar atelectatic changes are present. The heart appears enlarged. Limited evaluation of the solid organs due to lack of intravenous contrast. There is diffuse anasarca of the soft tissues. Liver: Liver is normal in size and CT density. No focal lesions. Biliary/Gallbladder:  The gallbladder is normal without evidence of radiopaque stones. The biliary tree is nondilated. Spleen: Spleen is normal in size and CT density. Pancreas:  Pancreas is normal. There is no evidence of pancreatic mass or peripancreatic fluid. Kidneys:  Kidneys are  normal in size. There are no stones or hydronephrosis. Adrenals:  Adrenal glands are unremarkable. Retroperitoneal/Lymph Nodes/Vasculature:  No retroperitoneal adenopathy is identified. Atherosclerotic calcifications are present. IVC filter present. Gastrointestinal/Mesentery:  The bowel loops are non-dilated without wall thickening or mass. Mild stranding is seen along the pylorus of the stomach within the right upper quadrant with questionable mild wall thickening (series 2 image 56). Trace amount of free fluid present within  the abdomen and pelvis. Gastrostomy tube present within the stomach. Large amount of stool present within the rectum. Moderate stool burden present proximally. The appendix is not well-visualized. No secondary findings of appendicitis.. There is diverticulosis of the sigmoid colon. No significant inflammatory changes identified. No evidence of obstruction. No free air. No mesenteric fluid collections identified. Bladder:  The bladder is normal. Genital:   Unremarkable       Bony Structures:   Visualized bony structures are consistent with the patient's age.     Impression: Impression: 1. Questional mild wall thickening of the pylorus with stranding within the adjacent mesenteric fat which may be related to gastritis. Peptic ulcer disease cannot be excluded. A component of these findings may be related to trace amount of free fluid present throughout the abdomen and pelvis likely related to volume overload given presence of anasarca of the soft tissues and at least moderate bilateral size pleural effusions. Recommend clinical correlation. 2. Large amount of stool present within the rectum with moderate stool burden present proximally likely related to constipation/obstipation. 3. Ancillary findings as described above. Electronically Signed: Maddison Pelletier  4/20/2023 6:56 PM EDT  Workstation ID: [id number]      Results for orders placed during the hospital encounter of 03/22/23    Adult  Transthoracic Echo Complete W/ Cont if Necessary Per Protocol    Interpretation Summary  •  Left ventricular systolic function is normal. Calculated left ventricular EF = 60% Left ventricular ejection fraction appears to be 56 - 60%.  •  Left ventricular diastolic function is consistent with (grade I) impaired relaxation and age.  •  Moderate to severe mitral valve regurgitation is present.  •  Moderate tricuspid valve regurgitation is present.  •  Estimated right ventricular systolic pressure from tricuspid regurgitation is moderately elevated (45-55 mmHg).  •  Moderate pulmonary hypertension is present.      Current medications:  Scheduled Meds:aspirin, 81 mg, Per PEG Tube, Daily  carvedilol, 25 mg, Per PEG Tube, BID With Meals  sennosides, 10 mL, Per PEG Tube, BID   And  docusate sodium, 100 mg, Per PEG Tube, BID  enoxaparin, 40 mg, Subcutaneous, Nightly  furosemide, 40 mg, Oral, Daily  gabapentin, 100 mg, Per PEG Tube, Q12H  hydrALAZINE, 75 mg, Per PEG Tube, Q8H  lansoprazole, 30 mg, Per G Tube, BID AC  levothyroxine, 112 mcg, Per PEG Tube, Q AM  lidocaine, 1 patch, Transdermal, Q24H  metoclopramide, 10 mg, Per G Tube, 4x Daily AC & at Bedtime  sodium chloride, 10 mL, Intravenous, Q12H  valsartan, 320 mg, Per PEG Tube, Q24H      Continuous Infusions:   PRN Meds:.•  acetaminophen **OR** acetaminophen **OR** acetaminophen  •  [DISCONTINUED] senna-docusate sodium **AND** polyethylene glycol **AND** [DISCONTINUED] bisacodyl **AND** bisacodyl  •  cloNIDine  •  HYDROcodone-acetaminophen  •  ipratropium-albuterol  •  [DISCONTINUED] ondansetron **OR** ondansetron  •  promethazine  •  sodium chloride  •  sodium chloride    Assessment & Plan   Assessment & Plan     Active Hospital Problems    Diagnosis  POA   • **Esophageal stricture [K22.2]  Unknown   • Severe malnutrition [E43]  Unknown   • Hypothyroidism (acquired) [E03.9]  Unknown   • S/P percutaneous endoscopic gastrostomy (PEG) tube placement [Z93.1]  Not Applicable    • Adenocarcinoma [C80.1]  Unknown   • Perforated ulcer [K27.5]  Yes   • Former smoker [Z87.891]  Not Applicable   • Coronary artery disease  [I25.10]  Yes   • Chronic obstructive pulmonary disease [J44.9]  Yes   • Essential hypertension [I10]  Yes   • ASCVD (arteriosclerotic cardiovascular disease) [I25.10]  Yes      Resolved Hospital Problems   No resolved problems to display.        Brief Hospital Course to date:  Christine Garg is a 89 y.o. female with past medical history of essential hypertension, dyslipidemia, coronary artery disease, peripheral artery disease, hypothyroidism, COPD who was admitted to [facility] on 3/22 for abdominal pain; went to OR, had perforated duodenal ulcer repaired with omental patch.  Then on 3/29, patient had CT of thoracic spine which noted left pleural consolidation and parenchymal nodules.  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  She was also found to have an esophageal stricture secondary to extrinsic compression of 3cm peritracheal lymph node.  On 4/7, patient had PEG tube placed.  She was sent to [facility] for placement of esophageal stent by Dr. Waters.        This patient's problems and plans were partially entered by my partner and updated as appropriate by me 04/22/23.     Assessment & Plan:  Dysphagia due to external esophageal compression by LAD  S/p PEG  · EGD 4/12 with esophageal stent placement  · Plan for full liquid diet x 1 week then advance to puree 4/19 if tolerating  · Currently on nocturnal tube feed.  · CT of abdomen pelvis which was done on 4/20/2023 does not show any acute process.  However, patient has very mild diffuse tenderness on physical exam mild last night tube feeding had to be stopped as she developed abdominal discomfort.    Hyponatremia     Serum sodium is drifting downward.  We will restrict the p.o. fluid intake and also monitor the serum sodium.  Also will get urine sodium  and osmolality.    Cancer related pain  · Continue as needed Norco to q4h   · Pain seems to be well controlled.    Perforated Duodenal Ulcer s/p exploratory laparotomy with patch 3/22  · Continue PPI twice daily  · Continue lower aspirin to 81 mg daily instead of 325 mg      New diagnosis of lung adenocarcinoma  COPD  Former smoker   On oxygen 2L here   · Continue PRN duonebs  · Follow up outpatient for treatment plan with Dr. Balbuena        Coronary artery disease  Dyslipidemia  Essential hypertension  · Lowered home aspirin from 325 mg to 81mg. Will need to make this clear at discharge in order to prevent further ulceration  · Currently blood pressure controlled with valsartan, Coreg and hydralazine  · Continue as needed clonidine    Hypoalbuminemia   Severe malnutrition  · Nutrition consulted  · Start nocturnal tube feeds     Hypothyroidism  · Continue home synthroid      Debility  · PT  · Patient had been approved to [facility] for subacute rehab in [place]. Patient would still like to go there after discharge. Patient is to follow up outpatient for treatment options regarding her cancer.      Expected Discharge Location and Transportation: Rehab  Expected Discharge when bwd available.                         DVT prophylaxis:  Medical and mechanical DVT prophylaxis orders are present.     AM-PAC 6 Clicks Score (PT): 11 (04/21/23 1319)    CODE STATUS:   Code Status and Medical Interventions:   Ordered at: 04/10/23 0222     Code Status (Patient has no pulse and is not breathing):    CPR (Attempt to Resuscitate)     Medical Interventions (Patient has pulse or is breathing):    Full Support     Copied text in this note has been reviewed and is accurate as of 04/22/23.     Fransico Feng MD  04/22/23

## 2023-04-22 NOTE — PLAN OF CARE
Goal Outcome Evaluation: Pt up in bed, no s/s of distress, no tele orders, no I per order, O2 at 2L/NC, awaiting placement.

## 2023-04-23 NOTE — PROGRESS NOTES
[facility] Medicine Services  PROGRESS NOTE    Patient Name: Christine Garg  : 1933  MRN: [id number]    Date of Admission: 4/10/2023  Primary Care Physician: Dave Tobar MD    Subjective   Subjective     CC:  Follow-up dysphagia    HPI:  Resting in bed in no acute distress and tells me she is comfortable.  She does not have any specific complaints.  Tells me that she is breathing better and abdomen, although still a little sore, is not tender and does not have any abdominal pain.  No fever or chills.    ROS:  Gen- No fevers, chills,  CV- No chest pain, palpitations  Resp- No cough, dyspnea  GI- No N/V/D, n    Objective   Objective     Vital Signs:   Temp:  [95.6 °F (35.3 °C)-96 °F (35.6 °C)] 96 °F (35.6 °C)  Heart Rate:  [] 101  Resp:  [16-18] 18  BP: ()/(53-91) 118/71  Flow (L/min):  [1] 1     Physical Exam:  Constitutional: No acute distress  HENT: NCAT, mucous membranes moist  Respiratory: Clear to auscultation bilaterally, respiratory effort normal   Cardiovascular: RRR, no murmurs, rubs, or gallops  Gastrointestinal: Positive bowel sounds, soft, nontender, nondistended, PEG tube in place and site is clean.  Musculoskeletal:  bilateral ankle edema  Psychiatric: Appropriate affect, cooperative  Neurologic: Awake, alert, follows commands, speech clear  Skin: No rashes    Results Reviewed:  LAB RESULTS:      Lab 23  0511 23  0744 23  0704 23  0352 23  1506   WBC 13.15* 14.86* 17.81* 13.57* 13.29*   HEMOGLOBIN 9.5* 9.7* 10.9* 10.3* 9.9*   HEMATOCRIT 28.8* 29.9* 32.2* 32.3* 30.5*   PLATELETS 340 357 395 359 337   NEUTROS ABS 10.78* 12.50* 14.66*  --  10.67*   IMMATURE GRANS (ABS) 0.10* 0.14* 0.17*  --  0.25*   LYMPHS ABS 1.21 1.10 1.51  --  1.06   MONOS ABS 0.63 0.72 0.96*  --  0.73   EOS ABS 0.38 0.33 0.44*  --  0.53*   MCV 93.5 93.4 94.7 97.9* 94.4         Lab 23  0511 23  0744 23  0704 23  0352 23  2127  04/17/23  1506   SODIUM 127* 126* 127* 130* 130* 129*   POTASSIUM 3.7 4.2 4.7 4.4 4.2 4.4   CHLORIDE 88* 88* 87* 91* 93* 91*   CO2 32.0* 32.0* 32.0* 30.0* 30.0* 30.0*   ANION GAP 7.0 6.0 8.0 9.0 7.0 8.0   BUN 13 14 12 8 8 7*   CREATININE 0.41* 0.40* 0.51* 0.35* 0.34* 0.37*   EGFR 94.2 94.7 89.4 97.8 98.5 96.5   GLUCOSE 111* 116* 118* 105* 128* 113*   CALCIUM 7.9* 8.2* 8.6 7.8* 8.2* 8.2*   MAGNESIUM  --  1.6  --  1.8  --  1.4*   PHOSPHORUS  --  3.2  --   --   --   --          Lab 04/21/23  0704   TOTAL PROTEIN 4.7*   ALBUMIN 2.6*   GLOBULIN 2.1   ALT (SGPT) 23   AST (SGOT) 20   BILIRUBIN <0.2   ALK PHOS 107                     Brief Urine Lab Results  (Last result in the past 365 days)      Color   Clarity   Blood   Leuk Est   Nitrite   Protein   CREAT   Urine HCG        01/06/23 0149 Yellow   Clear   Negative   Negative   Negative   Negative                 Microbiology Results Abnormal     None          XR Chest 1 View    Result Date: 4/22/2023  XR CHEST 1 VW Date of Exam: 4/22/2023 4:20 PM EDT Indication: sob Comparison: 4/13/2023. Findings: Esophageal stent projects unchanged, accounting for patient rotation. There are new bilateral increased opacities present at the lung bases, with the appearance of volume loss or airspace disease and small to moderate pleural effusions. Apparent mediastinal widening is likely related to patient rotation.     Impression: Impression: Esophageal stent projects unchanged, accounting for patient rotation. There are new bilateral increased opacities present at the lung bases, with the appearance of volume loss or airspace disease and small to moderate pleural effusions. Apparent mediastinal widening is likely related to patient rotation Electronically Signed: Sae Gudino  4/22/2023 6:56 PM EDT  Workstation ID: [id number]      Results for orders placed during the hospital encounter of 03/22/23    Adult Transthoracic Echo Complete W/ Cont if Necessary Per Protocol    Interpretation  Summary  •  Left ventricular systolic function is normal. Calculated left ventricular EF = 60% Left ventricular ejection fraction appears to be 56 - 60%.  •  Left ventricular diastolic function is consistent with (grade I) impaired relaxation and age.  •  Moderate to severe mitral valve regurgitation is present.  •  Moderate tricuspid valve regurgitation is present.  •  Estimated right ventricular systolic pressure from tricuspid regurgitation is moderately elevated (45-55 mmHg).  •  Moderate pulmonary hypertension is present.      Current medications:  Scheduled Meds:aspirin, 81 mg, Per PEG Tube, Daily  carvedilol, 25 mg, Per PEG Tube, BID With Meals  sennosides, 10 mL, Per PEG Tube, BID   And  docusate sodium, 100 mg, Per PEG Tube, BID  enoxaparin, 40 mg, Subcutaneous, Nightly  furosemide, 40 mg, Oral, Daily  gabapentin, 100 mg, Per PEG Tube, Q12H  hydrALAZINE, 75 mg, Per PEG Tube, Q8H  lansoprazole, 30 mg, Per G Tube, BID AC  levothyroxine, 112 mcg, Per PEG Tube, Q AM  lidocaine, 1 patch, Transdermal, Q24H  metoclopramide, 10 mg, Per G Tube, 4x Daily AC & at Bedtime  sodium chloride, 10 mL, Intravenous, Q12H  valsartan, 320 mg, Per PEG Tube, Q24H      Continuous Infusions:   PRN Meds:.•  acetaminophen **OR** acetaminophen **OR** acetaminophen  •  [DISCONTINUED] senna-docusate sodium **AND** polyethylene glycol **AND** [DISCONTINUED] bisacodyl **AND** bisacodyl  •  cloNIDine  •  HYDROcodone-acetaminophen  •  ipratropium-albuterol  •  [DISCONTINUED] ondansetron **OR** ondansetron  •  promethazine  •  sodium chloride  •  sodium chloride    Assessment & Plan   Assessment & Plan     Active Hospital Problems    Diagnosis  POA   • **Esophageal stricture [K22.2]  Unknown   • Severe malnutrition [E43]  Unknown   • Hypothyroidism (acquired) [E03.9]  Unknown   • S/P percutaneous endoscopic gastrostomy (PEG) tube placement [Z93.1]  Not Applicable   • Adenocarcinoma [C80.1]  Unknown   • Perforated ulcer [K27.5]  Yes   •  Former smoker [Z87.891]  Not Applicable   • Coronary artery disease  [I25.10]  Yes   • Chronic obstructive pulmonary disease [J44.9]  Yes   • Essential hypertension [I10]  Yes   • ASCVD (arteriosclerotic cardiovascular disease) [I25.10]  Yes      Resolved Hospital Problems   No resolved problems to display.        Brief Hospital Course to date:  Christine Garg is a 89 y.o. female with past medical history of essential hypertension, dyslipidemia, coronary artery disease, peripheral artery disease, hypothyroidism, COPD who was admitted to [facility] on 3/22 for abdominal pain; went to OR, had perforated duodenal ulcer repaired with omental patch.  Then on 3/29, patient had CT of thoracic spine which noted left pleural consolidation and parenchymal nodules.  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  She was also found to have an esophageal stricture secondary to extrinsic compression of 3cm peritracheal lymph node.  On 4/7, patient had PEG tube placed.  She was sent to [facility] for placement of esophageal stent by Dr. Waters.        This patient's problems and plans were partially entered by my partner and updated as appropriate by me 04/23/23.     Assessment & Plan:  Dysphagia due to external esophageal compression by LAD  S/p PEG  · EGD 4/12 with esophageal stent placement  · Plan for full liquid diet x 1 week then advance to puree 4/19 if tolerating  · Currently on nocturnal tube feed.  · CT of abdomen pelvis which was done on 4/20/2023 does not show any acute process.  However, patient has very mild diffuse tenderness on physical exam mild last night tube feeding had to be stopped as she developed abdominal discomfort.    Hyponatremia     Patient currently is on fluid restriction and sodium is slowly improving.  We will continue fluid restriction and monitor the sodium.    Cancer related pain  · Continue as needed Norco to q4h   · Pain seems to be  well controlled.    Perforated Duodenal Ulcer s/p exploratory laparotomy with patch 3/22  · Continue PPI twice daily  · Continue lower aspirin to 81 mg daily instead of 325 mg      New diagnosis of lung adenocarcinoma  COPD  Former smoker   On oxygen 2L here   · Continue PRN duonebs  · Follow up outpatient for treatment plan with Dr. Balbuena        Coronary artery disease  Dyslipidemia  Essential hypertension  · Lowered home aspirin from 325 mg to 81mg. Will need to make this clear at discharge in order to prevent further ulceration  · Currently blood pressure controlled with valsartan, Coreg and hydralazine  · Continue as needed clonidine    Hypoalbuminemia   Severe malnutrition  · Nutrition consulted  · Start nocturnal tube feeds     Hypothyroidism  · Continue home synthroid      Debility  · PT  · Patient had been approved to [facility] for subacute rehab in [place]. Patient would still like to go there after discharge. Patient is to follow up outpatient for treatment options regarding her cancer.      Expected Discharge Location and Transportation: Rehab  Expected Discharge when bwd available.                         DVT prophylaxis:  Medical and mechanical DVT prophylaxis orders are present.     AM-PAC 6 Clicks Score (PT): 11 (04/21/23 5559)    CODE STATUS:   Code Status and Medical Interventions:   Ordered at: 04/10/23 2384     Code Status (Patient has no pulse and is not breathing):    CPR (Attempt to Resuscitate)     Medical Interventions (Patient has pulse or is breathing):    Full Support     Copied text in this note has been reviewed and is accurate as of 04/23/23.     Fransico Feng MD  04/23/23

## 2023-04-24 NOTE — PLAN OF CARE
Goal Outcome Evaluation:  Plan of Care Reviewed With: patient        Progress: no change  Outcome Evaluation: Pt's ADL independence continues to be limited d/t generalized weakness, increased pain, dizziness, decreased functional endurance, and balance deficits. Will continue to progress pt as tolerated per OT POC. Rec SNF at d/c.

## 2023-04-24 NOTE — THERAPY PROGRESS REPORT/RE-CERT
Patient Name: Christine Garg  : 1933    MRN: [id number]                              Today's Date: 2023       Admit Date: 4/10/2023    Visit Dx:     ICD-10-CM ICD-9-CM   1. Esophageal stricture  K22.2 530.3     Patient Active Problem List   Diagnosis   • Essential hypertension   • Dyslipidemia   • ASCVD (arteriosclerotic cardiovascular disease)   • Palpitations   • Coronary artery disease    • Abnormal PFTs (pulmonary function tests)   • Chronic obstructive pulmonary disease   • Mild persistent asthma with allergic rhinitis   • Pulmonary nodule   • Former smoker   • Colitis   • Perforated ulcer   • Acute gastric ulcer with perforation   • Moderate malnutrition   • Esophageal stricture   • Hypothyroidism (acquired)   • S/P percutaneous endoscopic gastrostomy (PEG) tube placement   • Adenocarcinoma   • Severe malnutrition     Past Medical History:   Diagnosis Date   • Advanced age    • CAD (coronary artery disease) 2011    s/p 3v CABG   • Chronic kidney disease (CKD), stage III (moderate)    • COPD (chronic obstructive pulmonary disease)    • History of tobacco use    • Hyperlipidemia    • Hypertension    • Hypothyroidism    • PONV (postoperative nausea and vomiting)    • Pulmonary nodule      Past Surgical History:   Procedure Laterality Date   • BREAST BIOPSY Left     benign   • CATARACT EXTRACTION     • CORONARY ANGIOPLASTY     • CORONARY ARTERY BYPASS GRAFT     • ENDOSCOPY W/ PEG TUBE PLACEMENT N/A 2023    Procedure: ESOPHAGOGASTRODUODENOSCOPY WITH PERCUTANEOUS ENDOSCOPIC GASTROSTOMY TUBE INSERTION;  Surgeon: Aurora Kirkland MD;  Location: [facility] OR;  Service: General;  Laterality: N/A;   • ENDOSCOPY W/ STENT PLACEMENT/REMOVAL N/A 2023    Procedure: ESOPHAGOGASTRODUODENOSCOPY WITH STENT PLACEMENT WITH FLUOROSCOPY;  Surgeon: Brunner, Mark I, MD;  Location: [facility] ENDOSCOPY;  Service: Gastroenterology;  Laterality: N/A;   • EXPLORATORY LAPAROTOMY N/A 3/22/2023    Procedure: LAPAROTOMY  EXPLORATORY WITH OVER SEW OF DUODENAL ULCER WITH OMENTAL PATCH AND BIOPATCH AND CENTRAL LINE PLACEMENT;  Surgeon: Aurora Kirkland MD;  Location: [facility];  Service: General;  Laterality: N/A;      General Information     Row Name 04/24/23 1314          OT Time and Intention    Document Type progress note/recertification  -     Mode of Treatment occupational therapy;individual therapy  -     Row Name 04/24/23 1314          General Information    Patient Profile Reviewed yes  -     Existing Precautions/Restrictions fall;oxygen therapy device and L/min;other (see comments)  abdominal incision; PEG  -     Barriers to Rehab medically complex  -     Row Name 04/24/23 1314          Cognition    Orientation Status (Cognition) oriented x 3  -     Row Name 04/24/23 1314          Safety Issues, Functional Mobility    Safety Issues Affecting Function (Mobility) awareness of need for assistance;insight into deficits/self-awareness;safety precaution awareness;safety precautions follow-through/compliance;sequencing abilities  -     Impairments Affecting Function (Mobility) balance;coordination;endurance/activity tolerance;motor planning;strength;shortness of breath  -           User Key  (r) = Recorded By, (t) = Taken By, (c) = Cosigned By    Initials Name Provider Type     Ca Luther OT Occupational Therapist                 Mobility/ADL's     Row Name 04/24/23 1314          Bed Mobility    Bed Mobility supine-sit  -     Supine-Sit [place] (Bed Mobility) moderate assist (50% patient effort);1 person assist;verbal cues  -     Bed Mobility, Safety Issues decreased use of legs for bridging/pushing;decreased use of arms for pushing/pulling;impaired trunk control for bed mobility  -     Assistive Device (Bed Mobility) bed rails;head of bed elevated  -     Comment, (Bed Mobility) Extended time and effort required, encouragement and education on importance of activity provided.  -     Row  Name 04/24/23 1314          Transfers    Transfers sit-stand transfer;toilet transfer;stand-sit transfer  -     Comment, (Transfers) Once sitting on commode pt c/o dizziness. RN called and present in room. Pt unable to stand w/ RW, req max Ax1 w/ BUE support for STS from commode, RN present to assist w/ wiping & to bring chair to bathroom door, then pt max Ax1 for SPT from commode to recliner chair. BP checked once pt in recliner and stable.  -     Row Name 04/24/23 1314          Sit-Stand Transfer    Sit-Stand [place] (Transfers) minimum assist (75% patient effort);1 person assist;verbal cues  -     Assistive Device (Sit-Stand Transfers) walker, front-wheeled  -     Row Name 04/24/23 1314          Stand-Sit Transfer    Stand-Sit [place] (Transfers) maximum assist (25% patient effort);1 person assist;verbal cues  -     Row Name 04/24/23 1314          Toilet Transfer    Type (Toilet Transfer) stand-sit;sit-stand  -     [place] Level (Toilet Transfer) maximum assist (25% patient effort);1 person assist;verbal cues  -     Assistive Device (Toilet Transfer) commode;grab bars/safety frame;walker, front-wheeled  -     Row Name 04/24/23 1314          Functional Mobility    Functional Mobility- Ind. Level minimum assist (75% patient effort);1 person;verbal cues required  -     Functional Mobility- Device walker, front-wheeled  -     Functional Mobility-Distance (Feet) 8  -     Functional Mobility- Safety Issues balance decreased during turns;sequencing ability decreased;step length decreased;supplemental O2  -     Functional Mobility- Comment Pt min Ax1 for 8ft from bed to toilet w/ RW. VC's for sequencing.  -     Row Name 04/24/23 1314          Activities of Daily Living    BADL Assessment/Intervention lower body dressing;toileting  -     Row Name 04/24/23 1314          Lower Body Dressing Assessment/Training    [place] Level (Lower Body Dressing) don;socks;dependent (less  than 25% patient effort)  -     Position (Lower Body Dressing) edge of bed sitting  -     Row Name 04/24/23 1314          Toileting Assessment/Training    [place] Level (Toileting) perform perineal hygiene;adjust/manage clothing;dependent (less than 25% patient effort);verbal cues  -     Assistive Devices (Toileting) commode;grab bar/safety frame  -     Position (Toileting) supported standing  -           User Key  (r) = Recorded By, (t) = Taken By, (c) = Cosigned By    Initials Name Provider Type     Ca Luther OT Occupational Therapist               Obj/Interventions     Row Name 04/24/23 1319          Balance    Balance Assessment sitting static balance;sitting dynamic balance;sit to stand dynamic balance;standing static balance;standing dynamic balance  -     Static Sitting Balance contact guard  -     Dynamic Sitting Balance contact guard  -     Position, Sitting Balance unsupported;sitting edge of bed;sitting in chair;other (see comments)  commode  -     Sit to Stand Dynamic Balance minimal assist;1-person assist;verbal cues  -     Static Standing Balance minimal assist;1-person assist;verbal cues  -     Dynamic Standing Balance minimal assist;1-person assist;verbal cues  -     Position/Device Used, Standing Balance supported;walker, front-wheeled  -     Balance Interventions sitting;sit to stand;occupation based/functional task  -           User Key  (r) = Recorded By, (t) = Taken By, (c) = Cosigned By    Initials Name Provider Type     Ca Luther OT Occupational Therapist               Goals/Plan     Row Name 04/24/23 1321          Bed Mobility Goal 1 (OT)    Activity/Assistive Device (Bed Mobility Goal 1, OT) sit to supine;supine to sit  -     [place] Level/Cues Needed (Bed Mobility Goal 1, OT) minimum assist (75% or more patient effort);verbal cues required  -     Time Frame (Bed Mobility Goal 1, OT) long term goal (LTG);10 days  -      Progress/Outcomes (Bed Mobility Goal 1, OT) continuing progress toward goal;goal revised this date  -     Row Name 04/24/23 1321          Transfer Goal 1 (OT)    Activity/Assistive Device (Transfer Goal 1, OT) sit-to-stand/stand-to-sit;bed-to-chair/chair-to-bed;toilet;walker, rolling  -     [place] Level/Cues Needed (Transfer Goal 1, OT) contact guard required;verbal cues required  -     Time Frame (Transfer Goal 1, OT) long term goal (LTG);10 days  -     Progress/Outcome (Transfer Goal 1, OT) continuing progress toward goal;goal revised this date  -     Row Name 04/24/23 1321          Dressing Goal 1 (OT)    Activity/Device (Dressing Goal 1, OT) lower body dressing  don/doff socks w/ AAD  -     [place]/Cues Needed (Dressing Goal 1, OT) moderate assist (50-74% patient effort);verbal cues required;nonverbal cues (demo/gesture) required  -     Time Frame (Dressing Goal 1, OT) long term goal (LTG);10 days  -     Progress/Outcome (Dressing Goal 1, OT) progress slower than expected  -           User Key  (r) = Recorded By, (t) = Taken By, (c) = Cosigned By    Initials Name Provider Type    Ca Browne, AMAIRANI Occupational Therapist               Clinical Impression     Row Name 04/24/23 3095          Pain Assessment    Pretreatment Pain Rating 9/10  -     Posttreatment Pain Rating 9/10  -     Pain Location generalized  -     Pain Location - back  -     Pain Intervention(s) Repositioned;Ambulation/increased activity  -     Row Name 04/24/23 4093          Plan of Care Review    Plan of Care Reviewed With patient  -     Progress no change  -     Outcome Evaluation Pt's ADL independence continues to be limited d/t generalized weakness, increased pain, dizziness, decreased functional endurance, and balance deficits. Will continue to progress pt as tolerated per OT POC. Rec SNF at d/c.  -     Row Name 04/24/23 2373          Therapy Assessment/Plan (OT)    Rehab Potential (OT)  good, to achieve stated therapy goals  -     Criteria for Skilled Therapeutic Interventions Met (OT) yes;meets criteria;skilled treatment is necessary  -     Therapy Frequency (OT) daily  -[facility] Name 04/24/23 1319          Therapy Plan Review/Discharge Plan (OT)    Anticipated Discharge Disposition (OT) [facility] nursing facility  -[facility] Name 04/24/23 1319          Vital Signs    Pre Systolic BP Rehab --  VSS - RN cleared OT  -     O2 Delivery Pre Treatment nasal cannula  -     O2 Delivery Intra Treatment nasal cannula  -     O2 Delivery Post Treatment nasal cannula  -MC     Pre Patient Position Supine  -     Intra Patient Position Standing  -     Post Patient Position Sitting  -[facility] 04/24/23 1319          Positioning and Restraints    Pre-Treatment Position in bed  -     Post Treatment Position chair  -     In Chair reclined;call light within reach;encouraged to call for assist;exit alarm on;waffle cushion;on mechanical lift sling;legs elevated;heels elevated  -           User Key  (r) = Recorded By, (t) = Taken By, (c) = Cosigned By    Initials Name Provider Type    Ca Browne OT Occupational Therapist               Outcome Measures     Row Name 04/24/23 1322          How much help from another is currently needed...    Putting on and taking off regular lower body clothing? 2  -MC     Bathing (including washing, rinsing, and drying) 2  -MC     Toileting (which includes using toilet bed pan or urinal) 1  -MC     Putting on and taking off regular upper body clothing 2  -MC     Taking care of personal grooming (such as brushing teeth) 3  -MC     Eating meals 2  -     AM-PAC 6 Clicks Score (OT) 12  -MC     Row Name 04/24/23 1322          Functional Assessment    Outcome Measure Options AM-PAC 6 Clicks Daily Activity (OT)  -           User Key  (r) = Recorded By, (t) = Taken By, (c) = Cosigned By    Initials Name Provider Type    Ca Browne OT  Occupational Therapist                Occupational Therapy Education     Title: PT OT SLP Therapies (In Progress)     Topic: Occupational Therapy (In Progress)     Point: ADL training (In Progress)     Description:   Instruct learner(s) on proper safety adaptation and remediation techniques during self care or transfers.   Instruct in proper use of assistive devices.              Learning Progress Summary           Patient Acceptance, E, NR by  at 4/24/2023 1322    Acceptance, E, NR by  at 4/19/2023 1510    Acceptance, E,D, VU,NR by  at 4/17/2023 1426    Comment: UE TE, pacing self, AE for LBD, trasnfer technique    Acceptance, E, NR by  at 4/13/2023 1536    Acceptance, E, NR by  at 4/11/2023 1330                   Point: Home exercise program (Done)     Description:   Instruct learner(s) on appropriate technique for monitoring, assisting and/or progressing therapeutic exercises/activities.              Learning Progress Summary           Patient Acceptance, E,D, VU,NR by  at 4/17/2023 1426    Comment: UE TE, pacing self, AE for LBD, trasnfer technique                   Point: Precautions (In Progress)     Description:   Instruct learner(s) on prescribed precautions during self-care and functional transfers.              Learning Progress Summary           Patient Acceptance, E, NR by  at 4/24/2023 1322    Acceptance, E, NR by  at 4/19/2023 1510    Acceptance, E, NR by  at 4/13/2023 1536    Acceptance, E, NR by  at 4/11/2023 1330                   Point: Body mechanics (In Progress)     Description:   Instruct learner(s) on proper positioning and spine alignment during self-care, functional mobility activities and/or exercises.              Learning Progress Summary           Patient Acceptance, E, NR by  at 4/24/2023 1322    Acceptance, E, NR by  at 4/19/2023 1510    Acceptance, E,D, VU,NR by  at 4/17/2023 1426    Comment: UE TE, pacing self, AE for LBD, trasnfer technique    Acceptance,  E, NR by  at 4/13/2023 1536    Acceptance, E, NR by  at 4/11/2023 1330                               User Key     Initials Effective Dates Name Provider Type Discipline     02/03/23 -  Laura Osman, OT Occupational Therapist OT     06/16/21 -  Ana M Presley OT Occupational Therapist OT     10/14/22 -  Ca Luther OT Occupational Therapist OT              OT Recommendation and Plan  Planned Therapy Interventions (OT): activity tolerance training, adaptive equipment training, BADL retraining, functional balance retraining, IADL retraining, occupation/activity based interventions, ROM/therapeutic exercise, strengthening exercise, transfer/mobility retraining, patient/caregiver education/training  Therapy Frequency (OT): daily  Plan of Care Review  Plan of Care Reviewed With: patient  Progress: no change  Outcome Evaluation: Pt's ADL independence continues to be limited d/t generalized weakness, increased pain, dizziness, decreased functional endurance, and balance deficits. Will continue to progress pt as tolerated per OT POC. Rec SNF at d/c.     Time Calculation:    Time Calculation- OT     Row Name 04/24/23 1323             Time Calculation- OT    OT Start Time 1102  -      OT Received On 04/24/23  -      OT Goal Re-Cert Due Date 05/04/23  -         Timed Charges    64921 - OT Therapeutic Activity Minutes 15  -      14269 - OT Self Care/Mgmt Minutes 10  -         Total Minutes    Timed Charges Total Minutes 25  -       Total Minutes 25  -            User Key  (r) = Recorded By, (t) = Taken By, (c) = Cosigned By    Initials Name Provider Type     Ca Luther OT Occupational Therapist              Therapy Charges for Today     Code Description Service Date Service Provider Modifiers Qty    54130770425 HC OT THERAPEUTIC ACT EA 15 MIN 4/24/2023 Ca Luther OT GO 1    [id number] HC OT SELF CARE/MGMT/TRAIN EA 15 MIN 4/24/2023 Ca Luther OT GO 1                Ca Luther, OT  4/24/2023

## 2023-04-24 NOTE — PROGRESS NOTES
[facility] Medicine Services  PROGRESS NOTE    Patient Name: Christine Garg  : 1933  MRN: [id number]    Date of Admission: 4/10/2023  Primary Care Physician: Dave Tobar MD    Subjective   Subjective     CC:  Follow-up dysphagia    HPI:  Resting in bed in no acute distress and tells me she is okay and feels a little better than yesterday.  She does not have any specific complaints.  No fever or chills.    ROS:  Gen- No fevers, chills,  CV- No chest pain, palpitations  Resp- No cough, dyspnea  GI- No N/V/D, n    Objective   Objective     Vital Signs:   Temp:  [96 °F (35.6 °C)-97.5 °F (36.4 °C)] 97.5 °F (36.4 °C)  Heart Rate:  [] 111  Resp:  [14-20] 20  BP: (102-146)/(60-93) 122/68  Flow (L/min):  [1-2] 2     Physical Exam:  Constitutional: No acute distress  HENT: NCAT, mucous membranes moist  Respiratory: Crackles at the bases, respiratory effort normal   Cardiovascular: RRR, no murmurs, rubs, or gallops  Gastrointestinal: Positive bowel sounds, soft, nontender, nondistended, PEG tube in place and site is clean.  Musculoskeletal:  bilateral ankle edema  Psychiatric: Appropriate affect, cooperative  Neurologic: Awake, alert, follows commands, speech clear  Skin: No rashes    Results Reviewed:  LAB RESULTS:      Lab 23  0522 23  0511 23  0744 23  0704 23  0352   WBC 14.51* 13.15* 14.86* 17.81* 13.57*   HEMOGLOBIN 9.9* 9.5* 9.7* 10.9* 10.3*   HEMATOCRIT 30.7* 28.8* 29.9* 32.2* 32.3*   PLATELETS 410 340 357 395 359   NEUTROS ABS 11.76* 10.78* 12.50* 14.66*  --    IMMATURE GRANS (ABS) 0.16* 0.10* 0.14* 0.17*  --    LYMPHS ABS 1.39 1.21 1.10 1.51  --    MONOS ABS 0.70 0.63 0.72 0.96*  --    EOS ABS 0.42* 0.38 0.33 0.44*  --    MCV 94.5 93.5 93.4 94.7 97.9*         Lab 23  0522 23  0511 23  0744 23  0704 23  0352   SODIUM 127* 127* 126* 127* 130*   POTASSIUM 4.1 3.7 4.2 4.7 4.4   CHLORIDE 87* 88* 88* 87* 91*   CO2  31.0* 32.0* 32.0* 32.0* 30.0*   ANION GAP 9.0 7.0 6.0 8.0 9.0   BUN 13 13 14 12 8   CREATININE 0.35* 0.41* 0.40* 0.51* 0.35*   EGFR 97.8 94.2 94.7 89.4 97.8   GLUCOSE 105* 111* 116* 118* 105*   CALCIUM 8.0* 7.9* 8.2* 8.6 7.8*   MAGNESIUM 1.8  --  1.6  --  1.8   PHOSPHORUS  --   --  3.2  --   --          Lab 04/21/23  0704   TOTAL PROTEIN 4.7*   ALBUMIN 2.6*   GLOBULIN 2.1   ALT (SGPT) 23   AST (SGOT) 20   BILIRUBIN <0.2   ALK PHOS 107                     Brief Urine Lab Results  (Last result in the past 365 days)      Color   Clarity   Blood   Leuk Est   Nitrite   Protein   CREAT   Urine HCG        01/06/23 0149 Yellow   Clear   Negative   Negative   Negative   Negative                 Microbiology Results Abnormal     None          XR Chest 1 View    Result Date: 4/24/2023  XR CHEST 1 VW Date of Exam: 4/24/2023 8:10 AM EDT Indication: crackles Comparison: April 22, 2023 Findings: There are postoperative changes from midline sternotomy. The heart is enlarged. There is an esophageal stent in place. There are moderate size bilateral pleural effusions with basilar atelectasis. There is no pneumothorax. There are no new infiltrates.     Impression: Impression: Stable chest. Bilateral moderate pleural effusions with basilar atelectasis as before. Electronically Signed: Tobias Yarbrough  4/24/2023 8:41 AM EDT  Workstation ID: [id number]      Results for orders placed during the hospital encounter of 03/22/23    Adult Transthoracic Echo Complete W/ Cont if Necessary Per Protocol    Interpretation Summary  •  Left ventricular systolic function is normal. Calculated left ventricular EF = 60% Left ventricular ejection fraction appears to be 56 - 60%.  •  Left ventricular diastolic function is consistent with (grade I) impaired relaxation and age.  •  Moderate to severe mitral valve regurgitation is present.  •  Moderate tricuspid valve regurgitation is present.  •  Estimated right ventricular systolic pressure from tricuspid  regurgitation is moderately elevated (45-55 mmHg).  •  Moderate pulmonary hypertension is present.      Current medications:  Scheduled Meds:aspirin, 81 mg, Per PEG Tube, Daily  carvedilol, 25 mg, Per PEG Tube, BID With Meals  cefTRIAXone, 2 g, Intravenous, Q24H  sennosides, 10 mL, Per PEG Tube, BID   And  docusate sodium, 100 mg, Per PEG Tube, BID  enoxaparin, 40 mg, Subcutaneous, Nightly  furosemide, 40 mg, Oral, Daily  gabapentin, 100 mg, Per PEG Tube, Q12H  hydrALAZINE, 75 mg, Per PEG Tube, Q8H  lansoprazole, 30 mg, Per G Tube, BID AC  levothyroxine, 112 mcg, Per PEG Tube, Q AM  lidocaine, 1 patch, Transdermal, Q24H  metoclopramide, 10 mg, Per G Tube, 4x Daily AC & at Bedtime  sodium chloride, 10 mL, Intravenous, Q12H  valsartan, 320 mg, Per PEG Tube, Q24H      Continuous Infusions:   PRN Meds:.•  acetaminophen **OR** acetaminophen **OR** acetaminophen  •  [DISCONTINUED] senna-docusate sodium **AND** polyethylene glycol **AND** [DISCONTINUED] bisacodyl **AND** bisacodyl  •  cloNIDine  •  HYDROcodone-acetaminophen  •  ipratropium-albuterol  •  [DISCONTINUED] ondansetron **OR** ondansetron  •  promethazine  •  sodium chloride  •  sodium chloride    Assessment & Plan   Assessment & Plan     Active Hospital Problems    Diagnosis  POA   • **Esophageal stricture [K22.2]  Unknown   • Severe malnutrition [E43]  Unknown   • Hypothyroidism (acquired) [E03.9]  Unknown   • S/P percutaneous endoscopic gastrostomy (PEG) tube placement [Z93.1]  Not Applicable   • Adenocarcinoma [C80.1]  Unknown   • Perforated ulcer [K27.5]  Yes   • Former smoker [Z87.891]  Not Applicable   • Coronary artery disease  [I25.10]  Yes   • Chronic obstructive pulmonary disease [J44.9]  Yes   • Essential hypertension [I10]  Yes   • ASCVD (arteriosclerotic cardiovascular disease) [I25.10]  Yes      Resolved Hospital Problems   No resolved problems to display.        Brief Hospital Course to date:  Christine Garg is a 89 y.o. female with past medical  history of essential hypertension, dyslipidemia, coronary artery disease, peripheral artery disease, hypothyroidism, COPD who was admitted to [facility] on 3/22 for abdominal pain; went to OR, had perforated duodenal ulcer repaired with omental patch.  Then on 3/29, patient had CT of thoracic spine which noted left pleural consolidation and parenchymal nodules.  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  She was also found to have an esophageal stricture secondary to extrinsic compression of 3cm peritracheal lymph node.  On 4/7, patient had PEG tube placed.  She was sent to [facility] for placement of esophageal stent by Dr. Waters.        This patient's problems and plans were partially entered by my partner and updated as appropriate by me 04/24/23.     Assessment & Plan:  Dysphagia due to external esophageal compression by LAD  S/p PEG  · EGD 4/12 with esophageal stent placement  · Plan for full liquid diet x 1 week then advance to puree 4/19 if tolerating  · Currently on nocturnal tube feed.  · CT of abdomen pelvis which was done on 4/20/2023 does not show any acute process.  However, patient has very mild diffuse tenderness on physical exam mild last night tube feeding had to be stopped as she developed abdominal discomfort.    Hyponatremia     Patient currently is on fluid restriction and sodium is slowly improving.  We will continue fluid restriction and monitor the sodium.    Cancer related pain  · Continue as needed Norco to q4h   · Pain seems to be well controlled.    Perforated Duodenal Ulcer s/p exploratory laparotomy with patch 3/22  · Continue PPI twice daily  · Continue lower aspirin to 81 mg daily instead of 325 mg      New diagnosis of lung adenocarcinoma  COPD  Former smoker   On oxygen 2L here   · Continue PRN duonebs  · Chest x-ray is stable.   · Follow up outpatient for treatment plan with Dr. Balbuena     · Patient has elevated  white count but no fever.  As mentioned chest x-ray is also stable.  We will start the patient empirically on Rocephin and check the white count in a.m.     Coronary artery disease  Dyslipidemia  Essential hypertension  · Lowered home aspirin from 325 mg to 81mg. Will need to make this clear at discharge in order to prevent further ulceration  · Currently blood pressure controlled with valsartan, Coreg and hydralazine  · Continue as needed clonidine    Hypoalbuminemia   Severe malnutrition  · Nutrition consulted  · Start nocturnal tube feeds     Hypothyroidism  · Continue home synthroid      Debility  · PT  · Patient had been approved to [facility] for subacute rehab in [place]. Patient would still like to go there after discharge. Patient is to follow up outpatient for treatment options regarding her cancer.       NOTE: Total time spent 45 minutes     Expected Discharge Location and Transportation: Rehab  Expected Discharge when bwd available.                         DVT prophylaxis:  Medical and mechanical DVT prophylaxis orders are present.     AM-PAC 6 Clicks Score (PT): 11 (04/21/23 1319)    CODE STATUS:   Code Status and Medical Interventions:   Ordered at: 04/10/23 7946     Code Status (Patient has no pulse and is not breathing):    CPR (Attempt to Resuscitate)     Medical Interventions (Patient has pulse or is breathing):    Full Support     Copied text in this note has been reviewed and is accurate as of 04/24/23.     Fransico Feng MD  04/24/23

## 2023-04-24 NOTE — CONSULTS
Clinical Nutrition     Nutrition Support Assessment  Reason for Visit: Follow-up protocol, EN/PO      Patient Name: Christine Garg  YOB: 1933  MRN: [id number]  Date of Encounter: 04/24/23 16:03 EDT  Admission date: 4/10/2023    Comments:    Pt reports passing large stool ball this morning. Continues with limited PO acceptance 2/2 nausea. Pt has not tolerated EN >35mL/hr.     Hyponatremia noted - will decrease flushes to 10mL q2hrs. Difficult promote PO adequacy with newly implemented fluid restriction while on full liquid diet.     Nutrition Assessment   Admission Diagnosis:  Esophageal stricture [K22.2]      Problem List:    Esophageal stricture    Essential hypertension    ASCVD (arteriosclerotic cardiovascular disease)    Coronary artery disease     Chronic obstructive pulmonary disease    Former smoker    Perforated ulcer    Hypothyroidism (acquired)    S/P percutaneous endoscopic gastrostomy (PEG) tube placement    Adenocarcinoma    Severe malnutrition        PMH:   She  has a past medical history of Advanced age, CAD (coronary artery disease) (2011), Chronic kidney disease (CKD), stage III (moderate), COPD (chronic obstructive pulmonary disease), History of tobacco use, Hyperlipidemia, Hypertension, Hypothyroidism, PONV (postoperative nausea and vomiting), and Pulmonary nodule.    PSH:  She  has a past surgical history that includes Breast biopsy (Left); Cataract extraction; Coronary angioplasty; Coronary artery bypass graft; Exploratory Laparotomy (N/A, 3/22/2023); Esophagogastroduodenoscopy w/ PEG (N/A, 4/7/2023); and ENDOSCOPY W/ STENT PLACEMENT/REMOVAL (N/A, 4/12/2023).      Applicable Nutrition Concerns:   Skin: surgical incision abdoment  Oral:  GI: PEG      Applicable Interval History:     3/22: duodenal ulcer repair  4/7: PEG placed at [facility]  4/11: initiated EN  4/12: esophageal stent placement  4/14: Nocturnal feeds initiated    Reported/Observed/Food/Nutrition  Related History:     4/24  Pt reports passing large stool ball this morning. Continues with limited PO acceptance 2/2 nausea. Pt has not tolerated nocturnal EN >35mL/hr. Hyponatremia noted.     4/20  Pt has not tolerated EN >30mL/hr; c/o nausea and residual of 50% delivered TF volume on AM check. Per MD note, will obtain CT abdomen. No documented BM x4 days, suspect may be contributing factor for PO intolerance and nausea with EN. Receiving prokinetic agent and Zofran. Pt reports inability to drink ONS 2/2 fullness from soup broth - encouraged to drink ONS or hold off tray to have between meals as able. Noted pt lost IV access, all meds PO.       4/17  Tolerating nocturnal feeds at 30mL/hr - denies nausea or abdominal discomfort at this rate. Per MD note, confusion about EN order from RN - plan to start at 30mL/hr and advance to goal overnight tonight. Continues to drink Boost Breeze without difficulty. Documented BM x1 in past 24hrs.     4/14  Pt son concerned for poor PO acceptance. Plan to initiate nocturnal feeds with hope of improved tolerance - ? Ability to deliver >15mL/hr. Per RN, pt tolerating meds via PEG with additional flush without difficulty.     4/12  Pt unable to tolerate EN at 15mL/hr - c/o abdominal tightness. Esophageal stent placed - diet upgraded to full liquids. Reports tolerating 1 cup of OJ.  Pt does not like original ONS but agreeable to Breeze.     4/11  Pt laying in bed with family at bedside - able to provide some weight/nutrition hx, additional information provided by spouse and son at bedside. Endorsed recent hx of inability to tolerate PO intake of any kind without emesis for ~3wks prior to hospitalization in March. PEG placed 2/2 esophageal stricture and inability for diet advance past clear liquids. Unable to tolerate EN order of Nutren 1.5 >20mL/hr (goal was 50mL/hr) 2/2 abdominal tightness. Mild hyponatremia noted. Denies N/V/D - reports soft BM. Plan for esophageal stent placement  "- ? Timeline.   Educated on EN formula most likely to choose, verbalized understanding.     Labs    Labs Reviewed: Yes     Results from last 7 days   Lab Units 04/24/23  0522 04/23/23  0511 04/22/23  0744 04/21/23  0704 04/19/23  0352   GLUCOSE mg/dL 105* 111* 116* 118* 105*   BUN mg/dL 13 13 14 12 8   CREATININE mg/dL 0.35* 0.41* 0.40* 0.51* 0.35*   SODIUM mmol/L 127* 127* 126* 127* 130*   CHLORIDE mmol/L 87* 88* 88* 87* 91*   POTASSIUM mmol/L 4.1 3.7 4.2 4.7 4.4   PHOSPHORUS mg/dL  --   --  3.2  --   --    MAGNESIUM mg/dL 1.8  --  1.6  --  1.8   ALT (SGPT) U/L  --   --   --  23  --        Results from last 7 days   Lab Units 04/21/23  0704   ALBUMIN g/dL 2.6*       Results from last 7 days   Lab Units 04/24/23  1211 04/24/23  0535 04/23/23  2344 04/23/23  1731 04/23/23  1117 04/23/23  0552   GLUCOSE mg/dL 126 133* 146* 125 120 113     Lab Results   Lab Value Date/Time    HGBA1C 6.10 (H) 03/22/2023 1139    HGBA1C 6.00 (H) 12/19/2022 1638                 Medications    Medications Reviewed: Yes  Pertinent: docusate, levothyroxine, reglan, zofran, protonix, senokot  Infusion:   PRN: Norco      Intake/Ouptut 24 hrs (0701 - 0700)   I&O's Reviewed: Yes       Anthropometrics     Flowsheet Rows    Flowsheet Row First Filed Value   Admission Height 165.1 cm (65\") Documented at 04/11/2023 0532   Admission Weight 66.2 kg (146 lb) Documented at 04/11/2023 0532          Height: Height: 165.1 cm (65\")  Last Filed Weight: Weight: 66.2 kg (146 lb) (04/11/23 0532)  Method: Weight Method: Bed scale  BMI: BMI (Calculated): 24.3  BMI classification: Normal: 18.5-24.9kg/m2  IBW:  125lbs    UBW:      Weight       Weight (kg) Weight (lbs) Weight Method Visit Report   12/19/2022 70.081 kg  154 lb 8 oz  Bed scale      68.04 kg  150 lb  Stated      68.04 kg  150 lb      12/20/2022 --  --      12/21/2022 70.943 kg  156 lb 6.4 oz  Bed scale     12/22/2022 71.578 kg  157 lb 12.8 oz  Bed scale     12/23/2022 71.487 kg  157 lb 9.6 oz  Bed scale " "    12/24/2022 74.118 kg  163 lb 6.4 oz  Bed scale     12/25/2022 73.936 kg  163 lb  Bed scale     1/5/2023 68.04 kg  150 lb  Stated     3/22/2023 71.895 kg  158 lb 8 oz  Bed scale      56.7 kg  125 lb  Standing scale      56.7 kg  125 lb  Stated     3/25/2023 70.625 kg  155 lb 11.2 oz  Bed scale     3/26/2023 67.722 kg  149 lb 4.8 oz  Bed scale     3/28/2023 67.042 kg  147 lb 12.8 oz  Bed scale     3/29/2023 65.227 kg  143 lb 12.8 oz  Bed scale     3/30/2023 65.454 kg  144 lb 4.8 oz  Bed scale     3/31/2023 66.271 kg  146 lb 1.6 oz  Bed scale     4/1/2023 66.18 kg  145 lb 14.4 oz  Bed scale     4/2/2023 66.906 kg  147 lb 8 oz  Bed scale     4/3/2023 65.363 kg  144 lb 1.6 oz  Bed scale     4/4/2023 65.817 kg  145 lb 1.6 oz  Bed scale     4/11/2023 66.225 kg  146 lb  Bed scale       Weight change:   Had a significant weight loss of 12lbs (7.6%) in ~1 month     Nutrition Focused Physical Exam     Date: 4/11     NFPE completed, patient meets criteria for MSA at this time.     Needs Assessment   Date: 4/11    Height used:Height: 165.1 cm (65\")  Weights used: 66kg CBW; 57kg IBW      Estimated Calorie needs: ~ 1525 Kcal/day  Method: 23-28 Kcals/KG = 9800-5728  Method:  MSJ (1085) x1.4 = 1519    Estimated Protein needs: ~ 90 g PRO/day  Method: 1.3-1.5g/Kg = 86-99    Estimated Fluid needs: ~ ml/day   Per clinical status    Current Nutrition Prescription     PO: Diet: Liquid Diets, Fluid Restriction (240 mL/tray) Diets; Full Liquid; 1500 mL/day; Texture: Regular Texture (IDDSI 7); Fluid Consistency: Thin (IDDSI 0)  Oral Nutrition Supplement:         EN: Peptamen AF  Goal Rate:  55mL/hr Water Flushes: 20mL/hr  Modular: None  Route: PEG  Tube: 20 PEG    At goal over: 12Hrs/day    Rx will supply:   Goal Volume 660 mL/day     Flush Volume 240 mL/day     Energy 792 Kcal/day 52 % Est Need   Protein 50 g/day 56 % Est Need   Fiber 0 g/day     Water in   mL     Total Water 775 mL     Meet DRI No    "     --------------------------------------------------------------------------  Product/Rate verified at bedside: Yes  Infusing Rate at time of visit: nocturnal feeds - pump set to 30mL/hr    Average Delivery from [facility] Day:  Volume 498 mL/day 75  % Goal Vol.   Flush Volume 181 mL/day     Energy 598 Kcal/day  % Est Need   Protein 38 g/day  % Est Need   Fiber 3 g/day     Water in   mL     Total Water 585 mL     Meet DRI No                Nutrition Diagnosis   Date:  Updated:   Problem Malnutrition acute severe   Etiology Dysphagia & recent abdominal surgery   Signs/Symptoms significant weight loss of 7.6% x1 month & <50% of EEN for >5d.    Status:     Date:   Updated: ,   Problem Inadequate enteral nutrition infusion   Etiology Dysphagia   Signs/Symptoms EN not yet restarted, intolerance to previous regimen and not at goal   Status: EN off, PO diet initiated, nocturnal feeds ordered    Goal:   General: Nutrition support treatment, Nutrition to support treatment, Improve nutrition related labs  PO: Increase intake  EN/PN: Tolerate EN at goal, Adjust EN    Nutrition Intervention      Follow treatment progress, Care plan reviewed, Nutrition support order placed    Hyponatremia noted - will decrease flushes to 10mL q2hrs. Difficult promote PO adequacy with newly implemented fluid restriction while on full liquid diet.     Monitoring/Evaluation:   Per protocol, I&O, PO intake, Supplement intake, Pertinent labs, EN delivery/tolerance, Weight, GI status, Symptoms      Suad Emmanuel, MS,RD,LD  Time Spent: 30min

## 2023-04-24 NOTE — CASE MANAGEMENT/SOCIAL WORK
Continued Stay Note  [facility]     Patient Name: Christine Garg  MRN: [id number]  Today's Date: 4/24/2023    Admit Date: 4/10/2023    Plan: Rehab   Discharge Plan     Row Name 04/24/23 1640       Plan    Plan Rehab    Patient/Family in Agreement with Plan yes    Plan Comments Patient is awaiting on bed placement. Patient wants to go to SNF in [place]. [facility] are looking over referral today. Still no bed at [facility] and [facility]. There is a bed in [facility] at a [facility]. CM is following.    Final Discharge Disposition Code 03 - skilled nursing facility (SNF)               Discharge Codes    No documentation.               Expected Discharge Date and Time     Expected Discharge Date Expected Discharge Time    Apr 21, 2023             Vale Lechuga RN

## 2023-04-24 NOTE — PLAN OF CARE
Goal Outcome Evaluation:  Plan of Care Reviewed With: patient        Progress: no change  Outcome Evaluation: VSS. Pt got up to chair today with help of PT/OT. One time dose of rocephin given per MD orders. Pain relieved by PRN pain meds. Will continue plan of care.

## 2023-04-25 NOTE — CONSULTS
Palliative Care Initial Consult   Attending Physician: Flora Franco,*  Referring Provider: Dr. Flora Franco    Reason for Referral:  assistance with clarification of goals of care and pain    Code Status:   Code Status and Medical Interventions:   Ordered at: 04/25/23 0851     Medical Intervention Limits:    NO intubation (DNI)     Level Of Support Discussed With:    Health Care Surrogate     Code Status (Patient has no pulse and is not breathing):    No CPR (Do Not Attempt to Resuscitate)     Medical Interventions (Patient has pulse or is breathing):    Limited Support      Advanced Directives: Advance Directive Status: Patient has advance directive, copy requested   Family/Support: Kaleb Garg (HCS/son), Ziyad Garg (alt HCS/son), Filiberto Garg (alt HCS/son)  Goals of Care: TBD.    HPI: Christine Garg is a 89 y.o. female with PMH significant for HTN, HLD, CAD, COPD, former smoker, PAD, CKD III, and hypothyroid. Patient presented from [facility] where she had been hospitalized since 3/22 for perforated duodenal ulcer s/p omental patch, biopsy of pleural mass showed adenocarcinoma and esophageal stricture secondary to extrinsic compression of peritracheal lymph node, with PEG tube placement. Patient transferred to [facility] for placement of esophageal stent on 4/10. Palliative Care consulted for cancer related pain management.   Patient currently on Gabapentin 100mg q 12 hours, Lidocaine patch, Hydrocodone-Acetaminophen 7.5-325mg PEG x6 doses. LBM moderate 4/24, smear 4/25.   Patient reports pain to left shoulder blade that radiates down her back left>right back down to her waist. She reports it is a dull, constant ache that is not affected by movement either improving or worsening. The lidocaine patches are most effective for her pain to the shoulder blade. Pain started in December 2022. Patient endorses SOB with any exertion which she finds distressing and has been increasing since it started in  December 2022, the Hydrocodone does improve her SOB. She reports anxiety due to circumstance and symptom burden including pain and SOB. Patient reports abdominal pain at times, improves with BM, agreeable to suppository today. She does endorse poor appetite with taste changes since having COVID in January of this year. She reports occasional nausea and increased at night with TF. She reports improved nausea with available medications.   Patient previously lived home alone and did not use any assistive devices prior to hospitalization in [place].     ROS: +pain, left shoulder blade that radiates down her back on left>right side, constant dull ache, improves with Lidocaine, pain currently 5/10, at best 2/10. +SOB, worse with exertion, improves with Hydrocodone, limits her activity. +anxiety, due to SOB, pain, and circumstances. +nausea, improved with medications. +decreased appetite, TF at night, poor appetite during day due to nausea and taste changes. +debility.  Denies vomiting, HA, chest pain.       Past Medical History:   Diagnosis Date   • Advanced age    • CAD (coronary artery disease) 2011    s/p 3v CABG   • Chronic kidney disease (CKD), stage III (moderate)    • COPD (chronic obstructive pulmonary disease)    • History of tobacco use    • Hyperlipidemia    • Hypertension    • Hypothyroidism    • PONV (postoperative nausea and vomiting)    • Pulmonary nodule      Past Surgical History:   Procedure Laterality Date   • BREAST BIOPSY Left     benign   • CATARACT EXTRACTION     • CORONARY ANGIOPLASTY     • CORONARY ARTERY BYPASS GRAFT     • ENDOSCOPY W/ PEG TUBE PLACEMENT N/A 4/7/2023    Procedure: ESOPHAGOGASTRODUODENOSCOPY WITH PERCUTANEOUS ENDOSCOPIC GASTROSTOMY TUBE INSERTION;  Surgeon: Aurora Kirkland MD;  Location: [facility];  Service: General;  Laterality: N/A;   • ENDOSCOPY W/ STENT PLACEMENT/REMOVAL N/A 4/12/2023    Procedure: ESOPHAGOGASTRODUODENOSCOPY WITH STENT PLACEMENT WITH FLUOROSCOPY;  Surgeon:  "Brunner, Mark I, MD;  Location:  [facility];  Service: Gastroenterology;  Laterality: N/A;   • EXPLORATORY LAPAROTOMY N/A 3/22/2023    Procedure: LAPAROTOMY EXPLORATORY WITH OVER SEW OF DUODENAL ULCER WITH OMENTAL PATCH AND BIOPATCH AND CENTRAL LINE PLACEMENT;  Surgeon: Aurora Kirkland MD;  Location:  [facility];  Service: General;  Laterality: N/A;     Social History     Socioeconomic History   • Marital status:    Tobacco Use   • Smoking status: Former     Types: Cigarettes   • Smokeless tobacco: Never   Vaping Use   • Vaping Use: Never used   Substance and Sexual Activity   • Alcohol use: No   • Drug use: No   • Sexual activity: Defer     Family History   Problem Relation Age of Onset   • Heart disease Mother    • Heart disease Father    • Heart disease Brother    • Breast cancer Neg Hx        Allergies   Allergen Reactions   • Motrin [Ibuprofen] Anaphylaxis and Hives   • Novocain [Procaine] Other (See Comments)     Pt state she passes out.       Current medication reviewed for route, type, dose and frequency and are current per MAR at time of dictation.    Palliative Performance Scale Score:  50%    /78   Pulse 108   Temp 97.1 °F (36.2 °C) (Oral)   Resp 18   Ht 165.1 cm (65\")   Wt 66.2 kg (146 lb)   SpO2 93%   BMI 24.30 kg/m²     Intake/Output Summary (Last 24 hours) at 4/25/2023 0929  Last data filed at 4/25/2023 0615  Gross per 24 hour   Intake 644 ml   Output 500 ml   Net 144 ml       Physical Exam:    General Appearance:   Patient laying in bed, awake, alert, chronically ill appearing, cooperative, NAD   HEENT:    NC/AT, EOMI, anicteric, MMM, face relaxed   Neck:   supple, trachea midline, no JVD   Lungs:     CTA bilat, diminished in bases; respirations regular, even and unlabored; RR 18-20 on exam    Heart:    RRR, normal S1 and S2, no M/R/G   Abdomen:     Hypoactive bowel sounds, soft, nontender, distended   G/U:   Deferred   MSK/Extremities:    no edema   Pulses:   Pulses " palpable and equal bilaterally   Skin:   Warm, dry   Neurologic:   A/Ox3, cooperative, ELIZONDO   Psych:   Calm, appropriate         Labs:   Results from last 7 days   Lab Units 04/25/23  0415   WBC 10*3/mm3 13.86*   HEMOGLOBIN g/dL 9.4*   HEMATOCRIT % 28.6*   PLATELETS 10*3/mm3 387     Results from last 7 days   Lab Units 04/25/23  0641   SODIUM mmol/L 128*   POTASSIUM mmol/L 3.9   CHLORIDE mmol/L 88*   CO2 mmol/L 31.0*   BUN mg/dL 13   CREATININE mg/dL 0.39*   GLUCOSE mg/dL 123*   CALCIUM mg/dL 8.2*     Results from last 7 days   Lab Units 04/25/23  0641 04/22/23  0744 04/21/23  0704   SODIUM mmol/L 128*   < > 127*   POTASSIUM mmol/L 3.9   < > 4.7   CHLORIDE mmol/L 88*   < > 87*   CO2 mmol/L 31.0*   < > 32.0*   BUN mg/dL 13   < > 12   CREATININE mg/dL 0.39*   < > 0.51*   CALCIUM mg/dL 8.2*   < > 8.6   BILIRUBIN mg/dL  --   --  <0.2   ALK PHOS U/L  --   --  107   ALT (SGPT) U/L  --   --  23   AST (SGOT) U/L  --   --  20   GLUCOSE mg/dL 123*   < > 118*    < > = values in this interval not displayed.     Imaging Results (Last 72 Hours)     Procedure Component Value Units Date/Time    XR Chest 1 View [[id number]] Collected: 04/24/23 0839     Updated: 04/24/23 0844    Narrative:      XR CHEST 1 VW    Date of Exam: 4/24/2023 8:10 AM EDT    Indication: crackles    Comparison: April 22, 2023    Findings:  There are postoperative changes from midline sternotomy. The heart is enlarged. There is an esophageal stent in place. There are moderate size bilateral pleural effusions with basilar atelectasis. There is no pneumothorax. There are no new infiltrates.      Impression:      Impression:  Stable chest. Bilateral moderate pleural effusions with basilar atelectasis as before.      Electronically Signed: Tobias Yarbrough    4/24/2023 8:41 AM EDT    Workstation ID: [id number]    XR Chest 1 View [[id number]] Collected: 04/22/23 1855     Updated: 04/22/23 1900    Narrative:      XR CHEST 1 VW    Date of Exam: 4/22/2023 4:20 PM  EDT    Indication: sob    Comparison: 4/13/2023.    Findings:  Esophageal stent projects unchanged, accounting for patient rotation. There are new bilateral increased opacities present at the lung bases, with the appearance of volume loss or airspace disease and small to moderate pleural effusions. Apparent   mediastinal widening is likely related to patient rotation.      Impression:      Impression:  Esophageal stent projects unchanged, accounting for patient rotation. There are new bilateral increased opacities present at the lung bases, with the appearance of volume loss or airspace disease and small to moderate pleural effusions. Apparent   mediastinal widening is likely related to patient rotation      Electronically Signed: Sae Gudino    4/22/2023 6:56 PM EDT    Workstation ID: [id number]                Diagnostics: Reviewed    A:   Esophageal stricture    Essential hypertension    ASCVD (arteriosclerotic cardiovascular disease)    Coronary artery disease     Chronic obstructive pulmonary disease    Former smoker    Perforated ulcer    Hypothyroidism (acquired)    S/P percutaneous endoscopic gastrostomy (PEG) tube placement    Adenocarcinoma    Severe malnutrition    89 y.o. female with esophageal stricture due to adenocarcinoma.      S/S:   1. Pain -left shoulder blade, neoplastic lesion left T5-T6  -continue Gabapentin 100mg PEG  q 12 hours  -scheduled Hydrocodone-Acetaminophen 7.5-325mg PEG q 6 hours  -continue Lidocaine patch  -continue Hydrocodone-Acetaminophen 7.5-325mg PEG q 4 hours prn breakthrough pain    2. SOB -Hydrocodone-Acetaminophen for SOB as well    3. Nausea -continue scheduled Reglan 10mg PEG four times daily  -continue Zofran 4mg IV q 6 hours prn N/V  -continue Promethazine 12.5mg MO q 4 hours prn N/V    4. Anxiety -started Hydroxyzine 25mg PEG TID prn anxiety    5. Constipation -at risk for OIC  -continue bowel regimen, patient agreeable to Bisacodyl MO today    6. Decreased Appetite -TF  via PEG with night feeds  -offer frequent snacks and supplements    7. Debility -PT/OT following    8. GOC -DNR/DNI -per discussion with patient  -ACP on chart  -patient reports goal to seek treatment after discharge for adenocarcinoma  -goal to discharge for STR with follow up with Oncology    P: Introduced Palliative Care and services. Long discussion regarding symptom burden and medications adjusted as above. Discussed GOC including code status and she confirms DNR/DNI. She reports her plan is to discharge for STR and then seek further treatment outpatient. Patient would like treatment of symptom burden to allow for more participation in PT/OT and STR. Will continue to follow for symptom management and further GOC discussion.       Thank you for this consult and allowing us to participate in patient's plan of care. Palliative Care Team will continue to follow patient. Please do not hesitate to contact us regarding further symptom management or goals of care needs.  Time: 60 minutes spent reviewing medical and medication records, assessing and examining patient, discussing with family, answering questions, providing some guidance about a plan and documentation of care, and coordinating care with other healthcare members, with > 50% time spent face to face.         María Elena Vazquez, APRN  4/25/2023

## 2023-04-25 NOTE — PLAN OF CARE
Goal Outcome Evaluation:  Plan of Care Reviewed With: patient  Progress: no change  Outcome Evaluation: VSS. Non tele. Remains on 2L nasal cannula. Patient tolerated nocturnal tube feeding at 35mL/hr, stopped at 0600. PRN norco given for pain. No further complaints, continue plan of care.

## 2023-04-25 NOTE — CASE MANAGEMENT/SOCIAL WORK
Continued Stay Note  [facility]     Patient Name: Christine Garg  MRN: [id number]  Today's Date: 4/25/2023    Admit Date: 4/10/2023    Plan: Rehab   Discharge Plan     Row Name 04/25/23 1435       Plan    Plan Rehab    Plan Comments Spoke with patient at bedside. We discussed rehab option. CM called referrals to all the places in [place] and have not got a bed offer. CM called places in surrounding areas and only got a bed offer in [facility] at [facility]. Patient is now agreeable to go. CM called [employer] to start insurance approval on her. Discharge plan is [facility] when insurance approves. CM will follow.    Final Discharge Disposition Code 03 - skilled nursing facility (SNF)               Discharge Codes    No documentation.               Expected Discharge Date and Time     Expected Discharge Date Expected Discharge Time    Apr 28, 2023             Vale Lechuga RN

## 2023-04-25 NOTE — PLAN OF CARE
Goal Outcome Evaluation:  Plan of Care Reviewed With: patient        Progress: improving  Outcome Evaluation: Patient continues to be limited by weakness, poor activity tolerance, and dyspnea on exertion affecting functional mobility. Continue skilled IP PT services to address deficits and support return to baseline. Recommend SNF at discharge.

## 2023-04-25 NOTE — THERAPY TREATMENT NOTE
Patient Name: Christine Garg  : 1933    MRN: [id number]                              Today's Date: 2023       Admit Date: 4/10/2023    Visit Dx:     ICD-10-CM ICD-9-CM   1. Esophageal stricture  K22.2 530.3     Patient Active Problem List   Diagnosis   • Essential hypertension   • Dyslipidemia   • ASCVD (arteriosclerotic cardiovascular disease)   • Palpitations   • Coronary artery disease    • Abnormal PFTs (pulmonary function tests)   • Chronic obstructive pulmonary disease   • Mild persistent asthma with allergic rhinitis   • Pulmonary nodule   • Former smoker   • Colitis   • Perforated ulcer   • Acute gastric ulcer with perforation   • Moderate malnutrition   • Esophageal stricture   • Hypothyroidism (acquired)   • S/P percutaneous endoscopic gastrostomy (PEG) tube placement   • Adenocarcinoma   • Severe malnutrition     Past Medical History:   Diagnosis Date   • Advanced age    • CAD (coronary artery disease) 2011    s/p 3v CABG   • Chronic kidney disease (CKD), stage III (moderate)    • COPD (chronic obstructive pulmonary disease)    • History of tobacco use    • Hyperlipidemia    • Hypertension    • Hypothyroidism    • PONV (postoperative nausea and vomiting)    • Pulmonary nodule      Past Surgical History:   Procedure Laterality Date   • BREAST BIOPSY Left     benign   • CATARACT EXTRACTION     • CORONARY ANGIOPLASTY     • CORONARY ARTERY BYPASS GRAFT     • ENDOSCOPY W/ PEG TUBE PLACEMENT N/A 2023    Procedure: ESOPHAGOGASTRODUODENOSCOPY WITH PERCUTANEOUS ENDOSCOPIC GASTROSTOMY TUBE INSERTION;  Surgeon: Aurora Kirkland MD;  Location: [facility] OR;  Service: General;  Laterality: N/A;   • ENDOSCOPY W/ STENT PLACEMENT/REMOVAL N/A 2023    Procedure: ESOPHAGOGASTRODUODENOSCOPY WITH STENT PLACEMENT WITH FLUOROSCOPY;  Surgeon: Brunner, Mark I, MD;  Location: [facility] ENDOSCOPY;  Service: Gastroenterology;  Laterality: N/A;   • EXPLORATORY LAPAROTOMY N/A 3/22/2023    Procedure: LAPAROTOMY  EXPLORATORY WITH OVER SEW OF DUODENAL ULCER WITH OMENTAL PATCH AND BIOPATCH AND CENTRAL LINE PLACEMENT;  Surgeon: Aurora Kirkland MD;  Location: [facility];  Service: General;  Laterality: N/A;      General Information     Row Name 04/25/23 0914          Physical Therapy Time and Intention    Document Type therapy note (daily note)  -NS     Mode of Treatment individual therapy;physical therapy  -NS     Row Name 04/25/23 0914          General Information    Patient Profile Reviewed yes  -NS     Existing Precautions/Restrictions fall;oxygen therapy device and L/min;other (see comments)  abdominal incision, PEG; anxiety with mobility  -NS     Row Name 04/25/23 0914          Cognition    Orientation Status (Cognition) oriented x 3  -NS     Row Name 04/25/23 0914          Safety Issues, Functional Mobility    Safety Issues Affecting Function (Mobility) insight into deficits/self-awareness;judgment;problem-solving;safety precaution awareness;safety precautions follow-through/compliance;sequencing abilities  -NS     Impairments Affecting Function (Mobility) balance;coordination;endurance/activity tolerance;motor planning;strength;pain;shortness of breath  -NS     Comment, Safety Issues/Impairments (Mobility) high anxiety with mobility  -NS           User Key  (r) = Recorded By, (t) = Taken By, (c) = Cosigned By    Initials Name Provider Type    NS Marilee Cox PT Physical Therapist               Mobility     Row Name 04/25/23 0914          Bed Mobility    Bed Mobility supine-sit;sit-supine;scooting/bridging  -NS     Scooting/Bridging Gilliam (Bed Mobility) dependent (less than 25% patient effort);2 person assist  -NS     Supine-Sit [place]am (Bed Mobility) minimum assist (75% patient effort);verbal cues  -NS     Sit-Supine [place]am (Bed Mobility) moderate assist (50% patient effort);verbal cues  -NS     Assistive Device (Bed Mobility) bed rails;head of bed elevated  -NS     Comment, (Bed Mobility) Cues for  sequencing, encouragement/education required.  -NS     Row Name 04/25/23 0914          Transfers    Comment, (Transfers) VCs for hand placement. Min A from EOB, Mod A from chair.  -NS     Row Name 04/25/23 0914          Sit-Stand Transfer    Sit-Stand Maria Stein (Transfers) moderate assist (50% patient effort);verbal cues  -NS     Assistive Device (Sit-Stand Transfers) walker, front-wheeled  -NS     Row Name 04/25/23 0914          Gait/Stairs (Locomotion)    Maria Stein Level (Gait) minimum assist (75% patient effort);1 person assist  -NS     Assistive Device (Gait) walker, front-wheeled  -NS     Distance in Feet (Gait) 3+3  -NS     Deviations/Abnormal Patterns (Gait) denny decreased;gait speed decreased;stride length decreased  -NS     Bilateral Gait Deviations forward flexed posture;heel strike decreased  -NS     Comment, (Gait/Stairs) Patient ambulated to the chair then back to bed, demonstrating flexed posture and shuffled steps. She declined further gait despite encouragement due to back pain and anxiety, reporting feeling SOA. SpO2 briefly decreased to 88% on RA, recovering to >90% quickly.  -NS           User Key  (r) = Recorded By, (t) = Taken By, (c) = Cosigned By    Initials Name Provider Type    Marilee Gibbs PT Physical Therapist               Obj/Interventions     [facility] Name 04/25/23 0914          Motor Skills    Therapeutic Exercise hip;knee;ankle  -NS     Row Name 04/25/23 0914          Hip (Therapeutic Exercise)    Hip (Therapeutic Exercise) strengthening exercise  -NS     Hip Strengthening (Therapeutic Exercise) bilateral;external rotation;internal rotation;10 repetitions;marching while seated;5 repetitions  -[facility] Name 04/25/23 0914          Knee (Therapeutic Exercise)    Knee (Therapeutic Exercise) strengthening exercise  -NS     Knee Strengthening (Therapeutic Exercise) bilateral;LAQ (long arc quad);10 repetitions  -[facility] Name 04/25/23 0914          Ankle (Therapeutic Exercise)     Ankle AROM (Therapeutic Exercise) bilateral;dorsiflexion;plantarflexion;10 repetitions  -NS     Row Name 04/25/23 0914          Balance    Balance Assessment sitting static balance;sitting dynamic balance;standing static balance;standing dynamic balance  -NS     Static Sitting Balance contact guard  -NS     Dynamic Sitting Balance contact guard  -NS     Position, Sitting Balance unsupported;sitting edge of bed  -NS     Static Standing Balance minimal assist  -NS     Dynamic Standing Balance minimal assist  -NS     Position/Device Used, Standing Balance supported;walker, front-wheeled  -NS           User Key  (r) = Recorded By, (t) = Taken By, (c) = Cosigned By    Initials Name Provider Type    Marilee Gibbs, PT Physical Therapist               Goals/Plan    No documentation.                Clinical Impression     Row Name 04/25/23 0914          Pain    Pre/Posttreatment Pain Comment Pt states she cannot have pain medicine yet.  -NS     Pain Intervention(s) Repositioned;Ambulation/increased activity;Nursing Notified  -NS     Row Name 04/25/23 0914          Pain Scale: FACES Pre/Post-Treatment    Pain: FACES Scale, Pretreatment 4-->hurts little more  -NS     Posttreatment Pain Rating 4-->hurts little more  -NS     Pain Location generalized  -NS     Pain Location - back  -NS     Row Name 04/25/23 0914          Plan of Care Review    Plan of Care Reviewed With patient  -NS     Progress improving  -NS     Outcome Evaluation Patient continues to be limited by weakness, poor activity tolerance, and dyspnea on exertion affecting functional mobility. Continue skilled IP PT services to address deficits and support return to baseline. Recommend SNF at discharge.  -NS     Row Name 04/25/23 0914          Vital Signs    Pre Systolic BP Rehab --  VSS- RN cleared for PT  -NS     Pre SpO2 (%) 95  -NS     O2 Delivery Pre Treatment room air  -NS     Intra SpO2 (%) 88  -NS     O2 Delivery Intra Treatment room air  -NS     Post  SpO2 (%) 92  -NS     O2 Delivery Post Treatment room air  -NS     Pre Patient Position Supine  -NS     Intra Patient Position Standing  -NS     Post Patient Position Supine  -NS     Row Name 04/25/23 0914          Positioning and Restraints    Pre-Treatment Position in bed  -NS     Post Treatment Position bed  -NS     In Bed notified nsg;supine;call light within reach;encouraged to call for assist;exit alarm on;side rails up x3;with nsg;heels elevated  -NS           User Key  (r) = Recorded By, (t) = Taken By, (c) = Cosigned By    Initials Name Provider Type    Marilee Gibbs PT Physical Therapist               Outcome Measures     Row Name 04/25/23 0914 04/25/23 0809       How much help from another person do you currently need...    Turning from your back to your side while in flat bed without using bedrails? 3  -NS 2  -LC    Moving from lying on back to sitting on the side of a flat bed without bedrails? 2  -NS 2  -LC    Moving to and from a bed to a chair (including a wheelchair)? 2  -NS 2  -LC    Standing up from a chair using your arms (e.g., wheelchair, bedside chair)? 2  -NS 2  -LC    Climbing 3-5 steps with a railing? 1  -NS 1  -LC    To walk in hospital room? 2  -NS 2  -LC    AM-PAC 6 Clicks Score (PT) 12  -NS 11  -LC    Highest level of mobility 4 --> Transferred to chair/commode  -NS 4 --> Transferred to chair/commode  -LC    Row Name 04/25/23 0914          Functional Assessment    Outcome Measure Options AM-PAC 6 Clicks Basic Mobility (PT)  -NS           User Key  (r) = Recorded By, (t) = Taken By, (c) = Cosigned By    Initials Name Provider Type    Meghan Mckeon RN Registered Nurse    Marilee Gibbs, MARIA DE JESUS Physical Therapist                             Physical Therapy Education     Title: PT OT SLP Therapies (In Progress)     Topic: Physical Therapy (In Progress)     Point: Mobility training (In Progress)     Learning Progress Summary           Patient Acceptance, E, NR by NS at 4/25/2023 7246     Comment: importance of OOB activity    Acceptance, E, NR by NS at 4/21/2023 1426    Comment: importance of OOB activity    Acceptance, E, VU,NR by HT at 4/17/2023 1431    Acceptance, E,D,H, NR by DM at 4/14/2023 1737    Acceptance, E, VU,NR by HT at 4/11/2023 1500                   Point: Home exercise program (In Progress)     Learning Progress Summary           Patient Acceptance, E, NR by NS at 4/25/2023 1019    Comment: importance of OOB activity    Acceptance, E, NR by NS at 4/21/2023 1426    Comment: importance of OOB activity    Acceptance, E,D,H, NR by DM at 4/14/2023 1737                   Point: Body mechanics (In Progress)     Learning Progress Summary           Patient Acceptance, E, NR by NS at 4/25/2023 1019    Comment: importance of OOB activity    Acceptance, E, NR by NS at 4/21/2023 1426    Comment: importance of OOB activity    Acceptance, E, VU,NR by HT at 4/17/2023 1431    Acceptance, E,D,H, NR by DM at 4/14/2023 1737    Acceptance, E, VU,NR by HT at 4/11/2023 1500                   Point: Precautions (In Progress)     Learning Progress Summary           Patient Acceptance, E, NR by NS at 4/25/2023 1019    Comment: importance of OOB activity    Acceptance, E, NR by NS at 4/21/2023 1426    Comment: importance of OOB activity    Acceptance, E, VU,NR by HT at 4/17/2023 1431    Acceptance, E,D,H, NR by DM at 4/14/2023 1737    Acceptance, E, VU,NR by HT at 4/11/2023 1500                               User Key     Initials Effective Dates Name Provider Type Discipline    DM 02/03/23 -  Rubi Nick, PT Physical Therapist PT    NS 06/16/21 -  Marilee Cox, PT Physical Therapist PT    HT 01/12/23 -  Clare Fulton, PT Student PT Student PT              PT Recommendation and Plan  Planned Therapy Interventions (PT): balance training, bed mobility training, gait training, home exercise program, neuromuscular re-education, patient/family education, strengthening, stair training, transfer  training  Plan of Care Reviewed With: patient  Progress: improving  Outcome Evaluation: Patient continues to be limited by weakness, poor activity tolerance, and dyspnea on exertion affecting functional mobility. Continue skilled IP PT services to address deficits and support return to baseline. Recommend SNF at discharge.     Time Calculation:    PT Charges     Row Name 04/25/23 0914             Time Calculation    Start Time 0914  -NS      PT Received On 04/25/23  -NS      PT Goal Re-Cert Due Date 05/01/23  -NS         Timed Charges    98748 - PT Therapeutic Exercise Minutes 8  -NS      78532 - PT Therapeutic Activity Minutes 18  -NS         Total Minutes    Timed Charges Total Minutes 26  -NS       Total Minutes 26  -NS            User Key  (r) = Recorded By, (t) = Taken By, (c) = Cosigned By    Initials Name Provider Type    Marilee Gibbs PT Physical Therapist              Therapy Charges for Today     Code Description Service Date Service Provider Modifiers Qty    96737599655 HC PT THERAPEUTIC ACT EA 15 MIN 4/25/2023 Marilee Cox, PT GP 1    99238192784 HC PT THER PROC EA 15 MIN 4/25/2023 Marilee Cox, PT GP 1          PT G-Codes  Outcome Measure Options: AM-PAC 6 Clicks Basic Mobility (PT)  AM-PAC 6 Clicks Score (PT): 12  AM-PAC 6 Clicks Score (OT): 12  PT Discharge Summary  Anticipated Discharge Disposition (PT): skilled nursing facility    Marilee Cox PT  4/25/2023

## 2023-04-25 NOTE — PLAN OF CARE
Goal Outcome Evaluation:              Outcome Evaluation: Patient on room air during shift. Frequent complaints of pain somewhat resolved with medications. Patient with poor intake this shift, difficulty tolerating meals. Some skin interventions in place (specialty bed, waffle boots, z guard), patient reeducated about repositioning and skin care. Patient encouraged to get up in chair this afternoon, patient declined. Bowel medications given per palliative, patient complaint of abdominal fullness/discomfort, passing flatus, no bowel movement. KUB completed, CT completed, results pending. Anticipate discharge to facility when possible.

## 2023-04-25 NOTE — PROGRESS NOTES
[facility] Medicine Services  PROGRESS NOTE    Patient Name: Christine Garg  : 1933  MRN: [id number]    Date of Admission: 4/10/2023  Primary Care Physician: Dave Tobar MD    Subjective   Subjective     CC:  abd pain    HPI:  Complaining of pain all over, but currently looks comfortable    ROS:  Gen- No fevers, chills  CV- No chest pain, palpitations  Resp- No cough, dyspnea  GI- No N/V/D, abd pain        Objective   Objective     Vital Signs:   Temp:  [96 °F (35.6 °C)-97.5 °F (36.4 °C)] 97.5 °F (36.4 °C)  Heart Rate:  [] 97  Resp:  [16-22] 18  BP: ()/(54-82) 98/58  Flow (L/min):  [1-2] 2     Physical Exam:  Constitutional: No acute distress, awake, alert  HENT: NCAT, mucous membranes moist  Respiratory: Clear to auscultation bilaterally, respiratory effort normal   Cardiovascular: RRR, no murmurs, rubs, or gallops  Gastrointestinal: Soft, tender, nondistended  Musculoskeletal: No bilateral ankle edema  Psychiatric: Appropriate affect, cooperative  Neurologic: Oriented x 2-3, speech soft but clear  Skin: No rashes      Results Reviewed:  LAB RESULTS:      Lab 23  0415 23  0522 23  0511 23  0744 23  0704   WBC 13.86* 14.51* 13.15* 14.86* 17.81*   HEMOGLOBIN 9.4* 9.9* 9.5* 9.7* 10.9*   HEMATOCRIT 28.6* 30.7* 28.8* 29.9* 32.2*   PLATELETS 387 410 340 357 395   NEUTROS ABS 11.24* 11.76* 10.78* 12.50* 14.66*   IMMATURE GRANS (ABS) 0.16* 0.16* 0.10* 0.14* 0.17*   LYMPHS ABS 1.33 1.39 1.21 1.10 1.51   MONOS ABS 0.73 0.70 0.63 0.72 0.96*   EOS ABS 0.32 0.42* 0.38 0.33 0.44*   MCV 94.1 94.5 93.5 93.4 94.7         Lab 23  0641 23  0522 23  0511 23  0744 23  0704 23  0352   SODIUM 128* 127* 127* 126* 127* 130*   POTASSIUM 3.9 4.1 3.7 4.2 4.7 4.4   CHLORIDE 88* 87* 88* 88* 87* 91*   CO2 31.0* 31.0* 32.0* 32.0* 32.0* 30.0*   ANION GAP 9.0 9.0 7.0 6.0 8.0 9.0   BUN 13 13 13 14 12 8   CREATININE 0.39* 0.35* 0.41*  0.40* 0.51* 0.35*   EGFR 95.3 97.8 94.2 94.7 89.4 97.8   GLUCOSE 123* 105* 111* 116* 118* 105*   CALCIUM 8.2* 8.0* 7.9* 8.2* 8.6 7.8*   MAGNESIUM  --  1.8  --  1.6  --  1.8   PHOSPHORUS  --   --   --  3.2  --   --          Lab 04/21/23  0704   TOTAL PROTEIN 4.7*   ALBUMIN 2.6*   GLOBULIN 2.1   ALT (SGPT) 23   AST (SGOT) 20   BILIRUBIN <0.2   ALK PHOS 107                     Brief Urine Lab Results  (Last result in the past 365 days)      Color   Clarity   Blood   Leuk Est   Nitrite   Protein   CREAT   Urine HCG        04/25/23 0950 Yellow   Clear   Negative   Small (1+)   Negative   Negative                 Microbiology Results Abnormal     None          XR Chest 1 View    Result Date: 4/24/2023  XR CHEST 1 VW Date of Exam: 4/24/2023 8:10 AM EDT Indication: crackles Comparison: April 22, 2023 Findings: There are postoperative changes from midline sternotomy. The heart is enlarged. There is an esophageal stent in place. There are moderate size bilateral pleural effusions with basilar atelectasis. There is no pneumothorax. There are no new infiltrates.     Impression: Impression: Stable chest. Bilateral moderate pleural effusions with basilar atelectasis as before. Electronically Signed: Tobias Yarbrough  4/24/2023 8:41 AM EDT  Workstation ID: [id number]      Results for orders placed during the hospital encounter of 03/22/23    Adult Transthoracic Echo Complete W/ Cont if Necessary Per Protocol    Interpretation Summary  •  Left ventricular systolic function is normal. Calculated left ventricular EF = 60% Left ventricular ejection fraction appears to be 56 - 60%.  •  Left ventricular diastolic function is consistent with (grade I) impaired relaxation and age.  •  Moderate to severe mitral valve regurgitation is present.  •  Moderate tricuspid valve regurgitation is present.  •  Estimated right ventricular systolic pressure from tricuspid regurgitation is moderately elevated (45-55 mmHg).  •  Moderate pulmonary hypertension  is present.      Current medications:  Scheduled Meds:aspirin, 81 mg, Per PEG Tube, Daily  carvedilol, 25 mg, Per PEG Tube, BID With Meals  cefTRIAXone, 2 g, Intravenous, Q24H  sennosides, 10 mL, Per PEG Tube, BID   And  docusate sodium, 100 mg, Per PEG Tube, BID  enoxaparin, 40 mg, Subcutaneous, Nightly  [START ON 4/26/2023] furosemide, 40 mg, Per PEG Tube, Daily  gabapentin, 100 mg, Per PEG Tube, Q12H  hydrALAZINE, 75 mg, Per PEG Tube, Q8H  HYDROcodone-acetaminophen, 1 tablet, Per PEG Tube, Q6H  lansoprazole, 30 mg, Per G Tube, BID AC  levothyroxine, 112 mcg, Per PEG Tube, Q AM  lidocaine, 1 patch, Transdermal, Q24H  metoclopramide, 10 mg, Per G Tube, 4x Daily AC & at Bedtime  sodium chloride, 10 mL, Intravenous, Q12H  valsartan, 320 mg, Per PEG Tube, Q24H      Continuous Infusions:   PRN Meds:.•  acetaminophen **OR** acetaminophen **OR** acetaminophen  •  aluminum-magnesium hydroxide-simethicone  •  [DISCONTINUED] senna-docusate sodium **AND** polyethylene glycol **AND** [DISCONTINUED] bisacodyl **AND** bisacodyl  •  cloNIDine  •  HYDROcodone-acetaminophen  •  hydrOXYzine  •  ipratropium-albuterol  •  [DISCONTINUED] ondansetron **OR** ondansetron  •  promethazine  •  sodium chloride  •  sodium chloride    Assessment & Plan   Assessment & Plan     Active Hospital Problems    Diagnosis  POA   • **Esophageal stricture [K22.2]  Unknown   • Severe malnutrition [E43]  Unknown   • Hypothyroidism (acquired) [E03.9]  Unknown   • S/P percutaneous endoscopic gastrostomy (PEG) tube placement [Z93.1]  Not Applicable   • Adenocarcinoma [C80.1]  Unknown   • Perforated ulcer [K27.5]  Yes   • Former smoker [Z87.891]  Not Applicable   • Coronary artery disease  [I25.10]  Yes   • Chronic obstructive pulmonary disease [J44.9]  Yes   • Essential hypertension [I10]  Yes   • ASCVD (arteriosclerotic cardiovascular disease) [I25.10]  Yes      Resolved Hospital Problems   No resolved problems to display.        Brief Hospital Course to  date:  Christine Garg is a 89 y.o. female with past medical history of essential hypertension, dyslipidemia, coronary artery disease, peripheral artery disease, hypothyroidism, COPD who was admitted to [facility] on 3/22 for abdominal pain; went to OR, had perforated duodenal ulcer repaired with omental patch.  Then on 3/29, patient had CT of thoracic spine which noted left pleural consolidation and parenchymal nodules.    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  She was also found to have an esophageal stricture secondary to extrinsic compression of 3cm peritracheal lymph node.    On 4/7, patient had PEG tube placed.  She was sent to [facility] for placement of esophageal stent by Dr. Waters.        This patient's problems and plans were partially entered by my partner and updated as appropriate by me 04/25/23.     Dysphagia due to external esophageal compression by LAD  S/p PEG  - EGD 4/12 with esophageal stent placement    Hyponatremia   - dc free water flush with tube feeds     Cancer related pain  -palliative care following     Perforated Duodenal Ulcer s/p exploratory laparotomy with patch 3/22  -continue low dose asa     New diagnosis of lung adenocarcinoma  COPD  Former smoker   - Follow up outpatient for treatment plan with Dr. Balbuena       - with possible infection, culture pending       Coronary artery disease  Dyslipidemia  Essential hypertension  - Lowered home aspirin from 325 mg to 81mg. Will need to make this clear at discharge in order to prevent further ulceration       Hypoalbuminemia   Severe malnutrition  - should improve with tube feeds    Hypothyroidism  - Continue home synthroid      Debility  - Patient had been approved to [facility] for subacute rehab in [place]. Patient would still like to go there after discharge. Patient is to follow up outpatient for treatment options regarding her cancer.        Expected  Discharge Location and Transportation: rehab   Expected Discharge   Expected Discharge Date: 04/28/23       DVT prophylaxis:  Medical and mechanical DVT prophylaxis orders are present.     AM-PAC 6 Clicks Score (PT): 12 (04/25/23 0914)    CODE STATUS:   Code Status and Medical Interventions:   Ordered at: 04/25/23 0851     Medical Intervention Limits:    NO intubation (DNI)     Level Of Support Discussed With:    Health Care Surrogate     Code Status (Patient has no pulse and is not breathing):    No CPR (Do Not Attempt to Resuscitate)     Medical Interventions (Patient has pulse or is breathing):    Limited Support       Flora Franco,   04/25/23

## 2023-04-25 NOTE — PLAN OF CARE
Problem: Palliative Care  Goal: Enhanced Quality of Life  Intervention: Promote Advance Care Planning  Flowsheets (Taken 4/25/2023 1623)  Life Transition/Adjustment:   palliative care discussed   palliative care initiated   decision-making facilitated     Problem: Palliative Care  Goal: Enhanced Quality of Life  Intervention: Optimize Psychosocial Wellbeing  Flowsheets (Taken 4/25/2023 1623)  Supportive Measures: (palliative IDT: RN, TU, APRN, DO)   active listening utilized   decision-making supported   goal-setting facilitated   verbalization of feelings encouraged   other (see comments)  Family/Support System Care:   presence promoted   caregiver stress acknowledged   support provided   involvement promoted     Problem: COPD (Chronic Obstructive Pulmonary Disease) Comorbidity  Goal: Maintenance of COPD Symptom Control  Intervention: Maintain COPD-Symptom Control  Recent Flowsheet Documentation  Taken 4/25/2023 1623 by Yessica Fulton RN  Supportive Measures: (palliative IDT: RN, TU, APRN, DO)   active listening utilized   decision-making supported   goal-setting facilitated   verbalization of feelings encouraged   other (see comments)   Goal Outcome Evaluation:  Plan of Care Reviewed With: patient, son        Progress: no change  Outcome Evaluation: new palliative Consult from Dr Franco.  Pt and 2 sons present ( Ziyad Coleman and Kaleb who are pt's hcs). Pt has been taking 4 norco a day since December prior to ulcer perforation. Pt  complains of dyspnea, a dull aching pain that starts between shoulder blades and radiates through to chest. Norco scheduled and prn.  Pt states dyspnea improved with pain meds and also prn nebs. Pt states she had a good bm yesterday and plans for supp again today. Pt is eating minimally by mouth full liquids and receiving tube feeds at night. Goal is to get back home and be able to walk a little and use the bathroom. CM looking for st rehab.  Continue to work on symptoms to  improve quality of life.

## 2023-04-25 NOTE — PROGRESS NOTES
Nutrition Services    Patient Name:  Christine Garg  YOB: 1933  MRN: [id number]  Admit Date:  4/10/2023    Pt continues with severely limited PO intake with c/o nausea and abdominal tightness. Continues with inability to advance nocturnal feeds to goal. KUB ordered per MD - free air noted. Discussed nutritional concerns hospitalist, reports plan for CT abdomen to assess air noted on KUB. Will continue to monitor for results prior to making additional nutrition intervention recommendations.     Electronically signed by:  Suad Emmanuel MS,RD,LD  04/25/23 15:47 EDT

## 2023-04-25 NOTE — DISCHARGE PLACEMENT REQUEST
"Amanda Gomez (89 y.o. Female)     Date of Birth   1933    Social Security Number       Address   [address] [id number]    Home Phone   [phone]    MRN   [id number]       [facility]    Marital Status                               Admission Date   4/10/23    Admission Type   Urgent    Admitting Provider   Flora Franco DO    Attending Provider   Flora Franco DO    Department, Room/Bed   [facility] 6B, N640/1       Discharge Date       Discharge Disposition       Discharge Destination                               Attending Provider: Flora Franco DO    Allergies: Motrin [Ibuprofen], Novocain [Procaine]    Isolation: None   Infection: None   Code Status: No CPR    Ht: 165.1 cm (65\")   Wt: 66.2 kg (146 lb)    Admission Cmt: None   Principal Problem: Esophageal stricture [K22.2]                 Active Insurance as of 4/10/2023     Primary Coverage     Payor Plan Insurance Group Employer/Plan Group    ANTH MEDICARE REPLACEMENT ANTH MEDICARE ADVANTAGE [id number]     Payor Plan Address Payor Plan Phone Number Payor Plan Fax Number Effective Dates    [address] [phone]  2019 - None Entered    [facility] [id number]       Subscriber Name Subscriber Birth Date Member ID       AMANDA GOMEZ 1933 [id number]                 Emergency Contacts      (Rel.) Home Phone Work Phone Mobile Phone    Kaleb Gomez (healthcare surrogate) (Son) -- -- [phone]    Ziyad Gomez (alternative healthcare surrogate) (Son) -- -- [phone]    Filiberto Gomez (second alternative HCS) (Son) -- -- [phone]               History & Physical      Hola Crowley DO at 04/10/23 1944              [facility] Medicine Services  HISTORY AND PHYSICAL    Patient Name: Amanda Gomez  : 1933  MRN: [id number]  Primary Care Physician: Dave Tobar MD  Date of admission: 4/10/2023    Subjective  "   Subjective     Chief Complaint:  Adenocarcinoma    HPI:  Christine Garg is a 89 y.o. female with PMH of HTN, HLD, CAD, COPD, former smoker, PAD and hypothyroid.     Patient presented to [facility] on 3/22 for a 5-day complain of abdominal pain.  She was taken to the OR for an ex lap.  She was found to have a perforated duodenal ulcer which was repaired with omental patch.  On 3/29, patient had CT of thoracic spine which noted left pleural consolidation and parenchymal nodules.  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  She was also found to have an esophageal stricture secondary to extrinsic compression of 3cm peritracheal lymph node.  On 4/7, patient had PEG tube placed.  She was sent to [facility] for placement of esophageal stent by Dr. Waters.    Patient had been approved to [facility] for subacute rehab in [place]. Patient would still like to go there after discharge. Patient is to follow up outpatient for treatment options regarding her cancer.     Review of Systems   Constitutional: Positive for appetite change. Negative for activity change, chills and fever.   HENT: Positive for trouble swallowing. Negative for congestion and sore throat.    Eyes: Negative.    Respiratory: Negative for cough, chest tightness and shortness of breath.    Cardiovascular: Negative for chest pain and leg swelling.   Gastrointestinal: Positive for abdominal pain. Negative for constipation, diarrhea, nausea and vomiting.   Genitourinary: Negative for difficulty urinating, dysuria and urgency.   Musculoskeletal: Negative.    Skin: Negative.    Neurological: Positive for weakness. Negative for dizziness and headaches.   Hematological: Negative.    Psychiatric/Behavioral: Negative.  Negative for agitation and confusion.            Personal History     Past Medical History:   Diagnosis Date   • Advanced age    • CAD (coronary artery disease) 2011    s/p 3v  CABG   • Chronic kidney disease (CKD), stage III (moderate)    • COPD (chronic obstructive pulmonary disease)    • History of tobacco use    • Hyperlipidemia    • Hypertension    • Hypothyroidism    • PONV (postoperative nausea and vomiting)    • Pulmonary nodule              Past Surgical History:   Procedure Laterality Date   • BREAST BIOPSY Left     benign   • CATARACT EXTRACTION     • CORONARY ANGIOPLASTY     • CORONARY ARTERY BYPASS GRAFT     • EXPLORATORY LAPAROTOMY N/A 3/22/2023    Procedure: LAPAROTOMY EXPLORATORY WITH OVER SEW OF DUODENAL ULCER WITH OMENTAL PATCH AND BIOPATCH AND CENTRAL LINE PLACEMENT;  Surgeon: Aurora Kirkland MD;  Location: [facility];  Service: General;  Laterality: N/A;       Family History:  family history includes Heart disease in her brother, father, and mother.     Social History:  reports that she has quit smoking. Her smoking use included cigarettes. She has never used smokeless tobacco. She reports that she does not drink alcohol and does not use drugs.  Social History     Social History Narrative   • Not on file       Medications:  Evolocumab, HYDROcodone-acetaminophen, Lidocaine, aspirin, carvedilol, castor oil-balsam peru, cloNIDine, heparin (porcine), hydrALAZINE, labetalol, levothyroxine, magnesium sulfate, magnesium sulfate in D5W 1g/100mL (PREMIX), metoclopramide, montelukast, multivitamin, ondansetron, pantoprazole, polyethylene glycol, potassium chloride, potassium phosphate 45 mmol in sodium chloride 0.9 % 500 mL infusion, and valsartan    Allergies   Allergen Reactions   • Motrin [Ibuprofen] Anaphylaxis and Hives   • Novocain [Procaine] Other (See Comments)     Pt state she passes out.       Objective    Objective     Vital Signs:   Temp:  [97.5 °F (36.4 °C)-98.1 °F (36.7 °C)] 98.1 °F (36.7 °C)  Heart Rate:  [72-78] 72  Resp:  [16-18] 16  BP: (127-172)/(50-68) 127/50  Flow (L/min):  [2] 2    Physical Exam  Vitals reviewed.   Constitutional:       General: She is not  in acute distress.     Appearance: She is normal weight.   HENT:      Head: Normocephalic.      Nose: Nose normal. No congestion.      Mouth/Throat:      Mouth: Mucous membranes are moist.   Eyes:      Extraocular Movements: Extraocular movements intact.      Pupils: Pupils are equal, round, and reactive to light.   Cardiovascular:      Rate and Rhythm: Normal rate and regular rhythm.      Pulses: Normal pulses.      Heart sounds: Normal heart sounds. No murmur heard.  Pulmonary:      Effort: Pulmonary effort is normal. No respiratory distress.      Breath sounds: Normal breath sounds. No wheezing or rhonchi.   Abdominal:      General: Bowel sounds are normal. There is no distension.      Palpations: Abdomen is soft.      Tenderness: There is abdominal tenderness.   Musculoskeletal:         General: No swelling. Normal range of motion.      Cervical back: Normal range of motion. No rigidity.   Skin:     General: Skin is warm.      Capillary Refill: Capillary refill takes less than 2 seconds.      Findings: Wound (midline abdominal incision pink and closed, C/D/I) present.      Comments: PEG in place C/D/I   Neurological:      General: No focal deficit present.      Mental Status: She is alert and oriented to person, place, and time. Mental status is at baseline.      Motor: Weakness present.   Psychiatric:         Mood and Affect: Mood normal.         Behavior: Behavior normal.         Thought Content: Thought content normal.         Judgment: Judgment normal.          Result Review:  I have personally reviewed the results from the time of this admission to 4/10/2023 19:44 EDT and agree with these findings:  [x]  Laboratory list / accordion  [x]  Microbiology  [x]  Radiology  []  EKG/Telemetry   []  Cardiology/Vascular   []  Pathology  [x]  Old records  []  Other:  Most notable findings include:     LAB RESULTS:      Lab 04/09/23  0242 04/06/23  0139 04/04/23  0231   WBC 8.62 10.38 11.47*   HEMOGLOBIN 10.0* 10.1*  9.6*   HEMATOCRIT 30.8* 32.6* 29.3*   PLATELETS 285 249 298   NEUTROS ABS  --  7.63* 8.87*   IMMATURE GRANS (ABS)  --  0.04 0.06*   LYMPHS ABS  --  1.18 1.31   MONOS ABS  --  0.61 0.54   EOS ABS  --  0.82* 0.61*   MCV 96.6 104.2* 94.8   PROTIME 12.4  --  12.0*   APTT  --   --  30.4         Lab 04/09/23  0242 04/06/23  0139 04/05/23  0048 04/04/23  1751 04/04/23 0231   SODIUM 132* 135* 138  --  138   POTASSIUM 4.0 4.1 4.6 4.4 3.4*   CHLORIDE 99 100 103  --  101   CO2 28.1 27.6 29.5*  --  29.6*   ANION GAP 4.9* 7.4 5.5  --  7.4   BUN 10 7* 9  --  10   CREATININE 0.42* 0.52* 0.51*  --  0.48*   EGFR 93.6 88.9 89.4  --  90.7   GLUCOSE 119* 127* 97  --  91   CALCIUM 8.6 8.6 9.1  --  8.6   MAGNESIUM 2.0 2.1 1.8  --  2.1   PHOSPHORUS 2.9 3.5 3.3  --  3.6         Lab 04/09/23  0242 04/06/23 0139 04/04/23  0231   TOTAL PROTEIN 4.8* 4.6* 4.8*   ALBUMIN 2.3* 2.5* 2.5*   GLOBULIN 2.5 2.1 2.3   ALT (SGPT) 7 11 17   AST (SGOT) 14 11 13   BILIRUBIN 0.2 <0.2 0.2   ALK PHOS 98 88 82         Lab 04/09/23  0242 04/04/23  0231   PROTIME 12.4 12.0*   INR 0.91 0.87*                 Brief Urine Lab Results  (Last result in the past 365 days)      Color   Clarity   Blood   Leuk Est   Nitrite   Protein   CREAT   Urine HCG        01/06/23 0149 Yellow   Clear   Negative   Negative   Negative   Negative               Microbiology Results (last 10 days)     ** No results found for the last 240 hours. **          CT Abdomen Pelvis Without Contrast    Result Date: 4/10/2023   EXAM DATE:   4/10/2023 11:29 AM  CLINICAL HISTORY:   abdominal pain; K25.1-Acute gastric ulcer with perforation; E87.8-Dbyt-izwqwwnfqd and hyponatremia; E87.6-Hypokalemia; A41.9-Sepsis, unspecified organism; K27.5-Chronic or unspecified peptic ulcer, site unspecified, with perforation; E44.0-Moderate protein-calorie malnutrition  TECHNIQUE:   Axial computed tomography images of the abdomen and pelvis without intravenous contrast.  Sagittal and coronal reformatted images  were created and reviewed.  This CT exam was performed using one or more of the following dose reduction techniques:  automated exposure control, adjustment of the mA and/or kV according to patient size, and/or use of iterative reconstruction technique.  COMPARISON: 04/04/2023  FINDINGS:   LUNG BASES:  Unremarkable.  No mass.  No consolidation.   PLEURAL SPACE:  Small bilateral pleural effusions.   ABDOMEN:   LIVER:  Unremarkable.   GALLBLADDER AND BILE DUCTS:  Unremarkable.  No calcified stones.  No ductal dilation.   PANCREAS:  Unremarkable.  No ductal dilation.   SPLEEN:  Unremarkable.  No splenomegaly.   ADRENALS:  Unremarkable.  No mass.   KIDNEYS AND URETERS:  Unremarkable.  No obstructing stones.  No hydronephrosis.   STOMACH AND BOWEL:  Contrast noted throughout the colon.  No obstruction.  No mucosal thickening.   PELVIS:   APPENDIX:  No findings to suggest acute appendicitis.   BLADDER:  Unremarkable.  No stones.   REPRODUCTIVE:  Unremarkable as visualized.   ABDOMEN and PELVIS:   INTRAPERITONEAL SPACE:  See below.    BONES/JOINTS:  No acute fracture.  No dislocation.   SOFT TISSUES:  Unremarkable.   VASCULATURE:  Atherosclerotic disease.  No abdominal aortic aneurysm.   LYMPH NODES:  Unremarkable.  No enlarged lymph nodes.   TUBES, LINES AND DEVICES:  Gastric tube noted in the stomach. This has been recently placed and small to moderate intraperitoneal free air persists.        Impression: 1.  Small bilateral pleural effusions. 2.  Gastric tube noted in the stomach. This has been recently placed and small to moderate intraperitoneal free air persists.   This report was finalized on 4/10/2023 12:06 PM by Dr. Henry Lozano MD.        Results for orders placed during the hospital encounter of 03/22/23    Adult Transthoracic Echo Complete W/ Cont if Necessary Per Protocol    Interpretation Summary  •  Left ventricular systolic function is normal. Calculated left ventricular EF = 60% Left ventricular ejection  fraction appears to be 56 - 60%.  •  Left ventricular diastolic function is consistent with (grade I) impaired relaxation and age.  •  Moderate to severe mitral valve regurgitation is present.  •  Moderate tricuspid valve regurgitation is present.  •  Estimated right ventricular systolic pressure from tricuspid regurgitation is moderately elevated (45-55 mmHg).  •  Moderate pulmonary hypertension is present.      Assessment & Plan   Assessment & Plan       Esophageal stricture    Essential hypertension    ASCVD (arteriosclerotic cardiovascular disease)    Coronary artery disease     Chronic obstructive pulmonary disease    Former smoker    Perforated ulcer    Hypothyroidism (acquired)    S/P percutaneous endoscopic gastrostomy (PEG) tube placement    Adenocarcinoma      Christine Garg is a 89 y.o. female with PMH of HTN, HLD, CAD, COPD, former smoker, PAD and hypothyroid. Direct admit to [facility] for esophageal stent placement.     Esophageal Stricture  S/p PEG  -2/2 extrinsic compression of paratracheal lymph node  -Pain control  -Maintenance fluids  -NPO  -Consult wound for PEG care  -Consult PT/OT, case management   -Consult GI      CAD  HLD  -lower home aspirin to 81mg. Will need to make this clear at discharge in order to prevent further ulceration    Hypoalbuminemia   -Albumin 2.3  -Consult nutrition    HTN  -Continue home coreg, valsartan, hydralazine   -PRN clonidine     Adenocarcinoma of Lung  COPD  Former Smoker  -PRN duonebs  -Follow up outpatient for treatment plan    Perforated Duodenal Ulcer  S/p Ex lap with patch  -Midline abdominal incision C/D/I  -Continue PPI twice daily  -lower aspirin to 81 mg instead of 325  -Consult wound  -patient only received perioperative cefazolin at time of surgery? Should have received micafungin and gram negative coverage for at least 5 days as ppx.  Would recommend discussing case with infectious disease/general surgery in am to determine if would benefit from abx this far  out from surgery.  Does not currently appear to have evidence of active infection.     ADDENDUM: CONTACTED BY GENERAL SURGERY FROM [facility] WHO CONFIRMS THAT PATIENT DID RECEIVE ABX DURING HER PRIOR ADMISSION.  received 21 doses of zosyn alone.  She also, received flagyl, vancomycin, and cefepime for 48 hours.     Hypothyroid  -Continue home synthroid         DVT prophylaxis:  SCDs in prep for surgery    CODE STATUS:  FULL       Expected Discharge  TBD        This note has been completed as part of a split-shared workflow.     Signature: Electronically signed by LUCÍA Sotelo, 04/10/23, 8:36 PM EDT.          Attending   Admission Attestation       I have performed an independent face-to-face diagnostic evaluation including performing an independent physical examination as documented here.  The documented plan of care above was reviewed and developed with the advanced practice clinician (APC).      Brief Summary Statement:   Christine Garg is a 89 y.o. female with past medical history of hypertension, hyperlipidemia, COPD, coronary artery disease, peripheral artery disease, hypothyroidism, who comes as a direct admit from [facility] for perforated duodenal ulcer status post emergency ex lap with patch who was later found to have esophageal stricture related to 3 cm paratracheal lymph node with extrinsic compression.  They discussed the case with Dr. Ralph's who recommended transfer for esophageal stenting.  Currently, patient reports expected abdominal pain.  Denies any fever or chills.  Reports decreased appetite.    Remainder of detailed HPI is as noted by APC and has been reviewed and/or edited by me for completeness.    Attending Physical Exam:  Temp:  [97.5 °F (36.4 °C)-98.1 °F (36.7 °C)] 98 °F (36.7 °C)  Heart Rate:  [72-78] 76  Resp:  [16-18] 17  BP: (123-172)/(50-68) 123/58  Flow (L/min):  [2] 2    Constitutional: Awake, alert, nontoxic  Eyes: PERRLA, sclerae anicteric, no conjunctival  injection  HENT: NCAT, mucous membranes moist  Neck: Supple, no thyromegaly, no lymphadenopathy, trachea midline  Respiratory: Clear to auscultation bilaterally, nonlabored respirations   Cardiovascular: RRR, no murmurs, rubs, or gallops, palpable pedal pulses bilaterally  Gastrointestinal: Positive bowel sounds, soft, expected abd tenderness post ex lap, nondistended, PEG in place  Musculoskeletal: No bilateral ankle edema, no clubbing or cyanosis to extremities  Psychiatric: Appropriate affect, cooperative  Neurologic: Oriented x 3, strength symmetric in all extremities, Cranial Nerves grossly intact to confrontation, speech clear  Skin: midline incision without drainage. Clean, dry, intact, expected surrounded pink skin but no evidence of cellulitis      Brief Assessment/Plan :  See detailed assessment and plan developed with APC which I have reviewed and/or edited for completeness.            Hola Crowley DO  04/10/23     Total time spent: 40  Time spent includes time reviewing chart, face-to-face time, counseling patient/family/caregiver, ordering medications/tests/procedures, communicating with other health care professionals, documenting clinical information in the electronic health record, and coordination of care.                     Electronically signed by Hola Crowley DO at 04/20/23 1900         Current Facility-Administered Medications   Medication Dose Route Frequency Provider Last Rate Last Admin   • acetaminophen (TYLENOL) tablet 650 mg  650 mg Per PEG Tube Q4H PRN Brunner, Mark I, MD        Or   • acetaminophen (TYLENOL) 160 MG/5ML solution 650 mg  650 mg Per PEG Tube Q4H PRN Brunner, Mark I, MD   650 mg at 04/23/23 0958    Or   • acetaminophen (TYLENOL) suppository 650 mg  650 mg Rectal Q4H PRN Brunner, Mark I, MD       • aspirin chewable tablet 81 mg  81 mg Per PEG Tube Daily Brunner, Mark I, MD   81 mg at 04/25/23 0813   • polyethylene glycol (MIRALAX) packet 17 g  17 g Per PEG Tube Daily  PRN Brunner, Mark I, MD   17 g at 04/25/23 1023    And   • bisacodyl (DULCOLAX) suppository 10 mg  10 mg Rectal Daily PRN Brunner, Mark I, MD   10 mg at 04/25/23 1023   • carvedilol (COREG) tablet 25 mg  25 mg Per PEG Tube BID With Meals Brunner, Mark I, MD   25 mg at 04/25/23 0813   • cefTRIAXone (ROCEPHIN) 2 g/100 mL 0.9% NS IVPB (MBP)  2 g Intravenous Q24H Fransico Feng MD   2 g at 04/25/23 0812   • cloNIDine (CATAPRES) tablet 0.1 mg  0.1 mg Per PEG Tube TID PRN Brunner, Mark I, MD   0.1 mg at 04/17/23 0413   • sennosides (SENOKOT) 8.8 MG/5ML syrup 10 mL  10 mL Per PEG Tube BID Brunner, Mark I, MD   10 mL at 04/25/23 1032    And   • docusate sodium (COLACE) liquid 100 mg  100 mg Per PEG Tube BID Brunner, Mark I, MD   100 mg at 04/25/23 0813   • Enoxaparin Sodium (LOVENOX) syringe 40 mg  40 mg Subcutaneous Nightly Catalina Leonardo MD   40 mg at 04/24/23 2150   • [START ON 4/26/2023] furosemide (LASIX) tablet 40 mg  40 mg Per PEG Tube Daily Tanner Roe, PharmD       • gabapentin (NEURONTIN) capsule 100 mg  100 mg Per PEG Tube Q12H Clare De Anda MD   100 mg at 04/25/23 0812   • hydrALAZINE (APRESOLINE) tablet 75 mg  75 mg Per PEG Tube Q8H Steven Yanes APRN   75 mg at 04/25/23 0639   • HYDROcodone-acetaminophen (NORCO) 7.5-325 MG per tablet 1 tablet  1 tablet Per PEG Tube Q4H PRN Catalina Leonardo MD   1 tablet at 04/25/23 1023   • HYDROcodone-acetaminophen (NORCO) 7.5-325 MG per tablet 1 tablet  1 tablet Per PEG Tube Q6H María Elena Vazquez, APRN       • hydrOXYzine (ATARAX) tablet 25 mg  25 mg Per PEG Tube TID PRN María Elena Vazquez APRN   25 mg at 04/25/23 1134   • ipratropium-albuterol (DUO-NEB) nebulizer solution 3 mL  3 mL Nebulization Q4H PRN Clare eD Anda MD   3 mL at 04/25/23 0323   • lansoprazole (FIRST) oral suspension 30 mg  30 mg Per G Tube BID AC Thomas Cruz, [facility]   30 mg at 04/25/23 0639   • levothyroxine (SYNTHROID, LEVOTHROID) tablet 112 mcg  112 mcg Per PEG Tube Q AM Brunner, Mark I, MD   112 mcg  at 23 0639   • lidocaine (LIDODERM) 5 % 1 patch  1 patch Transdermal Q24H Clare De Anda MD   1 patch at 23 0813   • metoclopramide (REGLAN) solution 10 mg  10 mg Per G Tube 4x Daily AC & at Bedtime Anaya Jones MD   10 mg at 23 1134   • ondansetron (ZOFRAN) injection 4 mg  4 mg Intravenous Q6H PRN Brunner, Mark I, MD   4 mg at 23 0959   • promethazine (PHENERGAN) suppository 12.5 mg  12.5 mg Rectal Q4H PRN Anaya Jones MD       • sodium chloride 0.9 % flush 10 mL  10 mL Intravenous Q12H Brunner, Mark I, MD   10 mL at 23 0813   • sodium chloride 0.9 % flush 10 mL  10 mL Intravenous PRN Brunner, Mark I, MD   10 mL at 23 1704   • sodium chloride 0.9 % infusion 40 mL  40 mL Intravenous PRN Brunner, Mark I, MD   40 mL at 23 0902   • valsartan (DIOVAN) tablet 320 mg  320 mg Per PEG Tube Q24H Brunner, Mark I, MD   320 mg at 23 0813        Physician Progress Notes (most recent note)      Fransico Feng MD at 23 1910              [facility] Medicine Services  PROGRESS NOTE    Patient Name: Christine Garg  : 1933  MRN: [id number]    Date of Admission: 4/10/2023  Primary Care Physician: Dave Tobar MD    Subjective   Subjective     CC:  Follow-up dysphagia    HPI:  Resting in bed in no acute distress and tells me she is okay and feels a little better than yesterday.  She does not have any specific complaints.  No fever or chills.    ROS:  Gen- No fevers, chills,  CV- No chest pain, palpitations  Resp- No cough, dyspnea  GI- No N/V/D, n    Objective   Objective     Vital Signs:   Temp:  [96 °F (35.6 °C)-97.5 °F (36.4 °C)] 97.5 °F (36.4 °C)  Heart Rate:  [] 111  Resp:  [14-20] 20  BP: (102-146)/(60-93) 122/68  Flow (L/min):  [1-2] 2     Physical Exam:  Constitutional: No acute distress  HENT: NCAT, mucous membranes moist  Respiratory: Crackles at the bases, respiratory effort normal   Cardiovascular: RRR, no  murmurs, rubs, or gallops  Gastrointestinal: Positive bowel sounds, soft, nontender, nondistended, PEG tube in place and site is clean.  Musculoskeletal:  bilateral ankle edema  Psychiatric: Appropriate affect, cooperative  Neurologic: Awake, alert, follows commands, speech clear  Skin: No rashes    Results Reviewed:  LAB RESULTS:      Lab 04/24/23  0522 04/23/23  0511 04/22/23  0744 04/21/23  07 04/19/23  0352   WBC 14.51* 13.15* 14.86* 17.81* 13.57*   HEMOGLOBIN 9.9* 9.5* 9.7* 10.9* 10.3*   HEMATOCRIT 30.7* 28.8* 29.9* 32.2* 32.3*   PLATELETS 410 340 357 395 359   NEUTROS ABS 11.76* 10.78* 12.50* 14.66*  --    IMMATURE GRANS (ABS) 0.16* 0.10* 0.14* 0.17*  --    LYMPHS ABS 1.39 1.21 1.10 1.51  --    MONOS ABS 0.70 0.63 0.72 0.96*  --    EOS ABS 0.42* 0.38 0.33 0.44*  --    MCV 94.5 93.5 93.4 94.7 97.9*         Lab 04/24/23  0522 04/23/23  0511 04/22/23  0744 04/21/23  07 04/19/23  0352   SODIUM 127* 127* 126* 127* 130*   POTASSIUM 4.1 3.7 4.2 4.7 4.4   CHLORIDE 87* 88* 88* 87* 91*   CO2 31.0* 32.0* 32.0* 32.0* 30.0*   ANION GAP 9.0 7.0 6.0 8.0 9.0   BUN 13 13 14 12 8   CREATININE 0.35* 0.41* 0.40* 0.51* 0.35*   EGFR 97.8 94.2 94.7 89.4 97.8   GLUCOSE 105* 111* 116* 118* 105*   CALCIUM 8.0* 7.9* 8.2* 8.6 7.8*   MAGNESIUM 1.8  --  1.6  --  1.8   PHOSPHORUS  --   --  3.2  --   --          Lab 04/21/23  0704   TOTAL PROTEIN 4.7*   ALBUMIN 2.6*   GLOBULIN 2.1   ALT (SGPT) 23   AST (SGOT) 20   BILIRUBIN <0.2   ALK PHOS 107                     Brief Urine Lab Results  (Last result in the past 365 days)      Color   Clarity   Blood   Leuk Est   Nitrite   Protein   CREAT   Urine HCG        01/06/23 0149 Yellow   Clear   Negative   Negative   Negative   Negative                 Microbiology Results Abnormal     None          XR Chest 1 View    Result Date: 4/24/2023  XR CHEST 1 VW Date of Exam: 4/24/2023 8:10 AM EDT Indication: crackles Comparison: April 22, 2023 Findings: There are postoperative changes from midline  sternotomy. The heart is enlarged. There is an esophageal stent in place. There are moderate size bilateral pleural effusions with basilar atelectasis. There is no pneumothorax. There are no new infiltrates.     Impression: Impression: Stable chest. Bilateral moderate pleural effusions with basilar atelectasis as before. Electronically Signed: Tobias Yarbrough  4/24/2023 8:41 AM EDT  Workstation ID: [id number]      Results for orders placed during the hospital encounter of 03/22/23    Adult Transthoracic Echo Complete W/ Cont if Necessary Per Protocol    Interpretation Summary  •  Left ventricular systolic function is normal. Calculated left ventricular EF = 60% Left ventricular ejection fraction appears to be 56 - 60%.  •  Left ventricular diastolic function is consistent with (grade I) impaired relaxation and age.  •  Moderate to severe mitral valve regurgitation is present.  •  Moderate tricuspid valve regurgitation is present.  •  Estimated right ventricular systolic pressure from tricuspid regurgitation is moderately elevated (45-55 mmHg).  •  Moderate pulmonary hypertension is present.      Current medications:  Scheduled Meds:aspirin, 81 mg, Per PEG Tube, Daily  carvedilol, 25 mg, Per PEG Tube, BID With Meals  cefTRIAXone, 2 g, Intravenous, Q24H  sennosides, 10 mL, Per PEG Tube, BID   And  docusate sodium, 100 mg, Per PEG Tube, BID  enoxaparin, 40 mg, Subcutaneous, Nightly  furosemide, 40 mg, Oral, Daily  gabapentin, 100 mg, Per PEG Tube, Q12H  hydrALAZINE, 75 mg, Per PEG Tube, Q8H  lansoprazole, 30 mg, Per G Tube, BID AC  levothyroxine, 112 mcg, Per PEG Tube, Q AM  lidocaine, 1 patch, Transdermal, Q24H  metoclopramide, 10 mg, Per G Tube, 4x Daily AC & at Bedtime  sodium chloride, 10 mL, Intravenous, Q12H  valsartan, 320 mg, Per PEG Tube, Q24H      Continuous Infusions:   PRN Meds:.•  acetaminophen **OR** acetaminophen **OR** acetaminophen  •  [DISCONTINUED] senna-docusate sodium **AND** polyethylene glycol **AND**  [DISCONTINUED] bisacodyl **AND** bisacodyl  •  cloNIDine  •  HYDROcodone-acetaminophen  •  ipratropium-albuterol  •  [DISCONTINUED] ondansetron **OR** ondansetron  •  promethazine  •  sodium chloride  •  sodium chloride    Assessment & Plan   Assessment & Plan     Active Hospital Problems    Diagnosis  POA   • **Esophageal stricture [K22.2]  Unknown   • Severe malnutrition [E43]  Unknown   • Hypothyroidism (acquired) [E03.9]  Unknown   • S/P percutaneous endoscopic gastrostomy (PEG) tube placement [Z93.1]  Not Applicable   • Adenocarcinoma [C80.1]  Unknown   • Perforated ulcer [K27.5]  Yes   • Former smoker [Z87.891]  Not Applicable   • Coronary artery disease  [I25.10]  Yes   • Chronic obstructive pulmonary disease [J44.9]  Yes   • Essential hypertension [I10]  Yes   • ASCVD (arteriosclerotic cardiovascular disease) [I25.10]  Yes      Resolved Hospital Problems   No resolved problems to display.        Brief Hospital Course to date:  Christine Garg is a 89 y.o. female with past medical history of essential hypertension, dyslipidemia, coronary artery disease, peripheral artery disease, hypothyroidism, COPD who was admitted to [facility] on 3/22 for abdominal pain; went to OR, had perforated duodenal ulcer repaired with omental patch.  Then on 3/29, patient had CT of thoracic spine which noted left pleural consolidation and parenchymal nodules.  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  She was also found to have an esophageal stricture secondary to extrinsic compression of 3cm peritracheal lymph node.  On 4/7, patient had PEG tube placed.  She was sent to [facility] for placement of esophageal stent by Dr. Waters.        This patient's problems and plans were partially entered by my partner and updated as appropriate by me 04/24/23.     Assessment & Plan:  Dysphagia due to external esophageal compression by LAD  S/p PEG  · EGD 4/12 with esophageal  stent placement  · Plan for full liquid diet x 1 week then advance to puree 4/19 if tolerating  · Currently on nocturnal tube feed.  · CT of abdomen pelvis which was done on 4/20/2023 does not show any acute process.  However, patient has very mild diffuse tenderness on physical exam mild last night tube feeding had to be stopped as she developed abdominal discomfort.    Hyponatremia     Patient currently is on fluid restriction and sodium is slowly improving.  We will continue fluid restriction and monitor the sodium.    Cancer related pain  · Continue as needed Norco to q4h   · Pain seems to be well controlled.    Perforated Duodenal Ulcer s/p exploratory laparotomy with patch 3/22  · Continue PPI twice daily  · Continue lower aspirin to 81 mg daily instead of 325 mg      New diagnosis of lung adenocarcinoma  COPD  Former smoker   On oxygen 2L here   · Continue PRN duonebs  · Chest x-ray is stable.   · Follow up outpatient for treatment plan with Dr. Balbuena     · Patient has elevated white count but no fever.  As mentioned chest x-ray is also stable.  We will start the patient empirically on Rocephin and check the white count in a.m.     Coronary artery disease  Dyslipidemia  Essential hypertension  · Lowered home aspirin from 325 mg to 81mg. Will need to make this clear at discharge in order to prevent further ulceration  · Currently blood pressure controlled with valsartan, Coreg and hydralazine  · Continue as needed clonidine    Hypoalbuminemia   Severe malnutrition  · Nutrition consulted  · Start nocturnal tube feeds     Hypothyroidism  · Continue home synthroid      Debility  · PT  · Patient had been approved to [facility] for subacute rehab in [place]. Patient would still like to go there after discharge. Patient is to follow up outpatient for treatment options regarding her cancer.       NOTE: Total time spent 45 minutes     Expected Discharge Location and Transportation: Rehab  Expected  Discharge when bwd available.                         DVT prophylaxis:  Medical and mechanical DVT prophylaxis orders are present.     AM-PAC 6 Clicks Score (PT): 11 (23 1319)    CODE STATUS:   Code Status and Medical Interventions:   Ordered at: 04/10/23 4738     Code Status (Patient has no pulse and is not breathing):    CPR (Attempt to Resuscitate)     Medical Interventions (Patient has pulse or is breathing):    Full Support     Copied text in this note has been reviewed and is accurate as of 23.     Fransico Feng MD  23      Electronically signed by Fransico Feng MD at 23 1910          Physical Therapy Notes (most recent note)      Marilee Cox, PT at 23 0914  Version 1 of 1         Patient Name: Christine Garg  : 1933    MRN: [id number]                              Today's Date: 2023       Admit Date: 4/10/2023    Visit Dx:     ICD-10-CM ICD-9-CM   1. Esophageal stricture  K22.2 530.3     Patient Active Problem List   Diagnosis   • Essential hypertension   • Dyslipidemia   • ASCVD (arteriosclerotic cardiovascular disease)   • Palpitations   • Coronary artery disease    • Abnormal PFTs (pulmonary function tests)   • Chronic obstructive pulmonary disease   • Mild persistent asthma with allergic rhinitis   • Pulmonary nodule   • Former smoker   • Colitis   • Perforated ulcer   • Acute gastric ulcer with perforation   • Moderate malnutrition   • Esophageal stricture   • Hypothyroidism (acquired)   • S/P percutaneous endoscopic gastrostomy (PEG) tube placement   • Adenocarcinoma   • Severe malnutrition     Past Medical History:   Diagnosis Date   • Advanced age    • CAD (coronary artery disease) 2011    s/p 3v CABG   • Chronic kidney disease (CKD), stage III (moderate)    • COPD (chronic obstructive pulmonary disease)    • History of tobacco use    • Hyperlipidemia    • Hypertension    • Hypothyroidism    • PONV (postoperative nausea and vomiting)    • Pulmonary  nodule      Past Surgical History:   Procedure Laterality Date   • BREAST BIOPSY Left     benign   • CATARACT EXTRACTION     • CORONARY ANGIOPLASTY     • CORONARY ARTERY BYPASS GRAFT     • ENDOSCOPY W/ PEG TUBE PLACEMENT N/A 4/7/2023    Procedure: ESOPHAGOGASTRODUODENOSCOPY WITH PERCUTANEOUS ENDOSCOPIC GASTROSTOMY TUBE INSERTION;  Surgeon: Aurora Kirkland MD;  Location: [facility] OR;  Service: General;  Laterality: N/A;   • ENDOSCOPY W/ STENT PLACEMENT/REMOVAL N/A 4/12/2023    Procedure: ESOPHAGOGASTRODUODENOSCOPY WITH STENT PLACEMENT WITH FLUOROSCOPY;  Surgeon: Brunner, Mark I, MD;  Location: [facility] ENDOSCOPY;  Service: Gastroenterology;  Laterality: N/A;   • EXPLORATORY LAPAROTOMY N/A 3/22/2023    Procedure: LAPAROTOMY EXPLORATORY WITH OVER SEW OF DUODENAL ULCER WITH OMENTAL PATCH AND BIOPATCH AND CENTRAL LINE PLACEMENT;  Surgeon: Aurora Kirkland MD;  Location: [facility] OR;  Service: General;  Laterality: N/A;      General Information     Row Name 04/25/23 0914          Physical Therapy Time and Intention    Document Type therapy note (daily note)  -NS     Mode of Treatment individual therapy;physical therapy  -NS     Row Name 04/25/23 0914          General Information    Patient Profile Reviewed yes  -NS     Existing Precautions/Restrictions fall;oxygen therapy device and L/min;other (see comments)  abdominal incision, PEG; anxiety with mobility  -NS     Row Name 04/25/23 0914          Cognition    Orientation Status (Cognition) oriented x 3  -NS     Row Name 04/25/23 0914          Safety Issues, Functional Mobility    Safety Issues Affecting Function (Mobility) insight into deficits/self-awareness;judgment;problem-solving;safety precaution awareness;safety precautions follow-through/compliance;sequencing abilities  -NS     Impairments Affecting Function (Mobility) balance;coordination;endurance/activity tolerance;motor planning;strength;pain;shortness of breath  -NS     Comment, Safety Issues/Impairments  (Mobility) high anxiety with mobility  -NS           User Key  (r) = Recorded By, (t) = Taken By, (c) = Cosigned By    Initials Name Provider Type    Marilee Gibbs, PT Physical Therapist               Mobility     Row Name 04/25/23 0914          Bed Mobility    Bed Mobility supine-sit;sit-supine;scooting/bridging  -NS     Scooting/Bridging [place] (Bed Mobility) dependent (less than 25% patient effort);2 person assist  -NS     Supine-Sit [place] (Bed Mobility) minimum assist (75% patient effort);verbal cues  -NS     Sit-Supine [place] (Bed Mobility) moderate assist (50% patient effort);verbal cues  -NS     Assistive Device (Bed Mobility) bed rails;head of bed elevated  -NS     Comment, (Bed Mobility) Cues for sequencing, encouragement/education required.  -NS     Row Name 04/25/23 0914          Transfers    Comment, (Transfers) VCs for hand placement. Min A from EOB, Mod A from chair.  -NS     Row Name 04/25/23 0914          Sit-Stand Transfer    Sit-Stand [place] (Transfers) moderate assist (50% patient effort);verbal cues  -NS     Assistive Device (Sit-Stand Transfers) walker, front-wheeled  -NS     Row Name 04/25/23 0914          Gait/Stairs (Locomotion)    [place] (Gait) minimum assist (75% patient effort);1 person assist  -NS     Assistive Device (Gait) walker, front-wheeled  -NS     Distance in Feet (Gait) 3+3  -NS     Deviations/Abnormal Patterns (Gait) denny decreased;gait speed decreased;stride length decreased  -NS     Bilateral Gait Deviations forward flexed posture;heel strike decreased  -NS     Comment, (Gait/Stairs) Patient ambulated to the chair then back to bed, demonstrating flexed posture and shuffled steps. She declined further gait despite encouragement due to back pain and anxiety, reporting feeling SOA. SpO2 briefly decreased to 88% on RA, recovering to >90% quickly.  -NS           User Key  (r) = Recorded By, (t) = Taken By, (c) = Cosigned By    Initials  Name Provider Type    Marilee Gibbs PT Physical Therapist               Obj/Interventions     Row Name 04/25/23 0914          Motor Skills    Therapeutic Exercise hip;knee;ankle  -NS     [facility] Name 04/25/23 0914          Hip (Therapeutic Exercise)    Hip (Therapeutic Exercise) strengthening exercise  -NS     Hip Strengthening (Therapeutic Exercise) bilateral;external rotation;internal rotation;10 repetitions;marching while seated;5 repetitions  -NS     [facility] Name 04/25/23 0914          Knee (Therapeutic Exercise)    Knee (Therapeutic Exercise) strengthening exercise  -NS     Knee Strengthening (Therapeutic Exercise) bilateral;LAQ (long arc quad);10 repetitions  -NS     [facility] Name 04/25/23 0914          Ankle (Therapeutic Exercise)    Ankle AROM (Therapeutic Exercise) bilateral;dorsiflexion;plantarflexion;10 repetitions  -NS     [facility] Name 04/25/23 0914          Balance    Balance Assessment sitting static balance;sitting dynamic balance;standing static balance;standing dynamic balance  -NS     Static Sitting Balance contact guard  -NS     Dynamic Sitting Balance contact guard  -NS     Position, Sitting Balance unsupported;sitting edge of bed  -NS     Static Standing Balance minimal assist  -NS     Dynamic Standing Balance minimal assist  -NS     Position/Device Used, Standing Balance supported;walker, front-wheeled  -NS           User Key  (r) = Recorded By, (t) = Taken By, (c) = Cosigned By    Initials Name Provider Type    Marilee Gibbs PT Physical Therapist               Goals/Plan    No documentation.                Clinical Impression     Row Name 04/25/23 0914          Pain    Pre/Posttreatment Pain Comment Pt states she cannot have pain medicine yet.  -NS     Pain Intervention(s) Repositioned;Ambulation/increased activity;Nursing Notified  -NS     Row Name 04/25/23 0914          Pain Scale: FACES Pre/Post-Treatment    Pain: FACES Scale, Pretreatment 4-->hurts little more  -NS     Posttreatment Pain Rating  4-->hurts little more  -NS     Pain Location generalized  -NS     Pain Location - back  -NS     Row Name 04/25/23 0914          Plan of Care Review    Plan of Care Reviewed With patient  -NS     Progress improving  -NS     Outcome Evaluation Patient continues to be limited by weakness, poor activity tolerance, and dyspnea on exertion affecting functional mobility. Continue skilled IP PT services to address deficits and support return to baseline. Recommend SNF at discharge.  -NS     Row Name 04/25/23 0914          Vital Signs    Pre Systolic BP Rehab --  VSS- RN cleared for PT  -NS     Pre SpO2 (%) 95  -NS     O2 Delivery Pre Treatment room air  -NS     Intra SpO2 (%) 88  -NS     O2 Delivery Intra Treatment room air  -NS     Post SpO2 (%) 92  -NS     O2 Delivery Post Treatment room air  -NS     Pre Patient Position Supine  -NS     Intra Patient Position Standing  -NS     Post Patient Position Supine  -NS     Row Name 04/25/23 0914          Positioning and Restraints    Pre-Treatment Position in bed  -NS     Post Treatment Position bed  -NS     In Bed notified nsg;supine;call light within reach;encouraged to call for assist;exit alarm on;side rails up x3;with nsg;heels elevated  -NS           User Key  (r) = Recorded By, (t) = Taken By, (c) = Cosigned By    Initials Name Provider Type    Marilee Gibbs, PT Physical Therapist               Outcome Measures     Row Name 04/25/23 0914 04/25/23 0809       How much help from another person do you currently need...    Turning from your back to your side while in flat bed without using bedrails? 3  -NS 2  -LC    Moving from lying on back to sitting on the side of a flat bed without bedrails? 2  -NS 2  -LC    Moving to and from a bed to a chair (including a wheelchair)? 2  -NS 2  -LC    Standing up from a chair using your arms (e.g., wheelchair, bedside chair)? 2  -NS 2  -LC    Climbing 3-5 steps with a railing? 1  -NS 1  -LC    To walk in hospital room? 2  -NS 2  -LC     -PAC 6 Clicks Score (PT) 12  -NS 11  -LC    Highest level of mobility 4 --> Transferred to chair/commode  -NS 4 --> Transferred to chair/commode  -LC    Row Name 04/25/23 0914          Functional Assessment    Outcome Measure Options AM-PAC 6 Clicks Basic Mobility (PT)  -NS           User Key  (r) = Recorded By, (t) = Taken By, (c) = Cosigned By    Initials Name Provider Type    Meghan Mckeon, RN Registered Nurse    Marilee Gibbs, MARIA DE JESUS Physical Therapist                             Physical Therapy Education     Title: PT OT SLP Therapies (In Progress)     Topic: Physical Therapy (In Progress)     Point: Mobility training (In Progress)     Learning Progress Summary           Patient Acceptance, E, NR by NS at 4/25/2023 1019    Comment: importance of OOB activity    Acceptance, E, NR by NS at 4/21/2023 1426    Comment: importance of OOB activity    Acceptance, E, VU,NR by HT at 4/17/2023 1431    Acceptance, E,D,H, NR by DM at 4/14/2023 1737    Acceptance, E, VU,NR by HT at 4/11/2023 1500                   Point: Home exercise program (In Progress)     Learning Progress Summary           Patient Acceptance, E, NR by NS at 4/25/2023 1019    Comment: importance of OOB activity    Acceptance, E, NR by NS at 4/21/2023 1426    Comment: importance of OOB activity    Acceptance, E,D,H, NR by DM at 4/14/2023 1737                   Point: Body mechanics (In Progress)     Learning Progress Summary           Patient Acceptance, E, NR by NS at 4/25/2023 1019    Comment: importance of OOB activity    Acceptance, E, NR by NS at 4/21/2023 1426    Comment: importance of OOB activity    Acceptance, E, VU,NR by HT at 4/17/2023 1431    Acceptance, E,D,H, NR by DM at 4/14/2023 1737    Acceptance, E, VU,NR by HT at 4/11/2023 1500                   Point: Precautions (In Progress)     Learning Progress Summary           Patient Acceptance, E, NR by NS at 4/25/2023 1019    Comment: importance of OOB activity    Acceptance, E, NR by NS  at 4/21/2023 1426    Comment: importance of OOB activity    Acceptance, E, VU,NR by HT at 4/17/2023 1431    Acceptance, E,D,H, NR by DM at 4/14/2023 1737    Acceptance, E, VU,NR by  at 4/11/2023 1500                               User Key     Initials Effective Dates Name Provider Type Discipline    DM 02/03/23 -  Rubi Nick, PT Physical Therapist PT    NS 06/16/21 -  Marilee Cox, MARIA DE JESUS Physical Therapist PT    HT 01/12/23 -  Clare Fulton, PT Student PT Student PT              PT Recommendation and Plan  Planned Therapy Interventions (PT): balance training, bed mobility training, gait training, home exercise program, neuromuscular re-education, patient/family education, strengthening, stair training, transfer training  Plan of Care Reviewed With: patient  Progress: improving  Outcome Evaluation: Patient continues to be limited by weakness, poor activity tolerance, and dyspnea on exertion affecting functional mobility. Continue skilled IP PT services to address deficits and support return to baseline. Recommend SNF at discharge.     Time Calculation:    PT Charges     Row Name 04/25/23 0914             Time Calculation    Start Time 0914  -NS      PT Received On 04/25/23  -NS      PT Goal Re-Cert Due Date 05/01/23  -NS         Timed Charges    97932 - PT Therapeutic Exercise Minutes 8  -NS      92995 - PT Therapeutic Activity Minutes 18  -NS         Total Minutes    Timed Charges Total Minutes 26  -NS       Total Minutes 26  -NS            User Key  (r) = Recorded By, (t) = Taken By, (c) = Cosigned By    Initials Name Provider Type    NS Marilee Cox, PT Physical Therapist              Therapy Charges for Today     Code Description Service Date Service Provider Modifiers Qty    54708579514 HC PT THERAPEUTIC ACT EA 15 MIN 4/25/2023 Marilee Cox, PT GP 1    01140800010 HC PT THER PROC EA 15 MIN 4/25/2023 Marilee Cox, PT GP 1          PT G-Codes  Outcome Measure Options: AM-PAC 6 Clicks Basic Mobility  (PT)  AM-PAC 6 Clicks Score (PT): 12  AM-PAC 6 Clicks Score (OT): 12  PT Discharge Summary  Anticipated Discharge Disposition (PT): skilled nursing facility    Marilee Cox, PT  2023      Electronically signed by Marilee Cox, PT at 23 1020          Occupational Therapy Notes (most recent note)      Ca Luther, OT at 23 1102          Patient Name: Christine Garg  : 1933    MRN: [id number]                              Today's Date: 2023       Admit Date: 4/10/2023    Visit Dx:     ICD-10-CM ICD-9-CM   1. Esophageal stricture  K22.2 530.3     Patient Active Problem List   Diagnosis   • Essential hypertension   • Dyslipidemia   • ASCVD (arteriosclerotic cardiovascular disease)   • Palpitations   • Coronary artery disease    • Abnormal PFTs (pulmonary function tests)   • Chronic obstructive pulmonary disease   • Mild persistent asthma with allergic rhinitis   • Pulmonary nodule   • Former smoker   • Colitis   • Perforated ulcer   • Acute gastric ulcer with perforation   • Moderate malnutrition   • Esophageal stricture   • Hypothyroidism (acquired)   • S/P percutaneous endoscopic gastrostomy (PEG) tube placement   • Adenocarcinoma   • Severe malnutrition     Past Medical History:   Diagnosis Date   • Advanced age    • CAD (coronary artery disease) 2011    s/p 3v CABG   • Chronic kidney disease (CKD), stage III (moderate)    • COPD (chronic obstructive pulmonary disease)    • History of tobacco use    • Hyperlipidemia    • Hypertension    • Hypothyroidism    • PONV (postoperative nausea and vomiting)    • Pulmonary nodule      Past Surgical History:   Procedure Laterality Date   • BREAST BIOPSY Left     benign   • CATARACT EXTRACTION     • CORONARY ANGIOPLASTY     • CORONARY ARTERY BYPASS GRAFT     • ENDOSCOPY W/ PEG TUBE PLACEMENT N/A 2023    Procedure: ESOPHAGOGASTRODUODENOSCOPY WITH PERCUTANEOUS ENDOSCOPIC GASTROSTOMY TUBE INSERTION;  Surgeon: Aurora Kirkland MD;  Location:   [facility] OR;  Service: General;  Laterality: N/A;   • ENDOSCOPY W/ STENT PLACEMENT/REMOVAL N/A 4/12/2023    Procedure: ESOPHAGOGASTRODUODENOSCOPY WITH STENT PLACEMENT WITH FLUOROSCOPY;  Surgeon: Brunner, Mark I, MD;  Location: [facility] ENDOSCOPY;  Service: Gastroenterology;  Laterality: N/A;   • EXPLORATORY LAPAROTOMY N/A 3/22/2023    Procedure: LAPAROTOMY EXPLORATORY WITH OVER SEW OF DUODENAL ULCER WITH OMENTAL PATCH AND BIOPATCH AND CENTRAL LINE PLACEMENT;  Surgeon: Aurora Kirkland MD;  Location: [facility] OR;  Service: General;  Laterality: N/A;      General Information     Row Name 04/24/23 1314          OT Time and Intention    Document Type progress note/recertification  -     Mode of Treatment occupational therapy;individual therapy  -     Row Name 04/24/23 1314          General Information    Patient Profile Reviewed yes  -     Existing Precautions/Restrictions fall;oxygen therapy device and L/min;other (see comments)  abdominal incision; PEG  -     Barriers to Rehab medically complex  -     Row Name 04/24/23 1314          Cognition    Orientation Status (Cognition) oriented x 3  -     Row Name 04/24/23 1314          Safety Issues, Functional Mobility    Safety Issues Affecting Function (Mobility) awareness of need for assistance;insight into deficits/self-awareness;safety precaution awareness;safety precautions follow-through/compliance;sequencing abilities  -     Impairments Affecting Function (Mobility) balance;coordination;endurance/activity tolerance;motor planning;strength;shortness of breath  -           User Key  (r) = Recorded By, (t) = Taken By, (c) = Cosigned By    Initials Name Provider Type     Ca Luther OT Occupational Therapist                 Mobility/ADL's     Row Name 04/24/23 1314          Bed Mobility    Bed Mobility supine-sit  -     Supine-Sit [place] (Bed Mobility) moderate assist (50% patient effort);1 person assist;verbal cues  -     Bed Mobility,  Safety Issues decreased use of legs for bridging/pushing;decreased use of arms for pushing/pulling;impaired trunk control for bed mobility  -     Assistive Device (Bed Mobility) bed rails;head of bed elevated  -     Comment, (Bed Mobility) Extended time and effort required, encouragement and education on importance of activity provided.  -     Row Name 04/24/23 1314          Transfers    Transfers sit-stand transfer;toilet transfer;stand-sit transfer  -     Comment, (Transfers) Once sitting on commode pt c/o dizziness. RN called and present in room. Pt unable to stand w/ RW, req max Ax1 w/ BUE support for STS from commode, RN present to assist w/ wiping & to bring chair to bathroom door, then pt max Ax1 for SPT from commode to recliner chair. BP checked once pt in recliner and stable.  -     Row Name 04/24/23 1314          Sit-Stand Transfer    Sit-Stand [place] (Transfers) minimum assist (75% patient effort);1 person assist;verbal cues  -     Assistive Device (Sit-Stand Transfers) walker, front-wheeled  -     Row Name 04/24/23 1314          Stand-Sit Transfer    Stand-Sit [place] (Transfers) maximum assist (25% patient effort);1 person assist;verbal cues  -     Row Name 04/24/23 1314          Toilet Transfer    Type (Toilet Transfer) stand-sit;sit-stand  -     [place] Level (Toilet Transfer) maximum assist (25% patient effort);1 person assist;verbal cues  -     Assistive Device (Toilet Transfer) commode;grab bars/safety frame;walker, front-wheeled  -     Row Name 04/24/23 1314          Functional Mobility    Functional Mobility- Ind. Level minimum assist (75% patient effort);1 person;verbal cues required  -     Functional Mobility- Device walker, front-wheeled  -     Functional Mobility-Distance (Feet) 8  -     Functional Mobility- Safety Issues balance decreased during turns;sequencing ability decreased;step length decreased;supplemental O2  -     Functional Mobility-  Comment Pt min Ax1 for 8ft from bed to toilet w/ RW. VC's for sequencing.  -     Row Name 04/24/23 1314          Activities of Daily Living    BADL Assessment/Intervention lower body dressing;toileting  -     Row Name 04/24/23 1314          Lower Body Dressing Assessment/Training    [place] Level (Lower Body Dressing) don;socks;dependent (less than 25% patient effort)  -     Position (Lower Body Dressing) edge of bed sitting  -     Row Name 04/24/23 1314          Toileting Assessment/Training    [place] Level (Toileting) perform perineal hygiene;adjust/manage clothing;dependent (less than 25% patient effort);verbal cues  -     Assistive Devices (Toileting) commode;grab bar/safety frame  -     Position (Toileting) supported standing  -           User Key  (r) = Recorded By, (t) = Taken By, (c) = Cosigned By    Initials Name Provider Type    Ca Browne OT Occupational Therapist               Obj/Interventions     Row Name 04/24/23 1319          Balance    Balance Assessment sitting static balance;sitting dynamic balance;sit to stand dynamic balance;standing static balance;standing dynamic balance  -     Static Sitting Balance contact guard  -     Dynamic Sitting Balance contact guard  -     Position, Sitting Balance unsupported;sitting edge of bed;sitting in chair;other (see comments)  commode  -     Sit to Stand Dynamic Balance minimal assist;1-person assist;verbal cues  -     Static Standing Balance minimal assist;1-person assist;verbal cues  -     Dynamic Standing Balance minimal assist;1-person assist;verbal cues  -     Position/Device Used, Standing Balance supported;walker, front-wheeled  -     Balance Interventions sitting;sit to stand;occupation based/functional task  -           User Key  (r) = Recorded By, (t) = Taken By, (c) = Cosigned By    Initials Name Provider Type    Ca Browne OT Occupational Therapist               Goals/Plan     Row  Name 04/24/23 1321          Bed Mobility Goal 1 (OT)    Activity/Assistive Device (Bed Mobility Goal 1, OT) sit to supine;supine to sit  -     [place] Level/Cues Needed (Bed Mobility Goal 1, OT) minimum assist (75% or more patient effort);verbal cues required  -     Time Frame (Bed Mobility Goal 1, OT) long term goal (LTG);10 days  -     Progress/Outcomes (Bed Mobility Goal 1, OT) continuing progress toward goal;goal revised this date  -     Row Name 04/24/23 1321          Transfer Goal 1 (OT)    Activity/Assistive Device (Transfer Goal 1, OT) sit-to-stand/stand-to-sit;bed-to-chair/chair-to-bed;toilet;walker, rolling  -     [place] Level/Cues Needed (Transfer Goal 1, OT) contact guard required;verbal cues required  -     Time Frame (Transfer Goal 1, OT) long term goal (LTG);10 days  -     Progress/Outcome (Transfer Goal 1, OT) continuing progress toward goal;goal revised this date  -     Row Name 04/24/23 1321          Dressing Goal 1 (OT)    Activity/Device (Dressing Goal 1, OT) lower body dressing  don/doff socks w/ AAD  -     [place]/Cues Needed (Dressing Goal 1, OT) moderate assist (50-74% patient effort);verbal cues required;nonverbal cues (demo/gesture) required  -     Time Frame (Dressing Goal 1, OT) long term goal (LTG);10 days  -     Progress/Outcome (Dressing Goal 1, OT) progress slower than expected  -           User Key  (r) = Recorded By, (t) = Taken By, (c) = Cosigned By    Initials Name Provider Type    Ca Browne, OT Occupational Therapist               Clinical Impression     Row Name 04/24/23 1319          Pain Assessment    Pretreatment Pain Rating 9/10  -     Posttreatment Pain Rating 9/10  -     Pain Location generalized  -     Pain Location - back  -     Pain Intervention(s) Repositioned;Ambulation/increased activity  -     Row Name 04/24/23 1313          Plan of Care Review    Plan of Care Reviewed With patient  -     Progress no  change  -     Outcome Evaluation Pt's ADL independence continues to be limited d/t generalized weakness, increased pain, dizziness, decreased functional endurance, and balance deficits. Will continue to progress pt as tolerated per OT POC. Rec SNF at d/c.  -     Row Name 04/24/23 1319          Therapy Assessment/Plan (OT)    Rehab Potential (OT) good, to achieve stated therapy goals  -     Criteria for Skilled Therapeutic Interventions Met (OT) yes;meets criteria;skilled treatment is necessary  -     Therapy Frequency (OT) daily  -     Row Name 04/24/23 1319          Therapy Plan Review/Discharge Plan (OT)    Anticipated Discharge Disposition (OT) skilled nursing facility  -     Row Name 04/24/23 1319          Vital Signs    Pre Systolic BP Rehab --  VSS - RN cleared OT  -     O2 Delivery Pre Treatment nasal cannula  -     O2 Delivery Intra Treatment nasal cannula  -     O2 Delivery Post Treatment nasal cannula  -     Pre Patient Position Supine  -     Intra Patient Position Standing  -     Post Patient Position Sitting  -[facility] Name 04/24/23 1311          Positioning and Restraints    Pre-Treatment Position in bed  -     Post Treatment Position chair  -     In Chair reclined;call light within reach;encouraged to call for assist;exit alarm on;waffle cushion;on mechanical lift sling;legs elevated;heels elevated  -           User Key  (r) = Recorded By, (t) = Taken By, (c) = Cosigned By    Initials Name Provider Type    Ca Browne, OT Occupational Therapist               Outcome Measures     Row Name 04/24/23 0393          How much help from another is currently needed...    Putting on and taking off regular lower body clothing? 2  -MC     Bathing (including washing, rinsing, and drying) 2  -MC     Toileting (which includes using toilet bed pan or urinal) 1  -MC     Putting on and taking off regular upper body clothing 2  -MC     Taking care of personal grooming (such as  brushing teeth) 3  -     Eating meals 2  -     AM-[facility] 6 Clicks Score (OT) 12  -     Row Name 04/24/23 1322          Functional Assessment    Outcome Measure Options AM-PAC 6 Clicks Daily Activity (OT)  -           User Key  (r) = Recorded By, (t) = Taken By, (c) = Cosigned By    Initials Name Provider Type     Ca Luther OT Occupational Therapist                Occupational Therapy Education     Title: PT OT SLP Therapies (In Progress)     Topic: Occupational Therapy (In Progress)     Point: ADL training (In Progress)     Description:   Instruct learner(s) on proper safety adaptation and remediation techniques during self care or transfers.   Instruct in proper use of assistive devices.              Learning Progress Summary           Patient Acceptance, E, NR by  at 4/24/2023 1322    Acceptance, E, NR by  at 4/19/2023 1510    Acceptance, E,D, VU,NR by  at 4/17/2023 1426    Comment: UE TE, pacing self, AE for LBD, trasnfer technique    Acceptance, E, NR by  at 4/13/2023 1536    Acceptance, E, NR by  at 4/11/2023 1330                   Point: Home exercise program (Done)     Description:   Instruct learner(s) on appropriate technique for monitoring, assisting and/or progressing therapeutic exercises/activities.              Learning Progress Summary           Patient Acceptance, E,D, VU,NR by  at 4/17/2023 1426    Comment: UE TE, pacing self, AE for LBD, trasnfer technique                   Point: Precautions (In Progress)     Description:   Instruct learner(s) on prescribed precautions during self-care and functional transfers.              Learning Progress Summary           Patient Acceptance, E, NR by  at 4/24/2023 1322    Acceptance, E, NR by  at 4/19/2023 1510    Acceptance, E, NR by  at 4/13/2023 1536    Acceptance, E, NR by  at 4/11/2023 1330                   Point: Body mechanics (In Progress)     Description:   Instruct learner(s) on proper positioning and spine  alignment during self-care, functional mobility activities and/or exercises.              Learning Progress Summary           Patient Acceptance, E, NR by  at 4/24/2023 1322    Acceptance, E, NR by  at 4/19/2023 1510    Acceptance, E,D, VU,NR by  at 4/17/2023 1426    Comment: UE TE, pacing self, AE for LBD, trasnfer technique    Acceptance, E, NR by  at 4/13/2023 1536    Acceptance, E, NR by  at 4/11/2023 1330                               User Key     Initials Effective Dates Name Provider Type Discipline     02/03/23 -  Laura Osman, OT Occupational Therapist OT     06/16/21 -  Ana M Presley, OT Occupational Therapist OT     10/14/22 -  Ca Luther, OT Occupational Therapist OT              OT Recommendation and Plan  Planned Therapy Interventions (OT): activity tolerance training, adaptive equipment training, BADL retraining, functional balance retraining, IADL retraining, occupation/activity based interventions, ROM/therapeutic exercise, strengthening exercise, transfer/mobility retraining, patient/caregiver education/training  Therapy Frequency (OT): daily  Plan of Care Review  Plan of Care Reviewed With: patient  Progress: no change  Outcome Evaluation: Pt's ADL independence continues to be limited d/t generalized weakness, increased pain, dizziness, decreased functional endurance, and balance deficits. Will continue to progress pt as tolerated per OT POC. Rec SNF at d/c.     Time Calculation:    Time Calculation- OT     Row Name 04/24/23 1323             Time Calculation- OT    OT Start Time 1102  -      OT Received On 04/24/23  -      OT Goal Re-Cert Due Date 05/04/23  -         Timed Charges    89133 - OT Therapeutic Activity Minutes 15  -MC      65109 - OT Self Care/Mgmt Minutes 10  -         Total Minutes    Timed Charges Total Minutes 25  -       Total Minutes 25  -            User Key  (r) = Recorded By, (t) = Taken By, (c) = Cosigned By    Initials Name Provider  Type     Ca Luther OT Occupational Therapist              Therapy Charges for Today     Code Description Service Date Service Provider Modifiers Qty    [id number]  OT THERAPEUTIC ACT EA 15 MIN 4/24/2023 Ca Luthre OT GO 1    [id number]  OT SELF CARE/MGMT/TRAIN EA 15 MIN 4/24/2023 Ca Luther OT GO 1               Ca Luther OT  4/24/2023    Electronically signed by Ca Luther OT at 04/24/23 1324       Speech Language Pathology Notes (most recent note)    No notes exist for this encounter.

## 2023-04-25 NOTE — PAYOR COMM NOTE
"Amanda Gomez (89 y.o. Female)     [id number]    Jane Puentes, RN  Utilization Review  Jhdtx-[phone]  [phone]-[phone]      Date of Birth   06/08/1933    Social Security Number       Address   [address] [id number]    Home Phone   [phone]    MRN   [id number]       Faith   Restorationism    Marital Status                               Admission Date   4/10/23    Admission Type   Urgent    Admitting Provider   Flora Franco DO    Attending Provider   Flora Franco DO    Department, Room/Bed   [facility] 6B, N640/1       Discharge Date       Discharge Disposition       Discharge Destination                               Attending Provider: Flora Franco DO    Allergies: Motrin [Ibuprofen], Novocain [Procaine]    Isolation: None   Infection: None   Code Status: No CPR    Ht: 165.1 cm (65\")   Wt: 66.2 kg (146 lb)    Admission Cmt: None   Principal Problem: Esophageal stricture [K22.2]                 Active Insurance as of 4/10/2023     Primary Coverage     Payor Plan Insurance Group Employer/Plan Group    ANTHEM MEDICARE REPLACEMENT ANTHEM MEDICARE ADVANTAGE [id number]     Payor Plan Address Payor Plan Phone Number Payor Plan Fax Number Effective Dates    [address] [phone]  1/1/2019 - None Entered    [facility] [id number]       Subscriber Name Subscriber Birth Date Member ID       AMANDA GOMEZ 6/8/1933 [id number]                 Emergency Contacts      (Rel.) Home Phone Work Phone Mobile Phone    Kaleb Gomez (healthcare surrogate) (Son) -- -- [phone]    Ziyad Gomez (alternative healthcare surrogate) (Son) -- -- [phone]    Filiberto Gomez (second alternative HCS) (Son) -- -- [phone]            Vital Signs (last day)     Date/Time Temp Temp src Pulse Resp BP Patient Position SpO2    04/25/23 0900 -- -- 108 -- -- -- 93    04/25/23 0847 -- -- 108 -- 138/78 -- 93    04/25/23 0811 -- -- 111 -- -- -- 93 "    04/25/23 0700 97.1 (36.2) Oral 92 18 112/60 Lying 97    04/25/23 0639 -- -- 97 -- 132/82 -- --    04/25/23 0323 -- -- 94 22 -- Lying 97    04/25/23 0300 96.3 (35.7) Axillary -- 16 -- Sitting --    04/25/23 0015 96.9 (36.1) Axillary -- 18 115/68 Sitting --    04/24/23 1900 97.5 (36.4) Oral -- 20 -- Sitting --    04/24/23 1831 -- -- 111 20 -- Lying 97    04/24/23 1454 96 (35.6) Axillary -- 16 122/68 Sitting --    04/24/23 1333 -- -- 99 -- 124/80 -- --    04/24/23 1059 96.9 (36.1) Axillary -- 17 108/67 Lying --    04/24/23 0802 96.5 (35.8) Axillary 112 18 146/84 Lying --    04/24/23 0603 -- -- 105 -- 131/90 -- --    04/24/23 0333 96.8 (36) Axillary 96 14 121/93 Lying 95    04/24/23 0012 -- -- 107 16 -- -- 96          Oxygen Therapy (last day)     Date/Time SpO2 Device (Oxygen Therapy) Flow (L/min) Oxygen Concentration (%) ETCO2 (mmHg)    04/25/23 0900 93 -- -- -- --    04/25/23 0847 93 -- -- -- --    04/25/23 0811 93 room air -- -- --    04/25/23 0700 97 -- -- -- --    04/25/23 0615 -- nasal cannula 2 -- --    04/25/23 0438 -- nasal cannula 2 -- --    04/25/23 0323 97 nasal cannula 2 -- --    04/25/23 0236 -- nasal cannula 2 -- --    04/25/23 0016 -- nasal cannula 2 -- --    04/24/23 2251 -- nasal cannula 2 -- --    04/24/23 2150 -- nasal cannula 2 -- --    04/24/23 1831 97 nasal cannula 2 -- --    04/24/23 1807 -- nasal cannula 1 -- --    04/24/23 1600 -- nasal cannula 1 -- --    04/24/23 1400 -- nasal cannula 1 -- --    04/24/23 1200 -- nasal cannula 1 -- --    04/24/23 1000 -- nasal cannula 1 -- --    04/24/23 0603 -- nasal cannula 1 -- --    04/24/23 0414 -- nasal cannula 1 -- --    04/24/23 0333 95 -- -- -- --    04/24/23 0200 -- nasal cannula 1 -- --    04/24/23 0016 -- nasal cannula 1 -- --    04/24/23 0012 96 nasal cannula 1 -- --          Lines, Drains & Airways     Active LDAs     Name Placement date Placement time Site Days    Peripheral IV 04/24/23 0932 Distal;Posterior;Right Forearm 04/24/23  0913   Forearm  1    Gastrostomy/Enterostomy Percutaneous endoscopic gastrostomy (PEG) 1 20 Fr. LUQ 04/07/23  1211  LUQ  17    External Urinary Catheter 04/17/23  1950  --  7                Current Facility-Administered Medications   Medication Dose Route Frequency Provider Last Rate Last Admin   • acetaminophen (TYLENOL) tablet 650 mg  650 mg Per PEG Tube Q4H PRN Brunner, Mark I, MD        Or   • acetaminophen (TYLENOL) 160 MG/5ML solution 650 mg  650 mg Per PEG Tube Q4H PRN Brunner, Mark I, MD   650 mg at 04/23/23 0958    Or   • acetaminophen (TYLENOL) suppository 650 mg  650 mg Rectal Q4H PRN Brunner, Mark I, MD       • aspirin chewable tablet 81 mg  81 mg Per PEG Tube Daily Brunner, Mark I, MD   81 mg at 04/25/23 0813   • polyethylene glycol (MIRALAX) packet 17 g  17 g Per PEG Tube Daily PRN Brunner, Mark I, MD   17 g at 04/23/23 0958    And   • bisacodyl (DULCOLAX) suppository 10 mg  10 mg Rectal Daily PRN Brunner, Mark I, MD   10 mg at 04/21/23 0835   • carvedilol (COREG) tablet 25 mg  25 mg Per PEG Tube BID With Meals Brunner, Mark I, MD   25 mg at 04/25/23 0813   • cefTRIAXone (ROCEPHIN) 2 g/100 mL 0.9% NS IVPB (MBP)  2 g Intravenous Q24H Fransico Feng MD   2 g at 04/25/23 0812   • cloNIDine (CATAPRES) tablet 0.1 mg  0.1 mg Per PEG Tube TID PRN Brunner, Mark I, MD   0.1 mg at 04/17/23 0413   • sennosides (SENOKOT) 8.8 MG/5ML syrup 10 mL  10 mL Per PEG Tube BID Brunner, Mark I, MD   10 mL at 04/24/23 0933    And   • docusate sodium (COLACE) liquid 100 mg  100 mg Per PEG Tube BID Brunner, Mark I, MD   100 mg at 04/25/23 0813   • Enoxaparin Sodium (LOVENOX) syringe 40 mg  40 mg Subcutaneous Nightly Catalina Leonardo MD   40 mg at 04/24/23 2150   • furosemide (LASIX) tablet 40 mg  40 mg Oral Daily Fransico Feng MD   40 mg at 04/25/23 0813   • gabapentin (NEURONTIN) capsule 100 mg  100 mg Per PEG Tube Q12H Clare De Anda MD   100 mg at 04/25/23 0812   • hydrALAZINE (APRESOLINE) tablet 75 mg  75 mg Per PEG Tube Q8H  Steven Yanes APRN   75 mg at 04/25/23 0639   • HYDROcodone-acetaminophen (NORCO) 7.5-325 MG per tablet 1 tablet  1 tablet Per PEG Tube Q4H PRN Catalina Leonardo MD   1 tablet at 04/25/23 0639   • ipratropium-albuterol (DUO-NEB) nebulizer solution 3 mL  3 mL Nebulization Q4H PRN Clare De Anda MD   3 mL at 04/25/23 0323   • lansoprazole (FIRST) oral suspension 30 mg  30 mg Per G Tube BID AC Thomas Cruz, [facility]   30 mg at 04/25/23 0639   • levothyroxine (SYNTHROID, LEVOTHROID) tablet 112 mcg  112 mcg Per PEG Tube Q AM Brunner, Mark I, MD   112 mcg at 04/25/23 0639   • lidocaine (LIDODERM) 5 % 1 patch  1 patch Transdermal Q24H Clare De Anda MD   1 patch at 04/25/23 0813   • metoclopramide (REGLAN) solution 10 mg  10 mg Per G Tube 4x Daily AC & at Bedtime Anaya Jones MD   10 mg at 04/25/23 0639   • ondansetron (ZOFRAN) injection 4 mg  4 mg Intravenous Q6H PRN Brunner, Mark I, MD   4 mg at 04/23/23 0959   • promethazine (PHENERGAN) suppository 12.5 mg  12.5 mg Rectal Q4H PRN Anaya Jones MD       • sodium chloride 0.9 % flush 10 mL  10 mL Intravenous Q12H Brunner, Mark I, MD   10 mL at 04/25/23 0813   • sodium chloride 0.9 % flush 10 mL  10 mL Intravenous PRN Brunner, Mark I, MD   10 mL at 04/19/23 1704   • sodium chloride 0.9 % infusion 40 mL  40 mL Intravenous PRN Brunner, Mark I, MD   40 mL at 04/12/23 0902   • valsartan (DIOVAN) tablet 320 mg  320 mg Per PEG Tube Q24H Brunner, Mark I, MD   320 mg at 04/25/23 0813     Lab Results (last 24 hours)     Procedure Component Value Units Date/Time    Urinalysis With Culture If Indicated - Straight Cath [[id number]] Collected: 04/25/23 0950    Specimen: Urine from Straight Cath Updated: 04/25/23 1012    Basic Metabolic Panel [[id number]]  (Abnormal) Collected: 04/25/23 0641    Specimen: Blood Updated: 04/25/23 0810     Glucose 123 mg/dL      BUN 13 mg/dL      Creatinine 0.39 mg/dL      Sodium 128 mmol/L      Potassium 3.9 mmol/L      Chloride 88 mmol/L       CO2 31.0 mmol/L      Calcium 8.2 mg/dL      BUN/Creatinine Ratio 33.3     Anion Gap 9.0 mmol/L      eGFR 95.3 mL/min/1.73     Narrative:      GFR Normal >60  Chronic Kidney Disease <60  Kidney Failure <15    The GFR formula is only valid for adults with stable renal function between ages 18 and 70.    CBC & Differential [[id number]]  (Abnormal) Collected: 04/25/23 0415    Specimen: Blood Updated: 04/25/23 0547    Narrative:      The following orders were created for panel order CBC & Differential.  Procedure                               Abnormality         Status                     ---------                               -----------         ------                     CBC Auto Differential[[id number]]        Abnormal            Final result                 Please view results for these tests on the individual orders.    CBC Auto Differential [[id number]]  (Abnormal) Collected: 04/25/23 0415    Specimen: Blood Updated: 04/25/23 0547     WBC 13.86 10*3/mm3      RBC 3.04 10*6/mm3      Hemoglobin 9.4 g/dL      Hematocrit 28.6 %      MCV 94.1 fL      MCH 30.9 pg      MCHC 32.9 g/dL      RDW 13.6 %      RDW-SD 46.6 fl      MPV 9.4 fL      Platelets 387 10*3/mm3      Neutrophil % 81.0 %      Lymphocyte % 9.6 %      Monocyte % 5.3 %      Eosinophil % 2.3 %      Basophil % 0.6 %      Immature Grans % 1.2 %      Neutrophils, Absolute 11.24 10*3/mm3      Lymphocytes, Absolute 1.33 10*3/mm3      Monocytes, Absolute 0.73 10*3/mm3      Eosinophils, Absolute 0.32 10*3/mm3      Basophils, Absolute 0.08 10*3/mm3      Immature Grans, Absolute 0.16 10*3/mm3      nRBC 0.0 /100 WBC     POC Glucose Once [[id number]]  (Normal) Collected: 04/25/23 0511    Specimen: Blood Updated: 04/25/23 0513     Glucose 121 mg/dL      Comment: Meter: [id number] : [id number] Anjelica Sprague       POC Glucose Once [[id number]]  (Normal) Collected: 04/25/23 0013    Specimen: Blood Updated: 04/25/23 0018     Glucose 114 mg/dL      Comment: Meter: [id number]  : [id number] Anjelica Sprague       POC Glucose Once [[id number]]  (Abnormal) Collected: 04/24/23 1858    Specimen: Blood Updated: 04/24/23 1905     Glucose 143 mg/dL      Comment: Meter: [id number] : [id number] Anjelica Sprague       POC Glucose Once [[id number]]  (Normal) Collected: 04/24/23 1211    Specimen: Blood Updated: 04/24/23 1213     Glucose 126 mg/dL      Comment: Meter: [id number] : [id number] Víctor Ingrid           Imaging Results (Last 24 Hours)     ** No results found for the last 24 hours. **        ECG/EMG Results (last 24 hours)     Procedure Component Value Units Date/Time    ECG 12 Lead Chest Pain [[id number]] Collected: 04/12/23 1236     Updated: 04/25/23 0629     QT Interval 370 ms      QTC Interval 442 ms     Narrative:      Test Reason : Chest Pain  Blood Pressure :   */*   mmHG  Vent. Rate :  86 BPM     Atrial Rate :  86 BPM     P-R Int : 150 ms          QRS Dur : 100 ms      QT Int : 370 ms       P-R-T Axes :  54  40   6 degrees     QTc Int : 442 ms    Sinus rhythm with occasional premature ventricular complexes  Possible Left atrial enlargement  Borderline ECG  When compared with ECG of 30-MAR-2023 12:04,  premature ventricular complexes are now present  Confirmed by JASMINA ORTIZ MD ([id number]) on 4/25/2023 6:27:10 AM    Referred By:            Confirmed By: JASMINA ORTIZ MD    ECG 12 Lead Chest Pain [[id number]] Collected: 04/25/23 0843     Updated: 04/25/23 0900     QT Interval 326 ms      QTC Interval 439 ms     Narrative:      Test Reason : Chest Pain  Blood Pressure :   */*   mmHG  Vent. Rate : 109 BPM     Atrial Rate : 101 BPM     P-R Int :   * ms          QRS Dur : 108 ms      QT Int : 326 ms       P-R-T Axes :   *  52 -42 degrees     QTc Int : 439 ms    Atrial fibrillation with rapid ventricular response  Cannot rule out Inferior infarct , age undetermined  ST & T wave abnormality, consider anterior ischemia or digitalis effect  Abnormal ECG  When compared with ECG of 12-APR-2023  12:36,  Atrial fibrillation has replaced Sinus rhythm  T wave inversion now evident in Anterior leads    Referred By:            Confirmed By:           Operative/Procedure Notes (last 24 hours)  Notes from 23 1014 through 23 1014   No notes of this type exist for this encounter.            Physician Progress Notes (last 24 hours)      Fransico Feng MD at 23 1910              [facility] Medicine Services  PROGRESS NOTE    Patient Name: Christine Garg  : 1933  MRN: [id number]    Date of Admission: 4/10/2023  Primary Care Physician: Dave Tobar MD    Subjective   Subjective     CC:  Follow-up dysphagia    HPI:  Resting in bed in no acute distress and tells me she is okay and feels a little better than yesterday.  She does not have any specific complaints.  No fever or chills.    ROS:  Gen- No fevers, chills,  CV- No chest pain, palpitations  Resp- No cough, dyspnea  GI- No N/V/D, n    Objective   Objective     Vital Signs:   Temp:  [96 °F (35.6 °C)-97.5 °F (36.4 °C)] 97.5 °F (36.4 °C)  Heart Rate:  [] 111  Resp:  [14-20] 20  BP: (102-146)/(60-93) 122/68  Flow (L/min):  [1-2] 2     Physical Exam:  Constitutional: No acute distress  HENT: NCAT, mucous membranes moist  Respiratory: Crackles at the bases, respiratory effort normal   Cardiovascular: RRR, no murmurs, rubs, or gallops  Gastrointestinal: Positive bowel sounds, soft, nontender, nondistended, PEG tube in place and site is clean.  Musculoskeletal:  bilateral ankle edema  Psychiatric: Appropriate affect, cooperative  Neurologic: Awake, alert, follows commands, speech clear  Skin: No rashes    Results Reviewed:  LAB RESULTS:      Lab 23  0522 23  0511 23  0744 23  0704 23  0352   WBC 14.51* 13.15* 14.86* 17.81* 13.57*   HEMOGLOBIN 9.9* 9.5* 9.7* 10.9* 10.3*   HEMATOCRIT 30.7* 28.8* 29.9* 32.2* 32.3*   PLATELETS 410 340 357 395 359   NEUTROS ABS 11.76* 10.78* 12.50* 14.66*   --    IMMATURE GRANS (ABS) 0.16* 0.10* 0.14* 0.17*  --    LYMPHS ABS 1.39 1.21 1.10 1.51  --    MONOS ABS 0.70 0.63 0.72 0.96*  --    EOS ABS 0.42* 0.38 0.33 0.44*  --    MCV 94.5 93.5 93.4 94.7 97.9*         Lab 04/24/23  0522 04/23/23  0511 04/22/23  0744 04/21/23  0704 04/19/23  0352   SODIUM 127* 127* 126* 127* 130*   POTASSIUM 4.1 3.7 4.2 4.7 4.4   CHLORIDE 87* 88* 88* 87* 91*   CO2 31.0* 32.0* 32.0* 32.0* 30.0*   ANION GAP 9.0 7.0 6.0 8.0 9.0   BUN 13 13 14 12 8   CREATININE 0.35* 0.41* 0.40* 0.51* 0.35*   EGFR 97.8 94.2 94.7 89.4 97.8   GLUCOSE 105* 111* 116* 118* 105*   CALCIUM 8.0* 7.9* 8.2* 8.6 7.8*   MAGNESIUM 1.8  --  1.6  --  1.8   PHOSPHORUS  --   --  3.2  --   --          Lab 04/21/23  0704   TOTAL PROTEIN 4.7*   ALBUMIN 2.6*   GLOBULIN 2.1   ALT (SGPT) 23   AST (SGOT) 20   BILIRUBIN <0.2   ALK PHOS 107                     Brief Urine Lab Results  (Last result in the past 365 days)      Color   Clarity   Blood   Leuk Est   Nitrite   Protein   CREAT   Urine HCG        01/06/23 0149 Yellow   Clear   Negative   Negative   Negative   Negative                 Microbiology Results Abnormal     None          XR Chest 1 View    Result Date: 4/24/2023  XR CHEST 1 VW Date of Exam: 4/24/2023 8:10 AM EDT Indication: crackles Comparison: April 22, 2023 Findings: There are postoperative changes from midline sternotomy. The heart is enlarged. There is an esophageal stent in place. There are moderate size bilateral pleural effusions with basilar atelectasis. There is no pneumothorax. There are no new infiltrates.     Impression: Impression: Stable chest. Bilateral moderate pleural effusions with basilar atelectasis as before. Electronically Signed: Tobias Yarbrough  4/24/2023 8:41 AM EDT  Workstation ID: [id number]      Results for orders placed during the hospital encounter of 03/22/23    Adult Transthoracic Echo Complete W/ Cont if Necessary Per Protocol    Interpretation Summary  •  Left ventricular systolic function is  normal. Calculated left ventricular EF = 60% Left ventricular ejection fraction appears to be 56 - 60%.  •  Left ventricular diastolic function is consistent with (grade I) impaired relaxation and age.  •  Moderate to severe mitral valve regurgitation is present.  •  Moderate tricuspid valve regurgitation is present.  •  Estimated right ventricular systolic pressure from tricuspid regurgitation is moderately elevated (45-55 mmHg).  •  Moderate pulmonary hypertension is present.      Current medications:  Scheduled Meds:aspirin, 81 mg, Per PEG Tube, Daily  carvedilol, 25 mg, Per PEG Tube, BID With Meals  cefTRIAXone, 2 g, Intravenous, Q24H  sennosides, 10 mL, Per PEG Tube, BID   And  docusate sodium, 100 mg, Per PEG Tube, BID  enoxaparin, 40 mg, Subcutaneous, Nightly  furosemide, 40 mg, Oral, Daily  gabapentin, 100 mg, Per PEG Tube, Q12H  hydrALAZINE, 75 mg, Per PEG Tube, Q8H  lansoprazole, 30 mg, Per G Tube, BID AC  levothyroxine, 112 mcg, Per PEG Tube, Q AM  lidocaine, 1 patch, Transdermal, Q24H  metoclopramide, 10 mg, Per G Tube, 4x Daily AC & at Bedtime  sodium chloride, 10 mL, Intravenous, Q12H  valsartan, 320 mg, Per PEG Tube, Q24H      Continuous Infusions:   PRN Meds:.•  acetaminophen **OR** acetaminophen **OR** acetaminophen  •  [DISCONTINUED] senna-docusate sodium **AND** polyethylene glycol **AND** [DISCONTINUED] bisacodyl **AND** bisacodyl  •  cloNIDine  •  HYDROcodone-acetaminophen  •  ipratropium-albuterol  •  [DISCONTINUED] ondansetron **OR** ondansetron  •  promethazine  •  sodium chloride  •  sodium chloride    Assessment & Plan   Assessment & Plan     Active Hospital Problems    Diagnosis  POA   • **Esophageal stricture [K22.2]  Unknown   • Severe malnutrition [E43]  Unknown   • Hypothyroidism (acquired) [E03.9]  Unknown   • S/P percutaneous endoscopic gastrostomy (PEG) tube placement [Z93.1]  Not Applicable   • Adenocarcinoma [C80.1]  Unknown   • Perforated ulcer [K27.5]  Yes   • Former smoker  [Z87.891]  Not Applicable   • Coronary artery disease  [I25.10]  Yes   • Chronic obstructive pulmonary disease [J44.9]  Yes   • Essential hypertension [I10]  Yes   • ASCVD (arteriosclerotic cardiovascular disease) [I25.10]  Yes      Resolved Hospital Problems   No resolved problems to display.        Brief Hospital Course to date:  Christine Garg is a 89 y.o. female with past medical history of essential hypertension, dyslipidemia, coronary artery disease, peripheral artery disease, hypothyroidism, COPD who was admitted to [facility] on 3/22 for abdominal pain; went to OR, had perforated duodenal ulcer repaired with omental patch.  Then on 3/29, patient had CT of thoracic spine which noted left pleural consolidation and parenchymal nodules.  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  She was also found to have an esophageal stricture secondary to extrinsic compression of 3cm peritracheal lymph node.  On 4/7, patient had PEG tube placed.  She was sent to [facility] for placement of esophageal stent by Dr. Waters.        This patient's problems and plans were partially entered by my partner and updated as appropriate by me 04/24/23.     Assessment & Plan:  Dysphagia due to external esophageal compression by LAD  S/p PEG  · EGD 4/12 with esophageal stent placement  · Plan for full liquid diet x 1 week then advance to puree 4/19 if tolerating  · Currently on nocturnal tube feed.  · CT of abdomen pelvis which was done on 4/20/2023 does not show any acute process.  However, patient has very mild diffuse tenderness on physical exam mild last night tube feeding had to be stopped as she developed abdominal discomfort.    Hyponatremia     Patient currently is on fluid restriction and sodium is slowly improving.  We will continue fluid restriction and monitor the sodium.    Cancer related pain  · Continue as needed Norco to q4h   · Pain seems to be well  controlled.    Perforated Duodenal Ulcer s/p exploratory laparotomy with patch 3/22  · Continue PPI twice daily  · Continue lower aspirin to 81 mg daily instead of 325 mg      New diagnosis of lung adenocarcinoma  COPD  Former smoker   On oxygen 2L here   · Continue PRN duonebs  · Chest x-ray is stable.   · Follow up outpatient for treatment plan with Dr. Balbuena     · Patient has elevated white count but no fever.  As mentioned chest x-ray is also stable.  We will start the patient empirically on Rocephin and check the white count in a.m.     Coronary artery disease  Dyslipidemia  Essential hypertension  · Lowered home aspirin from 325 mg to 81mg. Will need to make this clear at discharge in order to prevent further ulceration  · Currently blood pressure controlled with valsartan, Coreg and hydralazine  · Continue as needed clonidine    Hypoalbuminemia   Severe malnutrition  · Nutrition consulted  · Start nocturnal tube feeds     Hypothyroidism  · Continue home synthroid      Debility  · PT  · Patient had been approved to [facility] for subacute rehab in [place]. Patient would still like to go there after discharge. Patient is to follow up outpatient for treatment options regarding her cancer.       NOTE: Total time spent 45 minutes     Expected Discharge Location and Transportation: Rehab  Expected Discharge when bwd available.                         DVT prophylaxis:  Medical and mechanical DVT prophylaxis orders are present.     AM-PAC 6 Clicks Score (PT): 11 (04/21/23 1319)    CODE STATUS:   Code Status and Medical Interventions:   Ordered at: 04/10/23 1719     Code Status (Patient has no pulse and is not breathing):    CPR (Attempt to Resuscitate)     Medical Interventions (Patient has pulse or is breathing):    Full Support     Copied text in this note has been reviewed and is accurate as of 04/24/23.     Fransico Feng MD  04/24/23      Electronically signed by Fransico Feng MD at 04/24/23  1914

## 2023-04-26 NOTE — CONSULTS
INFECTIOUS DISEASE CONSULT/INITIAL HOSPITAL VISIT    Christine Garg  6/8/1933  [id number]    Date of Consult: 4/26/2023    Admission Date: 4/10/2023      Requesting Provider: Noelle Cmobs DO  Evaluating Physician: Ruy Nuñez MD    Reason for Consultation: Intra-abdominal abscess    History of present illness:    Patient is a 89 y.o. female with a history of COPD, coronary artery disease, peripheral vascular disease, hypertension, and hyperlipidemia who is seen today for evaluation of an intra-abdominal abscess.She was admitted to [facility] on 3/22/23 with a perforated duodenal ulcer requiring emergent surgical intervention with oversewing utilizing an omental patch/Biopatch.  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  She was transferred to [facility] on 4/10 for placement of an esophageal stent.Dr. Brunner placed the stent on 4/12. On 4/12 her white blood cell count was normal at 9.9.By 4/17 her white blood cell count was up to 13.3 and has further risen to 14.9 today. Due to her leukocytosis, she was started on intravenous Rocephin on 4/24.  An abdominal/pelvic CT scan performed yesterday revealed an upper abdominal abscess adjacent to the stomach.  She underwent CT-guided drainage of the abscess today [id number] cc of cloudy fluid filled with particulate matter. She has remained afebrile.    Past Medical History:   Diagnosis Date   • Advanced age    • CAD (coronary artery disease) 2011    s/p 3v CABG   • Chronic kidney disease (CKD), stage III (moderate)    • COPD (chronic obstructive pulmonary disease)    • History of tobacco use    • Hyperlipidemia    • Hypertension    • Hypothyroidism    • PONV (postoperative nausea and vomiting)    • Pulmonary nodule        Past  Surgical History:   Procedure Laterality Date   • BREAST BIOPSY Left     benign   • CATARACT EXTRACTION     • CORONARY ANGIOPLASTY     • CORONARY ARTERY BYPASS GRAFT     • ENDOSCOPY W/ PEG TUBE PLACEMENT N/A 4/7/2023    Procedure: ESOPHAGOGASTRODUODENOSCOPY WITH PERCUTANEOUS ENDOSCOPIC GASTROSTOMY TUBE INSERTION;  Surgeon: Aurora Kirkland MD;  Location: [facility] OR;  Service: General;  Laterality: N/A;   • ENDOSCOPY W/ STENT PLACEMENT/REMOVAL N/A 4/12/2023    Procedure: ESOPHAGOGASTRODUODENOSCOPY WITH STENT PLACEMENT WITH FLUOROSCOPY;  Surgeon: Brunner, Mark I, MD;  Location: [facility] ENDOSCOPY;  Service: Gastroenterology;  Laterality: N/A;   • EXPLORATORY LAPAROTOMY N/A 3/22/2023    Procedure: LAPAROTOMY EXPLORATORY WITH OVER SEW OF DUODENAL ULCER WITH OMENTAL PATCH AND BIOPATCH AND CENTRAL LINE PLACEMENT;  Surgeon: Aurora Kirkland MD;  Location: [facility] OR;  Service: General;  Laterality: N/A;       Family History   Problem Relation Age of Onset   • Heart disease Mother    • Heart disease Father    • Heart disease Brother    • Breast cancer Neg Hx        Social History     Socioeconomic History   • Marital status:    Tobacco Use   • Smoking status: Former     Types: Cigarettes   • Smokeless tobacco: Never   Vaping Use   • Vaping Use: Never used   Substance and Sexual Activity   • Alcohol use: No   • Drug use: No   • Sexual activity: Defer       Allergies   Allergen Reactions   • Motrin [Ibuprofen] Anaphylaxis and Hives   • Novocain [Procaine] Other (See Comments)     Pt state she passes out.         Medication:    Current Facility-Administered Medications:   •  acetaminophen (TYLENOL) tablet 650 mg, 650 mg, Per PEG Tube, Q4H PRN **OR** acetaminophen (TYLENOL) 160 MG/5ML solution 650 mg, 650 mg, Per PEG Tube, Q4H PRN, 650 mg at 04/23/23 0958 **OR** acetaminophen (TYLENOL) suppository 650 mg, 650 mg, Rectal, Q4H PRN, Brunner, Mark I, MD  •  aluminum-magnesium hydroxide-simethicone (MAALOX MAX) 400-400-40  MG/5ML suspension 15 mL, 15 mL, Oral, Q6H PRN, Flora Franco, , 15 mL at 04/25/23 1427  •  aspirin chewable tablet 81 mg, 81 mg, Per PEG Tube, Daily, Brunner, Mark I, MD, 81 mg at 04/25/23 0813  •  [DISCONTINUED] sennosides-docusate (PERICOLACE) 8.6-50 MG per tablet 2 tablet, 2 tablet, Per PEG Tube, BID **AND** polyethylene glycol (MIRALAX) packet 17 g, 17 g, Per PEG Tube, Daily PRN, 17 g at 04/25/23 1023 **AND** [DISCONTINUED] bisacodyl (DULCOLAX) EC tablet 5 mg, 5 mg, Oral, Daily PRN **AND** bisacodyl (DULCOLAX) suppository 10 mg, 10 mg, Rectal, Daily PRN, Brunner, Mark I, MD, 10 mg at 04/25/23 1023  •  carvedilol (COREG) tablet 25 mg, 25 mg, Per PEG Tube, BID With Meals, Aleta Hatch, LUCÍA, 25 mg at 04/25/23 1810  •  cefTRIAXone (ROCEPHIN) 2 g/100 mL 0.9% NS IVPB (MBP), 2 g, Intravenous, Q24H, Fransico Feng MD, 2 g at 04/25/23 0812  •  cloNIDine (CATAPRES) tablet 0.1 mg, 0.1 mg, Per PEG Tube, TID PRN, Brunner, Mark I, MD, 0.1 mg at 04/17/23 0413  •  sennosides (SENOKOT) 8.8 MG/5ML syrup 10 mL, 10 mL, Per PEG Tube, BID, 10 mL at 04/25/23 2304 **AND** docusate sodium (COLACE) liquid 100 mg, 100 mg, Per PEG Tube, BID, Brunner, Mark I, MD, 100 mg at 04/25/23 2115  •  Enoxaparin Sodium (LOVENOX) syringe 40 mg, 40 mg, Subcutaneous, Nightly, Catalina Leonardo MD, 40 mg at 04/25/23 2115  •  furosemide (LASIX) tablet 40 mg, 40 mg, Per PEG Tube, Daily, Tanner Roe, PharmD  •  gabapentin (NEURONTIN) capsule 100 mg, 100 mg, Per PEG Tube, Q12H, Clare De Anda MD, 100 mg at 04/25/23 2117  •  hydrALAZINE (APRESOLINE) tablet 75 mg, 75 mg, Per PEG Tube, Q8H, Steven Yanes APRN, 75 mg at 04/26/23 0559  •  HYDROcodone-acetaminophen (NORCO) 7.5-325 MG per tablet 1 tablet, 1 tablet, Per PEG Tube, Q4H PRN, Catalina Leonardo MD, 1 tablet at 04/26/23 0107  •  HYDROcodone-acetaminophen (NORCO) 7.5-325 MG per tablet 1 tablet, 1 tablet, Per PEG Tube, Q6H, Aleta Hatch APRN, 1 tablet at 04/26/23 0559  •  hydrOXYzine  (ATARAX) tablet 25 mg, 25 mg, Per PEG Tube, TID PRN, María Elena Vazquez, APRN, 25 mg at 04/25/23 1134  •  ipratropium-albuterol (DUO-NEB) nebulizer solution 3 mL, 3 mL, Nebulization, Q4H PRN, Clare De Anda MD, 3 mL at 04/25/23 0323  •  lansoprazole (FIRST) oral suspension 30 mg, 30 mg, Per G Tube, BID AC, Thomas Cruz, RP, 30 mg at 04/25/23 1808  •  levothyroxine (SYNTHROID, LEVOTHROID) tablet 112 mcg, 112 mcg, Per PEG Tube, Q AM, Brunner, Mark I, MD, 112 mcg at 04/26/23 0559  •  lidocaine (LIDODERM) 5 % 1 patch, 1 patch, Transdermal, Q24H, Clare De Anda MD, 1 patch at 04/25/23 0813  •  metoclopramide (REGLAN) solution 10 mg, 10 mg, Per G Tube, 4x Daily AC & at Bedtime, Anaya Jones MD, 10 mg at 04/25/23 2217  •  [DISCONTINUED] ondansetron (ZOFRAN) tablet 4 mg, 4 mg, Oral, Q6H PRN **OR** ondansetron (ZOFRAN) injection 4 mg, 4 mg, Intravenous, Q6H PRN, Brunner, Mark I, MD, 4 mg at 04/23/23 0959  •  promethazine (PHENERGAN) suppository 12.5 mg, 12.5 mg, Rectal, Q4H PRN, Anaya Jones MD  •  sodium chloride 0.9 % flush 10 mL, 10 mL, Intravenous, Q12H, Brunner, Mark I, MD, 10 mL at 04/25/23 2007  •  sodium chloride 0.9 % flush 10 mL, 10 mL, Intravenous, PRN, Brunner, Mark I, MD, 10 mL at 04/19/23 1704  •  sodium chloride 0.9 % infusion 40 mL, 40 mL, Intravenous, PRN, Brunner, Mark I, MD, 40 mL at 04/12/23 0902  •  valsartan (DIOVAN) tablet 320 mg, 320 mg, Per PEG Tube, Q24H, Brunner, Mark I, MD, 320 mg at 04/25/23 0813    Antibiotics:  Anti-Infectives (From admission, onward)    Ordered     Dose/Rate Route Frequency Start Stop    04/24/23 0733  cefTRIAXone (ROCEPHIN) 2 g/100 mL 0.9% NS IVPB (MBP)        Ordering Provider: Fransico Feng MD    2 g  over 30 Minutes Intravenous Every 24 Hours 04/24/23 0830 04/29/23 0829            Review of Systems:  Constitutional--No fevers or shaking chills.  She complains of fatigue.  HEENT-- Odynophagia.  As noted above, she has esophageal compression requiring  esophageal stent..  CV-- No chest pain Or history of valvular heart disease  Resp-- No SOB/cough. She has a recent diagnosis of adenocarcinoma of the lung.  She has a history of COPD.She had a history of Covid 19 infection in early 2023  GI-Recent duodenal ulcer perforation.  She has required a PEG.  -- No dysuria, hematuria, or flank pain.  Heme- No active bruising or bleeding;  MS-- no swelling or pain in the bones or joints of arms/legs.  No new back pain.  Neuro-- No acute focal weakness or numbness in the arms or legs.   Skin--No rashes or lesions      Physical Exam:   Vital Signs  Temp (24hrs), Av °F (36.1 °C), Min:96.4 °F (35.8 °C), Max:97.5 °F (36.4 °C)    Temp  Min: 96.4 °F (35.8 °C)  Max: 97.5 °F (36.4 °C)  BP  Min: 82/44  Max: 138/78  Pulse  Min: 85  Max: 112  Resp  Min: 14  Max: 18  SpO2  Min: 90 %  Max: 97 %    GENERAL: Awake and alert, in no acute distress.   HEENT: Normocephalic, atraumatic.  PERRL. EOMI. No conjunctival injection. No icterus. Oropharynx clear without evidence of thrush or exudate. .  NECK: Supple .  LYMPH: No cervical, axillary or inguinal lymphadenopathy.  HEART: RRR; No murmur, rubs, gallops.   LUNGS: Clear to auscultation bilaterally without wheezing, rales, rhonchi. Normal respiratory effort. Nonlabored..  ABDOMEN: Left upper quadrant PEG is in place with no exit site erythema or drainage  EXT:  .1+ lower extremity edema  :  Without Barriga catheter.  MSK: No joint effusions or erythema  SKIN: Warm and dry without cutaneous eruptions on Inspection/palpation.    NEURO: Oriented to PPT.  Motor 5/5 strength  PSYCHIATRIC: Normal insight and judgment. Cooperative with PE    Laboratory Data    Results from last 7 days   Lab Units 23  0420 23  0415 23  0522   WBC 10*3/mm3 14.90* 13.86* 14.51*   HEMOGLOBIN g/dL 8.8* 9.4* 9.9*   HEMATOCRIT % 27.5* 28.6* 30.7*   PLATELETS 10*3/mm3 345 387 410     Results from last 7 days   Lab Units 23  0420   SODIUM  mmol/L 129*   POTASSIUM mmol/L 3.7   CHLORIDE mmol/L 89*   CO2 mmol/L 31.0*   BUN mg/dL 12   CREATININE mg/dL 0.28*   GLUCOSE mg/dL 96   CALCIUM mg/dL 7.9*     Results from last 7 days   Lab Units 04/21/23  0704   ALK PHOS U/L 107   BILIRUBIN mg/dL <0.2   ALT (SGPT) U/L 23   AST (SGOT) U/L 20                         Estimated Creatinine Clearance: 142.3 mL/min (A) (by C-G formula based on SCr of 0.28 mg/dL (L)).      Microbiology:  No results found for: ACANTHNAEG, AFBCX, BPERTUSSISCX, BLOODCX  No results found for: BCIDPCR, CXREFLEX, CSFCX, CULTURETIS  No results found for: CULTURES, HSVCX, URCX  No results found for: EYECULTURE, GCCX, HSVCULTURE, LABHSV  No results found for: LEGIONELLA, MRSACX, MUMPSCX, MYCOPLASCX  No results found for: NOCARDIACX, STOOLCX  No results found for: THROATCX, UNSTIMCULT, URINECX, CULTURE, VZVCULTUR  No results found for: VIRALCULTU, WOUNDCX        Radiology:  Imaging Results (Last 72 Hours)     Procedure Component Value Units Date/Time    CT Abdomen Pelvis With Contrast [[id number]] Collected: 04/25/23 2017     Updated: 04/26/23 0038    Narrative:      CT ABDOMEN PELVIS W CONTRAST    Date of Exam: 4/25/2023 6:23 PM EDT    Indication: further eval for air seen on KUB.    Comparison: KUB 4/25/2023, CT abdomen and pelvis 4/20/2023, 1/6/2023    Technique: Axial CT images were obtained of the abdomen and pelvis following the uneventful intravenous administration of 85 mL Isovue-300. Reconstructed coronal and sagittal images were also obtained. Automated exposure control and iterative   construction methods were used.      Findings:  Redemonstration of percutaneous gastrostomy with the balloon positioned in the stomach lumen. There is a thinly rim-enhancing fluid and air/gas collection within the anterior left mid abdomen anterior to the gastric body and gastric antrum (series 2   image 81); this measures 7.9 x 2.9 x 5.7 cm (TV X AP X CC). This appears slightly enlarged and more conspicuous  compared to CT from 4/20/2023. The left-sided margin of this collection is in close proximity to the tubing/tract of the percutaneous   gastrostomy (series 2 image 78). The right-sided margin of this collection partially abuts the inflamed gastric pylorus/first portion of duodenum (series 900 image 17). Apart from air/gas within this collection, no evidence of pneumoperitoneum otherwise.   Superior to the collection is a prominent anteriorly-directed diverticulum arising from the fourth portion of the duodenum or proximal jejunum (series 2 image 66), present on multiple prior exams.   Similar appearance of wall thickened and inflamed gastric pylorus and proximal duodenum compatible with known ulcer disease. There is a small amount of fluid within the stomach, without findings to suggest gastric outlet obstruction. The remainder of the   GI tract appears unchanged, with redemonstration of scattered colonic diverticulosis and scattered unformed stool throughout the colon.    There is a small amount of simple appearing nonorganized pelvic free fluid which is likely reactive. No additional discrete/drainable fluid collections are identified.    Unremarkable appearance of the liver, gallbladder, bile ducts, spleen. Top normal size of the main pancreatic duct, with 0.6 cm round hypodensity in the pancreatic uncinate which appears to communicate with the main pancreatic duct, compatible with   sidebranch IPMN. Normal appearance of the adrenal glands, kidneys, ureters, bladder, uterus, and adnexa. There is diffuse fat stranding of the soft tissues of the body wall compatible with anasarca.    The lower thorax redemonstrates small to moderate bilateral pleural effusions with associated dependent/passive bibasilar atelectasis, partially imaged subcarinal mass or lymph node, and distal esophageal stent with internal fluid/debris within the stent   lumen.    There is atherosclerosis of the abdominal aorta without evidence of  aneurysm, with otherwise grossly unremarkable appearance of the vasculature. No lymphadenopathy. No acute or suspicious bony findings.        Impression:      Impression:  Mild increased size/conspicuity of thinly rim-enhancing organized fluid and air/gas collection within the anterior mid abdomen anterior to the stomach. It is difficult to ascertain if this reflects organized collection or abscess associated with the   percutaneous gastrostomy tube along its left margin, or evolving intra-abdominal abscess from gastric pyloric/proximal duodenal ulcer along its right margin.    Redemonstration of inflammatory thickening and fat stranding of the gastric pylorus and proximal duodenum compatible with known ulcer disease.    Redemonstration of distal esophageal stent.    Redemonstration of fluid overload state with moderate bilateral pleural effusions and anasarca.    Additional chronic and incidental ancillary findings as noted above.    Electronically Signed: Anibal Briseno    4/26/2023 12:35 AM EDT    Workstation ID: [id number]    XR Abdomen KUB [[id number]] Collected: 04/25/23 1429     Updated: 04/25/23 1454    Narrative:      XR ABDOMEN KUB    Date of Exam: 4/25/2023 1:47 PM EDT    Indication: abd pain, eval constipation    Comparison: CT abdomen pelvis 4/20/2023    Findings:  Bilateral pleural effusions with associated bibasilar opacities. Gastrostomy is seen projecting over the left mid abdomen. Central focus of air is seen which likely corresponds to contained duodenal ulcer perforation/diverticulum. Previously seen small   volume of pneumoperitoneum is not well appreciated on the supine radiographs. Degenerative change of the spine. Degenerative changes of the left greater than right hip. No abnormal abdominal calcifications. Nonobstructive bowel gas pattern. Mild colonic   stool burden.      Impression:      Impression:  Bilateral pleural effusions are again seen.    Central focus of rounded air may correspond to  contained duodenal perforation/diverticulum.    Previously seen pneumoperitoneum is not well appreciated on today's supine radiograph.    Mild colonic stool burden.    Findings discussed with Dr. MELLISSA ZAMUDIO by Dr. Bird Mejias via telephone on 2023 2:49 PM EDT.      Electronically Signed: Bird Mejias    2023 2:51 PM EDT    Workstation ID: [id number]    XR Chest 1 View [[id number]] Collected: 2339     Updated: 2344    Narrative:      XR CHEST 1 VW    Date of Exam: 2023 8:10 AM EDT    Indication: crackles    Comparison: 2023    Findings:  There are postoperative changes from midline sternotomy. The heart is enlarged. There is an esophageal stent in place. There are moderate size bilateral pleural effusions with basilar atelectasis. There is no pneumothorax. There are no new infiltrates.      Impression:      Impression:  Stable chest. Bilateral moderate pleural effusions with basilar atelectasis as before.      Electronically Signed: Tobias Yarbrough    2023 8:41 AM EDT    Workstation ID: [id number]            Impression:   1.  Intra-abdominal abscess-related to her perforated duodenal ulcer versus the G-tube placement.  She has undergone CT-guided drainage of the abscess on .  Cultures are pending.  She is currently on ceftriaxone but I will plan to switch her to cefepime, metronidazole, and fluconazole pending culture data.  2.  Duodenal ulcer perforation-status post surgery 3/22/23 with omental patch/Biopatch repair  3.  Adenocarcinoma of the lung-recently diagnosed with a pleural biopsy  4.  Dysphagia/external esophageal compression-status post esophageal stent placement on 23  5.  Leukocytosis/neutrophilia-secondary to intra-abdominal infection    PLAN/RECOMMENDATIONS:   Thank you for asking us to see Christine Garg, I recommend the followin.  CT-guided intra-abdominal abscess drainage  2.  Intra-abdominal abscess cultures-pending  3.   Discontinue ceftriaxone  4.  Intravenous cefepime  5.  Intravenous metronidazole  6.  Intravenous fluconazole       I reviewed her radiographic images with Dr. Hanna in radiology.  I discussed her complex situation with Dr. Alejandro Roberto in interventional radiology today.  I discussed her complex situation with Dr. Franco today.    Ruy Nuñez MD  4/26/2023  07:06 EDT

## 2023-04-26 NOTE — NURSING NOTE
Image guided abscess drain placed by Dr Sheikh. 8.5 [place] Locking drain to bulb suction placed; with 10 mls removed. Labs obtained. Sedation time of 20 minutes, 0.5 mg Versed and 25 mcg Fentanyl given. Patient tolerated well. Report called to nurse. DLP- [phone]

## 2023-04-26 NOTE — PROGRESS NOTES
Brief EN Review Note    Patient Name: Christine Garg  Date of Encounter: 04/26/23 14:57 EDT  MRN: [id number]  Admission date: 4/10/2023    Reason for visit: EN review . RD to continue to follow per protocol.     EMR reviewed   Medication reviewed  Labs reviewed     Additional Information Obtained:   Pt noted to have abscess at PEG site that was drained in IR today. Continues with anorexia - discussing possible diet advance with MD. Concern for continued esophageal dysphagia - SLP recommending GI re-eval vs MBS due to location. Hyponatremia persists, but slightly improved - MD d/jorge'd flushes on 4/25. Discussed with RN to limited water with meds. Pt bowels now moving - per CT abdomen, scattered unformed stool noted in colon. Would  Expect loose stools due to bowel regimen    Current diet: Diet: Liquid Diets, Fluid Restriction (240 mL/tray) Diets; Full Liquid; 1500 mL/day; Texture: Regular Texture (IDDSI 7); Fluid Consistency: Thin (IDDSI 0)   Intake: 2 Days: 50% x2 meals    EN: Peptamen AF  Goal Rate:  55mL/hr Water Flushes:  10mL q2hr  Modular: None  Route: PEG  Tube: 20 PEG    At goal over: 12Hrs/day    Rx will supply:   Goal Volume 660 mL/day     Flush Volume 60 mL/day     Energy 792 Kcal/day 52 % Est Need   Protein 50 g/day 56 % Est Need   Fiber 0 g/day     Water in   mL     Total Water 565 mL     Meet DRI No        --------------------------------------------------------------------------  Product/Rate verified at bedside: Yes  Infusing Rate at time of visit:  Nocturnal feeds    Average Delivery from Charting: Insufficient data unable to tolerate >35mL/hr      Intervention:  Follow treatment plan  Care plan reviewed    · Would benefit from appetite stimulant (I.e. remeron)  · Advance diet to puree diet as clinically indicated  · Continue to try advancing EN to goal as able.     Follow up:   Per protocol      Suad Emmanuel MS,RD,LD  14:50 EDT  Time: 30min

## 2023-04-26 NOTE — PROGRESS NOTES
[facility] Medicine Services  PROGRESS NOTE    Patient Name: Christine Garg  : 1933  MRN: [id number]    Date of Admission: 4/10/2023  Primary Care Physician: Dave Tobar MD    Subjective   Subjective     CC:  Fluid collection    HPI:  Agreeable to IR procedure today.  Seems more comfortable and less anxious than yesterday    ROS:  Gen- No fevers, chills  CV- No chest pain, palpitations  Resp- No cough, dyspnea  GI- No N/V/D, + abd pain        Objective   Objective     Vital Signs:   Temp:  [96.2 °F (35.7 °C)-97.2 °F (36.2 °C)] 97.2 °F (36.2 °C)  Heart Rate:  [] 103  Resp:  [11-23] 14  BP: ()/(35-99) 92/61  Flow (L/min):  [2] 2     Physical Exam:  Constitutional: No acute distress, awake, alert  HENT: NCAT, mucous membranes moist  Respiratory: Respiratory effort normal   Gastrointestinal: Soft, tender, nondistended  Musculoskeletal: No bilateral ankle edema  Psychiatric: Appropriate affect, cooperative  Neurologic: Oriented x 3,speech clear  Skin: No rashes      Results Reviewed:  LAB RESULTS:      Lab 23  0420 23  0415 23  0522 23  0511 23  0744   WBC 14.90* 13.86* 14.51* 13.15* 14.86*   HEMOGLOBIN 8.8* 9.4* 9.9* 9.5* 9.7*   HEMATOCRIT 27.5* 28.6* 30.7* 28.8* 29.9*   PLATELETS 345 387 410 340 357   NEUTROS ABS 12.49* 11.24* 11.76* 10.78* 12.50*   IMMATURE GRANS (ABS) 0.11* 0.16* 0.16* 0.10* 0.14*   LYMPHS ABS 1.26 1.33 1.39 1.21 1.10   MONOS ABS 0.72 0.73 0.70 0.63 0.72   EOS ABS 0.26 0.32 0.42* 0.38 0.33   MCV 94.8 94.1 94.5 93.5 93.4         Lab 23  0420 23  0641 23  0522 23  0511 23  0744   SODIUM 129* 128* 127* 127* 126*   POTASSIUM 3.7 3.9 4.1 3.7 4.2   CHLORIDE 89* 88* 87* 88* 88*   CO2 31.0* 31.0* 31.0* 32.0* 32.0*   ANION GAP 9.0 9.0 9.0 7.0 6.0   BUN 12 13 13 13 14   CREATININE 0.28* 0.39* 0.35* 0.41* 0.40*   EGFR 103.2 95.3 97.8 94.2 94.7   GLUCOSE 96 123* 105* 111* 116*   CALCIUM 7.9* 8.2* 8.0*  7.9* 8.2*   MAGNESIUM  --   --  1.8  --  1.6   PHOSPHORUS 3.2  --   --   --  3.2         Lab 04/26/23  0420 04/21/23  0704   TOTAL PROTEIN  --  4.7*   ALBUMIN 2.2* 2.6*   GLOBULIN  --  2.1   ALT (SGPT)  --  23   AST (SGOT)  --  20   BILIRUBIN  --  <0.2   ALK PHOS  --  107                     Brief Urine Lab Results  (Last result in the past 365 days)      Color   Clarity   Blood   Leuk Est   Nitrite   Protein   CREAT   Urine HCG        04/25/23 0950 Yellow   Clear   Negative   Small (1+)   Negative   Negative                 Microbiology Results Abnormal     Procedure Component Value - Date/Time    Urine Culture - Urine, Straight Cath [[id number]]  (Normal) Collected: 04/25/23 0950    Lab Status: Final result Specimen: Urine from Straight Cath Updated: 04/26/23 1037     Urine Culture No growth          CT Abdomen Pelvis With Contrast    Result Date: 4/26/2023  CT ABDOMEN PELVIS W CONTRAST Date of Exam: 4/25/2023 6:23 PM EDT Indication: further eval for air seen on KUB. Comparison: KUB 4/25/2023, CT abdomen and pelvis 4/20/2023, 1/6/2023 Technique: Axial CT images were obtained of the abdomen and pelvis following the uneventful intravenous administration of 85 mL Isovue-300. Reconstructed coronal and sagittal images were also obtained. Automated exposure control and iterative construction methods were used. Findings: Redemonstration of percutaneous gastrostomy with the balloon positioned in the stomach lumen. There is a thinly rim-enhancing fluid and air/gas collection within the anterior left mid abdomen anterior to the gastric body and gastric antrum (series 2 image 81); this measures 7.9 x 2.9 x 5.7 cm (TV X AP X CC). This appears slightly enlarged and more conspicuous compared to CT from 4/20/2023. The left-sided margin of this collection is in close proximity to the tubing/tract of the percutaneous gastrostomy (series 2 image 78). The right-sided margin of this collection partially abuts the inflamed gastric  pylorus/first portion of duodenum (series 900 image 17). Apart from air/gas within this collection, no evidence of pneumoperitoneum otherwise.  Superior to the collection is a prominent anteriorly-directed diverticulum arising from the fourth portion of the duodenum or proximal jejunum (series 2 image 66), present on multiple prior exams. Similar appearance of wall thickened and inflamed gastric pylorus and proximal duodenum compatible with known ulcer disease. There is a small amount of fluid within the stomach, without findings to suggest gastric outlet obstruction. The remainder of the  GI tract appears unchanged, with redemonstration of scattered colonic diverticulosis and scattered unformed stool throughout the colon. There is a small amount of simple appearing nonorganized pelvic free fluid which is likely reactive. No additional discrete/drainable fluid collections are identified. Unremarkable appearance of the liver, gallbladder, bile ducts, spleen. Top normal size of the main pancreatic duct, with 0.6 cm round hypodensity in the pancreatic uncinate which appears to communicate with the main pancreatic duct, compatible with sidebranch IPMN. Normal appearance of the adrenal glands, kidneys, ureters, bladder, uterus, and adnexa. There is diffuse fat stranding of the soft tissues of the body wall compatible with anasarca. The lower thorax redemonstrates small to moderate bilateral pleural effusions with associated dependent/passive bibasilar atelectasis, partially imaged subcarinal mass or lymph node, and distal esophageal stent with internal fluid/debris within the stent  lumen. There is atherosclerosis of the abdominal aorta without evidence of aneurysm, with otherwise grossly unremarkable appearance of the vasculature. No lymphadenopathy. No acute or suspicious bony findings.     Impression: Impression: Mild increased size/conspicuity of thinly rim-enhancing organized fluid and air/gas collection within the  anterior mid abdomen anterior to the stomach. It is difficult to ascertain if this reflects organized collection or abscess associated with the percutaneous gastrostomy tube along its left margin, or evolving intra-abdominal abscess from gastric pyloric/proximal duodenal ulcer along its right margin. Redemonstration of inflammatory thickening and fat stranding of the gastric pylorus and proximal duodenum compatible with known ulcer disease. Redemonstration of distal esophageal stent. Redemonstration of fluid overload state with moderate bilateral pleural effusions and anasarca. Additional chronic and incidental ancillary findings as noted above. Electronically Signed: Anibal Briseno  4/26/2023 12:35 AM EDT  Workstation ID: [id number]    CT Guided Abscess Drain Subdiaphr Subphren    Result Date: 4/26/2023  DATE OF EXAM: 4/26/2023 10:55 AM EDT PROCEDURE: CT GUIDED ABSCESS DRAIN SUBDIAPHR SUBPHREN INDICATIONS: fluid collection, rule out infection COMPARISON: No Comparisons Available FLUOROSCOPIC TIME: 84 seconds of CT fluoroscopy PHYSICIAN MONITORED CONSCIOUS SEDATION TIME: 20 minutes TECHNIQUE: A detailed explanation of the procedure, including the risks, benefits, and alternatives was provided. A preprocedure timeout was performed. The interventional radiology nurse mild the patient all times and provided conscious sedation. The patient was placed supine on the bed and a planning CT was performed, redemonstrating the air and fluid collection adjacent to the stomach. An appropriate access site was selected and the area was prepped and draped in the usual sterile fashion. The skin was anesthetized with 1% lidocaine and a small skin incision was made. Under CT fluoroscopic guidance a 18-gauge needle was advanced into the collection. A guidewire was manipulated centrally and the tract was dilated over the guidewire to accommodate a 10 Latvian drain. However when advancing the drain over the wire, it was not able to be  looped within the collection. This access site was abandoned. The 18-gauge needle was then readvanced into the collection and the tract was serially dilated over a guidewire to accommodate a 10 Persian drain which was pigtail looped within the collection. A total of 10 cc of cloudy fluid filled with particulate was aspirated. The catheter was secured to the skin with Prolene. Patient tolerated procedure well without immediate complication. A post scan demonstrated catheter in satisfactory position with near complete resolution of the collection. FINDINGS: See above     Impression: 1. Successful CT-guided drainage of fluid collection adjacent to the stomach. Electronically Signed: Alejandro Roberto  4/26/2023 12:12 PM EDT  Workstation ID: [id number]    XR Abdomen KUB    Result Date: 4/25/2023  XR ABDOMEN KUB Date of Exam: 4/25/2023 1:47 PM EDT Indication: abd pain, eval constipation Comparison: CT abdomen pelvis 4/20/2023 Findings: Bilateral pleural effusions with associated bibasilar opacities. Gastrostomy is seen projecting over the left mid abdomen. Central focus of air is seen which likely corresponds to contained duodenal ulcer perforation/diverticulum. Previously seen small volume of pneumoperitoneum is not well appreciated on the supine radiographs. Degenerative change of the spine. Degenerative changes of the left greater than right hip. No abnormal abdominal calcifications. Nonobstructive bowel gas pattern. Mild colonic stool burden.     Impression: Impression: Bilateral pleural effusions are again seen. Central focus of rounded air may correspond to contained duodenal perforation/diverticulum. Previously seen pneumoperitoneum is not well appreciated on today's supine radiograph. Mild colonic stool burden. Findings discussed with Dr. MELLISSA ZAMUDIO by Dr. Bird Mejias via telephone on 4/25/2023 2:49 PM EDT. Electronically Signed: Bird Mejias  4/25/2023 2:51 PM EDT  Workstation ID:  [id number]      Results for orders placed during the hospital encounter of 03/22/23    Adult Transthoracic Echo Complete W/ Cont if Necessary Per Protocol    Interpretation Summary  •  Left ventricular systolic function is normal. Calculated left ventricular EF = 60% Left ventricular ejection fraction appears to be 56 - 60%.  •  Left ventricular diastolic function is consistent with (grade I) impaired relaxation and age.  •  Moderate to severe mitral valve regurgitation is present.  •  Moderate tricuspid valve regurgitation is present.  •  Estimated right ventricular systolic pressure from tricuspid regurgitation is moderately elevated (45-55 mmHg).  •  Moderate pulmonary hypertension is present.      Current medications:  Scheduled Meds:aspirin, 81 mg, Per PEG Tube, Daily  carvedilol, 25 mg, Per PEG Tube, BID With Meals  cefTRIAXone, 2 g, Intravenous, Q24H  sennosides, 10 mL, Per PEG Tube, BID   And  docusate sodium, 100 mg, Per PEG Tube, BID  enoxaparin, 40 mg, Subcutaneous, Nightly  gabapentin, 100 mg, Per PEG Tube, Q12H  hydrALAZINE, 50 mg, Per PEG Tube, Q8H  HYDROcodone-acetaminophen, 1 tablet, Per PEG Tube, Q6H  lansoprazole, 30 mg, Per G Tube, BID AC  levothyroxine, 112 mcg, Per PEG Tube, Q AM  lidocaine, 1 patch, Transdermal, Q24H  metoclopramide, 10 mg, Per G Tube, 4x Daily AC & at Bedtime  midazolam, , ,   midodrine, 10 mg, Oral, Once  sodium chloride, 10 mL, Intravenous, Q12H  [START ON 4/27/2023] valsartan, 160 mg, Per PEG Tube, Q24H      Continuous Infusions:   PRN Meds:.•  acetaminophen **OR** acetaminophen **OR** acetaminophen  •  aluminum-magnesium hydroxide-simethicone  •  [DISCONTINUED] senna-docusate sodium **AND** polyethylene glycol **AND** [DISCONTINUED] bisacodyl **AND** bisacodyl  •  cloNIDine  •  HYDROcodone-acetaminophen  •  hydrOXYzine  •  ipratropium-albuterol  •  [DISCONTINUED] ondansetron **OR** ondansetron  •  promethazine  •  sodium chloride  •  sodium chloride    Assessment & Plan    Assessment & Plan     Active Hospital Problems    Diagnosis  POA   • **Esophageal stricture [K22.2]  Unknown   • Severe malnutrition [E43]  Unknown   • Hypothyroidism (acquired) [E03.9]  Unknown   • S/P percutaneous endoscopic gastrostomy (PEG) tube placement [Z93.1]  Not Applicable   • Adenocarcinoma [C80.1]  Unknown   • Perforated ulcer [K27.5]  Yes   • Former smoker [Z87.891]  Not Applicable   • Coronary artery disease  [I25.10]  Yes   • Chronic obstructive pulmonary disease [J44.9]  Yes   • Essential hypertension [I10]  Yes   • ASCVD (arteriosclerotic cardiovascular disease) [I25.10]  Yes      Resolved Hospital Problems   No resolved problems to display.        Brief Hospital Course to date:  Christine Garg is a 89 y.o. female with past medical history of essential hypertension, dyslipidemia, coronary artery disease, peripheral artery disease, hypothyroidism, COPD who was admitted to [facility] on 3/22 for abdominal pain; went to OR, had perforated duodenal ulcer repaired with omental patch.  Then on 3/29, patient had CT of thoracic spine which noted left pleural consolidation and parenchymal nodules.    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  She was also found to have an esophageal stricture secondary to extrinsic compression of 3cm peritracheal lymph node.    On 4/7, patient had PEG tube placed.  She was sent to [facility] for placement of esophageal stent by Dr. Waters.        This patient's problems and plans were partially entered by my partner and updated as appropriate by me 04/26/23.     Dysphagia due to external esophageal compression by LAD  S/p PEG  - EGD 4/12 with esophageal stent placement  -Repeat CT scan abdomen/pelvis on 4/25 showed new air/gas collection within left mid abdomen concerning for possible abscess  -She was started empirically on Rocephin for rising leukocytosis, discussed with Dr. Nuñez    Hyponatremia  -  dced free water flush with tube feeds  - monitor, improved slightly     Cancer related pain  -palliative care following     Perforated Duodenal Ulcer s/p exploratory laparotomy with patch 3/22  -continue low dose asa     New diagnosis of lung adenocarcinoma  COPD  Former smoker   - Follow up outpatient for treatment plan with Dr. Balbuena        Coronary artery disease  Dyslipidemia  Essential hypertension  - Lowered home aspirin from 325 mg to 81mg  -Decrease hydralazine and valsartan, BP on the lower side, continue with hold parameters   will need to make this clear at discharge in order to prevent further ulceration          Hypoalbuminemia   Severe malnutrition  - should improve with tube feeds     Hypothyroidism  - Continue home synthroid      Debility  - Patient had been approved to [facility] for subacute rehab in [place]. Patient would still like to go there after discharge. Patient is to follow up outpatient for treatment options regarding her cancer.        Expected Discharge Location and Transportation: rehab  Expected Discharge   Expected Discharge Date: 04/28/23       DVT prophylaxis:  Medical and mechanical DVT prophylaxis orders are present.     AM-PAC 6 Clicks Score (PT): 11 (04/26/23 0820)    CODE STATUS:   Code Status and Medical Interventions:   Ordered at: 04/25/23 0851     Medical Intervention Limits:    NO intubation (DNI)     Level Of Support Discussed With:    Health Care Surrogate     Code Status (Patient has no pulse and is not breathing):    No CPR (Do Not Attempt to Resuscitate)     Medical Interventions (Patient has pulse or is breathing):    Limited Support       Flora Franco, DO  04/26/23

## 2023-04-26 NOTE — SIGNIFICANT NOTE
04/26/23 1316   SLP Deferred Reason   SLP Deferred Reason Routine  (Consult received for dysphagia eval/ MBS & chart reviewed. Spoke w/ RN following chart review & appears concerns are more esophageal in nature (known esophageal compression by lymph node @ T5-6 s/p stent), rather than pharyngeal, & pt would likely benefit from functional esophageal assessment. SLP will f/u if any further concerns/needs.)

## 2023-04-26 NOTE — PLAN OF CARE
Goal Outcome Evaluation:              Outcome Evaluation: Patient on room air, 2L NC intermittently for rest. Poor PO intake today, tube feeding restarted early to help with intake. Frequent complaints of pain, palliative contacted for pain management. Abscess drained, SARAH in place, provider notified of low blood pressures, normal saline ordered at 75ml/hr. Some skin interventions in place (specialty bed), patient reeducated about skin breakdown, signed refusal of care form. Awaiting placement.

## 2023-04-26 NOTE — CASE MANAGEMENT/SOCIAL WORK
Continued Stay Note  [facility]     Patient Name: Christine Garg  MRN: [id number]  Today's Date: 4/26/2023    Admit Date: 4/10/2023    Plan: Rehab   Discharge Plan     Row Name 04/26/23 1118       Plan    Plan Rehab    Patient/Family in Agreement with Plan yes    Plan Comments Discussed in MDR's. Patient getting CT guided abscess drain today. Discharge plan is [facility]. Insurance approval started yesterday. CM will follow.    Final Discharge Disposition Code 03 - skilled nursing facility (SNF)               Discharge Codes    No documentation.               Expected Discharge Date and Time     Expected Discharge Date Expected Discharge Time    Apr 28, 2023             Vale Lechuga RN

## 2023-04-26 NOTE — PLAN OF CARE
Problem: Palliative Care  Goal: Enhanced Quality of Life  Intervention: Optimize Psychosocial Wellbeing  Flowsheets (Taken 4/26/2023 1618)  Supportive Measures: (palliative IDT: RN, Becky, LUCÍA, DO. ) other (see comments)     Problem: COPD (Chronic Obstructive Pulmonary Disease) Comorbidity  Goal: Maintenance of COPD Symptom Control  Intervention: Maintain COPD-Symptom Control  Recent Flowsheet Documentation  Taken 4/26/2023 1618 by Yessica Fulton RN  Supportive Measures: (palliative IDT: RN, Becky, LUCÍA, DO. ) other (see comments)   Goal Outcome Evaluation:  Plan of Care Reviewed With: patient, son        Progress: no change  Outcome Evaluation: Pt is off the floor at IR for abscess draining. Nursing reports pt continues with symptoms of pain, nausea, anxiety and limited mobility. continue to work on symptoms and support pt.

## 2023-04-27 NOTE — PLAN OF CARE
Goal Outcome Evaluation:           Progress: (P) no change  Outcome Evaluation: (P) Patient rested throughout shift. Remains on 2L NC. 20ml drained from SARAH. Nocturnal tube feed ran at 35 ml/hr. Patient refused turns throughout the night. C/O pain early this shift, PRN norco given. Interventions effective per patient. 0600 hydralazine and norco held for SBP <100.

## 2023-04-27 NOTE — PLAN OF CARE
Problem: Adult Inpatient Plan of Care  Goal: Plan of Care Review  Outcome: Ongoing, Progressing  Flowsheets (Taken 4/27/2023 8235)  Plan of Care Reviewed With: patient (Pended)  Outcome Evaluation: Pt. complains of pain rating of 6 throughout the day and SOA. Pt. is on 1L NS and her pain medication assisted with pain managment. Pt. allowed for more preventative skin measures today but refused turning. Pt. refused barium swallow test and tube feeding at night was discontinued. Pt. didn't have an appetite they would refuse trays but take the boost. (Pended)   Goal Outcome Evaluation:

## 2023-04-27 NOTE — CONSULTS
Clinical Nutrition     Nutrition Support Assessment  Reason for Visit: Follow-up protocol, EN/PO      Patient Name: Christine Garg  YOB: 1933  MRN: [id number]  Date of Encounter: 04/27/23 14:18 EDT  Admission date: 4/10/2023    Comments:    Per MD, pt wishing to stop nocturnal feeds. SLP evaluated for ability to advance to pureed diet. Continued recommendation for full liquids. Suspect pt will continue with inadequate energy intake however will d/c EN order as requested per MD.     Nutrition Assessment   Admission Diagnosis:  Esophageal stricture [K22.2]      Problem List:    Esophageal stricture    Essential hypertension    ASCVD (arteriosclerotic cardiovascular disease)    Coronary artery disease     Chronic obstructive pulmonary disease    Former smoker    Perforated ulcer    Hypothyroidism (acquired)    S/P percutaneous endoscopic gastrostomy (PEG) tube placement    Adenocarcinoma    Severe malnutrition        PMH:   She  has a past medical history of Advanced age, CAD (coronary artery disease) (2011), Chronic kidney disease (CKD), stage III (moderate), COPD (chronic obstructive pulmonary disease), History of tobacco use, Hyperlipidemia, Hypertension, Hypothyroidism, PONV (postoperative nausea and vomiting), and Pulmonary nodule.    PSH:  She  has a past surgical history that includes Breast biopsy (Left); Cataract extraction; Coronary angioplasty; Coronary artery bypass graft; Exploratory Laparotomy (N/A, 3/22/2023); Esophagogastroduodenoscopy w/ PEG (N/A, 4/7/2023); and ENDOSCOPY W/ STENT PLACEMENT/REMOVAL (N/A, 4/12/2023).      Applicable Nutrition Concerns:   Skin: surgical incision abdoment  Oral:  GI: PEG      Applicable Interval History:     3/22: duodenal ulcer repair  4/7: PEG placed at [facility]  4/11: initiated EN  4/12: esophageal stent placement  4/14: Nocturnal feeds initiated    Reported/Observed/Food/Nutrition Related History:     4/27  Per MD, pt wishing to stop  nocturnal feeds. SLP evaluated for ability to advance to pureed diet. Continued recommendation for full liquids. Suspect pt will continue with inadequate energy intake however will d/c EN order as requested per MD. Bowels moving - continues receiving bowel regimen.       4/26  Pt noted to have abscess at PEG site that was drained in IR today. Continues with anorexia - discussing possible diet advance with MD. Concern for continued esophageal dysphagia - SLP recommending GI re-eval vs MBS due to location. Hyponatremia persists, but slightly improved - MD d/c'd flushes on 4/25. Discussed with RN to limited water with meds. Pt bowels now moving - per CT abdomen, scattered unformed stool noted in colon. Would  Expect loose stools due to bowel regimen    4/25  Pt continues with severely limited PO intake with c/o nausea and abdominal tightness. Continues with inability to advance nocturnal feeds to goal. KUB ordered per MD - free air noted. Discussed nutritional concerns hospitalist, reports plan for CT abdomen to assess air noted on KUB. Will continue to monitor for results prior to making additional nutrition intervention recommendations.     4/24  Pt reports passing large stool ball this morning. Continues with limited PO acceptance 2/2 nausea. Pt has not tolerated nocturnal EN >35mL/hr. Hyponatremia noted.     4/20  Pt has not tolerated EN >30mL/hr; c/o nausea and residual of 50% delivered TF volume on AM check. Per MD note, will obtain CT abdomen. No documented BM x4 days, suspect may be contributing factor for PO intolerance and nausea with EN. Receiving prokinetic agent and Zofran. Pt reports inability to drink ONS 2/2 fullness from soup broth - encouraged to drink ONS or hold off tray to have between meals as able. Noted pt lost IV access, all meds PO.       4/17  Tolerating nocturnal feeds at 30mL/hr - denies nausea or abdominal discomfort at this rate. Per MD note, confusion about EN order from RN - plan to  start at 30mL/hr and advance to goal overnight tonight. Continues to drink Boost Breeze without difficulty. Documented BM x1 in past 24hrs.     4/14  Pt son concerned for poor PO acceptance. Plan to initiate nocturnal feeds with hope of improved tolerance - ? Ability to deliver >15mL/hr. Per RN, pt tolerating meds via PEG with additional flush without difficulty.     4/12  Pt unable to tolerate EN at 15mL/hr - c/o abdominal tightness. Esophageal stent placed - diet upgraded to full liquids. Reports tolerating 1 cup of OJ.  Pt does not like original ONS but agreeable to Breeze.     4/11  Pt laying in bed with family at bedside - able to provide some weight/nutrition hx, additional information provided by spouse and son at bedside. Endorsed recent hx of inability to tolerate PO intake of any kind without emesis for ~3wks prior to hospitalization in March. PEG placed 2/2 esophageal stricture and inability for diet advance past clear liquids. Unable to tolerate EN order of Nutren 1.5 >20mL/hr (goal was 50mL/hr) 2/2 abdominal tightness. Mild hyponatremia noted. Denies N/V/D - reports soft BM. Plan for esophageal stent placement - ? Timeline.   Educated on EN formula most likely to choose, verbalized understanding.     Labs    Labs Reviewed: Yes     Results from last 7 days   Lab Units 04/27/23  0519 04/26/23  0420 04/25/23  0641 04/24/23  0522 04/23/23  0511 04/22/23  0744 04/21/23  0704   GLUCOSE mg/dL 108* 96 123* 105*   < > 116* 118*   BUN mg/dL 14 12 13 13   < > 14 12   CREATININE mg/dL 0.37* 0.28* 0.39* 0.35*   < > 0.40* 0.51*   SODIUM mmol/L 132* 129* 128* 127*   < > 126* 127*   CHLORIDE mmol/L 94* 89* 88* 87*   < > 88* 87*   POTASSIUM mmol/L 3.7 3.7 3.9 4.1   < > 4.2 4.7   PHOSPHORUS mg/dL 3.6 3.2  --   --   --  3.2  --    MAGNESIUM mg/dL  --   --   --  1.8  --  1.6  --    ALT (SGPT) U/L  --   --   --   --   --   --  23    < > = values in this interval not displayed.       Results from last 7 days   Lab Units  "04/27/23  0519 04/26/23  0420 04/21/23  0704   ALBUMIN g/dL 2.1* 2.2* 2.6*       Results from last 7 days   Lab Units 04/27/23  1129 04/27/23  0543 04/27/23  0015 04/26/23  1716 04/26/23  1228 04/26/23  0621   GLUCOSE mg/dL 121 139* 163* 125 121 116     Lab Results   Lab Value Date/Time    HGBA1C 6.10 (H) 03/22/2023 1139    HGBA1C 6.00 (H) 12/19/2022 1638                 Medications    Medications Reviewed: Yes  Pertinent: docusate, levothyroxine, reglan, zofran, protonix, senokot  Infusion:   PRN: Norco      Intake/Ouptut 24 hrs (0701 - 0700)   I&O's Reviewed: Yes       Anthropometrics     Flowsheet Rows    Flowsheet Row First Filed Value   Admission Height 165.1 cm (65\") Documented at 04/11/2023 0532   Admission Weight 66.2 kg (146 lb) Documented at 04/11/2023 0532          Height: Height: 165.1 cm (65\")  Last Filed Weight: Weight: 66.2 kg (146 lb) (04/11/23 0532)  Method: Weight Method: Bed scale  BMI: BMI (Calculated): 24.3  BMI classification: Normal: 18.5-24.9kg/m2  IBW:  125lbs    UBW:      Weight       Weight (kg) Weight (lbs) Weight Method Visit Report   12/19/2022 70.081 kg  154 lb 8 oz  Bed scale      68.04 kg  150 lb  Stated      68.04 kg  150 lb      12/20/2022 --  --      12/21/2022 70.943 kg  156 lb 6.4 oz  Bed scale     12/22/2022 71.578 kg  157 lb 12.8 oz  Bed scale     12/23/2022 71.487 kg  157 lb 9.6 oz  Bed scale     12/24/2022 74.118 kg  163 lb 6.4 oz  Bed scale     12/25/2022 73.936 kg  163 lb  Bed scale     1/5/2023 68.04 kg  150 lb  Stated     3/22/2023 71.895 kg  158 lb 8 oz  Bed scale      56.7 kg  125 lb  Standing scale      56.7 kg  125 lb  Stated     3/25/2023 70.625 kg  155 lb 11.2 oz  Bed scale     3/26/2023 67.722 kg  149 lb 4.8 oz  Bed scale     3/28/2023 67.042 kg  147 lb 12.8 oz  Bed scale     3/29/2023 65.227 kg  143 lb 12.8 oz  Bed scale     3/30/2023 65.454 kg  144 lb 4.8 oz  Bed scale     3/31/2023 66.271 kg  146 lb 1.6 oz  Bed scale     4/1/2023 66.18 kg  145 lb 14.4 oz  Bed " "scale     4/2/2023 66.906 kg  147 lb 8 oz  Bed scale     4/3/2023 65.363 kg  144 lb 1.6 oz  Bed scale     4/4/2023 65.817 kg  145 lb 1.6 oz  Bed scale     4/11/2023 66.225 kg  146 lb  Bed scale       Weight change:   Had a significant weight loss of 12lbs (7.6%) in ~1 month     Nutrition Focused Physical Exam     Date: 4/11     NFPE completed, patient meets criteria for MSA at this time.     Needs Assessment   Date: 4/11    Height used:Height: 165.1 cm (65\")  Weights used: 66kg CBW; 57kg IBW      Estimated Calorie needs: ~ 1525 Kcal/day  Method: 23-28 Kcals/KG = 9475-0720  Method:  MSJ (1085) x1.4 = 1519    Estimated Protein needs: ~ 90 g PRO/day  Method: 1.3-1.5g/Kg = 86-99    Estimated Fluid needs: ~ ml/day   Per clinical status    Current Nutrition Prescription     PO: Diet: Liquid Diets, Fluid Restriction (240 mL/tray) Diets; Full Liquid; 1500 mL/day; Texture: Regular Texture (IDDSI 7); Fluid Consistency: Thin (IDDSI 0)  Oral Nutrition Supplement:         EN: Peptamen AF  Goal Rate:  55mL/hr Water Flushes: 20mL/hr  Modular: None  Route: PEG  Tube: 20 PEG    At goal over: 12Hrs/day    Rx will supply:   Goal Volume 660 mL/day     Flush Volume 240 mL/day     Energy 792 Kcal/day 52 % Est Need   Protein 50 g/day 56 % Est Need   Fiber 0 g/day     Water in   mL     Total Water 775 mL     Meet DRI No        --------------------------------------------------------------------------  Product/Rate verified at bedside: No  Infusing Rate at time of visit: nocturnal feeds     Average Delivery from Charting: Insufficient data ? When pump was previously cleared, current I&Os indicated EN running at goal rate but has not tolerated >35mL/hr.       Nutrition Diagnosis   Date: 4/11 Updated:   Problem Malnutrition acute severe   Etiology Dysphagia & recent abdominal surgery   Signs/Symptoms significant weight loss of 7.6% x1 month & <50% of EEN for >5d.    Status:     Date: 4/11  Updated: 4/12, 4/14, 4/27  Problem Inadequate " enteral nutrition infusion   Etiology Dysphagia   Signs/Symptoms EN not yet restarted, intolerance to previous regimen and not at goal   Status: EN off, PO diet initiated, nocturnal feeds ordered, EN d/c'd per pt    Goal:   General: Nutrition to support treatment, Improve nutrition related labs  PO: Increase intake  EN/PN: d/c    Nutrition Intervention      Follow treatment progress, Care plan reviewed, Nutrition support order placed    Per MD, pt wishing to stop nocturnal feeds. SLP evaluated for ability to advance to pureed diet. Continued recommendation for full liquids. Suspect pt will continue with inadequate energy intake however will d/c EN order as requested per MD.     Monitoring/Evaluation:   Per protocol, I&O, PO intake, Supplement intake, Pertinent labs, Weight, GI status, Symptoms, POC/GOC, Swallow function      Suad Emmanuel MS,RD,LD  Time Spent: 25min

## 2023-04-27 NOTE — PROGRESS NOTES
INFECTIOUS DISEASE Progress Note    Christine Garg  6/8/1933  [id number]    Admission Date: 4/10/2023      Requesting Provider: Noelle Combs DO  Evaluating Physician: Ruy Nuñez MD    Reason for Consultation: Intra-abdominal abscess    History of present illness:    4/26/23: Patient is a 89 y.o. female with a history of COPD, coronary artery disease, peripheral vascular disease, hypertension, and hyperlipidemia who is seen today for evaluation of an intra-abdominal abscess.She was admitted to [facility] on 3/22/23 with a perforated duodenal ulcer requiring emergent surgical intervention with oversewing utilizing an omental patch/Biopatch.  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  She was transferred to [facility] on 4/10 for placement of an esophageal stent.Dr. Brunner placed the stent on 4/12. On 4/12 her white blood cell count was normal at 9.9.By 4/17 her white blood cell count was up to 13.3 and has further risen to 14.9 today. Due to her leukocytosis, she was started on intravenous Rocephin on 4/24.  An abdominal/pelvic CT scan performed yesterday revealed an upper abdominal abscess adjacent to the stomach.  She underwent CT-guided drainage of the abscess today [id number] cc of cloudy fluid filled with particulate matter. She has remained afebrile.    4/27/23: She has remained afebrile.White blood cell count today is 12.  Creatinine is 0.37.  Intra-abdominal abscess cultures are pending.  She complains of some upper abdominal discomfort.  She complains of some increased dyspnea.    Past Medical History:   Diagnosis Date   • Advanced age    • CAD (coronary artery disease) 2011    s/p 3v CABG   • Chronic kidney disease (CKD), stage III (moderate)    • COPD (chronic  obstructive pulmonary disease)    • History of tobacco use    • Hyperlipidemia    • Hypertension    • Hypothyroidism    • PONV (postoperative nausea and vomiting)    • Pulmonary nodule        Past Surgical History:   Procedure Laterality Date   • BREAST BIOPSY Left     benign   • CATARACT EXTRACTION     • CORONARY ANGIOPLASTY     • CORONARY ARTERY BYPASS GRAFT     • ENDOSCOPY W/ PEG TUBE PLACEMENT N/A 4/7/2023    Procedure: ESOPHAGOGASTRODUODENOSCOPY WITH PERCUTANEOUS ENDOSCOPIC GASTROSTOMY TUBE INSERTION;  Surgeon: Aurora Kirkland MD;  Location:  [facility] OR;  Service: General;  Laterality: N/A;   • ENDOSCOPY W/ STENT PLACEMENT/REMOVAL N/A 4/12/2023    Procedure: ESOPHAGOGASTRODUODENOSCOPY WITH STENT PLACEMENT WITH FLUOROSCOPY;  Surgeon: Brunner, Mark I, MD;  Location: [facility] ENDOSCOPY;  Service: Gastroenterology;  Laterality: N/A;   • EXPLORATORY LAPAROTOMY N/A 3/22/2023    Procedure: LAPAROTOMY EXPLORATORY WITH OVER SEW OF DUODENAL ULCER WITH OMENTAL PATCH AND BIOPATCH AND CENTRAL LINE PLACEMENT;  Surgeon: Aurora Kirkland MD;  Location:  [facility] OR;  Service: General;  Laterality: N/A;       Family History   Problem Relation Age of Onset   • Heart disease Mother    • Heart disease Father    • Heart disease Brother    • Breast cancer Neg Hx        Social History     Socioeconomic History   • Marital status:    Tobacco Use   • Smoking status: Former     Types: Cigarettes   • Smokeless tobacco: Never   Vaping Use   • Vaping Use: Never used   Substance and Sexual Activity   • Alcohol use: No   • Drug use: No   • Sexual activity: Defer       Allergies   Allergen Reactions   • Motrin [Ibuprofen] Anaphylaxis and Hives   • Novocain [Procaine] Other (See Comments)     Pt state she passes out.         Medication:    Current Facility-Administered Medications:   •  acetaminophen (TYLENOL) tablet 650 mg, 650 mg, Per PEG Tube, Q4H PRN **OR** acetaminophen (TYLENOL) 160 MG/5ML solution 650 mg, 650 mg, Per PEG Tube, Q4H  PRN, 650 mg at 04/23/23 0958 **OR** acetaminophen (TYLENOL) suppository 650 mg, 650 mg, Rectal, Q4H PRN, Brunner, Mark I, MD  •  aluminum-magnesium hydroxide-simethicone (MAALOX MAX) 400-400-40 MG/5ML suspension 15 mL, 15 mL, Oral, Q6H PRN, Flora Franco DO, 15 mL at 04/25/23 1427  •  aspirin chewable tablet 81 mg, 81 mg, Per PEG Tube, Daily, Brunner, Mark I, MD, 81 mg at 04/26/23 0819  •  [DISCONTINUED] sennosides-docusate (PERICOLACE) 8.6-50 MG per tablet 2 tablet, 2 tablet, Per PEG Tube, BID **AND** polyethylene glycol (MIRALAX) packet 17 g, 17 g, Per PEG Tube, Daily PRN, 17 g at 04/25/23 1023 **AND** [DISCONTINUED] bisacodyl (DULCOLAX) EC tablet 5 mg, 5 mg, Oral, Daily PRN **AND** bisacodyl (DULCOLAX) suppository 10 mg, 10 mg, Rectal, Daily PRN, Brunner, Mark I, MD, 10 mg at 04/25/23 1023  •  carvedilol (COREG) tablet 25 mg, 25 mg, Per PEG Tube, BID With Meals, Aleta Hatch, APRN, 25 mg at 04/26/23 0819  •  cefepime (MAXIPIME) 2 g/100 mL 0.9% NS (mbp), 2 g, Intravenous, Q8H, Ruy Nuñez MD, 2 g at 04/27/23 0035  •  cloNIDine (CATAPRES) tablet 0.1 mg, 0.1 mg, Per PEG Tube, TID PRN, Brunner, Mark I, MD, 0.1 mg at 04/17/23 0413  •  sennosides (SENOKOT) 8.8 MG/5ML syrup 10 mL, 10 mL, Per PEG Tube, BID, 10 mL at 04/26/23 2010 **AND** docusate sodium (COLACE) liquid 100 mg, 100 mg, Per PEG Tube, BID, Brunner, Mark I, MD, 100 mg at 04/26/23 2010  •  Enoxaparin Sodium (LOVENOX) syringe 40 mg, 40 mg, Subcutaneous, Nightly, Catalina Leonardo MD, 40 mg at 04/26/23 2022  •  fluconazole (DIFLUCAN) IVPB 400 mg, 400 mg, Intravenous, Q24H, Ruy Nuñez MD, Last Rate: 100 mL/hr at 04/26/23 1836, 400 mg at 04/26/23 1836  •  gabapentin (NEURONTIN) capsule 100 mg, 100 mg, Per PEG Tube, Q12H, Clare De Anda MD, 100 mg at 04/26/23 2010  •  hydrALAZINE (APRESOLINE) tablet 50 mg, 50 mg, Per PEG Tube, Q8H, Flora Franco,   •  HYDROcodone-acetaminophen (NORCO) 7.5-325 MG per tablet 1 tablet, 1 tablet,  Per PEG Tube, Q4H PRN, Catalina Leonardo MD, 1 tablet at 04/27/23 0323  •  HYDROcodone-acetaminophen (NORCO) 7.5-325 MG per tablet 1 tablet, 1 tablet, Per PEG Tube, Q6H, Aleta Hatch APRN, 1 tablet at 04/27/23 0035  •  hydrOXYzine (ATARAX) tablet 25 mg, 25 mg, Per PEG Tube, TID PRN, María Elena Vazquez APRN, 25 mg at 04/27/23 0322  •  ipratropium-albuterol (DUO-NEB) nebulizer solution 3 mL, 3 mL, Nebulization, Q4H PRN, Clare De Anda MD, 3 mL at 04/25/23 0323  •  lansoprazole (FIRST) oral suspension 30 mg, 30 mg, Per G Tube, BID AC, Thomas Cruz, H, 30 mg at 04/27/23 0534  •  levothyroxine (SYNTHROID, LEVOTHROID) tablet 112 mcg, 112 mcg, Per PEG Tube, Q AM, Brunner, Mark I, MD, 112 mcg at 04/27/23 0534  •  lidocaine (LIDODERM) 5 % 1 patch, 1 patch, Transdermal, Q24H, Clare De Anda MD, 1 patch at 04/26/23 0818  •  lidocaine (LMX) 4 % cream 1 application, 1 application, Topical, Q6H, María Elena Vazquez APRN  •  metoclopramide (REGLAN) solution 10 mg, 10 mg, Per G Tube, 4x Daily AC & at Bedtime, Anaya Jones MD, 10 mg at 04/27/23 0537  •  metroNIDAZOLE (FLAGYL) IVPB 500 mg, 500 mg, Intravenous, Q8H, Ruy Nuñez MD, 500 mg at 04/27/23 0323  •  [DISCONTINUED] ondansetron (ZOFRAN) tablet 4 mg, 4 mg, Oral, Q6H PRN **OR** ondansetron (ZOFRAN) injection 4 mg, 4 mg, Intravenous, Q6H PRN, Brunner, Mark I, MD, 4 mg at 04/23/23 0959  •  promethazine (PHENERGAN) suppository 12.5 mg, 12.5 mg, Rectal, Q4H PRN, Anaya Jones MD  •  sodium chloride 0.9 % flush 10 mL, 10 mL, Intravenous, Q12H, Brunner, Mark I, MD, 10 mL at 04/26/23 0820  •  sodium chloride 0.9 % flush 10 mL, 10 mL, Intravenous, PRN, Brunner, Mark I, MD, 10 mL at 04/19/23 1704  •  sodium chloride 0.9 % infusion 40 mL, 40 mL, Intravenous, PRN, Brunner, Mark I, MD, 40 mL at 04/12/23 0902  •  valsartan (DIOVAN) tablet 160 mg, 160 mg, Per PEG Tube, Q24H, Flora Franco,     Antibiotics:  Anti-Infectives (From admission, onward)     Ordered     Dose/Rate Route Frequency Start Stop    23 1628  cefepime (MAXIPIME) 2 g/100 mL 0.9% NS (mbp)        Ordering Provider: Ruy Nuñez MD    2 g  over 4 Hours Intravenous Every 8 Hours 23 0000 23 2359    23 1628  metroNIDAZOLE (FLAGYL) IVPB 500 mg        Ordering Provider: Ruy Nuñez MD    500 mg  over 30 Minutes Intravenous Every 8 Hours 23 1800 23 1759    23 1628  cefepime (MAXIPIME) 2 g/100 mL 0.9% NS (mbp)        Ordering Provider: Ruy Nuñez MD    2 g  200 mL/hr over 30 Minutes Intravenous Once 23 1700 23 1838    23 1629  fluconazole (DIFLUCAN) IVPB 400 mg        Ordering Provider: Ruy Nuñez MD    400 mg  100 mL/hr over 120 Minutes Intravenous Every 24 Hours 23 1700 23 1659            Review of Systems:  See HPI      Physical Exam:   Vital Signs  Temp (24hrs), Av.8 °F (36 °C), Min:96.4 °F (35.8 °C), Max:97.2 °F (36.2 °C)    Temp  Min: 96.4 °F (35.8 °C)  Max: 97.2 °F (36.2 °C)  BP  Min: 72/35  Max: 122/70  Pulse  Min: 83  Max: 103  Resp  Min: 11  Max: 23  SpO2  Min: 90 %  Max: 100 %    GENERAL: Awake and alert, in no acute distress.   HEENT: Normocephalic, atraumatic.  PERRL. EOMI. No conjunctival injection. No icterus. Oropharynx clear without evidence of thrush or exudate. .  NECK: Supple .  HEART: RRR; No murmur, rubs, gallops.   LUNGS: Clear to auscultation bilaterally without wheezing, rales, rhonchi. Normal respiratory effort. Nonlabored..  ABDOMEN: Left upper quadrant PEG is in place with no exit site erythema or drainage.  She has a left upper quadrant drain in place with minimal active drainage.  EXT:  .1+ lower extremity edema  :  Without Barriga catheter.  MSK: No joint effusions or erythema  SKIN: Warm and dry without cutaneous eruptions on Inspection/palpation.    NEURO: Oriented to PPT.  Motor 5/5 strength  PSYCHIATRIC: Normal insight and judgment. Cooperative with PE    Laboratory  Data    Results from last 7 days   Lab Units 04/27/23  0519 04/26/23  0420 04/25/23  0415   WBC 10*3/mm3 12.00* 14.90* 13.86*   HEMOGLOBIN g/dL 8.3* 8.8* 9.4*   HEMATOCRIT % 26.0* 27.5* 28.6*   PLATELETS 10*3/mm3 326 345 387     Results from last 7 days   Lab Units 04/27/23  0519   SODIUM mmol/L 132*   POTASSIUM mmol/L 3.7   CHLORIDE mmol/L 94*   CO2 mmol/L 28.0   BUN mg/dL 14   CREATININE mg/dL 0.37*   GLUCOSE mg/dL 108*   CALCIUM mg/dL 7.9*     Results from last 7 days   Lab Units 04/21/23  0704   ALK PHOS U/L 107   BILIRUBIN mg/dL <0.2   ALT (SGPT) U/L 23   AST (SGOT) U/L 20                         Estimated Creatinine Clearance: 107.7 mL/min (A) (by C-G formula based on SCr of 0.37 mg/dL (L)).      Microbiology:  No results found for: ACANTHNAEG, AFBCX, BPERTUSSISCX, BLOODCX  No results found for: BCIDPCR, CXREFLEX, CSFCX, CULTURETIS  No results found for: CULTURES, HSVCX, URCX  No results found for: EYECULTURE, GCCX, HSVCULTURE, LABHSV  No results found for: LEGIONELLA, MRSACX, MUMPSCX, MYCOPLASCX  No results found for: NOCARDIACX, STOOLCX  No results found for: THROATCX, UNSTIMCULT, URINECX, CULTURE, VZVCULTUR  No results found for: VIRALCULTU, WOUNDCX        Radiology:  Imaging Results (Last 72 Hours)     Procedure Component Value Units Date/Time    CT Guided Abscess Drain Subdiaphr Subphren [[id number]] Collected: 04/26/23 1207     Updated: 04/26/23 1216    Narrative:      DATE OF EXAM:  4/26/2023 10:55 AM EDT    PROCEDURE:  CT GUIDED ABSCESS DRAIN SUBDIAPHR SUBPHREN    INDICATIONS:  fluid collection, rule out infection    COMPARISON:  No Comparisons Available    FLUOROSCOPIC TIME:  84 seconds of CT fluoroscopy    PHYSICIAN MONITORED CONSCIOUS SEDATION TIME:  20 minutes    TECHNIQUE:   A detailed explanation of the procedure, including the risks, benefits, and alternatives was provided. A preprocedure timeout was performed. The interventional radiology nurse mild the patient all times and provided conscious  sedation. The patient was   placed supine on the bed and a planning CT was performed, redemonstrating the air and fluid collection adjacent to the stomach. An appropriate access site was selected and the area was prepped and draped in the usual sterile fashion. The skin was   anesthetized with 1% lidocaine and a small skin incision was made. Under CT fluoroscopic guidance a 18-gauge needle was advanced into the collection. A guidewire was manipulated centrally and the tract was dilated over the guidewire to accommodate a 10   Swazi drain. However when advancing the drain over the wire, it was not able to be looped within the collection. This access site was abandoned.    The 18-gauge needle was then readvanced into the collection and the tract was serially dilated over a guidewire to accommodate a 10 Swazi drain which was pigtail looped within the collection. A total of 10 cc of cloudy fluid filled with particulate was   aspirated. The catheter was secured to the skin with Prolene. Patient tolerated procedure well without immediate complication. A post scan demonstrated catheter in satisfactory position with near complete resolution of the collection.    FINDINGS:  See above      Impression:        1. Successful CT-guided drainage of fluid collection adjacent to the stomach.      Electronically Signed: Alejandro Roberto    4/26/2023 12:12 PM EDT    Workstation ID: [id number]    CT Abdomen Pelvis With Contrast [[id number]] Collected: 04/25/23 2017     Updated: 04/26/23 0038    Narrative:      CT ABDOMEN PELVIS W CONTRAST    Date of Exam: 4/25/2023 6:23 PM EDT    Indication: further eval for air seen on KUB.    Comparison: KUB 4/25/2023, CT abdomen and pelvis 4/20/2023, 1/6/2023    Technique: Axial CT images were obtained of the abdomen and pelvis following the uneventful intravenous administration of 85 mL Isovue-300. Reconstructed coronal and sagittal images were also obtained. Automated exposure control and iterative    construction methods were used.      Findings:  Redemonstration of percutaneous gastrostomy with the balloon positioned in the stomach lumen. There is a thinly rim-enhancing fluid and air/gas collection within the anterior left mid abdomen anterior to the gastric body and gastric antrum (series 2   image 81); this measures 7.9 x 2.9 x 5.7 cm (TV X AP X CC). This appears slightly enlarged and more conspicuous compared to CT from 4/20/2023. The left-sided margin of this collection is in close proximity to the tubing/tract of the percutaneous   gastrostomy (series 2 image 78). The right-sided margin of this collection partially abuts the inflamed gastric pylorus/first portion of duodenum (series 900 image 17). Apart from air/gas within this collection, no evidence of pneumoperitoneum otherwise.   Superior to the collection is a prominent anteriorly-directed diverticulum arising from the fourth portion of the duodenum or proximal jejunum (series 2 image 66), present on multiple prior exams.   Similar appearance of wall thickened and inflamed gastric pylorus and proximal duodenum compatible with known ulcer disease. There is a small amount of fluid within the stomach, without findings to suggest gastric outlet obstruction. The remainder of the   GI tract appears unchanged, with redemonstration of scattered colonic diverticulosis and scattered unformed stool throughout the colon.    There is a small amount of simple appearing nonorganized pelvic free fluid which is likely reactive. No additional discrete/drainable fluid collections are identified.    Unremarkable appearance of the liver, gallbladder, bile ducts, spleen. Top normal size of the main pancreatic duct, with 0.6 cm round hypodensity in the pancreatic uncinate which appears to communicate with the main pancreatic duct, compatible with   sidebranch IPMN. Normal appearance of the adrenal glands, kidneys, ureters, bladder, uterus, and adnexa. There is diffuse fat  stranding of the soft tissues of the body wall compatible with anasarca.    The lower thorax redemonstrates small to moderate bilateral pleural effusions with associated dependent/passive bibasilar atelectasis, partially imaged subcarinal mass or lymph node, and distal esophageal stent with internal fluid/debris within the stent   lumen.    There is atherosclerosis of the abdominal aorta without evidence of aneurysm, with otherwise grossly unremarkable appearance of the vasculature. No lymphadenopathy. No acute or suspicious bony findings.        Impression:      Impression:  Mild increased size/conspicuity of thinly rim-enhancing organized fluid and air/gas collection within the anterior mid abdomen anterior to the stomach. It is difficult to ascertain if this reflects organized collection or abscess associated with the   percutaneous gastrostomy tube along its left margin, or evolving intra-abdominal abscess from gastric pyloric/proximal duodenal ulcer along its right margin.    Redemonstration of inflammatory thickening and fat stranding of the gastric pylorus and proximal duodenum compatible with known ulcer disease.    Redemonstration of distal esophageal stent.    Redemonstration of fluid overload state with moderate bilateral pleural effusions and anasarca.    Additional chronic and incidental ancillary findings as noted above.    Electronically Signed: Anibal Briseno    4/26/2023 12:35 AM EDT    Workstation ID: [id number]    XR Abdomen KUB [[id number]] Collected: 04/25/23 1429     Updated: 04/25/23 1454    Narrative:      XR ABDOMEN KUB    Date of Exam: 4/25/2023 1:47 PM EDT    Indication: abd pain, eval constipation    Comparison: CT abdomen pelvis 4/20/2023    Findings:  Bilateral pleural effusions with associated bibasilar opacities. Gastrostomy is seen projecting over the left mid abdomen. Central focus of air is seen which likely corresponds to contained duodenal ulcer perforation/diverticulum. Previously  seen small   volume of pneumoperitoneum is not well appreciated on the supine radiographs. Degenerative change of the spine. Degenerative changes of the left greater than right hip. No abnormal abdominal calcifications. Nonobstructive bowel gas pattern. Mild colonic   stool burden.      Impression:      Impression:  Bilateral pleural effusions are again seen.    Central focus of rounded air may correspond to contained duodenal perforation/diverticulum.    Previously seen pneumoperitoneum is not well appreciated on today's supine radiograph.    Mild colonic stool burden.    Findings discussed with Dr. MELLISSA ZAMUDIO by Dr. Bird Mejias via telephone on 4/25/2023 2:49 PM EDT.      Electronically Signed: Bird Mejias    4/25/2023 2:51 PM EDT    Workstation ID: [id number]    XR Chest 1 View [[id number]] Collected: 04/24/23 0839     Updated: 04/24/23 0844    Narrative:      XR CHEST 1 VW    Date of Exam: 4/24/2023 8:10 AM EDT    Indication: crackles    Comparison: April 22, 2023    Findings:  There are postoperative changes from midline sternotomy. The heart is enlarged. There is an esophageal stent in place. There are moderate size bilateral pleural effusions with basilar atelectasis. There is no pneumothorax. There are no new infiltrates.      Impression:      Impression:  Stable chest. Bilateral moderate pleural effusions with basilar atelectasis as before.      Electronically Signed: Tobias Kailogan    4/24/2023 8:41 AM EDT    Workstation ID: [id number]            Impression:   1.  Intra-abdominal abscess-related to her perforated duodenal ulcer versus the G-tube placement.  She underwent CT-guided drainage of the abscess on 4/26.  Cultures are pending.  She is currently on cefepime/metronidazole/fluconazole.  2.  Duodenal ulcer perforation-status post surgery 3/22/23 with omental patch/Biopatch repair  3.  Adenocarcinoma of the lung-recently diagnosed with a pleural biopsy  4.  Dysphagia/external esophageal  compression-status post esophageal stent placement on 4/12/23  5.  Leukocytosis/neutrophilia-secondary to intra-abdominal infection    PLAN/RECOMMENDATIONS:   1.  Intra-abdominal abscess cultures-pending  2.  Continue intravenous cefepime  3.  Continue intravenous metronidazole  4.  Continue intravenous fluconazole       Ruy Nuñez MD  4/27/2023  07:23 EDT

## 2023-04-27 NOTE — CONSULTS
HEMATOLOGY/ONCOLOGY INPATIENT CONSULTATION      REFERRING PHYSICIAN: Flora Franco DO    PRIMARY CARE PROVIDER: Dave Tobar MD    REASON FOR CONSULTATION: Stage IV adenocarcinoma of the lung      HISTORY OF PRESENT ILLNESS: Ms. Garg is a very pleasant 89-year-old lady who was sent up to [place] in 2 consider an esophageal stent due to external compression of the esophagus causing dysphagia.  She was on tube feeding until she stopped her tube feeding today.  She is considering not pursuing any treatments.  I was asked to talk to the patient about her disease and concerns.  She has had a considerable decline over the last several weeks since her diagnosis.    Allergies   Allergen Reactions   • Motrin [Ibuprofen] Anaphylaxis and Hives   • Novocain [Procaine] Other (See Comments)     Pt state she passes out.       Past Medical History:   Diagnosis Date   • Advanced age    • CAD (coronary artery disease) 2011    s/p 3v CABG   • Chronic kidney disease (CKD), stage III (moderate)    • COPD (chronic obstructive pulmonary disease)    • History of tobacco use    • Hyperlipidemia    • Hypertension    • Hypothyroidism    • PONV (postoperative nausea and vomiting)    • Pulmonary nodule          Current Facility-Administered Medications:   •  acetaminophen (TYLENOL) tablet 650 mg, 650 mg, Per PEG Tube, Q4H PRN **OR** acetaminophen (TYLENOL) 160 MG/5ML solution 650 mg, 650 mg, Per PEG Tube, Q4H PRN, 650 mg at 04/23/23 0958 **OR** acetaminophen (TYLENOL) suppository 650 mg, 650 mg, Rectal, Q4H PRN, Brunner, Mark I, MD  •  aluminum-magnesium hydroxide-simethicone (MAALOX MAX) 400-400-40 MG/5ML suspension 15 mL, 15 mL, Oral, Q6H PRN, Flora Franco DO, 15 mL at 04/25/23 1427  •  aspirin chewable tablet 81 mg, 81 mg, Per PEG Tube, Daily, Brunner, Mark I, MD, 81 mg at 04/27/23 0817  •  [DISCONTINUED] sennosides-docusate (PERICOLACE) 8.6-50 MG per tablet 2 tablet, 2 tablet, Per PEG Tube, BID **AND**  polyethylene glycol (MIRALAX) packet 17 g, 17 g, Per PEG Tube, Daily PRN, 17 g at 04/25/23 1023 **AND** [DISCONTINUED] bisacodyl (DULCOLAX) EC tablet 5 mg, 5 mg, Oral, Daily PRN **AND** bisacodyl (DULCOLAX) suppository 10 mg, 10 mg, Rectal, Daily PRN, Brunner, Mark I, MD, 10 mg at 04/25/23 1023  •  bisacodyl (DULCOLAX) suppository 10 mg, 10 mg, Rectal, Once, María Elena Vazquez APRN  •  carvedilol (COREG) tablet 25 mg, 25 mg, Per PEG Tube, BID With Meals, Aleta Hatch, LUCÍA, 25 mg at 04/26/23 0819  •  cefepime (MAXIPIME) 2 g/100 mL 0.9% NS (mbp), 2 g, Intravenous, Q8H, Ruy Nuñez MD, 2 g at 04/27/23 0826  •  cloNIDine (CATAPRES) tablet 0.1 mg, 0.1 mg, Per PEG Tube, TID PRN, Brunner, Mark I, MD, 0.1 mg at 04/17/23 0413  •  sennosides (SENOKOT) 8.8 MG/5ML syrup 10 mL, 10 mL, Per PEG Tube, BID, 10 mL at 04/27/23 0820 **AND** docusate sodium (COLACE) liquid 100 mg, 100 mg, Per PEG Tube, BID, Brunner, Mark I, MD, 100 mg at 04/27/23 0817  •  Enoxaparin Sodium (LOVENOX) syringe 40 mg, 40 mg, Subcutaneous, Nightly, Catalina Leonardo MD, 40 mg at 04/26/23 2022  •  fluconazole (DIFLUCAN) IVPB 400 mg, 400 mg, Intravenous, Q24H, Ruy Nuñez MD, Last Rate: 100 mL/hr at 04/26/23 1836, 400 mg at 04/26/23 1836  •  gabapentin (NEURONTIN) capsule 100 mg, 100 mg, Per PEG Tube, Q12H, Clare De Anda MD, 100 mg at 04/27/23 0818  •  HYDROcodone-acetaminophen (NORCO) 7.5-325 MG per tablet 1 tablet, 1 tablet, Per PEG Tube, Q4H PRN, Catalina Leonardo MD, 1 tablet at 04/27/23 0823  •  HYDROcodone-acetaminophen (NORCO) 7.5-325 MG per tablet 1 tablet, 1 tablet, Per PEG Tube, Q6H, Aleta Hatch APRN, 1 tablet at 04/27/23 1203  •  hydrOXYzine (ATARAX) tablet 25 mg, 25 mg, Per PEG Tube, TID PRN, María Elena Vazquez APRN, 25 mg at 04/27/23 1203  •  ipratropium-albuterol (DUO-NEB) nebulizer solution 3 mL, 3 mL, Nebulization, Q4H PRN, Clare De Anda MD, 3 mL at 04/27/23 1608  •  lansoprazole (FIRST) oral suspension 30 mg, 30 mg, Per G Tube,  BID AC, hTomas Cruz, RPH, 30 mg at 04/27/23 0534  •  levothyroxine (SYNTHROID, LEVOTHROID) tablet 112 mcg, 112 mcg, Per PEG Tube, Q AM, Brunner, Mark I, MD, 112 mcg at 04/27/23 0534  •  lidocaine (LIDODERM) 5 % 1 patch, 1 patch, Transdermal, Q24H, Clare De Anda MD, 1 patch at 04/27/23 0824  •  lidocaine (LMX) 4 % cream 1 application, 1 application, Topical, Q6H, María Elena Vazquez APRN, 1 application at 04/27/23 0827  •  metoclopramide (REGLAN) solution 5 mg, 5 mg, Per G Tube, TID Salvador JONES Meghan Carroll, , 5 mg at 04/27/23 1203  •  metroNIDAZOLE (FLAGYL) IVPB 500 mg, 500 mg, Intravenous, Q8H, Ruy Nuñez MD, 500 mg at 04/27/23 1006  •  [DISCONTINUED] ondansetron (ZOFRAN) tablet 4 mg, 4 mg, Oral, Q6H PRN **OR** ondansetron (ZOFRAN) injection 4 mg, 4 mg, Intravenous, Q6H PRN, Brunner, Mark I, MD, 4 mg at 04/23/23 0959  •  promethazine (PHENERGAN) suppository 12.5 mg, 12.5 mg, Rectal, Q4H PRN, Anaya Jones MD  •  sodium chloride 0.9 % flush 10 mL, 10 mL, Intravenous, Q12H, Brunner, Mark I, MD, 10 mL at 04/27/23 0826  •  sodium chloride 0.9 % flush 10 mL, 10 mL, Intravenous, PRN, Brunner, Mark I, MD, 10 mL at 04/19/23 1704  •  sodium chloride 0.9 % infusion 40 mL, 40 mL, Intravenous, PRN, Brunner, Mark I, MD, 40 mL at 04/12/23 0902    Past Surgical History:   Procedure Laterality Date   • BREAST BIOPSY Left     benign   • CATARACT EXTRACTION     • CORONARY ANGIOPLASTY     • CORONARY ARTERY BYPASS GRAFT     • ENDOSCOPY W/ PEG TUBE PLACEMENT N/A 4/7/2023    Procedure: ESOPHAGOGASTRODUODENOSCOPY WITH PERCUTANEOUS ENDOSCOPIC GASTROSTOMY TUBE INSERTION;  Surgeon: Aurora Kirkland MD;  Location: [facility] OR;  Service: General;  Laterality: N/A;   • ENDOSCOPY W/ STENT PLACEMENT/REMOVAL N/A 4/12/2023    Procedure: ESOPHAGOGASTRODUODENOSCOPY WITH STENT PLACEMENT WITH FLUOROSCOPY;  Surgeon: Brunner, Mark I, MD;  Location:  [facility];  Service: Gastroenterology;  Laterality: N/A;   • EXPLORATORY  LAPAROTOMY N/A 3/22/2023    Procedure: LAPAROTOMY EXPLORATORY WITH OVER SEW OF DUODENAL ULCER WITH OMENTAL PATCH AND BIOPATCH AND CENTRAL LINE PLACEMENT;  Surgeon: Aurora Kirkland MD;  Location: [facility];  Service: General;  Laterality: N/A;       Social History     Socioeconomic History   • Marital status:    Tobacco Use   • Smoking status: Former     Types: Cigarettes   • Smokeless tobacco: Never   Vaping Use   • Vaping Use: Never used   Substance and Sexual Activity   • Alcohol use: No   • Drug use: No   • Sexual activity: Defer       Family History   Problem Relation Age of Onset   • Heart disease Mother    • Heart disease Father    • Heart disease Brother    • Breast cancer Neg Hx        Oncology/Hematology History    No history exists.         REVIEW OF SYSTEMS:  A 14 point review of systems was performed and is negative except as noted below.    Review of Systems   Constitutional: Positive for appetite change, fatigue and unexpected weight change.   HENT:   Positive for trouble swallowing.    Eyes: Negative.    Respiratory: Positive for cough and shortness of breath.    Cardiovascular: Negative.    Gastrointestinal: Negative.    Endocrine: Negative.    Genitourinary: Negative.     Musculoskeletal: Negative.    Skin: Negative.    Neurological: Negative.    Hematological: Negative.    Psychiatric/Behavioral: Negative.          Objective     Vitals:    04/27/23 1200 04/27/23 1400 04/27/23 1510 04/27/23 1608   BP:   103/61    BP Location:   Left arm    Patient Position:   Lying    Pulse: 75 84 88    Resp:   16 18   Temp:   96.8 °F (36 °C)    TempSrc:   Axillary    SpO2: 97% 97%     Weight:       Height:                       Temp:  [96.4 °F (35.8 °C)-97.2 °F (36.2 °C)] 96.8 °F (36 °C)     Performance Status: 3    Physical Exam    General: Elderly frail appearing female in no acute distress  HEENT: sclerae anicteric,   Lymphatics: no cervical, supraclavicular, or axillary adenopathy  Cardiovascular:  regular rate and rhythm, no murmurs  Lungs: clear to auscultation bilaterally  Abdomen: soft, nontender, nondistended.  No palpable organomegaly  Extremities: + lower extremity edema  Skin: no rashes, lesions, bruising, or petechiae  Muscle. Profoundly weak.    LABS:    Lab Results   Component Value Date    HGB 8.3 (L) 04/27/2023    HCT 26.0 (L) 04/27/2023    MCV 96.3 04/27/2023     04/27/2023    WBC 12.00 (H) 04/27/2023    NEUTROABS 9.78 (H) 04/27/2023    LYMPHSABS 1.13 04/27/2023    MONOSABS 0.64 04/27/2023    EOSABS 0.31 04/27/2023    BASOSABS 0.06 04/27/2023     Lab Results   Component Value Date    GLUCOSE 108 (H) 04/27/2023    BUN 14 04/27/2023    CREATININE 0.37 (L) 04/27/2023     (L) 04/27/2023    K 3.7 04/27/2023    CL 94 (L) 04/27/2023    CO2 28.0 04/27/2023    CALCIUM 7.9 (L) 04/27/2023    PROTEINTOT 4.7 (L) 04/21/2023    ALBUMIN 2.1 (L) 04/27/2023    BILITOT <0.2 04/21/2023    ALKPHOS 107 04/21/2023    AST 20 04/21/2023    ALT 23 04/21/2023         IMAGING    CT Abdomen Pelvis Without Contrast    Result Date: 4/20/2023  CT ABDOMEN PELVIS WO CONTRAST Date of Exam: 4/20/2023 6:35 PM EDT Indication: Abdominal discomfort. Comparison: 4/18/2023, 4/10/2023 Technique: Axial CT images were obtained of the abdomen and pelvis without the administration of contrast. Reconstructed coronal and sagittal images were also obtained. Automated exposure control and iterative construction methods were used. Findings: Lung Bases:   At least moderate-sized bilateral pleural effusions are present. Bibasilar atelectatic changes are present. The heart appears enlarged. Limited evaluation of the solid organs due to lack of intravenous contrast. There is diffuse anasarca of the soft tissues. Liver: Liver is normal in size and CT density. No focal lesions. Biliary/Gallbladder:  The gallbladder is normal without evidence of radiopaque stones. The biliary tree is nondilated. Spleen: Spleen is normal in size and CT  density. Pancreas:  Pancreas is normal. There is no evidence of pancreatic mass or peripancreatic fluid. Kidneys:  Kidneys are normal in size. There are no stones or hydronephrosis. Adrenals:  Adrenal glands are unremarkable. Retroperitoneal/Lymph Nodes/Vasculature:  No retroperitoneal adenopathy is identified. Atherosclerotic calcifications are present. IVC filter present. Gastrointestinal/Mesentery:  The bowel loops are non-dilated without wall thickening or mass. Mild stranding is seen along the pylorus of the stomach within the right upper quadrant with questionable mild wall thickening (series 2 image 56). Trace amount of free fluid present within  the abdomen and pelvis. Gastrostomy tube present within the stomach. Large amount of stool present within the rectum. Moderate stool burden present proximally. The appendix is not well-visualized. No secondary findings of appendicitis.. There is diverticulosis of the sigmoid colon. No significant inflammatory changes identified. No evidence of obstruction. No free air. No mesenteric fluid collections identified. Bladder:  The bladder is normal. Genital:   Unremarkable       Bony Structures:   Visualized bony structures are consistent with the patient's age.     Impression: 1. Questional mild wall thickening of the pylorus with stranding within the adjacent mesenteric fat which may be related to gastritis. Peptic ulcer disease cannot be excluded. A component of these findings may be related to trace amount of free fluid present throughout the abdomen and pelvis likely related to volume overload given presence of anasarca of the soft tissues and at least moderate bilateral size pleural effusions. Recommend clinical correlation. 2. Large amount of stool present within the rectum with moderate stool burden present proximally likely related to constipation/obstipation. 3. Ancillary findings as described above. Electronically Signed: Maddison Pelletier  4/20/2023 6:56 PM EDT   Workstation ID: [id number]    CT Abdomen Pelvis Without Contrast    Result Date: 4/10/2023   EXAM DATE:   4/10/2023 11:29 AM  CLINICAL HISTORY:   abdominal pain; K25.1-Acute gastric ulcer with perforation; E87.9-Fczd-nsiygvmlgd and hyponatremia; E87.6-Hypokalemia; A41.9-Sepsis, unspecified organism; K27.5-Chronic or unspecified peptic ulcer, site unspecified, with perforation; E44.0-Moderate protein-calorie malnutrition  TECHNIQUE:   Axial computed tomography images of the abdomen and pelvis without intravenous contrast.  Sagittal and coronal reformatted images were created and reviewed.  This CT exam was performed using one or more of the following dose reduction techniques:  automated exposure control, adjustment of the mA and/or kV according to patient size, and/or use of iterative reconstruction technique.  COMPARISON: 04/04/2023  FINDINGS:   LUNG BASES:  Unremarkable.  No mass.  No consolidation.   PLEURAL SPACE:  Small bilateral pleural effusions.   ABDOMEN:   LIVER:  Unremarkable.   GALLBLADDER AND BILE DUCTS:  Unremarkable.  No calcified stones.  No ductal dilation.   PANCREAS:  Unremarkable.  No ductal dilation.   SPLEEN:  Unremarkable.  No splenomegaly.   ADRENALS:  Unremarkable.  No mass.   KIDNEYS AND URETERS:  Unremarkable.  No obstructing stones.  No hydronephrosis.   STOMACH AND BOWEL:  Contrast noted throughout the colon.  No obstruction.  No mucosal thickening.   PELVIS:   APPENDIX:  No findings to suggest acute appendicitis.   BLADDER:  Unremarkable.  No stones.   REPRODUCTIVE:  Unremarkable as visualized.   ABDOMEN and PELVIS:   INTRAPERITONEAL SPACE:  See below.    BONES/JOINTS:  No acute fracture.  No dislocation.   SOFT TISSUES:  Unremarkable.   VASCULATURE:  Atherosclerotic disease.  No abdominal aortic aneurysm.   LYMPH NODES:  Unremarkable.  No enlarged lymph nodes.   TUBES, LINES AND DEVICES:  Gastric tube noted in the stomach. This has been recently placed and small to moderate  intraperitoneal free air persists.        1.  Small bilateral pleural effusions. 2.  Gastric tube noted in the stomach. This has been recently placed and small to moderate intraperitoneal free air persists.   This report was finalized on 4/10/2023 12:06 PM by Dr. Henry Lozano MD.      CT Cervical Spine Without Contrast    Result Date: 3/29/2023  CT CERVICAL SPINE WO CONTRAST-  CLINICAL INDICATION: Worsening neck and back pain; K25.1-Acute gastric ulcer with perforation; E87.2-Lswv-zvcmylevmq and hyponatremia; E87.6-Hypokalemia; A41.9-Sepsis, unspecified organism; K27.5-Chronic or unspecified peptic ulcer, site unspecified, with perforation   COMPARISON: None available  TECHNIQUE: Axial images of the cervical spine were acquired with out any intravenous contrast. Reformatted images were then created in the sagittal and coronal planes.  DOSE:   Radiation dose reduction techniques were utilized per ALARA protocol. Automated exposure control was initiated through either or [employer] or [employer] software packages by  protocol.     FINDINGS: The provided study demonstrates preservation of the vertebral body heights in the sagittally reconstructed images.  Parenchymal nodules in the apices of both lungs.  Alignment is near anatomic.  Diffuse disc space narrowing and multiple spurs  I see no acute cervical spine fracture.       1. Arthritic change  2. No acute cervical abnormality.  3. Grade 1 anterolisthesis of C7 on T1  4. Parenchymal nodules in the lung apices bilaterally  This report was finalized on 3/29/2023 3:29 PM by Dr. Danny Nguyen MD.      CT Thoracic Spine Without Contrast    Result Date: 3/29/2023  EXAM:   CT Thoracic Spine Without Intravenous Contrast  EXAM DATE:   3/29/2023 2:38 PM  CLINICAL HISTORY:   Worsening neck and back pain; K25.1-Acute gastric ulcer with perforation; E87.1-Ajhf-mmbhzoptdc and hyponatremia; E87.6-Hypokalemia; A41.9-Sepsis, unspecified organism; K27.5-Chronic or  unspecified peptic ulcer, site unspecified, with perforation  TECHNIQUE:   Axial computed tomography images of the thoracic spine without intravenous contrast.  Sagittal and coronal reformatted images were created and reviewed.  This CT exam was performed using one or more of the following dose reduction techniques:  automated exposure control, adjustment of the mA and/or kV according to patient size, and/or use of iterative reconstruction technique.  COMPARISON:   03/10/2023  FINDINGS:   VERTEBRAE:  See below.    DISCS/SPINAL CANAL/NEURAL FORAMINA:  Again noted is abnormal low attenuation mass on the left at the T5-T6 level almost certainly compromising the exiting nerve root. There is some erosion of the adjacent vertebral body.  No spinal canal stenosis.   SOFT TISSUES:  Unremarkable.   LUNGS AND PLEURAL SPACES:  Interval development of small bibasilar pleural effusions and bibasilar consolidation.   OTHER FINDINGS:  No acute compression abnormality.      1.  Again noted is abnormal low attenuation mass on the left at the T5-T6 level almost certainly compromising the exiting nerve root. There is some erosion of the adjacent vertebral body. 2.  Interval development of small bibasilar pleural effusions and bibasilar consolidation. 3.  No acute compression abnormality.  This report was finalized on 3/29/2023 4:56 PM by Dr. Danny Nguyen MD.      CT Abdomen Pelvis With Contrast    Result Date: 4/26/2023  CT ABDOMEN PELVIS W CONTRAST Date of Exam: 4/25/2023 6:23 PM EDT Indication: further eval for air seen on KUB. Comparison: KUB 4/25/2023, CT abdomen and pelvis 4/20/2023, 1/6/2023 Technique: Axial CT images were obtained of the abdomen and pelvis following the uneventful intravenous administration of 85 mL Isovue-300. Reconstructed coronal and sagittal images were also obtained. Automated exposure control and iterative construction methods were used. Findings: Redemonstration of percutaneous gastrostomy with the  balloon positioned in the stomach lumen. There is a thinly rim-enhancing fluid and air/gas collection within the anterior left mid abdomen anterior to the gastric body and gastric antrum (series 2 image 81); this measures 7.9 x 2.9 x 5.7 cm (TV X AP X CC). This appears slightly enlarged and more conspicuous compared to CT from 4/20/2023. The left-sided margin of this collection is in close proximity to the tubing/tract of the percutaneous gastrostomy (series 2 image 78). The right-sided margin of this collection partially abuts the inflamed gastric pylorus/first portion of duodenum (series 900 image 17). Apart from air/gas within this collection, no evidence of pneumoperitoneum otherwise.  Superior to the collection is a prominent anteriorly-directed diverticulum arising from the fourth portion of the duodenum or proximal jejunum (series 2 image 66), present on multiple prior exams. Similar appearance of wall thickened and inflamed gastric pylorus and proximal duodenum compatible with known ulcer disease. There is a small amount of fluid within the stomach, without findings to suggest gastric outlet obstruction. The remainder of the  GI tract appears unchanged, with redemonstration of scattered colonic diverticulosis and scattered unformed stool throughout the colon. There is a small amount of simple appearing nonorganized pelvic free fluid which is likely reactive. No additional discrete/drainable fluid collections are identified. Unremarkable appearance of the liver, gallbladder, bile ducts, spleen. Top normal size of the main pancreatic duct, with 0.6 cm round hypodensity in the pancreatic uncinate which appears to communicate with the main pancreatic duct, compatible with sidebranch IPMN. Normal appearance of the adrenal glands, kidneys, ureters, bladder, uterus, and adnexa. There is diffuse fat stranding of the soft tissues of the body wall compatible with anasarca. The lower thorax redemonstrates small to  moderate bilateral pleural effusions with associated dependent/passive bibasilar atelectasis, partially imaged subcarinal mass or lymph node, and distal esophageal stent with internal fluid/debris within the stent  lumen. There is atherosclerosis of the abdominal aorta without evidence of aneurysm, with otherwise grossly unremarkable appearance of the vasculature. No lymphadenopathy. No acute or suspicious bony findings.     Impression: Mild increased size/conspicuity of thinly rim-enhancing organized fluid and air/gas collection within the anterior mid abdomen anterior to the stomach. It is difficult to ascertain if this reflects organized collection or abscess associated with the percutaneous gastrostomy tube along its left margin, or evolving intra-abdominal abscess from gastric pyloric/proximal duodenal ulcer along its right margin. Redemonstration of inflammatory thickening and fat stranding of the gastric pylorus and proximal duodenum compatible with known ulcer disease. Redemonstration of distal esophageal stent. Redemonstration of fluid overload state with moderate bilateral pleural effusions and anasarca. Additional chronic and incidental ancillary findings as noted above. Electronically Signed: Anibal Briseno  4/26/2023 12:35 AM EDT  Workstation ID: [id number]      Impression: Esophageal stent projects unchanged, accounting for patient rotation. There are new bilateral increased opacities present at the lung bases, with the appearance of volume loss or airspace disease and small to moderate pleural effusions. Apparent mediastinal widening is likely related to patient rotation Electronically Signed: Sae Gudino  4/22/2023 6:56 PM EDT  Workstation ID: [id number]    FL Upper GI Single Contrast SBFT    Result Date: 4/3/2023  EXAM:   [place] Upper Gastrointestinal Tract With Small Bowel Follow Through  EXAM DATE:   4/3/2023 8:17 AM  CLINICAL HISTORY:   nausea and vomiting after surgery for perforated ulcer;  K25.1-Acute gastric ulcer with perforation; E87.3-Xgtt-vnnfkrpoyp and hyponatremia; E87.6-Hypokalemia; A41.9-Sepsis, unspecified organism; K27.5-Chronic or unspecified peptic ulcer, site unspecified, with perforation  TECHNIQUE:   Fluoroscopy of the upper gastrointestinal tract with oral contrast and with or without KUB followed by serial images of the small bowel. Fluoroscopic guidance was provided by a physician. Fluoroscopy exposure time of approximately 1 minute or less.  COMPARISON:   12/24/2022  FINDINGS:   Esophagus:  Fixed area of esophageal narrowing is noted involving the mid thoracic esophagus just distal to the bala and has features that would suggest extrinsic mass effect.  Esophageal tertiary contractions noted with mild dysmotility.  No reflux.   Stomach:  Unremarkable.  No mass.  Normal mucosa.   Duodenum:  Unremarkable.  No mass.  Normal mucosa.   Small bowel:  Unremarkable.  No mass or abnormal filling defect.   Colon:  Small bowel is unremarkable with normal small bowel transit. Contrast reaches the colon within 1.5 hours.   Tubes, lines and devices:  Markedly delayed gastric emptying is noted with only small amounts of contrast passing across the pylorus. Limited assessment of the gastroduodenal junction due to cardiac leads and surgical staples but no definite contrast leak identified.   Other findings:  Small hiatal hernia.  No bowel obstruction.      1.  Fixed area of esophageal narrowing is noted involving the mid thoracic esophagus just distal to the bala and has features that would suggest extrinsic mass effect. Correlate with cross-sectional imaging. 2.  Esophageal tertiary contractions noted with mild dysmotility. 3.  Small hiatal hernia. 4.  Markedly delayed gastric emptying is noted with only small amounts of contrast passing across the pylorus. Limited assessment of the gastroduodenal junction due to cardiac leads and surgical staples but no definite contrast leak identified. 5.   Small bowel is unremarkable with normal small bowel transit. Contrast reaches the colon within 1.5 hours. 6.  No bowel obstruction.  This report was finalized on 4/3/2023 11:08 AM by Dr. Tobias Stoll MD.          CT Needle Biopsy Pleura    Result Date: 4/4/2023  EXAMINATION: CT NEEDLE BIOPSY PLEURA-  CLINICAL INDICATION:pleural based mass; K25.1-Acute gastric ulcer with perforation; E87.7-Kikd-goznvybjus and hyponatremia; E87.6-Hypokalemia; A41.9-Sepsis, unspecified organism; K27.5-Chronic or unspecified peptic ulcer, site unspecified, with perforation  COMPARISON: CT 03/29/2023  TECHNIQUE/FINDINGS: Informed consent was obtained regarding risks associated with the procedure including infection, bleeding, and injury to adjacent structures. Preprocedure CT imaging redemonstrates pleural-based mass that is essentially paraspinal in location involving the left lower lobe at the T6 level that is targeted for CT-guided biopsy. The posterior chest was prepped and draped in sterile fashion. 1% lidocaine was used to achieve local anesthesia. Under CT fluoroscopy guidance, 3 18-gauge core needle samples were obtained using a 10 cm 18-gauge core needle biopsy system. Specimens were cemented in formalin for eventual histopathologic analysis. No complication encountered during or immediately following the procedure. The biopsy site was cleansed and bandaged.      CT-guided biopsy of a left lower lobe region pleural-based mass.  This report was finalized on 4/4/2023 1:31 PM by Dr. Tobias Stoll MD.          ASSESSMENT/PLAN:    1.  Stage IV adenocarcinoma of the lung.  I had a discussion with Ms. Garg today.  Family was not around.  She is fairly clear that she is not interested in treatment at this time and would like few days to mahamed it over.  I think it is reasonable for her to take some time to think about whether she truly wants to pursue any treatment.  She would be a candidate for immunotherapy if she would like to  pursue it.  Though her performance status has declined significantly and I am not sure how much she would benefit from it.  Unfortunately over the last 3 weeks her performance status has declined significantly and her benefit is much lower at this point.  Immunotherapy does take time for it to be effective and if she is progressing fairly rapidly then she would not benefit from that treatment at this time.  She is definitely not a candidate for chemotherapy with her poor performance status.        Humble Montez MD    4/27/2023

## 2023-04-27 NOTE — PLAN OF CARE
Goal Outcome Evaluation:  Plan of Care Reviewed With: RN and MD            Pt does not wish for MBS + esophageal screen. Per discussion with MD, no further SLP needs at this time. Diet as per pt/MD. Currently on full liquid diet, which includes puddings. Should not feeling significant difference if wishes for puree trays, though suggest liquid wash/sips to help clear if any discomfort/sticking.

## 2023-04-27 NOTE — PLAN OF CARE
Goal Outcome Evaluation:  Plan of Care Reviewed With: (P) patient      SLP evaluation completed. Will continue to address dysphagia w/ MBS. Please see note for further details and recommendations.

## 2023-04-27 NOTE — PLAN OF CARE
Goal Outcome Evaluation:  Plan of Care Reviewed With: patient        Progress: declining  Outcome Evaluation: Pt. with complaints of pain and SOA declining all therapy minus in bed pumonary TE.  Pt. educated on importance of activity.  Pt. remains appropriate for skilled OT services to address deficit areas and promote return to PLOF.

## 2023-04-27 NOTE — PROGRESS NOTES
"Palliative Care Daily Progress Note     C/C: Patient reports feeling, \"terrible\", complaining of SOA.    S: Medical record reviewed. Follow up visit for medication effectiveness and GOC in the context of complex medical decision making. Events noted. Patient with low BP, started on IV fluids overnight. Patient with abscess drained yesterday by IR. Patient reports her abodminal pain is improved with abscess drainage. She is experiencing back pain which is relieved with the Hydrocodone. She received three prn Hydrocodone 7.5-325mg PO doses. Has scheduled Hydrocodone that was held due to hypotension. Patient did receive Atarax 25mg x2 doses for anxiety.    ROS: +pain, left shoulder blade that radiates down her back on left>right side, constant dull ache, improves with Lidocaine, pain currently 5/10, at best 2/10. +SOB, worse with exertion, improves with Hydrocodone, limits her activity. +anxiety, due to SOB, pain, and circumstances. +nausea, improved with medications. +decreased appetite, TF at night, poor appetite during day due to nausea and taste changes. +debility.  Denies vomiting, HA, chest pain.     O: Code Status:   Code Status and Medical Interventions:   Ordered at: 04/25/23 0851     Medical Intervention Limits:    NO intubation (DNI)     Level Of Support Discussed With:    Health Care Surrogate     Code Status (Patient has no pulse and is not breathing):    No CPR (Do Not Attempt to Resuscitate)     Medical Interventions (Patient has pulse or is breathing):    Limited Support      Advanced Directives: Advance Directive Status: Patient has advance directive, copy in chart   Goals of Care: Ongoing.   Palliative Performance Scale Score: 40%     /71   Pulse 95   Temp 96.4 °F (35.8 °C) (Axillary)   Resp 16   Ht 165.1 cm (65\")   Wt 66.2 kg (146 lb)   SpO2 98%   BMI 24.30 kg/m²     Intake/Output Summary (Last 24 hours) at 4/27/2023 1029  Last data filed at 4/27/2023 1006  Gross per 24 hour   Intake " 2008 ml   Output 20 ml   Net 1988 ml       PE:  General Appearance:   Patient laying in bed, awake, alert, chronically ill appearing, cooperative, NAD   HEENT:    NC/AT, EOMI, anicteric, MMM, face relaxed, NC in place   Neck:   supple, trachea midline, no JVD   Lungs:     crackles all lung fields, diminished in bases; respirations regular, even and unlabored; RR 18-20 on exam, 1LNC    Heart:    RRR, normal S1 and S2, no M/R/G   Abdomen:     Hypoactive bowel sounds, soft, nontender, distended   G/U:   Deferred   MSK/Extremities:    no edema   Pulses:   Pulses palpable and equal bilaterally   Skin:   Warm, dry   Neurologic:   A/Ox3, cooperative, ELIZONDO   Psych:   Calm, appropriate       Meds: Reviewed and changes noted    Labs:   Results from last 7 days   Lab Units 04/27/23  0519   WBC 10*3/mm3 12.00*   HEMOGLOBIN g/dL 8.3*   HEMATOCRIT % 26.0*   PLATELETS 10*3/mm3 326     Results from last 7 days   Lab Units 04/27/23  0519   SODIUM mmol/L 132*   POTASSIUM mmol/L 3.7   CHLORIDE mmol/L 94*   CO2 mmol/L 28.0   BUN mg/dL 14   CREATININE mg/dL 0.37*   GLUCOSE mg/dL 108*   CALCIUM mg/dL 7.9*     Results from last 7 days   Lab Units 04/27/23  0519 04/22/23  0744 04/21/23  0704   SODIUM mmol/L 132*   < > 127*   POTASSIUM mmol/L 3.7   < > 4.7   CHLORIDE mmol/L 94*   < > 87*   CO2 mmol/L 28.0   < > 32.0*   BUN mg/dL 14   < > 12   CREATININE mg/dL 0.37*   < > 0.51*   CALCIUM mg/dL 7.9*   < > 8.6   BILIRUBIN mg/dL  --   --  <0.2   ALK PHOS U/L  --   --  107   ALT (SGPT) U/L  --   --  23   AST (SGOT) U/L  --   --  20   GLUCOSE mg/dL 108*   < > 118*    < > = values in this interval not displayed.     Imaging Results (Last 72 Hours)     Procedure Component Value Units Date/Time    CT Guided Abscess Drain Subdiaphr Subphren [[id number]] Collected: 04/26/23 1207     Updated: 04/26/23 1216    Narrative:      DATE OF EXAM:  4/26/2023 10:55 AM EDT    PROCEDURE:  CT GUIDED ABSCESS DRAIN SUBDIAPHR SUBPHREN    INDICATIONS:  fluid collection,  rule out infection    COMPARISON:  No Comparisons Available    FLUOROSCOPIC TIME:  84 seconds of CT fluoroscopy    PHYSICIAN MONITORED CONSCIOUS SEDATION TIME:  20 minutes    TECHNIQUE:   A detailed explanation of the procedure, including the risks, benefits, and alternatives was provided. A preprocedure timeout was performed. The interventional radiology nurse mild the patient all times and provided conscious sedation. The patient was   placed supine on the bed and a planning CT was performed, redemonstrating the air and fluid collection adjacent to the stomach. An appropriate access site was selected and the area was prepped and draped in the usual sterile fashion. The skin was   anesthetized with 1% lidocaine and a small skin incision was made. Under CT fluoroscopic guidance a 18-gauge needle was advanced into the collection. A guidewire was manipulated centrally and the tract was dilated over the guidewire to accommodate a 10   English drain. However when advancing the drain over the wire, it was not able to be looped within the collection. This access site was abandoned.    The 18-gauge needle was then readvanced into the collection and the tract was serially dilated over a guidewire to accommodate a 10 English drain which was pigtail looped within the collection. A total of 10 cc of cloudy fluid filled with particulate was   aspirated. The catheter was secured to the skin with Prolene. Patient tolerated procedure well without immediate complication. A post scan demonstrated catheter in satisfactory position with near complete resolution of the collection.    FINDINGS:  See above      Impression:        1. Successful CT-guided drainage of fluid collection adjacent to the stomach.      Electronically Signed: Alejandro Roberto    4/26/2023 12:12 PM EDT    Workstation ID: [id number]    CT Abdomen Pelvis With Contrast [[id number]] Collected: 04/25/23 2017     Updated: 04/26/23 0038    Narrative:      CT ABDOMEN PELVIS W  CONTRAST    Date of Exam: 4/25/2023 6:23 PM EDT    Indication: further eval for air seen on KUB.    Comparison: KUB 4/25/2023, CT abdomen and pelvis 4/20/2023, 1/6/2023    Technique: Axial CT images were obtained of the abdomen and pelvis following the uneventful intravenous administration of 85 mL Isovue-300. Reconstructed coronal and sagittal images were also obtained. Automated exposure control and iterative   construction methods were used.      Findings:  Redemonstration of percutaneous gastrostomy with the balloon positioned in the stomach lumen. There is a thinly rim-enhancing fluid and air/gas collection within the anterior left mid abdomen anterior to the gastric body and gastric antrum (series 2   image 81); this measures 7.9 x 2.9 x 5.7 cm (TV X AP X CC). This appears slightly enlarged and more conspicuous compared to CT from 4/20/2023. The left-sided margin of this collection is in close proximity to the tubing/tract of the percutaneous   gastrostomy (series 2 image 78). The right-sided margin of this collection partially abuts the inflamed gastric pylorus/first portion of duodenum (series 900 image 17). Apart from air/gas within this collection, no evidence of pneumoperitoneum otherwise.   Superior to the collection is a prominent anteriorly-directed diverticulum arising from the fourth portion of the duodenum or proximal jejunum (series 2 image 66), present on multiple prior exams.   Similar appearance of wall thickened and inflamed gastric pylorus and proximal duodenum compatible with known ulcer disease. There is a small amount of fluid within the stomach, without findings to suggest gastric outlet obstruction. The remainder of the   GI tract appears unchanged, with redemonstration of scattered colonic diverticulosis and scattered unformed stool throughout the colon.    There is a small amount of simple appearing nonorganized pelvic free fluid which is likely reactive. No additional  discrete/drainable fluid collections are identified.    Unremarkable appearance of the liver, gallbladder, bile ducts, spleen. Top normal size of the main pancreatic duct, with 0.6 cm round hypodensity in the pancreatic uncinate which appears to communicate with the main pancreatic duct, compatible with   sidebranch IPMN. Normal appearance of the adrenal glands, kidneys, ureters, bladder, uterus, and adnexa. There is diffuse fat stranding of the soft tissues of the body wall compatible with anasarca.    The lower thorax redemonstrates small to moderate bilateral pleural effusions with associated dependent/passive bibasilar atelectasis, partially imaged subcarinal mass or lymph node, and distal esophageal stent with internal fluid/debris within the stent   lumen.    There is atherosclerosis of the abdominal aorta without evidence of aneurysm, with otherwise grossly unremarkable appearance of the vasculature. No lymphadenopathy. No acute or suspicious bony findings.        Impression:      Impression:  Mild increased size/conspicuity of thinly rim-enhancing organized fluid and air/gas collection within the anterior mid abdomen anterior to the stomach. It is difficult to ascertain if this reflects organized collection or abscess associated with the   percutaneous gastrostomy tube along its left margin, or evolving intra-abdominal abscess from gastric pyloric/proximal duodenal ulcer along its right margin.    Redemonstration of inflammatory thickening and fat stranding of the gastric pylorus and proximal duodenum compatible with known ulcer disease.    Redemonstration of distal esophageal stent.    Redemonstration of fluid overload state with moderate bilateral pleural effusions and anasarca.    Additional chronic and incidental ancillary findings as noted above.    Electronically Signed: Anibal Briseno    4/26/2023 12:35 AM EDT    Workstation ID: [id number]    XR Abdomen KUB [[id number]] Collected: 04/25/23 9342      Updated: 04/25/23 1454    Narrative:      XR ABDOMEN KUB    Date of Exam: 4/25/2023 1:47 PM EDT    Indication: abd pain, eval constipation    Comparison: CT abdomen pelvis 4/20/2023    Findings:  Bilateral pleural effusions with associated bibasilar opacities. Gastrostomy is seen projecting over the left mid abdomen. Central focus of air is seen which likely corresponds to contained duodenal ulcer perforation/diverticulum. Previously seen small   volume of pneumoperitoneum is not well appreciated on the supine radiographs. Degenerative change of the spine. Degenerative changes of the left greater than right hip. No abnormal abdominal calcifications. Nonobstructive bowel gas pattern. Mild colonic   stool burden.      Impression:      Impression:  Bilateral pleural effusions are again seen.    Central focus of rounded air may correspond to contained duodenal perforation/diverticulum.    Previously seen pneumoperitoneum is not well appreciated on today's supine radiograph.    Mild colonic stool burden.    Findings discussed with Dr. MELLISSA ZAMUDIO by Dr. Bird Mejias via telephone on 4/25/2023 2:49 PM EDT.      Electronically Signed: Bird Mejias    4/25/2023 2:51 PM EDT    Workstation ID: [id number]                Diagnostics: Reviewed    A:   Esophageal stricture    Essential hypertension    ASCVD (arteriosclerotic cardiovascular disease)    Coronary artery disease     Chronic obstructive pulmonary disease    Former smoker    Perforated ulcer    Hypothyroidism (acquired)    S/P percutaneous endoscopic gastrostomy (PEG) tube placement    Adenocarcinoma    Severe malnutrition     89 y.o. female with esophageal stricture due to adenocarcinoma.       S/S:   1. Pain -left shoulder blade, neoplastic lesion left T5-T6  -continue Gabapentin 100mg PEG  q 12 hours  -scheduled Hydrocodone-Acetaminophen 7.5-325mg PEG q 6 hours  -continue Lidocaine patch  -continue Hydrocodone-Acetaminophen 7.5-325mg PEG q 4 hours  prn breakthrough pain     2. SOB -Hydrocodone-Acetaminophen for SOB as well     3. Nausea -continue scheduled Reglan 5mg PEG four times daily  -continue Zofran 4mg IV q 6 hours prn N/V  -continue Promethazine 12.5mg OR q 4 hours prn N/V     4. Anxiety -continue Hydroxyzine 25mg PEG TID prn anxiety     5. Constipation -at risk for OIC  -continue bowel regimen, patient agreeable to Bisacodyl OR today     6. Decreased Appetite -TF via PEG with night feeds  -offer frequent snacks and supplements     7. Debility -PT/OT following     8. GOC -DNR/DNI -per discussion with patient  -ACP on chart  -patient reports goal to seek treatment after discharge for adenocarcinoma  -goal to discharge for STR with follow up with Oncology  -recommend Hem/Onc consult    P: Follow up visit for symptom management. Concern patient declining and may not be candidate for treatment options for adenocarcinoma. Recommend Hem/Onc consult as patient previously seen at a higher functional status.   Palliative Care Team will continue to follow patient. Please do not hesitate to contact us regarding further sx mgmt or GOC needs.  María Elena Vazquez, APRN  4/27/2023  Time spent: 30 minutes

## 2023-04-27 NOTE — THERAPY EVALUATION
Acute Care - Speech Language Pathology   Swallow Initial Evaluation [facility]        Patient Name: Christine Garg  : 1933  MRN: [id number]  Today's Date: 2023               Admit Date: 4/10/2023    Visit Dx:     ICD-10-CM ICD-9-CM   1. Esophageal stricture  K22.2 530.3     Patient Active Problem List   Diagnosis   • Essential hypertension   • Dyslipidemia   • ASCVD (arteriosclerotic cardiovascular disease)   • Palpitations   • Coronary artery disease    • Abnormal PFTs (pulmonary function tests)   • Chronic obstructive pulmonary disease   • Mild persistent asthma with allergic rhinitis   • Pulmonary nodule   • Former smoker   • Colitis   • Perforated ulcer   • Acute gastric ulcer with perforation   • Moderate malnutrition   • Esophageal stricture   • Hypothyroidism (acquired)   • S/P percutaneous endoscopic gastrostomy (PEG) tube placement   • Adenocarcinoma   • Severe malnutrition     Past Medical History:   Diagnosis Date   • Advanced age    • CAD (coronary artery disease) 2011    s/p 3v CABG   • Chronic kidney disease (CKD), stage III (moderate)    • COPD (chronic obstructive pulmonary disease)    • History of tobacco use    • Hyperlipidemia    • Hypertension    • Hypothyroidism    • PONV (postoperative nausea and vomiting)    • Pulmonary nodule      Past Surgical History:   Procedure Laterality Date   • BREAST BIOPSY Left     benign   • CATARACT EXTRACTION     • CORONARY ANGIOPLASTY     • CORONARY ARTERY BYPASS GRAFT     • ENDOSCOPY W/ PEG TUBE PLACEMENT N/A 2023    Procedure: ESOPHAGOGASTRODUODENOSCOPY WITH PERCUTANEOUS ENDOSCOPIC GASTROSTOMY TUBE INSERTION;  Surgeon: Aurora Kirkland MD;  Location: [facility];  Service: General;  Laterality: N/A;   • ENDOSCOPY W/ STENT PLACEMENT/REMOVAL N/A 2023    Procedure: ESOPHAGOGASTRODUODENOSCOPY WITH STENT PLACEMENT WITH FLUOROSCOPY;  Surgeon: Brunner, Mark I, MD;  Location: [facility] ENDOSCOPY;  Service: Gastroenterology;  Laterality: N/A;   •  EXPLORATORY LAPAROTOMY N/A 3/22/2023    Procedure: LAPAROTOMY EXPLORATORY WITH OVER SEW OF DUODENAL ULCER WITH OMENTAL PATCH AND BIOPATCH AND CENTRAL LINE PLACEMENT;  Surgeon: Aurora Kirkland MD;  Location:  [facility] OR;  Service: General;  Laterality: N/A;       SLP Recommendation and Plan  SLP Swallowing Diagnosis: (P) R/O pharyngeal dysphagia (04/27/23 1045)  SLP Diet Recommendation: (P) full liquid diet (04/27/23 1045)  Recommended Precautions and Strategies: (P) general aspiration precautions, reflux precautions (04/27/23 1045)  SLP Rec. for Method of Medication Administration: (P) as tolerated (04/27/23 1045)     Monitor for Signs of Aspiration: (P) no (04/27/23 1045)  Recommended Diagnostics: (P) VFSS (MBS), with esophageal screen (04/27/23 1045)              Predicted Duration Therapy Intervention (Days): (P) until discharge (04/27/23 1045)                                        Plan of Care Reviewed With: (P) patient      SWALLOW EVALUATION (last 72 hours)     SLP Adult Swallow Evaluation     Row Name 04/27/23 1045                   Rehab Evaluation    Document Type evaluation (P)   -LL        Subjective Information no complaints (P)   -LL        Patient Observations alert;cooperative (P)   -LL        Patient Effort good (P)   -LL        Comment MD notified that he wanted to continue w/ MBS. Reviewed and Educated pt on MBS. Patient in-agreement to complete MBS. (P)   -LL           General Information    Patient Profile Reviewed yes (P)   -LL        Pertinent History Of Current Problem Adm. w/ esophageal stricture, s/p stent 4/10. Hx of metastic lung ca. Consulted due to difficulty w/ PO and MD wants to see if material is passing ok. (P)   -LL        Current Method of Nutrition full liquids (P)   -LL        Precautions/Limitations, Vision WFL;for purposes of eval (P)   -LL        Precautions/Limitations, Hearing WFL;for purposes of eval (P)   -LL        Prior Level of Function-Communication unknown (P)   -LL         Prior Level of Function-Swallowing unknown;esophageal concerns (P)   -LL        Plans/Goals Discussed with patient;agreed upon (P)   -LL        Barriers to Rehab none identified (P)   -LL        Patient's Goals for Discharge patient did not state (P)   -LL           Pain    Additional Documentation Pain Scale: FACES Pre/Post-Treatment (Group) (P)   -LL           Pain Scale: FACES Pre/Post-Treatment    Pain: FACES Scale, Pretreatment 0-->no hurt (P)   -LL        Posttreatment Pain Rating 0-->no hurt (P)   -LL           SLP Evaluation Clinical Impression    SLP Swallowing Diagnosis R/O pharyngeal dysphagia (P)   -LL        Functional Impact risk of aspiration/pneumonia (P)   -LL           Recommendations    Predicted Duration Therapy Intervention (Days) until discharge (P)   -LL        SLP Diet Recommendation full liquid diet (P)   -LL        Recommended Diagnostics VFSS (MBS);with esophageal screen (P)   -LL        Recommended Precautions and Strategies general aspiration precautions;reflux precautions (P)   -LL        Oral Care Recommendations Oral Care BID/PRN (P)   -LL        SLP Rec. for Method of Medication Administration as tolerated (P)   -LL        Monitor for Signs of Aspiration no (P)   -LL              User Key  (r) = Recorded By, (t) = Taken By, (c) = Cosigned By    Initials Name Effective Dates    LL Ana M Liu, Speech Therapy Student 01/20/23 -                 EDUCATION  The patient has been educated in the following areas:   Dysphagia (Swallowing Impairment).              Time Calculation:    Time Calculation- SLP     Row Name 04/27/23 1148             Time Calculation- SLP    SLP Start Time 1045 (P)   -LL      SLP Received On 04/27/23 (P)   -LL         Untimed Charges    82534-AN Eval Oral Pharyng Swallow Minutes 25 (P)   -LL         Total Minutes    Untimed Charges Total Minutes 25 (P)   -LL       Total Minutes 25 (P)   -LL            User Key  (r) = Recorded By, (t) = Taken By, (c) =  Cosigned By    Initials Name Provider Type     Ana M Liu, Speech Therapy Student SLP Student                Therapy Charges for Today     Code Description Service Date Service Provider Modifiers Qty    [id number] HC ST EVAL ORAL PHARYNG SWALLOW 2 4/27/2023 Ana M Liu, Speech Therapy Student GN 1               Ana M Liu, Speech Therapy Student  4/27/2023

## 2023-04-27 NOTE — PROGRESS NOTES
"    [facility]  PROGRESS NOTE    Patient Name: Christine Garg  : 1933  MRN: [id number]    Date of Admission: 4/10/2023  Primary Care Physician: Dave Tobar MD    Subjective   Subjective     CC:  Dysphagia    HPI:  Getting weaker, does not want to eat, wants to \"step away\" but feels like her kids are preventing her from making decisions    ROS:  Gen- No fevers, chills  CV- No chest pain, palpitations  Resp- No cough, dyspnea  GI- No N/V/D, abd pain      Objective   Objective     Vital Signs:   Temp:  [96.4 °F (35.8 °C)-97.2 °F (36.2 °C)] 96.8 °F (36 °C)  Heart Rate:  [74-99] 88  Resp:  [16] 16  BP: ()/(51-71) 103/61  Flow (L/min):  [1-2] 1     Physical Exam:  Constitutional: No acute distress, ill appearing  HENT: NCAT, mucous membranes moist  Respiratory: Coarse bilaterally, increased work of breathing  Cardiovascular: RRR, no murmurs  Gastrointestinal: Positive bowel sounds, soft, nontender, nondistended  Musculoskeletal: LE edema  Psychiatric: Appropriate affect, cooperative  Neurologic: Oriented x 3, speech clear  Skin: No rashes      Results Reviewed:  LAB RESULTS:      Lab 23  0519 23  0420 23  0415 23  0522 23  0511   WBC 12.00* 14.90* 13.86* 14.51* 13.15*   HEMOGLOBIN 8.3* 8.8* 9.4* 9.9* 9.5*   HEMATOCRIT 26.0* 27.5* 28.6* 30.7* 28.8*   PLATELETS 326 345 387 410 340   NEUTROS ABS 9.78* 12.49* 11.24* 11.76* 10.78*   IMMATURE GRANS (ABS) 0.08* 0.11* 0.16* 0.16* 0.10*   LYMPHS ABS 1.13 1.26 1.33 1.39 1.21   MONOS ABS 0.64 0.72 0.73 0.70 0.63   EOS ABS 0.31 0.26 0.32 0.42* 0.38   MCV 96.3 94.8 94.1 94.5 93.5         Lab 23  0519 23  0420 23  0641 23  0522 23  0511 23  0744   SODIUM 132* 129* 128* 127* 127* 126*   POTASSIUM 3.7 3.7 3.9 4.1 3.7 4.2   CHLORIDE 94* 89* 88* 87* 88* 88*   CO2 28.0 31.0* 31.0* 31.0* 32.0* 32.0*   ANION GAP 10.0 9.0 9.0 9.0 7.0 6.0   BUN 14 12 13 13 13 14 "   CREATININE 0.37* 0.28* 0.39* 0.35* 0.41* 0.40*   EGFR 96.5 103.2 95.3 97.8 94.2 94.7   GLUCOSE 108* 96 123* 105* 111* 116*   CALCIUM 7.9* 7.9* 8.2* 8.0* 7.9* 8.2*   MAGNESIUM  --   --   --  1.8  --  1.6   PHOSPHORUS 3.6 3.2  --   --   --  3.2         Lab 04/27/23  0519 04/26/23  0420 04/21/23  0704   TOTAL PROTEIN  --   --  4.7*   ALBUMIN 2.1* 2.2* 2.6*   GLOBULIN  --   --  2.1   ALT (SGPT)  --   --  23   AST (SGOT)  --   --  20   BILIRUBIN  --   --  <0.2   ALK PHOS  --   --  107                     Brief Urine Lab Results  (Last result in the past 365 days)      Color   Clarity   Blood   Leuk Est   Nitrite   Protein   CREAT   Urine HCG        04/25/23 0950 Yellow   Clear   Negative   Small (1+)   Negative   Negative                 Microbiology Results Abnormal     Procedure Component Value - Date/Time    Body Fluid Culture - Body Fluid, Peritoneum [[id number]] Collected: 04/26/23 1142    Lab Status: Preliminary result Specimen: Body Fluid from Peritoneum Updated: 04/27/23 1015     Body Fluid Culture No growth     Gram Stain Many (4+) WBCs seen      No organisms seen    Urine Culture - Urine, Straight Cath [[id number]]  (Normal) Collected: 04/25/23 0950    Lab Status: Final result Specimen: Urine from Straight Cath Updated: 04/26/23 1037     Urine Culture No growth          CT Abdomen Pelvis With Contrast    Result Date: 4/26/2023  CT ABDOMEN PELVIS W CONTRAST Date of Exam: 4/25/2023 6:23 PM EDT Indication: further eval for air seen on KUB. Comparison: KUB 4/25/2023, CT abdomen and pelvis 4/20/2023, 1/6/2023 Technique: Axial CT images were obtained of the abdomen and pelvis following the uneventful intravenous administration of 85 mL Isovue-300. Reconstructed coronal and sagittal images were also obtained. Automated exposure control and iterative construction methods were used. Findings: Redemonstration of percutaneous gastrostomy with the balloon positioned in the stomach lumen. There is a thinly rim-enhancing  fluid and air/gas collection within the anterior left mid abdomen anterior to the gastric body and gastric antrum (series 2 image 81); this measures 7.9 x 2.9 x 5.7 cm (TV X AP X CC). This appears slightly enlarged and more conspicuous compared to CT from 4/20/2023. The left-sided margin of this collection is in close proximity to the tubing/tract of the percutaneous gastrostomy (series 2 image 78). The right-sided margin of this collection partially abuts the inflamed gastric pylorus/first portion of duodenum (series 900 image 17). Apart from air/gas within this collection, no evidence of pneumoperitoneum otherwise.  Superior to the collection is a prominent anteriorly-directed diverticulum arising from the fourth portion of the duodenum or proximal jejunum (series 2 image 66), present on multiple prior exams. Similar appearance of wall thickened and inflamed gastric pylorus and proximal duodenum compatible with known ulcer disease. There is a small amount of fluid within the stomach, without findings to suggest gastric outlet obstruction. The remainder of the  GI tract appears unchanged, with redemonstration of scattered colonic diverticulosis and scattered unformed stool throughout the colon. There is a small amount of simple appearing nonorganized pelvic free fluid which is likely reactive. No additional discrete/drainable fluid collections are identified. Unremarkable appearance of the liver, gallbladder, bile ducts, spleen. Top normal size of the main pancreatic duct, with 0.6 cm round hypodensity in the pancreatic uncinate which appears to communicate with the main pancreatic duct, compatible with sidebranch IPMN. Normal appearance of the adrenal glands, kidneys, ureters, bladder, uterus, and adnexa. There is diffuse fat stranding of the soft tissues of the body wall compatible with anasarca. The lower thorax redemonstrates small to moderate bilateral pleural effusions with associated dependent/passive  bibasilar atelectasis, partially imaged subcarinal mass or lymph node, and distal esophageal stent with internal fluid/debris within the stent  lumen. There is atherosclerosis of the abdominal aorta without evidence of aneurysm, with otherwise grossly unremarkable appearance of the vasculature. No lymphadenopathy. No acute or suspicious bony findings.     Impression: Impression: Mild increased size/conspicuity of thinly rim-enhancing organized fluid and air/gas collection within the anterior mid abdomen anterior to the stomach. It is difficult to ascertain if this reflects organized collection or abscess associated with the percutaneous gastrostomy tube along its left margin, or evolving intra-abdominal abscess from gastric pyloric/proximal duodenal ulcer along its right margin. Redemonstration of inflammatory thickening and fat stranding of the gastric pylorus and proximal duodenum compatible with known ulcer disease. Redemonstration of distal esophageal stent. Redemonstration of fluid overload state with moderate bilateral pleural effusions and anasarca. Additional chronic and incidental ancillary findings as noted above. Electronically Signed: Anibal Briseno  4/26/2023 12:35 AM EDT  Workstation ID: [id number]    CT Guided Abscess Drain Subdiaphr Subphren    Result Date: 4/26/2023  DATE OF EXAM: 4/26/2023 10:55 AM EDT PROCEDURE: CT GUIDED ABSCESS DRAIN SUBDIAPHR SUBPHREN INDICATIONS: fluid collection, rule out infection COMPARISON: No Comparisons Available FLUOROSCOPIC TIME: 84 seconds of CT fluoroscopy PHYSICIAN MONITORED CONSCIOUS SEDATION TIME: 20 minutes TECHNIQUE: A detailed explanation of the procedure, including the risks, benefits, and alternatives was provided. A preprocedure timeout was performed. The interventional radiology nurse mild the patient all times and provided conscious sedation. The patient was placed supine on the bed and a planning CT was performed, redemonstrating the air and fluid  collection adjacent to the stomach. An appropriate access site was selected and the area was prepped and draped in the usual sterile fashion. The skin was anesthetized with 1% lidocaine and a small skin incision was made. Under CT fluoroscopic guidance a 18-gauge needle was advanced into the collection. A guidewire was manipulated centrally and the tract was dilated over the guidewire to accommodate a 10 [place] drain. However when advancing the drain over the wire, it was not able to be looped within the collection. This access site was abandoned. The 18-gauge needle was then readvanced into the collection and the tract was serially dilated over a guidewire to accommodate a 10 [place] drain which was pigtail looped within the collection. A total of 10 cc of cloudy fluid filled with particulate was aspirated. The catheter was secured to the skin with Prolene. Patient tolerated procedure well without immediate complication. A post scan demonstrated catheter in satisfactory position with near complete resolution of the collection. FINDINGS: See above     Impression: 1. Successful CT-guided drainage of fluid collection adjacent to the stomach. Electronically Signed: Alejandro Roberto  4/26/2023 12:12 PM EDT  Workstation ID: [id number]      Results for orders placed during the hospital encounter of 03/22/23    Adult Transthoracic Echo Complete W/ Cont if Necessary Per Protocol    Interpretation Summary  •  Left ventricular systolic function is normal. Calculated left ventricular EF = 60% Left ventricular ejection fraction appears to be 56 - 60%.  •  Left ventricular diastolic function is consistent with (grade I) impaired relaxation and age.  •  Moderate to severe mitral valve regurgitation is present.  •  Moderate tricuspid valve regurgitation is present.  •  Estimated right ventricular systolic pressure from tricuspid regurgitation is moderately elevated (45-55 mmHg).  •  Moderate pulmonary hypertension is  present.      Current medications:  Scheduled Meds:aspirin, 81 mg, Per PEG Tube, Daily  bisacodyl, 10 mg, Rectal, Once  carvedilol, 25 mg, Per PEG Tube, BID With Meals  cefepime, 2 g, Intravenous, Q8H  sennosides, 10 mL, Per PEG Tube, BID   And  docusate sodium, 100 mg, Per PEG Tube, BID  enoxaparin, 40 mg, Subcutaneous, Nightly  fluconazole, 400 mg, Intravenous, Q24H  gabapentin, 100 mg, Per PEG Tube, Q12H  HYDROcodone-acetaminophen, 1 tablet, Per PEG Tube, Q6H  lansoprazole, 30 mg, Per G Tube, BID AC  levothyroxine, 112 mcg, Per PEG Tube, Q AM  lidocaine, 1 patch, Transdermal, Q24H  lidocaine, 1 application, Topical, Q6H  metoclopramide, 5 mg, Per G Tube, TID AC  metroNIDAZOLE, 500 mg, Intravenous, Q8H  sodium chloride, 10 mL, Intravenous, Q12H      Continuous Infusions:   PRN Meds:.•  acetaminophen **OR** acetaminophen **OR** acetaminophen  •  aluminum-magnesium hydroxide-simethicone  •  [DISCONTINUED] senna-docusate sodium **AND** polyethylene glycol **AND** [DISCONTINUED] bisacodyl **AND** bisacodyl  •  cloNIDine  •  HYDROcodone-acetaminophen  •  hydrOXYzine  •  ipratropium-albuterol  •  [DISCONTINUED] ondansetron **OR** ondansetron  •  promethazine  •  sodium chloride  •  sodium chloride    Assessment & Plan   Assessment & Plan     Active Hospital Problems    Diagnosis  POA   • **Esophageal stricture [K22.2]  Unknown   • Severe malnutrition [E43]  Unknown   • Hypothyroidism (acquired) [E03.9]  Unknown   • S/P percutaneous endoscopic gastrostomy (PEG) tube placement [Z93.1]  Not Applicable   • Adenocarcinoma [C80.1]  Unknown   • Perforated ulcer [K27.5]  Yes   • Former smoker [Z87.891]  Not Applicable   • Coronary artery disease  [I25.10]  Yes   • Chronic obstructive pulmonary disease [J44.9]  Yes   • Essential hypertension [I10]  Yes   • ASCVD (arteriosclerotic cardiovascular disease) [I25.10]  Yes      Resolved Hospital Problems   No resolved problems to display.        Brief Hospital Course to date:  Christine WHITEHEAD  Forest is a 89 y.o. female with past medical history of essential hypertension, dyslipidemia, coronary artery disease, peripheral artery disease, hypothyroidism, COPD who was admitted to [facility] on 3/22 for abdominal pain; went to OR, had perforated duodenal ulcer repaired with omental patch.  Then on 3/29, patient had CT of thoracic spine which noted left pleural consolidation and parenchymal nodules.    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  She was also found to have an esophageal stricture secondary to extrinsic compression of 3cm peritracheal lymph node.    On 4/7, patient had PEG tube placed.  She was sent to [facility] for placement of esophageal stent by Dr. Waters.        This patient's problems and plans were partially entered by my partner and updated as appropriate by me 04/27/23.     Dysphagia due to external esophageal compression by LAD  S/p PEG  - EGD 4/12 with esophageal stent placement  -Repeat CT scan abdomen/pelvis on 4/25 showed new air/gas collection within left mid abdomen concerning for possible abscess  -Continue empiric antibiotics/antifungals, ID following, wound cultures pending     Hyponatremia  - dced free water flush with tube feeds  - monitor, improved slightly     Cancer related pain  -palliative care following  -Patient wanting to pursue more comfort care measures but son/family seem to be pushing her to pursue aggressive means     Perforated Duodenal Ulcer s/p exploratory laparotomy with patch 3/22  -continue low dose asa     New diagnosis of lung adenocarcinoma  COPD  Former smoker   - Follow up outpatient for treatment plan with Dr. Balbuena        Coronary artery disease  Dyslipidemia  Essential hypertension  - Lowered home aspirin from 325 mg to 81mg  -DC blood pressure meds  will need to make this clear at discharge in order to prevent further ulceration           Hypoalbuminemia   Severe  malnutrition     Hypothyroidism  - Continue home synthroid      Debility  -She continues to decline, she wants her tube feeds stopped, has been refusing trays.  She states if she were offered chemo she would not pursue at this point.  She seems hesitant to tell these feelings to her family so we will continue current but without tube feeds per patient request          Expected Discharge Location and Transportation: ?  Expected Discharge Expected Discharge Date: 05/01/23       DVT prophylaxis:  Medical and mechanical DVT prophylaxis orders are present.     AM-PAC 6 Clicks Score (PT): 11 (04/26/23 0820)    CODE STATUS:   Code Status and Medical Interventions:   Ordered at: 04/25/23 0851     Medical Intervention Limits:    NO intubation (DNI)     Level Of Support Discussed With:    Health Care Surrogate     Code Status (Patient has no pulse and is not breathing):    No CPR (Do Not Attempt to Resuscitate)     Medical Interventions (Patient has pulse or is breathing):    Limited Support       Flora Franco, DO  04/27/23

## 2023-04-27 NOTE — THERAPY TREATMENT NOTE
Patient Name: Christine Garg  : 1933    MRN: [id number]                              Today's Date: 2023       Admit Date: 4/10/2023    Visit Dx:     ICD-10-CM ICD-9-CM   1. Esophageal stricture  K22.2 530.3     Patient Active Problem List   Diagnosis   • Essential hypertension   • Dyslipidemia   • ASCVD (arteriosclerotic cardiovascular disease)   • Palpitations   • Coronary artery disease    • Abnormal PFTs (pulmonary function tests)   • Chronic obstructive pulmonary disease   • Mild persistent asthma with allergic rhinitis   • Pulmonary nodule   • Former smoker   • Colitis   • Perforated ulcer   • Acute gastric ulcer with perforation   • Moderate malnutrition   • Esophageal stricture   • Hypothyroidism (acquired)   • S/P percutaneous endoscopic gastrostomy (PEG) tube placement   • Adenocarcinoma   • Severe malnutrition     Past Medical History:   Diagnosis Date   • Advanced age    • CAD (coronary artery disease) 2011    s/p 3v CABG   • Chronic kidney disease (CKD), stage III (moderate)    • COPD (chronic obstructive pulmonary disease)    • History of tobacco use    • Hyperlipidemia    • Hypertension    • Hypothyroidism    • PONV (postoperative nausea and vomiting)    • Pulmonary nodule      Past Surgical History:   Procedure Laterality Date   • BREAST BIOPSY Left     benign   • CATARACT EXTRACTION     • CORONARY ANGIOPLASTY     • CORONARY ARTERY BYPASS GRAFT     • ENDOSCOPY W/ PEG TUBE PLACEMENT N/A 2023    Procedure: ESOPHAGOGASTRODUODENOSCOPY WITH PERCUTANEOUS ENDOSCOPIC GASTROSTOMY TUBE INSERTION;  Surgeon: Aurora Kirkland MD;  Location: [facility] OR;  Service: General;  Laterality: N/A;   • ENDOSCOPY W/ STENT PLACEMENT/REMOVAL N/A 2023    Procedure: ESOPHAGOGASTRODUODENOSCOPY WITH STENT PLACEMENT WITH FLUOROSCOPY;  Surgeon: Brunner, Mark I, MD;  Location: [facility] ENDOSCOPY;  Service: Gastroenterology;  Laterality: N/A;   • EXPLORATORY LAPAROTOMY N/A 3/22/2023    Procedure: LAPAROTOMY  EXPLORATORY WITH OVER SEW OF DUODENAL ULCER WITH OMENTAL PATCH AND BIOPATCH AND CENTRAL LINE PLACEMENT;  Surgeon: Aurora Kirkland MD;  Location: [facility];  Service: General;  Laterality: N/A;      General Information     Row Name 04/27/23 1156          OT Time and Intention    Document Type therapy note (daily note)  -MATHEW     Mode of Treatment individual therapy;occupational therapy  -MATHEW     Row Name 04/27/23 1156          General Information    Patient Profile Reviewed yes  -MATHEW     Existing Precautions/Restrictions fall;oxygen therapy device and L/min  abdominal incision, PEG, anxiety with mobility  -MATHEW     Barriers to Rehab medically complex  -MATHEW     Row Name 04/27/23 1156          Cognition    Orientation Status (Cognition) oriented x 3  -MATHEW     Row Name 04/27/23 1156          Safety Issues, Functional Mobility    Safety Issues Affecting Function (Mobility) judgment  -MATHEW     Impairments Affecting Function (Mobility) pain;shortness of breath  this date per pt., pt. decline EOB or OOB activity  -MATHEW           User Key  (r) = Recorded By, (t) = Taken By, (c) = Cosigned By    Initials Name Provider Type    Laura Carrillo OT Occupational Therapist                 Mobility/ADL's     Row Name 04/27/23 1157          Bed Mobility    Comment, (Bed Mobility) pt. declined despite encouragement and explaining benefit of activity  -MATHEW     Row Name 04/27/23 1157          Activities of Daily Living    BADL Assessment/Intervention --  pt. declined all  -MATHEW           User Key  (r) = Recorded By, (t) = Taken By, (c) = Cosigned By    Initials Name Provider Type    Laura Carrillo OT Occupational Therapist               Obj/Interventions     Row Name 04/27/23 1158          Shoulder (Therapeutic Exercise)    Shoulder AROM (Therapeutic Exercise) bilateral;flexion;extension;aBduction;aDduction;horizontal aBduction/aDduction;scapular retraction;5 repetitions  cues for PLB, short rest between each, pt. completed 5 reps  diaphragmatic breathing also, pt. decline further  -MATHEW     Row Name 04/27/23 1158          Elbow/Forearm (Therapeutic Exercise)    Elbow/Forearm (Therapeutic Exercise) --  pt. declined  -MATHEW     Row Name 04/27/23 1158          Motor Skills    Therapeutic Exercise shoulder;elbow/forearm  -MATHEW           User Key  (r) = Recorded By, (t) = Taken By, (c) = Cosigned By    Initials Name Provider Type    Laura Carrillo, OT Occupational Therapist               Goals/Plan     Row Name 04/27/23 1203          Bed Mobility Goal 1 (OT)    Progress/Outcomes (Bed Mobility Goal 1, OT) progress slower than expected  -MATHEW     Row Name 04/27/23 1203          Transfer Goal 1 (OT)    Progress/Outcome (Transfer Goal 1, OT) progress slower than expected  -MATHEW     Row Name 04/27/23 1203          Dressing Goal 1 (OT)    Progress/Outcome (Dressing Goal 1, OT) progress slower than expected  -MATHEW     Row Name 04/27/23 1203          Problem Specific Goal 1 (OT)    Progress/Outcome (Problem Specific Goal 1, OT) progress slower than expected  -MATHEW           User Key  (r) = Recorded By, (t) = Taken By, (c) = Cosigned By    Initials Name Provider Type    Laura Carrillo, OT Occupational Therapist               Clinical Impression     Row Name 04/27/23 1200          Pain Assessment    Pretreatment Pain Rating 6/10  -MATHEW     Posttreatment Pain Rating 6/10  -MATHEW     Pain Location - Side/Orientation Bilateral  -MATHEW     Pain Location - chest;back  -MATHEW     Pre/Posttreatment Pain Comment per pt. pain meds not due currently  -MATHEW     Pain Intervention(s) Ambulation/increased activity  -     Row Name 04/27/23 1200          Plan of Care Review    Plan of Care Reviewed With patient  -MATHEW     Progress declining  -MATHEW     Outcome Evaluation Pt. with complaints of pain and SOA declining all therapy minus in bed pumonary TE.  Pt. educated on importance of activity.  Pt. remains appropriate for skilled OT services to address deficit areas and promote return to PLOF.   -MATHEW     Row Name 04/27/23 1200          Therapy Plan Review/Discharge Plan (OT)    Anticipated Discharge Disposition (OT) skilled nursing facility  -     Row Name 04/27/23 1200          Vital Signs    Pre Systolic BP Rehab 111  -MATHEW     Pre Treatment Diastolic BP 71  -MATHEW     Post Systolic BP Rehab 113  -MATHEW     Post Treatment Diastolic BP 51  -MATHEW     Pretreatment Heart Rate (beats/min) 87  -MATHEW     Posttreatment Heart Rate (beats/min) 84  -MATHEW     Pre SpO2 (%) 97  -MATHEW     O2 Delivery Pre Treatment nasal cannula  -MATHEW     O2 Delivery Intra Treatment nasal cannula  -MATHEW     Post SpO2 (%) 95  -MATHEW     O2 Delivery Post Treatment nasal cannula  -MATHEW     Pre Patient Position Supine  -MATHEW     Intra Patient Position Supine  -MATHEW     Post Patient Position Supine  -MATHEW     Row Name 04/27/23 1200          Positioning and Restraints    Pre-Treatment Position in bed  -MATHEW     Post Treatment Position bed  -MATHEW     In Bed supine;call light within reach;encouraged to call for assist;side rails up x2  did not change LE positioning or bed alarm setting  -MATHEW           User Key  (r) = Recorded By, (t) = Taken By, (c) = Cosigned By    Initials Name Provider Type    Laura Carrillo, OT Occupational Therapist               Outcome Measures     Row Name 04/27/23 1203          How much help from another is currently needed...    Putting on and taking off regular lower body clothing? 2  -MATHEW     Bathing (including washing, rinsing, and drying) 2  -MATHEW     Toileting (which includes using toilet bed pan or urinal) 1  -MATHEW     Putting on and taking off regular upper body clothing 2  -MATHEW     Taking care of personal grooming (such as brushing teeth) 3  -MATHEW     Eating meals 2  -MATHEW     AM-PAC 6 Clicks Score (OT) 12  -MATHEW     Row Name 04/27/23 1203          Functional Assessment    Outcome Measure Options AM-PAC 6 Clicks Daily Activity (OT)  -MATHEW           User Key  (r) = Recorded By, (t) = Taken By, (c) = Cosigned By    Initials Name Provider Type    MATHEW Osman  Laura Redmond OT Occupational Therapist                Occupational Therapy Education     Title: PT OT SLP Therapies (In Progress)     Topic: Occupational Therapy (In Progress)     Point: ADL training (In Progress)     Description:   Instruct learner(s) on proper safety adaptation and remediation techniques during self care or transfers.   Instruct in proper use of assistive devices.              Learning Progress Summary           Patient Acceptance, E, NR by  at 4/24/2023 1322    Acceptance, E, NR by  at 4/19/2023 1510    Acceptance, E,D, VU,NR by  at 4/17/2023 1426    Comment: UE TE, pacing self, AE for LBD, trasnfer technique    Acceptance, E, NR by  at 4/13/2023 1536    Acceptance, E, NR by  at 4/11/2023 1330                   Point: Home exercise program (In Progress)     Description:   Instruct learner(s) on appropriate technique for monitoring, assisting and/or progressing therapeutic exercises/activities.              Learning Progress Summary           Patient Acceptance, E,D, NR by  at 4/27/2023 1203    Comment: pulmonary/strengthing TE, benefit of activity, benefit of mentally preparing self for activity    Acceptance, E,D, VU,NR by  at 4/17/2023 1426    Comment: UE TE, pacing self, AE for LBD, trasnfer technique                   Point: Precautions (In Progress)     Description:   Instruct learner(s) on prescribed precautions during self-care and functional transfers.              Learning Progress Summary           Patient Acceptance, E, NR by  at 4/24/2023 1322    Acceptance, E, NR by  at 4/19/2023 1510    Acceptance, E, NR by  at 4/13/2023 1536    Acceptance, E, NR by  at 4/11/2023 1330                   Point: Body mechanics (In Progress)     Description:   Instruct learner(s) on proper positioning and spine alignment during self-care, functional mobility activities and/or exercises.              Learning Progress Summary           Patient Acceptance, E, NR by  at 4/24/2023 1322     Acceptance, E, NR by  at 4/19/2023 1510    Acceptance, E,D, VU,NR by  at 4/17/2023 1426    Comment: UE TE, pacing self, AE for LBD, trasnfer technique    Acceptance, E, NR by  at 4/13/2023 1536    Acceptance, E, NR by  at 4/11/2023 1330                               User Key     Initials Effective Dates Name Provider Type Discipline     02/03/23 -  Laura Osman, OT Occupational Therapist OT     06/16/21 -  Ana M Presley OT Occupational Therapist OT     10/14/22 -  Ca Luther OT Occupational Therapist OT              OT Recommendation and Plan  Therapy Frequency (OT): daily  Plan of Care Review  Plan of Care Reviewed With: patient  Progress: declining  Outcome Evaluation: Pt. with complaints of pain and SOA declining all therapy minus in bed pumonary TE.  Pt. educated on importance of activity.  Pt. remains appropriate for skilled OT services to address deficit areas and promote return to PLOF.     Time Calculation:    Time Calculation- OT     Row Name 04/27/23 1204             Time Calculation- OT    OT Start Time 1101  -MATHEW      OT Received On 04/27/23  -      OT Goal Re-Cert Due Date 05/04/23  -         Timed Charges    12938 - OT Therapeutic Exercise Minutes 9  -MATHEW         Total Minutes    Timed Charges Total Minutes 9  -MATHEW       Total Minutes 9  -MATHEW            User Key  (r) = Recorded By, (t) = Taken By, (c) = Cosigned By    Initials Name Provider Type    MATHEW Laura Osman, OT Occupational Therapist              Therapy Charges for Today     Code Description Service Date Service Provider Modifiers Qty    [id number]  OT THER PROC EA 15 MIN 4/27/2023 Laura Osman OT GO 1               Laura Osman OT  4/27/2023

## 2023-04-28 NOTE — DISCHARGE PLACEMENT REQUEST
"Amanda Gomez (89 y.o. Female)     Date of Birth   06/08/1933    Social Security Number       Address   [address] [id number]    Home Phone   [phone]    MRN   [id number]       [facility]    Marital Status                               Admission Date   4/10/23    Admission Type   Urgent    Admitting Provider   Flora Franco DO    Attending Provider   Flora Franco DO    Department, Room/Bed   [facility] 6B, N640/1       Discharge Date       Discharge Disposition       Discharge Destination                               Attending Provider: Flora Franco DO    Allergies: Motrin [Ibuprofen], Novocain [Procaine]    Isolation: None   Infection: None   Code Status: No CPR    Ht: 165.1 cm (65\")   Wt: 66.2 kg (146 lb)    Admission Cmt: None   Principal Problem: Esophageal stricture [K22.2]                 Active Insurance as of 4/10/2023     Primary Coverage     Payor Plan Insurance Group Employer/Plan Group    ANTHEM MEDICARE REPLACEMENT ANTHEM MEDICARE ADVANTAGE [id number]     Payor Plan Address Payor Plan Phone Number Payor Plan Fax Number Effective Dates    [address] [phone]  1/1/2019 - None Entered    [facility] [id number]       Subscriber Name Subscriber Birth Date Member ID       AMANDA GOMEZ 6/8/1933 [id number]                 Emergency Contacts      (Rel.) Home Phone Work Phone Mobile Phone    Kaleb Gomez (healthcare surrogate) (Son) -- -- [phone]    Ziyad Gomez (alternative healthcare surrogate) (Son) -- -- [phone]    Filiberto Gomez (second alternative HCS) (Son) -- -- [phone]            Emergency Contact Information     Name Relation Home Work Mobile    Kaleb Gomez (healthcare surrogate) Son   [phone]    Ziyad Gomez (alternative healthcare surrogate) Son   [phone]    Filiberto Gomez (second alternative HCS) Son   [phone]          Insurance Information                ANTHEM " MEDICARE REPLACEMENT/ANTHEM MEDICARE ADVANTAGE Phone: [phone]    Subscriber: Christine Garg Subscriber#: [id number]    Group#: [id number] Precert#: [id number]             History & Physical      Hola Crowley DO at 04/10/23 1944              [facility] Medicine Services  HISTORY AND PHYSICAL    Patient Name: Christine Garg  : 1933  MRN: [id number]  Primary Care Physician: Dave Tobar MD  Date of admission: 4/10/2023    Subjective    Subjective     Chief Complaint:  Adenocarcinoma    HPI:  Christine Garg is a 89 y.o. female with PMH of HTN, HLD, CAD, COPD, former smoker, PAD and hypothyroid.     Patient presented to [facility] on 3/22 for a 5-day complain of abdominal pain.  She was taken to the OR for an ex lap.  She was found to have a perforated duodenal ulcer which was repaired with omental patch.  On 3/29, patient had CT of thoracic spine which noted left pleural consolidation and parenchymal nodules.  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  She was also found to have an esophageal stricture secondary to extrinsic compression of 3cm peritracheal lymph node.  On , patient had PEG tube placed.  She was sent to [facility] for placement of esophageal stent by Dr. Waters.    Patient had been approved to [facility] for subacute rehab in [place]. Patient would still like to go there after discharge. Patient is to follow up outpatient for treatment options regarding her cancer.     Review of Systems   Constitutional: Positive for appetite change. Negative for activity change, chills and fever.   HENT: Positive for trouble swallowing. Negative for congestion and sore throat.    Eyes: Negative.    Respiratory: Negative for cough, chest tightness and shortness of breath.    Cardiovascular: Negative for chest pain and leg swelling.   Gastrointestinal: Positive for abdominal pain. Negative for  constipation, diarrhea, nausea and vomiting.   Genitourinary: Negative for difficulty urinating, dysuria and urgency.   Musculoskeletal: Negative.    Skin: Negative.    Neurological: Positive for weakness. Negative for dizziness and headaches.   Hematological: Negative.    Psychiatric/Behavioral: Negative.  Negative for agitation and confusion.            Personal History     Past Medical History:   Diagnosis Date   • Advanced age    • CAD (coronary artery disease) 2011    s/p 3v CABG   • Chronic kidney disease (CKD), stage III (moderate)    • COPD (chronic obstructive pulmonary disease)    • History of tobacco use    • Hyperlipidemia    • Hypertension    • Hypothyroidism    • PONV (postoperative nausea and vomiting)    • Pulmonary nodule              Past Surgical History:   Procedure Laterality Date   • BREAST BIOPSY Left     benign   • CATARACT EXTRACTION     • CORONARY ANGIOPLASTY     • CORONARY ARTERY BYPASS GRAFT     • EXPLORATORY LAPAROTOMY N/A 3/22/2023    Procedure: LAPAROTOMY EXPLORATORY WITH OVER SEW OF DUODENAL ULCER WITH OMENTAL PATCH AND BIOPATCH AND CENTRAL LINE PLACEMENT;  Surgeon: Aurora Kirkland MD;  Location: [facility];  Service: General;  Laterality: N/A;       Family History:  family history includes Heart disease in her brother, father, and mother.     Social History:  reports that she has quit smoking. Her smoking use included cigarettes. She has never used smokeless tobacco. She reports that she does not drink alcohol and does not use drugs.  Social History     Social History Narrative   • Not on file       Medications:  Evolocumab, HYDROcodone-acetaminophen, Lidocaine, aspirin, carvedilol, castor oil-balsam peru, cloNIDine, heparin (porcine), hydrALAZINE, labetalol, levothyroxine, magnesium sulfate, magnesium sulfate in D5W 1g/100mL (PREMIX), metoclopramide, montelukast, multivitamin, ondansetron, pantoprazole, polyethylene glycol, potassium chloride, potassium phosphate 45 mmol in sodium  chloride 0.9 % 500 mL infusion, and valsartan    Allergies   Allergen Reactions   • Motrin [Ibuprofen] Anaphylaxis and Hives   • Novocain [Procaine] Other (See Comments)     Pt state she passes out.       Objective    Objective     Vital Signs:   Temp:  [97.5 °F (36.4 °C)-98.1 °F (36.7 °C)] 98.1 °F (36.7 °C)  Heart Rate:  [72-78] 72  Resp:  [16-18] 16  BP: (127-172)/(50-68) 127/50  Flow (L/min):  [2] 2    Physical Exam  Vitals reviewed.   Constitutional:       General: She is not in acute distress.     Appearance: She is normal weight.   HENT:      Head: Normocephalic.      Nose: Nose normal. No congestion.      Mouth/Throat:      Mouth: Mucous membranes are moist.   Eyes:      Extraocular Movements: Extraocular movements intact.      Pupils: Pupils are equal, round, and reactive to light.   Cardiovascular:      Rate and Rhythm: Normal rate and regular rhythm.      Pulses: Normal pulses.      Heart sounds: Normal heart sounds. No murmur heard.  Pulmonary:      Effort: Pulmonary effort is normal. No respiratory distress.      Breath sounds: Normal breath sounds. No wheezing or rhonchi.   Abdominal:      General: Bowel sounds are normal. There is no distension.      Palpations: Abdomen is soft.      Tenderness: There is abdominal tenderness.   Musculoskeletal:         General: No swelling. Normal range of motion.      Cervical back: Normal range of motion. No rigidity.   Skin:     General: Skin is warm.      Capillary Refill: Capillary refill takes less than 2 seconds.      Findings: Wound (midline abdominal incision pink and closed, C/D/I) present.      Comments: PEG in place C/D/I   Neurological:      General: No focal deficit present.      Mental Status: She is alert and oriented to person, place, and time. Mental status is at baseline.      Motor: Weakness present.   Psychiatric:         Mood and Affect: Mood normal.         Behavior: Behavior normal.         Thought Content: Thought content normal.          Judgment: Judgment normal.          Result Review:  I have personally reviewed the results from the time of this admission to 4/10/2023 19:44 EDT and agree with these findings:  [x]  Laboratory list / accordion  [x]  Microbiology  [x]  Radiology  []  EKG/Telemetry   []  Cardiology/Vascular   []  Pathology  [x]  Old records  []  Other:  Most notable findings include:     LAB RESULTS:      Lab 04/09/23 0242 04/06/23 0139 04/04/23 0231   WBC 8.62 10.38 11.47*   HEMOGLOBIN 10.0* 10.1* 9.6*   HEMATOCRIT 30.8* 32.6* 29.3*   PLATELETS 285 249 298   NEUTROS ABS  --  7.63* 8.87*   IMMATURE GRANS (ABS)  --  0.04 0.06*   LYMPHS ABS  --  1.18 1.31   MONOS ABS  --  0.61 0.54   EOS ABS  --  0.82* 0.61*   MCV 96.6 104.2* 94.8   PROTIME 12.4  --  12.0*   APTT  --   --  30.4         Lab 04/09/23  0242 04/06/23 0139 04/05/23  0048 04/04/23  1751 04/04/23 0231   SODIUM 132* 135* 138  --  138   POTASSIUM 4.0 4.1 4.6 4.4 3.4*   CHLORIDE 99 100 103  --  101   CO2 28.1 27.6 29.5*  --  29.6*   ANION GAP 4.9* 7.4 5.5  --  7.4   BUN 10 7* 9  --  10   CREATININE 0.42* 0.52* 0.51*  --  0.48*   EGFR 93.6 88.9 89.4  --  90.7   GLUCOSE 119* 127* 97  --  91   CALCIUM 8.6 8.6 9.1  --  8.6   MAGNESIUM 2.0 2.1 1.8  --  2.1   PHOSPHORUS 2.9 3.5 3.3  --  3.6         Lab 04/09/23 0242 04/06/23 0139 04/04/23 0231   TOTAL PROTEIN 4.8* 4.6* 4.8*   ALBUMIN 2.3* 2.5* 2.5*   GLOBULIN 2.5 2.1 2.3   ALT (SGPT) 7 11 17   AST (SGOT) 14 11 13   BILIRUBIN 0.2 <0.2 0.2   ALK PHOS 98 88 82         Lab 04/09/23  0242 04/04/23  0231   PROTIME 12.4 12.0*   INR 0.91 0.87*                 Brief Urine Lab Results  (Last result in the past 365 days)      Color   Clarity   Blood   Leuk Est   Nitrite   Protein   CREAT   Urine HCG        01/06/23 0149 Yellow   Clear   Negative   Negative   Negative   Negative               Microbiology Results (last 10 days)     ** No results found for the last 240 hours. **          CT Abdomen Pelvis Without Contrast    Result Date:  4/10/2023   EXAM DATE:   4/10/2023 11:29 AM  CLINICAL HISTORY:   abdominal pain; K25.1-Acute gastric ulcer with perforation; E87.5-Ylqu-yzztbqvoap and hyponatremia; E87.6-Hypokalemia; A41.9-Sepsis, unspecified organism; K27.5-Chronic or unspecified peptic ulcer, site unspecified, with perforation; E44.0-Moderate protein-calorie malnutrition  TECHNIQUE:   Axial computed tomography images of the abdomen and pelvis without intravenous contrast.  Sagittal and coronal reformatted images were created and reviewed.  This CT exam was performed using one or more of the following dose reduction techniques:  automated exposure control, adjustment of the mA and/or kV according to patient size, and/or use of iterative reconstruction technique.  COMPARISON: 04/04/2023  FINDINGS:   LUNG BASES:  Unremarkable.  No mass.  No consolidation.   PLEURAL SPACE:  Small bilateral pleural effusions.   ABDOMEN:   LIVER:  Unremarkable.   GALLBLADDER AND BILE DUCTS:  Unremarkable.  No calcified stones.  No ductal dilation.   PANCREAS:  Unremarkable.  No ductal dilation.   SPLEEN:  Unremarkable.  No splenomegaly.   ADRENALS:  Unremarkable.  No mass.   KIDNEYS AND URETERS:  Unremarkable.  No obstructing stones.  No hydronephrosis.   STOMACH AND BOWEL:  Contrast noted throughout the colon.  No obstruction.  No mucosal thickening.   PELVIS:   APPENDIX:  No findings to suggest acute appendicitis.   BLADDER:  Unremarkable.  No stones.   REPRODUCTIVE:  Unremarkable as visualized.   ABDOMEN and PELVIS:   INTRAPERITONEAL SPACE:  See below.    BONES/JOINTS:  No acute fracture.  No dislocation.   SOFT TISSUES:  Unremarkable.   VASCULATURE:  Atherosclerotic disease.  No abdominal aortic aneurysm.   LYMPH NODES:  Unremarkable.  No enlarged lymph nodes.   TUBES, LINES AND DEVICES:  Gastric tube noted in the stomach. This has been recently placed and small to moderate intraperitoneal free air persists.        Impression: 1.  Small bilateral pleural effusions.  2.  Gastric tube noted in the stomach. This has been recently placed and small to moderate intraperitoneal free air persists.   This report was finalized on 4/10/2023 12:06 PM by Dr. Henry Lozano MD.        Results for orders placed during the hospital encounter of 03/22/23    Adult Transthoracic Echo Complete W/ Cont if Necessary Per Protocol    Interpretation Summary  •  Left ventricular systolic function is normal. Calculated left ventricular EF = 60% Left ventricular ejection fraction appears to be 56 - 60%.  •  Left ventricular diastolic function is consistent with (grade I) impaired relaxation and age.  •  Moderate to severe mitral valve regurgitation is present.  •  Moderate tricuspid valve regurgitation is present.  •  Estimated right ventricular systolic pressure from tricuspid regurgitation is moderately elevated (45-55 mmHg).  •  Moderate pulmonary hypertension is present.      Assessment & Plan   Assessment & Plan       Esophageal stricture    Essential hypertension    ASCVD (arteriosclerotic cardiovascular disease)    Coronary artery disease     Chronic obstructive pulmonary disease    Former smoker    Perforated ulcer    Hypothyroidism (acquired)    S/P percutaneous endoscopic gastrostomy (PEG) tube placement    Adenocarcinoma      Christine Garg is a 89 y.o. female with PMH of HTN, HLD, CAD, COPD, former smoker, PAD and hypothyroid. Direct admit to [facility] for esophageal stent placement.     Esophageal Stricture  S/p PEG  -2/2 extrinsic compression of paratracheal lymph node  -Pain control  -Maintenance fluids  -NPO  -Consult wound for PEG care  -Consult PT/OT, case management   -Consult GI      CAD  HLD  -lower home aspirin to 81mg. Will need to make this clear at discharge in order to prevent further ulceration    Hypoalbuminemia   -Albumin 2.3  -Consult nutrition    HTN  -Continue home coreg, valsartan, hydralazine   -PRN clonidine     Adenocarcinoma of Lung  COPD  Former Smoker  -PRN duonebs  -Follow  up outpatient for treatment plan    Perforated Duodenal Ulcer  S/p Ex lap with patch  -Midline abdominal incision C/D/I  -Continue PPI twice daily  -lower aspirin to 81 mg instead of 325  -Consult wound  -patient only received perioperative cefazolin at time of surgery? Should have received micafungin and gram negative coverage for at least 5 days as ppx.  Would recommend discussing case with infectious disease/general surgery in am to determine if would benefit from abx this far out from surgery.  Does not currently appear to have evidence of active infection.     ADDENDUM: CONTACTED BY GENERAL SURGERY FROM [facility] WHO CONFIRMS THAT PATIENT DID RECEIVE ABX DURING HER PRIOR ADMISSION.  received 21 doses of zosyn alone.  She also, received flagyl, vancomycin, and cefepime for 48 hours.     Hypothyroid  -Continue home synthroid         DVT prophylaxis:  SCDs in prep for surgery    CODE STATUS:  FULL       Expected Discharge  TBD        This note has been completed as part of a split-shared workflow.     Signature: Electronically signed by LUCÍA Sotelo, 04/10/23, 8:36 PM EDT.          Attending   Admission Attestation       I have performed an independent face-to-face diagnostic evaluation including performing an independent physical examination as documented here.  The documented plan of care above was reviewed and developed with the advanced practice clinician (APC).      Brief Summary Statement:   Christine Garg is a 89 y.o. female with past medical history of hypertension, hyperlipidemia, COPD, coronary artery disease, peripheral artery disease, hypothyroidism, who comes as a direct admit from [facility] for perforated duodenal ulcer status post emergency ex lap with patch who was later found to have esophageal stricture related to 3 cm paratracheal lymph node with extrinsic compression.  They discussed the case with Dr. Ralph's who recommended transfer for esophageal stenting.  Currently,  patient reports expected abdominal pain.  Denies any fever or chills.  Reports decreased appetite.    Remainder of detailed HPI is as noted by APC and has been reviewed and/or edited by me for completeness.    Attending Physical Exam:  Temp:  [97.5 °F (36.4 °C)-98.1 °F (36.7 °C)] 98 °F (36.7 °C)  Heart Rate:  [72-78] 76  Resp:  [16-18] 17  BP: (123-172)/(50-68) 123/58  Flow (L/min):  [2] 2    Constitutional: Awake, alert, nontoxic  Eyes: PERRLA, sclerae anicteric, no conjunctival injection  HENT: NCAT, mucous membranes moist  Neck: Supple, no thyromegaly, no lymphadenopathy, trachea midline  Respiratory: Clear to auscultation bilaterally, nonlabored respirations   Cardiovascular: RRR, no murmurs, rubs, or gallops, palpable pedal pulses bilaterally  Gastrointestinal: Positive bowel sounds, soft, expected abd tenderness post ex lap, nondistended, PEG in place  Musculoskeletal: No bilateral ankle edema, no clubbing or cyanosis to extremities  Psychiatric: Appropriate affect, cooperative  Neurologic: Oriented x 3, strength symmetric in all extremities, Cranial Nerves grossly intact to confrontation, speech clear  Skin: midline incision without drainage. Clean, dry, intact, expected surrounded pink skin but no evidence of cellulitis      Brief Assessment/Plan :  See detailed assessment and plan developed with APC which I have reviewed and/or edited for completeness.            Hola Crowley DO  04/10/23     Total time spent: 40  Time spent includes time reviewing chart, face-to-face time, counseling patient/family/caregiver, ordering medications/tests/procedures, communicating with other health care professionals, documenting clinical information in the electronic health record, and coordination of care.                     Electronically signed by Hola Crowley DO at 04/20/23 1900

## 2023-04-28 NOTE — PLAN OF CARE
Problem: Adult Inpatient Plan of Care  Goal: Plan of Care Review  Outcome: Ongoing, Not Progressing  Flowsheets (Taken 4/28/2023 1730)  Progress: no change  Plan of Care Reviewed With: patient  Outcome Evaluation: Patienrt resting comfortably. Pain controlled with scheduled pain meds. Some anxiety, prn given with relief. Appetite very poor. Posssible home with hospice.   Goal Outcome Evaluation:  Plan of Care Reviewed With: patient        Progress: no change  Outcome Evaluation: Patienrt resting comfortably. Pain controlled with scheduled pain meds. Some anxiety, prn given with relief. Appetite very poor. Posssible home with hospice.

## 2023-04-28 NOTE — PROGRESS NOTES
"Palliative Care Daily Progress Note     C/C: Patient reports feeling, \"terrible\", complaining of SOA.    S: Medical record reviewed. Follow up visit for medication effectiveness and GOC in the context of complex medical decision making. Events noted. Patient with low BP overnight. Patient reports her abodminal pain is improved with abscess drainage. She is experiencing back pain which is relieved with the Hydrocodone. She received x2 prn Hydrocodone 7.5-325mg PO doses. Has scheduled Hydrocodone that was held due to hypotension. Patient did receive Atarax 25mg x2 doses for anxiety. Patient declined MBS. TF stopped. Patient tolerating bites of pudding. Oncology consult with no treatment options besides immunotherapy due to poor functional status.     ROS: +pain, left shoulder blade that radiates down her back on left>right side, constant dull ache, improves with Lidocaine, pain currently 5/10, at best 2/10. +SOB, worse with exertion, improves with Hydrocodone, limits her activity. +anxiety, due to SOB, pain, and circumstances. +nausea, improved with medications. +decreased appetite, TF at night, poor appetite during day due to nausea and taste changes. +debility.  Denies vomiting, HA, chest pain.     O: Code Status:   Code Status and Medical Interventions:   Ordered at: 04/28/23 1212     Level Of Support Discussed With:    Patient     Code Status (Patient has no pulse and is not breathing):    No CPR (Do Not Attempt to Resuscitate)     Medical Interventions (Patient has pulse or is breathing):    Comfort Measures      Advanced Directives: Advance Directive Status: Patient has advance directive, copy in chart   Goals of Care: Ongoing.   Palliative Performance Scale Score: 40%     /72 (BP Location: Left arm, Patient Position: Lying)   Pulse 90   Temp 97.6 °F (36.4 °C) (Axillary)   Resp 18   Ht 165.1 cm (65\")   Wt 66.2 kg (146 lb)   SpO2 94%   BMI 24.30 kg/m²     Intake/Output Summary (Last 24 hours) at " 4/28/2023 1212  Last data filed at 4/28/2023 0913  Gross per 24 hour   Intake 720 ml   Output 5 ml   Net 715 ml       PE:  General Appearance:   Patient laying in bed, awake, alert, chronically ill appearing, cooperative, NAD   HEENT:    NC/AT, EOMI, anicteric, MMM, face relaxed, NC in place   Neck:   supple, trachea midline, no JVD   Lungs:     crackles all lung fields, diminished in bases; respirations regular, even and unlabored; RR 18-20 on exam, 1LNC    Heart:    RRR, normal S1 and S2, no M/R/G   Abdomen:     Hypoactive bowel sounds, soft, nontender, distended   G/U:   Deferred   MSK/Extremities:    no edema   Pulses:   Pulses palpable and equal bilaterally   Skin:   Warm, dry   Neurologic:   A/Ox3, cooperative, ELIZONDO   Psych:   Calm, appropriate       Meds: Reviewed and changes noted    Labs:   Results from last 7 days   Lab Units 04/27/23  0519   WBC 10*3/mm3 12.00*   HEMOGLOBIN g/dL 8.3*   HEMATOCRIT % 26.0*   PLATELETS 10*3/mm3 326     Results from last 7 days   Lab Units 04/27/23  0519   SODIUM mmol/L 132*   POTASSIUM mmol/L 3.7   CHLORIDE mmol/L 94*   CO2 mmol/L 28.0   BUN mg/dL 14   CREATININE mg/dL 0.37*   GLUCOSE mg/dL 108*   CALCIUM mg/dL 7.9*     Results from last 7 days   Lab Units 04/27/23  0519   SODIUM mmol/L 132*   POTASSIUM mmol/L 3.7   CHLORIDE mmol/L 94*   CO2 mmol/L 28.0   BUN mg/dL 14   CREATININE mg/dL 0.37*   CALCIUM mg/dL 7.9*   GLUCOSE mg/dL 108*     Imaging Results (Last 72 Hours)     Procedure Component Value Units Date/Time    CT Guided Abscess Drain Subdiaphr Subphren [[id number]] Collected: 04/26/23 1207     Updated: 04/26/23 1216    Narrative:      DATE OF EXAM:  4/26/2023 10:55 AM EDT    PROCEDURE:  CT GUIDED ABSCESS DRAIN SUBDIAPHR SUBPHREN    INDICATIONS:  fluid collection, rule out infection    COMPARISON:  No Comparisons Available    FLUOROSCOPIC TIME:  84 seconds of CT fluoroscopy    PHYSICIAN MONITORED CONSCIOUS SEDATION TIME:  20 minutes    TECHNIQUE:   A detailed explanation  of the procedure, including the risks, benefits, and alternatives was provided. A preprocedure timeout was performed. The interventional radiology nurse mild the patient all times and provided conscious sedation. The patient was   placed supine on the bed and a planning CT was performed, redemonstrating the air and fluid collection adjacent to the stomach. An appropriate access site was selected and the area was prepped and draped in the usual sterile fashion. The skin was   anesthetized with 1% lidocaine and a small skin incision was made. Under CT fluoroscopic guidance a 18-gauge needle was advanced into the collection. A guidewire was manipulated centrally and the tract was dilated over the guidewire to accommodate a 10   Polish drain. However when advancing the drain over the wire, it was not able to be looped within the collection. This access site was abandoned.    The 18-gauge needle was then readvanced into the collection and the tract was serially dilated over a guidewire to accommodate a 10 Polish drain which was pigtail looped within the collection. A total of 10 cc of cloudy fluid filled with particulate was   aspirated. The catheter was secured to the skin with Prolene. Patient tolerated procedure well without immediate complication. A post scan demonstrated catheter in satisfactory position with near complete resolution of the collection.    FINDINGS:  See above      Impression:        1. Successful CT-guided drainage of fluid collection adjacent to the stomach.      Electronically Signed: Alejandro Roberto    4/26/2023 12:12 PM EDT    Workstation ID: [id number]    CT Abdomen Pelvis With Contrast [[id number]] Collected: 04/25/23 2017     Updated: 04/26/23 0038    Narrative:      CT ABDOMEN PELVIS W CONTRAST    Date of Exam: 4/25/2023 6:23 PM EDT    Indication: further eval for air seen on KUB.    Comparison: KUB 4/25/2023, CT abdomen and pelvis 4/20/2023, 1/6/2023    Technique: Axial CT images were obtained of  the abdomen and pelvis following the uneventful intravenous administration of 85 mL Isovue-300. Reconstructed coronal and sagittal images were also obtained. Automated exposure control and iterative   construction methods were used.      Findings:  Redemonstration of percutaneous gastrostomy with the balloon positioned in the stomach lumen. There is a thinly rim-enhancing fluid and air/gas collection within the anterior left mid abdomen anterior to the gastric body and gastric antrum (series 2   image 81); this measures 7.9 x 2.9 x 5.7 cm (TV X AP X CC). This appears slightly enlarged and more conspicuous compared to CT from 4/20/2023. The left-sided margin of this collection is in close proximity to the tubing/tract of the percutaneous   gastrostomy (series 2 image 78). The right-sided margin of this collection partially abuts the inflamed gastric pylorus/first portion of duodenum (series 900 image 17). Apart from air/gas within this collection, no evidence of pneumoperitoneum otherwise.   Superior to the collection is a prominent anteriorly-directed diverticulum arising from the fourth portion of the duodenum or proximal jejunum (series 2 image 66), present on multiple prior exams.   Similar appearance of wall thickened and inflamed gastric pylorus and proximal duodenum compatible with known ulcer disease. There is a small amount of fluid within the stomach, without findings to suggest gastric outlet obstruction. The remainder of the   GI tract appears unchanged, with redemonstration of scattered colonic diverticulosis and scattered unformed stool throughout the colon.    There is a small amount of simple appearing nonorganized pelvic free fluid which is likely reactive. No additional discrete/drainable fluid collections are identified.    Unremarkable appearance of the liver, gallbladder, bile ducts, spleen. Top normal size of the main pancreatic duct, with 0.6 cm round hypodensity in the pancreatic uncinate  which appears to communicate with the main pancreatic duct, compatible with   sidebranch IPMN. Normal appearance of the adrenal glands, kidneys, ureters, bladder, uterus, and adnexa. There is diffuse fat stranding of the soft tissues of the body wall compatible with anasarca.    The lower thorax redemonstrates small to moderate bilateral pleural effusions with associated dependent/passive bibasilar atelectasis, partially imaged subcarinal mass or lymph node, and distal esophageal stent with internal fluid/debris within the stent   lumen.    There is atherosclerosis of the abdominal aorta without evidence of aneurysm, with otherwise grossly unremarkable appearance of the vasculature. No lymphadenopathy. No acute or suspicious bony findings.        Impression:      Impression:  Mild increased size/conspicuity of thinly rim-enhancing organized fluid and air/gas collection within the anterior mid abdomen anterior to the stomach. It is difficult to ascertain if this reflects organized collection or abscess associated with the   percutaneous gastrostomy tube along its left margin, or evolving intra-abdominal abscess from gastric pyloric/proximal duodenal ulcer along its right margin.    Redemonstration of inflammatory thickening and fat stranding of the gastric pylorus and proximal duodenum compatible with known ulcer disease.    Redemonstration of distal esophageal stent.    Redemonstration of fluid overload state with moderate bilateral pleural effusions and anasarca.    Additional chronic and incidental ancillary findings as noted above.    Electronically Signed: Anibal Briseno    4/26/2023 12:35 AM EDT    Workstation ID: [id number]    XR Abdomen KUB [[id number]] Collected: 04/25/23 1429     Updated: 04/25/23 1454    Narrative:      XR ABDOMEN KUB    Date of Exam: 4/25/2023 1:47 PM EDT    Indication: abd pain, eval constipation    Comparison: CT abdomen pelvis 4/20/2023    Findings:  Bilateral pleural effusions with  associated bibasilar opacities. Gastrostomy is seen projecting over the left mid abdomen. Central focus of air is seen which likely corresponds to contained duodenal ulcer perforation/diverticulum. Previously seen small   volume of pneumoperitoneum is not well appreciated on the supine radiographs. Degenerative change of the spine. Degenerative changes of the left greater than right hip. No abnormal abdominal calcifications. Nonobstructive bowel gas pattern. Mild colonic   stool burden.      Impression:      Impression:  Bilateral pleural effusions are again seen.    Central focus of rounded air may correspond to contained duodenal perforation/diverticulum.    Previously seen pneumoperitoneum is not well appreciated on today's supine radiograph.    Mild colonic stool burden.    Findings discussed with Dr. MELLISSA ZAMUDIO by Dr. Bird Mejias via telephone on 4/25/2023 2:49 PM EDT.      Electronically Signed: Bird Mejias    4/25/2023 2:51 PM EDT    Workstation ID: [id number]                Diagnostics: Reviewed    A:   Esophageal stricture    Essential hypertension    ASCVD (arteriosclerotic cardiovascular disease)    Coronary artery disease     Chronic obstructive pulmonary disease    Former smoker    Perforated ulcer    Hypothyroidism (acquired)    S/P percutaneous endoscopic gastrostomy (PEG) tube placement    Adenocarcinoma    Severe malnutrition     89 y.o. female with esophageal stricture due to adenocarcinoma.       S/S:   1. Pain -left shoulder blade, neoplastic lesion left T5-T6  -continue Gabapentin 100mg PEG  q 12 hours  -scheduled Hydrocodone-Acetaminophen 7.5-325mg PEG q 6 hours  -continue Lidocaine patch  -continue Hydrocodone-Acetaminophen 7.5-325mg PEG q 4 hours prn breakthrough pain     2. SOB -Hydrocodone-Acetaminophen for SOB as well  -started Morphine 2mg IV q 2 hours prn pain/dyspnea     3. Nausea -continue scheduled Reglan 5mg PEG four times daily  -continue Zofran 4mg IV q 6 hours  prn N/V  -continue Promethazine 12.5mg AL q 4 hours prn N/V     4. Anxiety -continue Hydroxyzine 25mg PEG TID prn anxiety     5. Constipation -at risk for OIC  -continue bowel regimen, patient agreeable to Bisacodyl AL today     6. Decreased Appetite -comfort diet  -offer frequent snacks and supplements     7. Debility -PT/OT following     8. GOC -DNR/DNI/Comfort Measures -per discussion with patient  -ACP on chart  -patient requesting home with hospice, hospice consult placed  -discussed comfort measures and patient requesting comfort measures    P: Follow up visit for symptom management and GOC. Patient reports her goal is to return home with hospice in light of prognosis. Two sons at bedside during discussion and in agreement. Discussed comfort measures, allowing for pain medication administration despite low BP, and patient in agreement. Discussed keeping ABX on board for symptom management due to pain from abscess. All questions and concerns addressed. Emotional support provided throughout discussion. Patient reports decreased anxiety due to decision for home with hospice and comfort measures.    Palliative Care Team will continue to follow patient. Please do not hesitate to contact us regarding further sx mgmt or GOC needs.  María Elena Vazquez, APRN  4/28/2023  Time spent: 45 minutes

## 2023-04-28 NOTE — PROGRESS NOTES
[facility] Medicine Services  PROGRESS NOTE    Patient Name: Christine Garg  : 1933  MRN: [id number]    Date of Admission: 4/10/2023  Primary Care Physician: Dave Tobar MD    Subjective   Subjective     CC:  Sepsis, dysphagia    HPI:  Patient wants to go home, family more agreeable to home with hospice    ROS:  Gen- No fevers, chills  CV- No chest pain, palpitations  Resp- No cough, dyspnea  GI- No N/V/D, abd pain        Objective   Objective     Vital Signs:   Temp:  [96.8 °F (36 °C)-97.7 °F (36.5 °C)] 97.7 °F (36.5 °C)  Heart Rate:  [70-96] 89  Resp:  [16-18] 18  BP: ()/(51-71) 108/54  Flow (L/min):  [1-2] 1     Physical Exam:  Constitutional: No acute distress, chronically ill-appearing  HENT: NCAT, mucous membranes moist  Respiratory: Crackles bilaterally, conversational dyspnea  Cardiovascular: RRR, no murmurs, rubs, or gallops  Musculoskeletal: LE edema  Psychiatric: Appropriate affect, cooperative  Neurologic: Oriented x 3,  speech clear  Skin: No rashes      Results Reviewed:  LAB RESULTS:      Lab 23  0519 23  0420 23  0415 23  0522 23  0511   WBC 12.00* 14.90* 13.86* 14.51* 13.15*   HEMOGLOBIN 8.3* 8.8* 9.4* 9.9* 9.5*   HEMATOCRIT 26.0* 27.5* 28.6* 30.7* 28.8*   PLATELETS 326 345 387 410 340   NEUTROS ABS 9.78* 12.49* 11.24* 11.76* 10.78*   IMMATURE GRANS (ABS) 0.08* 0.11* 0.16* 0.16* 0.10*   LYMPHS ABS 1.13 1.26 1.33 1.39 1.21   MONOS ABS 0.64 0.72 0.73 0.70 0.63   EOS ABS 0.31 0.26 0.32 0.42* 0.38   MCV 96.3 94.8 94.1 94.5 93.5         Lab 23  0519 23  0420 23  0641 23  0522 23  0511 23  0744   SODIUM 132* 129* 128* 127* 127* 126*   POTASSIUM 3.7 3.7 3.9 4.1 3.7 4.2   CHLORIDE 94* 89* 88* 87* 88* 88*   CO2 28.0 31.0* 31.0* 31.0* 32.0* 32.0*   ANION GAP 10.0 9.0 9.0 9.0 7.0 6.0   BUN 14 12 13 13 13 14   CREATININE 0.37* 0.28* 0.39* 0.35* 0.41* 0.40*   EGFR 96.5 103.2 95.3 97.8 94.2 94.7    GLUCOSE 108* 96 123* 105* 111* 116*   CALCIUM 7.9* 7.9* 8.2* 8.0* 7.9* 8.2*   MAGNESIUM  --   --   --  1.8  --  1.6   PHOSPHORUS 3.6 3.2  --   --   --  3.2         Lab 04/27/23  0519 04/26/23  0420   ALBUMIN 2.1* 2.2*                     Brief Urine Lab Results  (Last result in the past 365 days)      Color   Clarity   Blood   Leuk Est   Nitrite   Protein   CREAT   Urine HCG        04/25/23 0950 Yellow   Clear   Negative   Small (1+)   Negative   Negative                 Microbiology Results Abnormal     Procedure Component Value - Date/Time    Body Fluid Culture - Body Fluid, Peritoneum [[id number]] Collected: 04/26/23 1142    Lab Status: Preliminary result Specimen: Body Fluid from Peritoneum Updated: 04/27/23 1015     Body Fluid Culture No growth     Gram Stain Many (4+) WBCs seen      No organisms seen    Urine Culture - Urine, Straight Cath [[id number]]  (Normal) Collected: 04/25/23 0950    Lab Status: Final result Specimen: Urine from Straight Cath Updated: 04/26/23 1037     Urine Culture No growth          CT Guided Abscess Drain Subdiaphr Subphren    Result Date: 4/26/2023  DATE OF EXAM: 4/26/2023 10:55 AM EDT PROCEDURE: CT GUIDED ABSCESS DRAIN SUBDIAPHR SUBPHREN INDICATIONS: fluid collection, rule out infection COMPARISON: No Comparisons Available FLUOROSCOPIC TIME: 84 seconds of CT fluoroscopy PHYSICIAN MONITORED CONSCIOUS SEDATION TIME: 20 minutes TECHNIQUE: A detailed explanation of the procedure, including the risks, benefits, and alternatives was provided. A preprocedure timeout was performed. The interventional radiology nurse mild the patient all times and provided conscious sedation. The patient was placed supine on the bed and a planning CT was performed, redemonstrating the air and fluid collection adjacent to the stomach. An appropriate access site was selected and the area was prepped and draped in the usual sterile fashion. The skin was anesthetized with 1% lidocaine and a small skin incision  was made. Under CT fluoroscopic guidance a 18-gauge needle was advanced into the collection. A guidewire was manipulated centrally and the tract was dilated over the guidewire to accommodate a 10 Ecuadorean drain. However when advancing the drain over the wire, it was not able to be looped within the collection. This access site was abandoned. The 18-gauge needle was then readvanced into the collection and the tract was serially dilated over a guidewire to accommodate a 10 Ecuadorean drain which was pigtail looped within the collection. A total of 10 cc of cloudy fluid filled with particulate was aspirated. The catheter was secured to the skin with Prolene. Patient tolerated procedure well without immediate complication. A post scan demonstrated catheter in satisfactory position with near complete resolution of the collection. FINDINGS: See above     Impression: 1. Successful CT-guided drainage of fluid collection adjacent to the stomach. Electronically Signed: Alejandro Roberto  4/26/2023 12:12 PM EDT  Workstation ID: [id number]      Results for orders placed during the hospital encounter of 03/22/23    Adult Transthoracic Echo Complete W/ Cont if Necessary Per Protocol    Interpretation Summary  •  Left ventricular systolic function is normal. Calculated left ventricular EF = 60% Left ventricular ejection fraction appears to be 56 - 60%.  •  Left ventricular diastolic function is consistent with (grade I) impaired relaxation and age.  •  Moderate to severe mitral valve regurgitation is present.  •  Moderate tricuspid valve regurgitation is present.  •  Estimated right ventricular systolic pressure from tricuspid regurgitation is moderately elevated (45-55 mmHg).  •  Moderate pulmonary hypertension is present.      Current medications:  Scheduled Meds:aspirin, 81 mg, Per PEG Tube, Daily  carvedilol, 25 mg, Per PEG Tube, BID With Meals  cefepime, 2 g, Intravenous, Q8H  sennosides, 10 mL, Per PEG Tube, BID   And  docusate sodium,  100 mg, Per PEG Tube, BID  enoxaparin, 40 mg, Subcutaneous, Nightly  fluconazole, 400 mg, Intravenous, Q24H  gabapentin, 100 mg, Per PEG Tube, Q12H  HYDROcodone-acetaminophen, 1 tablet, Per PEG Tube, Q6H  lansoprazole, 30 mg, Per G Tube, BID AC  levothyroxine, 112 mcg, Per PEG Tube, Q AM  lidocaine, 1 patch, Transdermal, Q24H  lidocaine, 1 application, Topical, Q6H  metoclopramide, 5 mg, Per G Tube, TID AC  metroNIDAZOLE, 500 mg, Intravenous, Q8H  sodium chloride, 10 mL, Intravenous, Q12H      Continuous Infusions:   PRN Meds:.•  acetaminophen **OR** acetaminophen **OR** acetaminophen  •  aluminum-magnesium hydroxide-simethicone  •  [DISCONTINUED] senna-docusate sodium **AND** polyethylene glycol **AND** [DISCONTINUED] bisacodyl **AND** bisacodyl  •  cloNIDine  •  HYDROcodone-acetaminophen  •  hydrOXYzine  •  ipratropium-albuterol  •  [DISCONTINUED] ondansetron **OR** ondansetron  •  promethazine  •  sodium chloride  •  sodium chloride    Assessment & Plan   Assessment & Plan     Active Hospital Problems    Diagnosis  POA   • **Esophageal stricture [K22.2]  Unknown   • Severe malnutrition [E43]  Unknown   • Hypothyroidism (acquired) [E03.9]  Unknown   • S/P percutaneous endoscopic gastrostomy (PEG) tube placement [Z93.1]  Not Applicable   • Adenocarcinoma [C80.1]  Unknown   • Perforated ulcer [K27.5]  Yes   • Former smoker [Z87.891]  Not Applicable   • Coronary artery disease  [I25.10]  Yes   • Chronic obstructive pulmonary disease [J44.9]  Yes   • Essential hypertension [I10]  Yes   • ASCVD (arteriosclerotic cardiovascular disease) [I25.10]  Yes      Resolved Hospital Problems   No resolved problems to display.        Brief Hospital Course to date:  Christine Garg is a 89 y.o. female with past medical history of essential hypertension, dyslipidemia, coronary artery disease, peripheral artery disease, hypothyroidism, COPD who was admitted to [facility] on 3/22 for abdominal pain; went to OR, had  perforated duodenal ulcer repaired with omental patch.  Then on 3/29, patient had CT of thoracic spine which noted left pleural consolidation and parenchymal nodules.    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  She was also found to have an esophageal stricture secondary to extrinsic compression of 3cm peritracheal lymph node.    On 4/7, patient had PEG tube placed.  She was sent to [facility] for placement of esophageal stent by Dr. Waters.        This patient's problems and plans were partially entered by my partner and updated as appropriate by me 04/28/23.     Dysphagia due to external esophageal compression by LAD  S/p PEG  - EGD 4/12 with esophageal stent placement  -Repeat CT scan abdomen/pelvis on 4/25 showed new air/gas collection within left mid abdomen concerning for possible abscess  -Continue empiric antibiotics/antifungals, ID following, wound cultures pending     Hyponatremia  - dced free water flush with tube feeds  - monitor, improved slightly     Cancer related pain  -palliative care following  -Patient wanting to pursue more comfort care measures but son/family seem to be pushing her to pursue aggressive means     Perforated Duodenal Ulcer s/p exploratory laparotomy with patch 3/22  -continue low dose asa     New diagnosis of lung adenocarcinoma  COPD  Former smoker   - Follow up outpatient for treatment plan with Dr. Balbuena        Coronary artery disease  Dyslipidemia  Essential hypertension  - Lowered home aspirin from 325 mg to 81mg  -DC blood pressure meds  will need to make this clear at discharge in order to prevent further ulceration           Hypoalbuminemia   Severe malnutrition     Hypothyroidism  - Continue home synthroid      Debility  Family more agreeable to hospice conversation.  Currently working on home with hospice if she continues to decline.    Expected Discharge Location and Transportation: Home with hospice?  Expected  Discharge   Expected Discharge Date: 05/01/23       DVT prophylaxis:  Medical and mechanical DVT prophylaxis orders are present.     AM-PAC 6 Clicks Score (PT): 11 (04/26/23 0820)    CODE STATUS:   Code Status and Medical Interventions:   Ordered at: 04/25/23 0851     Medical Intervention Limits:    NO intubation (DNI)     Level Of Support Discussed With:    Health Care Surrogate     Code Status (Patient has no pulse and is not breathing):    No CPR (Do Not Attempt to Resuscitate)     Medical Interventions (Patient has pulse or is breathing):    Limited Support       Flora Franco,   04/28/23

## 2023-04-28 NOTE — PROGRESS NOTES
"    INFECTIOUS DISEASE Progress Note    Christine Garg  6/8/1933  [id number]    Admission Date: 4/10/2023      Requesting Provider: Noelle Combs DO  Evaluating Physician: ANA Bragg    Reason for Consultation: Intra-abdominal abscess    History of present illness:    4/26/23: Patient is a 89 y.o. female with a history of COPD, coronary artery disease, peripheral vascular disease, hypertension, and hyperlipidemia who is seen today for evaluation of an intra-abdominal abscess.She was admitted to [facility] on 3/22/23 with a perforated duodenal ulcer requiring emergent surgical intervention with oversewing utilizing an omental patch/Biopatch.  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  She was transferred to [facility] on 4/10 for placement of an esophageal stent.Dr. Brunner placed the stent on 4/12. On 4/12 her white blood cell count was normal at 9.9.By 4/17 her white blood cell count was up to 13.3 and has further risen to 14.9 today. Due to her leukocytosis, she was started on intravenous Rocephin on 4/24.  An abdominal/pelvic CT scan performed yesterday revealed an upper abdominal abscess adjacent to the stomach.  She underwent CT-guided drainage of the abscess today utxyenoh98 cc of cloudy fluid filled with particulate matter. She has remained afebrile.    4/27/23: She has remained afebrile.White blood cell count today is 12.  Creatinine is 0.37.  Intra-abdominal abscess cultures are pending.  She complains of some upper abdominal discomfort.  She complains of some increased dyspnea.    4/28/23: Afebrile.  \"not feeling well\".  Has shortness of breath and is currently on 1L O2 NC.  With O2 sat around 94%. Her peritoneum culture is positive for yeast.  Still with abdominal " discomfort. SARAH drain with serous drainage.  She denies f/c, v/d, rashes.  She has nausea.     Past Medical History:   Diagnosis Date   • Advanced age    • CAD (coronary artery disease) 2011    s/p 3v CABG   • Chronic kidney disease (CKD), stage III (moderate)    • COPD (chronic obstructive pulmonary disease)    • History of tobacco use    • Hyperlipidemia    • Hypertension    • Hypothyroidism    • PONV (postoperative nausea and vomiting)    • Pulmonary nodule        Past Surgical History:   Procedure Laterality Date   • BREAST BIOPSY Left     benign   • CATARACT EXTRACTION     • CORONARY ANGIOPLASTY     • CORONARY ARTERY BYPASS GRAFT     • ENDOSCOPY W/ PEG TUBE PLACEMENT N/A 4/7/2023    Procedure: ESOPHAGOGASTRODUODENOSCOPY WITH PERCUTANEOUS ENDOSCOPIC GASTROSTOMY TUBE INSERTION;  Surgeon: Aurora Kirkland MD;  Location: [facility];  Service: General;  Laterality: N/A;   • ENDOSCOPY W/ STENT PLACEMENT/REMOVAL N/A 4/12/2023    Procedure: ESOPHAGOGASTRODUODENOSCOPY WITH STENT PLACEMENT WITH FLUOROSCOPY;  Surgeon: Brunner, Mark I, MD;  Location: [facility] ENDOSCOPY;  Service: Gastroenterology;  Laterality: N/A;   • EXPLORATORY LAPAROTOMY N/A 3/22/2023    Procedure: LAPAROTOMY EXPLORATORY WITH OVER SEW OF DUODENAL ULCER WITH OMENTAL PATCH AND BIOPATCH AND CENTRAL LINE PLACEMENT;  Surgeon: Aurora Kirkland MD;  Location: [facility];  Service: General;  Laterality: N/A;       Family History   Problem Relation Age of Onset   • Heart disease Mother    • Heart disease Father    • Heart disease Brother    • Breast cancer Neg Hx        Social History     Socioeconomic History   • Marital status:    Tobacco Use   • Smoking status: Former     Types: Cigarettes   • Smokeless tobacco: Never   Vaping Use   • Vaping Use: Never used   Substance and Sexual Activity   • Alcohol use: No   • Drug use: No   • Sexual activity: Defer       Allergies   Allergen Reactions   • Motrin [Ibuprofen] Anaphylaxis and Hives   • Novocain  [Procaine] Other (See Comments)     Pt state she passes out.         Medication:    Current Facility-Administered Medications:   •  acetaminophen (TYLENOL) tablet 650 mg, 650 mg, Per PEG Tube, Q4H PRN **OR** acetaminophen (TYLENOL) 160 MG/5ML solution 650 mg, 650 mg, Per PEG Tube, Q4H PRN, 650 mg at 04/23/23 0958 **OR** acetaminophen (TYLENOL) suppository 650 mg, 650 mg, Rectal, Q4H PRN, Brunner, Mark I, MD  •  aluminum-magnesium hydroxide-simethicone (MAALOX MAX) 400-400-40 MG/5ML suspension 15 mL, 15 mL, Oral, Q6H PRN, Flora Franco DO, 15 mL at 04/25/23 1427  •  aspirin chewable tablet 81 mg, 81 mg, Per PEG Tube, Daily, Brunner, Mark I, MD, 81 mg at 04/28/23 0901  •  [DISCONTINUED] sennosides-docusate (PERICOLACE) 8.6-50 MG per tablet 2 tablet, 2 tablet, Per PEG Tube, BID **AND** polyethylene glycol (MIRALAX) packet 17 g, 17 g, Per PEG Tube, Daily PRN, 17 g at 04/25/23 1023 **AND** [DISCONTINUED] bisacodyl (DULCOLAX) EC tablet 5 mg, 5 mg, Oral, Daily PRN **AND** bisacodyl (DULCOLAX) suppository 10 mg, 10 mg, Rectal, Daily PRN, Brunner, Mark I, MD, 10 mg at 04/25/23 1023  •  carvedilol (COREG) tablet 25 mg, 25 mg, Per PEG Tube, BID With Meals, Aleta Hatch, APRN, 25 mg at 04/27/23 1708  •  cefepime (MAXIPIME) 2 g/100 mL 0.9% NS (mbp), 2 g, Intravenous, Q8H, Ruy Nuñez MD, 2 g at 04/28/23 0857  •  cloNIDine (CATAPRES) tablet 0.1 mg, 0.1 mg, Per PEG Tube, TID PRN, Brunner, Mark I, MD, 0.1 mg at 04/17/23 0413  •  sennosides (SENOKOT) 8.8 MG/5ML syrup 10 mL, 10 mL, Per PEG Tube, BID, 10 mL at 04/27/23 0820 **AND** docusate sodium (COLACE) liquid 100 mg, 100 mg, Per PEG Tube, BID, Brunner, Mark I, MD, 100 mg at 04/27/23 0817  •  Enoxaparin Sodium (LOVENOX) syringe 40 mg, 40 mg, Subcutaneous, Nightly, Catalina Leonardo MD, 40 mg at 04/27/23 2005  •  fluconazole (DIFLUCAN) IVPB 400 mg, 400 mg, Intravenous, Q24H, Ruy Nuñez MD, Last Rate: 100 mL/hr at 04/27/23 1716, 400 mg at 04/27/23 1716  •   gabapentin (NEURONTIN) capsule 100 mg, 100 mg, Per PEG Tube, Q12H, Clare De Anda MD, 100 mg at 04/28/23 0901  •  HYDROcodone-acetaminophen (NORCO) 7.5-325 MG per tablet 1 tablet, 1 tablet, Per PEG Tube, Q4H PRN, Catalina Leonardo MD, 1 tablet at 04/27/23 2244  •  HYDROcodone-acetaminophen (NORCO) 7.5-325 MG per tablet 1 tablet, 1 tablet, Per PEG Tube, Q6H, María Elena Vazquez APRN, 1 tablet at 04/28/23 1155  •  hydrOXYzine (ATARAX) tablet 25 mg, 25 mg, Per PEG Tube, TID PRN, María Elena Vazquez APRN, 25 mg at 04/28/23 0857  •  ipratropium-albuterol (DUO-NEB) nebulizer solution 3 mL, 3 mL, Nebulization, Q4H PRN, Clare De Anda MD, 3 mL at 04/28/23 0941  •  lansoprazole (FIRST) oral suspension 30 mg, 30 mg, Per G Tube, BID AC, Thomas Cruz, [facility], 30 mg at 04/28/23 0540  •  levothyroxine (SYNTHROID, LEVOTHROID) tablet 112 mcg, 112 mcg, Per PEG Tube, Q AM, Brunner, Mark I, MD, 112 mcg at 04/28/23 0540  •  lidocaine (LIDODERM) 5 % 1 patch, 1 patch, Transdermal, Q24H, Clare De Anda MD, 1 patch at 04/28/23 0857  •  lidocaine (LMX) 4 % cream 1 application, 1 application, Topical, Q6H, María Elena Vazquez APRN, 1 application at 04/27/23 0827  •  metoclopramide (REGLAN) solution 5 mg, 5 mg, Per G Tube, TID AC, Flora Franco, , 5 mg at 04/28/23 1155  •  metroNIDAZOLE (FLAGYL) IVPB 500 mg, 500 mg, Intravenous, Q8H, Ruy Nuñez MD, 500 mg at 04/28/23 0913  •  morphine injection 2 mg, 2 mg, Intravenous, Q2H PRN, George, María Elena E, APRN  •  [DISCONTINUED] ondansetron (ZOFRAN) tablet 4 mg, 4 mg, Oral, Q6H PRN **OR** ondansetron (ZOFRAN) injection 4 mg, 4 mg, Intravenous, Q6H PRN, Brunner, Mark I, MD, 4 mg at 04/23/23 0959  •  promethazine (PHENERGAN) suppository 12.5 mg, 12.5 mg, Rectal, Q4H PRN, Anaya Jones MD  •  sodium chloride 0.9 % flush 10 mL, 10 mL, Intravenous, Q12H, Brunner, Mark I, MD, 10 mL at 04/28/23 0901  •  sodium chloride 0.9 % flush 10 mL, 10 mL, Intravenous, PRN, Brunner, Mark I, MD, 10 mL at  23 1704  •  sodium chloride 0.9 % infusion 40 mL, 40 mL, Intravenous, PRN, Brunner, Mark I, MD, 40 mL at 23 0902    Antibiotics:  Anti-Infectives (From admission, onward)    Ordered     Dose/Rate Route Frequency Start Stop    23 1628  cefepime (MAXIPIME) 2 g/100 mL 0.9% NS (mbp)        Ordering Provider: Ruy Nuñez MD    2 g  over 4 Hours Intravenous Every 8 Hours 23 0000 23 2359    23 1628  metroNIDAZOLE (FLAGYL) IVPB 500 mg        Ordering Provider: Ruy Nuñez MD    500 mg  over 30 Minutes Intravenous Every 8 Hours 23 1800 23 1759    23 1628  cefepime (MAXIPIME) 2 g/100 mL 0.9% NS (mbp)        Ordering Provider: Ruy Nuñez MD    2 g  200 mL/hr over 30 Minutes Intravenous Once 23 1700 23 1838    23 1629  fluconazole (DIFLUCAN) IVPB 400 mg        Ordering Provider: Ruy Nuñez MD    400 mg  100 mL/hr over 120 Minutes Intravenous Every 24 Hours 23 1700 23 1659            Review of Systems:  See HPI      Physical Exam:   Vital Signs  Temp (24hrs), Av.2 °F (36.2 °C), Min:96.2 °F (35.7 °C), Max:97.7 °F (36.5 °C)    Temp  Min: 96.2 °F (35.7 °C)  Max: 97.7 °F (36.5 °C)  BP  Min: 89/58  Max: 117/66  Pulse  Min: 70  Max: 95  Resp  Min: 18  Max: 18  SpO2  Min: 92 %  Max: 97 %    GENERAL: Awake and alert, in no acute distress.   HEENT: Normocephalic, atraumatic.  PERRL. EOMI. No conjunctival injection. No icterus. Oropharynx clear without evidence of thrush or exudate. .  NECK: Supple .  HEART: RRR; No murmur, rubs, gallops.   LUNGS: Clear to auscultation bilaterally without wheezing, rales, rhonchi. Normal respiratory effort. Nonlabored..  ABDOMEN: Left upper quadrant PEG is in place with no exit site erythema or drainage.  She has a left upper quadrant drain in place with minimal active drainage, but serous drainage.   EXT:  .1+ lower extremity edema  :  Without Barriga catheter.  MSK: No joint effusions or  erythema  SKIN: Warm and dry without cutaneous eruptions on Inspection/palpation.    NEURO: Oriented to PPT.  Motor 5/5 strength  PSYCHIATRIC: Normal insight and judgment. Cooperative with PE    Laboratory Data    Results from last 7 days   Lab Units 04/27/23  0519 04/26/23  0420 04/25/23  0415   WBC 10*3/mm3 12.00* 14.90* 13.86*   HEMOGLOBIN g/dL 8.3* 8.8* 9.4*   HEMATOCRIT % 26.0* 27.5* 28.6*   PLATELETS 10*3/mm3 326 345 387     Results from last 7 days   Lab Units 04/27/23  0519   SODIUM mmol/L 132*   POTASSIUM mmol/L 3.7   CHLORIDE mmol/L 94*   CO2 mmol/L 28.0   BUN mg/dL 14   CREATININE mg/dL 0.37*   GLUCOSE mg/dL 108*   CALCIUM mg/dL 7.9*                             Estimated Creatinine Clearance: 107.7 mL/min (A) (by C-G formula based on SCr of 0.37 mg/dL (L)).      Microbiology:  Microbiology Results (last 10 days)     Procedure Component Value - Date/Time    Body Fluid Culture - Body Fluid, Peritoneum [[id number]]  (Abnormal) Collected: 04/26/23 1142    Lab Status: Preliminary result Specimen: Body Fluid from Peritoneum Updated: 04/28/23 1031     Body Fluid Culture Rare Yeast isolated     Comment: MICs to follow.        Gram Stain Many (4+) WBCs seen      No organisms seen    Urine Culture - Urine, Straight Cath [[id number]]  (Normal) Collected: 04/25/23 0950    Lab Status: Final result Specimen: Urine from Straight Cath Updated: 04/26/23 1037     Urine Culture No growth              Radiology:  Imaging Results (Last 72 Hours)     Procedure Component Value Units Date/Time    CT Guided Abscess Drain Subdiaphr Subphren [[id number]] Collected: 04/26/23 1207     Updated: 04/26/23 1216    Narrative:      DATE OF EXAM:  4/26/2023 10:55 AM EDT    PROCEDURE:  CT GUIDED ABSCESS DRAIN SUBDIAPHR SUBPHREN    INDICATIONS:  fluid collection, rule out infection    COMPARISON:  No Comparisons Available    FLUOROSCOPIC TIME:  84 seconds of CT fluoroscopy    PHYSICIAN MONITORED CONSCIOUS SEDATION TIME:  20 minutes    TECHNIQUE:    A detailed explanation of the procedure, including the risks, benefits, and alternatives was provided. A preprocedure timeout was performed. The interventional radiology nurse mild the patient all times and provided conscious sedation. The patient was   placed supine on the bed and a planning CT was performed, redemonstrating the air and fluid collection adjacent to the stomach. An appropriate access site was selected and the area was prepped and draped in the usual sterile fashion. The skin was   anesthetized with 1% lidocaine and a small skin incision was made. Under CT fluoroscopic guidance a 18-gauge needle was advanced into the collection. A guidewire was manipulated centrally and the tract was dilated over the guidewire to accommodate a 10   Czech drain. However when advancing the drain over the wire, it was not able to be looped within the collection. This access site was abandoned.    The 18-gauge needle was then readvanced into the collection and the tract was serially dilated over a guidewire to accommodate a 10 Czech drain which was pigtail looped within the collection. A total of 10 cc of cloudy fluid filled with particulate was   aspirated. The catheter was secured to the skin with Prolene. Patient tolerated procedure well without immediate complication. A post scan demonstrated catheter in satisfactory position with near complete resolution of the collection.    FINDINGS:  See above      Impression:        1. Successful CT-guided drainage of fluid collection adjacent to the stomach.      Electronically Signed: Alejandro Roberto    4/26/2023 12:12 PM EDT    Workstation ID: [id number]    CT Abdomen Pelvis With Contrast [[id number]] Collected: 04/25/23 2017     Updated: 04/26/23 0038    Narrative:      CT ABDOMEN PELVIS W CONTRAST    Date of Exam: 4/25/2023 6:23 PM EDT    Indication: further eval for air seen on KUB.    Comparison: KUB 4/25/2023, CT abdomen and pelvis 4/20/2023, 1/6/2023    Technique: Axial  CT images were obtained of the abdomen and pelvis following the uneventful intravenous administration of 85 mL Isovue-300. Reconstructed coronal and sagittal images were also obtained. Automated exposure control and iterative   construction methods were used.      Findings:  Redemonstration of percutaneous gastrostomy with the balloon positioned in the stomach lumen. There is a thinly rim-enhancing fluid and air/gas collection within the anterior left mid abdomen anterior to the gastric body and gastric antrum (series 2   image 81); this measures 7.9 x 2.9 x 5.7 cm (TV X AP X CC). This appears slightly enlarged and more conspicuous compared to CT from 4/20/2023. The left-sided margin of this collection is in close proximity to the tubing/tract of the percutaneous   gastrostomy (series 2 image 78). The right-sided margin of this collection partially abuts the inflamed gastric pylorus/first portion of duodenum (series 900 image 17). Apart from air/gas within this collection, no evidence of pneumoperitoneum otherwise.   Superior to the collection is a prominent anteriorly-directed diverticulum arising from the fourth portion of the duodenum or proximal jejunum (series 2 image 66), present on multiple prior exams.   Similar appearance of wall thickened and inflamed gastric pylorus and proximal duodenum compatible with known ulcer disease. There is a small amount of fluid within the stomach, without findings to suggest gastric outlet obstruction. The remainder of the   GI tract appears unchanged, with redemonstration of scattered colonic diverticulosis and scattered unformed stool throughout the colon.    There is a small amount of simple appearing nonorganized pelvic free fluid which is likely reactive. No additional discrete/drainable fluid collections are identified.    Unremarkable appearance of the liver, gallbladder, bile ducts, spleen. Top normal size of the main pancreatic duct, with 0.6 cm round hypodensity in  the pancreatic uncinate which appears to communicate with the main pancreatic duct, compatible with   sidebranch IPMN. Normal appearance of the adrenal glands, kidneys, ureters, bladder, uterus, and adnexa. There is diffuse fat stranding of the soft tissues of the body wall compatible with anasarca.    The lower thorax redemonstrates small to moderate bilateral pleural effusions with associated dependent/passive bibasilar atelectasis, partially imaged subcarinal mass or lymph node, and distal esophageal stent with internal fluid/debris within the stent   lumen.    There is atherosclerosis of the abdominal aorta without evidence of aneurysm, with otherwise grossly unremarkable appearance of the vasculature. No lymphadenopathy. No acute or suspicious bony findings.        Impression:      Impression:  Mild increased size/conspicuity of thinly rim-enhancing organized fluid and air/gas collection within the anterior mid abdomen anterior to the stomach. It is difficult to ascertain if this reflects organized collection or abscess associated with the   percutaneous gastrostomy tube along its left margin, or evolving intra-abdominal abscess from gastric pyloric/proximal duodenal ulcer along its right margin.    Redemonstration of inflammatory thickening and fat stranding of the gastric pylorus and proximal duodenum compatible with known ulcer disease.    Redemonstration of distal esophageal stent.    Redemonstration of fluid overload state with moderate bilateral pleural effusions and anasarca.    Additional chronic and incidental ancillary findings as noted above.    Electronically Signed: Anibal Briseno    4/26/2023 12:35 AM EDT    Workstation ID: [id number]            Impression:   1.  Intra-abdominal abscess-related to her perforated duodenal ulcer versus the G-tube placement.  She underwent CT-guided drainage of the abscess on 4/26.  Cultures are with yeast.  She is currently on cefepime/metronidazole/fluconazole.  2.   Duodenal ulcer perforation-status post surgery 3/22/23 with omental patch/Biopatch repair  3.  Adenocarcinoma of the lung-recently diagnosed with a pleural biopsy  4.  Dysphagia/external esophageal compression-status post esophageal stent placement on 4/12/23  5.  Leukocytosis/neutrophilia-secondary to intra-abdominal infection    PLAN/RECOMMENDATIONS:   1.  Intra-abdominal abscess cultures-yeast, micro is working this up.   2.  Continue intravenous cefepime  3.  Continue intravenous metronidazole  4.  Continue intravenous fluconazole  5.  Goals of care being discussed with Hospice.     Copied text in this note has been reviewed and is accurate as of 04/28/23      PA saw and examined patient today.  D/w ANA Bueno-C  [facility]         ANA Bragg  4/28/2023  15:17 EDT

## 2023-04-28 NOTE — PLAN OF CARE
Goal Outcome Evaluation:  Plan of Care Reviewed With: patient        Progress: no change  Outcome Evaluation: Patient c/o pain twice this and one dose prn norco given. C/o anxiety prn atarax administered. 0000 SBP in 90's while patient sleeping. Morning SBP 80's and recheck greater then 100. Patient continues to refuse turning in bed.

## 2023-04-28 NOTE — PROGRESS NOTES
Continued Stay Note  [facility]     Patient Name: Christine Garg  MRN: [id number]  Today's Date: 4/28/2023    Admit Date: 4/10/2023    Plan: Rehab   Discharge Plan     Row Name 04/28/23 1413       Plan    Plan Comments Hospice consult. Visit to bedside. Present are patient and two sons. Rn actively listens as family desire discharge home with hospice, have been advised that this is best for patient and that patient desires this. Patient relays wants to go home with hospice and asks if insurance will pay. Rn discusses hospice, services provided. Discusses equipment needs. Family relay need: bed, obt, oxygen, bsc. Discussed need to prepare room for equipment. Son Jared relays concerns over providing direct care. Rn discusses hospice does not provide 24hour care. He verbalizes understanding. Discussed paid caregivers-he relays that is their plan. Sons relay that they do not want equipment arranged until they hear from physician that patient is medically ready for discharge. They also prefer anyone calling them to have an [phone] area code as most telemarketers that they have call them use a 606 number. Rn provides contact information. Offers open communication. Updated María Elena CASTREJON with palliative and Dr. Franco and case management via epic. Discussed with Arnel Cain RN outpatient hospice. Hospice will continue to follow. Please contact [phone] with concerns.               Discharge Codes    No documentation.               Expected Discharge Date and Time     Expected Discharge Date Expected Discharge Time    May 1, 2023             Maddie Akbar RN

## 2023-04-29 NOTE — PROGRESS NOTES
"    INFECTIOUS DISEASE Progress Note    Christine Garg  6/8/1933  [id number]    Admission Date: 4/10/2023      Requesting Provider: Noelle Combs DO  Evaluating Physician: LUCÍA Gill    Reason for Consultation: Intra-abdominal abscess    History of present illness:    4/26/23: Patient is a 89 y.o. female with a history of COPD, coronary artery disease, peripheral vascular disease, hypertension, and hyperlipidemia who is seen today for evaluation of an intra-abdominal abscess.She was admitted to [facility] on 3/22/23 with a perforated duodenal ulcer requiring emergent surgical intervention with oversewing utilizing an omental patch/Biopatch.  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  She was transferred to [facility] on 4/10 for placement of an esophageal stent.Dr. Brunner placed the stent on 4/12. On 4/12 her white blood cell count was normal at 9.9.By 4/17 her white blood cell count was up to 13.3 and has further risen to 14.9 today. Due to her leukocytosis, she was started on intravenous Rocephin on 4/24.  An abdominal/pelvic CT scan performed yesterday revealed an upper abdominal abscess adjacent to the stomach.  She underwent CT-guided drainage of the abscess today [id number] cc of cloudy fluid filled with particulate matter. She has remained afebrile.    4/27/23: She has remained afebrile.White blood cell count today is 12.  Creatinine is 0.37.  Intra-abdominal abscess cultures are pending.  She complains of some upper abdominal discomfort.  She complains of some increased dyspnea.    4/28/23: Afebrile.  \"not feeling well\".  Has shortness of breath and is currently on 1L O2 NC.  With O2 sat around 94%. Her peritoneum culture is positive for yeast.  Still with " abdominal discomfort. SARAH drain with serous drainage.  She denies f/c, v/d, rashes.  She has nausea.     4/29/23:  She remains afebrile.  No new labs. Peritoneal culture with yeast. She complains of shortness of breath. No cough, no abdominal pain.  Hospice evaluated, possibly going home with hospice.     Past Medical History:   Diagnosis Date   • Advanced age    • CAD (coronary artery disease) 2011    s/p 3v CABG   • Chronic kidney disease (CKD), stage III (moderate)    • COPD (chronic obstructive pulmonary disease)    • History of tobacco use    • Hyperlipidemia    • Hypertension    • Hypothyroidism    • PONV (postoperative nausea and vomiting)    • Pulmonary nodule        Past Surgical History:   Procedure Laterality Date   • BREAST BIOPSY Left     benign   • CATARACT EXTRACTION     • CORONARY ANGIOPLASTY     • CORONARY ARTERY BYPASS GRAFT     • ENDOSCOPY W/ PEG TUBE PLACEMENT N/A 4/7/2023    Procedure: ESOPHAGOGASTRODUODENOSCOPY WITH PERCUTANEOUS ENDOSCOPIC GASTROSTOMY TUBE INSERTION;  Surgeon: Aurora Kirkland MD;  Location: [facility];  Service: General;  Laterality: N/A;   • ENDOSCOPY W/ STENT PLACEMENT/REMOVAL N/A 4/12/2023    Procedure: ESOPHAGOGASTRODUODENOSCOPY WITH STENT PLACEMENT WITH FLUOROSCOPY;  Surgeon: Brunner, Mark I, MD;  Location: [facility] ENDOSCOPY;  Service: Gastroenterology;  Laterality: N/A;   • EXPLORATORY LAPAROTOMY N/A 3/22/2023    Procedure: LAPAROTOMY EXPLORATORY WITH OVER SEW OF DUODENAL ULCER WITH OMENTAL PATCH AND BIOPATCH AND CENTRAL LINE PLACEMENT;  Surgeon: Aurora Kirkland MD;  Location: [facility];  Service: General;  Laterality: N/A;       Family History   Problem Relation Age of Onset   • Heart disease Mother    • Heart disease Father    • Heart disease Brother    • Breast cancer Neg Hx        Social History     Socioeconomic History   • Marital status:    Tobacco Use   • Smoking status: Former     Types: Cigarettes   • Smokeless tobacco: Never   Vaping Use   • Vaping  Use: Never used   Substance and Sexual Activity   • Alcohol use: No   • Drug use: No   • Sexual activity: Defer       Allergies   Allergen Reactions   • Motrin [Ibuprofen] Anaphylaxis and Hives   • Novocain [Procaine] Other (See Comments)     Pt state she passes out.         Medication:    Current Facility-Administered Medications:   •  acetaminophen (TYLENOL) tablet 650 mg, 650 mg, Per PEG Tube, Q4H PRN **OR** acetaminophen (TYLENOL) 160 MG/5ML solution 650 mg, 650 mg, Per PEG Tube, Q4H PRN, 650 mg at 04/23/23 0958 **OR** acetaminophen (TYLENOL) suppository 650 mg, 650 mg, Rectal, Q4H PRN, Brunner, Mark I, MD  •  aluminum-magnesium hydroxide-simethicone (MAALOX MAX) 400-400-40 MG/5ML suspension 15 mL, 15 mL, Oral, Q6H PRN, Flora Farnco DO, 15 mL at 04/25/23 1427  •  aspirin chewable tablet 81 mg, 81 mg, Per PEG Tube, Daily, Brunner, Mark I, MD, 81 mg at 04/29/23 0858  •  [DISCONTINUED] sennosides-docusate (PERICOLACE) 8.6-50 MG per tablet 2 tablet, 2 tablet, Per PEG Tube, BID **AND** polyethylene glycol (MIRALAX) packet 17 g, 17 g, Per PEG Tube, Daily PRN, 17 g at 04/25/23 1023 **AND** [DISCONTINUED] bisacodyl (DULCOLAX) EC tablet 5 mg, 5 mg, Oral, Daily PRN **AND** bisacodyl (DULCOLAX) suppository 10 mg, 10 mg, Rectal, Daily PRN, Brunner, Mark I, MD, 10 mg at 04/25/23 1023  •  carvedilol (COREG) tablet 25 mg, 25 mg, Per PEG Tube, BID With Meals, Aleta Hatch APRN, 25 mg at 04/27/23 1708  •  cefepime (MAXIPIME) 2 g/100 mL 0.9% NS (mbp), 2 g, Intravenous, Q8H, Ruy Nuñez MD, 2 g at 04/29/23 0920  •  cloNIDine (CATAPRES) tablet 0.1 mg, 0.1 mg, Per PEG Tube, TID PRN, Brunner, Mark I, MD, 0.1 mg at 04/17/23 0413  •  sennosides (SENOKOT) 8.8 MG/5ML syrup 10 mL, 10 mL, Per PEG Tube, BID, 10 mL at 04/27/23 0820 **AND** docusate sodium (COLACE) liquid 100 mg, 100 mg, Per PEG Tube, BID, Brunner, Mark I, MD, 100 mg at 04/29/23 0856  •  Enoxaparin Sodium (LOVENOX) syringe 40 mg, 40 mg, Subcutaneous,  Nightly, Catalina Leonardo MD, 40 mg at 04/28/23 1955  •  fluconazole (DIFLUCAN) IVPB 400 mg, 400 mg, Intravenous, Q24H, Ruy Nuñez MD, Last Rate: 100 mL/hr at 04/28/23 1653, 400 mg at 04/28/23 1653  •  gabapentin (NEURONTIN) capsule 100 mg, 100 mg, Per PEG Tube, Q12H, Clare De Anda MD, 100 mg at 04/29/23 0858  •  HYDROcodone-acetaminophen (NORCO) 7.5-325 MG per tablet 1 tablet, 1 tablet, Per PEG Tube, Q4H PRN, Catalina Leonrado MD, 1 tablet at 04/29/23 0317  •  HYDROcodone-acetaminophen (NORCO) 7.5-325 MG per tablet 1 tablet, 1 tablet, Per PEG Tube, Q6H, María Elena Vazquez APRN, 1 tablet at 04/29/23 0603  •  hydrOXYzine (ATARAX) tablet 25 mg, 25 mg, Per PEG Tube, TID PRN, María Elena Vazquez APRN, 25 mg at 04/28/23 0857  •  ipratropium-albuterol (DUO-NEB) nebulizer solution 3 mL, 3 mL, Nebulization, Q4H PRN, Clare De Anda MD, 3 mL at 04/29/23 0051  •  lansoprazole (FIRST) oral suspension 30 mg, 30 mg, Per G Tube, BID AC, Thomas Cruz, [facility], 30 mg at 04/28/23 1655  •  levothyroxine (SYNTHROID, LEVOTHROID) tablet 112 mcg, 112 mcg, Per PEG Tube, Q AM, Brunner, Mark I, MD, 112 mcg at 04/29/23 0602  •  lidocaine (LIDODERM) 5 % 1 patch, 1 patch, Transdermal, Q24H, Clare De Anda MD, 1 patch at 04/28/23 0857  •  lidocaine (LMX) 4 % cream 1 application, 1 application, Topical, Q6H, María Elena Vazquez APRN, 1 application at 04/29/23 0920  •  metoclopramide (REGLAN) solution 5 mg, 5 mg, Per G Tube, TID AC, Flora Francooll, DO, 5 mg at 04/29/23 0604  •  metroNIDAZOLE (FLAGYL) IVPB 500 mg, 500 mg, Intravenous, Q8H, Ruy Nuñez MD, 500 mg at 04/29/23 0920  •  morphine injection 2 mg, 2 mg, Intravenous, Q2H PRN, María Elena Vazquez, APRN, 2 mg at 04/29/23 0813  •  [DISCONTINUED] ondansetron (ZOFRAN) tablet 4 mg, 4 mg, Oral, Q6H PRN **OR** ondansetron (ZOFRAN) injection 4 mg, 4 mg, Intravenous, Q6H PRN, Brunner, Mark I, MD, 4 mg at 04/23/23 0959  •  promethazine (PHENERGAN) suppository 12.5 mg, 12.5 mg, Rectal, Q4H PRN,  Anaya Jones MD  •  sodium chloride 0.9 % flush 10 mL, 10 mL, Intravenous, Q12H, Brunner, Mark I, MD, 10 mL at 23 0921  •  sodium chloride 0.9 % flush 10 mL, 10 mL, Intravenous, PRN, Brunner, Mark I, MD, 10 mL at 23 1704  •  sodium chloride 0.9 % infusion 40 mL, 40 mL, Intravenous, PRN, Brunner, Mark I, MD, 40 mL at 23 0902    Antibiotics:  Anti-Infectives (From admission, onward)    Ordered     Dose/Rate Route Frequency Start Stop    23 1628  cefepime (MAXIPIME) 2 g/100 mL 0.9% NS (mbp)        Ordering Provider: Ruy Nuñez MD    2 g  over 4 Hours Intravenous Every 8 Hours 23 0000 23 2359    23 1628  metroNIDAZOLE (FLAGYL) IVPB 500 mg        Ordering Provider: Ruy Nuñez MD    500 mg  over 30 Minutes Intravenous Every 8 Hours 23 1800 23 1759    23 1628  cefepime (MAXIPIME) 2 g/100 mL 0.9% NS (mbp)        Ordering Provider: Ruy Nuñez MD    2 g  200 mL/hr over 30 Minutes Intravenous Once 23 1700 23 1838    23 1629  fluconazole (DIFLUCAN) IVPB 400 mg        Ordering Provider: Ruy Nuñez MD    400 mg  100 mL/hr over 120 Minutes Intravenous Every 24 Hours 23 1700 23 1659            Review of Systems:  See HPI      Physical Exam:   Vital Signs  Temp (24hrs), Av.8 °F (35.4 °C), Min:95.1 °F (35.1 °C), Max:96.7 °F (35.9 °C)    Temp  Min: 95.1 °F (35.1 °C)  Max: 96.7 °F (35.9 °C)  BP  Min: 100/60  Max: 117/66  Pulse  Min: 79  Max: 91  Resp  Min: 14  Max: 18  SpO2  Min: 93 %  Max: 98 %    GENERAL: Awake and alert, in no acute distress.   HEENT: Normocephalic, atraumatic.  PERRL. EOMI. No conjunctival injection. No icterus. Oropharynx clear without evidence of thrush or exudate. .  NECK: Supple .  HEART: RRR; No murmur, rubs, gallops.   LUNGS: few scattered crackles   ABDOMEN: Left upper quadrant PEG is in place with no exit site erythema or drainage.  She has a left upper quadrant drain in  place with minimal active drainage, but serous drainage.   EXT:  .1+ lower extremity edema  :  Without Barriga catheter.  MSK: No joint effusions or erythema  SKIN: Warm and dry without cutaneous eruptions on Inspection/palpation.    NEURO: Oriented to PPT.  Motor 5/5 strength  PSYCHIATRIC: Normal insight and judgment. Cooperative with PE    Laboratory Data    Results from last 7 days   Lab Units 04/27/23  0519 04/26/23  0420 04/25/23  0415   WBC 10*3/mm3 12.00* 14.90* 13.86*   HEMOGLOBIN g/dL 8.3* 8.8* 9.4*   HEMATOCRIT % 26.0* 27.5* 28.6*   PLATELETS 10*3/mm3 326 345 387     Results from last 7 days   Lab Units 04/27/23  0519   SODIUM mmol/L 132*   POTASSIUM mmol/L 3.7   CHLORIDE mmol/L 94*   CO2 mmol/L 28.0   BUN mg/dL 14   CREATININE mg/dL 0.37*   GLUCOSE mg/dL 108*   CALCIUM mg/dL 7.9*                             Estimated Creatinine Clearance: 107.7 mL/min (A) (by C-G formula based on SCr of 0.37 mg/dL (L)).      Microbiology:  Microbiology Results (last 10 days)     Procedure Component Value - Date/Time    Anaerobic Culture - Drainage, Peritoneum [[id number]]  (Normal) Collected: 04/26/23 1143    Lab Status: Preliminary result Specimen: Drainage from Peritoneum Updated: 04/29/23 0843     Anaerobic Culture No anaerobes isolated at 3 days    Body Fluid Culture - Body Fluid, Peritoneum [[id number]]  (Abnormal) Collected: 04/26/23 1142    Lab Status: Preliminary result Specimen: Body Fluid from Peritoneum Updated: 04/29/23 0928     Body Fluid Culture Rare Yeast isolated     Gram Stain Many (4+) WBCs seen      No organisms seen    Urine Culture - Urine, Straight Cath [[id number]]  (Normal) Collected: 04/25/23 0950    Lab Status: Final result Specimen: Urine from Straight Cath Updated: 04/26/23 1037     Urine Culture No growth              Radiology:  Imaging Results (Last 72 Hours)     Procedure Component Value Units Date/Time    CT Guided Abscess Drain Subdiaphr Subphren [[id number]] Collected: 04/26/23 1207      Updated: 04/26/23 1216    Narrative:      DATE OF EXAM:  4/26/2023 10:55 AM EDT    PROCEDURE:  CT GUIDED ABSCESS DRAIN SUBDIAPHR SUBPHREN    INDICATIONS:  fluid collection, rule out infection    COMPARISON:  No Comparisons Available    FLUOROSCOPIC TIME:  84 seconds of CT fluoroscopy    PHYSICIAN MONITORED CONSCIOUS SEDATION TIME:  20 minutes    TECHNIQUE:   A detailed explanation of the procedure, including the risks, benefits, and alternatives was provided. A preprocedure timeout was performed. The interventional radiology nurse mild the patient all times and provided conscious sedation. The patient was   placed supine on the bed and a planning CT was performed, redemonstrating the air and fluid collection adjacent to the stomach. An appropriate access site was selected and the area was prepped and draped in the usual sterile fashion. The skin was   anesthetized with 1% lidocaine and a small skin incision was made. Under CT fluoroscopic guidance a 18-gauge needle was advanced into the collection. A guidewire was manipulated centrally and the tract was dilated over the guidewire to accommodate a 10   Slovak drain. However when advancing the drain over the wire, it was not able to be looped within the collection. This access site was abandoned.    The 18-gauge needle was then readvanced into the collection and the tract was serially dilated over a guidewire to accommodate a 10 Slovak drain which was pigtail looped within the collection. A total of 10 cc of cloudy fluid filled with particulate was   aspirated. The catheter was secured to the skin with Prolene. Patient tolerated procedure well without immediate complication. A post scan demonstrated catheter in satisfactory position with near complete resolution of the collection.    FINDINGS:  See above      Impression:        1. Successful CT-guided drainage of fluid collection adjacent to the stomach.      Electronically Signed: Alejandro Roberto    4/26/2023 12:12 PM  EDT    Workstation ID: [id number]            Impression:   1.  Intra-abdominal abscess-related to her perforated duodenal ulcer versus the G-tube placement.  She underwent CT-guided drainage of the abscess on 4/26,  Cultures are with yeast.  She is currently on cefepime/metronidazole/fluconazole. ( )   2.  Duodenal ulcer perforation-status post surgery 3/22/23 with omental patch/Biopatch repair  3.  Adenocarcinoma of the lung-recently diagnosed with a pleural biopsy  4.  Dysphagia/external esophageal compression-status post esophageal stent placement on 4/12/23  5.  Leukocytosis/neutrophilia-secondary to intra-abdominal infection    PLAN/RECOMMENDATIONS:   1.  Intra-abdominal abscess cultures-yeast, micro is working this up.   2.  Continue intravenous cefepime  3.  Continue intravenous metronidazole  4.  Continue intravenous fluconazole  5.  Goals of care being discussed with Hospice.     Copied text in this note has been reviewed and is accurate as of 04/29/23               Bee Lara, LUCÍA  4/29/2023  10:04 EDT

## 2023-04-29 NOTE — PLAN OF CARE
Goal Outcome Evaluation:  Plan of Care Reviewed With: patient        Progress: no change  Outcome Evaluation: VSS. Pt has Q2 hr morphine that brought her pain relief and helped with air hunger. Pt rested well today and visited with family. Will continue plan of care.

## 2023-04-29 NOTE — PROGRESS NOTES
[facility] Medicine Services  PROGRESS NOTE    Patient Name: Christine aGrg  : 1933  MRN: [id number]    Date of Admission: 4/10/2023  Primary Care Physician: Dave Tobar MD    Subjective   Subjective     CC: f/u intra-abdominal abscess    HPI: Lying in bed. Denies abdominal pain. No new concerns.    ROS:  Gen- No fevers, chills  CV- No chest pain, palpitations  Resp- No cough, dyspnea  GI- No N/V/D, abd pain     Objective   Objective     Vital Signs:   Temp:  [95.1 °F (35.1 °C)-96.8 °F (36 °C)] 96.8 °F (36 °C)  Heart Rate:  [79-91] 90  Resp:  [14-18] 16  BP: (100-117)/(57-76) 115/58  Flow (L/min):  [1] 1     Physical Exam:  Constitutional: No acute distress, awake, alert, comfortable appearing elderly female  HENT: NCAT, mucous membranes moist  Respiratory: Clear to auscultation bilaterally, respiratory effort normal   Cardiovascular: RRR, no murmurs, rubs, or gallops  Gastrointestinal: Positive bowel sounds, soft, nontender, nondistended, PEG, SARAH  Musculoskeletal: No bilateral ankle edema  Psychiatric: Appropriate affect, cooperative  Neurologic: Oriented x 3, strength symmetric in all extremities, Cranial Nerves grossly intact to confrontation, speech clear  Skin: No rashes    Results Reviewed:  LAB RESULTS:      Lab 23  0519 23  0420 23  0415 23  0522 23  0511   WBC 12.00* 14.90* 13.86* 14.51* 13.15*   HEMOGLOBIN 8.3* 8.8* 9.4* 9.9* 9.5*   HEMATOCRIT 26.0* 27.5* 28.6* 30.7* 28.8*   PLATELETS 326 345 387 410 340   NEUTROS ABS 9.78* 12.49* 11.24* 11.76* 10.78*   IMMATURE GRANS (ABS) 0.08* 0.11* 0.16* 0.16* 0.10*   LYMPHS ABS 1.13 1.26 1.33 1.39 1.21   MONOS ABS 0.64 0.72 0.73 0.70 0.63   EOS ABS 0.31 0.26 0.32 0.42* 0.38   MCV 96.3 94.8 94.1 94.5 93.5         Lab 23  0519 23  0420 23  0641 23  0522 23  0511   SODIUM 132* 129* 128* 127* 127*   POTASSIUM 3.7 3.7 3.9 4.1 3.7   CHLORIDE 94* 89* 88* 87* 88*   CO2 28.0  31.0* 31.0* 31.0* 32.0*   ANION GAP 10.0 9.0 9.0 9.0 7.0   BUN 14 12 13 13 13   CREATININE 0.37* 0.28* 0.39* 0.35* 0.41*   EGFR 96.5 103.2 95.3 97.8 94.2   GLUCOSE 108* 96 123* 105* 111*   CALCIUM 7.9* 7.9* 8.2* 8.0* 7.9*   MAGNESIUM  --   --   --  1.8  --    PHOSPHORUS 3.6 3.2  --   --   --          Lab 04/27/23  0519 04/26/23  0420   ALBUMIN 2.1* 2.2*                     Brief Urine Lab Results  (Last result in the past 365 days)      Color   Clarity   Blood   Leuk Est   Nitrite   Protein   CREAT   Urine HCG        04/25/23 0950 Yellow   Clear   Negative   Small (1+)   Negative   Negative                 Microbiology Results Abnormal     Procedure Component Value - Date/Time    Anaerobic Culture - Drainage, Peritoneum [[id number]]  (Normal) Collected: 04/26/23 1143    Lab Status: Preliminary result Specimen: Drainage from Peritoneum Updated: 04/29/23 0843     Anaerobic Culture No anaerobes isolated at 3 days    Urine Culture - Urine, Straight Cath [[id number]]  (Normal) Collected: 04/25/23 0950    Lab Status: Final result Specimen: Urine from Straight Cath Updated: 04/26/23 1037     Urine Culture No growth          No radiology results from the last 24 hrs    Results for orders placed during the hospital encounter of 03/22/23    Adult Transthoracic Echo Complete W/ Cont if Necessary Per Protocol    Interpretation Summary  •  Left ventricular systolic function is normal. Calculated left ventricular EF = 60% Left ventricular ejection fraction appears to be 56 - 60%.  •  Left ventricular diastolic function is consistent with (grade I) impaired relaxation and age.  •  Moderate to severe mitral valve regurgitation is present.  •  Moderate tricuspid valve regurgitation is present.  •  Estimated right ventricular systolic pressure from tricuspid regurgitation is moderately elevated (45-55 mmHg).  •  Moderate pulmonary hypertension is present.      Current medications:  Scheduled Meds:aspirin, 81 mg, Per PEG Tube,  Daily  carvedilol, 25 mg, Per PEG Tube, BID With Meals  cefepime, 2 g, Intravenous, Q8H  sennosides, 10 mL, Per PEG Tube, BID   And  docusate sodium, 100 mg, Per PEG Tube, BID  enoxaparin, 40 mg, Subcutaneous, Nightly  fluconazole, 400 mg, Intravenous, Q24H  gabapentin, 100 mg, Per PEG Tube, Q12H  HYDROcodone-acetaminophen, 1 tablet, Per PEG Tube, Q6H  lansoprazole, 30 mg, Per G Tube, BID AC  levothyroxine, 112 mcg, Per PEG Tube, Q AM  lidocaine, 1 patch, Transdermal, Q24H  lidocaine, 1 application, Topical, Q6H  metoclopramide, 5 mg, Per G Tube, TID AC  metroNIDAZOLE, 500 mg, Intravenous, Q8H  sodium chloride, 10 mL, Intravenous, Q12H      Continuous Infusions:   PRN Meds:.•  acetaminophen **OR** acetaminophen **OR** acetaminophen  •  aluminum-magnesium hydroxide-simethicone  •  [DISCONTINUED] senna-docusate sodium **AND** polyethylene glycol **AND** [DISCONTINUED] bisacodyl **AND** bisacodyl  •  cloNIDine  •  HYDROcodone-acetaminophen  •  hydrOXYzine  •  ipratropium-albuterol  •  Morphine  •  [DISCONTINUED] ondansetron **OR** ondansetron  •  promethazine  •  sodium chloride  •  sodium chloride    Assessment & Plan   Assessment & Plan     Active Hospital Problems    Diagnosis  POA   • **Esophageal stricture [K22.2]  Unknown   • Severe malnutrition [E43]  Unknown   • Hypothyroidism (acquired) [E03.9]  Unknown   • S/P percutaneous endoscopic gastrostomy (PEG) tube placement [Z93.1]  Not Applicable   • Adenocarcinoma [C80.1]  Unknown   • Perforated ulcer [K27.5]  Yes   • Former smoker [Z87.891]  Not Applicable   • Coronary artery disease  [I25.10]  Yes   • Chronic obstructive pulmonary disease [J44.9]  Yes   • Essential hypertension [I10]  Yes   • ASCVD (arteriosclerotic cardiovascular disease) [I25.10]  Yes      Resolved Hospital Problems   No resolved problems to display.        Brief Hospital Course to date:  Christine Garg is a 89 y.o. female with past medical history of essential hypertension, dyslipidemia,  coronary artery disease, peripheral artery disease, hypothyroidism, COPD who was admitted to [facility] on 3/22 for abdominal pain; went to OR, had perforated duodenal ulcer repaired with omental patch.  Then on 3/29, patient had CT of thoracic spine which noted left pleural consolidation and parenchymal nodules.    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  She was also found to have an esophageal stricture secondary to extrinsic compression of 3cm peritracheal lymph node.    On 4/7, patient had PEG tube placed.  She was sent to [facility] for placement of esophageal stent by Dr. Waters.        This patient's problems and plans were partially entered by my partner and updated as appropriate by me 04/29/23.     Dysphagia due to external esophageal compression by LAD  S/p PEG  -EGD 4/12 with esophageal stent placement  -Repeat CT scan abdomen/pelvis on 4/25 showed new air/gas collection within left mid abdomen concerning for possible abscess  -Continue empiric antibiotics/antifungals, ID following, wound cultures pending     Hyponatremia, mild.  - dced free water flush with tube feeds  - CTM, improved slightly     Cancer related pain  -Palliative care following  -Patient wanting to pursue more comfort care measures but son/family seem to be pushing her to pursue aggressive means     Perforated Duodenal Ulcer s/p exploratory laparotomy with patch 3/22  -Continue low dose asa     New diagnosis of lung adenocarcinoma  COPD  Former smoker   - Follow up outpatient for treatment plan with Dr. Balbuena        Coronary artery disease  Dyslipidemia  Essential hypertension  - Lowered home aspirin from 325 mg to 81mg. Will need to make this clear at discharge in order to prevent further ulceration     Hypoalbuminemia  Severe malnutrition     Hypothyroidism  - Continue home synthroid      Debility  Family more agreeable to hospice conversation.  Currently working on  home with hospice if she continues to decline.    Expected Discharge Location and Transportation:   Expected Discharge   Expected Discharge Date: 05/01/23       DVT prophylaxis:  Medical and mechanical DVT prophylaxis orders are present.     AM-PAC 6 Clicks Score (PT): 11 (04/26/23 0820)    CODE STATUS:   Code Status and Medical Interventions:   Ordered at: 04/28/23 1212     Level Of Support Discussed With:    Patient     Code Status (Patient has no pulse and is not breathing):    No CPR (Do Not Attempt to Resuscitate)     Medical Interventions (Patient has pulse or is breathing):    Comfort Measures       Shalini Frye II, DO  04/29/23

## 2023-04-29 NOTE — PLAN OF CARE
Goal Outcome Evaluation:  Plan of Care Reviewed With: (P) patient        Progress: (P) no change  Outcome Evaluation: (P) Patient rested throughout shift. PRN pain medications given, effective per patient. VSS. Continued to refuse turns.

## 2023-04-30 NOTE — THERAPY DISCHARGE NOTE
Patient Name: Christine Garg  : 1933    MRN: [id number]                              Today's Date: 2023       Admit Date: 4/10/2023    Visit Dx:     ICD-10-CM ICD-9-CM   1. Esophageal stricture  K22.2 530.3     Patient Active Problem List   Diagnosis   • Essential hypertension   • Dyslipidemia   • ASCVD (arteriosclerotic cardiovascular disease)   • Palpitations   • Coronary artery disease    • Abnormal PFTs (pulmonary function tests)   • Chronic obstructive pulmonary disease   • Mild persistent asthma with allergic rhinitis   • Pulmonary nodule   • Former smoker   • Colitis   • Perforated ulcer   • Acute gastric ulcer with perforation   • Moderate malnutrition   • Esophageal stricture   • Hypothyroidism (acquired)   • S/P percutaneous endoscopic gastrostomy (PEG) tube placement   • Adenocarcinoma   • Severe malnutrition     Past Medical History:   Diagnosis Date   • Advanced age    • CAD (coronary artery disease) 2011    s/p 3v CABG   • Chronic kidney disease (CKD), stage III (moderate)    • COPD (chronic obstructive pulmonary disease)    • History of tobacco use    • Hyperlipidemia    • Hypertension    • Hypothyroidism    • PONV (postoperative nausea and vomiting)    • Pulmonary nodule      Past Surgical History:   Procedure Laterality Date   • BREAST BIOPSY Left     benign   • CATARACT EXTRACTION     • CORONARY ANGIOPLASTY     • CORONARY ARTERY BYPASS GRAFT     • ENDOSCOPY W/ PEG TUBE PLACEMENT N/A 2023    Procedure: ESOPHAGOGASTRODUODENOSCOPY WITH PERCUTANEOUS ENDOSCOPIC GASTROSTOMY TUBE INSERTION;  Surgeon: Aurora Kirkland MD;  Location: [facility] OR;  Service: General;  Laterality: N/A;   • ENDOSCOPY W/ STENT PLACEMENT/REMOVAL N/A 2023    Procedure: ESOPHAGOGASTRODUODENOSCOPY WITH STENT PLACEMENT WITH FLUOROSCOPY;  Surgeon: Brunner, Mark I, MD;  Location: [facility] ENDOSCOPY;  Service: Gastroenterology;  Laterality: N/A;   • EXPLORATORY LAPAROTOMY N/A 3/22/2023    Procedure: LAPAROTOMY  EXPLORATORY WITH OVER SEW OF DUODENAL ULCER WITH OMENTAL PATCH AND BIOPATCH AND CENTRAL LINE PLACEMENT;  Surgeon: Aurora Kirkland MD;  Location: [facility] OR;  Service: General;  Laterality: N/A;      General Information     Row Name 04/30/23 1007          Physical Therapy Time and Intention    Document Type discharge evaluation/summary  -ML     Mode of Treatment physical therapy  -ML     Row Name 04/30/23 1007          General Information    Patient Profile Reviewed yes  -ML           User Key  (r) = Recorded By, (t) = Taken By, (c) = Cosigned By    Initials Name Provider Type    ML Tessy Jaquez Physical Therapist               Mobility    No documentation.                Obj/Interventions    No documentation.                Goals/Plan     Row Name 04/30/23 1009          Bed Mobility Goal 1 (PT)    Progress/Outcomes (Bed Mobility Goal 1, PT) goal no longer appropriate  -ML     Row Name 04/30/23 1009          Transfer Goal 1 (PT)    Progress/Outcome (Transfer Goal 1, PT) goal no longer appropriate  -ML     Row Name 04/30/23 1009          Gait Training Goal 1 (PT)    Progress/Outcome (Gait Training Goal 1, PT) goal no longer appropriate  -ML     Row Name 04/30/23 1009          Stairs Goal 1 (PT)    Progress/Outcome (Stairs Goal 1, PT) goal no longer appropriate  -ML           User Key  (r) = Recorded By, (t) = Taken By, (c) = Cosigned By    Initials Name Provider Type    ML Tessy Jaquez Physical Therapist               Clinical Impression     Row Name 04/30/23 1007          Plan of Care Review    Outcome Evaluation Per notes and discussion with RN patient plans to discharge home with hospice services. Currently under comfort measures. Will complete PT consult at this time. Please re-consult if needs should arise.  -ML           User Key  (r) = Recorded By, (t) = Taken By, (c) = Cosigned By    Initials Name Provider Type    Tessy Yeung Physical Therapist               Outcome Measures    No documentation.                Physical Therapy Education     Title: PT OT SLP Therapies (In Progress)     Topic: Physical Therapy (In Progress)     Point: Mobility training (In Progress)     Learning Progress Summary           Patient Acceptance, E, NR by NS at 4/25/2023 1019    Comment: importance of OOB activity    Acceptance, E, NR by NS at 4/21/2023 1426    Comment: importance of OOB activity    Acceptance, E, VU,NR by HT at 4/17/2023 1431    Acceptance, E,D,H, NR by DM at 4/14/2023 1737    Acceptance, E, VU,NR by HT at 4/11/2023 1500                   Point: Home exercise program (In Progress)     Learning Progress Summary           Patient Acceptance, E, NR by NS at 4/25/2023 1019    Comment: importance of OOB activity    Acceptance, E, NR by NS at 4/21/2023 1426    Comment: importance of OOB activity    Acceptance, E,D,H, NR by DM at 4/14/2023 1737                   Point: Body mechanics (In Progress)     Learning Progress Summary           Patient Acceptance, E, NR by NS at 4/25/2023 1019    Comment: importance of OOB activity    Acceptance, E, NR by NS at 4/21/2023 1426    Comment: importance of OOB activity    Acceptance, E, VU,NR by HT at 4/17/2023 1431    Acceptance, E,D,H, NR by DM at 4/14/2023 1737    Acceptance, E, VU,NR by HT at 4/11/2023 1500                   Point: Precautions (In Progress)     Learning Progress Summary           Patient Acceptance, E, NR by NS at 4/25/2023 1019    Comment: importance of OOB activity    Acceptance, E, NR by NS at 4/21/2023 1426    Comment: importance of OOB activity    Acceptance, E, VU,NR by HT at 4/17/2023 1431    Acceptance, E,D,H, NR by DM at 4/14/2023 1737    Acceptance, E, VU,NR by HT at 4/11/2023 1500                               User Key     Initials Effective Dates Name Provider Type Discipline    DM 02/03/23 -  Rubi Nick, PT Physical Therapist PT    NS 06/16/21 -  Marilee Cox, PT Physical Therapist PT    HT 01/12/23 -  Clare Fulton, PT Student PT Student PT               PT Recommendation and Plan     Outcome Evaluation: Per notes and discussion with RN patient plans to discharge home with hospice services. Currently under comfort measures. Will complete PT consult at this time. Please re-consult if needs should arise.     Time Calculation:    PT Charges     Row Name 04/30/23 1010             Time Calculation    Start Time 1009  -ML      PT Received On 04/30/23  -ML            User Key  (r) = Recorded By, (t) = Taken By, (c) = Cosigned By    Initials Name Provider Type    Tessy Yeung Physical Therapist                  PT G-Codes  Outcome Measure Options: AM-PAC 6 Clicks Daily Activity (OT)  AM-PAC 6 Clicks Score (PT): 11  AM-PAC 6 Clicks Score (OT): 12    PT Discharge Summary  Anticipated Discharge Disposition (PT): other (see comments) (Per notes home with hospice)  Reason for Discharge: other (comment) (Comfort measures)    Tessy Jaquez  4/30/2023

## 2023-04-30 NOTE — PLAN OF CARE
Goal Outcome Evaluation:              Outcome Evaluation: Per notes and discussion with RN patient plans to discharge home with hospice services. Currently under comfort measures. Will complete PT consult at this time. Please re-consult if needs should arise.

## 2023-04-30 NOTE — PLAN OF CARE
Goal Outcome Evaluation:  Plan of Care Reviewed With: patient        Progress: no change  Outcome Evaluation: Vitals stable. Patient c/o soa several times this shift. Oxygen titrated to 3L NC for comfort. PRN neb treatments given. Provider called about soa, new order scheduled neb treatment. Lungs course. one time dose lasix requested by rn. no new orders recieved. One dose prn morphine given for dyspnea.

## 2023-04-30 NOTE — PROGRESS NOTES
"    INFECTIOUS DISEASE Progress Note    Christine Garg  6/8/1933  [id number]    Admission Date: 4/10/2023      Requesting Provider: Noelle Combs DO  Evaluating Physician: LUCÍA Gill    Reason for Consultation: Intra-abdominal abscess    History of present illness:    4/26/23: Patient is a 89 y.o. female with a history of COPD, coronary artery disease, peripheral vascular disease, hypertension, and hyperlipidemia who is seen today for evaluation of an intra-abdominal abscess.She was admitted to [facility] on 3/22/23 with a perforated duodenal ulcer requiring emergent surgical intervention with oversewing utilizing an omental patch/Biopatch.  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  She was transferred to [facility] on 4/10 for placement of an esophageal stent.Dr. Brunner placed the stent on 4/12. On 4/12 her white blood cell count was normal at 9.9.By 4/17 her white blood cell count was up to 13.3 and has further risen to 14.9 today. Due to her leukocytosis, she was started on intravenous Rocephin on 4/24.  An abdominal/pelvic CT scan performed yesterday revealed an upper abdominal abscess adjacent to the stomach.  She underwent CT-guided drainage of the abscess today [id number] cc of cloudy fluid filled with particulate matter. She has remained afebrile.    4/27/23: She has remained afebrile.White blood cell count today is 12.  Creatinine is 0.37.  Intra-abdominal abscess cultures are pending.  She complains of some upper abdominal discomfort.  She complains of some increased dyspnea.    4/28/23: Afebrile.  \"not feeling well\".  Has shortness of breath and is currently on 1L O2 NC.  With O2 sat around 94%. Her peritoneum culture is positive for yeast.  Still with " "abdominal discomfort. SARAH drain with serous drainage.  She denies f/c, v/d, rashes.  She has nausea.     4/29/23:  She remains afebrile.  No new labs. Peritoneal culture with yeast. She complains of shortness of breath. No cough, no abdominal pain.  Hospice evaluated, possibly going home with hospice.     4/30/23:  Had a few episodes of shortness of breath yesterday.  She remains afebrile. Possible home with hospice.  \" I'm just really tired, honey\".  Now on 3L NC.     Past Medical History:   Diagnosis Date   • Advanced age    • CAD (coronary artery disease) 2011    s/p 3v CABG   • Chronic kidney disease (CKD), stage III (moderate)    • COPD (chronic obstructive pulmonary disease)    • History of tobacco use    • Hyperlipidemia    • Hypertension    • Hypothyroidism    • PONV (postoperative nausea and vomiting)    • Pulmonary nodule        Past Surgical History:   Procedure Laterality Date   • BREAST BIOPSY Left     benign   • CATARACT EXTRACTION     • CORONARY ANGIOPLASTY     • CORONARY ARTERY BYPASS GRAFT     • ENDOSCOPY W/ PEG TUBE PLACEMENT N/A 4/7/2023    Procedure: ESOPHAGOGASTRODUODENOSCOPY WITH PERCUTANEOUS ENDOSCOPIC GASTROSTOMY TUBE INSERTION;  Surgeon: Aurora Kirkland MD;  Location: [facility] OR;  Service: General;  Laterality: N/A;   • ENDOSCOPY W/ STENT PLACEMENT/REMOVAL N/A 4/12/2023    Procedure: ESOPHAGOGASTRODUODENOSCOPY WITH STENT PLACEMENT WITH FLUOROSCOPY;  Surgeon: Brunner, Mark I, MD;  Location: [facility] ENDOSCOPY;  Service: Gastroenterology;  Laterality: N/A;   • EXPLORATORY LAPAROTOMY N/A 3/22/2023    Procedure: LAPAROTOMY EXPLORATORY WITH OVER SEW OF DUODENAL ULCER WITH OMENTAL PATCH AND BIOPATCH AND CENTRAL LINE PLACEMENT;  Surgeon: Aurora Kirkland MD;  Location: [facility] OR;  Service: General;  Laterality: N/A;       Family History   Problem Relation Age of Onset   • Heart disease Mother    • Heart disease Father    • Heart disease Brother    • Breast cancer Neg Hx        Social History "     Socioeconomic History   • Marital status:    Tobacco Use   • Smoking status: Former     Types: Cigarettes   • Smokeless tobacco: Never   Vaping Use   • Vaping Use: Never used   Substance and Sexual Activity   • Alcohol use: No   • Drug use: No   • Sexual activity: Defer       Allergies   Allergen Reactions   • Motrin [Ibuprofen] Anaphylaxis and Hives   • Novocain [Procaine] Other (See Comments)     Pt state she passes out.         Medication:    Current Facility-Administered Medications:   •  acetaminophen (TYLENOL) tablet 650 mg, 650 mg, Per PEG Tube, Q4H PRN **OR** acetaminophen (TYLENOL) 160 MG/5ML solution 650 mg, 650 mg, Per PEG Tube, Q4H PRN, 650 mg at 04/23/23 0958 **OR** acetaminophen (TYLENOL) suppository 650 mg, 650 mg, Rectal, Q4H PRN, Brunner, Mark I, MD  •  aluminum-magnesium hydroxide-simethicone (MAALOX MAX) 400-400-40 MG/5ML suspension 15 mL, 15 mL, Oral, Q6H PRN, Flora Franco DO, 15 mL at 04/25/23 1427  •  aspirin chewable tablet 81 mg, 81 mg, Per PEG Tube, Daily, Brunner, Mark I, MD, 81 mg at 04/29/23 0858  •  [DISCONTINUED] sennosides-docusate (PERICOLACE) 8.6-50 MG per tablet 2 tablet, 2 tablet, Per PEG Tube, BID **AND** polyethylene glycol (MIRALAX) packet 17 g, 17 g, Per PEG Tube, Daily PRN, 17 g at 04/25/23 1023 **AND** [DISCONTINUED] bisacodyl (DULCOLAX) EC tablet 5 mg, 5 mg, Oral, Daily PRN **AND** bisacodyl (DULCOLAX) suppository 10 mg, 10 mg, Rectal, Daily PRN, Brunner, Mark I, MD, 10 mg at 04/25/23 1023  •  carvedilol (COREG) tablet 25 mg, 25 mg, Per PEG Tube, BID With Meals, Aleta Hatch, APRN, 25 mg at 04/27/23 1708  •  cefepime (MAXIPIME) 2 g/100 mL 0.9% NS (mbp), 2 g, Intravenous, Q8H, Ruy Nuñez MD, 2 g at 04/30/23 0038  •  cloNIDine (CATAPRES) tablet 0.1 mg, 0.1 mg, Per PEG Tube, TID PRN, Brunner, Mark I, MD, 0.1 mg at 04/17/23 0413  •  sennosides (SENOKOT) 8.8 MG/5ML syrup 10 mL, 10 mL, Per PEG Tube, BID, 10 mL at 04/29/23 2051 **AND** docusate  sodium (COLACE) liquid 100 mg, 100 mg, Per PEG Tube, BID, Brunner, Mark I, MD, 100 mg at 04/29/23 2051  •  Enoxaparin Sodium (LOVENOX) syringe 40 mg, 40 mg, Subcutaneous, Nightly, Catalina Leonardo MD, 40 mg at 04/29/23 2050  •  fluconazole (DIFLUCAN) IVPB 400 mg, 400 mg, Intravenous, Q24H, Ruy Nuñez MD, Last Rate: 100 mL/hr at 04/29/23 1744, 400 mg at 04/29/23 1744  •  gabapentin (NEURONTIN) capsule 100 mg, 100 mg, Per PEG Tube, Q12H, Clare De Anda MD, 100 mg at 04/29/23 2050  •  HYDROcodone-acetaminophen (NORCO) 7.5-325 MG per tablet 1 tablet, 1 tablet, Per PEG Tube, Q4H PRN, Catalina Leonardo MD, 1 tablet at 04/29/23 1811  •  HYDROcodone-acetaminophen (NORCO) 7.5-325 MG per tablet 1 tablet, 1 tablet, Per PEG Tube, Q6H, María Elena Vazquez APRN, 1 tablet at 04/30/23 0522  •  hydrOXYzine (ATARAX) tablet 25 mg, 25 mg, Per PEG Tube, TID PRN, María Elena Vazquez APRN, 25 mg at 04/30/23 0142  •  ipratropium-albuterol (DUO-NEB) nebulizer solution 3 mL, 3 mL, Nebulization, Q4H PRN, Clare De Anda MD, 3 mL at 04/30/23 0055  •  ipratropium-albuterol (DUO-NEB) nebulizer solution 3 mL, 3 mL, Nebulization, 4x Daily - RT, Jenn Rios, APRN  •  lansoprazole (FIRST) oral suspension 30 mg, 30 mg, Per G Tube, BID AC, Thomas Cruz, [facility], 30 mg at 04/29/23 1743  •  levothyroxine (SYNTHROID, LEVOTHROID) tablet 112 mcg, 112 mcg, Per PEG Tube, Q AM, Brunner, Mark I, MD, 112 mcg at 04/30/23 0523  •  lidocaine (LIDODERM) 5 % 1 patch, 1 patch, Transdermal, Q24H, Clare De Anda MD, 1 patch at 04/29/23 1040  •  lidocaine (LMX) 4 % cream 1 application, 1 application, Topical, Q6H, María Elena Vazquez APRN, 1 application at 04/29/23 2054  •  metoclopramide (REGLAN) solution 5 mg, 5 mg, Per G Tube, TID AC, Flora Franco, , 5 mg at 04/30/23 0523  •  metroNIDAZOLE (FLAGYL) IVPB 500 mg, 500 mg, Intravenous, Q8H, Ruy Nuñez MD, 500 mg at 04/30/23 0259  •  morphine injection 2 mg, 2 mg, Intravenous, Q2H PRN, María Elena Vazquez APRN,  2 mg at 23 0038  •  [DISCONTINUED] ondansetron (ZOFRAN) tablet 4 mg, 4 mg, Oral, Q6H PRN **OR** ondansetron (ZOFRAN) injection 4 mg, 4 mg, Intravenous, Q6H PRN, Brunner, Mark I, MD, 4 mg at 23 0959  •  promethazine (PHENERGAN) suppository 12.5 mg, 12.5 mg, Rectal, Q4H PRN, Anaya Jones MD  •  sodium chloride 0.9 % flush 10 mL, 10 mL, Intravenous, Q12H, Brunner, Mark I, MD, 10 mL at 23 205  •  sodium chloride 0.9 % flush 10 mL, 10 mL, Intravenous, PRN, Brunner, Mark I, MD, 10 mL at 23 1704  •  sodium chloride 0.9 % infusion 40 mL, 40 mL, Intravenous, PRN, Brunner, Mark I, MD, 40 mL at 23 0902    Antibiotics:  Anti-Infectives (From admission, onward)    Ordered     Dose/Rate Route Frequency Start Stop    23 1628  cefepime (MAXIPIME) 2 g/100 mL 0.9% NS (mbp)        Ordering Provider: Ruy Nuñez MD    2 g  over 4 Hours Intravenous Every 8 Hours 23 0000 23 2359    23 1628  metroNIDAZOLE (FLAGYL) IVPB 500 mg        Ordering Provider: Ruy Nuñez MD    500 mg  over 30 Minutes Intravenous Every 8 Hours 23 1800 23 1759    23 1628  cefepime (MAXIPIME) 2 g/100 mL 0.9% NS (mbp)        Ordering Provider: Ruy Nuñez MD    2 g  200 mL/hr over 30 Minutes Intravenous Once 23 1700 23 1838    23 1629  fluconazole (DIFLUCAN) IVPB 400 mg        Ordering Provider: Ruy Nuñez MD    400 mg  100 mL/hr over 120 Minutes Intravenous Every 24 Hours 23 1700 23 1659            Review of Systems:  See HPI      Physical Exam:   Vital Signs  Temp (24hrs), Av.8 °F (35.4 °C), Min:95 °F (35 °C), Max:96.8 °F (36 °C)    Temp  Min: 95 °F (35 °C)  Max: 96.8 °F (36 °C)  BP  Min: 96/62  Max: 117/71  Pulse  Min: 79  Max: 97  Resp  Min: 16  Max: 17  SpO2  Min: 92 %  Max: 100 %    GENERAL: Awake and alert, in no acute distress.   HEENT: Normocephalic, atraumatic.  PERRL. EOMI. No conjunctival injection. No icterus.  Oropharynx clear without evidence of thrush or exudate. .  NECK: Supple .  HEART: RRR; No murmur, rubs, gallops.   LUNGS: few scattered crackles on 3L NC  ABDOMEN: Left upper quadrant PEG is in place with no exit site erythema or drainage.  She has a left upper quadrant drain in place with minimal active drainage, but serous drainage.   EXT:  .1+ lower extremity edema  :  Without Barriga catheter.  MSK: No joint effusions or erythema  SKIN: Warm and dry without cutaneous eruptions on Inspection/palpation.    NEURO: Oriented to PPT.  Motor 5/5 strength  PSYCHIATRIC: Normal insight and judgment. Cooperative with PE    Laboratory Data    Results from last 7 days   Lab Units 04/27/23  0519 04/26/23  0420 04/25/23  0415   WBC 10*3/mm3 12.00* 14.90* 13.86*   HEMOGLOBIN g/dL 8.3* 8.8* 9.4*   HEMATOCRIT % 26.0* 27.5* 28.6*   PLATELETS 10*3/mm3 326 345 387     Results from last 7 days   Lab Units 04/27/23  0519   SODIUM mmol/L 132*   POTASSIUM mmol/L 3.7   CHLORIDE mmol/L 94*   CO2 mmol/L 28.0   BUN mg/dL 14   CREATININE mg/dL 0.37*   GLUCOSE mg/dL 108*   CALCIUM mg/dL 7.9*                             Estimated Creatinine Clearance: 107.7 mL/min (A) (by C-G formula based on SCr of 0.37 mg/dL (L)).      Microbiology:  Microbiology Results (last 10 days)     Procedure Component Value - Date/Time    Anaerobic Culture - Drainage, Peritoneum [[id number]]  (Normal) Collected: 04/26/23 1143    Lab Status: Preliminary result Specimen: Drainage from Peritoneum Updated: 04/29/23 0843     Anaerobic Culture No anaerobes isolated at 3 days    Body Fluid Culture - Body Fluid, Peritoneum [[id number]]  (Abnormal) Collected: 04/26/23 1142    Lab Status: Preliminary result Specimen: Body Fluid from Peritoneum Updated: 04/29/23 0928     Body Fluid Culture Rare Yeast isolated     Gram Stain Many (4+) WBCs seen      No organisms seen    Urine Culture - Urine, Straight Cath [[id number]]  (Normal) Collected: 04/25/23 0950    Lab Status: Final result  Specimen: Urine from Straight Cath Updated: 04/26/23 1037     Urine Culture No growth              Radiology:  Imaging Results (Last 72 Hours)     ** No results found for the last 72 hours. **            Impression:   1.  Intra-abdominal abscess-related to her perforated duodenal ulcer versus the G-tube placement.  She underwent CT-guided drainage of the abscess on 4/26,  Cultures with yeast.  She is currently on cefepime/metronidazole/fluconazole. ( )   2.  Duodenal ulcer perforation-status post surgery 3/22/23 with omental patch/Biopatch repair  3.  Adenocarcinoma of the lung-recently diagnosed with a pleural biopsy  4.  Dysphagia/external esophageal compression-status post esophageal stent placement on 4/12/23  5.  Leukocytosis/neutrophilia-secondary to intra-abdominal infection    PLAN/RECOMMENDATIONS:   1.  Intra-abdominal abscess cultures-yeast, micro is working this up.   2.  Continue intravenous cefepime  3.  Continue intravenous metronidazole  4.  Continue intravenous fluconazole  5.  Goals of care being discussed with Hospice.     Copied text in this note has been reviewed and is accurate as of 04/30/23               Bee Lara, APRN  4/30/2023  08:07 EDT

## 2023-04-30 NOTE — PROGRESS NOTES
[facility] Medicine Services  PROGRESS NOTE    Patient Name: Christine Garg  : 1933  MRN: [id number]    Date of Admission: 4/10/2023  Primary Care Physician: Dave Tobar MD    Subjective   Subjective     CC: f/u abscess    HPI: Overnight events noted. Feels better after lasix. Asking for breathing treatment this am.    ROS:  Gen- No fevers, chills  CV- No chest pain, palpitations  Resp- No cough, dyspnea  GI- No N/V/D, abd pain     Objective   Objective     Vital Signs:   Temp:  [95 °F (35 °C)-96.8 °F (36 °C)] 95.1 °F (35.1 °C)  Heart Rate:  [79-97] 91  Resp:  [16-17] 16  BP: ()/(56-73) 102/73  Flow (L/min):  [2-3] 3     Physical Exam:  Constitutional: Elderly female, frail appearing  HENT: NCAT, mucous membranes moist  Respiratory: Clear to auscultation bilaterally, respiratory effort normal   Cardiovascular: RRR, no murmurs, rubs, or gallops  Gastrointestinal: Positive bowel sounds, soft, nontender, nondistended, PEG, SARAH  Musculoskeletal: No bilateral ankle edema  Psychiatric: Appropriate affect, cooperative  Neurologic: Oriented x 3, strength symmetric in all extremities, Cranial Nerves grossly intact to confrontation, speech clear  Skin: No rashes    Results Reviewed:  LAB RESULTS:      Lab 23  0519 23  0420 23  0415 23  0522   WBC 12.00* 14.90* 13.86* 14.51*   HEMOGLOBIN 8.3* 8.8* 9.4* 9.9*   HEMATOCRIT 26.0* 27.5* 28.6* 30.7*   PLATELETS 326 345 387 410   NEUTROS ABS 9.78* 12.49* 11.24* 11.76*   IMMATURE GRANS (ABS) 0.08* 0.11* 0.16* 0.16*   LYMPHS ABS 1.13 1.26 1.33 1.39   MONOS ABS 0.64 0.72 0.73 0.70   EOS ABS 0.31 0.26 0.32 0.42*   MCV 96.3 94.8 94.1 94.5         Lab 23  0857 23  0519 23  0420 23  0641 23  0522   SODIUM 128* 132* 129* 128* 127*   POTASSIUM 3.8 3.7 3.7 3.9 4.1   CHLORIDE 91* 94* 89* 88* 87*   CO2 29.0 28.0 31.0* 31.0* 31.0*   ANION GAP 8.0 10.0 9.0 9.0 9.0   BUN 16 14 12 13 13   CREATININE  0.44* 0.37* 0.28* 0.39* 0.35*   EGFR 92.6 96.5 103.2 95.3 97.8   GLUCOSE 106* 108* 96 123* 105*   CALCIUM 8.9 7.9* 7.9* 8.2* 8.0*   MAGNESIUM  --   --   --   --  1.8   PHOSPHORUS  --  3.6 3.2  --   --          Lab 04/27/23  0519 04/26/23  0420   ALBUMIN 2.1* 2.2*                     Brief Urine Lab Results  (Last result in the past 365 days)      Color   Clarity   Blood   Leuk Est   Nitrite   Protein   CREAT   Urine HCG        04/25/23 0950 Yellow   Clear   Negative   Small (1+)   Negative   Negative                 Microbiology Results Abnormal     Procedure Component Value - Date/Time    Anaerobic Culture - Drainage, Peritoneum [[id number]]  (Normal) Collected: 04/26/23 1143    Lab Status: Preliminary result Specimen: Drainage from Peritoneum Updated: 04/29/23 0843     Anaerobic Culture No anaerobes isolated at 3 days    Urine Culture - Urine, Straight Cath [[id number]]  (Normal) Collected: 04/25/23 0950    Lab Status: Final result Specimen: Urine from Straight Cath Updated: 04/26/23 1037     Urine Culture No growth          No radiology results from the last 24 hrs    Results for orders placed during the hospital encounter of 03/22/23    Adult Transthoracic Echo Complete W/ Cont if Necessary Per Protocol    Interpretation Summary  •  Left ventricular systolic function is normal. Calculated left ventricular EF = 60% Left ventricular ejection fraction appears to be 56 - 60%.  •  Left ventricular diastolic function is consistent with (grade I) impaired relaxation and age.  •  Moderate to severe mitral valve regurgitation is present.  •  Moderate tricuspid valve regurgitation is present.  •  Estimated right ventricular systolic pressure from tricuspid regurgitation is moderately elevated (45-55 mmHg).  •  Moderate pulmonary hypertension is present.      Current medications:  Scheduled Meds:aspirin, 81 mg, Per PEG Tube, Daily  carvedilol, 25 mg, Per PEG Tube, BID With Meals  cefepime, 2 g, Intravenous, Q8H  sennosides,  10 mL, Per PEG Tube, BID   And  docusate sodium, 100 mg, Per PEG Tube, BID  enoxaparin, 40 mg, Subcutaneous, Nightly  fluconazole, 400 mg, Intravenous, Q24H  gabapentin, 100 mg, Per PEG Tube, Q12H  HYDROcodone-acetaminophen, 1 tablet, Per PEG Tube, Q6H  ipratropium-albuterol, 3 mL, Nebulization, 4x Daily - RT  lansoprazole, 30 mg, Per G Tube, BID AC  levothyroxine, 112 mcg, Per PEG Tube, Q AM  lidocaine, 1 patch, Transdermal, Q24H  lidocaine, 1 application, Topical, Q6H  metoclopramide, 5 mg, Per G Tube, TID AC  metroNIDAZOLE, 500 mg, Intravenous, Q8H  sodium chloride, 10 mL, Intravenous, Q12H      Continuous Infusions:   PRN Meds:.•  acetaminophen **OR** acetaminophen **OR** acetaminophen  •  aluminum-magnesium hydroxide-simethicone  •  [DISCONTINUED] senna-docusate sodium **AND** polyethylene glycol **AND** [DISCONTINUED] bisacodyl **AND** bisacodyl  •  cloNIDine  •  HYDROcodone-acetaminophen  •  hydrOXYzine  •  ipratropium-albuterol  •  Morphine  •  [DISCONTINUED] ondansetron **OR** ondansetron  •  promethazine  •  sodium chloride  •  sodium chloride    Assessment & Plan   Assessment & Plan     Active Hospital Problems    Diagnosis  POA   • **Esophageal stricture [K22.2]  Unknown   • Severe malnutrition [E43]  Unknown   • Hypothyroidism (acquired) [E03.9]  Unknown   • S/P percutaneous endoscopic gastrostomy (PEG) tube placement [Z93.1]  Not Applicable   • Adenocarcinoma [C80.1]  Unknown   • Perforated ulcer [K27.5]  Yes   • Former smoker [Z87.891]  Not Applicable   • Coronary artery disease  [I25.10]  Yes   • Chronic obstructive pulmonary disease [J44.9]  Yes   • Essential hypertension [I10]  Yes   • ASCVD (arteriosclerotic cardiovascular disease) [I25.10]  Yes      Resolved Hospital Problems   No resolved problems to display.        Brief Hospital Course to date:  Christine Garg is a 89 y.o. female with past medical history of essential hypertension, dyslipidemia, coronary artery disease, peripheral artery  disease, hypothyroidism, COPD who was [facility] to [facility] on 3/22 for abdominal pain; went to OR, had perforated duodenal ulcer repaired with omental patch.  Then on 3/29, patient had CT of thoracic spine which noted left pleural consolidation and parenchymal nodules.    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  She was also found to have an esophageal stricture secondary to extrinsic compression of 3cm peritracheal lymph node.    On 4/7, patient had PEG tube placed.  She was sent to [facility] for placement of esophageal stent by Dr. Waters.        This patient's problems and plans were partially entered by my partner and updated as appropriate by me 04/30/23.     Dysphagia due to external esophageal compression by LAD  S/p PEG  -EGD 4/12 with esophageal stent placement  -Repeat CT scan abdomen/pelvis on 4/25 showed new air/gas collection within left mid abdomen concerning for possible abscess  -Continue empiric antibiotics/antifungals, ID following, wound cultures pending     Hyponatremia, worse.  - Have dced free water flush with tube feeds. Sodium worse today. Will send urine studies, repeat in am.  - CTM, improved slightly     Cancer related pain  -Palliative care following  -Patient wanting to pursue more comfort care measures but son/family seem to be pushing her to pursue aggressive means     Perforated Duodenal Ulcer s/p exploratory laparotomy with patch 3/22  -Continue low dose asa     New diagnosis of lung adenocarcinoma  COPD  Former smoker   - Follow up outpatient for treatment plan with Dr. Balbuena        Coronary artery disease  Dyslipidemia  Essential hypertension  - Lowered home aspirin from 325 mg to 81mg. Will need to make this clear at discharge in order to prevent further ulceration     Hypoalbuminemia  Severe malnutrition     Hypothyroidism  - Continue home synthroid      Debility  - Family more agreeable to hospice  conversation.  Currently working on home with hospice if she continues to decline.    Expected Discharge Location and Transportation:   Expected Discharge   Expected Discharge Date: 05/01/23       DVT prophylaxis:  Medical and mechanical DVT prophylaxis orders are present.     AM-PAC 6 Clicks Score (PT): 11 (04/26/23 0820)    CODE STATUS:   Code Status and Medical Interventions:   Ordered at: 04/28/23 1212     Level Of Support Discussed With:    Patient     Code Status (Patient has no pulse and is not breathing):    No CPR (Do Not Attempt to Resuscitate)     Medical Interventions (Patient has pulse or is breathing):    Comfort Measures       Shalini Frye II, DO  04/30/23

## 2023-04-30 NOTE — PLAN OF CARE
Goal Outcome Evaluation:                 Pt on 3L nasal cannula, no complaints of SOA. Pain relieved by scheduled pain medication. Poor oral intake. No acute events.

## 2023-05-01 NOTE — PLAN OF CARE
Problem: COPD (Chronic Obstructive Pulmonary Disease) Comorbidity  Goal: Maintenance of COPD Symptom Control  Intervention: Maintain COPD-Symptom Control  Recent Flowsheet Documentation  Taken 5/1/2023 1529 by Yessica Fulton RN  Supportive Measures:   active listening utilized   decision-making supported   verbalization of feelings encouraged   goal-setting facilitated     Problem: Palliative Care  Goal: Enhanced Quality of Life  Intervention: Optimize Psychosocial Wellbeing  Flowsheets (Taken 5/1/2023 1529)  Supportive Measures:   active listening utilized   decision-making supported   verbalization of feelings encouraged   goal-setting facilitated  Family/Support System Care:   presence promoted   caregiver stress acknowledged   support provided   involvement promoted   Goal Outcome Evaluation:  Plan of Care Reviewed With: patient, son, family        Progress: declining  Outcome Evaluation: Pt is complaining of hurting all over and is restless. Pt medicated with norco and prn ativan per PEG. Pt taking minimal po food/fluids. Pt asks for occasional ice chip. Pt's son Kaleb and dtr in law present. Plans for home with hospice tomorrow.  Pt is anxious and dyspneic at times and lost iv access. Continue comfort meds via PEG.  Pt has some audible congestion.

## 2023-05-01 NOTE — PROGRESS NOTES
"Palliative Care Daily Progress Note     C/C: Patient sleeping at time of visit.    S: Medical record reviewed. Follow up visit for medication effectiveness and GOC in the context of complex medical decision making. Events noted. Patient reports her abodminal pain is improved with abscess drainage. She is experiencing back pain which is relieved with the Hydrocodone. She received x1 prn Hydrocodone 7.5-325mg PO dose as well as Hydrocodone 7.5-325mg q 6 hours. Patient did receive Atarax 25mg x1 dose for anxiety. Patient tolerating bites of pudding. Oncology consult with no treatment options besides immunotherapy due to poor functional status.     ROS: +pain, left shoulder blade that radiates down her back on left>right side, constant dull ache, improves with Lidocaine, pain currently 5/10, at best 2/10. +SOB, worse with exertion, improves with Hydrocodone, limits her activity. +anxiety, due to SOB, pain, and circumstances. +nausea, improved with medications. +decreased appetite, TF at night, poor appetite during day due to nausea and taste changes. +debility.  Denies vomiting, HA, chest pain.     O: Code Status:   Code Status and Medical Interventions:   Ordered at: 04/28/23 1212     Level Of Support Discussed With:    Patient     Code Status (Patient has no pulse and is not breathing):    No CPR (Do Not Attempt to Resuscitate)     Medical Interventions (Patient has pulse or is breathing):    Comfort Measures      Advanced Directives: Advance Directive Status: Patient has advance directive, copy in chart   Goals of Care: Ongoing.   Palliative Performance Scale Score: 40%     BP 96/76 (BP Location: Left arm, Patient Position: Lying)   Pulse 101   Temp 95.6 °F (35.3 °C) (Axillary)   Resp 16   Ht 165.1 cm (65\")   Wt 66.2 kg (146 lb)   SpO2 96%   BMI 24.30 kg/m²     Intake/Output Summary (Last 24 hours) at 5/1/2023 1025  Last data filed at 5/1/2023 0900  Gross per 24 hour   Intake 885 ml   Output 352.5 ml   Net " 532.5 ml       PE:  General Appearance:   Patient laying in bed, sleeping, chronically ill appearing, NAD   HEENT:    NC/AT, MMM, face relaxed, NC in place   Neck:   supple, trachea midline, no JVD   Lungs:     CTA upper lobes, diminished in bases; respirations regular, even and unlabored; RR 18-20 on exam, 1LNC    Heart:    RRR, normal S1 and S2, no M/R/G   Abdomen:     Hypoactive bowel sounds, soft, nontender, distended, PEG in place   G/U:   Deferred   MSK/Extremities:    no edema   Pulses:   Pulses palpable and equal bilaterally   Skin:   Warm, dry   Neurologic:   sleeping   Psych:   sleeping       Meds: Reviewed and changes noted    Labs:   Results from last 7 days   Lab Units 04/27/23  0519   WBC 10*3/mm3 12.00*   HEMOGLOBIN g/dL 8.3*   HEMATOCRIT % 26.0*   PLATELETS 10*3/mm3 326     Results from last 7 days   Lab Units 05/01/23  0341   SODIUM mmol/L 132*   POTASSIUM mmol/L 3.8   CHLORIDE mmol/L 96*   CO2 mmol/L 28.0   BUN mg/dL 17   CREATININE mg/dL 0.44*   GLUCOSE mg/dL 97   CALCIUM mg/dL 8.8     Results from last 7 days   Lab Units 05/01/23  0341   SODIUM mmol/L 132*   POTASSIUM mmol/L 3.8   CHLORIDE mmol/L 96*   CO2 mmol/L 28.0   BUN mg/dL 17   CREATININE mg/dL 0.44*   CALCIUM mg/dL 8.8   GLUCOSE mg/dL 97     Imaging Results (Last 72 Hours)     ** No results found for the last 72 hours. **                Diagnostics: Reviewed    A:   Esophageal stricture    Essential hypertension    ASCVD (arteriosclerotic cardiovascular disease)    Coronary artery disease     Chronic obstructive pulmonary disease    Former smoker    Perforated ulcer    Hypothyroidism (acquired)    S/P percutaneous endoscopic gastrostomy (PEG) tube placement    Adenocarcinoma    Severe malnutrition     89 y.o. female with esophageal stricture due to adenocarcinoma.       S/S:   1. Pain -left shoulder blade, neoplastic lesion left T5-T6  -continue Gabapentin 100mg PEG  q 12 hours  -scheduled Hydrocodone-Acetaminophen 7.5-325mg PEG q 6  hours  -continue Lidocaine patch  -continue Hydrocodone-Acetaminophen 7.5-325mg PEG q 4 hours prn breakthrough pain     2. SOB -Hydrocodone-Acetaminophen for SOB as well  -started Morphine 2mg IV q 2 hours prn pain/dyspnea     3. Nausea -continue scheduled Reglan 5mg PEG four times daily  -continue Zofran 4mg IV q 6 hours prn N/V  -continue Promethazine 12.5mg KS q 4 hours prn N/V     4. Anxiety -continue Hydroxyzine 25mg PEG TID prn anxiety     5. Constipation -at risk for OIC  -continue bowel regimen     6. Decreased Appetite -comfort diet  -offer frequent snacks and supplements     7. Debility -PT/OT following     8. GOC -DNR/DNI/Comfort Measures -per discussion with patient  -ACP on chart  -patient requesting home with hospice, hospice following  -discussed comfort measures and patient requesting comfort measures    P: Follow up visit for symptom management and GOC. Patient previously reported her goal is to return home with hospice in light of prognosis. Patient sleeping at time of visit, appears comfortable. Will continue to follow for symptom management and GOC.   Palliative Care Team will continue to follow patient. Please do not hesitate to contact us regarding further sx mgmt or GOC needs.  María Elena Vazquez, APRN  5/1/2023  Time spent: 20 minutes

## 2023-05-01 NOTE — PROGRESS NOTES
Continued Stay Note  [facility]     Patient Name: Christine Garg  MRN: [id number]  Today's Date: 5/1/2023    Admit Date: 4/10/2023    Plan: Home with [facility]   Discharge Plan     Row Name 05/01/23 1310       Plan    Plan Home with [facility]    Plan Comments Dr. Frye spoke with pt's family regarding pt's current condition, Dr. Frye informed family that pt is medically ready to discharge. Visit made to pt, pt's son Kaleb and daughter-in-law present. Son stated did speak with Dr. Frye and the plan is for pt to discharge home with hospice. Reviewed hospice services, discussed equipment needs-pt will need a hospital bed, overbed table, BSC, nebulizer, and transport w/c. Equipment to be delivered to pt's home tomorrow morning. Pt requires ambulance transportation. Kaleb signed the EMS/DNR. Request sent to [facility], ambulance arranged for tomorrow at 1645. Provided family with the hospice 24 hr number to call to initiate hospice services when pt arrives home. Pt's care team notified of pt's transportation time. Will continue to follow. Please call [phone] if can be of further assistance.               Discharge Codes    No documentation.               Expected Discharge Date and Time     Expected Discharge Date Expected Discharge Time    May 1, 2023             Rubi Lehman RN

## 2023-05-01 NOTE — PLAN OF CARE
Goal Outcome Evaluation:  Plan of Care Reviewed With: patient    Progress: no change  Outcome Evaluation: VSS, blood pressures soft.  Non tele. On 2L nasal cannula. Scheduled and PRN norco given ovenright. PRN atarax given. Skin interventions in place. Continue plan of care.

## 2023-05-01 NOTE — PROGRESS NOTES
"    INFECTIOUS DISEASE Progress Note    Christine Garg  6/8/1933  [id number]    Admission Date: 4/10/2023      Requesting Provider: Noelle Combs DO  Evaluating Physician: Ruy Nuñez MD    Reason for Consultation: Intra-abdominal abscess    History of present illness:    4/26/23: Patient is a 89 y.o. female with a history of COPD, coronary artery disease, peripheral vascular disease, hypertension, and hyperlipidemia who is seen today for evaluation of an intra-abdominal abscess.She was admitted to [facility] on 3/22/23 with a perforated duodenal ulcer requiring emergent surgical intervention with oversewing utilizing an omental patch/Biopatch.  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  She was transferred to [facility] on 4/10 for placement of an esophageal stent.Dr. Brunner placed the stent on 4/12. On 4/12 her white blood cell count was normal at 9.9.By 4/17 her white blood cell count was up to 13.3 and has further risen to 14.9 today. Due to her leukocytosis, she was started on intravenous Rocephin on 4/24.  An abdominal/pelvic CT scan performed yesterday revealed an upper abdominal abscess adjacent to the stomach.  She underwent CT-guided drainage of the abscess today [id number] cc of cloudy fluid filled with particulate matter. She has remained afebrile.    4/27/23: She has remained afebrile.White blood cell count today is 12.  Creatinine is 0.37.  Intra-abdominal abscess cultures are pending.  She complains of some upper abdominal discomfort.  She complains of some increased dyspnea.    4/28/23: Afebrile.  \"not feeling well\".  Has shortness of breath and is currently on 1L O2 NC.  With O2 sat around 94%. Her peritoneum culture is positive for yeast.  Still with abdominal " "discomfort. SARAH drain with serous drainage.  She denies f/c, v/d, rashes.  She has nausea.     4/29/23:  She remains afebrile.  No new labs. Peritoneal culture with yeast. She complains of shortness of breath. No cough, no abdominal pain.  Hospice evaluated, possibly going home with hospice.     4/30/23:  Had a few episodes of shortness of breath yesterday.  She remains afebrile. Possible home with hospice.  \" I'm just really tired, honey\".  Now on 3L NC.     5/1/23: She has remained afebrile. Creatinine today is 0.44. Intra-abdominal abscess culture is growing rare yeast. She is on 2 L of oxygen with an O2 saturation of 98%.  She complains of increased dyspnea.  She is eating very little.  She has decided to go home with hospice.  She remains on intravenous cefepime, metronidazole, and fluconazole.  The yeast in her intra-abdominal cultures has now been identified as Candida glabrata sensitive to fluconazole.      Past Medical History:   Diagnosis Date   • Advanced age    • CAD (coronary artery disease) 2011    s/p 3v CABG   • Chronic kidney disease (CKD), stage III (moderate)    • COPD (chronic obstructive pulmonary disease)    • History of tobacco use    • Hyperlipidemia    • Hypertension    • Hypothyroidism    • PONV (postoperative nausea and vomiting)    • Pulmonary nodule        Past Surgical History:   Procedure Laterality Date   • BREAST BIOPSY Left     benign   • CATARACT EXTRACTION     • CORONARY ANGIOPLASTY     • CORONARY ARTERY BYPASS GRAFT     • ENDOSCOPY W/ PEG TUBE PLACEMENT N/A 4/7/2023    Procedure: ESOPHAGOGASTRODUODENOSCOPY WITH PERCUTANEOUS ENDOSCOPIC GASTROSTOMY TUBE INSERTION;  Surgeon: Aurora Kirkland MD;  Location: [facility] OR;  Service: General;  Laterality: N/A;   • ENDOSCOPY W/ STENT PLACEMENT/REMOVAL N/A 4/12/2023    Procedure: ESOPHAGOGASTRODUODENOSCOPY WITH STENT PLACEMENT WITH FLUOROSCOPY;  Surgeon: Brunner, Mark I, MD;  Location: [facility] ENDOSCOPY;  Service: Gastroenterology;  " Laterality: N/A;   • EXPLORATORY LAPAROTOMY N/A 3/22/2023    Procedure: LAPAROTOMY EXPLORATORY WITH OVER SEW OF DUODENAL ULCER WITH OMENTAL PATCH AND BIOPATCH AND CENTRAL LINE PLACEMENT;  Surgeon: Aurora Kirkland MD;  Location: [facility];  Service: General;  Laterality: N/A;       Family History   Problem Relation Age of Onset   • Heart disease Mother    • Heart disease Father    • Heart disease Brother    • Breast cancer Neg Hx        Social History     Socioeconomic History   • Marital status:    Tobacco Use   • Smoking status: Former     Types: Cigarettes   • Smokeless tobacco: Never   Vaping Use   • Vaping Use: Never used   Substance and Sexual Activity   • Alcohol use: No   • Drug use: No   • Sexual activity: Defer       Allergies   Allergen Reactions   • Motrin [Ibuprofen] Anaphylaxis and Hives   • Novocain [Procaine] Other (See Comments)     Pt state she passes out.         Medication:    Current Facility-Administered Medications:   •  acetaminophen (TYLENOL) tablet 650 mg, 650 mg, Per PEG Tube, Q4H PRN **OR** acetaminophen (TYLENOL) 160 MG/5ML solution 650 mg, 650 mg, Per PEG Tube, Q4H PRN, 650 mg at 04/23/23 0958 **OR** acetaminophen (TYLENOL) suppository 650 mg, 650 mg, Rectal, Q4H PRN, Brunner, Mark I, MD  •  aluminum-magnesium hydroxide-simethicone (MAALOX MAX) 400-400-40 MG/5ML suspension 15 mL, 15 mL, Per PEG Tube, Q6H PRN, Tanner Roe, PharmD  •  aspirin chewable tablet 81 mg, 81 mg, Per PEG Tube, Daily, Brunner, Mark I, MD, 81 mg at 04/29/23 0858  •  [DISCONTINUED] sennosides-docusate (PERICOLACE) 8.6-50 MG per tablet 2 tablet, 2 tablet, Per PEG Tube, BID **AND** polyethylene glycol (MIRALAX) packet 17 g, 17 g, Per PEG Tube, Daily PRN, 17 g at 04/25/23 1023 **AND** [DISCONTINUED] bisacodyl (DULCOLAX) EC tablet 5 mg, 5 mg, Oral, Daily PRN **AND** bisacodyl (DULCOLAX) suppository 10 mg, 10 mg, Rectal, Daily PRN, Brunner, Mark I, MD, 10 mg at 04/25/23 1023  •  carvedilol (COREG) tablet 25  mg, 25 mg, Per PEG Tube, BID With Meals, Aleta Hatch, APRN, 25 mg at 04/27/23 1708  •  cefepime (MAXIPIME) 2 g/100 mL 0.9% NS (mbp), 2 g, Intravenous, Q8H, Ruy Nuñez MD, 2 g at 05/01/23 0001  •  cloNIDine (CATAPRES) tablet 0.1 mg, 0.1 mg, Per PEG Tube, TID PRN, Brunner, Mark I, MD, 0.1 mg at 04/17/23 0413  •  sennosides (SENOKOT) 8.8 MG/5ML syrup 10 mL, 10 mL, Per PEG Tube, BID, 10 mL at 04/30/23 2148 **AND** docusate sodium (COLACE) liquid 100 mg, 100 mg, Per PEG Tube, BID, Brunner, Mark I, MD, 100 mg at 04/30/23 2148  •  Enoxaparin Sodium (LOVENOX) syringe 40 mg, 40 mg, Subcutaneous, Nightly, Catalina Leonardo MD, 40 mg at 04/30/23 2148  •  fluconazole (DIFLUCAN) IVPB 400 mg, 400 mg, Intravenous, Q24H, Ruy Nuñez MD, Last Rate: 100 mL/hr at 04/30/23 1724, 400 mg at 04/30/23 1724  •  gabapentin (NEURONTIN) capsule 100 mg, 100 mg, Per PEG Tube, Q12H, Clare De Anda MD, 100 mg at 04/30/23 2149  •  HYDROcodone-acetaminophen (NORCO) 7.5-325 MG per tablet 1 tablet, 1 tablet, Per PEG Tube, Q4H PRN, Catalina Leonardo MD, 1 tablet at 05/01/23 0144  •  HYDROcodone-acetaminophen (NORCO) 7.5-325 MG per tablet 1 tablet, 1 tablet, Per PEG Tube, Q6H, María Elena Vazquez APRN, 1 tablet at 05/01/23 0613  •  hydrOXYzine (ATARAX) tablet 25 mg, 25 mg, Per PEG Tube, TID PRN, María Elena Vazquez APRN, 25 mg at 05/01/23 0144  •  ipratropium-albuterol (DUO-NEB) nebulizer solution 3 mL, 3 mL, Nebulization, Q4H PRN, Clare De Anda MD, 3 mL at 04/30/23 0055  •  ipratropium-albuterol (DUO-NEB) nebulizer solution 3 mL, 3 mL, Nebulization, 4x Daily - RT, Jenn Rios, APRN, 3 mL at 04/30/23 2038  •  lansoprazole (PREVACID SOLUTAB) disintegrating tablet Tablet Delayed Release Dispersible 30 mg, 30 mg, Per G Tube, BID AC, Shalini Frye II, DO, 30 mg at 04/30/23 1724  •  levothyroxine (SYNTHROID, LEVOTHROID) tablet 112 mcg, 112 mcg, Per PEG Tube, Q AM, Brunner, Mark I, MD, 112 mcg at 05/01/23 0613  •  lidocaine (LIDODERM) 5 % 1  patch, 1 patch, Transdermal, Q24H, Clare De Anda MD, 1 patch at 04/29/23 1040  •  lidocaine (LMX) 4 % cream 1 application, 1 application, Topical, Q6H, María Elena Vazquez APRN, 1 application at 05/01/23 0352  •  metoclopramide (REGLAN) solution 5 mg, 5 mg, Per G Tube, TID AC, Flora Franco, , 5 mg at 04/30/23 1724  •  metroNIDAZOLE (FLAGYL) IVPB 500 mg, 500 mg, Intravenous, Q8H, Ruy Nuñez MD, 500 mg at 05/01/23 0144  •  morphine injection 2 mg, 2 mg, Intravenous, Q2H PRN, María Elena Vazquez APRN, 2 mg at 04/30/23 0038  •  [DISCONTINUED] ondansetron (ZOFRAN) tablet 4 mg, 4 mg, Oral, Q6H PRN **OR** ondansetron (ZOFRAN) injection 4 mg, 4 mg, Intravenous, Q6H PRN, Brunner, Mark I, MD, 4 mg at 04/23/23 0959  •  promethazine (PHENERGAN) suppository 12.5 mg, 12.5 mg, Rectal, Q4H PRN, Anaya Jones MD  •  sodium chloride 0.9 % flush 10 mL, 10 mL, Intravenous, Q12H, Brunner, Mark I, MD, 10 mL at 04/30/23 2149  •  sodium chloride 0.9 % flush 10 mL, 10 mL, Intravenous, PRN, Brunner, Mark I, MD, 10 mL at 04/19/23 1704  •  sodium chloride 0.9 % infusion 40 mL, 40 mL, Intravenous, PRN, Brunner, Mark I, MD, 40 mL at 04/12/23 0902    Antibiotics:  Anti-Infectives (From admission, onward)    Ordered     Dose/Rate Route Frequency Start Stop    04/26/23 1628  cefepime (MAXIPIME) 2 g/100 mL 0.9% NS (mbp)        Ordering Provider: Ruy Nuñez MD    2 g  over 4 Hours Intravenous Every 8 Hours 04/27/23 0000 05/06/23 2359    04/26/23 1628  metroNIDAZOLE (FLAGYL) IVPB 500 mg        Ordering Provider: Ruy Nuñez MD    500 mg  over 30 Minutes Intravenous Every 8 Hours 04/26/23 1800 05/06/23 1759    04/26/23 1628  cefepime (MAXIPIME) 2 g/100 mL 0.9% NS (mbp)        Ordering Provider: Ruy Nuñez MD    2 g  200 mL/hr over 30 Minutes Intravenous Once 04/26/23 1700 04/26/23 1838    04/26/23 1629  fluconazole (DIFLUCAN) IVPB 400 mg        Ordering Provider: Ruy Nuñez MD    400 mg  100 mL/hr  over 120 Minutes Intravenous Every 24 Hours 23 1700 23 1659            Review of Systems:  See HPI      Physical Exam:   Vital Signs  Temp (24hrs), Av °F (35.6 °C), Min:94.7 °F (34.8 °C), Max:97.6 °F (36.4 °C)    Temp  Min: 94.7 °F (34.8 °C)  Max: 97.6 °F (36.4 °C)  BP  Min: 92/54  Max: 102/40  Pulse  Min: 90  Max: 101  Resp  Min: 12  Max: 16  SpO2  Min: 95 %  Max: 99 %    GENERAL: She is more chronically ill and debilitated appearing.  HEENT: Normocephalic, atraumatic.  PERRL. EOMI. No conjunctival injection. No icterus. Oropharynx clear without evidence of thrush or exudate. .  NECK: Supple .  HEART: RRR; No murmur, rubs, gallops.   LUNGS: few scattered crackles on 3L NC  ABDOMEN: Left upper quadrant PEG is in place with no exit site erythema or drainage.  She has a left upper quadrant drain in place with minimal active drainage.  EXT:  .1+ lower extremity edema  :  Without Barriga catheter.  MSK: No joint effusions or erythema  SKIN: Warm and dry without cutaneous eruptions on Inspection/palpation.    NEURO: Oriented to PPT.  Motor 5/5 strength  PSYCHIATRIC: Normal insight and judgment. Cooperative with PE    Laboratory Data    Results from last 7 days   Lab Units 23  0519 23  0420 23  0415   WBC 10*3/mm3 12.00* 14.90* 13.86*   HEMOGLOBIN g/dL 8.3* 8.8* 9.4*   HEMATOCRIT % 26.0* 27.5* 28.6*   PLATELETS 10*3/mm3 326 345 387     Results from last 7 days   Lab Units 23  0341   SODIUM mmol/L 132*   POTASSIUM mmol/L 3.8   CHLORIDE mmol/L 96*   CO2 mmol/L 28.0   BUN mg/dL 17   CREATININE mg/dL 0.44*   GLUCOSE mg/dL 97   CALCIUM mg/dL 8.8                             Estimated Creatinine Clearance: 90.6 mL/min (A) (by C-G formula based on SCr of 0.44 mg/dL (L)).      Microbiology:  Microbiology Results (last 10 days)     Procedure Component Value - Date/Time    Anaerobic Culture - Drainage, Peritoneum [[id number]]  (Normal) Collected: 23 1143    Lab Status: Final result  Specimen: Drainage from Peritoneum Updated: 05/01/23 0641     Anaerobic Culture No anaerobes isolated at 5 days    Body Fluid Culture - Body Fluid, Peritoneum [[id number]]  (Abnormal) Collected: 04/26/23 1142    Lab Status: Preliminary result Specimen: Body Fluid from Peritoneum Updated: 04/29/23 0928     Body Fluid Culture Rare Yeast isolated     Gram Stain Many (4+) WBCs seen      No organisms seen    Urine Culture - Urine, Straight Cath [[id number]]  (Normal) Collected: 04/25/23 0950    Lab Status: Final result Specimen: Urine from Straight Cath Updated: 04/26/23 1037     Urine Culture No growth              Radiology:  Imaging Results (Last 72 Hours)     ** No results found for the last 72 hours. **            Impression:   1.  Candida glabrata intra-abdominal abscess-related to her perforated duodenal ulcer versus the G-tube placement.  She underwent CT-guided drainage of the abscess on 4/26, I will plan to switch her to oral fluconazole since her Candida glabrata is fluconazole sensitive.  She can be discharged on oral fluconazole as a comfort measure.  2.  Duodenal ulcer perforation-status post surgery 3/22/23 with omental patch/Biopatch repair  3.  Adenocarcinoma of the lung-recently diagnosed with a pleural biopsy  4.  Dysphagia/external esophageal compression-status post esophageal stent placement on 4/12/23  5.  Leukocytosis/neutrophilia-secondary to intra-abdominal infection    PLAN/RECOMMENDATIONS:  1.  Discontinue cefepime  2.  Discontinue metronidazole  3.  Change fluconazole to 200 mg p.o. daily  4.  Discharge on hospice    I discussed her complex situation in detail with her and her family today.  I discussed her complex situation and disposition with Dr. Frye today.    Ruy Nuñez MD  5/1/2023  08:01 EDT

## 2023-05-01 NOTE — PROGRESS NOTES
[facility] Medicine Services  PROGRESS NOTE    Patient Name: Christine Garg  : 1933  MRN: [id number]    Date of Admission: 4/10/2023  Primary Care Physician: Dave Tobar MD    Subjective   Subjective     CC: f/u abd pain    HPI: Up in bed. C/O air hunger and anxiety. No abd pain.    ROS:  Gen- No fevers, chills  CV- No chest pain, palpitations  Resp- No cough  GI- No N/V/D    Objective   Objective     Vital Signs:   Temp:  [94.7 °F (34.8 °C)-97.6 °F (36.4 °C)] 95.6 °F (35.3 °C)  Heart Rate:  [] 101  Resp:  [12-16] 16  BP: ()/(40-76) 96/76  Flow (L/min):  [2-3] 2     Physical Exam:  Constitutional: No acute distress, awake, alert, frail appearing  HENT: NCAT, mucous membranes moist  Respiratory: Clear to auscultation bilaterally, respiratory effort normal   Cardiovascular: RRR, no murmurs, rubs, or gallops  Gastrointestinal: Positive bowel sounds, soft, nontender, nondistended, PEG, SARAH  Musculoskeletal: No bilateral ankle edema  Psychiatric: Appropriate affect, cooperative  Neurologic: Oriented x 3, strength symmetric in all extremities, Cranial Nerves grossly intact to confrontation, speech clear  Skin: No rashes    Results Reviewed:  LAB RESULTS:      Lab 23  0523  0420 23  0415   WBC 12.00* 14.90* 13.86*   HEMOGLOBIN 8.3* 8.8* 9.4*   HEMATOCRIT 26.0* 27.5* 28.6*   PLATELETS 326 345 387   NEUTROS ABS 9.78* 12.49* 11.24*   IMMATURE GRANS (ABS) 0.08* 0.11* 0.16*   LYMPHS ABS 1.13 1.26 1.33   MONOS ABS 0.64 0.72 0.73   EOS ABS 0.31 0.26 0.32   MCV 96.3 94.8 94.1         Lab 23  0341 23  0857 23  0519 23  0420 23  0641   SODIUM 132* 128* 132* 129* 128*   POTASSIUM 3.8 3.8 3.7 3.7 3.9   CHLORIDE 96* 91* 94* 89* 88*   CO2 28.0 29.0 28.0 31.0* 31.0*   ANION GAP 8.0 8.0 10.0 9.0 9.0   BUN 17 16 14 12 13   CREATININE 0.44* 0.44* 0.37* 0.28* 0.39*   EGFR 92.6 92.6 96.5 103.2 95.3   GLUCOSE 97 106* 108* 96 123*   CALCIUM 8.8  8.9 7.9* 7.9* 8.2*   PHOSPHORUS  --   --  3.6 3.2  --          Lab 04/27/23  0519 04/26/23  0420   ALBUMIN 2.1* 2.2*                     Brief Urine Lab Results  (Last result in the past 365 days)      Color   Clarity   Blood   Leuk Est   Nitrite   Protein   CREAT   Urine HCG        04/25/23 0950 Yellow   Clear   Negative   Small (1+)   Negative   Negative                 Microbiology Results Abnormal     Procedure Component Value - Date/Time    Anaerobic Culture - Drainage, Peritoneum [[id number]]  (Normal) Collected: 04/26/23 1143    Lab Status: Final result Specimen: Drainage from Peritoneum Updated: 05/01/23 0641     Anaerobic Culture No anaerobes isolated at 5 days    Urine Culture - Urine, Straight Cath [[id number]]  (Normal) Collected: 04/25/23 0950    Lab Status: Final result Specimen: Urine from Straight Cath Updated: 04/26/23 1037     Urine Culture No growth          No radiology results from the last 24 hrs    Results for orders placed during the hospital encounter of 03/22/23    Adult Transthoracic Echo Complete W/ Cont if Necessary Per Protocol    Interpretation Summary  •  Left ventricular systolic function is normal. Calculated left ventricular EF = 60% Left ventricular ejection fraction appears to be 56 - 60%.  •  Left ventricular diastolic function is consistent with (grade I) impaired relaxation and age.  •  Moderate to severe mitral valve regurgitation is present.  •  Moderate tricuspid valve regurgitation is present.  •  Estimated right ventricular systolic pressure from tricuspid regurgitation is moderately elevated (45-55 mmHg).  •  Moderate pulmonary hypertension is present.      Current medications:  Scheduled Meds:aspirin, 81 mg, Per PEG Tube, Daily  carvedilol, 25 mg, Per PEG Tube, BID With Meals  cefepime, 2 g, Intravenous, Q8H  sennosides, 10 mL, Per PEG Tube, BID   And  docusate sodium, 100 mg, Per PEG Tube, BID  enoxaparin, 40 mg, Subcutaneous, Nightly  fluconazole, 400 mg, Intravenous,  Q24H  gabapentin, 100 mg, Per PEG Tube, Q12H  HYDROcodone-acetaminophen, 1 tablet, Per PEG Tube, Q6H  ipratropium-albuterol, 3 mL, Nebulization, 4x Daily - RT  lansoprazole, 30 mg, Per G Tube, BID AC  levothyroxine, 112 mcg, Per PEG Tube, Q AM  lidocaine, 1 patch, Transdermal, Q24H  lidocaine, 1 application, Topical, Q6H  metoclopramide, 5 mg, Per G Tube, TID AC  metroNIDAZOLE, 500 mg, Intravenous, Q8H  sodium chloride, 10 mL, Intravenous, Q12H      Continuous Infusions:   PRN Meds:.•  acetaminophen **OR** acetaminophen **OR** acetaminophen  •  aluminum-magnesium hydroxide-simethicone  •  [DISCONTINUED] senna-docusate sodium **AND** polyethylene glycol **AND** [DISCONTINUED] bisacodyl **AND** bisacodyl  •  cloNIDine  •  HYDROcodone-acetaminophen  •  hydrOXYzine  •  ipratropium-albuterol  •  LORazepam  •  Morphine  •  [DISCONTINUED] ondansetron **OR** ondansetron  •  promethazine  •  sodium chloride  •  sodium chloride    Assessment & Plan   Assessment & Plan     Active Hospital Problems    Diagnosis  POA   • **Esophageal stricture [K22.2]  Unknown   • Severe malnutrition [E43]  Unknown   • Hypothyroidism (acquired) [E03.9]  Unknown   • S/P percutaneous endoscopic gastrostomy (PEG) tube placement [Z93.1]  Not Applicable   • Adenocarcinoma [C80.1]  Unknown   • Perforated ulcer [K27.5]  Yes   • Former smoker [Z87.891]  Not Applicable   • Coronary artery disease  [I25.10]  Yes   • Chronic obstructive pulmonary disease [J44.9]  Yes   • Essential hypertension [I10]  Yes   • ASCVD (arteriosclerotic cardiovascular disease) [I25.10]  Yes      Resolved Hospital Problems   No resolved problems to display.        Brief Hospital Course to date:  Christine Garg is a 89 y.o. female with past medical history of essential hypertension, dyslipidemia, coronary artery disease, peripheral artery disease, hypothyroidism, COPD who was admitted to [facility] on 3/22 for abdominal pain; went to OR, had perforated duodenal ulcer  repaired with omental patch.  Then on 3/29, patient had CT of thoracic spine which noted left pleural consolidation and parenchymal nodules.    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  She was also found to have an esophageal stricture secondary to extrinsic compression of 3cm peritracheal lymph node.    On 4/7, patient had PEG tube placed.  She was sent to [facility] for placement of esophageal stent by Dr. Waters.        This patient's problems and plans were partially entered by my partner and updated as appropriate by me 05/01/23.    Dysphagia due to external esophageal compression by LAD  S/p PEG  -EGD 4/12 with esophageal stent placement  -Repeat CT scan abdomen/pelvis on 4/25 showed new air/gas collection within left mid abdomen concerning for possible abscess  -ID following for abx, had been on cefepime, diflucan. Unfortunately has now lost IV access and unable to obtain any further access. I am meeting with her son this am to discuss GOC however I recommend comfort care.     Hyponatremia, worse.  - Have dced free water flush with tube feeds. Sodium worse today due to diuresis. Hold further diuretics.     Cancer related pain/anxiety  -Palliative care following  -Patient wanting to pursue more comfort care measures but son/family seem to be pushing her to pursue aggressive means  -Add ativan today.     Perforated Duodenal Ulcer s/p exploratory laparotomy with patch 3/22  -Continue low dose asa     New diagnosis of lung adenocarcinoma  COPD  Former smoker   - Follow up outpatient for treatment plan with Dr. Balbuena        Coronary artery disease  Dyslipidemia  Essential hypertension  - Lowered home aspirin from 325 mg to 81mg. Will need to make this clear at discharge in order to prevent further ulceration     Hypoalbuminemia  Severe malnutrition     Hypothyroidism  - Continue home synthroid      Debility  - Family more agreeable to hospice conversation.   Currently working on home with hospice if she continues to decline.    Expected Discharge Location and Transportation:   Expected Discharge   Expected Discharge Date: 05/01/23       DVT prophylaxis:  Medical and mechanical DVT prophylaxis orders are present.     AM-PAC 6 Clicks Score (PT): 11 (04/26/23 0820)    CODE STATUS:   Code Status and Medical Interventions:   Ordered at: 04/28/23 1212     Level Of Support Discussed With:    Patient     Code Status (Patient has no pulse and is not breathing):    No CPR (Do Not Attempt to Resuscitate)     Medical Interventions (Patient has pulse or is breathing):    Comfort Measures       Shalini Frye II, DO  05/01/23

## 2023-05-02 PROBLEM — C34.90 ADENOCARCINOMA OF LUNG: Status: ACTIVE | Noted: 2023-05-02

## 2023-05-02 NOTE — H&P
"Hospice History and Physical     Patient Name:  Christine Garg   : 1933   Sex: female    Patient Care Team:  Dave Tobar MD as PCP - General  Dave Tobar MD as PCP - Family Medicine    Code Status: Comfort measures.     Subjective     89 yoF who had been living at home with her son, performing her own ADL's until 2022.  At that time she was diagnosed with UTI and COVID 19.  During that hospitalization she was found to have pleural mass.  Pt had been following with her PCP and had scheduled follow up appointments for evaluation and biopsy.  She was unable to make these appointments due to hospitalization on 3/22/23.     PMH significant for HTN, HLD, CAD, COPD, former smoker, PAD, CKD III, and hypothyroid    Pt presented to ED at [facility] on 3/22/23 for worsening abdominal pain with N/V x 2 days.  Workup revealed free air in stomach and pt was taken for emergent surgery.  Post op diagnosis: severe sepsis with perforated duodenal ulcer. S/P exploratory laparotomy with over sew of duodenal ulcer with omental patch and biopatch.  CT of thoracic spine on 3/29 that showed T5-T6 low attenuation mass development of small bibasilar pleural effusions and consolidation, also on cervical spine CT showed Parenchymal nodules in lung apices and Grade 1 anterolisthesis of C7 on T1. Per Christine conversation with Dr Robin this is a known mass and her primary care had been working on outpatient follow up to discuss Biopsy.     On 3/31/23 Palliative care was consulted for goals of care and advanced care planning in medically complex, elderly patient.  Pt was able to complete a living will with provider and code status changed to DNR/DNI per pt request.     On 4/3/23 oncology met with the patient and reviewed CT scans.  Pt planned for biopsy on 23 to determine diagnosis and treatment plan options.  Pt verbalized on this date that she \"may not want to take treatment\", verbalized that she is \"ready to go\". "      On 4/5/22 pt had biopsy of pleural lesion. Her case was discussed in tumor board with radiology, pathology, surgery, radiation oncology, and medical oncology. 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   She continued to have significant dysphagia, PEG tube placed on 4/7/23. She was started on tube feeding.       On 4/10/23 pt transferred to [facility] for possible esophageal stenting for quality of life and comfort purpose.  Followed by Dr Waters.  On 4/12/23 Dr Brunner performed upper GI and esophageal stenting.     Plan was that pt would discharge to SNF for short term rehab and then discharge home with hospice.  However, pt continued to have worsening symptoms and changes in mental status despite maximum medical interventions.     5/2/23- due to pts continued decline and worsening of symptoms family have elected to pursue inpatient hospice admission for symptom management and end of life care.  2 sons at bedside, 1 son coming in tonight from [place].      Upon assessment today, pt is restless, pulling at her gown and crying out when touched.  Nursing reports periods of increased dyspnea not managed with medications.  Son states that she is asking for her other son today, yesterday she was unable to recognize any of them.  APRN discussed inpatient hospice with sons, we discussed hours to days prognosis.  Both verbalize understanding, realize that pt has been declining steadily since December.  We discussed medication regimen and plan for symptom management as well as transition to N when bed becomes available.  Good understanding verbalized to all the above.     Review of Systems  Review of Systems   Unable to perform ROS: Acuity of condition       History  Past Medical History:   Diagnosis Date   • Advanced age    • CAD (coronary artery disease) 2011    s/p 3v CABG   • Chronic kidney disease (CKD), stage III  (moderate)    • COPD (chronic obstructive pulmonary disease)    • History of tobacco use    • Hyperlipidemia    • Hypertension    • Hypothyroidism    • PONV (postoperative nausea and vomiting)    • Pulmonary nodule      Past Surgical History:   Procedure Laterality Date   • BREAST BIOPSY Left     benign   • CATARACT EXTRACTION     • CORONARY ANGIOPLASTY     • CORONARY ARTERY BYPASS GRAFT     • ENDOSCOPY W/ PEG TUBE PLACEMENT N/A 4/7/2023    Procedure: ESOPHAGOGASTRODUODENOSCOPY WITH PERCUTANEOUS ENDOSCOPIC GASTROSTOMY TUBE INSERTION;  Surgeon: Aurora Kirkland MD;  Location: [facility] OR;  Service: General;  Laterality: N/A;   • ENDOSCOPY W/ STENT PLACEMENT/REMOVAL N/A 4/12/2023    Procedure: ESOPHAGOGASTRODUODENOSCOPY WITH STENT PLACEMENT WITH FLUOROSCOPY;  Surgeon: Brunner, Mark I, MD;  Location: [facility] beds at the [facility] ENDOSCOPY;  Service: Gastroenterology;  Laterality: N/A;   • EXPLORATORY LAPAROTOMY N/A 3/22/2023    Procedure: LAPAROTOMY EXPLORATORY WITH OVER SEW OF DUODENAL ULCER WITH OMENTAL PATCH AND BIOPATCH AND CENTRAL LINE PLACEMENT;  Surgeon: Aurora Kirkland MD;  Location: [facility] OR;  Service: General;  Laterality: N/A;     Current Facility-Administered Medications   Medication Dose Route Frequency Provider Last Rate Last Admin   • acetaminophen (TYLENOL) tablet 650 mg  650 mg Oral Q4H PRN Chanell Gutierrez APRN        Or   • acetaminophen (TYLENOL) 160 MG/5ML solution 650 mg  650 mg Oral Q4H PRN Chanell Gutierrez APRN        Or   • acetaminophen (TYLENOL) suppository 650 mg  650 mg Rectal Q4H PRN Chanell Gutierrez APRN       • bisacodyl (DULCOLAX) suppository 10 mg  10 mg Rectal Daily PRN Chanell Gutierrez APRJENNIFER       • bisacodyl (DULCOLAX) suppository 10 mg  10 mg Rectal Daily Chanell Gutierrez, APRN       • furosemide (LASIX) injection 20 mg  20 mg Intravenous Q6H PRN Chanell Gutierrez APRJENNIFER       • glycopyrrolate (ROBINUL) injection 0.2 mg  0.2 mg Intravenous Q4H PRN Chanell Gutierrez, APRJENNIFER       • haloperidol  lactate (HALDOL) injection 1 mg  1 mg Intravenous Q4H PRN Chanell Gutierrez APRN       • ipratropium-albuterol (DUO-NEB) nebulizer solution 3 mL  3 mL Nebulization Q4H PRN Chanell Gutierrez APRN       • [START ON 5/3/2023] lidocaine (LIDODERM) 5 % 1 patch  1 patch Transdermal Q24H Chanell Gutierrez APRN       • lidocaine (LMX) 4 % cream 1 application  1 application Topical Q6H Chanell Gutierrez APRJENNIFER       • LORazepam (ATIVAN) injection 0.25 mg  0.25 mg Intravenous Q4H PRN Chanell Gutierrez APRJENNIFER       • morphine injection 2 mg  2 mg Intravenous Q1H PRN Chanell Gutierrez APRN       • ondansetron (ZOFRAN) injection 4 mg  4 mg Intravenous Q6H PRN Chaenll Gutierrez APRJENNIFER       • ondansetron (ZOFRAN) injection 4 mg  4 mg Intravenous Q6H PRN Chanell Gutierrez APRN       • palliative care oral rinse   Mouth/Throat PRN Chanell Gutierrez APRN       • polyvinyl alcohol (LIQUIFILM) 1.4 % ophthalmic solution 1 drop  1 drop Both Eyes Q30 Min PRN Chanell Gutierrez APRN       • scopolamine patch 1 mg/72 hr  1 patch Transdermal Q72H PRN Chanell Gutierrez APRN       • sodium chloride 0.9 % flush 10 mL  10 mL Intravenous Q12H Chanell Gutierrez APRN            •  acetaminophen **OR** acetaminophen **OR** acetaminophen  •  bisacodyl  •  furosemide  •  glycopyrrolate  •  haloperidol lactate  •  ipratropium-albuterol  •  LORazepam  •  Morphine  •  ondansetron  •  ondansetron  •  palliative care oral rinse  •  polyvinyl alcohol  •  Scopolamine  Allergies   Allergen Reactions   • Motrin [Ibuprofen] Anaphylaxis and Hives   • Novocain [Procaine] Other (See Comments)     Pt state she passes out.     Family History   Problem Relation Age of Onset   • Heart disease Mother    • Heart disease Father    • Heart disease Brother    • Breast cancer Neg Hx      Social History     Socioeconomic History   • Marital status:    Tobacco Use   • Smoking status: Former     Types: Cigarettes   • Smokeless tobacco: Never   Vaping Use   •  Vaping Use: Never used   Substance and Sexual Activity   • Alcohol use: No   • Drug use: No   • Sexual activity: Defer       Objective     Vital Signs  Temp:  [94 °F (34.4 °C)-97.6 °F (36.4 °C)] 96.7 °F (35.9 °C)  Heart Rate:  [] 81  Resp:  [16-17] 16  BP: ()/(53-65) 82/60    PPS: Palliative Performance Scale score as of 5/2/2023, 12:59 EDT is 20% based on the following measures:   Ambulation: Totally bed bound  Activity and Evidence of Disease: Unable to do any work, extensive evidence of disease  Self-Care: Total care  Intake:Minimal sips  LOC: Full, drowsy or confusion    Physical Exam:  Physical Exam  Vitals and nursing note reviewed. Exam conducted with a chaperone present.   Constitutional:       Appearance: She is ill-appearing.   HENT:      Head: Normocephalic.      Mouth/Throat:      Mouth: Mucous membranes are moist.      Pharynx: Oropharyngeal exudate present.   Cardiovascular:      Rate and Rhythm: Normal rate.   Pulmonary:      Effort: Respiratory distress present.      Breath sounds: Rales present. No rhonchi.   Abdominal:      General: Bowel sounds are normal. There is distension.      Tenderness: There is abdominal tenderness. There is no guarding.   Musculoskeletal:         General: Swelling present.      Right lower leg: Edema present.      Left lower leg: Edema present.   Skin:     General: Skin is warm and dry.      Coloration: Skin is pale.   Neurological:      Mental Status: She is disoriented.      Motor: Weakness present.   Psychiatric:         Behavior: Behavior is agitated.         Cognition and Memory: Cognition is impaired. Memory is impaired.         Results Reviewed:  LAB RESULTS:      Lab 05/02/23  0345 04/27/23  0519 04/26/23  0420   WBC 12.98* 12.00* 14.90*   HEMOGLOBIN 9.0* 8.3* 8.8*   HEMATOCRIT 29.0* 26.0* 27.5*   PLATELETS 352 326 345   NEUTROS ABS  --  9.78* 12.49*   IMMATURE GRANS (ABS)  --  0.08* 0.11*   LYMPHS ABS  --  1.13 1.26   MONOS ABS  --  0.64 0.72   EOS  ABS  --  0.31 0.26   MCV 98.0* 96.3 94.8         Lab 05/01/23  0341 04/30/23  0857 04/27/23  0519 04/26/23  0420   SODIUM 132* 128* 132* 129*   POTASSIUM 3.8 3.8 3.7 3.7   CHLORIDE 96* 91* 94* 89*   CO2 28.0 29.0 28.0 31.0*   ANION GAP 8.0 8.0 10.0 9.0   BUN 17 16 14 12   CREATININE 0.44* 0.44* 0.37* 0.28*   EGFR 92.6 92.6 96.5 103.2   GLUCOSE 97 106* 108* 96   CALCIUM 8.8 8.9 7.9* 7.9*   PHOSPHORUS  --   --  3.6 3.2         Lab 04/27/23  0519 04/26/23  0420   ALBUMIN 2.1* 2.2*                     Brief Urine Lab Results  (Last result in the past 365 days)      Color   Clarity   Blood   Leuk Est   Nitrite   Protein   CREAT   Urine HCG        04/25/23 0950 Yellow   Clear   Negative   Small (1+)   Negative   Negative                 Microbiology Results Abnormal     None            Adenocarcinoma of lung      Assessment & Plan     Assessment/Plan:   -Admit to inpatient hospice service 05/02/2023 with Adenocarcinoma of lung [C34.90].     -Coordination of care with nursing staff and BCN IDT.     -Pain: Morphine 2 mg IV q 1 hr PRN for pain or dyspnea.     -Dyspnea:  Morphine as above, oxygen via NC PRN     -Nausea/Vomiting: Zofran 4 mg scheduled q 6 hr PRN       -Anxiety/Agitation: Lorazepam 0.5 mg q 4 hr PRN; haldol 1 mg q 4 hr PRN      -Bowel/bladder: bisacodyl supp q day x 3 days then PRN. Last BM 4/27/23.     -Nutrition: NPO, may diet as tolerated     -ADLs: total care     -Terminal fever: tylenol supp 650 mg q 6 hr PRN. tordal 15 mg IV q 6 hr PRN     -Terminal secretions:  May start PRN robinul/scop patch/furosemide if needed     -Patient comfort: palliative oral rinse PRN, liquifilm PRN      Goals of Care: achieve comfortable death, educate and support family through this process.      Total Visit Time: 50 min   Face to Face Time: 30 min   Total time spent includes time reviewing chart, face-to-face time, counseling patient/family/caregiver, ordering medications/tests/procedures, communicating with other health  care professionals, documenting clinical information in the electronic health record, and coordination of care with facility staff and BCN IDT.     Justification for care:  Patient meets criteria for acute in-patient care with required nursing assessment and interventions for symptoms with IV medications.      CEDRICK LEIVA, MSN, APRN  [facility]  Hospice and Palliative Care Nurse Practitioner  05/02/23  12:58 EDT

## 2023-05-02 NOTE — DISCHARGE SUMMARY
[facility] Medicine Services  DISCHARGE TO INPATIENT HOSPICE    Patient Name: Christine Garg  : 1933  MRN: [id number]    Date of Admission: 4/10/2023  Date of Discharge:  2023  Primary Care Physician: Dave Tobar MD    Consults     Date and Time Order Name Status Description    2023 11:23 AM Inpatient Hematology & Oncology Consult Completed     2023  6:40 AM Inpatient Infectious Diseases Consult Completed     2023  8:51 AM Inpatient Palliative Care MD Consult Completed     2023 12:34 AM Inpatient Gastroenterology Consult Completed     4/3/2023  1:24 PM Hematology & Oncology Inpatient Consult Completed     3/31/2023 10:35 AM Inpatient Palliative Care MD Consult Completed     3/22/2023  1:55 PM Hospitalist (on-call MD unless specified) Completed         Hospital Course     Presenting Problem:   Esophageal stricture [K22.2]    Active Hospital Problems    Diagnosis  POA   • **Esophageal stricture [K22.2]  Unknown   • Severe malnutrition [E43]  Unknown   • Hypothyroidism (acquired) [E03.9]  Unknown   • S/P percutaneous endoscopic gastrostomy (PEG) tube placement [Z93.1]  Not Applicable   • Adenocarcinoma [C80.1]  Unknown   • Perforated ulcer [K27.5]  Yes   • Former smoker [Z87.891]  Not Applicable   • Coronary artery disease  [I25.10]  Yes   • Chronic obstructive pulmonary disease [J44.9]  Yes   • Essential hypertension [I10]  Yes   • ASCVD (arteriosclerotic cardiovascular disease) [I25.10]  Yes      Resolved Hospital Problems   No resolved problems to display.          Hospital Course:  Christine Garg is a 89 y.o. female with past medical history of essential hypertension, dyslipidemia, coronary artery disease, peripheral artery disease, hypothyroidism, COPD who was admitted to [facility] on 3/22 for abdominal pain; went to OR, had perforated duodenal ulcer repaired with omental patch.  Then on 3/29, patient had CT of thoracic spine which noted  left pleural consolidation and parenchymal nodules.    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  She was also found to have an esophageal stricture secondary to extrinsic compression of 3cm peritracheal lymph node.    On 4/7, patient had PEG tube placed.  She was sent to [facility] for placement of esophageal stent by Dr. Waters.        Dysphagia due to external esophageal compression by LAD  Cancer related pain/anxiety  Perforated Duodenal Ulcer s/p exploratory laparotomy with patch 3/22  -Patient treated as above w/ ongoing failure to thrive. Palliative and hospice followed throughout stay and ultimately patient continued to decline to point of being appropriate for inpatient hospice.   -ID following for abx, had been on cefepime, diflucan. Unfortunately has now lost IV access and unable to obtain any further access. I have d/w aleksey Ornelas's oral fluconazole for 2-3 weeks for palliative tx.       Day of Discharge       Discharge Details     Discharge Disposition:  Transfer care to inpatient Hospice at [facility]    Time Spent on Discharge:  34 minutes    Shalini Frye II, DO  05/02/23

## 2023-05-02 NOTE — PROGRESS NOTES
[facility] Medicine Services  PROGRESS NOTE    Patient Name: Christine Garg  : 1933  MRN: [id number]    Date of Admission: 4/10/2023  Primary Care Physician: Dave Tobar MD    Subjective   Subjective     CC: f/u FTT    HPI: Lying in bed. Appears uncomfortable this am. Says she hurts all over.    ROS:  Gen- No fevers, chills  CV- No chest pain, palpitations  Resp- No cough, dyspnea  GI- No N/V/D, abd pain     Objective   Objective     Vital Signs:   Temp:  [94 °F (34.4 °C)-97.6 °F (36.4 °C)] 96.3 °F (35.7 °C)  Heart Rate:  [] 91  Resp:  [14-17] 17  BP: ()/(53-65) 102/53  Flow (L/min):  [2-3] 3     Physical Exam:  Constitutional: No acute distress, awake, alert, appears uncomfortable  HENT: NCAT, mucous membranes moist  Respiratory: Clear to auscultation bilaterally, respiratory effort normal   Cardiovascular: RRR, no murmurs, rubs, or gallops  Gastrointestinal: Positive bowel sounds, soft, PEG, SARAH  Musculoskeletal: No bilateral ankle edema  Psychiatric: Appropriate affect, cooperative  Neurologic: Oriented x 3, strength symmetric in all extremities, Cranial Nerves grossly intact to confrontation, speech clear  Skin: No rashes    Results Reviewed:  LAB RESULTS:      Lab 23  0345 23  0519 23  0420   WBC 12.98* 12.00* 14.90*   HEMOGLOBIN 9.0* 8.3* 8.8*   HEMATOCRIT 29.0* 26.0* 27.5*   PLATELETS 352 326 345   NEUTROS ABS  --  9.78* 12.49*   IMMATURE GRANS (ABS)  --  0.08* 0.11*   LYMPHS ABS  --  1.13 1.26   MONOS ABS  --  0.64 0.72   EOS ABS  --  0.31 0.26   MCV 98.0* 96.3 94.8         Lab 23  0341 23  0857 23  0519 23  0420   SODIUM 132* 128* 132* 129*   POTASSIUM 3.8 3.8 3.7 3.7   CHLORIDE 96* 91* 94* 89*   CO2 28.0 29.0 28.0 31.0*   ANION GAP 8.0 8.0 10.0 9.0   BUN 17 16 14 12   CREATININE 0.44* 0.44* 0.37* 0.28*   EGFR 92.6 92.6 96.5 103.2   GLUCOSE 97 106* 108* 96   CALCIUM 8.8 8.9 7.9* 7.9*   PHOSPHORUS  --   --  3.6 3.2          Lab 04/27/23  0519 04/26/23  0420   ALBUMIN 2.1* 2.2*                     Brief Urine Lab Results  (Last result in the past 365 days)      Color   Clarity   Blood   Leuk Est   Nitrite   Protein   CREAT   Urine HCG        04/25/23 0950 Yellow   Clear   Negative   Small (1+)   Negative   Negative                 Microbiology Results Abnormal     Procedure Component Value - Date/Time    Anaerobic Culture - Drainage, Peritoneum [[id number]]  (Normal) Collected: 04/26/23 1143    Lab Status: Final result Specimen: Drainage from Peritoneum Updated: 05/01/23 0641     Anaerobic Culture No anaerobes isolated at 5 days    Urine Culture - Urine, Straight Cath [[id number]]  (Normal) Collected: 04/25/23 0950    Lab Status: Final result Specimen: Urine from Straight Cath Updated: 04/26/23 1037     Urine Culture No growth          No radiology results from the last 24 hrs    Results for orders placed during the hospital encounter of 03/22/23    Adult Transthoracic Echo Complete W/ Cont if Necessary Per Protocol    Interpretation Summary  •  Left ventricular systolic function is normal. Calculated left ventricular EF = 60% Left ventricular ejection fraction appears to be 56 - 60%.  •  Left ventricular diastolic function is consistent with (grade I) impaired relaxation and age.  •  Moderate to severe mitral valve regurgitation is present.  •  Moderate tricuspid valve regurgitation is present.  •  Estimated right ventricular systolic pressure from tricuspid regurgitation is moderately elevated (45-55 mmHg).  •  Moderate pulmonary hypertension is present.      Current medications:  Scheduled Meds:aspirin, 81 mg, Per PEG Tube, Daily  carvedilol, 25 mg, Per PEG Tube, BID With Meals  sennosides, 10 mL, Per PEG Tube, BID   And  docusate sodium, 100 mg, Per PEG Tube, BID  enoxaparin, 40 mg, Subcutaneous, Nightly  fluconazole, 200 mg, Oral, Q24H  gabapentin, 100 mg, Per PEG Tube, Q12H  HYDROcodone-acetaminophen, 1 tablet, Per PEG Tube,  Q6H  ipratropium-albuterol, 3 mL, Nebulization, 4x Daily - RT  lansoprazole, 30 mg, Per G Tube, BID AC  levothyroxine, 112 mcg, Per PEG Tube, Q AM  lidocaine, 1 patch, Transdermal, Q24H  lidocaine, 1 application, Topical, Q6H  metoclopramide, 5 mg, Per G Tube, TID AC  sodium chloride, 10 mL, Intravenous, Q12H      Continuous Infusions:   PRN Meds:.•  acetaminophen **OR** acetaminophen **OR** acetaminophen  •  aluminum-magnesium hydroxide-simethicone  •  [DISCONTINUED] senna-docusate sodium **AND** polyethylene glycol **AND** [DISCONTINUED] bisacodyl **AND** bisacodyl  •  cloNIDine  •  HYDROcodone-acetaminophen  •  hydrOXYzine  •  ipratropium-albuterol  •  LORazepam  •  Morphine  •  [DISCONTINUED] ondansetron **OR** ondansetron  •  promethazine  •  sodium chloride  •  sodium chloride    Assessment & Plan   Assessment & Plan     Active Hospital Problems    Diagnosis  POA   • **Esophageal stricture [K22.2]  Unknown   • Severe malnutrition [E43]  Unknown   • Hypothyroidism (acquired) [E03.9]  Unknown   • S/P percutaneous endoscopic gastrostomy (PEG) tube placement [Z93.1]  Not Applicable   • Adenocarcinoma [C80.1]  Unknown   • Perforated ulcer [K27.5]  Yes   • Former smoker [Z87.891]  Not Applicable   • Coronary artery disease  [I25.10]  Yes   • Chronic obstructive pulmonary disease [J44.9]  Yes   • Essential hypertension [I10]  Yes   • ASCVD (arteriosclerotic cardiovascular disease) [I25.10]  Yes      Resolved Hospital Problems   No resolved problems to display.        Brief Hospital Course to date:  Christine Garg is a 89 y.o. female with past medical history of essential hypertension, dyslipidemia, coronary artery disease, peripheral artery disease, hypothyroidism, COPD who was admitted to [facility] on 3/22 for abdominal pain; went to OR, had perforated duodenal ulcer repaired with omental patch.  Then on 3/29, patient had CT of thoracic spine which noted left pleural consolidation and parenchymal  nodules.    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  She was also found to have an esophageal stricture secondary to extrinsic compression of 3cm peritracheal lymph node.    On 4/7, patient had PEG tube placed.  She was sent to [facility] for placement of esophageal stent by Dr. Waters.        This patient's problems and plans were partially entered by my partner and updated as appropriate by me 05/02/23.     Dysphagia due to external esophageal compression by LAD  S/p PEG  -EGD 4/12 with esophageal stent placement  -Repeat CT scan abdomen/pelvis on 4/25 showed new air/gas collection within left mid abdomen concerning for possible abscess  -ID following for abx, had been on cefepime, diflucan. Unfortunately has now lost IV access and unable to obtain any further access. I have d/w Dr. Nuñez, aleksey's oral fluconazole for 2-3 weeks for palliative tx.  -Have d/w palliative care today - since she is actively declining she has ordered SQ line for morphine administration. Going to d/w hospice to see if inpatient hospice is warranted.     Hyponatremia, worse.  - Have dced free water flush with tube feeds. Sodium worse today due to diuresis. Hold further diuretics.     Cancer related pain/anxiety  -Palliative care following  -Patient wanting to pursue more comfort care measures but son/family seem to be pushing her to pursue aggressive means  -Added ativan today.     Perforated Duodenal Ulcer s/p exploratory laparotomy with patch 3/22  -Continue low dose asa     New diagnosis of lung adenocarcinoma  COPD  Former smoker   - Follow up outpatient for treatment plan with Dr. Balbuena        Coronary artery disease  Dyslipidemia  Essential hypertension  - Lowered home aspirin from 325 mg to 81mg. Will need to make this clear at discharge in order to prevent further ulceration     Hypoalbuminemia  Severe malnutrition     Hypothyroidism  - Continue home  synthroid      Debility  - Family initially agreeable to take him home w/ hospice however since has declined may be appropriate for inpatient hospice.    Expected Discharge Location and Transportation:   Expected Discharge   Expected Discharge Date: 5/1/2023; Expected Discharge Time:  9:00 AM     DVT prophylaxis:  Medical and mechanical DVT prophylaxis orders are present.     AM-PAC 6 Clicks Score (PT): 11 (04/26/23 0820)    CODE STATUS:   Code Status and Medical Interventions:   Ordered at: 04/28/23 1212     Level Of Support Discussed With:    Patient     Code Status (Patient has no pulse and is not breathing):    No CPR (Do Not Attempt to Resuscitate)     Medical Interventions (Patient has pulse or is breathing):    Comfort Measures       Shalini Frye II, DO  05/02/23

## 2023-05-02 NOTE — PAYOR COMM NOTE
"Amanda Gomez (89 y.o. Female)     [id number]    Jane Puentes, RN  Utilization Review  [phone]  [phone]      DC to inpt Hospice     Date of Birth   06/08/1933    Social Security Number       Address   [address] [id number]    Home Phone   [phone]    MRN   [id number]       [place]   [facility]    Marital Status                               Admission Date   4/10/23    Admission Type   Urgent    Admitting Provider   Shalini Frye II, DO    Attending Provider       Department, Room/Bed   [facility] 6B, N640/1       Discharge Date   5/2/2023    Discharge Disposition   Hospice/Medical Facility ([facility])    Discharge Destination                               Attending Provider: (none)   Allergies: Motrin [Ibuprofen], Novocain [Procaine]    Isolation: None   Infection: None   Code Status: Prior    Ht: 165.1 cm (65\")   Wt: 66.2 kg (146 lb)    Admission Cmt: None   Principal Problem: Esophageal stricture [K22.2]                 Active Insurance as of 4/10/2023     Primary Coverage     Payor Plan Insurance Group Employer/Plan Group    ANTHEM MEDICARE REPLACEMENT ANTHEM MEDICARE ADVANTAGE [id number]     Payor Plan Address Payor Plan Phone Number Payor Plan Fax Number Effective Dates    [address] [phone]  1/1/2019 - None Entered    [facility] [place] [address]       Subscriber Name Subscriber Birth Date Member ID       AMANDA GOMEZ 6/8/1933 [id number]                 Emergency Contacts      (Rel.) Home Phone Work Phone Mobile Phone    Kaleb Gomez (healthcare surrogate) (Son) -- -- [phone]    Ziyad Gomez (alternative healthcare surrogate) (Son) -- -- [phone]    Filiberto Gomez (second alternative HCS) (Son) -- -- [phone]               Discharge Summary      Shalini Frye II, DO at 05/02/23 1141              [facility] Medicine Services  DISCHARGE TO INPATIENT HOSPICE    Patient Name: " Christine Garg  : 1933  MRN: [id number]    Date of Admission: 4/10/2023  Date of Discharge:  2023  Primary Care Physician: Dave Tobar MD    Consults     Date and Time Order Name Status Description    2023 11:23 AM Inpatient Hematology & Oncology Consult Completed     2023  6:40 AM Inpatient Infectious Diseases Consult Completed     2023  8:51 AM Inpatient Palliative Care MD Consult Completed     2023 12:34 AM Inpatient Gastroenterology Consult Completed     4/3/2023  1:24 PM Hematology & Oncology Inpatient Consult Completed     3/31/2023 10:35 AM Inpatient Palliative Care MD Consult Completed     3/22/2023  1:55 PM Hospitalist (on-call MD unless specified) Completed         Hospital Course     Presenting Problem:   Esophageal stricture [K22.2]    Active Hospital Problems    Diagnosis  POA   • **Esophageal stricture [K22.2]  Unknown   • Severe malnutrition [E43]  Unknown   • Hypothyroidism (acquired) [E03.9]  Unknown   • S/P percutaneous endoscopic gastrostomy (PEG) tube placement [Z93.1]  Not Applicable   • Adenocarcinoma [C80.1]  Unknown   • Perforated ulcer [K27.5]  Yes   • Former smoker [Z87.891]  Not Applicable   • Coronary artery disease  [I25.10]  Yes   • Chronic obstructive pulmonary disease [J44.9]  Yes   • Essential hypertension [I10]  Yes   • ASCVD (arteriosclerotic cardiovascular disease) [I25.10]  Yes      Resolved Hospital Problems   No resolved problems to display.          Hospital Course:  Christine Garg is a 89 y.o. female with past medical history of essential hypertension, dyslipidemia, coronary artery disease, peripheral artery disease, hypothyroidism, COPD who [facility]; went to OR, had perforated duodenal ulcer repaired with omental patch.  Then on 3/29, patient had CT of thoracic spine which noted left pleural consolidation and parenchymal nodules.    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  She was also found to have an esophageal stricture secondary to extrinsic compression of 3cm peritracheal lymph node.    On 4/7, patient had PEG tube placed.  She was sent to [facility] for placement of esophageal stent by Dr. Waters.        Dysphagia due to external esophageal compression by LAD  Cancer related pain/anxiety  Perforated Duodenal Ulcer s/p exploratory laparotomy with patch 3/22  -Patient treated as above w/ ongoing failure to thrive. Palliative and hospice followed throughout stay and ultimately patient continued to decline to point of being appropriate for inpatient hospice.   -ID following for abx, had been on cefepime, diflucan. Unfortunately has now lost IV access and unable to obtain any further access. I have d/w Dr. Nuñez, rec's oral fluconazole for 2-3 weeks for palliative tx.       Day of Discharge       Discharge Details     Discharge Disposition:  Transfer care to inpatient Hospice at [facility]    Time Spent on Discharge:  34 minutes    Shalini Frye II, DO  05/02/23    Electronically signed by Shalini Frye II, DO at 05/02/23 3582

## 2023-05-02 NOTE — PLAN OF CARE
Goal Outcome Evaluation:   Patient on 2.5L NC, PRN medications given, provider notified of unresolved restlessness. Family at bedside.      1908 - orders per Chanell, on call hospice provider

## 2023-05-02 NOTE — PROGRESS NOTES
Continued Stay Note  [facility]     Patient Name: Christine Garg  MRN: [id number]  Today's Date: 5/2/2023    Admit Date: 5/2/2023    Plan: Inpatient hospice   Discharge Plan     Row Name 05/02/23 1238       Plan    Plan Inpatient hospice    Plan Comments Admitted 89yo female 10% pps to inpatient hospice services this day. Pallor noted, occasional moan, grimace, and tense body posture. Pallor noted. Lungs with congestion, diminished on the (R). Son completed inpatient hospice admission via email and is to come to bedside later this afternoon. Verbalizes goal of comfort for his mother. Support provided. Patient requires inpatient hospice level of care due to requirements of injectable medications for palliation of symptoms necessitating frequent skilled nursing assessment, intervention, and reevaluation. Her current level of care is unable to be provided in an alternate setting. Disposition dependent on survival of hospitalization and ability to provide care needs in an alternate setting. Updated Dr. Frye and KAITLIN CASTREJON. Care coordinated with Meghan SALAS.    Final Discharge Disposition Code 51 - hospice medical facility               Discharge Codes    No documentation.                     Maddie Akbar RN

## 2023-05-02 NOTE — PROGRESS NOTES
"Palliative Care Daily Progress Note     C/C: Patient complaining of pain and SOB.    S: Medical record reviewed. Follow up visit for medication effectiveness and GOC in the context of complex medical decision making. Events noted. Patient reports her abodminal pain is improved with abscess drainage. She is experiencing back pain which is relieved with the Hydrocodone. She received x1 prn Hydrocodone 7.5-325mg PEG dose as well as Hydrocodone 7.5-325mg q 6 hours. Patient did receive Atarax 25mg x2 doses and one dose Lorazepam 0.5mg per PEG for anxiety. Patient with significant increase in pain and SOB at time of visit. Pain and SOB unrelieved with Hydrocodone. Patient with ice chips in only yesterday, increased sleeping throughout day.     ROS: +pain, left shoulder blade that radiates down her back on left>right side, constant dull ache, improves with Lidocaine, pain currently 10/10. +SOB, worse with exertion, does not improve with Hydrocodone, limits her activity. +anxiety, due to SOB, pain, and circumstances. +nausea, improved with medications. +decreased appetite,  poor appetite during day due to nausea and taste changes. +debility.  Denies vomiting, HA, chest pain.     O: Code Status:   Code Status and Medical Interventions:   Ordered at: 04/28/23 1212     Level Of Support Discussed With:    Patient     Code Status (Patient has no pulse and is not breathing):    No CPR (Do Not Attempt to Resuscitate)     Medical Interventions (Patient has pulse or is breathing):    Comfort Measures      Advanced Directives: Advance Directive Status: Patient has advance directive, copy in chart   Goals of Care: Ongoing.   Palliative Performance Scale Score: 20%     /53   Pulse 99   Temp 96.3 °F (35.7 °C) (Axillary)   Resp 16   Ht 165.1 cm (65\")   Wt 66.2 kg (146 lb)   SpO2 97%   BMI 24.30 kg/m²     Intake/Output Summary (Last 24 hours) at 5/2/2023 1104  Last data filed at 5/2/2023 0832  Gross per 24 hour   Intake 1024 " ml   Output 150 ml   Net 874 ml       PE:  General Appearance:   Patient laying in bed, awake, alert, chronically ill appearing, NAD   HEENT:    NC/AT, MMM, facial grimacing, NC in place   Neck:   supple, trachea midline, no JVD   Lungs:     CTA upper lobes, diminished in bases; respirations regular, even and unlabored; RR 18-20 on exam, 1LNC    Heart:    RRR, normal S1 and S2, no M/R/G   Abdomen:     Hypoactive bowel sounds, soft, nontender, distended, PEG in place   G/U:   Deferred   MSK/Extremities:    no edema   Pulses:   Pulses palpable and equal bilaterally   Skin:   Warm, dry   Neurologic:   anxious, alert, oriented x3   Psych:   anxious,       Meds: Reviewed and changes noted    Labs:   Results from last 7 days   Lab Units 05/02/23  0345   WBC 10*3/mm3 12.98*   HEMOGLOBIN g/dL 9.0*   HEMATOCRIT % 29.0*   PLATELETS 10*3/mm3 352     Results from last 7 days   Lab Units 05/01/23  0341   SODIUM mmol/L 132*   POTASSIUM mmol/L 3.8   CHLORIDE mmol/L 96*   CO2 mmol/L 28.0   BUN mg/dL 17   CREATININE mg/dL 0.44*   GLUCOSE mg/dL 97   CALCIUM mg/dL 8.8     Results from last 7 days   Lab Units 05/01/23  0341   SODIUM mmol/L 132*   POTASSIUM mmol/L 3.8   CHLORIDE mmol/L 96*   CO2 mmol/L 28.0   BUN mg/dL 17   CREATININE mg/dL 0.44*   CALCIUM mg/dL 8.8   GLUCOSE mg/dL 97     Imaging Results (Last 72 Hours)     ** No results found for the last 72 hours. **                Diagnostics: Reviewed    A:   Esophageal stricture    Essential hypertension    ASCVD (arteriosclerotic cardiovascular disease)    Coronary artery disease     Chronic obstructive pulmonary disease    Former smoker    Perforated ulcer    Hypothyroidism (acquired)    S/P percutaneous endoscopic gastrostomy (PEG) tube placement    Adenocarcinoma    Severe malnutrition     89 y.o. female with esophageal stricture due to adenocarcinoma.       S/S:   1. Pain -left shoulder blade, neoplastic lesion left T5-T6  -continue Gabapentin 100mg PEG  q 12 hours  -scheduled  Hydrocodone-Acetaminophen 7.5-325mg PEG q 6 hours  -continue Lidocaine patch  -continue Hydrocodone-Acetaminophen 7.5-325mg PEG q 4 hours prn breakthrough pain  -started morphine 2mg IV/SC q 2 hours prn dyspnea/pain     2. SOB -Hydrocodone-Acetaminophen for SOB as well  -started Morphine 2mg IV/SC q 2 hours prn pain/dyspnea     3. Nausea -continue scheduled Reglan 5mg PEG four times daily  -continue Zofran 4mg IV q 6 hours prn N/V  -continue Promethazine 12.5mg ME q 4 hours prn N/V     4. Anxiety -continue Hydroxyzine 25mg PEG TID prn anxiety  -started Lorazepam 0.5mg peg or Lorazepam 0.25mg IV q 4 hours prn anxiety/agitation     5. Constipation -at risk for OIC  -continue bowel regimen     6. Decreased Appetite -comfort diet  -offer frequent snacks and supplements     7. Debility -PT/OT following     8. GOC -DNR/DNI/Comfort Measures -per discussion with patient  -ACP on chart  -patient requesting home with hospice, hospice following  -discussed comfort measures and patient requesting comfort measures  -inpatient hospice notified of patient decline and requiring IV/SC medications for symptom management    P: Follow up visit for symptom management and GOC. Patient with significantly increased symptom burden unrelieved with current medications. Ordered SC placement to administer IV/SC Morphine for increased pain and SOB. Contacted inpatient hospice regarding patient decline and requiring IV/SC medications for symptom management. Please do not hesitate to contact us regarding further sx mgmt or GOC needs.  María Elena Vazquez, APRN  5/2/2023  Time spent: 35 minutes

## 2023-05-02 NOTE — PLAN OF CARE
Goal Outcome Evaluation:  Plan of Care Reviewed With: patient, son, family        Progress: declining  Outcome Evaluation: Pt having episodes of dyspnea, pain, anxiety. Pt requiring frequent use of medications to control symptoms. minimal po intake of ice chips/sips. Plans to transition to comfort plan of care and inpt hospice.  Prn morphine given subcut for pain,dyspnea with effectiveness. Ativan changed to subcut.  Son on his way here from [place]

## 2023-05-02 NOTE — PROGRESS NOTES
"    INFECTIOUS DISEASE Progress Note    Christine Garg  6/8/1933  [id number]    Admission Date: 4/10/2023      Requesting Provider: Noelle Combs DO  Evaluating Physician: Ruy Nuñez MD    Reason for Consultation: Intra-abdominal abscess    History of present illness:    4/26/23: Patient is a 89 y.o. female with a history of COPD, coronary artery disease, peripheral vascular disease, hypertension, and hyperlipidemia who is seen today for evaluation of an intra-abdominal abscess.She was admitted to [facility] on 3/22/23 with a perforated duodenal ulcer requiring emergent surgical intervention with oversewing utilizing an omental patch/Biopatch.  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  She was transferred to [facility] on 4/10 for placement of an esophageal stent.Dr. Brunner placed the stent on 4/12. On 4/12 her white blood cell count was normal at 9.9.By 4/17 her white blood cell count was up to 13.3 and has further risen to 14.9 today. Due to her leukocytosis, she was started on intravenous Rocephin on 4/24.  An abdominal/pelvic CT scan performed yesterday revealed an upper abdominal abscess adjacent to the stomach.  She underwent CT-guided drainage of the abscess today [id number] cc of cloudy fluid filled with particulate matter. She has remained afebrile.    4/27/23: She has remained afebrile.White blood cell count today is 12.  Creatinine is 0.37.  Intra-abdominal abscess cultures are pending.  She complains of some upper abdominal discomfort.  She complains of some increased dyspnea.    4/28/23: Afebrile.  \"not feeling well\".  Has shortness of breath and is currently on 1L O2 NC.  With O2 sat around 94%. Her peritoneum culture is positive for yeast.  Still with abdominal " "discomfort. SARAH drain with serous drainage.  She denies f/c, v/d, rashes.  She has nausea.     4/29/23:  She remains afebrile.  No new labs. Peritoneal culture with yeast. She complains of shortness of breath. No cough, no abdominal pain.  Hospice evaluated, possibly going home with hospice.     4/30/23:  Had a few episodes of shortness of breath yesterday.  She remains afebrile. Possible home with hospice.  \" I'm just really tired, honey\".  Now on 3L NC.     5/1/23: She has remained afebrile. Creatinine today is 0.44. Intra-abdominal abscess culture is growing rare yeast. She is on 2 L of oxygen with an O2 saturation of 98%.  She complains of increased dyspnea.  She is eating very little.  She has decided to go home with hospice.  She remains on intravenous cefepime, metronidazole, and fluconazole.  The yeast in her intra-abdominal cultures has now been identified as Candida glabrata sensitive to fluconazole.    5/2/23: She remains afebrile.Her white blood cell count today is 13.  Hemoglobin is 9. She is on 2 L of oxygen with an O2 saturation of 96%.  She feels poorly and has declined through the day.  She is now being transitioned to inpatient hospice rather than home hospice.    Past Medical History:   Diagnosis Date   • Advanced age    • CAD (coronary artery disease) 2011    s/p 3v CABG   • Chronic kidney disease (CKD), stage III (moderate)    • COPD (chronic obstructive pulmonary disease)    • History of tobacco use    • Hyperlipidemia    • Hypertension    • Hypothyroidism    • PONV (postoperative nausea and vomiting)    • Pulmonary nodule        Past Surgical History:   Procedure Laterality Date   • BREAST BIOPSY Left     benign   • CATARACT EXTRACTION     • CORONARY ANGIOPLASTY     • CORONARY ARTERY BYPASS GRAFT     • ENDOSCOPY W/ PEG TUBE PLACEMENT N/A 4/7/2023    Procedure: ESOPHAGOGASTRODUODENOSCOPY WITH PERCUTANEOUS ENDOSCOPIC GASTROSTOMY TUBE INSERTION;  Surgeon: Aurora Kirkland MD;  Location: [facility]" OR;  Service: General;  Laterality: N/A;   • ENDOSCOPY W/ STENT PLACEMENT/REMOVAL N/A 4/12/2023    Procedure: ESOPHAGOGASTRODUODENOSCOPY WITH STENT PLACEMENT WITH FLUOROSCOPY;  Surgeon: Brunner, Mark I, MD;  Location: [facility] ENDOSCOPY;  Service: Gastroenterology;  Laterality: N/A;   • EXPLORATORY LAPAROTOMY N/A 3/22/2023    Procedure: LAPAROTOMY EXPLORATORY WITH OVER SEW OF DUODENAL ULCER WITH OMENTAL PATCH AND BIOPATCH AND CENTRAL LINE PLACEMENT;  Surgeon: Aurora Kirkland MD;  Location: [facility] OR;  Service: General;  Laterality: N/A;       Family History   Problem Relation Age of Onset   • Heart disease Mother    • Heart disease Father    • Heart disease Brother    • Breast cancer Neg Hx        Social History     Socioeconomic History   • Marital status:    Tobacco Use   • Smoking status: Former     Types: Cigarettes   • Smokeless tobacco: Never   Vaping Use   • Vaping Use: Never used   Substance and Sexual Activity   • Alcohol use: No   • Drug use: No   • Sexual activity: Defer       Allergies   Allergen Reactions   • Motrin [Ibuprofen] Anaphylaxis and Hives   • Novocain [Procaine] Other (See Comments)     Pt state she passes out.         Medication:    Current Facility-Administered Medications:   •  acetaminophen (TYLENOL) tablet 650 mg, 650 mg, Per PEG Tube, Q4H PRN **OR** acetaminophen (TYLENOL) 160 MG/5ML solution 650 mg, 650 mg, Per PEG Tube, Q4H PRN, 650 mg at 04/23/23 0958 **OR** acetaminophen (TYLENOL) suppository 650 mg, 650 mg, Rectal, Q4H PRN, Brunner, Mark I, MD  •  aluminum-magnesium hydroxide-simethicone (MAALOX MAX) 400-400-40 MG/5ML suspension 15 mL, 15 mL, Per PEG Tube, Q6H PRN, Tanner Roe, PharmD  •  aspirin chewable tablet 81 mg, 81 mg, Per PEG Tube, Daily, Brunner, Mark I, MD, 81 mg at 05/01/23 0841  •  [DISCONTINUED] sennosides-docusate (PERICOLACE) 8.6-50 MG per tablet 2 tablet, 2 tablet, Per PEG Tube, BID **AND** polyethylene glycol (MIRALAX) packet 17 g, 17 g, Per PEG Tube,  Daily PRN, 17 g at 04/25/23 1023 **AND** [DISCONTINUED] bisacodyl (DULCOLAX) EC tablet 5 mg, 5 mg, Oral, Daily PRN **AND** bisacodyl (DULCOLAX) suppository 10 mg, 10 mg, Rectal, Daily PRN, Brunner, Mark I, MD, 10 mg at 04/25/23 1023  •  carvedilol (COREG) tablet 25 mg, 25 mg, Per PEG Tube, BID With Meals, Aleta Hatch, APRJENNIFER, 25 mg at 04/27/23 1708  •  cloNIDine (CATAPRES) tablet 0.1 mg, 0.1 mg, Per PEG Tube, TID PRN, Brunner, Mark I, MD, 0.1 mg at 04/17/23 0413  •  sennosides (SENOKOT) 8.8 MG/5ML syrup 10 mL, 10 mL, Per PEG Tube, BID, 10 mL at 05/01/23 2148 **AND** docusate sodium (COLACE) liquid 100 mg, 100 mg, Per PEG Tube, BID, Brunner, Mark I, MD, 100 mg at 05/01/23 2148  •  Enoxaparin Sodium (LOVENOX) syringe 40 mg, 40 mg, Subcutaneous, Nightly, Catalina Leonardo MD, 40 mg at 05/01/23 2148  •  fluconazole (DIFLUCAN) tablet 200 mg, 200 mg, Oral, Q24H, Ruy Nuñez MD, 200 mg at 05/01/23 1742  •  gabapentin (NEURONTIN) capsule 100 mg, 100 mg, Per PEG Tube, Q12H, Clare De Anda MD, 100 mg at 05/01/23 2147  •  HYDROcodone-acetaminophen (NORCO) 7.5-325 MG per tablet 1 tablet, 1 tablet, Per PEG Tube, Q4H PRN, Catalina Leonardo MD, 1 tablet at 05/02/23 0205  •  HYDROcodone-acetaminophen (NORCO) 7.5-325 MG per tablet 1 tablet, 1 tablet, Per PEG Tube, Q6H, María Elena Vazquez APRJENNIFER, 1 tablet at 05/02/23 0509  •  hydrOXYzine (ATARAX) tablet 25 mg, 25 mg, Per PEG Tube, TID PRN, María Elena Vazquez, APRN, 25 mg at 05/01/23 2148  •  ipratropium-albuterol (DUO-NEB) nebulizer solution 3 mL, 3 mL, Nebulization, Q4H PRN, Clare De Anda MD, 3 mL at 04/30/23 0055  •  ipratropium-albuterol (DUO-NEB) nebulizer solution 3 mL, 3 mL, Nebulization, 4x Daily - RT, Jenn Rios, APRJENNIFER, 3 mL at 05/02/23 0652  •  lansoprazole (PREVACID SOLUTAB) disintegrating tablet Tablet Delayed Release Dispersible 30 mg, 30 mg, Per G Tube, BID AC, Shalini Frye II, DO, 30 mg at 05/01/23 1740  •  levothyroxine (SYNTHROID, LEVOTHROID) tablet 112 mcg, 112  mcg, Per PEG Tube, Q AM, Brunner, Mark I, MD, 112 mcg at 23 0509  •  lidocaine (LIDODERM) 5 % 1 patch, 1 patch, Transdermal, Q24H, Clare De Anda MD, 1 patch at 23 0839  •  lidocaine (LMX) 4 % cream 1 application, 1 application, Topical, Q6H, María Elena Vazquez APRN, 1 application at 23 0205  •  LORazepam (ATIVAN) tablet 0.5 mg, 0.5 mg, Oral, Q6H PRN, Shalini Frye II, DO, 0.5 mg at 23 1126  •  metoclopramide (REGLAN) solution 5 mg, 5 mg, Per G Tube, TID AC, Flora Franco, DO, 5 mg at 23 1740  •  morphine injection 2 mg, 2 mg, Intravenous, Q2H PRN, María Elena Vazquez, APRN, 2 mg at 23 0038  •  [DISCONTINUED] ondansetron (ZOFRAN) tablet 4 mg, 4 mg, Oral, Q6H PRN **OR** ondansetron (ZOFRAN) injection 4 mg, 4 mg, Intravenous, Q6H PRN, Brunner, Mark I, MD, 4 mg at 23 0959  •  promethazine (PHENERGAN) suppository 12.5 mg, 12.5 mg, Rectal, Q4H PRN, Anaya Jones MD  •  sodium chloride 0.9 % flush 10 mL, 10 mL, Intravenous, Q12H, Brunner, Mark I, MD, 10 mL at 23 2149  •  sodium chloride 0.9 % flush 10 mL, 10 mL, Intravenous, PRN, Brunner, Mark I, MD, 10 mL at 23 1704  •  sodium chloride 0.9 % infusion 40 mL, 40 mL, Intravenous, PRN, Brunner, Mark I, MD, 40 mL at 23 0902    Antibiotics:  Anti-Infectives (From admission, onward)    Ordered     Dose/Rate Route Frequency Start Stop    23 1206  fluconazole (DIFLUCAN) tablet 200 mg        Ordering Provider: Ruy Nuñez MD    200 mg Oral Every 24 Hours 23 1300 23 1259    23 1628  cefepime (MAXIPIME) 2 g/100 mL 0.9% NS (mbp)        Ordering Provider: Ruy Nuñez MD    2 g  200 mL/hr over 30 Minutes Intravenous Once 23 1700 23 1838            Review of Systems:  See HPI      Physical Exam:   Vital Signs  Temp (24hrs), Av.3 °F (35.2 °C), Min:94 °F (34.4 °C), Max:97.6 °F (36.4 °C)    Temp  Min: 94 °F (34.4 °C)  Max: 97.6 °F (36.4 °C)  BP  Min: 93/53  Max:  102/65  Pulse  Min: 85  Max: 102  Resp  Min: 14  Max: 17  SpO2  Min: 96 %  Max: 100 %    GENERAL: . She is debilitated and ill-appearing.  HEENT: Normocephalic, atraumatic.  PERRL. EOMI. No conjunctival injection. No icterus. Oropharynx clear without evidence of thrush or exudate. .  NECK: Supple .  HEART: RRR; No murmur, rubs, gallops.   LUNGS: few scattered crackles on 3L NC  ABDOMEN: Left upper quadrant PEG is in place with no exit site erythema or drainage.  She has a left upper quadrant drain in place with minimal active drainage.  EXT:  .1+ lower extremity edema  :  Without Barriga catheter.  MSK: No joint effusions or erythema  SKIN: Warm and dry without cutaneous eruptions on Inspection/palpation.    NEURO: Oriented to PPT.  Motor 5/5 strength  PSYCHIATRIC: Normal insight and judgment. Cooperative with PE    Laboratory Data    Results from last 7 days   Lab Units 05/02/23  0345 04/27/23  0519 04/26/23  0420   WBC 10*3/mm3 12.98* 12.00* 14.90*   HEMOGLOBIN g/dL 9.0* 8.3* 8.8*   HEMATOCRIT % 29.0* 26.0* 27.5*   PLATELETS 10*3/mm3 352 326 345     Results from last 7 days   Lab Units 05/01/23  0341   SODIUM mmol/L 132*   POTASSIUM mmol/L 3.8   CHLORIDE mmol/L 96*   CO2 mmol/L 28.0   BUN mg/dL 17   CREATININE mg/dL 0.44*   GLUCOSE mg/dL 97   CALCIUM mg/dL 8.8                             Estimated Creatinine Clearance: 90.6 mL/min (A) (by C-G formula based on SCr of 0.44 mg/dL (L)).      Microbiology:  Microbiology Results (last 10 days)     Procedure Component Value - Date/Time    Anaerobic Culture - Drainage, Peritoneum [[id number]]  (Normal) Collected: 04/26/23 1143    Lab Status: Final result Specimen: Drainage from Peritoneum Updated: 05/01/23 0641     Anaerobic Culture No anaerobes isolated at 5 days    Body Fluid Culture - Body Fluid, Peritoneum [[id number]]  (Abnormal)  (Susceptibility) Collected: 04/26/23 1142    Lab Status: Final result Specimen: Body Fluid from Peritoneum Updated: 05/01/23 0918     Body  Fluid Culture Rare Candida glabrata     Gram Stain Many (4+) WBCs seen      No organisms seen    Susceptibility      Candida glabrata      YVAN      Fluconazole Susceptible      Micafungin Susceptible                           Urine Culture - Urine, Straight Cath [[id number]]  (Normal) Collected: 04/25/23 0950    Lab Status: Final result Specimen: Urine from Straight Cath Updated: 04/26/23 1037     Urine Culture No growth              Radiology:  Imaging Results (Last 72 Hours)     ** No results found for the last 72 hours. **            Impression:   1.  Candida glabrata intra-abdominal abscess-related to her perforated duodenal ulcer versus the G-tube placement.  She underwent CT-guided drainage of the abscess on 4/26, I will plan to switch her to oral fluconazole since her Candida glabrata is fluconazole sensitive.  It would be appropriate to continue fluconazole for 2-3 weeks as a comfort measure.  2.  Duodenal ulcer perforation-status post surgery 3/22/23 with omental patch/Biopatch repair  3.  Adenocarcinoma of the lung-recently diagnosed with a pleural biopsy  4.  Dysphagia/external esophageal compression-status post esophageal stent placement on 4/12/23  5.  Leukocytosis/neutrophilia-secondary to intra-abdominal infection    PLAN/RECOMMENDATIONS:  1.  Continue fluconazole 200 mg by mouth daily as a comfort measure  2.  Remove the abscess drainage catheter  3.  Transition to hospice as she is transitioning to inpatient hospice today    I discussed her complex situation again with Dr. Frye today.  I will sign off.    Ruy Nuñez MD  5/2/2023  06:58 EDT

## 2023-05-02 NOTE — SIGNIFICANT NOTE
Exam confirms with auscultation zero audible heart tones and zero audible respirations. Ms. Christine Garg was pronounced dead at 1930  MD notified by Patient’s RN    Herlinda Fox, RN  Clinical House Supervisor    5/2/2023 1959

## 2023-05-02 NOTE — CONSULTS
Clinical Nutrition     Nutrition Support Assessment  Reason for Visit: Follow-up protocol      Patient Name: Christine Garg  YOB: 1933  MRN: [id number]  Date of Encounter: 05/02/23 12:46 EDT  Admission date: 5/2/2023    Comments:    Pt now admitted to IP hospice 2/2 decline in status. RD will sign off at this time. Please consult if GOC should change.     Nutrition Assessment   Admission Diagnosis:  No admission diagnoses are documented for this encounter.      Problem List:    * No active hospital problems. *        PMH:   She  has a past medical history of Advanced age, CAD (coronary artery disease) (2011), Chronic kidney disease (CKD), stage III (moderate), COPD (chronic obstructive pulmonary disease), History of tobacco use, Hyperlipidemia, Hypertension, Hypothyroidism, PONV (postoperative nausea and vomiting), and Pulmonary nodule.    PSH:  She  has a past surgical history that includes Breast biopsy (Left); Cataract extraction; Coronary angioplasty; Coronary artery bypass graft; Exploratory Laparotomy (N/A, 3/22/2023); Esophagogastroduodenoscopy w/ PEG (N/A, 4/7/2023); and ENDOSCOPY W/ STENT PLACEMENT/REMOVAL (N/A, 4/12/2023).      Applicable Nutrition Concerns:   Skin: surgical incision abdoment  Oral:  GI: PEG      Applicable Interval History:     3/22: duodenal ulcer repair  4/7: PEG placed at [facility]  4/11: initiated EN  4/12: esophageal stent placement  4/14: Nocturnal feeds initiated    Reported/Observed/Food/Nutrition Related History:     5/2  Pt admitted to IP hospice.     4/27  Per MD, pt wishing to stop nocturnal feeds. SLP evaluated for ability to advance to pureed diet. Continued recommendation for full liquids. Suspect pt will continue with inadequate energy intake however will d/c EN order as requested per MD. Bowels moving - continues receiving bowel regimen.       4/26  Pt noted to have abscess at PEG site that was drained in IR today. Continues with anorexia -  discussing possible diet advance with MD. Concern for continued esophageal dysphagia - SLP recommending GI re-eval vs MBS due to location. Hyponatremia persists, but slightly improved - MD d/c'd flushes on 4/25. Discussed with RN to limited water with meds. Pt bowels now moving - per CT abdomen, scattered unformed stool noted in colon. Would  Expect loose stools due to bowel regimen    4/25  Pt continues with severely limited PO intake with c/o nausea and abdominal tightness. Continues with inability to advance nocturnal feeds to goal. KUB ordered per MD - free air noted. Discussed nutritional concerns hospitalist, reports plan for CT abdomen to assess air noted on KUB. Will continue to monitor for results prior to making additional nutrition intervention recommendations.     4/24  Pt reports passing large stool ball this morning. Continues with limited PO acceptance 2/2 nausea. Pt has not tolerated nocturnal EN >35mL/hr. Hyponatremia noted.     4/20  Pt has not tolerated EN >30mL/hr; c/o nausea and residual of 50% delivered TF volume on AM check. Per MD note, will obtain CT abdomen. No documented BM x4 days, suspect may be contributing factor for PO intolerance and nausea with EN. Receiving prokinetic agent and Zofran. Pt reports inability to drink ONS 2/2 fullness from soup broth - encouraged to drink ONS or hold off tray to have between meals as able. Noted pt lost IV access, all meds PO.       4/17  Tolerating nocturnal feeds at 30mL/hr - denies nausea or abdominal discomfort at this rate. Per MD note, confusion about EN order from RN - plan to start at 30mL/hr and advance to goal overnight tonight. Continues to drink Boost Breeze without difficulty. Documented BM x1 in past 24hrs.     4/14  Pt son concerned for poor PO acceptance. Plan to initiate nocturnal feeds with hope of improved tolerance - ? Ability to deliver >15mL/hr. Per RN, pt tolerating meds via PEG with additional flush without difficulty.  "    4/12  Pt unable to tolerate EN at 15mL/hr - c/o abdominal tightness. Esophageal stent placed - diet upgraded to full liquids. Reports tolerating 1 cup of OJ.  Pt does not like original ONS but agreeable to Breeze.     4/11  Pt laying in bed with family at bedside - able to provide some weight/nutrition hx, additional information provided by spouse and son at bedside. Endorsed recent hx of inability to tolerate PO intake of any kind without emesis for ~3wks prior to hospitalization in March. PEG placed 2/2 esophageal stricture and inability for diet advance past clear liquids. Unable to tolerate EN order of Nutren 1.5 >20mL/hr (goal was 50mL/hr) 2/2 abdominal tightness. Mild hyponatremia noted. Denies N/V/D - reports soft BM. Plan for esophageal stent placement - ? Timeline.   Educated on EN formula most likely to choose, verbalized understanding.     Labs    Labs Reviewed: Yes     Results from last 7 days   Lab Units 05/01/23  0341 04/30/23  0857 04/27/23  0519 04/26/23  0420   GLUCOSE mg/dL 97 106* 108* 96   BUN mg/dL 17 16 14 12   CREATININE mg/dL 0.44* 0.44* 0.37* 0.28*   SODIUM mmol/L 132* 128* 132* 129*   CHLORIDE mmol/L 96* 91* 94* 89*   POTASSIUM mmol/L 3.8 3.8 3.7 3.7   PHOSPHORUS mg/dL  --   --  3.6 3.2       Results from last 7 days   Lab Units 04/27/23  0519 04/26/23  0420   ALBUMIN g/dL 2.1* 2.2*       Results from last 7 days   Lab Units 04/29/23  0040 04/28/23  1653 04/28/23  1143 04/28/23  0617 04/28/23  0014 04/27/23  1647   GLUCOSE mg/dL 115 116 130 119 122 115     Lab Results   Lab Value Date/Time    HGBA1C 6.10 (H) 03/22/2023 1139    HGBA1C 6.00 (H) 12/19/2022 1638                 Medications    Medications Reviewed: Yes    Intake/Ouptut 24 hrs (0701 - 0700)   I&O's Reviewed: Yes       Anthropometrics     Flowsheet Rows    Flowsheet Row First Filed Value   Admission Height 165.1 cm (65\") Documented at 04/11/2023 0532   Admission Weight 66.2 kg (146 lb) Documented at 04/11/2023 0532    " "      Height:  65\"  Last Filed Weight:  146lbs  Method:  ?  BMI:  24.30  BMI classification: Normal: 18.5-24.9kg/m2  IBW:  125lbs    UBW:      Weight       Weight (kg) Weight (lbs) Weight Method Visit Report   12/19/2022 70.081 kg  154 lb 8 oz  Bed scale      68.04 kg  150 lb  Stated      68.04 kg  150 lb      12/20/2022 --  --      12/21/2022 70.943 kg  156 lb 6.4 oz  Bed scale     12/22/2022 71.578 kg  157 lb 12.8 oz  Bed scale     12/23/2022 71.487 kg  157 lb 9.6 oz  Bed scale     12/24/2022 74.118 kg  163 lb 6.4 oz  Bed scale     12/25/2022 73.936 kg  163 lb  Bed scale     1/5/2023 68.04 kg  150 lb  Stated     3/22/2023 71.895 kg  158 lb 8 oz  Bed scale      56.7 kg  125 lb  Standing scale      56.7 kg  125 lb  Stated     3/25/2023 70.625 kg  155 lb 11.2 oz  Bed scale     3/26/2023 67.722 kg  149 lb 4.8 oz  Bed scale     3/28/2023 67.042 kg  147 lb 12.8 oz  Bed scale     3/29/2023 65.227 kg  143 lb 12.8 oz  Bed scale     3/30/2023 65.454 kg  144 lb 4.8 oz  Bed scale     3/31/2023 66.271 kg  146 lb 1.6 oz  Bed scale     4/1/2023 66.18 kg  145 lb 14.4 oz  Bed scale     4/2/2023 66.906 kg  147 lb 8 oz  Bed scale     4/3/2023 65.363 kg  144 lb 1.6 oz  Bed scale     4/4/2023 65.817 kg  145 lb 1.6 oz  Bed scale     4/11/2023 66.225 kg  146 lb  Bed scale       Weight change:   Had a significant weight loss of 12lbs (7.6%) in ~1 month     Nutrition Focused Physical Exam     Date: 4/11     NFPE completed, patient meets criteria for MSA at this time.       Current Nutrition Prescription     PO: No diet orders on file  Oral Nutrition Supplement:     Nutrition Diagnosis   Date: 4/11 Updated:   Problem Malnutrition acute severe   Etiology Dysphagia & recent abdominal surgery   Signs/Symptoms significant weight loss of 7.6% x1 month & <50% of EEN for >5d.    Status:     Date: 4/11  Updated: 4/12, 4/14, 4/27  Problem Inadequate enteral nutrition infusion   Etiology Dysphagia   Signs/Symptoms EN not yet restarted, intolerance " to previous regimen and not at goal   Status: resolved    Date:  5/2 Updated:  Problem Predicted suboptimal energy intake   Etiology Overall decline in status   Signs/Symptoms Admission to IP hospice   Status:      Goal:   General: Hospice care  PO: Tolerate PO as desired  EN/PN: d/c    Nutrition Intervention      Follow treatment progress, Care plan reviewed    Monitoring/Evaluation:   Per protocol, POC/GOC      Suad Emmanuel, MS,RD,LD  Time Spent: 10min

## 2023-05-03 NOTE — DISCHARGE SUMMARY
Date of Admission: 05/02/2023   Date and Time of Death: 5/2/2023 at 7:30 PM    Hospital Course:  Christine Garg is a 89 y.o. female admitted to inpatient hospice with diagnosis of Adenocarcinoma of lung [C34.90]  for symptom management. Medications were available throughout admission for symptom management and comfort. Patient continued to decline after admission as expected ultimately to their death on 5/2/2023 at 7:30 PM. Patient was pronounced dead by Clinical House Supervisor. Spiritual and psychosocial support were available to patient and family throughout admission. Patient's remains were released per [facility] protocol.    Chanell Gutierrez, MSN, APRN  [facility]  Hospice and Palliative Care Nurse Practitioner  05/03/23  10:51 EDT

## 2023-05-04 ENCOUNTER — SPECIALTY PHARMACY (OUTPATIENT)
Dept: PHARMACY | Facility: HOSPITAL | Age: 88
End: 2023-05-04

## 2023-07-05 NOTE — PLAN OF CARE
Goal Outcome Evaluation:  Plan of Care Reviewed With: patient        Progress: improving  Outcome Evaluation: Patient continues to present with functional limitations including decreased functional endurance/activity tolerance, impaired balance, and generalized weakness which impact functional transfers and ADL performance. She has been participating in skilled services with improvement in endurance noted, but still relies on staff/therapy assist beyond prior level of function.   General Sunscreen Counseling: I recommended over-the-counter SPF 30 or higher sunscreen applied prior to going outdoors, even on cloudy days, and the use of broad-brimmed hats and sun-protective clothing. I advised that sunscreen should be reapplied every 2 hours, and immediately after swimming. I recommended the use of a daily facial moisturizer containing SPF 30 or higher sunscreen. Products Recommended: Daily facial moisturizer - Cetaphil oil control moisturizer with SPF 30 Detail Level: Zone

## (undated) DEVICE — HARMONIC FOCUS SHEARS 9CM LENGTH + ADAPTIVE TISSUE TECHNOLOGY FOR USE WITH BLUE HAND PIECE ONLY: Brand: HARMONIC FOCUS

## (undated) DEVICE — PENCL ES MEGADINE EZ/CLEAN BUTN W/HOLSTR 10FT

## (undated) DEVICE — ELECTRD BLD EDGE/INSUL1P SFTY SLV 2.75IN

## (undated) DEVICE — HOLDER: Brand: DEROYAL

## (undated) DEVICE — POOLE SUCTION INSTRUMENT WITH REMOVABLE SHEATH: Brand: POOLE

## (undated) DEVICE — THE DISPOSABLE RAPTOR GRASPING DEVICE IS USED TO GRASP TISSUE AND/OR RETRIEVE FOREIGN BODIES, EXCISED TISSUE AND STENTS DURING ENDOSCOPIC PROCEDURES.: Brand: RAPTOR

## (undated) DEVICE — JELLY,LUBE,STERILE,FLIP TOP,TUBE,2-OZ: Brand: MEDLINE

## (undated) DEVICE — THE BITE BLOCK MAXI, LATEX FREE STRAP IS USED TO PROTECT THE ENDOSCOPE INSERTION TUBE FROM BEING BITTEN BY THE PATIENT.

## (undated) DEVICE — DRAPE,UTILTY,TAPE,15X26, 4EA/PK: Brand: MEDLINE

## (undated) DEVICE — TBG FEED PULL FLOW20 NO/DRUG 20F 4.47MM 150CM

## (undated) DEVICE — ELECTRD BLD EZ CLN STD 6.5IN

## (undated) DEVICE — NDL HYPO ECLPS SFTY 25G 1 1/2IN

## (undated) DEVICE — Device: Brand: DEFENDO AIR/WATER/SUCTION AND BIOPSY VALVE

## (undated) DEVICE — ELECTRD NDL MEGADYNE EZCLEAN NOSE 7CM

## (undated) DEVICE — 3M™ IOBAN™ 2 ANTIMICROBIAL INCISE DRAPE 6650EZ: Brand: IOBAN™ 2

## (undated) DEVICE — JACKSON-PRATT 100CC BULB RESERVOIR: Brand: CARDINAL HEALTH

## (undated) DEVICE — TUBING, SUCTION, 1/4" X 10', STRAIGHT: Brand: MEDLINE

## (undated) DEVICE — ANTIBACTERIAL UNDYED BRAIDED (POLYGLACTIN 910), SYNTHETIC ABSORBABLE SUTURE: Brand: COATED VICRYL

## (undated) DEVICE — CONTN GRAD MEAS TRIANG 32OZ BLK

## (undated) DEVICE — SUT SILK 3/0 SH CR8 18IN C013D

## (undated) DEVICE — SYR LUERLOK 50ML

## (undated) DEVICE — PK BASIC 70

## (undated) DEVICE — SUT VIC 0 LIGA J287G

## (undated) DEVICE — PATIENT RETURN ELECTRODE, SINGLE-USE, CONTACT QUALITY MONITORING, ADULT, WITH 9FT CORD, FOR PATIENTS WEIGING OVER 33LBS. (15KG): Brand: MEGADYNE

## (undated) DEVICE — SPNG ENDO BEDSIDE TUB ENZYM

## (undated) DEVICE — 3M™ STERI-DRAPE™ ISOLATION BAG, 10 PER CARTON / 4 CARTONS PER CASE, 1003: Brand: 3M™ STERI-DRAPE™

## (undated) DEVICE — Device

## (undated) DEVICE — DRN WND HUBLSS FLUT FULL PERF SIL10MM

## (undated) DEVICE — PROXIMATE RH ROTATING HEAD SKIN STAPLERS (35 WIDE) CONTAINS 35 STAINLESS STEEL STAPLES: Brand: PROXIMATE

## (undated) DEVICE — ELECTRD BLD EZ CLN MOD 4IN

## (undated) DEVICE — DBD-DRAPE,LAP,CHOLE,W/TROUGHS,STERILE: Brand: MEDLINE

## (undated) DEVICE — KT ORCA ORCAPOD DISP STRL

## (undated) DEVICE — GLV SURG PREMIERPRO MIC LTX PF SZ7 BRN

## (undated) DEVICE — APPL CHLORAPREP HI/LITE 26ML ORNG

## (undated) DEVICE — INTRO ACCSR BLNT TP